# Patient Record
Sex: FEMALE | Race: BLACK OR AFRICAN AMERICAN | NOT HISPANIC OR LATINO | Employment: OTHER | URBAN - METROPOLITAN AREA
[De-identification: names, ages, dates, MRNs, and addresses within clinical notes are randomized per-mention and may not be internally consistent; named-entity substitution may affect disease eponyms.]

---

## 2017-04-12 ENCOUNTER — PATIENT OUTREACH (OUTPATIENT)
Dept: ADMINISTRATIVE | Facility: HOSPITAL | Age: 67
End: 2017-04-12

## 2017-04-12 NOTE — PROGRESS NOTES
Left message w/ the gentlemen who answered to have her call our office. Tried calling home number, I was unable to leave a message because the mailbox was full.    A letter was mailed to the patient on today in regards to the information below.    The patient is over due for a diabetes and hypertension follow up and fasting blood work w/ urine. The patient is also due for immunizations(pneumonia,tetanus,zoster, & flu), papsmear, mammogram, colonoscopy, and a diabetic eye and foot exam.

## 2017-09-25 DIAGNOSIS — E11.9 DIABETES MELLITUS WITHOUT COMPLICATION: Primary | ICD-10-CM

## 2021-08-24 ENCOUNTER — CLINICAL SUPPORT (OUTPATIENT)
Dept: URGENT CARE | Facility: CLINIC | Age: 71
End: 2021-08-24
Payer: MEDICARE

## 2021-08-24 DIAGNOSIS — Z78.9 NO KNOWN HEALTH PROBLEMS: Primary | ICD-10-CM

## 2021-08-24 DIAGNOSIS — U07.1 COVID-19 VIRUS DETECTED: ICD-10-CM

## 2021-08-24 LAB
CTP QC/QA: YES
SARS-COV-2 RDRP RESP QL NAA+PROBE: POSITIVE

## 2021-08-24 PROCEDURE — U0002 COVID-19 LAB TEST NON-CDC: HCPCS | Mod: QW,CR,S$GLB, | Performed by: FAMILY MEDICINE

## 2021-08-24 PROCEDURE — U0002: ICD-10-PCS | Mod: QW,CR,S$GLB, | Performed by: FAMILY MEDICINE

## 2021-08-24 PROCEDURE — 99211 OFF/OP EST MAY X REQ PHY/QHP: CPT | Mod: S$GLB,,, | Performed by: FAMILY MEDICINE

## 2021-08-24 PROCEDURE — 99211 PR OFFICE/OUTPT VISIT, EST, LEVL I: ICD-10-PCS | Mod: S$GLB,,, | Performed by: FAMILY MEDICINE

## 2022-01-31 ENCOUNTER — PATIENT OUTREACH (OUTPATIENT)
Dept: ADMINISTRATIVE | Facility: HOSPITAL | Age: 72
End: 2022-01-31
Payer: MEDICARE

## 2022-02-06 ENCOUNTER — HOSPITAL ENCOUNTER (INPATIENT)
Facility: HOSPITAL | Age: 72
LOS: 5 days | Discharge: HOME | DRG: 066 | End: 2022-02-11
Attending: EMERGENCY MEDICINE | Admitting: INTERNAL MEDICINE
Payer: MEDICARE

## 2022-02-06 DIAGNOSIS — E11.40 TYPE 2 DIABETES MELLITUS WITH DIABETIC NEUROPATHY: ICD-10-CM

## 2022-02-06 DIAGNOSIS — I10 HYPERTENSION, UNCONTROLLED: ICD-10-CM

## 2022-02-06 DIAGNOSIS — I65.01 VERTEBRAL ARTERY OCCLUSION, RIGHT: ICD-10-CM

## 2022-02-06 DIAGNOSIS — I63.9 STROKE: ICD-10-CM

## 2022-02-06 DIAGNOSIS — I63.9 CEREBROVASCULAR ACCIDENT (CVA), UNSPECIFIED MECHANISM: Primary | ICD-10-CM

## 2022-02-06 PROBLEM — D64.9 NORMOCYTIC ANEMIA: Status: ACTIVE | Noted: 2022-02-06

## 2022-02-06 PROBLEM — I63.211 CEREBROVASCULAR ACCIDENT (CVA) DUE TO OCCLUSION OF RIGHT VERTEBRAL ARTERY: Status: ACTIVE | Noted: 2022-02-06

## 2022-02-06 PROBLEM — F17.210 MODERATE CIGARETTE SMOKER (10-19 PER DAY): Status: ACTIVE | Noted: 2022-02-06

## 2022-02-06 PROBLEM — E66.3 OVERWEIGHT WITH BODY MASS INDEX (BMI) OF 25 TO 25.9 IN ADULT: Status: ACTIVE | Noted: 2022-02-06

## 2022-02-06 PROBLEM — E87.6 HYPOKALEMIA: Status: ACTIVE | Noted: 2022-02-06

## 2022-02-06 LAB
ALBUMIN SERPL BCP-MCNC: 3 G/DL (ref 3.5–5.2)
ALP SERPL-CCNC: 81 U/L (ref 55–135)
ALT SERPL W/O P-5'-P-CCNC: 6 U/L (ref 10–44)
ANION GAP SERPL CALC-SCNC: 14 MMOL/L (ref 8–16)
ANION GAP SERPL CALC-SCNC: 9 MMOL/L (ref 8–16)
APTT BLDCRRT: 26.3 SEC (ref 21–32)
AST SERPL-CCNC: 10 U/L (ref 10–40)
BASOPHILS # BLD AUTO: 0.02 K/UL (ref 0–0.2)
BASOPHILS NFR BLD: 0.3 % (ref 0–1.9)
BILIRUB SERPL-MCNC: 0.2 MG/DL (ref 0.1–1)
BUN SERPL-MCNC: 17 MG/DL (ref 8–23)
BUN SERPL-MCNC: 20 MG/DL (ref 6–30)
CALCIUM SERPL-MCNC: 8.9 MG/DL (ref 8.7–10.5)
CHLORIDE SERPL-SCNC: 104 MMOL/L (ref 95–110)
CHLORIDE SERPL-SCNC: 105 MMOL/L (ref 95–110)
CHOLEST SERPL-MCNC: 333 MG/DL (ref 120–199)
CHOLEST/HDLC SERPL: 6.7 {RATIO} (ref 2–5)
CO2 SERPL-SCNC: 24 MMOL/L (ref 23–29)
CREAT SERPL-MCNC: 1.1 MG/DL (ref 0.5–1.4)
CREAT SERPL-MCNC: 1.3 MG/DL (ref 0.5–1.4)
CTP QC/QA: YES
DIFFERENTIAL METHOD: ABNORMAL
EOSINOPHIL # BLD AUTO: 0.1 K/UL (ref 0–0.5)
EOSINOPHIL NFR BLD: 1.9 % (ref 0–8)
ERYTHROCYTE [DISTWIDTH] IN BLOOD BY AUTOMATED COUNT: 15.7 % (ref 11.5–14.5)
EST. GFR  (AFRICAN AMERICAN): 48 ML/MIN/1.73 M^2
EST. GFR  (NON AFRICAN AMERICAN): 42 ML/MIN/1.73 M^2
GLUCOSE SERPL-MCNC: 249 MG/DL (ref 70–110)
GLUCOSE SERPL-MCNC: 262 MG/DL (ref 70–110)
HCT VFR BLD AUTO: 34.2 % (ref 37–48.5)
HCT VFR BLD CALC: 37 %PCV (ref 36–54)
HDLC SERPL-MCNC: 50 MG/DL (ref 40–75)
HDLC SERPL: 15 % (ref 20–50)
HGB BLD-MCNC: 10.8 G/DL (ref 12–16)
IMM GRANULOCYTES # BLD AUTO: 0.02 K/UL (ref 0–0.04)
IMM GRANULOCYTES NFR BLD AUTO: 0.3 % (ref 0–0.5)
INR PPP: 0.9 (ref 0.8–1.2)
IRON SERPL-MCNC: 49 UG/DL (ref 30–160)
LDLC SERPL CALC-MCNC: 236 MG/DL (ref 63–159)
LYMPHOCYTES # BLD AUTO: 1.3 K/UL (ref 1–4.8)
LYMPHOCYTES NFR BLD: 19.9 % (ref 18–48)
MAGNESIUM SERPL-MCNC: 1.9 MG/DL (ref 1.6–2.6)
MCH RBC QN AUTO: 26.2 PG (ref 27–31)
MCHC RBC AUTO-ENTMCNC: 31.6 G/DL (ref 32–36)
MCV RBC AUTO: 83 FL (ref 82–98)
MONOCYTES # BLD AUTO: 0.4 K/UL (ref 0.3–1)
MONOCYTES NFR BLD: 6.6 % (ref 4–15)
NEUTROPHILS # BLD AUTO: 4.6 K/UL (ref 1.8–7.7)
NEUTROPHILS NFR BLD: 71 % (ref 38–73)
NONHDLC SERPL-MCNC: 283 MG/DL
NRBC BLD-RTO: 0 /100 WBC
PLATELET # BLD AUTO: 271 K/UL (ref 150–450)
PMV BLD AUTO: 10.4 FL (ref 9.2–12.9)
POC IONIZED CALCIUM: 1.11 MMOL/L (ref 1.06–1.42)
POC TCO2 (MEASURED): 28 MMOL/L (ref 23–29)
POCT GLUCOSE: 229 MG/DL (ref 70–110)
POCT GLUCOSE: 251 MG/DL (ref 70–110)
POTASSIUM BLD-SCNC: 3.6 MMOL/L (ref 3.5–5.1)
POTASSIUM SERPL-SCNC: 3.4 MMOL/L (ref 3.5–5.1)
PROT SERPL-MCNC: 7.1 G/DL (ref 6–8.4)
PROTHROMBIN TIME: 10 SEC (ref 9–12.5)
RBC # BLD AUTO: 4.12 M/UL (ref 4–5.4)
SAMPLE: ABNORMAL
SARS-COV-2 RDRP RESP QL NAA+PROBE: NEGATIVE
SATURATED IRON: 17 % (ref 20–50)
SODIUM BLD-SCNC: 141 MMOL/L (ref 136–145)
SODIUM SERPL-SCNC: 138 MMOL/L (ref 136–145)
TOTAL IRON BINDING CAPACITY: 293 UG/DL (ref 250–450)
TRANSFERRIN SERPL-MCNC: 198 MG/DL (ref 200–375)
TRIGL SERPL-MCNC: 235 MG/DL (ref 30–150)
TSH SERPL DL<=0.005 MIU/L-ACNC: 1.44 UIU/ML (ref 0.4–4)
WBC # BLD AUTO: 6.48 K/UL (ref 3.9–12.7)

## 2022-02-06 PROCEDURE — 20000000 HC ICU ROOM

## 2022-02-06 PROCEDURE — U0002 COVID-19 LAB TEST NON-CDC: HCPCS | Performed by: EMERGENCY MEDICINE

## 2022-02-06 PROCEDURE — 84443 ASSAY THYROID STIM HORMONE: CPT | Performed by: EMERGENCY MEDICINE

## 2022-02-06 PROCEDURE — 80061 LIPID PANEL: CPT | Performed by: EMERGENCY MEDICINE

## 2022-02-06 PROCEDURE — 93010 ELECTROCARDIOGRAM REPORT: CPT | Mod: ,,, | Performed by: INTERNAL MEDICINE

## 2022-02-06 PROCEDURE — 82330 ASSAY OF CALCIUM: CPT

## 2022-02-06 PROCEDURE — 84295 ASSAY OF SERUM SODIUM: CPT

## 2022-02-06 PROCEDURE — C9399 UNCLASSIFIED DRUGS OR BIOLOG: HCPCS | Performed by: INTERNAL MEDICINE

## 2022-02-06 PROCEDURE — 93005 ELECTROCARDIOGRAM TRACING: CPT

## 2022-02-06 PROCEDURE — 80053 COMPREHEN METABOLIC PANEL: CPT | Performed by: EMERGENCY MEDICINE

## 2022-02-06 PROCEDURE — 63600175 PHARM REV CODE 636 W HCPCS: Performed by: EMERGENCY MEDICINE

## 2022-02-06 PROCEDURE — 25000003 PHARM REV CODE 250: Performed by: INTERNAL MEDICINE

## 2022-02-06 PROCEDURE — 85610 PROTHROMBIN TIME: CPT | Performed by: EMERGENCY MEDICINE

## 2022-02-06 PROCEDURE — 85025 COMPLETE CBC W/AUTO DIFF WBC: CPT | Performed by: EMERGENCY MEDICINE

## 2022-02-06 PROCEDURE — 85730 THROMBOPLASTIN TIME PARTIAL: CPT | Performed by: EMERGENCY MEDICINE

## 2022-02-06 PROCEDURE — 82962 GLUCOSE BLOOD TEST: CPT

## 2022-02-06 PROCEDURE — 63600175 PHARM REV CODE 636 W HCPCS: Performed by: INTERNAL MEDICINE

## 2022-02-06 PROCEDURE — G0426 PR INPT TELEHEALTH CONSULT 50M: ICD-10-PCS | Mod: 95,,, | Performed by: PSYCHIATRY & NEUROLOGY

## 2022-02-06 PROCEDURE — 83735 ASSAY OF MAGNESIUM: CPT | Performed by: EMERGENCY MEDICINE

## 2022-02-06 PROCEDURE — 84466 ASSAY OF TRANSFERRIN: CPT | Performed by: INTERNAL MEDICINE

## 2022-02-06 PROCEDURE — 93010 EKG 12-LEAD: ICD-10-PCS | Mod: ,,, | Performed by: INTERNAL MEDICINE

## 2022-02-06 PROCEDURE — G0426 INPT/ED TELECONSULT50: HCPCS | Mod: 95,,, | Performed by: PSYCHIATRY & NEUROLOGY

## 2022-02-06 PROCEDURE — 82565 ASSAY OF CREATININE: CPT

## 2022-02-06 PROCEDURE — 99285 EMERGENCY DEPT VISIT HI MDM: CPT | Mod: 25

## 2022-02-06 PROCEDURE — 96374 THER/PROPH/DIAG INJ IV PUSH: CPT

## 2022-02-06 PROCEDURE — 25000003 PHARM REV CODE 250: Performed by: EMERGENCY MEDICINE

## 2022-02-06 PROCEDURE — 25500020 PHARM REV CODE 255: Performed by: EMERGENCY MEDICINE

## 2022-02-06 PROCEDURE — 36415 COLL VENOUS BLD VENIPUNCTURE: CPT | Performed by: INTERNAL MEDICINE

## 2022-02-06 PROCEDURE — 84132 ASSAY OF SERUM POTASSIUM: CPT

## 2022-02-06 PROCEDURE — 83036 HEMOGLOBIN GLYCOSYLATED A1C: CPT | Performed by: INTERNAL MEDICINE

## 2022-02-06 PROCEDURE — 99900035 HC TECH TIME PER 15 MIN (STAT)

## 2022-02-06 PROCEDURE — 85014 HEMATOCRIT: CPT

## 2022-02-06 RX ORDER — ATORVASTATIN CALCIUM 40 MG/1
80 TABLET, FILM COATED ORAL DAILY
Status: DISCONTINUED | OUTPATIENT
Start: 2022-02-07 | End: 2022-02-11 | Stop reason: HOSPADM

## 2022-02-06 RX ORDER — INSULIN ASPART 100 [IU]/ML
0-5 INJECTION, SOLUTION INTRAVENOUS; SUBCUTANEOUS EVERY 6 HOURS PRN
Status: DISCONTINUED | OUTPATIENT
Start: 2022-02-06 | End: 2022-02-10

## 2022-02-06 RX ORDER — LABETALOL HYDROCHLORIDE 5 MG/ML
10 INJECTION, SOLUTION INTRAVENOUS
Status: DISCONTINUED | OUTPATIENT
Start: 2022-02-06 | End: 2022-02-07

## 2022-02-06 RX ORDER — MULTIVITAMIN
1 TABLET ORAL DAILY
COMMUNITY

## 2022-02-06 RX ORDER — ONDANSETRON 2 MG/ML
4 INJECTION INTRAMUSCULAR; INTRAVENOUS EVERY 8 HOURS PRN
Status: DISCONTINUED | OUTPATIENT
Start: 2022-02-06 | End: 2022-02-11 | Stop reason: HOSPADM

## 2022-02-06 RX ORDER — GLUCAGON 1 MG
1 KIT INJECTION
Status: DISCONTINUED | OUTPATIENT
Start: 2022-02-06 | End: 2022-02-11 | Stop reason: HOSPADM

## 2022-02-06 RX ORDER — POLYETHYLENE GLYCOL 3350 17 G/17G
17 POWDER, FOR SOLUTION ORAL DAILY
Status: DISCONTINUED | OUTPATIENT
Start: 2022-02-07 | End: 2022-02-11 | Stop reason: HOSPADM

## 2022-02-06 RX ORDER — CLOPIDOGREL BISULFATE 75 MG/1
75 TABLET ORAL DAILY
Status: DISCONTINUED | OUTPATIENT
Start: 2022-02-07 | End: 2022-02-11 | Stop reason: HOSPADM

## 2022-02-06 RX ORDER — ENOXAPARIN SODIUM 100 MG/ML
40 INJECTION SUBCUTANEOUS EVERY 24 HOURS
Status: DISCONTINUED | OUTPATIENT
Start: 2022-02-06 | End: 2022-02-11 | Stop reason: HOSPADM

## 2022-02-06 RX ORDER — SODIUM CHLORIDE 9 MG/ML
INJECTION, SOLUTION INTRAVENOUS CONTINUOUS
Status: DISCONTINUED | OUTPATIENT
Start: 2022-02-06 | End: 2022-02-08

## 2022-02-06 RX ORDER — ASPIRIN 325 MG
325 TABLET ORAL DAILY
Status: ON HOLD | COMMUNITY
End: 2022-02-11 | Stop reason: HOSPADM

## 2022-02-06 RX ORDER — ASCORBIC ACID 500 MG
500 TABLET ORAL DAILY
COMMUNITY

## 2022-02-06 RX ORDER — LORAZEPAM 2 MG/ML
1 INJECTION INTRAMUSCULAR
Status: COMPLETED | OUTPATIENT
Start: 2022-02-06 | End: 2022-02-06

## 2022-02-06 RX ORDER — SODIUM CHLORIDE 0.9 % (FLUSH) 0.9 %
10 SYRINGE (ML) INJECTION
Status: DISCONTINUED | OUTPATIENT
Start: 2022-02-06 | End: 2022-02-11 | Stop reason: HOSPADM

## 2022-02-06 RX ORDER — IBUPROFEN 800 MG/1
800 TABLET ORAL DAILY
Status: ON HOLD | COMMUNITY
End: 2022-02-22 | Stop reason: HOSPADM

## 2022-02-06 RX ORDER — AMLODIPINE BESYLATE 10 MG/1
10 TABLET ORAL DAILY
Status: ON HOLD | COMMUNITY
End: 2022-02-22 | Stop reason: SDUPTHER

## 2022-02-06 RX ORDER — LABETALOL HYDROCHLORIDE 5 MG/ML
10 INJECTION, SOLUTION INTRAVENOUS
Status: COMPLETED | OUTPATIENT
Start: 2022-02-06 | End: 2022-02-06

## 2022-02-06 RX ORDER — ASPIRIN 81 MG/1
81 TABLET ORAL DAILY
Status: DISCONTINUED | OUTPATIENT
Start: 2022-02-07 | End: 2022-02-11 | Stop reason: HOSPADM

## 2022-02-06 RX ORDER — MUPIROCIN 20 MG/G
OINTMENT TOPICAL 2 TIMES DAILY
Status: DISCONTINUED | OUTPATIENT
Start: 2022-02-06 | End: 2022-02-11 | Stop reason: HOSPADM

## 2022-02-06 RX ORDER — CLOPIDOGREL 300 MG/1
600 TABLET, FILM COATED ORAL
Status: COMPLETED | OUTPATIENT
Start: 2022-02-06 | End: 2022-02-06

## 2022-02-06 RX ORDER — AMOXICILLIN 250 MG
1 CAPSULE ORAL 2 TIMES DAILY PRN
Status: DISCONTINUED | OUTPATIENT
Start: 2022-02-06 | End: 2022-02-11 | Stop reason: HOSPADM

## 2022-02-06 RX ORDER — ACETAMINOPHEN 325 MG/1
650 TABLET ORAL EVERY 6 HOURS PRN
Status: DISCONTINUED | OUTPATIENT
Start: 2022-02-06 | End: 2022-02-11 | Stop reason: HOSPADM

## 2022-02-06 RX ADMIN — ENOXAPARIN SODIUM 40 MG: 40 INJECTION SUBCUTANEOUS at 08:02

## 2022-02-06 RX ADMIN — POTASSIUM BICARBONATE 20 MEQ: 391 TABLET, EFFERVESCENT ORAL at 08:02

## 2022-02-06 RX ADMIN — INSULIN DETEMIR 5 UNITS: 100 INJECTION, SOLUTION SUBCUTANEOUS at 08:02

## 2022-02-06 RX ADMIN — CLOPIDOGREL BISULFATE 600 MG: 300 TABLET, FILM COATED ORAL at 05:02

## 2022-02-06 RX ADMIN — LORAZEPAM 1 MG: 2 INJECTION INTRAMUSCULAR; INTRAVENOUS at 08:02

## 2022-02-06 RX ADMIN — SODIUM CHLORIDE: 0.9 INJECTION, SOLUTION INTRAVENOUS at 11:02

## 2022-02-06 RX ADMIN — LABETALOL HYDROCHLORIDE 10 MG: 5 INJECTION INTRAVENOUS at 04:02

## 2022-02-06 RX ADMIN — IOHEXOL 75 ML: 350 INJECTION, SOLUTION INTRAVENOUS at 04:02

## 2022-02-06 NOTE — ED NOTES
Dr. Vivar in triage assessing patient. Stroke alert called. Glucose obtained 251. Pt wheeled to CT.

## 2022-02-06 NOTE — HPI
Pt is in the [facility] ED. Presenting with aphasia, dizziness and slurred speech. Symptoms started at 2:30 pm. Per ER nurse, patient symptoms are resolved.         Current Facility-Administered Medications:     labetaloL injection 10 mg, 10 mg, Intravenous, ED 1 Time, Mike Vivar MD    Current Outpatient Medications:     aspirin (ECOTRIN) 81 MG EC tablet, Take 1 tablet (81 mg total) by mouth once daily., Disp: 30 tablet, Rfl: 11    atorvastatin (LIPITOR) 80 MG tablet, Take 1 tablet (80 mg total) by mouth once daily., Disp: 90 tablet, Rfl: 3    cloNIDine (CATAPRES) 0.1 MG tablet, Take 0.1 mg by mouth 2 (two) times daily., Disp: , Rfl:     glimepiride (AMARYL) 2 MG tablet, Take 1 tablet (2 mg total) by mouth 2 (two) times daily., Disp: 60 tablet, Rfl: 11    lisinopril 10 MG tablet, Take 1 tablet (10 mg total) by mouth once daily., Disp: 30 tablet, Rfl: 2    meclizine (ANTIVERT) 25 mg tablet, Take 1 tablet (25 mg total) by mouth every 6 (six) hours as needed., Disp: 20 tablet, Rfl: 0    metformin (GLUCOPHAGE) 500 MG tablet, Take 1 tablet (500 mg total) by mouth daily with breakfast., Disp: 30 tablet, Rfl: 2    ondansetron (ZOFRAN) 4 MG tablet, Take 1 tablet (4 mg total) by mouth every 8 (eight) hours as needed (Nausea and vomiting)., Disp: 12 tablet, Rfl: 0    polyethylene glycol (COLYTE) 240-22.72-6.72 -5.84 gram SolR, Take 4,000 mLs (4 L total) by mouth as directed., Disp: 1 Bottle, Rfl: 0

## 2022-02-06 NOTE — ED PROVIDER NOTES
EM PHYSICIAN NOTE  SCRIBE #1 NOTE: I, Michael Romano, am scribing for, and in the presence of,  Mike Vivar MD. I have scribed the following portions of the note - Other sections scribed: HPI, ROS, PE.         HPI  This patient presents with a complaint of hypertension  Chief Complaint   Patient presents with    Hypertension     Pt reports /115 @ work 45 mins ago. Pt states she took norvasc 10, , and clondine 0.1 PTA.    Dizziness     Dizziness when she woke up.     Speech Problem     Slurred speech      Cerebrovascular Accident       HPI: 71 y.o. female, with a pertinent past medical history of hypertension presents to the ED with hypertension. Pt states that she was at work and around 1430 and she began to feel a variety of symptoms. Pt reports experiencing lightheadedness, blurry vision, slurred speech, dizziness, tingling in right arm and leg. Pt then measured her blood pressure and it was 215/115. Pt states that she then took a aspirin and clonidine 0.1 and all of her symptoms resolved. Pt denies being on blood thinners. No other exacerbating or alleviating factors. Patient denies other associated symptoms.       REVIEW of PMH, SOC History and Family History:  Past Medical History:   Diagnosis Date    Diabetes mellitus     Hyperlipidemia     Hypertension      Social history noncontributory  Family history noncontributory    Review of patient's allergies indicates:   Allergen Reactions    Ampicillin Rash    Darvocet a500 [propoxyphene n-acetaminophen] Other (See Comments)     shaky           REVIEW of SYSTEMS  Source: Patient  The nurse's notes and triage vital signs were reviewed.  GENERAL/CONSTITUTIONAL: SEE HPI  CARDIOVASCULAR: There is no report of chest pain   RESPIRATORY: There is no report of cough or SOB  GASTROINTESTINAL: There is no report of nausea, vomiting, diarrhea  MUSCULOSKELETAL: SEE HPI  SKIN AND BREASTS: There is no report of easy bruising, skin redness, skin  "rash.  HEMATOLOGIC/LYMPHATIC: There is no report of anemia, bleeding or clotting defects. There is no report of anticoagulant use.  The remainder of the ROS is negative.        PHYSICAL EXAMINATION    ED Triage Vitals [02/06/22 1513]   Enc Vitals Group      BP (!) 241/109      Pulse 94      Resp 18      Temp 97.9 °F (36.6 °C)      Temp src Oral      SpO2 100 %      Weight 160 lb      Height 5' 7"      Head Circumference       Peak Flow       Pain Score       Pain Loc       Pain Edu?       Excl. in GC?      Vital signs and Pulse Ox reviewed in clinical context. Abnormalities noted: hypertension  Pt's level of consciousness is alert, and the patient is in mild distress. No visual field defects. Extra ocular motion intact. No dysmetria. No pronator drift.  Skin: warm, pink and dry.  Capillary refill is less than 2 seconds.  Mucosa:moist  Head and Neck: WNL  Cardiac exam: RRR  Pulmonary exam: unlabored and clear  Abd Exam: soft nontender   Musculoskeletal: no joint tenderness, deformity or swelling. Normal strength and sensation of bilateral upper and lower extremities.  Neurologic: GCS: GCS 15; 5 over 5 strength, cranial nerves intact, neck supple.  The patient has no nystagmus.  There is normal finger to nose and rapid alternating hand movements.  There is no ataxia.  There are no visual field defects. EOMi         Initial Impression:  TIA, hypertensive urgency, stroke-like symptoms  Plan:  Stroke activation  Mike Vivar MD, 3:33 PM 2/6/2022      Medical decision making:   Nurses notes and Vital Signs reviewed.  Orders Placed This Encounter   Procedures    CT Head Without Contrast    CTA Head and Neck (xpd)    MRI Brain Ischemic Inter Pro Incl MRA W/O Con    X-Ray Chest AP Portable    CBC W/ AUTO DIFFERENTIAL    Comprehensive metabolic panel    TSH    LDL - Lipid Panel    Protime-INR    APTT    Vitamin B12    Folate    Iron and TIBC    Magnesium    Urinalysis, Reflex to Urine Culture Urine, Clean " Catch    Magnesium    Comprehensive metabolic panel    Magnesium    Phosphorus    Hemoglobin A1c    Troponin I    CK-MB    CBC auto differential    APTT    Protime-INR    Urinalysis, Reflex to Urine Culture Urine, Clean Catch    Sedimentation rate    Urinalysis Microscopic    Diet diabetic [facility]; 2000 Calorie    Vital signs    Nursing swallow assessment No food, oral fluids, or oral meds until after patient passes screen. Notify MD of Results    Vital signs    Check temperature    Cardiac Monitoring - Adult    Neuro checks:  LOC, Orientation, GCS, Speech, Gaze, Pupils, Visual Fields, Facial Symmetry, Motor, Sensation, Neglect, Ataxia    Intake and output    Measure height or length    Weigh patient    Skin assessment    Passive range of motion to affected extremity    Provide stroke education    Contact MD / APC if patient requires a face mask to maintain adequate oxygenation    Straight Cath    Tobacco cessation counseling    Nursing communication-     Nursing communication    Nursing communication    Toilet out of bed or at bedside commode    Notify Provider    Complete Renner Screening Assessment    Place sequential compression device    Notify Stroke Coordinator    Notify Provider    Notify Provider    Place MIRIAN hose    Neuro checks:  LOC, Orientation, GCS    If any glucose result is less than 50 or greater than 400:    If 2nd result is less than 50 or greater than 400:    Re-check Blood Glucose    Nursing swallow assessment Please notify MD if fails nursing bedside swallow study    Notify Provider    Full code    Consult to Telemedicine - Acute Stroke    IP consult to case management/social work    Inpatient consult to Neurology    Inpatient consult to Social Work/Case Management    OT evaluate and treat    PT evaluate and treat    Oxygen Continuous    Pulse Oximetry Q4H    CARDIAC MONITORING STRIPS    POCT glucose    POCT COVID-19 Rapid  Screening    ECG 12 lead    Echo Saline Bubble? Yes    Insert peripheral IV    Insert saline lock    Possible Hospitalization    Admit to Inpatient    Transfer patient    Aspiration precautions    Fall precautions       ED Course as of 02/07/22 1408   Sun Feb 06, 2022   1536 BP(!): 241/109 [MH]   1618 Per Dr. De Oliveira, if patient has evidence of large vessel occlusion please transfer to [facility] otherwise start Plavix and aspirin and admit to our campus [MH]   1656 CTA:     Absence of flow within the V3 segment right vertebral artery, suggestive of occlusion.  MRI of the brain obtained for further evaluation.     Otherwise, unremarkable CTA of the head and neck.     Case discussed with Dr. Vivar 02/06/2022 at 16:49. [MH]   1706 Patient received the dose of labetalol prior to the decision by the neurologist that the patient was not a candidate for tPA.  At that time the patient remains hypertensive but I will not repeat the dose of labetalol. [MH]   1727 CMP reveals potassium of 3.4.  Glucose is 262. Anion gap is normal. [MH]   1727 There is anemia on the CBC with an H&H of 10 and 34.  [MH]   1825 Pt had episode of slurred speech lasting 2-3 minutes associated with bilateral arm tremors.  BP elevated.  No focal motor deficits.  No facial droop.  No visual field deficits.  No aphasia. [MH]   1830 I am concerned about PRES vs stuttering stroke symptoms.  Neuro consult initiated [MH]   1833 Discussed with Dr. Albright: request MRI now.  No heparin or nicardipine yet, will base on results of MRI. [MH]   1908 Called [facility] to call in MRI []      ED Course User Index  [] Mike Vivar MD       Diagnoses that have been ruled out:   None   Diagnoses that are still under consideration:   None   Final diagnoses:   Stroke              This note was created using Dictation Software.  This program may occasionally misinterpret certain words and phrases.      SCRIBE ATTESTATION NOTE:  I attest that I personally  performed the services documented by the scribe and acknowledged and confirm the content of the note.   Nurses notes were reviewed.  Mike Vivar        Nurses notes were reviewed.      CRITICAL CARE TIME:  These services included the following: chart data review, reviewing nursing notes and researching old charts from internal and external sources, documentation time, consultant collaboration regarding findings and treatment options, medication orders and management, direct patient care, vital sign assessments, physical exam reassessments, and ordering, interpreting and reviewing diagnostic studies/lab tests.    Aggregate critical care time was approximately 35 minutes, which includes only time during which I was engaged in work directly related to the patient's care, as described above, whether at the bedside or elsewhere in the Emergency Department.  It did not include time spent performing other reported procedures or the services of residents, students, nurses or physician assistants.              Scribe Attestation:   Scribe #1: I performed the above scribed service and the documentation accurately describes the services I performed. I attest to the accuracy of the note.         ED Disposition Condition    Admit            Mike Vivar MD  02/07/22 1284

## 2022-02-06 NOTE — SUBJECTIVE & OBJECTIVE
"  Woke up with symptoms?: no    Recent bleeding noted: no  Does the patient take any Blood Thinners? yes  Medications: Antiplatelets:  aspirin      Past Medical History: hypertension    Past Surgical History: no major surgeries within the last 2 weeks    Family History: no relevant history    Social History: unable to obtain    Allergies: Ampicillin  Darvocet A500 [Propoxyphene N-Acetaminophen] No relevant allergies    Review of Systems   Constitutional: Positive for activity change.     Objective:   Vitals: Blood pressure (!) 241/109, pulse 94, temperature 97.9 °F (36.6 °C), temperature source Oral, resp. rate 18, height 5' 7" (1.702 m), weight 72.6 kg (160 lb), SpO2 100 %. BP: 240/109    CT READ: Yes  No hemmorhage. No mass effect. No early infarct signs.     Physical Exam  Neurological:      Mental Status: She is alert.           "

## 2022-02-06 NOTE — CONSULTS
[facility]  Vascular Neurology  Comprehensive Stroke Center  TeleVascular Neurology Acute Consultation Note      Consults    Consulting Provider: NASH SINGH  Current Providers  No providers found    Patient Location:  [facility] EMERGENCY DEPARTMENT Emergency Department  Spoke hospital nurse at bedside with patient assisting consultant.     Patient information was obtained from patient.         Assessment/Plan:     Pt is in the [facility] ED. Presenting with aphasia, dizziness and slurred speech. Symptoms started at 2:30 pm. Per ER nurse, patient symptoms are resolved. On my tele-evaluation, NIHSS=0.       CT head per ER physician is no acute intra-cranial process. I tried to retreive it and it does not show up in my PACS.She does not appear large vessel occlusion but I recommend obtaining CTA head and neck with and without contrast. It looks TIA. She is not TPA candidate as her NIHSS=0. No LVO.     Rectal aspirin 300 mg now.  Head of bed flat, IV Fluids, permissive hypertension  CTA head and neck with and without contrast.  Neuro consult if in house available or transfer for neuro consult  Stroke/TIA work-up with MRI brain, Echo, PT/OT/ Speech and swallow evaluation.  They will call me with CTA results if abnormal.  As CTA shows right V 3 occlusion, recommend loading Plavix load 600 mg today per POINT/CHANCE protocol today and from tomorrow 81 mg aspirin and plavix 75 mg for 21 days followed by mono antiplatelet.  Admit with Q 2 neuro checks.   Case discussed with ER physician.       Diagnoses:     Stroke like symptoms     STROKE DOCUMENTATION     Acute Stroke Times:   Acute Stroke Times   Last Known Normal Date: 02/06/22  Last Known Normal Time: 1430  Symptom Onset Date: 02/06/22  Symptom Onset Time: 1430  Stroke Team Called Date: 02/06/22  Stroke Team Called Time: 1533  Stroke Team Arrival Date: 02/06/22  Stroke Team Arrival Time: 1536    NIH Scale:  1a. Level of  "Consciousness: 0-->Alert, keenly responsive  1b. LOC Questions: 0-->Answers both questions correctly  1c. LOC Commands: 0-->Performs both tasks correctly  2. Best Gaze: 0-->Normal  3. Visual: 0-->No visual loss  4. Facial Palsy: 0-->Normal symmetrical movements  5a. Motor Arm, Left: 0-->No drift, limb holds 90 (or 45) degrees for full 10 secs  5b. Motor Arm, Right: 0-->No drift, limb holds 90 (or 45) degrees for full 10 secs  6a. Motor Leg, Left: 0-->No drift, leg holds 30 degree position for full 5 secs  6b. Motor Leg, Right: 0-->No drift, leg holds 30 degree position for full 5 secs  7. Limb Ataxia: 0-->Absent  8. Sensory: 0-->Normal, no sensory loss  9. Best Language: 0-->No aphasia, normal  10. Dysarthria: 0-->Normal  11. Extinction and Inattention (formerly Neglect): 0-->No abnormality  Total (NIH Stroke Scale): 0     Modified Stephens    Josh Coma Scale:    ABCD2 Score:    [id number] Score:   HAS -BLED Score:   ICH Score:   Hunt & Marques Classification:       Blood pressure (!) 241/109, pulse 94, temperature 97.9 °F (36.6 °C), temperature source Oral, resp. rate 18, height 5' 7" (1.702 m), weight 72.6 kg (160 lb), SpO2 100 %.  Alteplase Eligible?: Yes  Alteplase Recommendation: Alteplase not recommended due to Patient back to neurological baseline  Possible Interventional Revascularization Candidate? Awaiting CTA results from Spoke for determination     Disposition Recommendation: admit to inpatient    Subjective:     History of Present Illness:  Pt is in the [facility] ED. Presenting with aphasia, dizziness and slurred speech. Symptoms started at 2:30 pm. Per ER nurse, patient symptoms are resolved      Woke up with symptoms?: no    Recent bleeding noted: no  Does the patient take any Blood Thinners? yes  Medications: Antiplatelets:  aspirin      Past Medical History: hypertension    Past Surgical History: no major surgeries within the last 2 weeks    Family History: no relevant history    Social History: " "unable to obtain    Allergies: Ampicillin  Darvocet A500 [Propoxyphene N-Acetaminophen] No relevant allergies    Review of Systems   Constitutional: Positive for activity change.     Objective:   Vitals: Blood pressure (!) 241/109, pulse 94, temperature 97.9 °F (36.6 °C), temperature source Oral, resp. rate 18, height 5' 7" (1.702 m), weight 72.6 kg (160 lb), SpO2 100 %. BP: 240/109    CT READ: Yes  No hemmorhage. No mass effect. No early infarct signs.     Physical Exam  Neurological:      Mental Status: She is alert.               Recommended the emergency room physician to have a brief discussion with the patient and/or family if available regarding the risks and benefits of treatment, and to briefly document the occurrence of that discussion in his clinical encounter note.     The attending portion of this evaluation, treatment, and documentation was performed per Álvaro De Oliveira MD via audiovisual.    Billing code:  (non-intervention mild to moderate stroke, TIA, some mimics)    · This patient has a critical neurological condition/illness, with some potential for high morbidity and mortality.  · There is a moderate probability for acute neurological change leading to clinical and possibly life-threatening deterioration requiring highest level of physician preparedness for urgent intervention.  · Care was coordinated with other physicians involved in the patient's care.  · Radiologic studies and laboratory data were reviewed and interpreted, and plan of care was re-assessed based on the results.  · Diagnosis, treatment options and prognosis may have been discussed with the patient and/or family members or caregiver.      In your opinion, this was a: Tier 1 Van Negative    Consult End Time: 5:08 PM     Álvaro De Oliveira MD  Comprehensive Stroke Center  Vascular Neurology   [facility]  "

## 2022-02-07 PROBLEM — I63.9 CEREBRAL VASCULAR ACCIDENT: Status: ACTIVE | Noted: 2022-02-06

## 2022-02-07 LAB
ALBUMIN SERPL BCP-MCNC: 2.6 G/DL (ref 3.5–5.2)
ALP SERPL-CCNC: 66 U/L (ref 55–135)
ALT SERPL W/O P-5'-P-CCNC: 5 U/L (ref 10–44)
ANION GAP SERPL CALC-SCNC: 9 MMOL/L (ref 8–16)
AORTIC ROOT ANNULUS: 3.09 CM
AORTIC VALVE CUSP SEPERATION: 1.74 CM
APTT BLDCRRT: 28.1 SEC (ref 21–32)
ASCENDING AORTA: 3.06 CM
AST SERPL-CCNC: 8 U/L (ref 10–40)
AV INDEX (PROSTH): 0.58
AV MEAN GRADIENT: 11 MMHG
AV PEAK GRADIENT: 17 MMHG
AV VALVE AREA: 2.15 CM2
AV VELOCITY RATIO: 0.58
BACTERIA #/AREA URNS HPF: ABNORMAL /HPF
BASOPHILS # BLD AUTO: 0.02 K/UL (ref 0–0.2)
BASOPHILS NFR BLD: 0.4 % (ref 0–1.9)
BILIRUB SERPL-MCNC: 0.2 MG/DL (ref 0.1–1)
BILIRUB UR QL STRIP: NEGATIVE
BILIRUB UR QL STRIP: NEGATIVE
BSA FOR ECHO PROCEDURE: 1.94 M2
BUN SERPL-MCNC: 16 MG/DL (ref 8–23)
CALCIUM SERPL-MCNC: 8.4 MG/DL (ref 8.7–10.5)
CHLORIDE SERPL-SCNC: 108 MMOL/L (ref 95–110)
CK MB SERPL-MCNC: 1.4 NG/ML (ref 0.1–6.5)
CK MB SERPL-RTO: 2.5 % (ref 0–5)
CK SERPL-CCNC: 55 U/L (ref 20–180)
CLARITY UR: CLEAR
CLARITY UR: CLEAR
CO2 SERPL-SCNC: 24 MMOL/L (ref 23–29)
COLOR UR: YELLOW
COLOR UR: YELLOW
CREAT SERPL-MCNC: 0.9 MG/DL (ref 0.5–1.4)
CV ECHO LV RWT: 0.89 CM
DIFFERENTIAL METHOD: ABNORMAL
DOP CALC AO PEAK VEL: 2.05 M/S
DOP CALC AO VTI: 51.8 CM
DOP CALC LVOT AREA: 3.7 CM2
DOP CALC LVOT DIAMETER: 2.18 CM
DOP CALC LVOT PEAK VEL: 1.18 M/S
DOP CALC LVOT STROKE VOLUME: 111.25 CM3
DOP CALCLVOT PEAK VEL VTI: 29.82 CM
E WAVE DECELERATION TIME: 233.08 MSEC
E/A RATIO: 1.09
E/E' RATIO: 19.5 M/S
ECHO LV POSTERIOR WALL: 1.82 CM (ref 0.6–1.1)
EJECTION FRACTION: 65 %
EOSINOPHIL # BLD AUTO: 0.1 K/UL (ref 0–0.5)
EOSINOPHIL NFR BLD: 1.9 % (ref 0–8)
ERYTHROCYTE [DISTWIDTH] IN BLOOD BY AUTOMATED COUNT: 15.7 % (ref 11.5–14.5)
ERYTHROCYTE [SEDIMENTATION RATE] IN BLOOD BY WESTERGREN METHOD: 70 MM/HR (ref 0–20)
EST. GFR  (AFRICAN AMERICAN): >60 ML/MIN/1.73 M^2
EST. GFR  (NON AFRICAN AMERICAN): >60 ML/MIN/1.73 M^2
ESTIMATED AVG GLUCOSE: 171 MG/DL (ref 68–131)
FOLATE SERPL-MCNC: 5.9 NG/ML (ref 4–24)
FRACTIONAL SHORTENING: 31 % (ref 28–44)
GLUCOSE SERPL-MCNC: 133 MG/DL (ref 70–110)
GLUCOSE UR QL STRIP: ABNORMAL
GLUCOSE UR QL STRIP: ABNORMAL
HBA1C MFR BLD: 7.6 % (ref 4–5.6)
HCT VFR BLD AUTO: 31.4 % (ref 37–48.5)
HGB BLD-MCNC: 9.9 G/DL (ref 12–16)
HGB UR QL STRIP: ABNORMAL
HGB UR QL STRIP: ABNORMAL
HYALINE CASTS #/AREA URNS LPF: 0 /LPF
IMM GRANULOCYTES # BLD AUTO: 0.01 K/UL (ref 0–0.04)
IMM GRANULOCYTES NFR BLD AUTO: 0.2 % (ref 0–0.5)
INR PPP: 1 (ref 0.8–1.2)
INTERVENTRICULAR SEPTUM: 2.02 CM (ref 0.6–1.1)
IVRT: 149.94 MSEC
KETONES UR QL STRIP: NEGATIVE
KETONES UR QL STRIP: NEGATIVE
LA MAJOR: 6.61 CM
LA MINOR: 6.08 CM
LA WIDTH: 4.79 CM
LEFT ATRIUM SIZE: 4.07 CM
LEFT ATRIUM VOLUME INDEX: 55 ML/M2
LEFT ATRIUM VOLUME: 104.96 CM3
LEFT INTERNAL DIMENSION IN SYSTOLE: 2.81 CM (ref 2.1–4)
LEFT VENTRICLE DIASTOLIC VOLUME INDEX: 38.65 ML/M2
LEFT VENTRICLE DIASTOLIC VOLUME: 73.83 ML
LEFT VENTRICLE MASS INDEX: 188 G/M2
LEFT VENTRICLE SYSTOLIC VOLUME INDEX: 15.7 ML/M2
LEFT VENTRICLE SYSTOLIC VOLUME: 29.94 ML
LEFT VENTRICULAR INTERNAL DIMENSION IN DIASTOLE: 4.09 CM (ref 3.5–6)
LEFT VENTRICULAR MASS: 358.58 G
LEUKOCYTE ESTERASE UR QL STRIP: NEGATIVE
LEUKOCYTE ESTERASE UR QL STRIP: NEGATIVE
LV LATERAL E/E' RATIO: 16.71 M/S
LV SEPTAL E/E' RATIO: 23.4 M/S
LYMPHOCYTES # BLD AUTO: 1.7 K/UL (ref 1–4.8)
LYMPHOCYTES NFR BLD: 30.6 % (ref 18–48)
MAGNESIUM SERPL-MCNC: 1.9 MG/DL (ref 1.6–2.6)
MCH RBC QN AUTO: 26 PG (ref 27–31)
MCHC RBC AUTO-ENTMCNC: 31.5 G/DL (ref 32–36)
MCV RBC AUTO: 82 FL (ref 82–98)
MICROSCOPIC COMMENT: ABNORMAL
MONOCYTES # BLD AUTO: 0.5 K/UL (ref 0.3–1)
MONOCYTES NFR BLD: 9 % (ref 4–15)
MV PEAK A VEL: 1.07 M/S
MV PEAK E VEL: 1.17 M/S
NEUTROPHILS # BLD AUTO: 3.3 K/UL (ref 1.8–7.7)
NEUTROPHILS NFR BLD: 57.9 % (ref 38–73)
NITRITE UR QL STRIP: NEGATIVE
NITRITE UR QL STRIP: NEGATIVE
NRBC BLD-RTO: 0 /100 WBC
PH UR STRIP: 6 [PH] (ref 5–8)
PH UR STRIP: 6 [PH] (ref 5–8)
PHOSPHATE SERPL-MCNC: 2.9 MG/DL (ref 2.7–4.5)
PISA TR MAX VEL: 2.39 M/S
PLATELET # BLD AUTO: 267 K/UL (ref 150–450)
PMV BLD AUTO: 10.6 FL (ref 9.2–12.9)
POCT GLUCOSE: 140 MG/DL (ref 70–110)
POCT GLUCOSE: 148 MG/DL (ref 70–110)
POCT GLUCOSE: 161 MG/DL (ref 70–110)
POCT GLUCOSE: 176 MG/DL (ref 70–110)
POTASSIUM SERPL-SCNC: 3.2 MMOL/L (ref 3.5–5.1)
PROT SERPL-MCNC: 6.1 G/DL (ref 6–8.4)
PROT UR QL STRIP: ABNORMAL
PROT UR QL STRIP: ABNORMAL
PROTHROMBIN TIME: 10.3 SEC (ref 9–12.5)
PULM VEIN S/D RATIO: 1.59
PV PEAK D VEL: 0.49 M/S
PV PEAK S VEL: 0.78 M/S
PV PEAK VELOCITY: 0.83 CM/S
RA MAJOR: 6.15 CM
RA PRESSURE: 8 MMHG
RA WIDTH: 4.66 CM
RBC # BLD AUTO: 3.81 M/UL (ref 4–5.4)
RBC #/AREA URNS HPF: 6 /HPF (ref 0–4)
RIGHT VENTRICULAR END-DIASTOLIC DIMENSION: 3.96 CM
RV TISSUE DOPPLER FREE WALL SYSTOLIC VELOCITY 1 (APICAL 4 CHAMBER VIEW): 12.79 CM/S
SINUS: 3.23 CM
SODIUM SERPL-SCNC: 141 MMOL/L (ref 136–145)
SP GR UR STRIP: >1.03 (ref 1–1.03)
SP GR UR STRIP: >1.03 (ref 1–1.03)
SQUAMOUS #/AREA URNS HPF: 5 /HPF
STJ: 2.91 CM
TDI LATERAL: 0.07 M/S
TDI SEPTAL: 0.05 M/S
TDI: 0.06 M/S
TR MAX PG: 23 MMHG
TROPONIN I SERPL DL<=0.01 NG/ML-MCNC: 0.02 NG/ML (ref 0–0.03)
TV REST PULMONARY ARTERY PRESSURE: 31 MMHG
URN SPEC COLLECT METH UR: ABNORMAL
URN SPEC COLLECT METH UR: ABNORMAL
UROBILINOGEN UR STRIP-ACNC: NEGATIVE EU/DL
UROBILINOGEN UR STRIP-ACNC: NEGATIVE EU/DL
VIT B12 SERPL-MCNC: 302 PG/ML (ref 210–950)
WBC # BLD AUTO: 5.65 K/UL (ref 3.9–12.7)
WBC #/AREA URNS HPF: 2 /HPF (ref 0–5)

## 2022-02-07 PROCEDURE — 85025 COMPLETE CBC W/AUTO DIFF WBC: CPT | Performed by: INTERNAL MEDICINE

## 2022-02-07 PROCEDURE — 84484 ASSAY OF TROPONIN QUANT: CPT | Performed by: INTERNAL MEDICINE

## 2022-02-07 PROCEDURE — 25000003 PHARM REV CODE 250: Performed by: INTERNAL MEDICINE

## 2022-02-07 PROCEDURE — 82746 ASSAY OF FOLIC ACID SERUM: CPT | Performed by: INTERNAL MEDICINE

## 2022-02-07 PROCEDURE — 83735 ASSAY OF MAGNESIUM: CPT | Performed by: INTERNAL MEDICINE

## 2022-02-07 PROCEDURE — 82553 CREATINE MB FRACTION: CPT | Performed by: INTERNAL MEDICINE

## 2022-02-07 PROCEDURE — 99222 1ST HOSP IP/OBS MODERATE 55: CPT | Mod: ,,, | Performed by: PSYCHIATRY & NEUROLOGY

## 2022-02-07 PROCEDURE — 84100 ASSAY OF PHOSPHORUS: CPT | Performed by: INTERNAL MEDICINE

## 2022-02-07 PROCEDURE — 25000003 PHARM REV CODE 250: Performed by: STUDENT IN AN ORGANIZED HEALTH CARE EDUCATION/TRAINING PROGRAM

## 2022-02-07 PROCEDURE — 92610 EVALUATE SWALLOWING FUNCTION: CPT

## 2022-02-07 PROCEDURE — 85610 PROTHROMBIN TIME: CPT | Performed by: INTERNAL MEDICINE

## 2022-02-07 PROCEDURE — 85730 THROMBOPLASTIN TIME PARTIAL: CPT | Performed by: INTERNAL MEDICINE

## 2022-02-07 PROCEDURE — 82607 VITAMIN B-12: CPT | Performed by: INTERNAL MEDICINE

## 2022-02-07 PROCEDURE — 94761 N-INVAS EAR/PLS OXIMETRY MLT: CPT

## 2022-02-07 PROCEDURE — 63600175 PHARM REV CODE 636 W HCPCS: Performed by: INTERNAL MEDICINE

## 2022-02-07 PROCEDURE — 80053 COMPREHEN METABOLIC PANEL: CPT | Performed by: INTERNAL MEDICINE

## 2022-02-07 PROCEDURE — 81000 URINALYSIS NONAUTO W/SCOPE: CPT | Performed by: INTERNAL MEDICINE

## 2022-02-07 PROCEDURE — 85652 RBC SED RATE AUTOMATED: CPT | Performed by: INTERNAL MEDICINE

## 2022-02-07 PROCEDURE — 97161 PT EVAL LOW COMPLEX 20 MIN: CPT

## 2022-02-07 PROCEDURE — 99222 PR INITIAL HOSPITAL CARE,LEVL II: ICD-10-PCS | Mod: ,,, | Performed by: PSYCHIATRY & NEUROLOGY

## 2022-02-07 PROCEDURE — 97165 OT EVAL LOW COMPLEX 30 MIN: CPT

## 2022-02-07 PROCEDURE — 11000001 HC ACUTE MED/SURG PRIVATE ROOM

## 2022-02-07 PROCEDURE — 36415 COLL VENOUS BLD VENIPUNCTURE: CPT | Performed by: INTERNAL MEDICINE

## 2022-02-07 RX ORDER — LABETALOL HYDROCHLORIDE 5 MG/ML
10 INJECTION, SOLUTION INTRAVENOUS EVERY 4 HOURS PRN
Status: DISCONTINUED | OUTPATIENT
Start: 2022-02-07 | End: 2022-02-11 | Stop reason: HOSPADM

## 2022-02-07 RX ADMIN — ATORVASTATIN CALCIUM 80 MG: 40 TABLET, FILM COATED ORAL at 09:02

## 2022-02-07 RX ADMIN — ASPIRIN 81 MG: 81 TABLET, COATED ORAL at 09:02

## 2022-02-07 RX ADMIN — CLOPIDOGREL 75 MG: 75 TABLET, FILM COATED ORAL at 09:02

## 2022-02-07 RX ADMIN — LABETALOL HYDROCHLORIDE 10 MG: 5 INJECTION INTRAVENOUS at 09:02

## 2022-02-07 RX ADMIN — ENOXAPARIN SODIUM 40 MG: 40 INJECTION SUBCUTANEOUS at 05:02

## 2022-02-07 RX ADMIN — MUPIROCIN: 20 OINTMENT TOPICAL at 09:02

## 2022-02-07 RX ADMIN — LABETALOL HYDROCHLORIDE 10 MG: 5 INJECTION INTRAVENOUS at 08:02

## 2022-02-07 NOTE — CONSULTS
[facility] - Intensive Care  Neurology  Consult Note    Patient Name: Joanne Peña  MRN: [id number]  Admission Date: 2/6/2022  Hospital Length of Stay: 1 days  Code Status: Full Code   Attending Provider: Jaguar Mancia MD   Consulting Provider: Devonte Albright MD  Primary Care Physician: Marielle Gibson MD  Principal Problem:Cerebrovascular accident (CVA) due to occlusion of right vertebral artery    Inpatient consult to Neurology  Consult performed by: Devonte Albright MD  Consult ordered by: SHEA Suazo MD        Subjective:     Chief Complaint:  Dizziness     HPI: 70 y/o female with Medical Hx of HTN, DM, hyperlipidemia states that yesterday in the morning she began to experience dizziness and nausea. Also noted slurred speech. She was at work for which she delayed to seek medical care until around 2:30 pm when decided to come to ED. Pt arrived to this facility around 3:22 pm. Stroke CODE was made. No thrombolytics as NIHSS was 0. Another episode of dysarthria occurred at 6:29 pm. Pt tells me that this resolved. She is currently feeling better. No slurred speech, weakness or numbness of limbs but complains of lightheadedness.    Past Medical History:   Diagnosis Date    Diabetes mellitus     Hyperlipidemia     Hypertension        No past surgical history on file.    Review of patient's allergies indicates:   Allergen Reactions    Ampicillin Rash    Darvocet a500 [propoxyphene n-acetaminophen] Other (See Comments)     shaky       Current Neurological Medications:     No current facility-administered medications on file prior to encounter.     Current Outpatient Medications on File Prior to Encounter   Medication Sig    amLODIPine (NORVASC) 10 MG tablet Take 10 mg by mouth once daily.    ascorbic acid, vitamin C, (VITAMIN C) 500 MG tablet Take 500 mg by mouth once daily.    aspirin 325 MG tablet Take 325 mg by mouth once daily.    atorvastatin (LIPITOR) 80 MG tablet Take 1 tablet (80 mg total) by  mouth once daily. (Patient taking differently: Take 80 mg by mouth once daily. PRN)    cloNIDine (CATAPRES) 0.1 MG tablet Take 0.1 mg by mouth 2 (two) times daily. PRN    guanfacine HCl (GUANFACINE ORAL) Take by mouth.    ibuprofen (ADVIL,MOTRIN) 800 MG tablet Take 800 mg by mouth once daily.    metformin (GLUCOPHAGE) 500 MG tablet Take 1 tablet (500 mg total) by mouth daily with breakfast. (Patient taking differently: Take 500 mg by mouth daily with breakfast. prn)    multivitamin (ONE DAILY MULTIVITAMIN) per tablet Take 1 tablet by mouth once daily.    glimepiride (AMARYL) 2 MG tablet Take 1 tablet (2 mg total) by mouth 2 (two) times daily.    lisinopril 10 MG tablet Take 1 tablet (10 mg total) by mouth once daily.    meclizine (ANTIVERT) 25 mg tablet Take 1 tablet (25 mg total) by mouth every 6 (six) hours as needed.      Family History     Problem Relation (Age of Onset)    Cancer Father, Maternal Aunt    Diabetes Daughter    Heart disease Mother    Hypertension Brother, Daughter    Kidney disease Mother        Tobacco Use    Smoking status: Current Every Day Smoker     Packs/day: 0.50     Types: Cigarettes    Smokeless tobacco: Never Used   Substance and Sexual Activity    Alcohol use: Not on file    Drug use: No    Sexual activity: Not on file     Review of Systems   Constitutional: Negative for fever.   HENT: Negative for trouble swallowing.    Eyes: Negative for photophobia.   Respiratory: Negative for chest tightness.    Gastrointestinal: Negative for abdominal pain.   Genitourinary: Negative for dysuria.   Musculoskeletal: Negative for back pain.   Neurological: Negative for headaches.   Psychiatric/Behavioral: Negative for confusion.     Objective:     Vital Signs (Most Recent):  Temp: 98 °F (36.7 °C) (02/07/22 0430)  Pulse: 60 (02/07/22 0600)  Resp: 20 (02/07/22 0600)  BP: (!) 164/74 (02/07/22 0600)  SpO2: (!) 93 % (02/07/22 0600) Vital Signs (24h Range):  Temp:  [97.9 °F (36.6 °C)-98.1 °F  (36.7 °C)] 98 °F (36.7 °C)  Pulse:  [60-94] 60  Resp:  [10-39] 20  SpO2:  [91 %-100 %] 93 %  BP: (146-241)/() 164/74     Weight: 79.4 kg (175 lb)  Body mass index is 27.41 kg/m².    Physical Exam  Constitutional:       General: She is not in acute distress.  HENT:      Head: Normocephalic.      Right Ear: External ear normal.      Left Ear: External ear normal.   Eyes:      General:         Right eye: No discharge.         Left eye: No discharge.   Cardiovascular:      Rate and Rhythm: Normal rate.   Pulmonary:      Breath sounds: Normal breath sounds.   Abdominal:      Palpations: Abdomen is soft.   Musculoskeletal:         General: No tenderness.      Cervical back: Neck supple.   Skin:     General: Skin is warm.   Neurological:      Mental Status: She is oriented to person, place, and time.      Coordination: Finger-Nose-Finger Test and Heel to Shin Test normal.      Deep Tendon Reflexes: Strength normal.   Psychiatric:         Speech: Speech normal.         NEUROLOGICAL EXAMINATION:     MENTAL STATUS   Oriented to person, place, and time.   Speech: speech is normal   Level of consciousness: alert    CRANIAL NERVES     CN III, IV, VI   Right pupil: Size: 2 mm. Shape: regular.   Left pupil: Size: 2 mm. Shape: regular.   Ophthalmoparesis: none  Conjugate gaze: present    CN V   Right facial sensation deficit: none  Left facial sensation deficit: none    CN VII   Right facial weakness: none  Left facial weakness: none    CN XII   Tongue deviation: none    MOTOR EXAM     Strength   Strength 5/5 throughout.     GAIT AND COORDINATION      Coordination   Finger to nose coordination: normal  Heel to shin coordination: normal       Mild balance impairment         Significant Labs:   CBC:   Recent Labs   Lab 02/06/22  1543 02/06/22  1624 02/07/22  0237   WBC  --  6.48 5.65   HGB  --  10.8* 9.9*   HCT 37 34.2* 31.4*   PLT  --  271 267     CMP:   Recent Labs   Lab 02/06/22  1624 02/07/22  0237   * 133*     141   K 3.4* 3.2*    108   CO2 24 24   BUN 17 16   CREATININE 1.3 0.9   CALCIUM 8.9 8.4*   MG 1.9 1.9   PROT 7.1 6.1   ALBUMIN 3.0* 2.6*   BILITOT 0.2 0.2   ALKPHOS 81 66   AST 10 8*   ALT 6* 5*   ANIONGAP 9 9   EGFRNONAA 42* >60     Urine Studies:   Recent Labs   Lab 02/07/22  0106   COLORU Yellow  Yellow   APPEARANCEUA Clear  Clear   PHUR 6.0  6.0   SPECGRAV >1.030*  >1.030*   PROTEINUA 2+*  2+*   GLUCUA Trace*  Trace*   KETONESU Negative  Negative   BILIRUBINUA Negative  Negative   OCCULTUA Trace*  Trace*   NITRITE Negative  Negative   UROBILINOGEN Negative  Negative   LEUKOCYTESUR Negative  Negative   RBCUA 6*   WBCUA 2   BACTERIA None   SQUAMEPITHEL 5   HYALINECASTS 0       Significant Imaging: I have reviewed all pertinent imaging results/findings within the past 24 hours.     MRI     MRI brain:     Small acute infarction in the left s[place] genny and small subacute infarction in the left inferior cerebellum.  No evidence of intracranial hemorrhage.     MRA brain:     Significant diminished flow in the posterior circulation with nonvisualization of flow within the right vertebral artery and intermittent flow in the left vertebral artery.  Multiple luminal narrowing involving the basilar with suspected multiple high-grade stenosis versus occlusions.     Robust bilateral posterior communicating arteries supplying the PCA territories.  The findings in the basilar may be chronic in nature given the robust bilateral posterior communicating arteries.     Additional findings above.     Case discussed with Dr. Suazo on 02/06/2022 at 23:53.        Electronically signed by: Govind Simpson MD  Date:                                            02/06/2022  Time:                                           23:55    Assessment and Plan:     72 y/o female consulted for stroke    1. Acute stroke: small areas of infarction on left cerebellum and left genny. Posterior circulation strokes   CTA demonstrates occlusion of  left vertebral artery.   -ASA 81 mg daily. Clopidogrel 75 mg daily.   -High dose statin.   -Echo.   -OK for -200.   -PT/OT.    2. Left vertebral artery occlusion: pt now with posteriro circulation strokes.   No intervention as artery is already occluded.   -DAPT.   -Statin.    3. Tobacco abuse: pt reports smoking at least 1/2 pack daily.   -Tobacco cessation discussed with pt.     Pt and daughter informed or findings and plan. Questions answered.    Active Diagnoses:    Diagnosis Date Noted POA    PRINCIPAL PROBLEM:  Cerebrovascular accident (CVA) due to occlusion of right vertebral artery [I63.211] 02/06/2022 Yes    Normocytic anemia [D64.9] 02/06/2022 Yes    Moderate cigarette smoker (10-19 per day) [F17.210] 02/06/2022 Yes    Hypokalemia [E87.6] 02/06/2022 Yes    Overweight with body mass index (BMI) of 25 to 25.9 in adult [E66.3, Z68.25] 02/06/2022 Not Applicable    Mixed hyperlipidemia [E78.2] 02/04/2016 Yes    Type 2 diabetes mellitus with hyperglycemia, without long-term current use of insulin [E11.65] 01/29/2016 Yes    Malignant essential hypertension [I10] 01/29/2016 Yes      Problems Resolved During this Admission:       VTE Risk Mitigation (From admission, onward)         Ordered     enoxaparin injection 40 mg  Daily         02/06/22 1953     Place MIRIAN hose  Until discontinued         02/06/22 1953     IP VTE LOW RISK PATIENT  Once         02/06/22 1953     Place sequential compression device  Until discontinued         02/06/22 1953                Thank you for your consult. I will follow-up with patient. Please contact us if you have any additional questions.    Devonte Albright MD  Neurology  [facility] - [place] - Intensive Care

## 2022-02-07 NOTE — ASSESSMENT & PLAN NOTE
Given acute occlusion noted above, will allow for permissive HTN per CVA protocol  Holding home lisinopril, Norvasc and Clonidine  Monitor in ICU until PRES ruled out  MRI brain pending      labetaloL injection 10 mg, 10 mg, Intravenous, Q15 Min PRN SBP >220

## 2022-02-07 NOTE — PLAN OF CARE
Problem: Occupational Therapy Goal  Goal: Occupational Therapy Goal  Description: Goals to be met by: 2/21/22     Patient will increase functional independence with ADLs by performing:    LE Dressing with Modified [place].  Grooming while standing at sink with Modified [place].  Toileting from toilet with Modified [place] for hygiene and clothing management.   Step transfer with Modified [place]  Toilet transfer to toilet with Modified [place].    Outcome: Ongoing, Progressing     Pt very pleasant and motivated to participate in tx session this date. Pt denied symptoms related to CVA and was able to ambulate household/community distances w/ SBA and no AD. However, pt noted w/ mild balance deficits when simultaneously scanning environment while ambulating. Pt tolerated tx session well and will continue to benefit from skilled acute OT services 2-3x/wk to maximize functional capacity for safe performance w/ ADLs and functional mobility.

## 2022-02-07 NOTE — SUBJECTIVE & OBJECTIVE
Interval History: No acute events, she is currently not dizzy or having any abnormal symptoms this morning.     Review of Systems   Constitutional: Negative for chills, fatigue and fever.   HENT: Negative for congestion.    Respiratory: Negative for cough and shortness of breath.    Cardiovascular: Negative for chest pain.   Gastrointestinal: Negative for abdominal pain, constipation, diarrhea, nausea and vomiting.   Endocrine: Negative for heat intolerance.   Genitourinary: Negative for dysuria.   Musculoskeletal: Positive for gait problem.   Skin: Negative for rash.   Neurological: Negative for dizziness, speech difficulty and weakness.   Hematological: Does not bruise/bleed easily.   Psychiatric/Behavioral: Negative for confusion. The patient is not nervous/anxious.      Objective:     Vital Signs (Most Recent):  Temp: 98 °F (36.7 °C) (02/07/22 0430)  Pulse: 69 (02/07/22 1300)  Resp: 19 (02/07/22 1300)  BP: (!) 142/63 (02/07/22 1300)  SpO2: (!) 94 % (02/07/22 1300) Vital Signs (24h Range):  Temp:  [97.9 °F (36.6 °C)-98.1 °F (36.7 °C)] 98 °F (36.7 °C)  Pulse:  [60-94] 69  Resp:  [10-63] 19  SpO2:  [91 %-100 %] 94 %  BP: (142-241)/() 142/63     Weight: 79.4 kg (175 lb)  Body mass index is 27.41 kg/m².    Intake/Output Summary (Last 24 hours) at 2/7/2022 1458  Last data filed at 2/7/2022 1200  Gross per 24 hour   Intake 1210.78 ml   Output 250 ml   Net 960.78 ml      Physical Exam  Vitals and nursing note reviewed.   Constitutional:       General: She is awake. She is not in acute distress.     Appearance: She is well-developed. She is not diaphoretic.   HENT:      Head: Normocephalic and atraumatic.      Mouth/Throat:      Pharynx: No oropharyngeal exudate.   Eyes:      General: No scleral icterus.        Right eye: No discharge.         Left eye: No discharge.      Conjunctiva/sclera: Conjunctivae normal.      Pupils: Pupils are equal, round, and reactive to light.      Comments: PERRL, no ptosis   Neck:       Thyroid: No thyromegaly.      Vascular: No JVD.      Trachea: No tracheal deviation.   Cardiovascular:      Rate and Rhythm: Normal rate and regular rhythm.      Heart sounds: Normal heart sounds. No murmur heard.  No friction rub. No gallop.    Pulmonary:      Effort: Pulmonary effort is normal. No respiratory distress.      Breath sounds: Normal breath sounds. No stridor. No decreased breath sounds, wheezing, rhonchi or rales.   Chest:      Chest wall: No tenderness.   Abdominal:      General: Bowel sounds are normal. There is no distension.      Palpations: Abdomen is soft. There is no mass.      Tenderness: There is no abdominal tenderness. There is no guarding or rebound.   Genitourinary:     Comments: No agustin in place  Musculoskeletal:         General: No tenderness. Normal range of motion.      Cervical back: Normal range of motion and neck supple.   Lymphadenopathy:      Cervical: No cervical adenopathy.      Comments: No peripheral edema   Skin:     General: Skin is warm and dry.      Coloration: Skin is not pale.      Findings: No erythema or rash.   Neurological:      Mental Status: She is alert and easily aroused.      GCS: GCS eye subscore is 4. GCS verbal subscore is 5. GCS motor subscore is 6.      Cranial Nerves: No cranial nerve deficit.      Motor: No tremor, abnormal muscle tone or pronator drift.      Coordination: Coordination normal.      Comments: Symmetrical strength bilaterally this morning, no focal deficits. No dizziness/vertigo symptoms   Psychiatric:         Attention and Perception: Attention and perception normal.         Behavior: Behavior normal.         Thought Content: Thought content normal.         Cognition and Memory: Cognition and memory normal.         Judgment: Judgment normal.         Significant Labs: All pertinent labs within the past 24 hours have been reviewed.    Significant Imaging: I have reviewed all pertinent imaging results/findings within the past 24 hours.

## 2022-02-07 NOTE — HOSPITAL COURSE
Ms. Garza is a 72yo lady with a pas medical history of HTN, DM2 and tobacco abuse (smokes 1/2 PPD) admitted to ICU on 02/06/2022 with dizziness and found to have left santa genny and left cerebellum infarctions.     MRI brain with Small acute infarction in the left s[place] gen[place] and small subacute infarction in the left inferior cerebellum.  No evidence of intracranial hemorrhage. MRA brain with significant diminished flow in the posterior circulation with nonvisualization of flow within the right vertebral artery and intermittent flow in the left vertebral artery.  Multiple luminal narrowing involving the basilar with suspected multiple high-grade stenosis versus occlusions. Patient loaded with aspirin and plavix. 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 She was not a candidate for tpa. Neurology consulted. Patient transfer to the floor on 02/07/2022. Echo with EF of 65% and no evidence of intracardiac shunting. G2DD.  Home BP meds restarted and blood pressure still elevated. Added hydralazine and losartan with better blood pressure controlled. PT/OT recommended outpatient therapy on discharge.     Patient significant improvement.  States she noticed a difference with the addition of hydralazine losartan in the way she feels and in her blood pressure. Pt denies any fever, headaches, vision changes, chest pain, shortness of breath, palpitations, abdominal pain, nausea, vomiting, or any new weaknesses. Patient's exam on discharge was as follow: Patient is alert and oriented, appears in no acute distress, heart with regular rate and rhythm, lungs clear to asculation with non-labored breathing, abdomen soft, and no new weaknesses or focal deficits seen.  Bilateral lower extremities without any edema.  No calf tenderness.    Patient was counseled regarding any abnormal labs, differential diagnosis, treatment options, risk-benefit, lifestyle changes, prognosis, current  condition, and medications. Patient was interactive and attentive.  Patient's questions were answered in a respectful and timely manner. Patient was instructed to follow-up with PCP within 1 week and to continue taking medications as prescribed.  Also, extensively discussed the risks, benefits, and side effects of patient's medications. Discussed with patient about any medication changes. Patient verbalized understanding and agrees to treatment plan.  Patient is stable for discharge.  Patient has no other questions or concerns at this time.  ED precautions discussed with the patient.    Vital signs are stable. Ambulating without any difficulty. Tolerating p.o. intake without any nausea or vomiting. Afebrile for over 24 hours. Patient is in stable condition and has no questions or concerns. Patient will be discharge to home with referral for outpatient PT/OT. Medications sent to pharmacy.

## 2022-02-07 NOTE — ASSESSMENT & PLAN NOTE
Stat MRI brain pending  Appreciated Neurovascular stroke assessment and recommendations - thank you  Patient took ASA 325mg PTA  She was loaded with Plavix 600mg po x 1 at 17:20  Admitted to ICU due to stuttering symptoms and malignant HTN (although likely reactive)   -Allow for permissive HTN per stroke protocol interventions  CVA best practice pathway order sets have been initiated  TTE with bubble in am  Neurology consult, Dr. Albright, placed for am.  DVT prophylaxis with Lovenox 40mg daily  Swallow screen in ED now by nurse and formal in am  PT and OT consults, SLP consult    Current therapy:    0.9%  NaCl infusion, , Intravenous, Continuous, 75 cc/hr    [START ON 2/7/2022] aspirin EC tablet 81 mg, 81 mg, Oral, Daily    [START ON 2/7/2022] atorvastatin tablet 80 mg, 80 mg, Oral, Daily    [START ON 2/7/2022] clopidogreL tablet 75 mg, 75 mg, Oral, Daily    labetaloL injection 10 mg, 10 mg, Intravenous, Q15 Min PRN SBP >220    MRA brain showed: Absence of flow within the V3 segment right vertebral artery, suggestive of occlusion

## 2022-02-07 NOTE — PT/OT/SLP EVAL
Speech Language Pathology Evaluation  Bedside Swallow    Patient Name:  Joanne Peña   MRN:  [id number]  Admitting Diagnosis: Cerebrovascular accident (CVA) due to occlusion of right vertebral artery    Recommendations:                 General Recommendations:  Follow-up not indicated  Diet recommendations:  Regular, Thin   Aspiration Precautions: 1 bite/sip at a time   General Precautions: Standard,    Communication strategies:  none    History:     Past Medical History:   Diagnosis Date    Diabetes mellitus     Hyperlipidemia     Hypertension        No past surgical history on file.    Social History: Patient (I) for ADLs    Chest X-Rays: 2/6 Cardiomegaly.  No evidence of pulmonary edema.    Prior diet: unrestricted    Occupation/hobbies/homemaking: LPN at [employer]     Subjective   Pt and Pt's daughter reporting episode of slurred speech is resolved and that cognitive linguistic skills and swallow are at baseline. Nursing reporting good tolerance of meals and meds. Pt easily passed informal cognitive linguistic screen.   Patient goals: return to baseline    Pain/Comfort:  · Pain Rating 1: 0/10    Respiratory Status: Room air    Objective:     Oral Musculature Evaluation  · Oral Musculature: WFL  · Dentition: upper dentures,lower dentures  · Secretion Management: adequate  · Mucosal Quality: good  · Mandibular Strength and Mobility: WFL  · Oral Labial Strength and Mobility: WFL  · Lingual Strength and Mobility: WFL  · Velar Elevation: WFL  · Buccal Strength and Mobility: WFL  · Voice Prior to PO Intake: wfl  · Oral Musculature Comments: wfl    Bedside Swallow Eval:   Consistencies Assessed:  · Thin liquids behzad 3oz thin liquids via straw  · Puree X1  · Solids X2 crackers     Oral Phase:   · WFL    Pharyngeal Phase:   · no overt clinical signs/symptoms of aspiration    Compensatory Strategies  · None    Treatment: please note silent aspiration cannot be r/o at bedside.     Assessment:     Joanne  Nadya Peña is a 71 y.o. female with dx of cva she presents with functional swallow and language at reported baseline.     Goals:   Multidisciplinary Problems     SLP Goals     Not on file          Multidisciplinary Problems (Resolved)        Problem: SLP Goal    Goal Priority Disciplines Outcome   SLP Goal   (Resolved)    Low SLP Met   Description: Pt will participate in ST eval (goal met 2/7)                    Plan:       · Plan of Care expires:  02/07/22  · Plan of Care reviewed with:  patient,daughter   · SLP Follow-Up:  No       Discharge recommendations:    no further ST is warranted  Barriers to Discharge:  None    Time Tracking:     SLP Treatment Date:   02/07/22  Speech Start Time:  1226  Speech Stop Time:  1234     Speech Total Time (min):  8 min    Billable Minutes: Eval Swallow and Oral Function 8    02/07/2022

## 2022-02-07 NOTE — ED NOTES
Called to room by pts family member, pt had a starring look on her face, slight slurring to her speech.   Called MD to room.  Speech is slowing getting better.   Some weakness noted in her limbs.    BP and glucose rechecked.   2nd IV site started.  Will continue to monitor

## 2022-02-07 NOTE — EICU
72 yo female w/ PMHx HTN, active smoker here w/ multiple vague symptoms including slurred speech, tingling in her arms noted to be in hypertensive urgency at 220/120.     Detailed evaluation by ED physician and tele neuro noted, H&P reviewed.     Admitted to ICU for observation.     MRA brain showed: Absence of flow within the V3 segment right vertebral artery, suggestive of occlusion.     Plan as per bedside team.   Nicotine patch if needed.   BP control.   Wean off IVF.     Please call for further assistance.     Follow up note:  - BP goal set as   SBP between 150-180/ DBP < 110    Will use PRN labetalol if BP raises above these.

## 2022-02-07 NOTE — PLAN OF CARE
Problem: Adult Inpatient Plan of Care  Goal: Plan of Care Review  Outcome: Ongoing, Progressing  Goal: Patient-Specific Goal (Individualized)  Outcome: Ongoing, Progressing  Goal: Absence of Hospital-Acquired Illness or Injury  Outcome: Ongoing, Progressing  Goal: Optimal Comfort and Wellbeing  Outcome: Ongoing, Progressing  Goal: Readiness for Transition of Care  Outcome: Ongoing, Progressing     Problem: Infection  Goal: Absence of Infection Signs and Symptoms  Outcome: Ongoing, Progressing     Problem: Adjustment to Illness (Stroke, Ischemic/Transient Ischemic Attack)  Goal: Optimal Coping  Outcome: Ongoing, Progressing     Problem: Bowel Elimination Impaired (Stroke, Ischemic/Transient Ischemic Attack)  Goal: Effective Bowel Elimination  Outcome: Ongoing, Progressing     Problem: Cerebral Tissue Perfusion (Stroke, Ischemic/Transient Ischemic Attack)  Goal: Optimal Cerebral Tissue Perfusion  Outcome: Ongoing, Progressing     Problem: Cognitive Impairment (Stroke, Ischemic/Transient Ischemic Attack)  Goal: Optimal Cognitive Function  Outcome: Ongoing, Progressing     Problem: Communication Impairment (Stroke, Ischemic/Transient Ischemic Attack)  Goal: Improved Communication Skills  Outcome: Ongoing, Progressing     Problem: Functional Ability Impaired (Stroke, Ischemic/Transient Ischemic Attack)  Goal: Optimal Functional Ability  Outcome: Ongoing, Progressing     Problem: Respiratory Compromise (Stroke, Ischemic/Transient Ischemic Attack)  Goal: Effective Oxygenation and Ventilation  Outcome: Ongoing, Progressing     Problem: Sensorimotor Impairment (Stroke, Ischemic/Transient Ischemic Attack)  Goal: Improved Sensorimotor Function  Outcome: Ongoing, Progressing     Problem: Swallowing Impairment (Stroke, Ischemic/Transient Ischemic Attack)  Goal: Optimal Eating and Swallowing without Aspiration  Outcome: Ongoing, Progressing     Problem: Urinary Elimination Impaired (Stroke, Ischemic/Transient Ischemic  Attack)  Goal: Effective Urinary Elimination  Outcome: Ongoing, Progressing     Problem: Diabetes Comorbidity  Goal: Blood Glucose Level Within Targeted Range  Outcome: Ongoing, Progressing

## 2022-02-07 NOTE — PHARMACY MED REC
"Admission Medication History     The home medication history was taken by Mechelle Lao.    You may go to "Admission" then "Reconcile Home Medications" tabs to review and/or act upon these items.      The home medication list has been updated by the Pharmacy department.    Please read ALL comments highlighted in yellow.    Please address this information as you see fit.     Feel free to contact us if you have any questions or require assistance.      The medications listed below were removed from the home medication list.   Ondansetron   Colyte    Patient reports no longer taking the following medication(s):   Amaryl   Lisinopril    Patient reports not taking the following medications as prescribed:   Lipitor 80mg   Clonidine 0.1mg   Metformin 500mg    Medications listed below were obtained from: Patient/family    Amlodopine 10mg   Ibuprofen 800mg - daily   Vitamin C 500mg   Multivitamin   Guanfacine    Potential issues to be addressed PRIOR TO DISCHARGE  Patient requires education regarding drug therapies     Medication reconciliation was completed at patient's bedside.    Mechelle Lao  [phone]                    .          "

## 2022-02-07 NOTE — ASSESSMENT & PLAN NOTE
Holding NPO for now with acute CVA  Holding home Glucophage and Amaryl  Low dose SSI protocol for NPO patients  HgA1c  Glucose elevated to 262, so adding Levemir 5U HS, first now

## 2022-02-07 NOTE — PLAN OF CARE
Problem: Physical Therapy Goal  Goal: Physical Therapy Goal  Description: Goals to be met by:      Patient will increase functional independence with mobility by performin. Gait  x 300 feet with Modified [place] using no AD vs SPC.   2. Lower extremity exercise program x20 reps per handout, standing, with supervision    Outcome: Ongoing, Progressing

## 2022-02-07 NOTE — ASSESSMENT & PLAN NOTE
A1c:   Lab Results   Component Value Date    HGBA1C 7.6 (H) 02/06/2022     Meds: basal bolus insulin + SSI PRN to maintain goal 140-180  ADA diet, accuchecks ACHS, hypoglycemic protocol  - adjust as needed

## 2022-02-07 NOTE — PT/OT/SLP EVAL
Occupational Therapy   Evaluation    Name: Joanne Peña  MRN: [id number]  Admitting Diagnosis:  Cerebrovascular accident (CVA) due to occlusion of right vertebral artery  Recent Surgery: * No surgery found *      Recommendations:     Discharge Recommendations: outpatient PT (refer to PT eval)  Discharge Equipment Recommendations:  none  Barriers to discharge:  None    Assessment:     Joanne Peña is a 71 y.o. female with a medical diagnosis of Cerebrovascular accident (CVA) due to occlusion of right vertebral artery. Performance deficits affecting function: weakness,gait instability,impaired balance.      Pt very pleasant and motivated to participate in tx session this date. Pt denied symptoms related to CVA and was able to ambulate household/community distances w/ SBA and no AD. However, pt noted w/ mild balance deficits when simultaneously scanning environment while ambulating. Pt tolerated tx session well and will continue to benefit from skilled acute OT services 2-3x/wk to maximize functional capacity for safe performance w/ ADLs and functional mobility.     Rehab Prognosis: Good; patient would benefit from acute skilled OT services to address these deficits and reach maximum level of function.       Plan:     Patient to be seen 2 x/week,3 x/week to address the above listed problems via self-care/home management,therapeutic activities,therapeutic exercises  · Plan of Care Expires: 02/21/22  · Plan of Care Reviewed with: daughter,patient    Subjective     Chief Complaint: None stated; aware of mild balance deficits   Patient/Family Comments/goals: Pt ready to d/c home.     Occupational Profile:  Living Environment: Pt lives w/ son in [facility] w/ 0 [place].   Previous level of function: Pt was (I) w/ ADLs and functional mobility PTA; pt relies on son for transportation.   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   Equipment Used at Home:   none  Assistance upon Discharge: Pt will have assistance from son, dtr and spouse upon d/c.     Pain/Comfort:  · Pain Rating 1: 0/10  · Pain Rating Post-Intervention 1: 0/10    Patients cultural, spiritual, Nondenominational conflicts given the current situation: no    Objective:     Communicated with: nurse prior to session.  Patient found up in chair with blood pressure cuff,pulse ox (continuous),telemetry,peripheral IV upon OT entry to room.    General Precautions: Standard, fall   Orthopedic Precautions:N/A   Braces: N/A  Respiratory Status: Room air    Occupational Performance:    Bed Mobility:    · Pt found/left in chair    Functional Mobility/Transfers:  · Patient completed Sit <> Stand Transfer with supervision  with  no assistive device   · Functional Mobility: Pt ambulated household/community distances w/ SBA and no AD. Pt noted w/ mild swaying when turning head to scan environment upon PT's cues. Refer to PT eval for gait assessment.      Activities of Daily Living:  · Upper Body Dressing: minimum assistance for donning gown over back     Cognitive/Visual Perceptual:  Cognitive/Psychosocial Skills:     -       Oriented to: Person, Place, Time and Situation   -       Follows Commands/attention:Follows multistep  commands  -       Communication: clear/fluent  -       Memory: No Deficits noted  -       Safety awareness/insight to disability: intact     Physical Exam:  Balance:    -       good sitting balance; fair + standing balance  Postural examination/scapula alignment:    -       No postural abnormalities identified  Skin integrity: Visible skin intact  Edema:  None noted  Sensation:    -       Intact  Motor Planning:    -       WFL   Upper Extremity Range of Motion:     -       Right Upper Extremity: WFL  -       Left Upper Extremity: WFL  Upper Extremity Strength:    -       Right Upper Extremity: WFL  -       Left Upper Extremity: WFL   Strength:    -       Right Upper Extremity: WFL  -       Left Upper  Extremity: WFL  Fine Motor Coordination:    -       Intact  Left hand thumb/finger opposition skills and Right hand thumb/finger opposition skills  Gross motor coordination:   WFL    AMPAC 6 Click ADL:  AMPAC Total Score: 24    Treatment & Education:  -Initial eval complete.   -Pt educated on safe functional mobility and ADL performance.   -Pt educated on importance of scanning environment and maintaining awareness for preventing falls.   -Questions and concerns addressed within OT scope.   Education:    Patient left up in chair with all lines intact and call button in reach    GOALS:   Multidisciplinary Problems     Occupational Therapy Goals        Problem: Occupational Therapy Goal    Goal Priority Disciplines Outcome Interventions   Occupational Therapy Goal     OT, PT/OT Ongoing, Progressing    Description: Goals to be met by: 2/21/22     Patient will increase functional independence with ADLs by performing:    LE Dressing with Modified [place].  Grooming while standing at sink with Modified [place].  Toileting from toilet with Modified [place] for hygiene and clothing management.   Step transfer with Modified [place]  Toilet transfer to toilet with Modified [place].                     History:     Past Medical History:   Diagnosis Date    Diabetes mellitus     Hyperlipidemia     Hypertension        No past surgical history on file.    Time Tracking:     OT Date of Treatment: 02/07/22  OT Start Time: 1005  OT Stop Time: 1020  OT Total Time (min): 15 min    Billable Minutes:Evaluation 15  Total Time 15    2/7/2022

## 2022-02-07 NOTE — PROVIDER TRANSFER
72 yo admitted with CVA of left [place] and left cerebellar. Noted right vertebral artery occlusion but thought to be somewhat chronic and not acute. On ASA/plavix. Symptoms essentially resolved. PT/OT/SLP consulted. Stable for transfer to floor.    - restart home meds in AM as tolerated for blood pressure  - f/u Neurology recommendations and echo  - PT recommending outpatient therapy    Jaguar Mancia MD  Department of Hospital Medicine  [facility]

## 2022-02-07 NOTE — H&P
"[facility] Medicine  History & Physical    Patient Name: Joanne Peña  MRN: [id number]  Patient Class: IP- Inpatient  Admission Date: 2/6/2022  Attending Physician: Jaguar Mancia MD   Primary Care Provider: Marielle Gibson MD         Patient information was obtained from patient, relative(s), past medical records and ER records.     Subjective:     Principal Problem:Cerebrovascular accident (CVA) due to occlusion of right vertebral artery    Chief Complaint:   Chief Complaint   Patient presents with    Hypertension     Pt reports /115 @ work 45 mins ago. Pt states she took norvasc 10, , and clondine 0.1 PTA.    Dizziness     Dizziness when she woke up.     Speech Problem     Slurred speech      Cerebrovascular Accident        HPI: Ms. Peña is a 72yo lady with a pas medical history of HTN, DM2 and tobacco abuse (smokes 1/2 PPD).  Her COVID vaccination status is unknown, though she was positive on 8/24/21.     She now comes to the ED with vertigo when she woke up this morning.  She states things, "were just off when looking at them and kind of spinning."  She had her brother drive her to work (at the assisted) and had an episode of N/V en route.  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  Shortly after this she had abrupt onet of slurred speech and right sided weakness.  She had to go sit down and call the assisted nurse to evaluate her.  She denied double vision with the vertigo and denied any numbness or tingling (other than an odd burning in her nose before the symptoms).    When the nurse arrived she had dysarthria and a measured BP of 215/115, so she took a clonidine 0.1mg x 1 and ASA 325mg.  She called her daughter to come pick her up, who then drove her to the ED.  She states that her symptoms all resolved shortly after taking the ASA " (<1 hour).  She never had any neck pain or headache.  She feels all of her symptoms have now completely resolved, including the vertigo.  On arrival to the ED, she did have a brief recurrence of the above symptoms, but it resolved of its own accord after a few minutes.     In the ED she was found to be hypertensive with a CTA head that showed No CT evidence of large territorial infarction and absence of flow within the V3 segment right vertebral artery, suggestive of occlusion.  NV tele stroke consult was obtained and recommended Plavix 600mg and full dose ASA (already had PTA), and MRI brain stat.         Past Medical History:   Diagnosis Date    Hypertension        No past surgical history on file.    Review of patient's allergies indicates:   Allergen Reactions    Ampicillin     Darvocet a500 [propoxyphene n-acetaminophen]        No current facility-administered medications on file prior to encounter.     Current Outpatient Medications on File Prior to Encounter   Medication Sig    amLODIPine (NORVASC) 10 MG tablet Take 10 mg by mouth once daily.    ascorbic acid, vitamin C, (VITAMIN C) 500 MG tablet Take 500 mg by mouth once daily.    aspirin 325 MG tablet Take 325 mg by mouth once daily.    atorvastatin (LIPITOR) 80 MG tablet Take 1 tablet (80 mg total) by mouth once daily. (Patient taking differently: Take 80 mg by mouth once daily. PRN)    cloNIDine (CATAPRES) 0.1 MG tablet Take 0.1 mg by mouth 2 (two) times daily. PRN    guanfacine HCl (GUANFACINE ORAL) Take by mouth.    ibuprofen (ADVIL,MOTRIN) 800 MG tablet Take 800 mg by mouth once daily.    metformin (GLUCOPHAGE) 500 MG tablet Take 1 tablet (500 mg total) by mouth daily with breakfast. (Patient taking differently: Take 500 mg by mouth daily with breakfast. prn)    multivitamin (ONE DAILY MULTIVITAMIN) per tablet Take 1 tablet by mouth once daily.    glimepiride (AMARYL) 2 MG tablet Take 1 tablet (2 mg total) by mouth 2 (two) times daily.     lisinopril 10 MG tablet Take 1 tablet (10 mg total) by mouth once daily.    meclizine (ANTIVERT) 25 mg tablet Take 1 tablet (25 mg total) by mouth every 6 (six) hours as needed.    [DISCONTINUED] ondansetron (ZOFRAN) 4 MG tablet Take 1 tablet (4 mg total) by mouth every 8 (eight) hours as needed (Nausea and vomiting).    [DISCONTINUED] polyethylene glycol (COLYTE) 240-22.72-6.72 -5.84 gram SolR Take 4,000 mLs (4 L total) by mouth as directed.     Family History    None       Tobacco Use    Smoking status: Current Every Day Smoker     Packs/day: 0.50     Types: Cigarettes    Smokeless tobacco: Never Used   Substance and Sexual Activity    Alcohol use: Not on file    Drug use: No    Sexual activity: Not on file     Review of Systems   Constitutional: Negative for chills, fatigue and fever.   HENT: Negative for congestion.    Respiratory: Negative for cough and shortness of breath.    Cardiovascular: Negative for chest pain.   Gastrointestinal: Negative for abdominal pain, constipation, diarrhea, nausea and vomiting.   Endocrine: Negative for heat intolerance.   Genitourinary: Negative for dysuria.   Musculoskeletal: Positive for gait problem.   Skin: Negative for rash.   Neurological: Positive for dizziness and speech difficulty. Negative for weakness.        + Transient right hemiparesis  + Vertigo   Hematological: Does not bruise/bleed easily.   Psychiatric/Behavioral: Negative for confusion. The patient is not nervous/anxious.      Objective:     Vital Signs (Most Recent):  Temp: 97.9 °F (36.6 °C) (02/06/22 1513)  Pulse: 91 (02/06/22 1829)  Resp: 18 (02/06/22 1513)  BP: (!) 191/125 (02/06/22 1829)  SpO2: 97 % (02/06/22 1803) Vital Signs (24h Range):  Temp:  [97.9 °F (36.6 °C)] 97.9 °F (36.6 °C)  Pulse:  [69-94] 91  Resp:  [18] 18  SpO2:  [97 %-100 %] 97 %  BP: (183-241)/() 191/125     Weight: 72.6 kg (160 lb)  Body mass index is 25.06 kg/m².    Physical Exam  Vitals and nursing note reviewed.    Constitutional:       General: She is awake. She is not in acute distress.     Appearance: She is well-developed and well-nourished. She is not diaphoretic.   HENT:      Head: Normocephalic and atraumatic.      Mouth/Throat:      Pharynx: No oropharyngeal exudate.   Eyes:      General: No scleral icterus.        Right eye: No discharge.         Left eye: No discharge.      Extraocular Movements: EOM normal.      Conjunctiva/sclera: Conjunctivae normal.      Pupils: Pupils are equal, round, and reactive to light.      Comments: PERRL, no ptosis   Neck:      Thyroid: No thyromegaly.      Vascular: No JVD.      Trachea: No tracheal deviation.   Cardiovascular:      Rate and Rhythm: Normal rate and regular rhythm.      Pulses: Intact distal pulses.      Heart sounds: Normal heart sounds. No murmur heard.  No friction rub. No gallop.    Pulmonary:      Effort: Pulmonary effort is normal. No respiratory distress.      Breath sounds: Normal breath sounds. No stridor. No decreased breath sounds, wheezing, rhonchi or rales.   Chest:      Chest wall: No tenderness.   Abdominal:      General: Bowel sounds are normal. There is no distension.      Palpations: Abdomen is soft. There is no mass.      Tenderness: There is no abdominal tenderness. There is no guarding or rebound.   Genitourinary:     Comments: No agustin in place  Musculoskeletal:         General: No tenderness or edema. Normal range of motion.      Cervical back: Normal range of motion and neck supple.   Lymphadenopathy:      Cervical: No cervical adenopathy.      Comments: No peripheral edema   Skin:     General: Skin is warm and dry.      Coloration: Skin is not pale.      Findings: No erythema or rash.   Neurological:      Mental Status: She is alert and easily aroused. She is disoriented.      GCS: GCS eye subscore is 4. GCS verbal subscore is 5. GCS motor subscore is 6.      Cranial Nerves: No cranial nerve deficit.      Motor: No tremor, abnormal muscle tone  or pronator drift.      Coordination: Coordination normal.      Comments: 4+/5 right upper and lower strength  5/5 left sided strength  Finger to nose intact bilaterally  No dysarthria   Psychiatric:         Attention and Perception: Attention and perception normal.         Mood and Affect: Mood and affect normal.         Behavior: Behavior normal.         Thought Content: Thought content normal.         Cognition and Memory: Cognition and memory normal.         Judgment: Judgment normal.           CRANIAL NERVES     CN III, IV, VI   Pupils are equal, round, and reactive to light.  Extraocular motions are normal.        Significant Labs:   Recent Results (from the past 24 hour(s))   POCT glucose    Collection Time: 02/06/22  3:25 PM   Result Value Ref Range    POCT Glucose 251 (H) 70 - 110 mg/dL   ISTAT PROCEDURE    Collection Time: 02/06/22  3:43 PM   Result Value Ref Range    POC Glucose 249 (H) 70 - 110 mg/dL    POC BUN 20 6 - 30 mg/dL    POC Creatinine 1.1 0.5 - 1.4 mg/dL    POC Sodium 141 136 - 145 mmol/L    POC Potassium 3.6 3.5 - 5.1 mmol/L    POC Chloride 104 95 - 110 mmol/L    POC TCO2 (MEASURED) 28 23 - 29 mmol/L    POC Anion Gap 14 8 - 16 mmol/L    POC Ionized Calcium 1.11 1.06 - 1.42 mmol/L    POC Hematocrit 37 36 - 54 %PCV    Sample VENOUS    CBC W/ AUTO DIFFERENTIAL    Collection Time: 02/06/22  4:24 PM   Result Value Ref Range    WBC 6.48 3.90 - 12.70 K/uL    RBC 4.12 4.00 - 5.40 M/uL    Hemoglobin 10.8 (L) 12.0 - 16.0 g/dL    Hematocrit 34.2 (L) 37.0 - 48.5 %    MCV 83 82 - 98 fL    MCH 26.2 (L) 27.0 - 31.0 pg    MCHC 31.6 (L) 32.0 - 36.0 g/dL    RDW 15.7 (H) 11.5 - 14.5 %    Platelets 271 150 - 450 K/uL    MPV 10.4 9.2 - 12.9 fL    Immature Granulocytes 0.3 0.0 - 0.5 %    Gran # (ANC) 4.6 1.8 - 7.7 K/uL    Immature Grans (Abs) 0.02 0.00 - 0.04 K/uL    Lymph # 1.3 1.0 - 4.8 K/uL    Mono # 0.4 0.3 - 1.0 K/uL    Eos # 0.1 0.0 - 0.5 K/uL    Baso # 0.02 0.00 - 0.20 K/uL    nRBC 0 0 /100 WBC    Gran %  71.0 38.0 - 73.0 %    Lymph % 19.9 18.0 - 48.0 %    Mono % 6.6 4.0 - 15.0 %    Eosinophil % 1.9 0.0 - 8.0 %    Basophil % 0.3 0.0 - 1.9 %    Differential Method Automated    Comprehensive metabolic panel    Collection Time: 02/06/22  4:24 PM   Result Value Ref Range    Sodium 138 136 - 145 mmol/L    Potassium 3.4 (L) 3.5 - 5.1 mmol/L    Chloride 105 95 - 110 mmol/L    CO2 24 23 - 29 mmol/L    Glucose 262 (H) 70 - 110 mg/dL    BUN 17 8 - 23 mg/dL    Creatinine 1.3 0.5 - 1.4 mg/dL    Calcium 8.9 8.7 - 10.5 mg/dL    Total Protein 7.1 6.0 - 8.4 g/dL    Albumin 3.0 (L) 3.5 - 5.2 g/dL    Total Bilirubin 0.2 0.1 - 1.0 mg/dL    Alkaline Phosphatase 81 55 - 135 U/L    AST 10 10 - 40 U/L    ALT 6 (L) 10 - 44 U/L    Anion Gap 9 8 - 16 mmol/L    eGFR if African American 48 (A) >60 mL/min/1.73 m^2    eGFR if non African American 42 (A) >60 mL/min/1.73 m^2   TSH    Collection Time: 02/06/22  4:24 PM   Result Value Ref Range    TSH 1.440 0.400 - 4.000 uIU/mL   LDL - Lipid Panel    Collection Time: 02/06/22  4:24 PM   Result Value Ref Range    Cholesterol 333 (H) 120 - 199 mg/dL    Triglycerides 235 (H) 30 - 150 mg/dL    HDL 50 40 - 75 mg/dL    LDL Cholesterol 236.0 (H) 63.0 - 159.0 mg/dL    HDL/Cholesterol Ratio 15.0 (L) 20.0 - 50.0 %    Total Cholesterol/HDL Ratio 6.7 (H) 2.0 - 5.0    Non-HDL Cholesterol 283 mg/dL   Magnesium    Collection Time: 02/06/22  4:24 PM   Result Value Ref Range    Magnesium 1.9 1.6 - 2.6 mg/dL   POCT COVID-19 Rapid Screening    Collection Time: 02/06/22  6:06 PM   Result Value Ref Range    POC Rapid COVID Negative Negative     Acceptable Yes    POCT glucose    Collection Time: 02/06/22  6:25 PM   Result Value Ref Range    POCT Glucose 229 (H) 70 - 110 mg/dL   Protime-INR    Collection Time: 02/06/22  6:44 PM   Result Value Ref Range    Prothrombin Time 10.0 9.0 - 12.5 sec    INR 0.9 0.8 - 1.2   APTT    Collection Time: 02/06/22  6:44 PM   Result Value Ref Range    aPTT 26.3 21.0 - 32.0  sec     Significant Imaging:   CTA HEAD AND NECK (XPD)  FINDINGS:  Noncontrast CT head:   The subcutaneous tissues unremarkable bony calvarium is intact.  The paranasal sinuses unremarkable.  The mastoid air cells are clear.  The orbits and intraorbital contents are unremarkable.   The craniocervical junction is intact.  The midline structures are unremarkable.  The there are no extra-axial fluid collections.  There is no evidence of intracranial hemorrhage.  The ventricles and sulci are erosive changes in volume loss.  There are minimal hypodensities within periventricular and subcortical white matter.  The gray-white differentiation is maintained.  There is no dense vessel sign.  There is no evidence of mass effect.     CTA neck:   The great vessels arising for aortic arch are within normal limits.  The bilateral subclavian arteries are within normal limits.  The internal and external carotid arteries appear unremarkable.   The vertebral artery origins are within normal limits.  There is no identifiable flow within the V3 segment of the right vertebral artery.  The left vertebral artery is within normal limits.     CTA brain:   The intracranial segments of the ICAs are within normal limits.  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  There is probable azygous configuration of the A2.  The middle cerebral artery is unremarkable.   There is reconstitution of flow within the distal segment of the right V4.  The left vertebral is unremarkable.  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   The dural venous sinuses are within normal limits.   Noncontrast examination:   The soft tissue the neck are within normal limits.  The parotid and submandibular glands unremarkable.  The thyroid gland is within normal limits.  There is no evidence of lymphadenopathy in the neck.   The trachea is unremarkable.  There are emphysematous changes in the visualized lung apices.  There is  no evidence of a pneumothorax or pulmonary contusion.   There are degenerative changes in the osseous structures.  The region is physis in the lower cervical spine.  There is no evidence of acute fracture or listhesis of the cervical spine.     Impression:   Noncontrast CT scan head:   No CT evidence of large territorial infarction.  Additional evaluation, as clinically warranted.     CTA:   Absence of flow within the V3 segment right vertebral artery, suggestive of occlusion.    MRI of the brain obtained for further evaluation.   Otherwise, unremarkable CTA of the head and neck.      Electronically signed by: Govind Simpson MD  Date:                                            02/06/2022  Time:                                           16:52    06-FEB-2022 17:04:46 EKG   Normal sinus rhythm  Vent. rate 72 BPM  WA interval 138 ms  QRS duration 88 ms  QT/QTc 416/455 ms  P-R-T axes 58 25 122  T wave abnormality, consider lateral ischemia  Abnormal ECG  When compared with ECG of 25-JAN-2016 07:55,  Significant changes have occurred    Assessment/Plan:     * Cerebrovascular accident (CVA) due to occlusion of right vertebral artery  Stat MRI brain pending  Appreciated Neurovascular stroke assessment and recommendations - thank you  Patient took ASA 325mg PTA  She was loaded with Plavix 600mg po x 1 at 17:20  Admitted to ICU due to stuttering symptoms and malignant HTN (although likely reactive)   -Allow for permissive HTN per stroke protocol interventions  CVA best practice pathway order sets have been initiated  TTE with bubble in am  Neurology consult, Dr. Albright, placed for am.  DVT prophylaxis with Lovenox 40mg daily  Swallow screen in ED now by nurse and formal in am  PT and OT consults, SLP consult    Current therapy:    0.9%  NaCl infusion, , Intravenous, Continuous, 75 cc/hr    [START ON 2/7/2022] aspirin EC tablet 81 mg, 81 mg, Oral, Daily    [START ON 2/7/2022] atorvastatin tablet 80 mg, 80 mg, Oral, Daily     [START ON 2/7/2022] clopidogreL tablet 75 mg, 75 mg, Oral, Daily    labetaloL injection 10 mg, 10 mg, Intravenous, Q15 Min PRN SBP >220    MRA brain showed: Absence of flow within the V3 segment right vertebral artery, suggestive of occlusion      Malignant essential hypertension  Given acute occlusion noted above, will allow for permissive HTN per CVA protocol  Holding home lisinopril, Norvasc and Clonidine  Monitor in ICU until PRES ruled out  MRI brain pending      labetaloL injection 10 mg, 10 mg, Intravenous, Q15 Min PRN SBP >220        Type 2 diabetes mellitus with hyperglycemia, without long-term current use of insulin  Holding NPO for now with acute CVA  Holding home Glucophage and Amaryl  Low dose SSI protocol for NPO patients  HgA1c  Glucose elevated to 262, so adding Levemir 5U HS, first now    Mixed hyperlipidemia  Continue home Lipitor 80mg po qday  Consider adding Tricor    Normocytic anemia  Check B12, folate, Fe studies  Hg 10.8g, MCV 83    Moderate cigarette smoker (10-19 per day)  Tobacco cessation counseling provided x 3 minutes  Holding on nicotine patch in setting of acute cerebral thrombosis      Hypokalemia  K-lyte 20 meq po x 1  Follow daily  Check Mg now via add on      Overweight with body mass index (BMI) of 25 to 25.9 in adult  Encourage exercise and healthy diet  BMI 25.06 kg/m2        VTE Risk Mitigation (From admission, onward)         Ordered     enoxaparin injection 40 mg  Daily         02/06/22 1953     Place MIRIAN hose  Until discontinued         02/06/22 1953     IP VTE LOW RISK PATIENT  Once         02/06/22 1953     Place sequential compression device  Until discontinued         02/06/22 1953                   RAMON Suazo MD  Department of Hospital Medicine   [facility] - [place] - Emergency Dept

## 2022-02-07 NOTE — HPI
"Ms. Garza is a 72yo lady with a pas medical history of HTN, DM2 and tobacco abuse (smokes 1/2 PPD).  Her COVID vaccination status is unknown, though she was positive on 8/24/21.     She now comes to the ED with vertigo when she woke up this morning.  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  Shortly after this she had abrupt onet of slurred speech and right sided weakness.  She had to go sit down and call the residential nurse to evaluate her.  She denied double vision with the vertigo and denied any numbness or tingling (other than an odd burning in her nose before the symptoms).    When the nurse arrived she had dysarthria and a measured BP of 215/115, so she took a clonidine 0.1mg x 1 and ASA 325mg.  She called her daughter to come pick her up, who then drove her to the ED.  She states that her symptoms all resolved shortly after taking the ASA (<1 hour).  She never had any neck pain or headache.  She feels all of her symptoms have now completely resolved, including the vertigo.  On arrival to the ED, she did have a brief recurrence of the above symptoms, but it resolved of its own accord after a few minutes.     In the ED she was found to be hypertensive with a CTA head that showed No CT evidence of large territorial infarction and absence of flow within the V3 segment right vertebral artery, suggestive of occlusion.  NV tele stroke consult was obtained and recommended Plavix 600mg and full dose ASA (already had PTA), and MRI brain stat.     "

## 2022-02-07 NOTE — ASSESSMENT & PLAN NOTE
MRI/MRA - it does not appear that the right vertebral artery is acute as she looks like she has robust blood flow in other areas suggesting more chronic in nature  Appreciated Neurovascular stroke assessment and recommendations   Patient took ASA 325mg PTA and now on 81 mg daily  She was loaded with Plavix 600mg po x 1 and now on 75 mg daily for 21 days    Admitted to ICU due to stuttering symptoms and malignant HTN (although likely reactive)   -Allow for permissive HTN per stroke protocol interventions  CVA best practice pathway order sets have been initiated  TTE with bubble pending  Neurology consulted - appreciate recommendations  DVT prophylaxis with Lovenox 40mg daily  PT/OT/SLP consulted

## 2022-02-07 NOTE — CLINICAL REVIEW
Vertebral artery occlusion may be chronic  Appears culprit is likely small acute CVA in L santa-genny and L inferior cerebellum    MRI BRAIN ISCHEMIC INTERVENTIONAL PROTOCOL INCL MRA W/O CONTRAST  FINDINGS:  MRI brain:   There is diffusion restriction involving the lateral aspect of the left genny.  There is associated increased signal on the FLAIR sequences.     There is DWI hyperintensity in the left inferior cerebellum with no definitive decreased signal on the ADC maps corresponding to this region there is associated T2/FLAIR signal hyperintensity corresponding to this region of the diffusion weighted signal abnormality.     The ventricles and sulci are prominent.  There are scattered T2/FLAIR signal hyperintensities within the periventricular and subcortical white matter.  There are no extra-axial fluid collections there is no evidence of intracranial hemorrhage.     MRA:   The intracranial segments of the ICAs are within normal limits.  The A1 segment of the right anterior cerebral artery is hypoplastic.  There is also significant attenuated within the A2 segment of the right anterior cerebral artery.  The left anterior cerebral artery is within normal limits.     The middle cerebral arteries are within normal limits.     There is no identifiable flow within the right vertebral artery.  There is intermittent flow signal within the left vertebral artery.  The basilar is significant diminutive in appearance with suspected multiple areas of stenosis.  There are robust bilateral posterior communicating arteries the supply the PCAs.     There is no evidence of aneurysm of the intracranial circulation.     Impression:     MRI brain:   Small acute infarction in the left santa genyn and small subacute infarction in the left inferior cerebellum.  No evidence of intracranial hemorrhage.     MRA brain:   Significant diminished flow in the posterior circulation with nonvisualization of flow within the right vertebral artery  and intermittent flow in the left vertebral artery.  Multiple luminal narrowing involving the basilar with suspected multiple high-grade stenosis versus occlusions.     Robust bilateral posterior communicating arteries supplying the PCA territories.  The findings in the basilar may be chronic in nature given the robust bilateral posterior communicating arteries.          Electronically signed by: Govind Simpson MD  Date:                                            02/06/2022  Time:                                           23:55

## 2022-02-07 NOTE — ED NOTES
Chat  with MD I saw that we should notify him if systolic pressure drops below 180. I woke her up and repositioned the cuff and her systolic pressure is 177 currently. He is adjusting the patient fluids so I will pass the order to you and wait to start the fluids so the orders can be modified. I passed this message to Lorie in your department.

## 2022-02-07 NOTE — ASSESSMENT & PLAN NOTE
Tobacco cessation counseling provided x 3 minutes  Holding on nicotine patch in setting of acute cerebral thrombosis

## 2022-02-07 NOTE — PROGRESS NOTES
"[facility]  Progress Note    Patient Name: Jaonne Peña  MRN: [id number]  Patient Class: IP- Inpatient   Admission Date: 2/6/2022  Length of Stay: 1 days  Attending Physician: Jaguar Mancia MD  Primary Care Provider: Marielle Gibson MD        Subjective:     Principal Problem:Cerebral vascular accident        HPI:  Ms. Peña is a 70yo lady with a pas medical history of HTN, DM2 and tobacco abuse (smokes 1/2 PPD).  Her COVID vaccination status is unknown, though she was positive on 8/24/21.     She now comes to the ED with vertigo when she woke up this morning.  She states things, "were just off when looking at them and kind of spinning."  She had her brother drive her to work (at the half-way) and had an episode of N/V en route.  Shortly after arriving at the half-way, around 2:15pm, she developed a weird out of body sensation and vague confusion, like she could not recognize her inmates she's known forever.  Shortly after this she had abrupt onet of slurred speech and right sided weakness.  She had to go sit down and call the half-way nurse to evaluate her.  She denied double vision with the vertigo and denied any numbness or tingling (other than an odd burning in her nose before the symptoms).    When the nurse arrived she had dysarthria and a measured BP of 215/115, so she took a clonidine 0.1mg x 1 and ASA 325mg.  She called her daughter to come pick her up, who then drove her to the ED.  She states that her symptoms all resolved shortly after taking the ASA (<1 hour).  She never had any neck pain or headache.  She feels all of her symptoms have now completely resolved, including the vertigo.  On arrival to the ED, she did have a brief recurrence of the above symptoms, but it resolved of its own accord after a few minutes.     In the ED she was found to be hypertensive with a CTA head that showed No CT evidence of large territorial infarction and absence of flow within the V3 " segment right vertebral artery, suggestive of occlusion.  [place] tele stroke consult was obtained and recommended Plavix 600mg and full dose ASA (already had PTA), and MRI brain stat.         Overview/Hospital Course:  Admitted with dizziness and found to have left [place] and left cerebellum infarctions. Loaded with aspirin and plavix. 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 She was not a candidate for tpa. Neurology following. Stable for transfer to floor.      Interval History: No acute events, she is currently not dizzy or having any abnormal symptoms this morning.     Review of Systems   Constitutional: Negative for chills, fatigue and fever.   HENT: Negative for congestion.    Respiratory: Negative for cough and shortness of breath.    Cardiovascular: Negative for chest pain.   Gastrointestinal: Negative for abdominal pain, constipation, diarrhea, nausea and vomiting.   Endocrine: Negative for heat intolerance.   Genitourinary: Negative for dysuria.   Musculoskeletal: Positive for gait problem.   Skin: Negative for rash.   Neurological: Negative for dizziness, speech difficulty and weakness.   Hematological: Does not bruise/bleed easily.   Psychiatric/Behavioral: Negative for confusion. The patient is not nervous/anxious.      Objective:     Vital Signs (Most Recent):  Temp: 98 °F (36.7 °C) (02/07/22 0430)  Pulse: 69 (02/07/22 1300)  Resp: 19 (02/07/22 1300)  BP: (!) 142/63 (02/07/22 1300)  SpO2: (!) 94 % (02/07/22 1300) Vital Signs (24h Range):  Temp:  [97.9 °F (36.6 °C)-98.1 °F (36.7 °C)] 98 °F (36.7 °C)  Pulse:  [60-94] 69  Resp:  [10-63] 19  SpO2:  [91 %-100 %] 94 %  BP: (142-241)/() 142/63     Weight: 79.4 kg (175 lb)  Body mass index is 27.41 kg/m².    Intake/Output Summary (Last 24 hours) at 2/7/2022 3795  Last data filed at 2/7/2022 1200  Gross per 24 hour   Intake 1210.78 ml   Output 250 ml   Net 960.78 ml      Physical Exam  Vitals and  nursing note reviewed.   Constitutional:       General: She is awake. She is not in acute distress.     Appearance: She is well-developed. She is not diaphoretic.   HENT:      Head: Normocephalic and atraumatic.      Mouth/Throat:      Pharynx: No oropharyngeal exudate.   Eyes:      General: No scleral icterus.        Right eye: No discharge.         Left eye: No discharge.      Conjunctiva/sclera: Conjunctivae normal.      Pupils: Pupils are equal, round, and reactive to light.      Comments: PERRL, no ptosis   Neck:      Thyroid: No thyromegaly.      Vascular: No JVD.      Trachea: No tracheal deviation.   Cardiovascular:      Rate and Rhythm: Normal rate and regular rhythm.      Heart sounds: Normal heart sounds. No murmur heard.  No friction rub. No gallop.    Pulmonary:      Effort: Pulmonary effort is normal. No respiratory distress.      Breath sounds: Normal breath sounds. No stridor. No decreased breath sounds, wheezing, rhonchi or rales.   Chest:      Chest wall: No tenderness.   Abdominal:      General: Bowel sounds are normal. There is no distension.      Palpations: Abdomen is soft. There is no mass.      Tenderness: There is no abdominal tenderness. There is no guarding or rebound.   Genitourinary:     Comments: No agustin in place  Musculoskeletal:         General: No tenderness. Normal range of motion.      Cervical back: Normal range of motion and neck supple.   Lymphadenopathy:      Cervical: No cervical adenopathy.      Comments: No peripheral edema   Skin:     General: Skin is warm and dry.      Coloration: Skin is not pale.      Findings: No erythema or rash.   Neurological:      Mental Status: She is alert and easily aroused.      GCS: GCS eye subscore is 4. GCS verbal subscore is 5. GCS motor subscore is 6.      Cranial Nerves: No cranial nerve deficit.      Motor: No tremor, abnormal muscle tone or pronator drift.      Coordination: Coordination normal.      Comments: Symmetrical strength  bilaterally this morning, no focal deficits. No dizziness/vertigo symptoms   Psychiatric:         Attention and Perception: Attention and perception normal.         Behavior: Behavior normal.         Thought Content: Thought content normal.         Cognition and Memory: Cognition and memory normal.         Judgment: Judgment normal.         Significant Labs: All pertinent labs within the past 24 hours have been reviewed.    Significant Imaging: I have reviewed all pertinent imaging results/findings within the past 24 hours.      Assessment/Plan:      * Cerebral vascular accident  MRI/MRA - it does not appear that the right vertebral artery is acute as she looks like she has robust blood flow in other areas suggesting more chronic in nature  Appreciated Neurovascular stroke assessment and recommendations   Patient took ASA 325mg PTA and now on 81 mg daily  She was loaded with Plavix 600mg po x 1 and now on 75 mg daily for 21 days    Admitted to ICU due to stuttering symptoms and malignant HTN (although likely reactive)   -Allow for permissive HTN per stroke protocol interventions  CVA best practice pathway order sets have been initiated  TTE with bubble pending  Neurology consulted - appreciate recommendations  DVT prophylaxis with Lovenox 40mg daily  PT/OT/SLP consulted      Overweight with body mass index (BMI) of 25 to 25.9 in adult  Encourage exercise and healthy diet  BMI 25.06 kg/m2      Hypokalemia  K-lyte 20 meq po x 1  Follow daily  Check Mg now via add on      Moderate cigarette smoker (10-19 per day)  Tobacco cessation counseling provided x 3 minutes  Holding on nicotine patch in setting of acute cerebral thrombosis      Normocytic anemia  Check B12, folate, Fe studies  Hg 10.8g, MCV 83    Mixed hyperlipidemia  Continue home Lipitor 80mg po qday  Consider adding Tricor    Type 2 diabetes mellitus with hyperglycemia, without long-term current use of insulin  A1c:   Lab Results   Component Value Date     HGBA1C 7.6 (H) 02/06/2022     Meds: basal bolus insulin + SSI PRN to maintain goal 140-180  ADA diet, accuchecks ACHS, hypoglycemic protocol  - adjust as needed    Malignant essential hypertension  - Allowing for permissive hypertension up to   - can likely resume home meds below in AM  Holding home lisinopril, Norvasc and Clonidine  MRI brain does not show evidence of PRES            VTE Risk Mitigation (From admission, onward)         Ordered     enoxaparin injection 40 mg  Daily         02/06/22 1953     Place MIRIAN hose  Until discontinued         02/06/22 1953     IP VTE LOW RISK PATIENT  Once         02/06/22 1953     Place sequential compression device  Until discontinued         02/06/22 1953                Discharge Planning   ARIANA: 2/8/2022     Code Status: Full Code   Is the patient medically ready for discharge?:     Reason for patient still in hospital (select all that apply): Treatment               Critical care time spent on the evaluation and treatment of severe organ dysfunction, review of pertinent labs and imaging studies, discussions with consulting providers and discussions with patient/family: 32 minutes.      Jaguar Mancia MD  Department of Hospital Medicine   [facility] - [place] - Intensive Care

## 2022-02-07 NOTE — PT/OT/SLP EVAL
Physical Therapy Evaluation    Patient Name:  Joanne Peña   MRN:  [id number]    Recommendations:     Discharge Recommendations:  outpatient PT   Discharge Equipment Recommendations: none   Barriers to discharge: None    Assessment:     Joanne Peña is a 71 y.o. female admitted with a medical diagnosis of Cerebrovascular accident (CVA) due to occlusion of right vertebral artery.  She presents with the following impairments/functional limitations:  weakness,impaired balance,gait instability .    Mildly impaired balance/gait stability while ambulating without a device. D/w patient and dtr outpatient PT strongly recommended, as she might benefit from a cane for improved safety with gait but too soon to determine at this time.  after ambulating (d/w nurse) but down to 170s with seated rest.     Rehab Prognosis: Good; patient would benefit from acute skilled PT services to address these deficits and reach maximum level of function.        Plan:     During this hospitalization, patient to be seen 5 x/week to address the identified rehab impairments via gait training,therapeutic activities,therapeutic exercises and progress toward the following goals:    · Plan of Care Expires:  02/21/22    Subjective     Chief Complaint: none  Patient/Family Comments/goals: go home. Return to work  Pain/Comfort:  · Pain Rating 1: 0/10    Patients cultural, spiritual, Faith conflicts given the current situation: no    Living Environment:  [facility] with son. No TORREY. 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   Prior to admission, patients level of function was ind with all; no DME; no falls.  Equipment used at home: none.  DME owned (not currently used): none.  Upon discharge, patient will have assistance from family.    Objective:     Communicated with ICU nurse Nohemi prior to session.  Patient found up in chair with blood pressure cuff,pulse ox  (continuous),telemetry,peripheral IV  upon PT entry to room.    General Precautions: Standard, fall   Orthopedic Precautions:N/A   Braces: N/A  Respiratory Status: Room air    Exams:  · dtr present.  · Cognitive Exam:  Patient is oriented to Person, Place, Time, Situation and in good spirits  · Gross Motor Coordination:  WFL  · Postural Exam:  Patient presented with the following abnormalities:    · -       No postural abnormalities identified  · Sensation:    · -       Intact  · Skin Integrity/Edema:      · -       Skin integrity: Visible skin intact  · RLE ROM: WFL  · RLE Strength: WFL  · LLE ROM: WFL  · LLE Strength: WFL     + romberg (LOB with eyes closed)  Min assist needed to perform bilateral single-legged stance 5 seconds each    Functional Mobility:  · Transfers:     · Sit to Stand:  modified independence with no AD  · Gait: SBA ~ 300 ft  · Mild multi-directional sway with self recovery  · Cues to decrease gait speed   · Cues for arm swing instead of holding hospital gown  · Walks and talks on RA without SOB  · Glasses on  · Walks in straight path with cues to turn head left and right  · Balance: fair +/good - with gait    /82 pre gait  /86 post gait, seated  BP = 172/79 post gait, seated, after 5 minutes    *keep SBP <200 per Dr. Albright, verbal      AM-PAC 6 CLICK MOBILITY  Total Score:22     Patient left up in chair with call button in reach, nurse eze notified and dtr present.    GOALS:   Multidisciplinary Problems     Physical Therapy Goals        Problem: Physical Therapy Goal    Goal Priority Disciplines Outcome Goal Variances Interventions   Physical Therapy Goal     PT, PT/OT Ongoing, Progressing     Description: Goals to be met by:      Patient will increase functional independence with mobility by performin. Gait  x 300 feet with Modified Eddy using no AD vs SPC.   2. Lower extremity exercise program x20 reps per handout, standing, with supervision                      History:     Past Medical History:   Diagnosis Date    Diabetes mellitus     Hyperlipidemia     Hypertension        No past surgical history on file.    Time Tracking:     PT Received On: 02/07/22  PT Start Time: 0958     PT Stop Time: 1022  PT Total Time (min): 24 min     Billable Minutes: Evaluation 15 and Gait Training 8      02/07/2022

## 2022-02-07 NOTE — ASSESSMENT & PLAN NOTE
- Allowing for permissive hypertension up to   - can likely resume home meds below in AM  Holding home lisinopril, Norvasc and Clonidine  MRI brain does not show evidence of PRES

## 2022-02-07 NOTE — SUBJECTIVE & OBJECTIVE
Past Medical History:   Diagnosis Date    Hypertension        No past surgical history on file.    Review of patient's allergies indicates:   Allergen Reactions    Ampicillin     Darvocet a500 [propoxyphene n-acetaminophen]        No current facility-administered medications on file prior to encounter.     Current Outpatient Medications on File Prior to Encounter   Medication Sig    amLODIPine (NORVASC) 10 MG tablet Take 10 mg by mouth once daily.    ascorbic acid, vitamin C, (VITAMIN C) 500 MG tablet Take 500 mg by mouth once daily.    aspirin 325 MG tablet Take 325 mg by mouth once daily.    atorvastatin (LIPITOR) 80 MG tablet Take 1 tablet (80 mg total) by mouth once daily. (Patient taking differently: Take 80 mg by mouth once daily. PRN)    cloNIDine (CATAPRES) 0.1 MG tablet Take 0.1 mg by mouth 2 (two) times daily. PRN    guanfacine HCl (GUANFACINE ORAL) Take by mouth.    ibuprofen (ADVIL,MOTRIN) 800 MG tablet Take 800 mg by mouth once daily.    metformin (GLUCOPHAGE) 500 MG tablet Take 1 tablet (500 mg total) by mouth daily with breakfast. (Patient taking differently: Take 500 mg by mouth daily with breakfast. prn)    multivitamin (ONE DAILY MULTIVITAMIN) per tablet Take 1 tablet by mouth once daily.    glimepiride (AMARYL) 2 MG tablet Take 1 tablet (2 mg total) by mouth 2 (two) times daily.    lisinopril 10 MG tablet Take 1 tablet (10 mg total) by mouth once daily.    meclizine (ANTIVERT) 25 mg tablet Take 1 tablet (25 mg total) by mouth every 6 (six) hours as needed.    [DISCONTINUED] ondansetron (ZOFRAN) 4 MG tablet Take 1 tablet (4 mg total) by mouth every 8 (eight) hours as needed (Nausea and vomiting).    [DISCONTINUED] polyethylene glycol (COLYTE) 240-22.72-6.72 -5.84 gram SolR Take 4,000 mLs (4 L total) by mouth as directed.     Family History    None       Tobacco Use    Smoking status: Current Every Day Smoker     Packs/day: 0.50     Types: Cigarettes    Smokeless tobacco: Never  Used   Substance and Sexual Activity    Alcohol use: Not on file    Drug use: No    Sexual activity: Not on file     Review of Systems   Constitutional: Negative for chills, fatigue and fever.   HENT: Negative for congestion.    Respiratory: Negative for cough and shortness of breath.    Cardiovascular: Negative for chest pain.   Gastrointestinal: Negative for abdominal pain, constipation, diarrhea, nausea and vomiting.   Endocrine: Negative for heat intolerance.   Genitourinary: Negative for dysuria.   Musculoskeletal: Positive for gait problem.   Skin: Negative for rash.   Neurological: Positive for dizziness and speech difficulty. Negative for weakness.        + Transient right hemiparesis  + Vertigo   Hematological: Does not bruise/bleed easily.   Psychiatric/Behavioral: Negative for confusion. The patient is not nervous/anxious.      Objective:     Vital Signs (Most Recent):  Temp: 97.9 °F (36.6 °C) (02/06/22 1513)  Pulse: 91 (02/06/22 1829)  Resp: 18 (02/06/22 1513)  BP: (!) 191/125 (02/06/22 1829)  SpO2: 97 % (02/06/22 1803) Vital Signs (24h Range):  Temp:  [97.9 °F (36.6 °C)] 97.9 °F (36.6 °C)  Pulse:  [69-94] 91  Resp:  [18] 18  SpO2:  [97 %-100 %] 97 %  BP: (183-241)/() 191/125     Weight: 72.6 kg (160 lb)  Body mass index is 25.06 kg/m².    Physical Exam  Vitals and nursing note reviewed.   Constitutional:       General: She is awake. She is not in acute distress.     Appearance: She is well-developed and well-nourished. She is not diaphoretic.   HENT:      Head: Normocephalic and atraumatic.      Mouth/Throat:      Pharynx: No oropharyngeal exudate.   Eyes:      General: No scleral icterus.        Right eye: No discharge.         Left eye: No discharge.      Extraocular Movements: EOM normal.      Conjunctiva/sclera: Conjunctivae normal.      Pupils: Pupils are equal, round, and reactive to light.      Comments: PERRL, no ptosis   Neck:      Thyroid: No thyromegaly.      Vascular: No JVD.       Trachea: No tracheal deviation.   Cardiovascular:      Rate and Rhythm: Normal rate and regular rhythm.      Pulses: Intact distal pulses.      Heart sounds: Normal heart sounds. No murmur heard.  No friction rub. No gallop.    Pulmonary:      Effort: Pulmonary effort is normal. No respiratory distress.      Breath sounds: Normal breath sounds. No stridor. No decreased breath sounds, wheezing, rhonchi or rales.   Chest:      Chest wall: No tenderness.   Abdominal:      General: Bowel sounds are normal. There is no distension.      Palpations: Abdomen is soft. There is no mass.      Tenderness: There is no abdominal tenderness. There is no guarding or rebound.   Genitourinary:     Comments: No agustin in place  Musculoskeletal:         General: No tenderness or edema. Normal range of motion.      Cervical back: Normal range of motion and neck supple.   Lymphadenopathy:      Cervical: No cervical adenopathy.      Comments: No peripheral edema   Skin:     General: Skin is warm and dry.      Coloration: Skin is not pale.      Findings: No erythema or rash.   Neurological:      Mental Status: She is alert and easily aroused. She is disoriented.      GCS: GCS eye subscore is 4. GCS verbal subscore is 5. GCS motor subscore is 6.      Cranial Nerves: No cranial nerve deficit.      Motor: No tremor, abnormal muscle tone or pronator drift.      Coordination: Coordination normal.      Comments: 4+/5 right upper and lower strength  5/5 left sided strength  Finger to nose intact bilaterally  No dysarthria   Psychiatric:         Attention and Perception: Attention and perception normal.         Mood and Affect: Mood and affect normal.         Behavior: Behavior normal.         Thought Content: Thought content normal.         Cognition and Memory: Cognition and memory normal.         Judgment: Judgment normal.           CRANIAL NERVES     CN III, IV, VI   Pupils are equal, round, and reactive to light.  Extraocular motions are  normal.        Significant Labs:   Recent Results (from the past 24 hour(s))   POCT glucose    Collection Time: 02/06/22  3:25 PM   Result Value Ref Range    POCT Glucose 251 (H) 70 - 110 mg/dL   ISTAT PROCEDURE    Collection Time: 02/06/22  3:43 PM   Result Value Ref Range    POC Glucose 249 (H) 70 - 110 mg/dL    POC BUN 20 6 - 30 mg/dL    POC Creatinine 1.1 0.5 - 1.4 mg/dL    POC Sodium 141 136 - 145 mmol/L    POC Potassium 3.6 3.5 - 5.1 mmol/L    POC Chloride 104 95 - 110 mmol/L    POC TCO2 (MEASURED) 28 23 - 29 mmol/L    POC Anion Gap 14 8 - 16 mmol/L    POC Ionized Calcium 1.11 1.06 - 1.42 mmol/L    POC Hematocrit 37 36 - 54 %PCV    Sample VENOUS    CBC W/ AUTO DIFFERENTIAL    Collection Time: 02/06/22  4:24 PM   Result Value Ref Range    WBC 6.48 3.90 - 12.70 K/uL    RBC 4.12 4.00 - 5.40 M/uL    Hemoglobin 10.8 (L) 12.0 - 16.0 g/dL    Hematocrit 34.2 (L) 37.0 - 48.5 %    MCV 83 82 - 98 fL    MCH 26.2 (L) 27.0 - 31.0 pg    MCHC 31.6 (L) 32.0 - 36.0 g/dL    RDW 15.7 (H) 11.5 - 14.5 %    Platelets 271 150 - 450 K/uL    MPV 10.4 9.2 - 12.9 fL    Immature Granulocytes 0.3 0.0 - 0.5 %    Gran # (ANC) 4.6 1.8 - 7.7 K/uL    Immature Grans (Abs) 0.02 0.00 - 0.04 K/uL    Lymph # 1.3 1.0 - 4.8 K/uL    Mono # 0.4 0.3 - 1.0 K/uL    Eos # 0.1 0.0 - 0.5 K/uL    Baso # 0.02 0.00 - 0.20 K/uL    nRBC 0 0 /100 WBC    Gran % 71.0 38.0 - 73.0 %    Lymph % 19.9 18.0 - 48.0 %    Mono % 6.6 4.0 - 15.0 %    Eosinophil % 1.9 0.0 - 8.0 %    Basophil % 0.3 0.0 - 1.9 %    Differential Method Automated    Comprehensive metabolic panel    Collection Time: 02/06/22  4:24 PM   Result Value Ref Range    Sodium 138 136 - 145 mmol/L    Potassium 3.4 (L) 3.5 - 5.1 mmol/L    Chloride 105 95 - 110 mmol/L    CO2 24 23 - 29 mmol/L    Glucose 262 (H) 70 - 110 mg/dL    BUN 17 8 - 23 mg/dL    Creatinine 1.3 0.5 - 1.4 mg/dL    Calcium 8.9 8.7 - 10.5 mg/dL    Total Protein 7.1 6.0 - 8.4 g/dL    Albumin 3.0 (L) 3.5 - 5.2 g/dL    Total Bilirubin 0.2 0.1 -  1.0 mg/dL    Alkaline Phosphatase 81 55 - 135 U/L    AST 10 10 - 40 U/L    ALT 6 (L) 10 - 44 U/L    Anion Gap 9 8 - 16 mmol/L    eGFR if African American 48 (A) >60 mL/min/1.73 m^2    eGFR if non African American 42 (A) >60 mL/min/1.73 m^2   TSH    Collection Time: 02/06/22  4:24 PM   Result Value Ref Range    TSH 1.440 0.400 - 4.000 uIU/mL   LDL - Lipid Panel    Collection Time: 02/06/22  4:24 PM   Result Value Ref Range    Cholesterol 333 (H) 120 - 199 mg/dL    Triglycerides 235 (H) 30 - 150 mg/dL    HDL 50 40 - 75 mg/dL    LDL Cholesterol 236.0 (H) 63.0 - 159.0 mg/dL    HDL/Cholesterol Ratio 15.0 (L) 20.0 - 50.0 %    Total Cholesterol/HDL Ratio 6.7 (H) 2.0 - 5.0    Non-HDL Cholesterol 283 mg/dL   Magnesium    Collection Time: 02/06/22  4:24 PM   Result Value Ref Range    Magnesium 1.9 1.6 - 2.6 mg/dL   POCT COVID-19 Rapid Screening    Collection Time: 02/06/22  6:06 PM   Result Value Ref Range    POC Rapid COVID Negative Negative     Acceptable Yes    POCT glucose    Collection Time: 02/06/22  6:25 PM   Result Value Ref Range    POCT Glucose 229 (H) 70 - 110 mg/dL   Protime-INR    Collection Time: 02/06/22  6:44 PM   Result Value Ref Range    Prothrombin Time 10.0 9.0 - 12.5 sec    INR 0.9 0.8 - 1.2   APTT    Collection Time: 02/06/22  6:44 PM   Result Value Ref Range    aPTT 26.3 21.0 - 32.0 sec     Significant Imaging:   CTA HEAD AND NECK (XPD)  FINDINGS:  Noncontrast CT head:   The subcutaneous tissues unremarkable bony calvarium is intact.  The paranasal sinuses unremarkable.  The mastoid air cells are clear.  The orbits and intraorbital contents are unremarkable.   The craniocervical junction is intact.  The midline structures are unremarkable.  The there are no extra-axial fluid collections.  There is no evidence of intracranial hemorrhage.  The ventricles and sulci are erosive changes in volume loss.  There are minimal hypodensities within periventricular and subcortical white matter.  The  gray-white differentiation is maintained.  There is no dense vessel sign.  There is no evidence of mass effect.     CTA neck:   The great vessels arising for aortic arch are within normal limits.  The bilateral subclavian arteries are within normal limits.  The internal and external carotid arteries appear unremarkable.   The vertebral artery origins are within normal limits.  There is no identifiable flow within the V3 segment of the right vertebral artery.  The left vertebral artery is within normal limits.     CTA brain:   The intracranial segments of the ICAs are within normal limits.  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  There is probable azygous configuration of the A2.  The middle cerebral artery is unremarkable.   There is reconstitution of flow within the distal segment of the right V4.  The left vertebral is unremarkable.  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   The dural venous sinuses are within normal limits.   Noncontrast examination:   The soft tissue the neck are within normal limits.  The parotid and submandibular glands unremarkable.  The thyroid gland is within normal limits.  There is no evidence of lymphadenopathy in the neck.   The trachea is unremarkable.  There are emphysematous changes in the visualized lung apices.  There is no evidence of a pneumothorax or pulmonary contusion.   There are degenerative changes in the osseous structures.  The region is physis in the lower cervical spine.  There is no evidence of acute fracture or listhesis of the cervical spine.     Impression:   Noncontrast CT scan head:   No CT evidence of large territorial infarction.  Additional evaluation, as clinically warranted.     CTA:   Absence of flow within the V3 segment right vertebral artery, suggestive of occlusion.    MRI of the brain obtained for further evaluation.   Otherwise, unremarkable CTA of the head and neck.      Electronically signed by:  Govind Simpson MD  Date:                                            02/06/2022  Time:                                           16:52    06-FEB-2022 17:04:46 EKG   Normal sinus rhythm  Vent. rate 72 BPM  HI interval 138 ms  QRS duration 88 ms  QT/QTc 416/455 ms  P-R-T axes 58 25 122  T wave abnormality, consider lateral ischemia  Abnormal ECG  When compared with ECG of 25-JAN-2016 07:55,  Significant changes have occurred

## 2022-02-08 LAB
ALBUMIN SERPL BCP-MCNC: 2.8 G/DL (ref 3.5–5.2)
ALP SERPL-CCNC: 75 U/L (ref 55–135)
ALT SERPL W/O P-5'-P-CCNC: 6 U/L (ref 10–44)
ANION GAP SERPL CALC-SCNC: 11 MMOL/L (ref 8–16)
AST SERPL-CCNC: 9 U/L (ref 10–40)
BASOPHILS # BLD AUTO: 0.02 K/UL (ref 0–0.2)
BASOPHILS NFR BLD: 0.4 % (ref 0–1.9)
BILIRUB SERPL-MCNC: 0.3 MG/DL (ref 0.1–1)
BUN SERPL-MCNC: 16 MG/DL (ref 8–23)
CALCIUM SERPL-MCNC: 8.6 MG/DL (ref 8.7–10.5)
CHLORIDE SERPL-SCNC: 108 MMOL/L (ref 95–110)
CO2 SERPL-SCNC: 22 MMOL/L (ref 23–29)
CREAT SERPL-MCNC: 1 MG/DL (ref 0.5–1.4)
DIFFERENTIAL METHOD: ABNORMAL
EOSINOPHIL # BLD AUTO: 0.1 K/UL (ref 0–0.5)
EOSINOPHIL NFR BLD: 2 % (ref 0–8)
ERYTHROCYTE [DISTWIDTH] IN BLOOD BY AUTOMATED COUNT: 15.6 % (ref 11.5–14.5)
EST. GFR  (AFRICAN AMERICAN): >60 ML/MIN/1.73 M^2
EST. GFR  (NON AFRICAN AMERICAN): 58 ML/MIN/1.73 M^2
GLUCOSE SERPL-MCNC: 144 MG/DL (ref 70–110)
HCT VFR BLD AUTO: 31.7 % (ref 37–48.5)
HGB BLD-MCNC: 9.8 G/DL (ref 12–16)
IMM GRANULOCYTES # BLD AUTO: 0.01 K/UL (ref 0–0.04)
IMM GRANULOCYTES NFR BLD AUTO: 0.2 % (ref 0–0.5)
LYMPHOCYTES # BLD AUTO: 1.3 K/UL (ref 1–4.8)
LYMPHOCYTES NFR BLD: 25.6 % (ref 18–48)
MCH RBC QN AUTO: 25.9 PG (ref 27–31)
MCHC RBC AUTO-ENTMCNC: 30.9 G/DL (ref 32–36)
MCV RBC AUTO: 84 FL (ref 82–98)
MONOCYTES # BLD AUTO: 0.5 K/UL (ref 0.3–1)
MONOCYTES NFR BLD: 10.4 % (ref 4–15)
NEUTROPHILS # BLD AUTO: 3.1 K/UL (ref 1.8–7.7)
NEUTROPHILS NFR BLD: 61.4 % (ref 38–73)
NRBC BLD-RTO: 0 /100 WBC
PLATELET # BLD AUTO: 254 K/UL (ref 150–450)
PMV BLD AUTO: 10.5 FL (ref 9.2–12.9)
POCT GLUCOSE: 138 MG/DL (ref 70–110)
POCT GLUCOSE: 187 MG/DL (ref 70–110)
POTASSIUM SERPL-SCNC: 3.7 MMOL/L (ref 3.5–5.1)
PROT SERPL-MCNC: 6.5 G/DL (ref 6–8.4)
RBC # BLD AUTO: 3.79 M/UL (ref 4–5.4)
SODIUM SERPL-SCNC: 141 MMOL/L (ref 136–145)
WBC # BLD AUTO: 5.11 K/UL (ref 3.9–12.7)

## 2022-02-08 PROCEDURE — 63600175 PHARM REV CODE 636 W HCPCS: Performed by: INTERNAL MEDICINE

## 2022-02-08 PROCEDURE — 36415 COLL VENOUS BLD VENIPUNCTURE: CPT | Performed by: INTERNAL MEDICINE

## 2022-02-08 PROCEDURE — 80053 COMPREHEN METABOLIC PANEL: CPT | Performed by: INTERNAL MEDICINE

## 2022-02-08 PROCEDURE — 99232 PR SUBSEQUENT HOSPITAL CARE,LEVL II: ICD-10-PCS | Mod: ,,, | Performed by: PSYCHIATRY & NEUROLOGY

## 2022-02-08 PROCEDURE — 97110 THERAPEUTIC EXERCISES: CPT | Mod: CQ

## 2022-02-08 PROCEDURE — 99232 SBSQ HOSP IP/OBS MODERATE 35: CPT | Mod: ,,, | Performed by: PSYCHIATRY & NEUROLOGY

## 2022-02-08 PROCEDURE — 97535 SELF CARE MNGMENT TRAINING: CPT | Mod: CO

## 2022-02-08 PROCEDURE — 25000003 PHARM REV CODE 250: Performed by: STUDENT IN AN ORGANIZED HEALTH CARE EDUCATION/TRAINING PROGRAM

## 2022-02-08 PROCEDURE — 11000001 HC ACUTE MED/SURG PRIVATE ROOM

## 2022-02-08 PROCEDURE — 85025 COMPLETE CBC W/AUTO DIFF WBC: CPT | Performed by: INTERNAL MEDICINE

## 2022-02-08 PROCEDURE — 94761 N-INVAS EAR/PLS OXIMETRY MLT: CPT

## 2022-02-08 PROCEDURE — 25000003 PHARM REV CODE 250: Performed by: INTERNAL MEDICINE

## 2022-02-08 PROCEDURE — 97116 GAIT TRAINING THERAPY: CPT | Mod: CQ

## 2022-02-08 PROCEDURE — 97530 THERAPEUTIC ACTIVITIES: CPT | Mod: CO

## 2022-02-08 RX ORDER — AMLODIPINE BESYLATE 5 MG/1
10 TABLET ORAL DAILY
Status: DISCONTINUED | OUTPATIENT
Start: 2022-02-08 | End: 2022-02-11 | Stop reason: HOSPADM

## 2022-02-08 RX ORDER — LOSARTAN POTASSIUM 25 MG/1
25 TABLET ORAL DAILY
Status: DISCONTINUED | OUTPATIENT
Start: 2022-02-08 | End: 2022-02-08

## 2022-02-08 RX ORDER — LOSARTAN POTASSIUM 25 MG/1
50 TABLET ORAL DAILY
Status: DISCONTINUED | OUTPATIENT
Start: 2022-02-08 | End: 2022-02-09

## 2022-02-08 RX ADMIN — LABETALOL HYDROCHLORIDE 10 MG: 5 INJECTION INTRAVENOUS at 07:02

## 2022-02-08 RX ADMIN — POLYETHYLENE GLYCOL 3350 17 G: 17 POWDER, FOR SOLUTION ORAL at 08:02

## 2022-02-08 RX ADMIN — ATORVASTATIN CALCIUM 80 MG: 40 TABLET, FILM COATED ORAL at 08:02

## 2022-02-08 RX ADMIN — CLOPIDOGREL 75 MG: 75 TABLET, FILM COATED ORAL at 08:02

## 2022-02-08 RX ADMIN — MUPIROCIN: 20 OINTMENT TOPICAL at 08:02

## 2022-02-08 RX ADMIN — ENOXAPARIN SODIUM 40 MG: 40 INJECTION SUBCUTANEOUS at 04:02

## 2022-02-08 RX ADMIN — LOSARTAN POTASSIUM 50 MG: 25 TABLET, FILM COATED ORAL at 04:02

## 2022-02-08 RX ADMIN — AMLODIPINE BESYLATE 10 MG: 5 TABLET ORAL at 12:02

## 2022-02-08 RX ADMIN — ASPIRIN 81 MG: 81 TABLET, COATED ORAL at 08:02

## 2022-02-08 NOTE — SUBJECTIVE & OBJECTIVE
Interval History: No acute overnight events. she is currently not dizzy or having any abnormal symptoms this morning. States she feeling better overall. Still feel weak on her left side.     Review of Systems   Constitutional: Negative for chills, fatigue and fever.   HENT: Negative for congestion.    Respiratory: Negative for cough and shortness of breath.    Cardiovascular: Negative for chest pain.   Gastrointestinal: Negative for abdominal pain, constipation, diarrhea, nausea and vomiting.   Endocrine: Negative for heat intolerance.   Genitourinary: Negative for dysuria.   Musculoskeletal: Positive for gait problem.   Skin: Negative for rash.   Neurological: Positive for weakness. Negative for dizziness and speech difficulty.   Hematological: Does not bruise/bleed easily.   Psychiatric/Behavioral: Negative for confusion. The patient is not nervous/anxious.      Objective:     Vital Signs (Most Recent):  Temp: 98.2 °F (36.8 °C) (02/08/22 1300)  Pulse: 97 (02/08/22 1300)  Resp: 20 (02/08/22 1048)  BP: (!) 160/79 (02/08/22 1300)  SpO2: 98 % (02/08/22 1300) Vital Signs (24h Range):  Temp:  [98.2 °F (36.8 °C)-99 °F (37.2 °C)] 98.2 °F (36.8 °C)  Pulse:  [68-97] 97  Resp:  [18-33] 20  SpO2:  [93 %-98 %] 98 %  BP: (131-197)/(71-90) 160/79     Weight: 79.4 kg (175 lb)  Body mass index is 27.41 kg/m².    Intake/Output Summary (Last 24 hours) at 2/8/2022 1523  Last data filed at 2/8/2022 1300  Gross per 24 hour   Intake 480 ml   Output --   Net 480 ml      Physical Exam  Vitals and nursing note reviewed.   Constitutional:       General: She is awake. She is not in acute distress.     Appearance: She is well-developed. She is not diaphoretic.   HENT:      Head: Normocephalic and atraumatic.      Mouth/Throat:      Pharynx: No oropharyngeal exudate.   Eyes:      General: No scleral icterus.        Right eye: No discharge.         Left eye: No discharge.      Conjunctiva/sclera: Conjunctivae normal.      Pupils: Pupils are  equal, round, and reactive to light.      Comments: PERRL, no ptosis   Neck:      Thyroid: No thyromegaly.      Vascular: No JVD.      Trachea: No tracheal deviation.   Cardiovascular:      Rate and Rhythm: Normal rate and regular rhythm.      Heart sounds: Normal heart sounds. No murmur heard.  No friction rub. No gallop.    Pulmonary:      Effort: Pulmonary effort is normal. No respiratory distress.      Breath sounds: Normal breath sounds. No stridor. No decreased breath sounds, wheezing, rhonchi or rales.   Chest:      Chest wall: No tenderness.   Abdominal:      General: Bowel sounds are normal. There is no distension.      Palpations: Abdomen is soft. There is no mass.      Tenderness: There is no abdominal tenderness. There is no guarding or rebound.   Genitourinary:     Comments: No agustin in place  Musculoskeletal:         General: No tenderness. Normal range of motion.      Cervical back: Normal range of motion and neck supple.      Right lower leg: No edema.      Left lower leg: No edema.   Lymphadenopathy:      Comments: No peripheral edema   Skin:     General: Skin is warm and dry.      Coloration: Skin is not pale.      Findings: No erythema or rash.   Neurological:      Mental Status: She is alert and easily aroused.      GCS: GCS eye subscore is 4. GCS verbal subscore is 5. GCS motor subscore is 6.      Cranial Nerves: No cranial nerve deficit.      Motor: No tremor, abnormal muscle tone or pronator drift.      Coordination: Coordination normal.      Comments: Symmetrical strength bilaterally this morning, no focal deficits. No dizziness/vertigo symptoms   Psychiatric:         Attention and Perception: Attention and perception normal.         Behavior: Behavior normal.         Thought Content: Thought content normal.         Cognition and Memory: Cognition and memory normal.         Judgment: Judgment normal.         Significant Labs: All pertinent labs within the past 24 hours have been  reviewed.    Significant Imaging: I have reviewed all pertinent imaging results/findings within the past 24 hours.

## 2022-02-08 NOTE — NURSING
Patient arrived to floor via wheelchair via transporter from ICU.  Patient transferred to bed independently.  AAOX4.  Patient was oriented to room, information on whiteboard, and medication regimen.  Bed low, adequate lighting provided, side rails x2 up, call bell within reach.  Patient denied having pain/sob, none observed. No apparent distress noted. Will continue to monitor and follow treatment plan.  Family at bedside.

## 2022-02-08 NOTE — PROGRESS NOTES
[facility] Surg  Neurology  Progress Note    Patient Name: Joanne Peña  MRN: [id number]  Admission Date: 2/6/2022  Hospital Length of Stay: 2 days  Code Status: Full Code   Attending Provider: Madiha Kowalski DO  Primary Care Physician: Marielle Gibson MD   Principal Problem:Cerebral vascular accident    Subjective:     Interval History: 72 y/o female with Medical Hx of HTN, DM, hyperlipidemia states that yesterday in the morning she began to experience dizziness and nausea. Also noted slurred speech. She was at work for which she delayed to seek medical care until around 2:30 pm when decided to come to ED. Pt arrived to this facility around 3:22 pm. Stroke CODE was made. No thrombolytics as NIHSS was 0. Another episode of dysarthria occurred at 6:29 pm. Pt tells me that this resolved. She is currently feeling better. No slurred speech, weakness or numbness of limbs but complains of lightheadedness.    -2/8/22: No new symptoms. Denies headaches, visual or speech disturbances, vertigo, weakness or numbness.    Current Neurological Medications:     Current Facility-Administered Medications   Medication Dose Route Frequency Provider Last Rate Last Admin    acetaminophen tablet 650 mg  650 mg Oral Q6H PRN SHEA Suazo MD        amLODIPine tablet 10 mg  10 mg Oral Daily Madiha Kowalski DO   10 mg at 02/08/22 1200    aspirin EC tablet 81 mg  81 mg Oral Daily SHEA Suazo MD   81 mg at 02/08/22 0801    atorvastatin tablet 80 mg  80 mg Oral Daily SHEA Suazo MD   80 mg at 02/08/22 0801    clopidogreL tablet 75 mg  75 mg Oral Daily SHEA Suazo MD   75 mg at 02/08/22 0801    dextrose 50% injection 12.5 g  12.5 g Intravenous PRN SHEA Suazo MD        enoxaparin injection 40 mg  40 mg Subcutaneous Daily SHEA Suazo MD   40 mg at 02/07/22 1738    glucagon (human recombinant) injection 1 mg  1 mg Intramuscular PRN SHEA Suazo MD        influenza (QUADRIVALENT ADJUVANTED PF)  vaccine 0.5 mL  0.5 mL Intramuscular vaccine x 1 dose SHEA Suazo MD        insulin aspart U-100 pen 0-5 Units  0-5 Units Subcutaneous Q6H PRN SHEA Suazo MD        insulin detemir U-100 pen 5 Units  5 Units Subcutaneous QHS SHEA Suazo MD   5 Units at 02/06/22 2038    labetaloL injection 10 mg  10 mg Intravenous Q4H PRN Zach Fishman MD   10 mg at 02/08/22 0753    mupirocin 2 % ointment   Nasal BID Jaguar Mancia MD   Given at 02/08/22 0801    ondansetron injection 4 mg  4 mg Intravenous Q8H PRN SHEA Suazo MD        pneumoc 13-jose conj-dip cr(PF) (PREVNAR 13 (PF)) 0.5 mL  0.5 mL Intramuscular vaccine x 1 dose SHEA Suzao MD        polyethylene glycol packet 17 g  17 g Oral Daily SHEA Suazo MD   17 g at 02/08/22 0801    promethazine (PHENERGAN) 12.5 mg in dextrose 5 % 50 mL IVPB  12.5 mg Intravenous Q6H PRN SHEA Suazo MD        senna-docusate 8.6-50 mg per tablet 1 tablet  1 tablet Oral BID PRN SHEA Suazo MD        sodium chloride 0.9% flush 10 mL  10 mL Intravenous PRN SHEA Suazo MD           Review of Systems   Constitutional: Negative for fever.   HENT: Negative for trouble swallowing.    Eyes: Negative for photophobia.   Respiratory: Negative for chest tightness.    Gastrointestinal: Negative for abdominal pain.   Genitourinary: Negative for dysuria.   Musculoskeletal: Negative for back pain.   Neurological: Negative for headaches.   Psychiatric/Behavioral: Negative for confusion.     Objective:     Vital Signs (Most Recent):  Temp: 98.2 °F (36.8 °C) (02/08/22 1300)  Pulse: 97 (02/08/22 1300)  Resp: 20 (02/08/22 1048)  BP: (!) 160/79 (02/08/22 1300)  SpO2: 98 % (02/08/22 1300) Vital Signs (24h Range):  Temp:  [98.2 °F (36.8 °C)-99 °F (37.2 °C)] 98.2 °F (36.8 °C)  Pulse:  [68-97] 97  Resp:  [18-33] 20  SpO2:  [93 %-98 %] 98 %  BP: (131-197)/() 160/79     Weight: 79.4 kg (175 lb)  Body mass index is 27.41 kg/m².    Physical Exam  Constitutional:        General: She is not in acute distress.  HENT:      Head: Normocephalic.      Right Ear: External ear normal.      Left Ear: External ear normal.   Eyes:      General:         Right eye: No discharge.         Left eye: No discharge.   Cardiovascular:      Rate and Rhythm: Normal rate.   Pulmonary:      Breath sounds: Normal breath sounds.   Abdominal:      Palpations: Abdomen is soft.   Musculoskeletal:         General: No tenderness.      Cervical back: Neck supple.   Skin:     General: Skin is warm.   Neurological:      Mental Status: She is oriented to person, place, and time.      Coordination: Finger-Nose-Finger Test and Heel to Shin Test normal.      Deep Tendon Reflexes: Strength normal.   Psychiatric:         Speech: Speech normal.            NEUROLOGICAL EXAMINATION:      MENTAL STATUS   Oriented to person, place, and time.   Speech: speech is normal   Level of consciousness: alert     CRANIAL NERVES      CN III, IV, VI   Right pupil: Size: 2 mm. Shape: regular.   Left pupil: Size: 2 mm. Shape: regular.   Ophthalmoparesis: none  Conjugate gaze: present     CN V   Right facial sensation deficit: none  Left facial sensation deficit: none     CN VII   Right facial weakness: none  Left facial weakness: none     CN XII   Tongue deviation: none     MOTOR EXAM      Strength   Strength 5/5 throughout.      GAIT AND COORDINATION      Coordination   Finger to nose coordination: normal  Heel to shin coordination: normal       Mild balance impairment        Significant Labs:   CBC:   Recent Labs   Lab 02/08/22  0459   WBC 5.11   HGB 9.8*   HCT 31.7*        CMP:   Recent Labs   Lab 02/08/22  0459   *      K 3.7      CO2 22*   BUN 16   CREATININE 1.0   CALCIUM 8.6*   PROT 6.5   ALBUMIN 2.8*   BILITOT 0.3   ALKPHOS 75   AST 9*   ALT 6*   ANIONGAP 11   EGFRNONAA 58*       Significant Imaging: I have reviewed all pertinent imaging results/findings within the past 24 hours.    Assessment and  Plan:     70 y/o female consulted for stroke     1. Acute stroke: small areas of infarction on left cerebellum and left genny. Posterior circulation strokes           CTA demonstrates occlusion of left vertebral artery.           -ASA 81 mg daily. Clopidogrel 75 mg daily.           -High dose statin.           -Echo.           -OK for -160's.           -PT/OT.     2. 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           No intervention as artery is already occluded and seem to be a chronic occlusion. An acute occlusion would had caused a large area stroke.           -DAPT.           -Statin.     3. Tobacco abuse: pt reports smoking at least 1/2 pack daily.           -Tobacco cessation discussed with pt.             Pt and daughter informed or findings and plan. Questions answered.    Active Diagnoses:    Diagnosis Date Noted POA    PRINCIPAL PROBLEM:  Cerebral vascular accident [I63.9] 02/06/2022 Yes    Normocytic anemia [D64.9] 02/06/2022 Yes    Moderate cigarette smoker (10-19 per day) [F17.210] 02/06/2022 Yes    Hypokalemia [E87.6] 02/06/2022 Yes    Overweight with body mass index (BMI) of 25 to 25.9 in adult [E66.3, Z68.25] 02/06/2022 Not Applicable    Mixed hyperlipidemia [E78.2] 02/04/2016 Yes    Type 2 diabetes mellitus with hyperglycemia, without long-term current use of insulin [E11.65] 01/29/2016 Yes    Malignant essential hypertension [I10] 01/29/2016 Yes      Problems Resolved During this Admission:       VTE Risk Mitigation (From admission, onward)         Ordered     enoxaparin injection 40 mg  Daily         02/06/22 1953     Place MIRIAN hose  Until discontinued         02/06/22 1953     IP VTE LOW RISK PATIENT  Once         02/06/22 1953     Place sequential compression device  Until discontinued         02/06/22 1953                Devonte Albright MD  Neurology  [facility]

## 2022-02-08 NOTE — NURSING
Continuous NaCl  0.9% @100ml/hr last given 2/6/2022.  REKHA DALILA Gilliam was notified, stated to not start and notify am doctor.

## 2022-02-08 NOTE — PLAN OF CARE
[facility] [place] - Med Surg  Initial Discharge Assessment  Megha Car (Daughter) [phone] (Mobile) Lives with spouse Magdy Peña [phone], son and dtr in law.  No dme. Works at the  office.  Drives self. Family can drive her home.  PT recs outpt PT and sc.  Primary Care Provider: Marielle Gibson MD    Admission Diagnosis: Stroke [I63.9]  Vertebral artery occlusion, right [I65.01]    Admission Date: 2/6/2022  Expected Discharge Date: 2/8/2022    Discharge Barriers Identified: None    Payor: MEDICARE / Plan: MEDICARE PART A & B / Product Type: Government /     Extended Emergency Contact Information  Primary Emergency Contact: Megha Car  Address: [address] [place] [address]           [place] [address] [facility]  Home Phone: [phone]  Mobile Phone: [phone]  Relation: Daughter    Discharge Plan A: Home with family  Discharge Plan B: Home with family      [id number] [place] [facility] [address] AT [facility]  [address]  [place] [address]  Phone: [phone] Fax: [phone]      Initial Assessment (most recent)     Adult Discharge Assessment - 02/08/22 1709        Discharge Assessment    Assessment Type Discharge Planning Assessment     Source of Information patient;family     When was your last doctors appointment? --   2 year    Communicated ARIANA with patient/caregiver Date not available/Unable to determine     Reason For Admission stroke     Lives With spouse     Do you expect to return to your current living situation? Yes     Do you have help at home or someone to help you manage your care at home? Yes     Prior to hospitilization cognitive status: Alert/Oriented     Current cognitive status: Alert/Oriented     Walking or Climbing Stairs Difficulty none     Dressing/Bathing Difficulty none     Home Accessibility wheelchair accessible     Equipment Currently Used at Home other (see comments)     Readmission within 30 days? No     Patient  currently being followed by outpatient case management? Unable to determine (comments)     Do you currently have service(s) that help you manage your care at home? No     Do you take prescription medications? Yes     Do you have prescription coverage? Yes     Coverage medicare     Do you have any problems affording any of your prescribed medications? No     Is the patient taking medications as prescribed? yes     Who is going to help you get home at discharge? Megha Car (Daughter)   [phone] (Mobile)     How do you get to doctors appointments? family or friend will provide     Are you on dialysis? No     Discharge Plan A Home with family     Discharge Plan B Home with family     DME Needed Upon Discharge  shower chair     Discharge Plan discussed with: Patient;Adult children     Discharge Barriers Identified None        Relationship/Environment    Name(s) of Who Lives With Patient Megha Car (Daughter)   [phone] (Mobile)

## 2022-02-08 NOTE — PLAN OF CARE
Problem: Physical Therapy Goal  Goal: Physical Therapy Goal  Description: Goals to be met by:      Patient will increase functional independence with mobility by performin. Gait  x 300 feet with Modified [place] using no AD vs SPC.   2. Lower extremity exercise program x20 reps per handout, standing, with supervision    Outcome: Ongoing, Progressing   Sup to sit to Stand with mod I. Pt amb 500 ft with S with a little unsteadiness but no LOB. Pt performed BLE TE seated with demo/vcs for proper technique.  Pt demo'd understanding.

## 2022-02-08 NOTE — PLAN OF CARE
Problem: Occupational Therapy Goal  Goal: Occupational Therapy Goal  Description: Goals to be met by: 2/21/22     Patient will increase functional independence with ADLs by performing:    LE Dressing with Modified [place].  Grooming while standing at sink with Modified [place].  Toileting from toilet with Modified [place] for hygiene and clothing management.   Step transfer with Modified [place]  Toilet transfer to toilet with Modified [place].    Outcome: Ongoing, Progressing     Patient is very pleasant and actively participates in treatment. She is progressing toward her therapy goals and verbalized good understanding of home safety/fall prevention education provided. Patient performed functional mobility in room SBA with no use of AD with ambulation.    Double Island Pedicle Flap Text: The defect edges were debeveled with a #15 scalpel blade.  Given the location of the defect, shape of the defect and the proximity to free margins a double island pedicle advancement flap was deemed most appropriate.  Using a sterile surgical marker, an appropriate advancement flap was drawn incorporating the defect, outlining the appropriate donor tissue and placing the expected incisions within the relaxed skin tension lines where possible.    The area thus outlined was incised deep to adipose tissue with a #15 scalpel blade.  The skin margins were undermined to an appropriate distance in all directions around the primary defect and laterally outward around the island pedicle utilizing iris scissors.  There was minimal undermining beneath the pedicle flap.

## 2022-02-08 NOTE — ASSESSMENT & PLAN NOTE
Encourage exercise and healthy diet  Body mass index is 27.41 kg/m².  Weight loss as outpatient

## 2022-02-08 NOTE — PT/OT/SLP PROGRESS
"Physical Therapy Treatment    Patient Name:  Joanne Peña   MRN:  [id number]    Recommendations:     Discharge Recommendations:  outpatient PT   Discharge Equipment Recommendations: none   Barriers to discharge: None    Assessment:     Joanne Peña is a 71 y.o. female admitted with a medical diagnosis of Cerebral vascular accident.  She presents with the following impairments/functional limitations:  weakness,impaired functional mobilty,gait instability,impaired balance.    Rehab Prognosis: Good; patient would benefit from acute skilled PT services to address these deficits and reach maximum level of function.    Recent Surgery: * No surgery found *      Plan:     During this hospitalization, patient to be seen 5 x/week to address the identified rehab impairments via gait training,therapeutic activities,therapeutic exercises and progress toward the following goals:    · Plan of Care Expires:  02/21/22    Subjective     Chief Complaint: none  Patient/Family Comments/goals: "i'm ready to go home."  Pain/Comfort:  · Pain Rating 1: 0/10  · Pain Rating Post-Intervention 1: 0/10      Objective:     Communicated with nurse prior to session.  Patient found HOB elevated with telemetry,peripheral IV upon PT entry to room.     General Precautions: Standard, fall   Orthopedic Precautions:N/A   Braces:    Respiratory Status: Room air     Functional Mobility:  · Bed Mobility:     · Rolling Left:  modified independence  · Scooting: modified independence  · Supine to Sit: modified independence  · Transfers:     · Sit to Stand:  supervision with no AD  · Gait: 500 ft without AD with S  · Balance: F+ dynamic standing      AM-PAC 6 CLICK MOBILITY  Turning over in bed (including adjusting bedclothes, sheets and blankets)?: 4  Sitting down on and standing up from a chair with arms (e.g., wheelchair, bedside commode, etc.): 4  Moving from lying on back to sitting on the side of the bed?: 4  Moving to and from a bed to a chair " (including a wheelchair)?: 4  Need to walk in hospital room?: 4  Climbing 3-5 steps with a railing?: 3  Basic Mobility Total Score: 23       Therapeutic Activities and Exercises:   BLE TE seated 2x10; LAQ, jony, HS, HR, TT    Patient left sitting edge of bed with all lines intact, call button in reach and dtr present..    GOALS:   Multidisciplinary Problems     Physical Therapy Goals        Problem: Physical Therapy Goal    Goal Priority Disciplines Outcome Goal Variances Interventions   Physical Therapy Goal     PT, PT/OT Ongoing, Progressing     Description: Goals to be met by:      Patient will increase functional independence with mobility by performin. Gait  x 300 feet with Modified [place] using no AD vs SPC.   2. Lower extremity exercise program x20 reps per handout, standing, with supervision                     Time Tracking:     PT Received On: 22  PT Start Time: 1143     PT Stop Time: 1206  PT Total Time (min): 23 min     Billable Minutes: Gait Training 8 and Therapeutic Exercise 15    Treatment Type: Treatment  PT/PTA: PTA     PTA Visit Number: 1     2022

## 2022-02-08 NOTE — ASSESSMENT & PLAN NOTE
- Allowed for permissive hypertension for 24hrs  -Will resume home Norvasc 10mg. She was not on any other HTN meds per patient  -BP still elevated, will start Losartan 50mg with hold parameters  -Of note, patient stated she did not tolerate lisinopril in the past because it caused weakness  -Monitor BP

## 2022-02-08 NOTE — ASSESSMENT & PLAN NOTE
MRI/MRA - it does not appear that the right vertebral artery is acute as she looks like she has robust blood flow in other areas suggesting more chronic in nature  Appreciated Neurovascular stroke assessment and recommendations   Patient took ASA 325mg PTA and now on 81 mg daily  She was loaded with Plavix 600mg po x 1 and now on 75 mg daily for 21 days    Admitted to ICU due to stuttering symptoms and malignant HTN (although likely reactive)  Allow for permissive HTN per stroke protocol interventions  CVA best practice pathway order sets have been initiated  TTE with bubble study w/ no evidence of intracardiac shunting  Neurology consulted - appreciate recommendations  DVT prophylaxis with Lovenox 40mg daily  PT/OT/SLP consulted. Recommends outpatient PT/OT

## 2022-02-08 NOTE — PT/OT/SLP PROGRESS
"Occupational Therapy   Treatment    Name: Joanne Peña  MRN: [id number]  Admitting Diagnosis:  Cerebral vascular accident       Recommendations:     Discharge Recommendations: outpatient OT (Please refer to OT Eval)  Discharge Equipment Recommendations:  none  Barriers to discharge:  None    Assessment:     Joanne Peña is a 71 y.o. female with a medical diagnosis of Cerebral vascular accident. Performance deficits affecting function are weakness,impaired functional mobilty,impaired balance,gait instability.     Rehab Prognosis:  Good; patient would benefit from acute skilled OT services to address these deficits and reach maximum level of function.       Plan:     Patient to be seen 2 x/week,3 x/week to address the above listed problems via self-care/home management,therapeutic activities,therapeutic exercises  · Plan of Care Expires: 02/21/22  · Plan of Care Reviewed with: patient    Subjective     S: "I feel just fine, actually I just took a shower by myself."    Pain/Comfort:  · Pain Rating 1: 0/10    Objective:     Communicated with: Nurse Fair prior to session.  Patient found HOB elevated with telemetry,peripheral IV and daughter present upon OT entry to room.    General Precautions: Standard, fall   Orthopedic Precautions:N/A   Braces: N/A  Respiratory Status: Room air     Occupational Performance:     Bed Mobility:    · Patient completed Supine to Sit with independence  · Patient completed Sit to Supine with independence   · Patient completed rolling to L side with independence    Functional Mobility/Transfers:  · Patient completed Sit <> Stand Transfer with supervision using  no assistive device   · Patient completed a Toilet Transfer Stand-Pivot technique with supervision using no AD  · Functional Mobility: Patient performed functional mobility in room from the bed <-> sink with standby assistance and no assistive device. Patient ambulated household distances from bed>sink>toilet>bed SBA with " one VC to reduce her walking speed for increased safety.     Activities of Daily Living:  · Grooming: Patient washed her face standing at the sink with standby assistance and no assistive device.  · Upper Body Dressing: Pt donned gown with supervision sitting EOB.  · Lower Body Dressing: Patient doffed/donned her personal slip-on shoes with modified independence sitting EOB.      [facility] 6 Click ADL: 24    Treatment & Education:  Patient is very pleasant and actively participates in treatment. She is progressing toward her therapy goals and verbalized good understanding of home safety/fall prevention education provided. Patient performed functional mobility in room SBA with no use of AD with ambulation.     Patient left HOB elevated with all lines intact, call button in reach, bed alarm on with daughter and nurse presentEducation:      GOALS:   Multidisciplinary Problems     Occupational Therapy Goals        Problem: Occupational Therapy Goal    Goal Priority Disciplines Outcome Interventions   Occupational Therapy Goal     OT, PT/OT Ongoing, Progressing    Description: Goals to be met by: 2/21/22     Patient will increase functional independence with ADLs by performing:    LE Dressing with Modified [place].  Grooming while standing at sink with Modified [place].  Toileting from toilet with Modified [place] for hygiene and clothing management.   Step transfer with Modified [place]  Toilet transfer to toilet with Modified [place].                     Time Tracking:     OT Date of Treatment: 02/08/22  OT Start Time: 1513  OT Stop Time: 1537  OT Total Time (min): 24 min    Billable Minutes:Self Care/Home Management 13  Therapeutic Activity 11  Total Time 24    OT/JINA: JINA MURO Visit Number: 1    2/8/2022

## 2022-02-09 LAB
POCT GLUCOSE: 108 MG/DL (ref 70–110)
POCT GLUCOSE: 144 MG/DL (ref 70–110)
POCT GLUCOSE: 155 MG/DL (ref 70–110)
POCT GLUCOSE: 176 MG/DL (ref 70–110)
POCT GLUCOSE: 251 MG/DL (ref 70–110)

## 2022-02-09 PROCEDURE — 11000001 HC ACUTE MED/SURG PRIVATE ROOM

## 2022-02-09 PROCEDURE — 99232 SBSQ HOSP IP/OBS MODERATE 35: CPT | Mod: ,,, | Performed by: PSYCHIATRY & NEUROLOGY

## 2022-02-09 PROCEDURE — 25000003 PHARM REV CODE 250: Performed by: INTERNAL MEDICINE

## 2022-02-09 PROCEDURE — 97110 THERAPEUTIC EXERCISES: CPT | Mod: CQ

## 2022-02-09 PROCEDURE — 25000003 PHARM REV CODE 250: Performed by: STUDENT IN AN ORGANIZED HEALTH CARE EDUCATION/TRAINING PROGRAM

## 2022-02-09 PROCEDURE — 99232 PR SUBSEQUENT HOSPITAL CARE,LEVL II: ICD-10-PCS | Mod: ,,, | Performed by: PSYCHIATRY & NEUROLOGY

## 2022-02-09 PROCEDURE — 63600175 PHARM REV CODE 636 W HCPCS: Performed by: INTERNAL MEDICINE

## 2022-02-09 PROCEDURE — 25000003 PHARM REV CODE 250: Performed by: NURSE PRACTITIONER

## 2022-02-09 PROCEDURE — 97116 GAIT TRAINING THERAPY: CPT | Mod: CQ

## 2022-02-09 RX ORDER — HYDROXYZINE HYDROCHLORIDE 25 MG/1
25 TABLET, FILM COATED ORAL 3 TIMES DAILY PRN
Status: DISCONTINUED | OUTPATIENT
Start: 2022-02-09 | End: 2022-02-11 | Stop reason: HOSPADM

## 2022-02-09 RX ORDER — LOSARTAN POTASSIUM 25 MG/1
100 TABLET ORAL DAILY
Status: DISCONTINUED | OUTPATIENT
Start: 2022-02-09 | End: 2022-02-11 | Stop reason: HOSPADM

## 2022-02-09 RX ADMIN — AMLODIPINE BESYLATE 10 MG: 5 TABLET ORAL at 08:02

## 2022-02-09 RX ADMIN — ASPIRIN 81 MG: 81 TABLET, COATED ORAL at 08:02

## 2022-02-09 RX ADMIN — HYDROXYZINE HYDROCHLORIDE 25 MG: 25 TABLET ORAL at 09:02

## 2022-02-09 RX ADMIN — POLYETHYLENE GLYCOL 3350 17 G: 17 POWDER, FOR SOLUTION ORAL at 08:02

## 2022-02-09 RX ADMIN — ENOXAPARIN SODIUM 40 MG: 40 INJECTION SUBCUTANEOUS at 05:02

## 2022-02-09 RX ADMIN — CLOPIDOGREL 75 MG: 75 TABLET, FILM COATED ORAL at 08:02

## 2022-02-09 RX ADMIN — MUPIROCIN: 20 OINTMENT TOPICAL at 08:02

## 2022-02-09 RX ADMIN — LABETALOL HYDROCHLORIDE 10 MG: 5 INJECTION INTRAVENOUS at 11:02

## 2022-02-09 RX ADMIN — LOSARTAN POTASSIUM 100 MG: 25 TABLET, FILM COATED ORAL at 08:02

## 2022-02-09 RX ADMIN — ATORVASTATIN CALCIUM 80 MG: 40 TABLET, FILM COATED ORAL at 08:02

## 2022-02-09 RX ADMIN — LABETALOL HYDROCHLORIDE 10 MG: 5 INJECTION INTRAVENOUS at 07:02

## 2022-02-09 NOTE — PROGRESS NOTES
"[facility] Medicine  Progress Note    Patient Name: Joanne Peña  MRN: [id number]  Patient Class: IP- Inpatient   Admission Date: 2/6/2022  Length of Stay: 3 days  Attending Physician: Madiha Kowalski DO  Primary Care Provider: Marielle Gibson MD        Subjective:     Principal Problem:Cerebral vascular accident        HPI:  Ms. Peña is a 70yo lady with a pas medical history of HTN, DM2 and tobacco abuse (smokes 1/2 PPD).  Her COVID vaccination status is unknown, though she was positive on 8/24/21.     She now comes to the ED with vertigo when she woke up this morning.  She states things, "were just off when looking at them and kind of spinning."  She had her brother drive her to work (at the [employer]) and had an episode of N/V en route.  Shortly after arriving at the [employer], around 2:15pm, she developed a weird out of body sensation and vague confusion, like she could not recognize her inmates she's known forever.  Shortly after this she had abrupt onet of slurred speech and right sided weakness.  She had to go sit down and call the [employer] nurse to evaluate her.  She denied double vision with the vertigo and denied any numbness or tingling (other than an odd burning in her nose before the symptoms).    When the nurse arrived she had dysarthria and a measured BP of 215/115, so she took a clonidine 0.1mg x 1 and ASA 325mg.  She called her daughter to come pick her up, who then drove her to the ED.  She states that her symptoms all resolved shortly after taking the ASA (<1 hour).  She never had any neck pain or headache.  She feels all of her symptoms have now completely resolved, including the vertigo.  On arrival to the ED, she did have a brief recurrence of the above symptoms, but it resolved of its own accord after a few minutes.     In the ED she was found to be hypertensive with a CTA head that showed No CT evidence of large territorial infarction and absence of flow within the V3 segment " right vertebral artery, suggestive of occlusion.  [place] tele stroke consult was obtained and recommended Plavix 600mg and full dose ASA (already had PTA), and MRI brain stat.         Overview/Hospital Course:  Ms. Garza is a 70yo lady with a pas medical history of HTN, DM2 and tobacco abuse (smokes 1/2 PPD) admitted to ICU on 02/06/2022 with dizziness and found to have left [place] and left cerebellum infarctions.     MRI brain with Small acute infarction in the left [place] and small subacute infarction in the left inferior cerebellum.  No evidence of intracranial hemorrhage. MRA brain with significant diminished flow in the posterior circulation with nonvisualization of flow within the right vertebral artery and intermittent flow in the left vertebral artery.  Multiple luminal narrowing involving the basilar with suspected multiple high-grade stenosis versus occlusions. Patient loaded with aspirin and plavix. 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 She was not a candidate for tpa. Neurology consulted. Patient transfer to the floor on 02/07/2022. Echo with EF of 65% and no evidence of intracardiac shunting. G2DD.  Home BP meds restarted and blood pressure still elevated. Will continue to add and titrate anti-hypertensives meds as needed, and monitor blood pressure. PT/OT recommended outpatient therapy on discharge.       Interval History: No acute overnight events. she is currently not dizzy or having any abnormal symptoms this morning. States she feeling better overall. Still feel weak on her left side. Tolerating diet. Notice a difference in the way she felt when losartan was started and stated it made her feel better.     Review of Systems   Constitutional: Negative for chills, fatigue and fever.   HENT: Negative for congestion.    Respiratory: Negative for cough and shortness of breath.    Cardiovascular: Negative for chest pain.   Gastrointestinal:  Negative for abdominal pain, constipation, diarrhea, nausea and vomiting.   Endocrine: Negative for heat intolerance.   Genitourinary: Negative for dysuria.   Musculoskeletal: Positive for gait problem.   Skin: Negative for rash.   Neurological: Positive for weakness. Negative for dizziness and speech difficulty.   Hematological: Does not bruise/bleed easily.   Psychiatric/Behavioral: Negative for confusion. The patient is not nervous/anxious.      Objective:     Vital Signs (Most Recent):  Temp: 98 °F (36.7 °C) (02/09/22 1123)  Pulse: 67 (02/09/22 1215)  Resp: 20 (02/09/22 1123)  BP: (!) 164/72 (02/09/22 1215)  SpO2: 96 % (02/09/22 1123) Vital Signs (24h Range):  Temp:  [98 °F (36.7 °C)-98.7 °F (37.1 °C)] 98 °F (36.7 °C)  Pulse:  [67-76] 67  Resp:  [19-20] 20  SpO2:  [92 %-98 %] 96 %  BP: (164-194)/(70-90) 164/72     Weight: 79.4 kg (175 lb)  Body mass index is 27.41 kg/m².    Intake/Output Summary (Last 24 hours) at 2/9/2022 1407  Last data filed at 2/9/2022 0826  Gross per 24 hour   Intake 240 ml   Output --   Net 240 ml      Physical Exam  Vitals and nursing note reviewed.   Constitutional:       General: She is awake. She is not in acute distress.     Appearance: She is not diaphoretic.   HENT:      Head: Normocephalic and atraumatic.      Mouth/Throat:      Pharynx: No oropharyngeal exudate.   Eyes:      General: No scleral icterus.        Right eye: No discharge.         Left eye: No discharge.      Conjunctiva/sclera: Conjunctivae normal.      Pupils: Pupils are equal, round, and reactive to light.      Comments: PERRL, no ptosis   Neck:      Thyroid: No thyromegaly.      Vascular: No JVD.      Trachea: No tracheal deviation.   Cardiovascular:      Rate and Rhythm: Normal rate and regular rhythm.      Heart sounds: Normal heart sounds. No murmur heard.  No friction rub. No gallop.    Pulmonary:      Effort: Pulmonary effort is normal. No respiratory distress.      Breath sounds: Normal breath sounds. No  stridor. No decreased breath sounds, wheezing, rhonchi or rales.   Chest:      Chest wall: No tenderness.   Abdominal:      General: Bowel sounds are normal. There is no distension.      Palpations: Abdomen is soft. There is no mass.      Tenderness: There is no abdominal tenderness. There is no guarding or rebound.   Genitourinary:     Comments: No agustin in place  Musculoskeletal:         General: No tenderness. Normal range of motion.      Cervical back: Normal range of motion and neck supple.      Right lower leg: No edema.      Left lower leg: No edema.   Lymphadenopathy:      Comments: No peripheral edema   Skin:     General: Skin is warm and dry.      Coloration: Skin is not pale.      Findings: No erythema or rash.   Neurological:      Mental Status: She is alert and easily aroused.      GCS: GCS eye subscore is 4. GCS verbal subscore is 5. GCS motor subscore is 6.      Cranial Nerves: No cranial nerve deficit.      Motor: No tremor, abnormal muscle tone or pronator drift.      Coordination: Coordination normal.      Comments: Symmetrical strength bilaterally this morning, no focal deficits. No dizziness/vertigo symptoms   Psychiatric:         Attention and Perception: Attention and perception normal.         Behavior: Behavior normal.         Thought Content: Thought content normal.         Cognition and Memory: Cognition and memory normal.         Judgment: Judgment normal.         Significant Labs: All pertinent labs within the past 24 hours have been reviewed.    Significant Imaging: I have reviewed all pertinent imaging results/findings within the past 24 hours.      Assessment/Plan:      * Cerebral vascular accident  MRI/MRA - it does not appear that the right vertebral artery is acute as she looks like she has robust blood flow in other areas suggesting more chronic in nature  Appreciated Neurovascular stroke assessment and recommendations   Patient took ASA 325mg PTA and now on 81 mg daily  She was  loaded with Plavix 600mg po x 1 and now on 75 mg daily for 21 days    Admitted to ICU due to stuttering symptoms and malignant HTN (although likely reactive)  Allow for permissive HTN per stroke protocol interventions  CVA best practice pathway order sets have been initiated  TTE with bubble study w/ no evidence of intracardiac shunting  Neurology consulted - appreciate recommendations  DVT prophylaxis with Lovenox 40mg daily  PT/OT/SLP consulted. Recommends outpatient PT/OT  Continue to better control her blood pressure      Malignant essential hypertension  - Allowed for permissive hypertension for 24hrs  -Continue home Norvasc 10mg. She was not on any other HTN meds per patient  -BP still elevated, will increase Losartan to 100mg with hold parameters  -Of note, patient stated she did not tolerate lisinopril in the past because it caused weakness  -Monitor BP          Hypokalemia  Replace as needed  Resolved      Type 2 diabetes mellitus with hyperglycemia, without long-term current use of insulin  A1c:   Lab Results   Component Value Date    HGBA1C 7.6 (H) 02/06/2022     Meds: basal bolus insulin + SSI PRN to maintain goal 140-180  ADA diet, accuchecks ACHS, hypoglycemic protocol  - adjust as needed    Normocytic anemia  Stable  No signs of bleeding    Mixed hyperlipidemia  Continue home Lipitor 80mg po qday    Moderate cigarette smoker (10-19 per day)  Patient was counseled regarding smoking for 3-10 minutes.          Overweight with body mass index (BMI) of 25 to 25.9 in adult  Encourage exercise and healthy diet  Body mass index is 27.41 kg/m².  Weight loss as outpatient          VTE Risk Mitigation (From admission, onward)         Ordered     enoxaparin injection 40 mg  Daily         02/06/22 1953     Place MIRIAN hose  Until discontinued         02/06/22 1953     IP VTE LOW RISK PATIENT  Once         02/06/22 1953     Place sequential compression device  Until discontinued         02/06/22 1953                 Discharge Planning   ARIANA: 2/8/2022     Code Status: Full Code   Is the patient medically ready for discharge?:     Reason for patient still in hospital (select all that apply): Patient trending condition and Treatment  Discharge Plan A: Home with family                  Madiha Kowalski DO  Department of Hospital Medicine   [facility] [place] [facility]

## 2022-02-09 NOTE — PLAN OF CARE
02/09/22 1108   Medicare Message   Important Message from Medicare regarding Discharge Appeal Rights Given to patient/caregiver;Explained to patient/caregiver;Signed/date by patient/caregiver   Date IMM was signed 02/09/22   Time IMM was signed 1051   Patient provided with copy and original filed in chart.

## 2022-02-09 NOTE — SUBJECTIVE & OBJECTIVE
Interval History: No acute overnight events. she is currently not dizzy or having any abnormal symptoms this morning. States she feeling better overall. Still feel weak on her left side. Tolerating diet. Notice a difference in the way she felt when losartan was started and stated it made her feel better.     Review of Systems   Constitutional: Negative for chills, fatigue and fever.   HENT: Negative for congestion.    Respiratory: Negative for cough and shortness of breath.    Cardiovascular: Negative for chest pain.   Gastrointestinal: Negative for abdominal pain, constipation, diarrhea, nausea and vomiting.   Endocrine: Negative for heat intolerance.   Genitourinary: Negative for dysuria.   Musculoskeletal: Positive for gait problem.   Skin: Negative for rash.   Neurological: Positive for weakness. Negative for dizziness and speech difficulty.   Hematological: Does not bruise/bleed easily.   Psychiatric/Behavioral: Negative for confusion. The patient is not nervous/anxious.      Objective:     Vital Signs (Most Recent):  Temp: 98 °F (36.7 °C) (02/09/22 1123)  Pulse: 67 (02/09/22 1215)  Resp: 20 (02/09/22 1123)  BP: (!) 164/72 (02/09/22 1215)  SpO2: 96 % (02/09/22 1123) Vital Signs (24h Range):  Temp:  [98 °F (36.7 °C)-98.7 °F (37.1 °C)] 98 °F (36.7 °C)  Pulse:  [67-76] 67  Resp:  [19-20] 20  SpO2:  [92 %-98 %] 96 %  BP: (164-194)/(70-90) 164/72     Weight: 79.4 kg (175 lb)  Body mass index is 27.41 kg/m².    Intake/Output Summary (Last 24 hours) at 2/9/2022 1407  Last data filed at 2/9/2022 0826  Gross per 24 hour   Intake 240 ml   Output --   Net 240 ml      Physical Exam  Vitals and nursing note reviewed.   Constitutional:       General: She is awake. She is not in acute distress.     Appearance: She is not diaphoretic.   HENT:      Head: Normocephalic and atraumatic.      Mouth/Throat:      Pharynx: No oropharyngeal exudate.   Eyes:      General: No scleral icterus.        Right eye: No discharge.         Left  eye: No discharge.      Conjunctiva/sclera: Conjunctivae normal.      Pupils: Pupils are equal, round, and reactive to light.      Comments: PERRL, no ptosis   Neck:      Thyroid: No thyromegaly.      Vascular: No JVD.      Trachea: No tracheal deviation.   Cardiovascular:      Rate and Rhythm: Normal rate and regular rhythm.      Heart sounds: Normal heart sounds. No murmur heard.  No friction rub. No gallop.    Pulmonary:      Effort: Pulmonary effort is normal. No respiratory distress.      Breath sounds: Normal breath sounds. No stridor. No decreased breath sounds, wheezing, rhonchi or rales.   Chest:      Chest wall: No tenderness.   Abdominal:      General: Bowel sounds are normal. There is no distension.      Palpations: Abdomen is soft. There is no mass.      Tenderness: There is no abdominal tenderness. There is no guarding or rebound.   Genitourinary:     Comments: No agustin in place  Musculoskeletal:         General: No tenderness. Normal range of motion.      Cervical back: Normal range of motion and neck supple.      Right lower leg: No edema.      Left lower leg: No edema.   Lymphadenopathy:      Comments: No peripheral edema   Skin:     General: Skin is warm and dry.      Coloration: Skin is not pale.      Findings: No erythema or rash.   Neurological:      Mental Status: She is alert and easily aroused.      GCS: GCS eye subscore is 4. GCS verbal subscore is 5. GCS motor subscore is 6.      Cranial Nerves: No cranial nerve deficit.      Motor: No tremor, abnormal muscle tone or pronator drift.      Coordination: Coordination normal.      Comments: Symmetrical strength bilaterally this morning, no focal deficits. No dizziness/vertigo symptoms   Psychiatric:         Attention and Perception: Attention and perception normal.         Behavior: Behavior normal.         Thought Content: Thought content normal.         Cognition and Memory: Cognition and memory normal.         Judgment: Judgment normal.          Significant Labs: All pertinent labs within the past 24 hours have been reviewed.    Significant Imaging: I have reviewed all pertinent imaging results/findings within the past 24 hours.

## 2022-02-09 NOTE — ASSESSMENT & PLAN NOTE
- Allowed for permissive hypertension for 24hrs  -Continue home Norvasc 10mg. She was not on any other HTN meds per patient  -BP still elevated, will increase Losartan to 100mg with hold parameters  -Of note, patient stated she did not tolerate lisinopril in the past because it caused weakness  -Monitor BP

## 2022-02-09 NOTE — PT/OT/SLP PROGRESS
Physical Therapy Treatment    Patient Name:  Joanne Peña   MRN:  [id number]    Recommendations:     Discharge Recommendations:  outpatient PT   Discharge Equipment Recommendations: none   Barriers to discharge: None    Assessment:     Joanne Peña is a 71 y.o. female admitted with a medical diagnosis of Cerebral vascular accident.  She presents with the following impairments/functional limitations:  weakness,gait instability,impaired balance,decreased lower extremity function .    Rehab Prognosis: Good; patient would benefit from acute skilled PT services to address these deficits and reach maximum level of function.    Recent Surgery: * No surgery found *      Plan:     During this hospitalization, patient to be seen 5 x/week to address the identified rehab impairments via gait training,therapeutic activities,therapeutic exercises and progress toward the following goals:    · Plan of Care Expires:  02/21/22    Subjective     Chief Complaint: none  Patient/Family Comments/goals: pt agreed to therapy  Pain/Comfort:  · Pain Rating 1: 0/10  · Pain Rating Post-Intervention 1: 0/10      Objective:     Communicated with nurse prior to session.  Patient found HOB elevated with telemetry,peripheral IV upon PT entry to room.     General Precautions: Standard, fall   Orthopedic Precautions:N/A   Braces:    Respiratory Status: Room air     Functional Mobility:  · Bed Mobility:     · Rolling Left:  modified independence  · Scooting: modified independence  · Supine to Sit: modified independence  · Transfers:     · Sit to Stand:  modified independence with no AD  · Gait: 500 ft without AD with S with some unsteadiness with self-correction      AM-PAC 6 CLICK MOBILITY          Therapeutic Activities and Exercises:   BLE TE seated 2X10; LAQ, marches, HS, pillow squeezes, HR ,TT    Patient left sitting edge of bed with all lines intact, call button in reach and nurse notified..    GOALS:   Multidisciplinary Problems      Physical Therapy Goals        Problem: Physical Therapy Goal    Goal Priority Disciplines Outcome Goal Variances Interventions   Physical Therapy Goal     PT, PT/OT Ongoing, Progressing     Description: Goals to be met by:      Patient will increase functional independence with mobility by performin. Gait  x 300 feet with Modified [place] using no AD vs SPC.   2. Lower extremity exercise program x20 reps per handout, standing, with supervision                     Time Tracking:     PT Received On: 22  PT Start Time: 1208     PT Stop Time: 1231  PT Total Time (min): 23 min     Billable Minutes: Gait Training 10 and Therapeutic Exercise 13    Treatment Type: Treatment  PT/PTA: PTA     PTA Visit Number: 2     2022

## 2022-02-09 NOTE — PLAN OF CARE
Problem: Physical Therapy Goal  Goal: Physical Therapy Goal  Description: Goals to be met by:      Patient will increase functional independence with mobility by performin. Gait  x 300 feet with Modified [place] using no AD vs SPC.   2. Lower extremity exercise program x20 reps per handout, standing, with supervision    Outcome: Ongoing, Progressing   amb x 500 ft with S. Some unsteadiness.  Pt nilda BLE TE seated 2x10 with demo/vcs. Pt demo'd understanding.

## 2022-02-09 NOTE — ASSESSMENT & PLAN NOTE
MRI/MRA - it does not appear that the right vertebral artery is acute as she looks like she has robust blood flow in other areas suggesting more chronic in nature  Appreciated Neurovascular stroke assessment and recommendations   Patient took ASA 325mg PTA and now on 81 mg daily  She was loaded with Plavix 600mg po x 1 and now on 75 mg daily for 21 days    Admitted to ICU due to stuttering symptoms and malignant HTN (although likely reactive)  Allow for permissive HTN per stroke protocol interventions  CVA best practice pathway order sets have been initiated  TTE with bubble study w/ no evidence of intracardiac shunting  Neurology consulted - appreciate recommendations  DVT prophylaxis with Lovenox 40mg daily  PT/OT/SLP consulted. Recommends outpatient PT/OT  Continue to better control her blood pressure

## 2022-02-09 NOTE — PLAN OF CARE
Problem: Adult Inpatient Plan of Care  Goal: Plan of Care Review  Outcome: Ongoing, Progressing     Problem: Cerebral Tissue Perfusion (Stroke, Ischemic/Transient Ischemic Attack)  Goal: Optimal Cerebral Tissue Perfusion  Outcome: Ongoing, Progressing  Intervention: Protect and Optimize Cerebral Perfusion  Flowsheets (Taken 2/9/2022 0401)  Cerebral Perfusion Promotion:   blood pressure monitored   normothermia promoted

## 2022-02-10 LAB
POCT GLUCOSE: 157 MG/DL (ref 70–110)
POCT GLUCOSE: 172 MG/DL (ref 70–110)
POCT GLUCOSE: 173 MG/DL (ref 70–110)
POCT GLUCOSE: 179 MG/DL (ref 70–110)
POCT GLUCOSE: 204 MG/DL (ref 70–110)
POCT GLUCOSE: 216 MG/DL (ref 70–110)

## 2022-02-10 PROCEDURE — 25000003 PHARM REV CODE 250: Performed by: STUDENT IN AN ORGANIZED HEALTH CARE EDUCATION/TRAINING PROGRAM

## 2022-02-10 PROCEDURE — 99232 SBSQ HOSP IP/OBS MODERATE 35: CPT | Mod: ,,, | Performed by: PSYCHIATRY & NEUROLOGY

## 2022-02-10 PROCEDURE — 63600175 PHARM REV CODE 636 W HCPCS: Performed by: STUDENT IN AN ORGANIZED HEALTH CARE EDUCATION/TRAINING PROGRAM

## 2022-02-10 PROCEDURE — 11000001 HC ACUTE MED/SURG PRIVATE ROOM

## 2022-02-10 PROCEDURE — 25000003 PHARM REV CODE 250: Performed by: INTERNAL MEDICINE

## 2022-02-10 PROCEDURE — 99232 PR SUBSEQUENT HOSPITAL CARE,LEVL II: ICD-10-PCS | Mod: ,,, | Performed by: PSYCHIATRY & NEUROLOGY

## 2022-02-10 PROCEDURE — 25000003 PHARM REV CODE 250: Performed by: NURSE PRACTITIONER

## 2022-02-10 RX ORDER — INSULIN ASPART 100 [IU]/ML
0-5 INJECTION, SOLUTION INTRAVENOUS; SUBCUTANEOUS
Status: DISCONTINUED | OUTPATIENT
Start: 2022-02-10 | End: 2022-02-11 | Stop reason: HOSPADM

## 2022-02-10 RX ORDER — HYDRALAZINE HYDROCHLORIDE 25 MG/1
50 TABLET, FILM COATED ORAL EVERY 8 HOURS
Status: DISCONTINUED | OUTPATIENT
Start: 2022-02-10 | End: 2022-02-11 | Stop reason: HOSPADM

## 2022-02-10 RX ORDER — HYDRALAZINE HYDROCHLORIDE 25 MG/1
50 TABLET, FILM COATED ORAL EVERY 8 HOURS
Status: DISCONTINUED | OUTPATIENT
Start: 2022-02-10 | End: 2022-02-10

## 2022-02-10 RX ADMIN — HYDRALAZINE HYDROCHLORIDE 50 MG: 25 TABLET, FILM COATED ORAL at 09:02

## 2022-02-10 RX ADMIN — INSULIN ASPART 1 UNITS: 100 INJECTION, SOLUTION INTRAVENOUS; SUBCUTANEOUS at 09:02

## 2022-02-10 RX ADMIN — AMLODIPINE BESYLATE 10 MG: 5 TABLET ORAL at 08:02

## 2022-02-10 RX ADMIN — LOSARTAN POTASSIUM 100 MG: 25 TABLET, FILM COATED ORAL at 08:02

## 2022-02-10 RX ADMIN — ASPIRIN 81 MG: 81 TABLET, COATED ORAL at 08:02

## 2022-02-10 RX ADMIN — HYDRALAZINE HYDROCHLORIDE 50 MG: 25 TABLET, FILM COATED ORAL at 01:02

## 2022-02-10 RX ADMIN — MUPIROCIN: 20 OINTMENT TOPICAL at 08:02

## 2022-02-10 RX ADMIN — LABETALOL HYDROCHLORIDE 10 MG: 5 INJECTION INTRAVENOUS at 05:02

## 2022-02-10 RX ADMIN — CLOPIDOGREL 75 MG: 75 TABLET, FILM COATED ORAL at 08:02

## 2022-02-10 RX ADMIN — ENOXAPARIN SODIUM 40 MG: 40 INJECTION SUBCUTANEOUS at 05:02

## 2022-02-10 RX ADMIN — ATORVASTATIN CALCIUM 80 MG: 40 TABLET, FILM COATED ORAL at 09:02

## 2022-02-10 RX ADMIN — INSULIN DETEMIR 5 UNITS: 100 INJECTION, SOLUTION SUBCUTANEOUS at 09:02

## 2022-02-10 RX ADMIN — HYDROXYZINE HYDROCHLORIDE 25 MG: 25 TABLET ORAL at 09:02

## 2022-02-10 RX ADMIN — INSULIN ASPART 2 UNITS: 100 INJECTION, SOLUTION INTRAVENOUS; SUBCUTANEOUS at 12:02

## 2022-02-10 NOTE — SUBJECTIVE & OBJECTIVE
Interval History: No acute overnight events. she is currently not dizzy or having any abnormal symptoms this morning. States she feeling better overall. Still feel weak on her left side. Tolerating diet. Notice a difference in the way she felt when losartan was started and stated it made her feel better.     Review of Systems   Constitutional: Negative for chills, fatigue and fever.   HENT: Negative for congestion.    Respiratory: Negative for cough and shortness of breath.    Cardiovascular: Negative for chest pain.   Gastrointestinal: Negative for abdominal pain, constipation, diarrhea, nausea and vomiting.   Endocrine: Negative for heat intolerance.   Genitourinary: Negative for dysuria.   Musculoskeletal: Positive for gait problem.   Skin: Negative for rash.   Neurological: Positive for weakness. Negative for dizziness and speech difficulty.   Hematological: Does not bruise/bleed easily.   Psychiatric/Behavioral: Negative for confusion. The patient is not nervous/anxious.      Objective:     Vital Signs (Most Recent):  Temp: 98.1 °F (36.7 °C) (02/10/22 1143)  Pulse: 69 (02/10/22 1143)  Resp: 19 (02/10/22 1143)  BP: (!) 168/74 (02/10/22 1143)  SpO2: 96 % (02/10/22 1143) Vital Signs (24h Range):  Temp:  [98 °F (36.7 °C)-98.2 °F (36.8 °C)] 98.1 °F (36.7 °C)  Pulse:  [62-74] 69  Resp:  [18-20] 19  SpO2:  [94 %-97 %] 96 %  BP: (150-193)/(66-88) 168/74     Weight: 79.4 kg (175 lb)  Body mass index is 27.41 kg/m².    Intake/Output Summary (Last 24 hours) at 2/10/2022 1157  Last data filed at 2/9/2022 1800  Gross per 24 hour   Intake 360 ml   Output --   Net 360 ml      Physical Exam  Vitals and nursing note reviewed.   Constitutional:       General: She is awake. She is not in acute distress.     Appearance: She is not diaphoretic.   HENT:      Head: Normocephalic and atraumatic.      Right Ear: External ear normal.      Left Ear: External ear normal.      Mouth/Throat:      Pharynx: No oropharyngeal exudate.   Eyes:       General:         Right eye: No discharge.         Left eye: No discharge.      Extraocular Movements: Extraocular movements intact.      Conjunctiva/sclera: Conjunctivae normal.      Comments: PERRL, no ptosis   Neck:      Thyroid: No thyromegaly.      Vascular: No JVD.      Trachea: No tracheal deviation.   Cardiovascular:      Rate and Rhythm: Normal rate and regular rhythm.      Heart sounds: Normal heart sounds. No murmur heard.  No friction rub. No gallop.    Pulmonary:      Effort: Pulmonary effort is normal. No respiratory distress.      Breath sounds: Normal breath sounds. No stridor. No decreased breath sounds, wheezing, rhonchi or rales.   Chest:      Chest wall: No tenderness.   Abdominal:      General: Bowel sounds are normal. There is no distension.      Palpations: Abdomen is soft. There is no mass.      Tenderness: There is no abdominal tenderness. There is no guarding or rebound.   Genitourinary:     Comments: No agustin in place  Musculoskeletal:         General: No tenderness. Normal range of motion.      Cervical back: Normal range of motion and neck supple.      Right lower leg: No edema.      Left lower leg: No edema.   Lymphadenopathy:      Comments: No peripheral edema   Skin:     General: Skin is warm and dry.      Coloration: Skin is not pale.      Findings: No erythema or rash.   Neurological:      Mental Status: She is alert and easily aroused.      GCS: GCS eye subscore is 4. GCS verbal subscore is 5. GCS motor subscore is 6.      Cranial Nerves: No cranial nerve deficit.      Motor: No tremor, abnormal muscle tone or pronator drift.      Coordination: Coordination normal.      Comments: Symmetrical strength bilaterally, no focal deficits. No dizziness/vertigo symptoms   Psychiatric:         Attention and Perception: Attention and perception normal.         Behavior: Behavior normal.         Thought Content: Thought content normal.         Cognition and Memory: Cognition and memory  normal.         Judgment: Judgment normal.         Significant Labs: All pertinent labs within the past 24 hours have been reviewed.    Significant Imaging: I have reviewed all pertinent imaging results/findings within the past 24 hours.

## 2022-02-10 NOTE — PT/OT/SLP PROGRESS
"Physical Therapy      Patient Name:  Joanne Peña   MRN:  [id number]    Patient not seen today secondary to Patient unwilling to participate. "I've been walking all around this room. Will follow-up tomorrow.    Ban Mckeon PTA    "

## 2022-02-10 NOTE — ASSESSMENT & PLAN NOTE
MRI/MRA - it does not appear that the right vertebral artery is acute as she looks like she has robust blood flow in other areas suggesting more chronic in nature  Appreciated Neurovascular stroke assessment and recommendations   Patient took ASA 325mg PTA and now on 81 mg daily  She was loaded with Plavix 600mg po x 1 and now on 75 mg daily for 21 days    Admitted to ICU due to stuttering symptoms and malignant HTN (although likely reactive)  Allow for permissive HTN per stroke protocol interventions  CVA best practice pathway order sets have been initiated  TTE with bubble study w/ no evidence of intracardiac shunting  Neurology consulted - continue ASA,statin, and plavix  DVT prophylaxis with Lovenox 40mg daily  PT/OT/SLP consulted. Recommends outpatient PT/OT  Continue to better control her blood pressure

## 2022-02-10 NOTE — PLAN OF CARE
Problem: Adult Inpatient Plan of Care  Goal: Plan of Care Review  Outcome: Ongoing, Progressing  Flowsheets (Taken 2/10/2022 7634)  Plan of Care Reviewed With:   patient   daughter   Patient remained free from falls, trauma, and injuries throughout shift. No complaints of pain, nausea, or vomiting. Medications administered as prescribed. Patient ambulated to and from bathroom independently. Blood glucose controlled with prn sliding scale insulin. Patient's daughter visited throughout shift; very supportive in care. No acute distress noted.

## 2022-02-10 NOTE — PHYSICIAN QUERY
PT Name: Joanne Peña  MR #: [id number]  DOCUMENTATION CLARIFICATION     CDS/: Elena Álvarez RN, CDS               Contact information:  [email or web address]    This form is a permanent document in the medical record.    Query Date: February 10, 2022    By submitting this query, we are merely seeking further clarification of documentation. Please utilize your independent clinical judgment when addressing the question(s) below.    The medical record contains the following:   Indicators  Supporting Clinical Findings Location in Medical Record   x Occlusion or Stenosis documented --Vertebral artery occlusion may be chronic  --Appears culprit is likely small acute CVA in L santa-genny and L inferior cerebellum    --She does have an occluded left vertebral artery but this is thought to be chronic and not acute  --it does not appear that the right vertebral artery is acute     --Left vertebral artery occlusion: pt now with posteropr circulation strokes.          - No intervention as artery is already occluded and seem to be a chronic occlusion. An acute occlusion would had caused a large area stroke   [facility] Clinical review Note 2/7      Hosp med Note 2/7        Neuro Note 2/8   x TIA or Stroke documented --found to have left santa genny and left cerebellum infarctions    --Acute stroke: small areas of infarction on left cerebellum and left genny. Posterior circulation strokes Hosp med Note 2/7    Neuro Note 2/8        NIH Stroke Score (NIHSS)/neurological symptoms/ GCS     x Radiology findings --Small acute infarction in the left santa genny and small subacute infarction in the left inferior cerebellum.    --Significant diminished flow in the posterior circulation with nonvisualization of flow within the right vertebral artery and intermittent flow in the left vertebral artery.  Multiple luminal narrowing involving the basilar with suspected multiple high-grade stenosis versus occlusions.   Brain MRI  2/6      Brain MRA 2/6   x Medication --continue ASA,statin, and plavix Hosp med Note 2/10      Treatment      Other       Provider, please clarify the CVA:    [   ] Stroke/Cerebrovascular Accident (CVA) (please specify type, site and cause)  Site: (specify from each column) Cause:   [   ] Right  [ x ] Left  [   ] Bilateral  [   ] Unspecified   [ x ] Posterior  [   ] Other (please specify): ______   [   ] Unspecified   [ x ] Vertebral artery  [   ] Basilar artery  [   ] Other (please specify): ___________  [   ] Unspecified [   ] Ischemic            [   ] Occlusion/Stenosis      [   ] Other (please specify): ___  [   ] Unspecified         [   ] Other Neurological Diagnosis (please specify): _________________   [  ] Clinically Undetermined       Please document in your progress notes daily for the duration of treatment, until resolved, and include in your discharge summary.

## 2022-02-10 NOTE — PLAN OF CARE
Problem: Adult Inpatient Plan of Care  Goal: Plan of Care Review  Outcome: Ongoing, Progressing  Flowsheets (Taken 2/10/2022 0764)  Plan of Care Reviewed With:   patient   daughter     Problem: Infection  Goal: Absence of Infection Signs and Symptoms  Outcome: Ongoing, Progressing     Problem: Adjustment to Illness (Stroke, Ischemic/Transient Ischemic Attack)  Goal: Optimal Coping  Outcome: Ongoing, Progressing     Problem: Bowel Elimination Impaired (Stroke, Ischemic/Transient Ischemic Attack)  Goal: Effective Bowel Elimination  Outcome: Ongoing, Progressing     Problem: Cerebral Tissue Perfusion (Stroke, Ischemic/Transient Ischemic Attack)  Goal: Optimal Cerebral Tissue Perfusion  Outcome: Ongoing, Progressing     Problem: Cognitive Impairment (Stroke, Ischemic/Transient Ischemic Attack)  Goal: Optimal Cognitive Function  Outcome: Ongoing, Progressing     Problem: Communication Impairment (Stroke, Ischemic/Transient Ischemic Attack)  Goal: Improved Communication Skills  Outcome: Ongoing, Progressing

## 2022-02-10 NOTE — PLAN OF CARE
Problem: Adult Inpatient Plan of Care  Goal: Plan of Care Review  Outcome: Ongoing, Progressing     Problem: Diabetes Comorbidity  Goal: Blood Glucose Level Within Targeted Range  Outcome: Ongoing, Progressing  Intervention: Monitor and Manage Glycemia  Flowsheets (Taken 2/10/2022 0344)  Glycemic Management: blood glucose monitored     Problem: Hypertension Acute  Goal: Blood Pressure Within Desired Range  Outcome: Ongoing, Progressing  Intervention: Normalize Blood Pressure  Flowsheets (Taken 2/10/2022 0345)  Sensory Stimulation Regulation: quiet environment promoted  Medication Review/Management: medications reviewed

## 2022-02-10 NOTE — PROGRESS NOTES
"[facility]  Progress Note    Patient Name: Joanne Peña  MRN: [id number]  Patient Class: IP- Inpatient   Admission Date: 2/6/2022  Length of Stay: 4 days  Attending Physician: Madiha Kowalski DO  Primary Care Provider: Marielle Gibson MD        Subjective:     Principal Problem:Cerebral vascular accident        HPI:  Ms. Peña is a 72yo lady with a pas medical history of HTN, DM2 and tobacco abuse (smokes 1/2 PPD).  Her COVID vaccination status is unknown, though she was positive on 8/24/21.     She now comes to the ED with vertigo when she woke up this morning.  She states things, "were just off when looking at them and kind of spinning."  She had her brother drive her to work (at the [employer]) and had an episode of N/V en route.  Shortly after arriving at the correction, around 2:15pm, she developed a weird out of body sensation and vague confusion, like she could not recognize her inmates she's known forever.  Shortly after this she had abrupt onet of slurred speech and right sided weakness.  She had to go sit down and call the correction nurse to evaluate her.  She denied double vision with the vertigo and denied any numbness or tingling (other than an odd burning in her nose before the symptoms).    When the nurse arrived she had dysarthria and a measured BP of 215/115, so she took a clonidine 0.1mg x 1 and ASA 325mg.  She called her daughter to come pick her up, who then drove her to the ED.  She states that her symptoms all resolved shortly after taking the ASA (<1 hour).  She never had any neck pain or headache.  She feels all of her symptoms have now completely resolved, including the vertigo.  On arrival to the ED, she did have a brief recurrence of the above symptoms, but it resolved of its own accord after a few minutes.     In the ED she was found to be hypertensive with a CTA head that showed No CT evidence of large territorial infarction and absence of flow within the V3 segment " right vertebral artery, suggestive of occlusion.  [place] tele stroke consult was obtained and recommended Plavix 600mg and full dose ASA (already had PTA), and MRI brain stat.         Overview/Hospital Course:  Ms. Garza is a 72yo lady with a pas medical history of HTN, DM2 and tobacco abuse (smokes 1/2 PPD) admitted to ICU on 02/06/2022 with dizziness and found to have left santa genny and left cerebellum infarctions.     MRI brain with Small acute infarction in the left santa genny and small subacute infarction in the left inferior cerebellum.  No evidence of intracranial hemorrhage. MRA brain with significant diminished flow in the posterior circulation with nonvisualization of flow within the right vertebral artery and intermittent flow in the left vertebral artery.  Multiple luminal narrowing involving the basilar with suspected multiple high-grade stenosis versus occlusions. Patient loaded with aspirin and plavix. 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 She was not a candidate for tpa. Neurology consulted. Patient transfer to the floor on 02/07/2022. Echo with EF of 65% and no evidence of intracardiac shunting. G2DD.  Home BP meds restarted and blood pressure still elevated. Will continue to add and titrate anti-hypertensives meds as needed, and monitor blood pressure. PT/OT recommended outpatient therapy on discharge.       Interval History: No acute overnight events. she is currently not dizzy or having any abnormal symptoms this morning. States she feeling better overall. Still feel weak on her left side. Tolerating diet. Notice a difference in the way she felt when losartan was started and stated it made her feel better.     Review of Systems   Constitutional: Negative for chills, fatigue and fever.   HENT: Negative for congestion.    Respiratory: Negative for cough and shortness of breath.    Cardiovascular: Negative for chest pain.   Gastrointestinal:  Negative for abdominal pain, constipation, diarrhea, nausea and vomiting.   Endocrine: Negative for heat intolerance.   Genitourinary: Negative for dysuria.   Musculoskeletal: Positive for gait problem.   Skin: Negative for rash.   Neurological: Positive for weakness. Negative for dizziness and speech difficulty.   Hematological: Does not bruise/bleed easily.   Psychiatric/Behavioral: Negative for confusion. The patient is not nervous/anxious.      Objective:     Vital Signs (Most Recent):  Temp: 98.1 °F (36.7 °C) (02/10/22 1143)  Pulse: 69 (02/10/22 1143)  Resp: 19 (02/10/22 1143)  BP: (!) 168/74 (02/10/22 1143)  SpO2: 96 % (02/10/22 1143) Vital Signs (24h Range):  Temp:  [98 °F (36.7 °C)-98.2 °F (36.8 °C)] 98.1 °F (36.7 °C)  Pulse:  [62-74] 69  Resp:  [18-20] 19  SpO2:  [94 %-97 %] 96 %  BP: (150-193)/(66-88) 168/74     Weight: 79.4 kg (175 lb)  Body mass index is 27.41 kg/m².    Intake/Output Summary (Last 24 hours) at 2/10/2022 1157  Last data filed at 2/9/2022 1800  Gross per 24 hour   Intake 360 ml   Output --   Net 360 ml      Physical Exam  Vitals and nursing note reviewed.   Constitutional:       General: She is awake. She is not in acute distress.     Appearance: She is not diaphoretic.   HENT:      Head: Normocephalic and atraumatic.      Right Ear: External ear normal.      Left Ear: External ear normal.      Mouth/Throat:      Pharynx: No oropharyngeal exudate.   Eyes:      General:         Right eye: No discharge.         Left eye: No discharge.      Extraocular Movements: Extraocular movements intact.      Conjunctiva/sclera: Conjunctivae normal.      Comments: PERRL, no ptosis   Neck:      Thyroid: No thyromegaly.      Vascular: No JVD.      Trachea: No tracheal deviation.   Cardiovascular:      Rate and Rhythm: Normal rate and regular rhythm.      Heart sounds: Normal heart sounds. No murmur heard.  No friction rub. No gallop.    Pulmonary:      Effort: Pulmonary effort is normal. No respiratory  distress.      Breath sounds: Normal breath sounds. No stridor. No decreased breath sounds, wheezing, rhonchi or rales.   Chest:      Chest wall: No tenderness.   Abdominal:      General: Bowel sounds are normal. There is no distension.      Palpations: Abdomen is soft. There is no mass.      Tenderness: There is no abdominal tenderness. There is no guarding or rebound.   Genitourinary:     Comments: No agustin in place  Musculoskeletal:         General: No tenderness. Normal range of motion.      Cervical back: Normal range of motion and neck supple.      Right lower leg: No edema.      Left lower leg: No edema.   Lymphadenopathy:      Comments: No peripheral edema   Skin:     General: Skin is warm and dry.      Coloration: Skin is not pale.      Findings: No erythema or rash.   Neurological:      Mental Status: She is alert and easily aroused.      GCS: GCS eye subscore is 4. GCS verbal subscore is 5. GCS motor subscore is 6.      Cranial Nerves: No cranial nerve deficit.      Motor: No tremor, abnormal muscle tone or pronator drift.      Coordination: Coordination normal.      Comments: Symmetrical strength bilaterally, no focal deficits. No dizziness/vertigo symptoms   Psychiatric:         Attention and Perception: Attention and perception normal.         Behavior: Behavior normal.         Thought Content: Thought content normal.         Cognition and Memory: Cognition and memory normal.         Judgment: Judgment normal.         Significant Labs: All pertinent labs within the past 24 hours have been reviewed.    Significant Imaging: I have reviewed all pertinent imaging results/findings within the past 24 hours.      Assessment/Plan:      * Cerebral vascular accident  MRI/MRA - it does not appear that the right vertebral artery is acute as she looks like she has robust blood flow in other areas suggesting more chronic in nature  Appreciated Neurovascular stroke assessment and recommendations   Patient took ASA  325mg PTA and now on 81 mg daily  She was loaded with Plavix 600mg po x 1 and now on 75 mg daily for 21 days    Admitted to ICU due to stuttering symptoms and malignant HTN (although likely reactive)  Allow for permissive HTN per stroke protocol interventions  CVA best practice pathway order sets have been initiated  TTE with bubble study w/ no evidence of intracardiac shunting  Neurology consulted - continue ASA,statin, and plavix  DVT prophylaxis with Lovenox 40mg daily  PT/OT/SLP consulted. Recommends outpatient PT/OT  Continue to better control her blood pressure      Malignant essential hypertension  - Allowed for permissive hypertension for 24hrs  -Continue home Norvasc 10mg. She was not on any other HTN meds per patient  -BP still elevated with SBP in the 170-190s  -Continue Losartan to 100mg and will add hydralazine with hold parameters  -Of note, patient stated she did not tolerate lisinopril in the past because it caused weakness  -Monitor BP          Hypokalemia  Replace as needed  Resolved      Type 2 diabetes mellitus with hyperglycemia, without long-term current use of insulin  A1c:   Lab Results   Component Value Date    HGBA1C 7.6 (H) 02/06/2022     Meds: basal bolus insulin + SSI PRN to maintain goal 140-180  On review, patient has been refusing basal 5U at bedtime  ADA diet, accuchecks ACHS, hypoglycemic protocol  - adjust as needed    Normocytic anemia  Stable  No signs of bleeding    Mixed hyperlipidemia  Continue home Lipitor 80mg po qday    Moderate cigarette smoker (10-19 per day)  Patient was counseled regarding smoking for 3-10 minutes.          Overweight with body mass index (BMI) of 25 to 25.9 in adult  Encourage exercise and healthy diet  Body mass index is 27.41 kg/m².  Weight loss as outpatient          VTE Risk Mitigation (From admission, onward)         Ordered     enoxaparin injection 40 mg  Daily         02/06/22 1953     Place MIRIAN vasqueze  Until discontinued         02/06/22 1953      IP VTE LOW RISK PATIENT  Once         02/06/22 1953     Place sequential compression device  Until discontinued         02/06/22 1953                Discharge Planning   ARIANA: 2/8/2022     Code Status: Full Code   Is the patient medically ready for discharge?:     Reason for patient still in hospital (select all that apply): Patient trending condition and Treatment  Discharge Plan A: Home with family                  Madiha Kowalski DO  Department of Hospital Medicine   [facility]

## 2022-02-10 NOTE — ASSESSMENT & PLAN NOTE
A1c:   Lab Results   Component Value Date    HGBA1C 7.6 (H) 02/06/2022     Meds: basal bolus insulin + SSI PRN to maintain goal 140-180  On review, patient has been refusing basal 5U at bedtime  ADA diet, accuchecks ACHS, hypoglycemic protocol  - adjust as needed

## 2022-02-10 NOTE — ASSESSMENT & PLAN NOTE
- Allowed for permissive hypertension for 24hrs  -Continue home Norvasc 10mg. She was not on any other HTN meds per patient  -BP still elevated with SBP in the 170-190s  -Continue Losartan to 100mg and will add hydralazine with hold parameters  -Of note, patient stated she did not tolerate lisinopril in the past because it caused weakness  -Monitor BP

## 2022-02-11 VITALS
HEART RATE: 73 BPM | HEIGHT: 67 IN | OXYGEN SATURATION: 97 % | DIASTOLIC BLOOD PRESSURE: 74 MMHG | WEIGHT: 175 LBS | SYSTOLIC BLOOD PRESSURE: 156 MMHG | TEMPERATURE: 98 F | BODY MASS INDEX: 27.47 KG/M2 | RESPIRATION RATE: 19 BRPM

## 2022-02-11 PROBLEM — E87.6 HYPOKALEMIA: Status: RESOLVED | Noted: 2022-02-06 | Resolved: 2022-02-11

## 2022-02-11 LAB
POCT GLUCOSE: 139 MG/DL (ref 70–110)
POCT GLUCOSE: 159 MG/DL (ref 70–110)

## 2022-02-11 PROCEDURE — 25000003 PHARM REV CODE 250: Performed by: STUDENT IN AN ORGANIZED HEALTH CARE EDUCATION/TRAINING PROGRAM

## 2022-02-11 PROCEDURE — 97530 THERAPEUTIC ACTIVITIES: CPT | Mod: CO

## 2022-02-11 PROCEDURE — 97535 SELF CARE MNGMENT TRAINING: CPT | Mod: CO

## 2022-02-11 RX ORDER — LOSARTAN POTASSIUM 100 MG/1
100 TABLET ORAL DAILY
Qty: 30 TABLET | Refills: 1 | Status: ON HOLD | OUTPATIENT
Start: 2022-02-12 | End: 2022-02-22 | Stop reason: SDUPTHER

## 2022-02-11 RX ORDER — ASPIRIN 81 MG/1
81 TABLET ORAL DAILY
Qty: 30 TABLET | Refills: 3 | Status: SHIPPED | OUTPATIENT
Start: 2022-02-11 | End: 2024-03-15

## 2022-02-11 RX ORDER — HYDRALAZINE HYDROCHLORIDE 50 MG/1
50 TABLET, FILM COATED ORAL EVERY 8 HOURS
Qty: 90 TABLET | Refills: 1 | Status: ON HOLD | OUTPATIENT
Start: 2022-02-11 | End: 2022-02-22 | Stop reason: SDUPTHER

## 2022-02-11 RX ORDER — CLOPIDOGREL BISULFATE 75 MG/1
75 TABLET ORAL DAILY
Qty: 16 TABLET | Refills: 0 | Status: SHIPPED | OUTPATIENT
Start: 2022-02-12 | End: 2022-06-24

## 2022-02-11 RX ADMIN — AMLODIPINE BESYLATE 10 MG: 5 TABLET ORAL at 08:02

## 2022-02-11 RX ADMIN — ASPIRIN 81 MG: 81 TABLET, COATED ORAL at 08:02

## 2022-02-11 RX ADMIN — CLOPIDOGREL 75 MG: 75 TABLET, FILM COATED ORAL at 08:02

## 2022-02-11 RX ADMIN — MUPIROCIN: 20 OINTMENT TOPICAL at 08:02

## 2022-02-11 RX ADMIN — HYDRALAZINE HYDROCHLORIDE 50 MG: 25 TABLET, FILM COATED ORAL at 05:02

## 2022-02-11 RX ADMIN — ATORVASTATIN CALCIUM 80 MG: 40 TABLET, FILM COATED ORAL at 08:02

## 2022-02-11 RX ADMIN — LOSARTAN POTASSIUM 100 MG: 25 TABLET, FILM COATED ORAL at 08:02

## 2022-02-11 NOTE — PROGRESS NOTES
[facility] [place] [facility] S[facility]  Neurology  Progress Note    Patient Name: Joanne Peña  MRN: [id number]  Admission Date: 2/6/2022  Hospital Length of Stay: 5 days  Code Status: Full Code   Attending Provider: Madiha Kowalski DO  Primary Care Physician: Marielle Gibson MD   Principal Problem:Cerebral vascular accident    Subjective:     Interval History: 72 y/o female with Medical Hx of HTN, DM, hyperlipidemia states that yesterday in the morning she began to experience dizziness and nausea. Also noted slurred speech. She was at work for which she delayed to seek medical care until around 2:30 pm when decided to come to ED. Pt arrived to this facility around 3:22 pm. Stroke CODE was made. No thrombolytics as NIHSS was 0. Another episode of dysarthria occurred at 6:29 pm. Pt tells me that this resolved. She is currently feeling better. No slurred speech, weakness or numbness of limbs but complains of lightheadedness.     -2/8/22: No new symptoms. Denies headaches, visual or speech disturbances, vertigo, weakness or numbness.    -2/9/22: Pt with no motor or sensory symptoms.    Current Neurological Medications:     Current Facility-Administered Medications   Medication Dose Route Frequency Provider Last Rate Last Admin    acetaminophen tablet 650 mg  650 mg Oral Q6H PRN Jaguar Mancia MD        amLODIPine tablet 10 mg  10 mg Oral Daily Madiha Kowalski DO   10 mg at 02/10/22 0851    aspirin EC tablet 81 mg  81 mg Oral Daily Jaguar Mancia MD   81 mg at 02/10/22 0854    atorvastatin tablet 80 mg  80 mg Oral Daily Jaguar Mancia MD   80 mg at 02/10/22 0900    clopidogreL tablet 75 mg  75 mg Oral Daily Jaguar Mancia MD   75 mg at 02/10/22 0854    dextrose 50% injection 12.5 g  12.5 g Intravenous PRN Jaguar Mancia MD        enoxaparin injection 40 mg  40 mg Subcutaneous Daily Jaguar Mancia MD   40 mg at 02/10/22 1758    glucagon (human recombinant) injection 1 mg  1 mg Intramuscular PRN Jaguar Mancia MD         hydrALAZINE tablet 50 mg  50 mg Oral Q8H JohannaRaphael SNIDER. Kowalski, DO   50 mg at 02/11/22 0526    hydrOXYzine HCL tablet 25 mg  25 mg Oral TID PRN MARCOS Gomez   25 mg at 02/10/22 2108    influenza (QUADRIVALENT ADJUVANTED PF) vaccine 0.5 mL  0.5 mL Intramuscular vaccine x 1 dose Jaguar Mancia MD        insulin aspart U-100 pen 0-5 Units  0-5 Units Subcutaneous QID (AC + HS) PRN JohannaRaphael T. Kowalski, DO   1 Units at 02/10/22 2109    insulin detemir U-100 pen 5 Units  5 Units Subcutaneous QHS Jaguar Mancia MD   5 Units at 02/10/22 2118    labetaloL injection 10 mg  10 mg Intravenous Q4H PRN Jaguar Mancia MD   10 mg at 02/10/22 0517    losartan tablet 100 mg  100 mg Oral Daily Johanna-Mary Ann SNIDER. Kowalski, DO   100 mg at 02/10/22 0851    mupirocin 2 % ointment   Nasal BID Jaguar Mancia MD   Given at 02/10/22 0854    ondansetron injection 4 mg  4 mg Intravenous Q8H PRN Jaguar Mancia MD        pneumoc 13-jose conj-dip cr(PF) (PREVNAR 13 (PF)) 0.5 mL  0.5 mL Intramuscular vaccine x 1 dose Jaguar Mancia MD        polyethylene glycol packet 17 g  17 g Oral Daily Jaguar Mancia MD   17 g at 02/09/22 0854    promethazine (PHENERGAN) 12.5 mg in dextrose 5 % 50 mL IVPB  12.5 mg Intravenous Q6H PRN Jaguar Mancia MD        senna-docusate 8.6-50 mg per tablet 1 tablet  1 tablet Oral BID PRN Jaguar Mancia MD        sodium chloride 0.9% flush 10 mL  10 mL Intravenous PRN Jaguar Mancia MD           Review of Systems   Constitutional: Negative for fever.   HENT: Negative for trouble swallowing.    Eyes: Negative for photophobia.   Respiratory: Negative for chest tightness.    Gastrointestinal: Negative for abdominal pain.   Genitourinary: Negative for dysuria.   Musculoskeletal: Negative for back pain.   Neurological: Negative for headaches.   Psychiatric/Behavioral: Negative for confusion.     Objective:     Vital Signs (Most Recent):  Temp: 98 °F (36.7 °C) (02/11/22 0317)  Pulse: 72 (02/11/22 0317)  Resp: 19  (02/11/22 0317)  BP: (!) 161/69 (02/11/22 0317)  SpO2: (!) 93 % (02/11/22 0317) Vital Signs (24h Range):  Temp:  [97.9 °F (36.6 °C)-98.1 °F (36.7 °C)] 98 °F (36.7 °C)  Pulse:  [64-77] 72  Resp:  [18-20] 19  SpO2:  [92 %-97 %] 93 %  BP: (153-168)/(65-81) 161/69     Weight: 79.4 kg (175 lb)  Body mass index is 27.41 kg/m².    Physical Exam  Constitutional:       General: She is not in acute distress.  HENT:      Head: Normocephalic.      Right Ear: External ear normal.      Left Ear: External ear normal.   Eyes:      General:         Right eye: No discharge.         Left eye: No discharge.   Cardiovascular:      Rate and Rhythm: Normal rate.   Pulmonary:      Breath sounds: Normal breath sounds.   Abdominal:      Palpations: Abdomen is soft.   Musculoskeletal:         General: No tenderness.      Cervical back: Neck supple.   Skin:     General: Skin is warm.   Neurological:      Mental Status: She is oriented to person, place, and time.      Coordination: Finger-Nose-Finger Test and Heel to Shin Test normal.      Deep Tendon Reflexes: Strength normal.   Psychiatric:         Speech: Speech normal.            NEUROLOGICAL EXAMINATION:      MENTAL STATUS   Oriented to person, place, and time.   Speech: speech is normal   Level of consciousness: alert     CRANIAL NERVES      CN III, IV, VI   Right pupil: Size: 2 mm. Shape: regular.   Left pupil: Size: 2 mm. Shape: regular.   Ophthalmoparesis: none  Conjugate gaze: present     CN V   Right facial sensation deficit: none  Left facial sensation deficit: none     CN VII   Right facial weakness: none  Left facial weakness: none     CN XII   Tongue deviation: none     MOTOR EXAM      Strength   Strength 5/5 throughout.      GAIT AND COORDINATION      Coordination   Finger to nose coordination: normal  Heel to shin coordination: normal       Mild balance impairment             Significant Labs: CBC: No results for input(s): WBC, HGB, HCT, PLT in the last 48 hours.  CMP: No  results for input(s): GLU, NA, K, CL, CO2, BUN, CREATININE, CALCIUM, MG, PROT, ALBUMIN, BILITOT, ALKPHOS, AST, ALT, ANIONGAP, EGFRNONAA in the last 48 hours.    Significant Imaging: I have reviewed all pertinent imaging results/findings within the past 24 hours.    Assessment and Plan:     72 y/o female consulted for stroke     1. Acute stroke: small areas of infarction on left cerebellum and left genny. Posterior circulation strokes           CTA demonstrates occlusion of left vertebral artery.           -ASA 81 mg daily. Clopidogrel 75 mg daily x21 days           -High dose statin.           -OK for BP control.     2. 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           No intervention as artery is already occluded and seem to be a chronic occlusion. An acute occlusion would had caused a large area stroke.           -DAPT.           -Statin.     3. Tobacco abuse: pt reports smoking at least 1/2 pack daily.           -Tobacco cessation discussed with pt.             Pt and daughter informed or findings and plan. Questions answered.    Active Diagnoses:    Diagnosis Date Noted POA    PRINCIPAL PROBLEM:  Cerebral vascular accident [I63.9] 02/06/2022 Yes    Normocytic anemia [D64.9] 02/06/2022 Yes    Moderate cigarette smoker (10-19 per day) [F17.210] 02/06/2022 Yes    Hypokalemia [E87.6] 02/06/2022 Yes    Overweight with body mass index (BMI) of 25 to 25.9 in adult [E66.3, Z68.25] 02/06/2022 Not Applicable    Mixed hyperlipidemia [E78.2] 02/04/2016 Yes    Type 2 diabetes mellitus with hyperglycemia, without long-term current use of insulin [E11.65] 01/29/2016 Yes    Malignant essential hypertension [I10] 01/29/2016 Yes      Problems Resolved During this Admission:       VTE Risk Mitigation (From admission, onward)         Ordered     enoxaparin injection 40 mg  Daily         02/06/22 1953     Place MIRIAN hose  Until discontinued         02/06/22 1953     IP VTE LOW RISK PATIENT   Once         02/06/22 1953     Place sequential compression device  Until discontinued         02/06/22 1953                Devonte Albright MD  Neurology  [facility] Surg

## 2022-02-11 NOTE — PROGRESS NOTES
[facility] Med Surg  Neurology  Progress Note    Patient Name: Joanne Peña  MRN: [id number]  Admission Date: 2/6/2022  Hospital Length of Stay: 4 days  Code Status: Full Code   Attending Provider: Madiha Kowalski DO  Primary Care Physician: Marielle Gibson MD   Principal Problem:Cerebral vascular accident    Subjective:     Interval History: 72 y/o female with Medical Hx of HTN, DM, hyperlipidemia states that yesterday in the morning she began to experience dizziness and nausea. Also noted slurred speech. She was at work for which she delayed to seek medical care until around 2:30 pm when decided to come to ED. Pt arrived to this facility around 3:22 pm. Stroke CODE was made. No thrombolytics as NIHSS was 0. Another episode of dysarthria occurred at 6:29 pm. Pt tells me that this resolved. She is currently feeling better. No slurred speech, weakness or numbness of limbs but complains of lightheadedness.     -2/8/22: No new symptoms. Denies headaches, visual or speech disturbances, vertigo, weakness or numbness.    -2/10/22: Ms. Peña reports no symptoms.    Current Neurological Medications:     Current Facility-Administered Medications   Medication Dose Route Frequency Provider Last Rate Last Admin    acetaminophen tablet 650 mg  650 mg Oral Q6H PRN Jaguar Mancia MD        amLODIPine tablet 10 mg  10 mg Oral Daily Madiha Kowalski DO   10 mg at 02/10/22 0851    aspirin EC tablet 81 mg  81 mg Oral Daily Jaguar Mancia MD   81 mg at 02/10/22 0854    atorvastatin tablet 80 mg  80 mg Oral Daily Jaguar Mancia MD   80 mg at 02/10/22 0900    clopidogreL tablet 75 mg  75 mg Oral Daily Jaguar Mancia MD   75 mg at 02/10/22 0854    dextrose 50% injection 12.5 g  12.5 g Intravenous PRN Jaguar Mancia MD        enoxaparin injection 40 mg  40 mg Subcutaneous Daily Jaguar Mancia MD   40 mg at 02/10/22 1758    glucagon (human recombinant) injection 1 mg  1 mg Intramuscular PRN Jaguar Mancia MD         hydrALAZINE tablet 50 mg  50 mg Oral Q8H JohannaRaphael SNIDER. Kowalski, DO   50 mg at 02/10/22 1305    hydrOXYzine HCL tablet 25 mg  25 mg Oral TID PRN MARCOS Gomez   25 mg at 02/09/22 2100    influenza (QUADRIVALENT ADJUVANTED PF) vaccine 0.5 mL  0.5 mL Intramuscular vaccine x 1 dose Jaguar Mancia MD        insulin aspart U-100 pen 0-5 Units  0-5 Units Subcutaneous QID (AC + HS) PRN JohannaRaphael SNIDER. Kowalski, DO   2 Units at 02/10/22 1221    insulin detemir U-100 pen 5 Units  5 Units Subcutaneous QHS Jaguar Mancia MD   5 Units at 02/06/22 2038    labetaloL injection 10 mg  10 mg Intravenous Q4H PRN Jaguar Mancia MD   10 mg at 02/10/22 0517    losartan tablet 100 mg  100 mg Oral Daily JohannaRaphael SNIDER. Kowalski, DO   100 mg at 02/10/22 0851    mupirocin 2 % ointment   Nasal BID Jaguar Mancia MD   Given at 02/10/22 0854    ondansetron injection 4 mg  4 mg Intravenous Q8H PRN Jaguar Mancia MD        pneumoc 13-jose conj-dip cr(PF) (PREVNAR 13 (PF)) 0.5 mL  0.5 mL Intramuscular vaccine x 1 dose Jaguar Mancia MD        polyethylene glycol packet 17 g  17 g Oral Daily Jaguar aMncia MD   17 g at 02/09/22 0854    promethazine (PHENERGAN) 12.5 mg in dextrose 5 % 50 mL IVPB  12.5 mg Intravenous Q6H PRN Jaguar Mancia MD        senna-docusate 8.6-50 mg per tablet 1 tablet  1 tablet Oral BID PRN Jaguar Mancia MD        sodium chloride 0.9% flush 10 mL  10 mL Intravenous PRN Jaguar Mancia MD           Review of Systems   Constitutional: Negative for fever.   HENT: Negative for trouble swallowing.    Eyes: Negative for photophobia.   Respiratory: Negative for chest tightness.    Gastrointestinal: Negative for abdominal pain.   Genitourinary: Negative for dysuria.   Musculoskeletal: Negative for back pain.   Neurological: Negative for headaches.   Psychiatric/Behavioral: Negative for confusion.     Objective:     Vital Signs (Most Recent):  Temp: 98.1 °F (36.7 °C) (02/10/22 1626)  Pulse: 74 (02/10/22 1626)  Resp: 18  (02/10/22 1626)  BP: (!) 162/75 (02/10/22 1626)  SpO2: 96 % (02/10/22 1626) Vital Signs (24h Range):  Temp:  [98 °F (36.7 °C)-98.1 °F (36.7 °C)] 98.1 °F (36.7 °C)  Pulse:  [62-74] 74  Resp:  [18-20] 18  SpO2:  [94 %-97 %] 96 %  BP: (150-193)/(66-88) 162/75     Weight: 79.4 kg (175 lb)  Body mass index is 27.41 kg/m².    Physical Exam  Constitutional:       General: She is not in acute distress.  HENT:      Head: Normocephalic.      Right Ear: External ear normal.      Left Ear: External ear normal.   Eyes:      General:         Right eye: No discharge.         Left eye: No discharge.   Cardiovascular:      Rate and Rhythm: Normal rate.   Pulmonary:      Breath sounds: Normal breath sounds.   Abdominal:      Palpations: Abdomen is soft.   Musculoskeletal:         General: No tenderness.      Cervical back: Neck supple.   Skin:     General: Skin is warm.   Neurological:      Mental Status: She is oriented to person, place, and time.      Coordination: Finger-Nose-Finger Test and Heel to Shin Test normal.      Deep Tendon Reflexes: Strength normal.   Psychiatric:         Speech: Speech normal.            NEUROLOGICAL EXAMINATION:      MENTAL STATUS   Oriented to person, place, and time.   Speech: speech is normal   Level of consciousness: alert     CRANIAL NERVES      CN III, IV, VI   Right pupil: Size: 2 mm. Shape: regular.   Left pupil: Size: 2 mm. Shape: regular.   Ophthalmoparesis: none  Conjugate gaze: present     CN V   Right facial sensation deficit: none  Left facial sensation deficit: none     CN VII   Right facial weakness: none  Left facial weakness: none     CN XII   Tongue deviation: none     MOTOR EXAM      Strength   Strength 5/5 throughout.      GAIT AND COORDINATION      Coordination   Finger to nose coordination: normal  Heel to shin coordination: normal       Mild balance impairment             Significant Labs: CBC: No results for input(s): WBC, HGB, HCT, PLT in the last 48 hours.  CMP: No results  for input(s): GLU, NA, K, CL, CO2, BUN, CREATININE, CALCIUM, MG, PROT, ALBUMIN, BILITOT, ALKPHOS, AST, ALT, ANIONGAP, EGFRNONAA in the last 48 hours.    Significant Imaging: I have reviewed all pertinent imaging results/findings within the past 24 hours.    Assessment and Plan:     70 y/o female consulted for stroke     1. Acute stroke: small areas of infarction on left cerebellum and left genny. Posterior circulation strokes           CTA demonstrates occlusion of left vertebral artery.           -ASA 81 mg daily. Clopidogrel 75 mg daily x 21 days.           -High dose statin.           -Echo show goof EF; no AS           -OK for BP control. Adding hydralazine today     2. 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           No intervention as artery is already occluded and seem to be a chronic occlusion. An acute occlusion would had caused a large area stroke.           -DAPT.           -Statin.     3. Tobacco abuse: pt reports smoking at least 1/2 pack daily.           -Tobacco cessation discussed with pt.             Pt and daughter informed or findings and plan. Questions answered.       Active Diagnoses:    Diagnosis Date Noted POA    PRINCIPAL PROBLEM:  Cerebral vascular accident [I63.9] 02/06/2022 Yes    Normocytic anemia [D64.9] 02/06/2022 Yes    Moderate cigarette smoker (10-19 per day) [F17.210] 02/06/2022 Yes    Hypokalemia [E87.6] 02/06/2022 Yes    Overweight with body mass index (BMI) of 25 to 25.9 in adult [E66.3, Z68.25] 02/06/2022 Not Applicable    Mixed hyperlipidemia [E78.2] 02/04/2016 Yes    Type 2 diabetes mellitus with hyperglycemia, without long-term current use of insulin [E11.65] 01/29/2016 Yes    Malignant essential hypertension [I10] 01/29/2016 Yes      Problems Resolved During this Admission:       VTE Risk Mitigation (From admission, onward)         Ordered     enoxaparin injection 40 mg  Daily         02/06/22 1953     Place MIRIAN hose  Until  discontinued         02/06/22 1953     IP VTE LOW RISK PATIENT  Once         02/06/22 1953     Place sequential compression device  Until discontinued         02/06/22 1953                Devonte Albright MD  Neurology  [facility] Surg

## 2022-02-11 NOTE — NURSING
Patient in bed, free of falls, discharge paperwork with education and instructions printed and reviewed with patient. Safely remove IV, catheter intact. Prescription information given. Daughter at bedside. All questions answered. Awaiting transport to .

## 2022-02-11 NOTE — PLAN OF CARE
[facility] - [place] - Med Surg  Final Note:  TN sent a secure message to med surg nurse Branden, that patient is cleared for discharge from case management's standpoint.  Primary Care Provider: Marielle Gibson MD    Expected Discharge Date: 2/11/2022    Final Discharge Note (most recent)     Final Note - 02/11/22 1030        Final Note    Assessment Type Final Discharge Note     Anticipated Discharge Disposition Home or Self Care     What phone number can be called within the next 1-3 days to see how you are doing after discharge? --   see chart    Hospital Resources/Appts/Education Provided Appointments scheduled by Navigator/Coordinator;Provided patient/caregiver with written discharge plan information        Post-Acute Status    Coverage Medicare     Discharge Delays None known at this time                 Important Message from Medicare  Important Message from Medicare regarding Discharge Appeal Rights: Given to patient/caregiver,Explained to patient/caregiver,Signed/date by patient/caregiver     Date IMM was signed: 02/09/22  Time IMM was signed: 1051    Contact Info     OUT PATIENT PHYSICIAL THERAPY    PHYSICAL THERAPY WILL CALL PATIENT       Next Steps: Follow up    Instructions: out patient services

## 2022-02-11 NOTE — PT/OT/SLP PROGRESS
Occupational Therapy   Treatment    Name: Joanne Peña  MRN: [id number]  Admitting Diagnosis:  Cerebral vascular accident       Recommendations:     Discharge Recommendations: outpatient OT  Discharge Equipment Recommendations:  none  Barriers to discharge:  None    Assessment:     Joanne Peña is a 71 y.o. female with a medical diagnosis of Cerebral vascular accident.  Performance deficits affecting function are weakness,gait instability,impaired balance,decreased lower extremity function.     Rehab Prognosis:  Good; patient would benefit from acute skilled OT services to address these deficits and reach maximum level of function.       Plan:     Patient to be seen 2 x/week,3 x/week to address the above listed problems via self-care/home management,therapeutic activities,therapeutic exercises  · Plan of Care Expires: 02/21/22  · Plan of Care Reviewed with: patient,daughter    Subjective     Pain/Comfort:  · Pain Rating 1: 0/10    Objective:     Communicated with: Nurse Otero prior to session.  Patient found supine with telemetry,peripheral IV and daughter present upon OT entry to room.    General Precautions: Standard, fall   Orthopedic Precautions:N/A   Braces: N/A  Respiratory Status: Room air     Occupational Performance:     Bed Mobility:    · Patient completed Rolling/Turning to Left with  independence  · Patient completed Supine to Sit with independence     Functional Mobility/Transfers:  · Patient completed Sit <> Stand Transfer with independence  with  no assistive device   · Patient completed Bed <> Chair Transfer using Stand Pivot technique with independence with no assistive device  · Functional Mobility: Patient performed functional mobility with independence in room from bed<->sink to complete ADLs with no assistive device. Patient with good dynamic standing balance and safety awareness with functional mobility.     Activities of Daily Living:  · Grooming: Patient performed hand washing  with independence standing at the sink with no assistive device.  · Upper Body Dressing:Patient donned a sweater without fasteners and a hat with independence.  · Lower Body Dressing: Patient doffed/donned her personal anti-slip slip-on shoes with modified independence sitting EOB.      [facility] 6 Click ADL: 24    Treatment & Education:  Patient educated in home safety upon discharge, patient and daughter verbalized understanding. Patient completed 3 ADLs with independence/modified independence during this treatment session.     Patient left with all lines intact, call button in reach and daughter present awaiting transport to be discharged.    GOALS:   Multidisciplinary Problems     Occupational Therapy Goals        Problem: Occupational Therapy Goal    Goal Priority Disciplines Outcome Interventions   Occupational Therapy Goal     OT, PT/OT Ongoing, Progressing    Description: Goals to be met by: 2/21/22     Patient will increase functional independence with ADLs by performing:    LE Dressing with Modified [place].  Grooming while standing at sink with Modified [place].  Toileting from toilet with Modified [place] for hygiene and clothing management.   Step transfer with Modified [place]  Toilet transfer to toilet with Modified [place].                     Time Tracking:     OT Date of Treatment: 02/11/22  OT Start Time: 1140  OT Stop Time: 1207  OT Total Time (min): 27 min    Billable Minutes:Self Care/Home Management 16  Therapeutic Activity 11  Total Time 27    OT/JINA: JINA     JINA Visit Number: 2    2/11/2022

## 2022-02-11 NOTE — NURSING
Report received from CARINA Swan LPN. Patient lying in bed resting comfortably.  No acute cardiac or respiratory distress noted. Safety measures maintained; wheels locked, bed in lowest position, bed alarm active and engaged, and call light in reach. Will continue to monitor.

## 2022-02-11 NOTE — PLAN OF CARE
Problem: Occupational Therapy Goal  Goal: Occupational Therapy Goal  Description: Goals to be met by: 2/21/22     Patient will increase functional independence with ADLs by performing:    LE Dressing with Modified [place].  Grooming while standing at sink with Modified [place].  Toileting from toilet with Modified [place] for hygiene and clothing management.   Step transfer with Modified [place]  Toilet transfer to toilet with Modified [place].    Outcome: Ongoing, Progressing

## 2022-02-11 NOTE — DISCHARGE SUMMARY
"[facility]  Discharge Summary      Patient Name: Joanne Peña  MRN: [id number]  Patient Class: IP- Inpatient  Admission Date: 2/6/2022  Hospital Length of Stay: 5 days  Discharge Date and Time: 2/11/2022 12:25 PM  Attending Physician: No att. providers found   Discharging Provider: Madiha Kowalski, DO  Primary Care Provider: Marielle Gibson MD      HPI:   Ms. Peña is a 72yo lady with a pas medical history of HTN, DM2 and tobacco abuse (smokes 1/2 PPD).  Her COVID vaccination status is unknown, though she was positive on 8/24/21.     She now comes to the ED with vertigo when she woke up this morning.  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  Shortly after arriving at the long-term, around 2:15pm, she developed a weird out of body sensation and vague confusion, like she could not recognize her inmates she's known forever.  Shortly after this she had abrupt onet of slurred speech and right sided weakness.  She had to go sit down and call the long-term nurse to evaluate her.  She denied double vision with the vertigo and denied any numbness or tingling (other than an odd burning in her nose before the symptoms).    When the nurse arrived she had dysarthria and a measured BP of 215/115, so she took a clonidine 0.1mg x 1 and ASA 325mg.  She called her daughter to come pick her up, who then drove her to the ED.  She states that her symptoms all resolved shortly after taking the ASA (<1 hour).  She never had any neck pain or headache.  She feels all of her symptoms have now completely resolved, including the vertigo.  On arrival to the ED, she did have a brief recurrence of the above symptoms, but it resolved of its own accord after a few minutes.     In the ED she was found to be hypertensive with a CTA head that showed No CT evidence of large territorial infarction and " absence of flow within the V3 segment right vertebral artery, suggestive of occlusion.  [place] tele stroke consult was obtained and recommended Plavix 600mg and full dose ASA (already had PTA), and MRI brain stat.         * No surgery found *      Hospital Course:   Ms. Garza is a 72yo lady with a pas medical history of HTN, DM2 and tobacco abuse (smokes 1/2 PPD) admitted to ICU on 02/06/2022 with dizziness and found to have left s[place] and left cerebellum infarctions.     MRI brain with Small acute infarction in the left [place] and small subacute infarction in the left inferior cerebellum.  No evidence of intracranial hemorrhage. MRA brain with significant diminished flow in the posterior circulation with nonvisualization of flow within the right vertebral artery and intermittent flow in the left vertebral artery.  Multiple luminal narrowing involving the basilar with suspected multiple high-grade stenosis versus occlusions. Patient loaded with aspirin and plavix. 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 She was not a candidate for tpa. Neurology consulted. Patient transfer to the floor on 02/07/2022. Echo with EF of 65% and no evidence of intracardiac shunting. G2DD.  Home BP meds restarted and blood pressure still elevated. Added hydralazine and losartan with better blood pressure controlled. PT/OT recommended outpatient therapy on discharge.     Patient significant improvement.  States she noticed a difference with the addition of hydralazine losartan in the way she feels and in her blood pressure. Pt denies any fever, headaches, vision changes, chest pain, shortness of breath, palpitations, abdominal pain, nausea, vomiting, or any new weaknesses. Patient's exam on discharge was as follow: Patient is alert and oriented, appears in no acute distress, heart with regular rate and rhythm, lungs clear to asculation with non-labored breathing, abdomen soft,  and no new weaknesses or focal deficits seen.  Bilateral lower extremities without any edema.  No calf tenderness.    Patient was counseled regarding any abnormal labs, differential diagnosis, treatment options, risk-benefit, lifestyle changes, prognosis, current condition, and medications. Patient was interactive and attentive.  Patient's questions were answered in a respectful and timely manner. Patient was instructed to follow-up with PCP within 1 week and to continue taking medications as prescribed.  Also, extensively discussed the risks, benefits, and side effects of patient's medications. Discussed with patient about any medication changes. Patient verbalized understanding and agrees to treatment plan.  Patient is stable for discharge.  Patient has no other questions or concerns at this time.  ED precautions discussed with the patient.    Vital signs are stable. Ambulating without any difficulty. Tolerating p.o. intake without any nausea or vomiting. Afebrile for over 24 hours. Patient is in stable condition and has no questions or concerns. Patient will be discharge to home with referral for outpatient PT/OT. Medications sent to pharmacy.          Goals of Care Treatment Preferences:  Code Status: Full Code      Consults:   Consults (From admission, onward)        Status Ordering Provider     Inpatient consult to Neurology  Once        Provider:  Devonte Albright MD    Completed SHEA SWEET          No new Assessment & Plan notes have been filed under this hospital service since the last note was generated.  Service: Hospital Medicine    Final Active Diagnoses:    Diagnosis Date Noted POA    PRINCIPAL PROBLEM:  Cerebral vascular accident [I63.9] 02/06/2022 Yes    Malignant essential hypertension [I10] 01/29/2016 Yes    Type 2 diabetes mellitus with hyperglycemia, without long-term current use of insulin [E11.65] 01/29/2016 Yes    Normocytic anemia [D64.9] 02/06/2022 Yes    Mixed hyperlipidemia  [E78.2] 02/04/2016 Yes    Moderate cigarette smoker (10-19 per day) [F17.210] 02/06/2022 Yes    Overweight with body mass index (BMI) of 25 to 25.9 in adult [E66.3, Z68.25] 02/06/2022 Not Applicable      Problems Resolved During this Admission:    Diagnosis Date Noted Date Resolved POA    Hypokalemia [E87.6] 02/06/2022 02/11/2022 Yes       Discharged Condition: stable    Disposition: Home or Self Care    Follow Up:   Follow-up Information     OUT PATIENT PHYSICIAL THERAPY.    Why: out patient services  Contact information:  PHYSICAL THERAPY WILL CALL PATIENT                     Patient Instructions:      Ambulatory referral/consult to Physical/Occupational Therapy   Standing Status: Future   Referral Priority: Routine Referral Type: Physical Medicine   Referral Reason: Specialty Services Required   Number of Visits Requested: 1     Diet Cardiac     Diet diabetic     Notify your health care provider if you experience any of the following:  persistent nausea and vomiting or diarrhea     Notify your health care provider if you experience any of the following:  difficulty breathing or increased cough     Notify your health care provider if you experience any of the following:  severe persistent headache     Notify your health care provider if you experience any of the following:  persistent dizziness, light-headedness, or visual disturbances     Notify your health care provider if you experience any of the following:  increased confusion or weakness     Activity as tolerated       Pending Diagnostic Studies:     None         Medications:  Reconciled Home Medications:      Medication List      START taking these medications    clopidogreL 75 mg tablet  Commonly known as: PLAVIX  Take 1 tablet (75 mg total) by mouth once daily. for 16 days  Start taking on: February 12, 2022     hydrALAZINE 50 MG tablet  Commonly known as: APRESOLINE  Take 1 tablet (50 mg total) by mouth every 8 (eight) hours.     losartan 100 MG  tablet  Commonly known as: COZAAR  Take 1 tablet (100 mg total) by mouth once daily.  Start taking on: February 12, 2022        CHANGE how you take these medications    aspirin 81 MG EC tablet  Commonly known as: ECOTRIN  Take 1 tablet (81 mg total) by mouth once daily.  What changed: Another medication with the same name was removed. Continue taking this medication, and follow the directions you see here.     atorvastatin 80 MG tablet  Commonly known as: LIPITOR  Take 1 tablet (80 mg total) by mouth once daily.  What changed: additional instructions     metFORMIN 500 MG tablet  Commonly known as: GLUCOPHAGE  Take 1 tablet (500 mg total) by mouth daily with breakfast.  What changed: additional instructions        CONTINUE taking these medications    amLODIPine 10 MG tablet  Commonly known as: NORVASC  Take 10 mg by mouth once daily.     ascorbic acid (vitamin C) 500 MG tablet  Commonly known as: VITAMIN C  Take 500 mg by mouth once daily.     glimepiride 2 MG tablet  Commonly known as: AMARYL  Take 1 tablet (2 mg total) by mouth 2 (two) times daily.     GUANFACINE ORAL  Take by mouth.     ibuprofen 800 MG tablet  Commonly known as: ADVIL,MOTRIN  Take 800 mg by mouth once daily.     meclizine 25 mg tablet  Commonly known as: ANTIVERT  Take 1 tablet (25 mg total) by mouth every 6 (six) hours as needed.     ONE DAILY MULTIVITAMIN per tablet  Generic drug: multivitamin  Take 1 tablet by mouth once daily.        STOP taking these medications    cloNIDine 0.1 MG tablet  Commonly known as: CATAPRES     lisinopriL 10 MG tablet            Indwelling Lines/Drains at time of discharge:   Lines/Drains/Airways     None                 Time spent on the discharge of patient: Greater than 35 minutes         Madiha Kowalski DO  Department of Hospital Medicine  [facility]

## 2022-02-16 ENCOUNTER — TELEPHONE (OUTPATIENT)
Dept: FAMILY MEDICINE | Facility: CLINIC | Age: 72
End: 2022-02-16
Payer: MEDICARE

## 2022-02-17 ENCOUNTER — OFFICE VISIT (OUTPATIENT)
Dept: FAMILY MEDICINE | Facility: CLINIC | Age: 72
End: 2022-02-17
Payer: MEDICARE

## 2022-02-17 VITALS
HEART RATE: 78 BPM | TEMPERATURE: 98 F | BODY MASS INDEX: 27.55 KG/M2 | HEIGHT: 67 IN | WEIGHT: 175.5 LBS | SYSTOLIC BLOOD PRESSURE: 136 MMHG | DIASTOLIC BLOOD PRESSURE: 84 MMHG | OXYGEN SATURATION: 98 %

## 2022-02-17 DIAGNOSIS — E11.40 TYPE 2 DIABETES MELLITUS WITH DIABETIC NEUROPATHY, WITHOUT LONG-TERM CURRENT USE OF INSULIN: ICD-10-CM

## 2022-02-17 DIAGNOSIS — Z11.59 NEED FOR HEPATITIS C SCREENING TEST: ICD-10-CM

## 2022-02-17 DIAGNOSIS — E11.65 TYPE 2 DIABETES MELLITUS WITH HYPERGLYCEMIA, WITHOUT LONG-TERM CURRENT USE OF INSULIN: ICD-10-CM

## 2022-02-17 DIAGNOSIS — Z12.31 BREAST CANCER SCREENING BY MAMMOGRAM: ICD-10-CM

## 2022-02-17 DIAGNOSIS — E78.2 MIXED HYPERLIPIDEMIA: ICD-10-CM

## 2022-02-17 DIAGNOSIS — E66.3 OVERWEIGHT WITH BODY MASS INDEX (BMI) OF 27 TO 27.9 IN ADULT: ICD-10-CM

## 2022-02-17 DIAGNOSIS — R42 DIZZINESS: ICD-10-CM

## 2022-02-17 DIAGNOSIS — Z12.11 COLON CANCER SCREENING: ICD-10-CM

## 2022-02-17 DIAGNOSIS — I63.89 CEREBROVASCULAR ACCIDENT (CVA) DUE TO OTHER MECHANISM: ICD-10-CM

## 2022-02-17 DIAGNOSIS — Z78.0 POST-MENOPAUSAL: ICD-10-CM

## 2022-02-17 DIAGNOSIS — Z09 HOSPITAL DISCHARGE FOLLOW-UP: Primary | ICD-10-CM

## 2022-02-17 DIAGNOSIS — E78.5 HYPERLIPIDEMIA, UNSPECIFIED HYPERLIPIDEMIA TYPE: ICD-10-CM

## 2022-02-17 PROCEDURE — 99204 OFFICE O/P NEW MOD 45 MIN: CPT | Mod: S$PBB,,, | Performed by: NURSE PRACTITIONER

## 2022-02-17 PROCEDURE — 99215 OFFICE O/P EST HI 40 MIN: CPT | Mod: PBBFAC,PO | Performed by: NURSE PRACTITIONER

## 2022-02-17 PROCEDURE — 99999 PR PBB SHADOW E&M-EST. PATIENT-LVL V: CPT | Mod: PBBFAC,,, | Performed by: NURSE PRACTITIONER

## 2022-02-17 PROCEDURE — 99999 PR PBB SHADOW E&M-EST. PATIENT-LVL V: ICD-10-PCS | Mod: PBBFAC,,, | Performed by: NURSE PRACTITIONER

## 2022-02-17 PROCEDURE — 99204 PR OFFICE/OUTPT VISIT, NEW, LEVL IV, 45-59 MIN: ICD-10-PCS | Mod: S$PBB,,, | Performed by: NURSE PRACTITIONER

## 2022-02-17 RX ORDER — ATORVASTATIN CALCIUM 80 MG/1
80 TABLET, FILM COATED ORAL DAILY
Qty: 90 TABLET | Refills: 3 | Status: SHIPPED | OUTPATIENT
Start: 2022-02-17 | End: 2023-03-13

## 2022-02-17 RX ORDER — GLIMEPIRIDE 2 MG/1
2 TABLET ORAL 2 TIMES DAILY
Qty: 180 TABLET | Refills: 3 | Status: SHIPPED | OUTPATIENT
Start: 2022-02-17 | End: 2023-03-13

## 2022-02-17 RX ORDER — MECLIZINE HYDROCHLORIDE 25 MG/1
25 TABLET ORAL 2 TIMES DAILY PRN
Qty: 60 TABLET | Refills: 0 | Status: SHIPPED | OUTPATIENT
Start: 2022-02-17 | End: 2022-06-03

## 2022-02-17 RX ORDER — MECLIZINE HYDROCHLORIDE 25 MG/1
25 TABLET ORAL 2 TIMES DAILY PRN
Qty: 60 TABLET | Refills: 0 | Status: SHIPPED | OUTPATIENT
Start: 2022-02-17 | End: 2022-02-17

## 2022-02-17 NOTE — PROGRESS NOTES
"Chief Complaint  Chief Complaint   Patient presents with    Hospital Follow Up     Patient had a stroke       HPI  Ms. Kaela Peña is a 71 y.o. female with medical problems as listed below. The patient presents to clinic for hospital f/u. The patient was admitted to the hospital on 2/6/2022 and was discharged on 2/11/2022. The patient was admitted for CVA and hypertension.     HPI   Patient Name: Joanne Peña  MRN: [id number]  Patient Class: IP- Inpatient  Admission Date: 2/6/2022  Hospital Length of Stay: 5 days  Discharge Date and Time: 2/11/2022 12:25 PM  Attending Physician: Taylor att. providers found   Discharging Provider: Madiha Kowalski DO  Primary Care Provider: Marielle Gibson MD        HPI:   Ms. Peña is a 70yo lady with a pas medical history of HTN, DM2 and tobacco abuse (smokes 1/2 PPD).  Her COVID vaccination status is unknown, though she was positive on 8/24/21.      She now comes to the ED with vertigo when she woke up this morning.  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  Shortly after this she had abrupt onet of slurred speech and right sided weakness.  She had to go sit down and call the residential nurse to evaluate her.  She denied double vision with the vertigo and denied any numbness or tingling (other than an odd burning in her nose before the symptoms).     When the nurse arrived she had dysarthria and a measured BP of 215/115, so she took a clonidine 0.1mg x 1 and ASA 325mg.  She called her daughter to come pick her up, who then drove her to the ED.  She states that her symptoms all resolved shortly after taking the ASA (<1 hour).  She never had any neck pain or headache.  She feels " all of her symptoms have now completely resolved, including the vertigo.  On arrival to the ED, she did have a brief recurrence of the above symptoms, but it resolved of its own accord after a few minutes.      In the ED she was found to be hypertensive with a CTA head that showed No CT evidence of large territorial infarction and absence of flow within the V3 segment right vertebral artery, suggestive of occlusion.  [place] tele stroke consult was obtained and recommended Plavix 600mg and full dose ASA (already had PTA), and MRI brain stat.           * No surgery found *       Hospital Course:   Ms. Garza is a 70yo lady with a pas medical history of HTN, DM2 and tobacco abuse (smokes 1/2 PPD) admitted to ICU on 02/06/2022 with dizziness and found to have left [place] and left cerebellum infarctions.      MRI brain with Small acute infarction in the left [place] and small subacute infarction in the left inferior cerebellum.  No evidence of intracranial hemorrhage. MRA brain with significant diminished flow in the posterior circulation with nonvisualization of flow within the right vertebral artery and intermittent flow in the left vertebral artery.  Multiple luminal narrowing involving the basilar with suspected multiple high-grade stenosis versus occlusions. Patient loaded with aspirin and plavix. 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 She was not a candidate for tpa. Neurology consulted. Patient transfer to the floor on 02/07/2022. Echo with EF of 65% and no evidence of intracardiac shunting. G2DD.  Home BP meds restarted and blood pressure still elevated. Added hydralazine and losartan with better blood pressure controlled. PT/OT recommended outpatient therapy on discharge.      Patient significant improvement.  States she noticed a difference with the addition of hydralazine losartan in the way she feels and in her blood pressure. Pt denies any fever,  headaches, vision changes, chest pain, shortness of breath, palpitations, abdominal pain, nausea, vomiting, or any new weaknesses. Patient's exam on discharge was as follow: Patient is alert and oriented, appears in no acute distress, heart with regular rate and rhythm, lungs clear to asculation with non-labored breathing, abdomen soft, and no new weaknesses or focal deficits seen.  Bilateral lower extremities without any edema.  No calf tenderness.     Patient was counseled regarding any abnormal labs, differential diagnosis, treatment options, risk-benefit, lifestyle changes, prognosis, current condition, and medications. Patient was interactive and attentive.  Patient's questions were answered in a respectful and timely manner. Patient was instructed to follow-up with PCP within 1 week and to continue taking medications as prescribed.  Also, extensively discussed the risks, benefits, and side effects of patient's medications. Discussed with patient about any medication changes. Patient verbalized understanding and agrees to treatment plan.  Patient is stable for discharge.  Patient has no other questions or concerns at this time.  ED precautions discussed with the patient.     Vital signs are stable. Ambulating without any difficulty. Tolerating p.o. intake without any nausea or vomiting. Afebrile for over 24 hours. Patient is in stable condition and has no questions or concerns. Patient will be discharge to home with referral for outpatient PT/OT. Medications sent to pharmacy.            Goals of Care Treatment Preferences:  Code Status: Full Code        Consults:           Consults (From admission, onward)         Status Ordering Provider       Inpatient consult to Neurology  Once        Provider:  Devonte Albright MD    Completed SHEA SWEET             No new Assessment & Plan notes have been filed under this hospital service since the last note was generated.  Service: Hospital Medicine            Final  Active Diagnoses:     Diagnosis Date Noted POA    PRINCIPAL PROBLEM:  Cerebral vascular accident [I63.9] 02/06/2022 Yes    Malignant essential hypertension [I10] 01/29/2016 Yes    Type 2 diabetes mellitus with hyperglycemia, without long-term current use of insulin [E11.65] 01/29/2016 Yes    Normocytic anemia [D64.9] 02/06/2022 Yes    Mixed hyperlipidemia [E78.2] 02/04/2016 Yes    Moderate cigarette smoker (10-19 per day) [F17.210] 02/06/2022 Yes    Overweight with body mass index (BMI) of 25 to 25.9 in adult [E66.3, Z68.25] 02/06/2022 Not Applicable       Problems Resolved During this Admission:     Diagnosis Date Noted Date Resolved POA    Hypokalemia [E87.6] 02/06/2022 02/11/2022 Yes         Discharged Condition: stable     Disposition: Home or Self Care     Follow Up:         Follow-up Information           OUT PATIENT PHYSICIAL THERAPY.    Why: out patient services  Contact information:  PHYSICAL THERAPY WILL CALL PATIENT                           Patient Instructions:             Ambulatory referral/consult to Physical/Occupational Therapy    Standing Status: Future   Referral Priority: Routine Referral Type: Physical Medicine    Referral Reason: Specialty Services Required    Number of Visits Requested: 1       Diet Cardiac      Diet diabetic      Notify your health care provider if you experience any of the following:  persistent nausea and vomiting or diarrhea      Notify your health care provider if you experience any of the following:  difficulty breathing or increased cough      Notify your health care provider if you experience any of the following:  severe persistent headache      Notify your health care provider if you experience any of the following:  persistent dizziness, light-headedness, or visual disturbances      Notify your health care provider if you experience any of the following:  increased confusion or weakness      Activity as tolerated             Pending Diagnostic Studies:       None          Medications:  Reconciled Home Medications:       Medication List       START taking these medications    clopidogreL 75 mg tablet  Commonly known as: PLAVIX  Take 1 tablet (75 mg total) by mouth once daily. for 16 days  Start taking on: February 12, 2022      hydrALAZINE 50 MG tablet  Commonly known as: APRESOLINE  Take 1 tablet (50 mg total) by mouth every 8 (eight) hours.      losartan 100 MG tablet  Commonly known as: COZAAR  Take 1 tablet (100 mg total) by mouth once daily.  Start taking on: February 12, 2022          CHANGE how you take these medications    aspirin 81 MG EC tablet  Commonly known as: ECOTRIN  Take 1 tablet (81 mg total) by mouth once daily.  What changed: Another medication with the same name was removed. Continue taking this medication, and follow the directions you see here.      atorvastatin 80 MG tablet  Commonly known as: LIPITOR  Take 1 tablet (80 mg total) by mouth once daily.  What changed: additional instructions      metFORMIN 500 MG tablet  Commonly known as: GLUCOPHAGE  Take 1 tablet (500 mg total) by mouth daily with breakfast.  What changed: additional instructions          CONTINUE taking these medications    amLODIPine 10 MG tablet  Commonly known as: NORVASC  Take 10 mg by mouth once daily.      ascorbic acid (vitamin C) 500 MG tablet  Commonly known as: VITAMIN C  Take 500 mg by mouth once daily.      glimepiride 2 MG tablet  Commonly known as: AMARYL  Take 1 tablet (2 mg total) by mouth 2 (two) times daily.      GUANFACINE ORAL  Take by mouth.      ibuprofen 800 MG tablet  Commonly known as: ADVIL,MOTRIN  Take 800 mg by mouth once daily.      meclizine 25 mg tablet  Commonly known as: ANTIVERT  Take 1 tablet (25 mg total) by mouth every 6 (six) hours as needed.      ONE DAILY MULTIVITAMIN per tablet  Generic drug: multivitamin  Take 1 tablet by mouth once daily.          STOP taking these medications    cloNIDine 0.1 MG tablet  Commonly known as: CATAPRES       lisinopriL 10 MG tablet                Indwelling Lines/Drains at time of discharge:       Lines/Drains/Airways      None                      Time spent on the discharge of patient: Greater than 35 minutes           Madiha Kowalski DO  Department of Hospital Medicine  [facility] [place] [facility]        The patient since discharge:  The patient has been doing well since discharge. She states that she is in no pain but that she does have  Some lightheadedness. She also has bilateral leg weakness. The patient was started on losartan 100 mg and  Hydralazine 50 mg. Initially the patient was only on Norvasc 10 mg. She has not been taking the medication as prescribed. She she has been taking the norvasc daily but has not been taking the hydralazine at all and has only been taking the losartan PRN. The patient has been taking her BP twice a day and it has been running in the 130s.     She has physical therapy that was set up for 2/28/2022.     She did not have neurology set up so referral will be placed at this time     The patient is otherwise healthy and has no other complaints at this time.       PAST MEDICAL HISTORY:  Past Medical History:   Diagnosis Date    Diabetes mellitus     Hyperlipidemia     Hypertension     Stroke        PAST SURGICAL HISTORY:  History reviewed. No pertinent surgical history.    SOCIAL HISTORY:  Social History     Socioeconomic History    Marital status:    Tobacco Use    Smoking status: Former Smoker     Packs/day: 0.50     Types: Cigarettes    Smokeless tobacco: Never Used   Substance and Sexual Activity    Drug use: No       FAMILY HISTORY:  Family History   Problem Relation Age of Onset    Kidney disease Mother     Heart disease Mother     Cancer Father     Hypertension Brother     Diabetes Daughter     Hypertension Daughter     Cancer Maternal Aunt        ALLERGIES AND MEDICATIONS: updated and reviewed.  Review of patient's allergies indicates:   Allergen Reactions     Ampicillin Rash    Darvocet a500 [propoxyphene n-acetaminophen] Other (See Comments)     kierstenky     Current Outpatient Medications   Medication Sig Dispense Refill    amLODIPine (NORVASC) 10 MG tablet Take 10 mg by mouth once daily.      aspirin (ECOTRIN) 81 MG EC tablet Take 1 tablet (81 mg total) by mouth once daily. 30 tablet 3    clopidogreL (PLAVIX) 75 mg tablet Take 1 tablet (75 mg total) by mouth once daily. for 16 days 16 tablet 0    losartan (COZAAR) 100 MG tablet Take 1 tablet (100 mg total) by mouth once daily. 30 tablet 1    multivitamin (THERAGRAN) per tablet Take 1 tablet by mouth once daily.      ascorbic acid, vitamin C, (VITAMIN C) 500 MG tablet Take 500 mg by mouth once daily.      atorvastatin (LIPITOR) 80 MG tablet Take 1 tablet (80 mg total) by mouth once daily. 90 tablet 3    glimepiride (AMARYL) 2 MG tablet Take 1 tablet (2 mg total) by mouth 2 (two) times daily. 180 tablet 3    guanfacine HCl (GUANFACINE ORAL) Take by mouth.      hydrALAZINE (APRESOLINE) 50 MG tablet Take 1 tablet (50 mg total) by mouth every 8 (eight) hours. (Patient not taking: Reported on 2/17/2022) 90 tablet 1    ibuprofen (ADVIL,MOTRIN) 800 MG tablet Take 800 mg by mouth once daily.      meclizine (ANTIVERT) 25 mg tablet Take 1 tablet (25 mg total) by mouth 2 (two) times daily as needed for Dizziness. 60 tablet 0    metformin (GLUCOPHAGE) 500 MG tablet Take 1 tablet (500 mg total) by mouth daily with breakfast. (Patient taking differently: Take 500 mg by mouth daily with breakfast. prn) 30 tablet 2     No current facility-administered medications for this visit.       Patient Care Team:  Marielle Gibson MD as PCP - General (Internal Medicine)    ROS  Review of Systems   Constitutional: Negative for chills, fatigue, fever and unexpected weight change.   HENT: Negative for congestion, ear discharge, ear pain and postnasal drip.    Eyes: Negative for photophobia, pain and visual disturbance.   Respiratory:  "Negative for cough, shortness of breath and wheezing.    Cardiovascular: Negative for chest pain, palpitations and leg swelling.   Gastrointestinal: Negative for abdominal pain, constipation, diarrhea, nausea and vomiting.   Genitourinary: Negative for dysuria, frequency, urgency and vaginal discharge.   Musculoskeletal: Negative for back pain, joint swelling and neck stiffness.   Skin: Negative for rash.   Neurological: Positive for weakness and light-headedness. Negative for headaches.   Psychiatric/Behavioral: Negative for dysphoric mood and sleep disturbance. The patient is not nervous/anxious.            Physical Exam  Vitals:    02/17/22 0926   BP: 136/84   Pulse: 78   Temp: 98.4 °F (36.9 °C)   TempSrc: Oral   SpO2: 98%   Weight: 79.6 kg (175 lb 7.8 oz)   Height: 5' 7" (1.702 m)    Body mass index is 27.49 kg/m².  Weight: 79.6 kg (175 lb 7.8 oz)   Height: 5' 7" (170.2 cm)     Physical Exam  Constitutional:       General: Vital signs are normal.      Appearance: She is well-developed and well-nourished.   HENT:      Head: Normocephalic and atraumatic.      Right Ear: External ear normal.      Left Ear: External ear normal.      Nose: Nose normal.      Mouth/Throat:      Mouth: Oropharynx is clear and moist.   Eyes:      Extraocular Movements: EOM normal.      Conjunctiva/sclera: Conjunctivae normal.      Pupils: Pupils are equal, round, and reactive to light.   Neck:      Thyroid: No thyromegaly.   Cardiovascular:      Rate and Rhythm: Normal rate and regular rhythm.      Pulses: Intact distal pulses.   Pulmonary:      Effort: Pulmonary effort is normal.      Breath sounds: Normal breath sounds. No wheezing.   Abdominal:      General: Bowel sounds are normal.      Palpations: Abdomen is soft. There is no hepatomegaly or splenomegaly.      Tenderness: There is no abdominal tenderness.   Genitourinary:     Vagina: Normal. No vaginal discharge.      Uterus: Normal.    Musculoskeletal:         General: Normal range " of motion.      Cervical back: Normal range of motion and neck supple.   Lymphadenopathy:      Cervical: No cervical adenopathy.   Skin:     General: Skin is warm and dry.      Findings: No rash.   Neurological:      Mental Status: She is alert and oriented to person, place, and time.      Cranial Nerves: No cranial nerve deficit.      Motor: Weakness present.   Psychiatric:         Mood and Affect: Mood and affect normal.         Behavior: Behavior normal.         Thought Content: Thought content normal.         Cognition and Memory: Cognition and memory normal.         Judgment: Judgment normal.         Health Maintenance       Date Due Completion Date    Hepatitis C Screening Never done ---    COVID-19 Vaccine (1) Never done ---    Pneumococcal Vaccines (Age 65+) (1 of 2 - PPSV23) Never done ---    Eye Exam Never done ---    TETANUS VACCINE Never done ---    High Dose Statin Never done ---    DEXA SCAN Never done ---    Colorectal Cancer Screening Never done ---    Shingles Vaccine (1 of 2) Never done ---    Diabetes Urine Screening 01/29/2017 1/29/2016    Foot Exam 02/04/2017 2/4/2016    Mammogram 02/12/2017 2/12/2016    Influenza Vaccine (1) Never done ---    Hemoglobin A1c 08/06/2022 2/6/2022    Lipid Panel 02/06/2023 2/6/2022      Health maintenance reviewed at this time.     Assessment & Plan  Hospital discharge follow-up'Cerebrovascular accident (CVA) due to other mechanism  -     Ambulatory referral/consult to Neurology; Future; Expected date: 02/24/2022    Patient has been doing well. Will monitor BP at home. Patient encouraged to take medication as prescribed. As of now she will not take the hydralazine.    PT on 2/28/2022.    Neurology appointment to be scheduled after visit.     Type 2 diabetes mellitus with diabetic neuropathy, without long-term current use of insulin  -     glimepiride (AMARYL) 2 MG tablet; Take 1 tablet (2 mg total) by mouth 2 (two) times daily.  Dispense: 180 tablet; Refill: 3  -      Comprehensive Metabolic Panel; Future; Expected date: 02/17/2022  -     Hemoglobin A1C; Future; Expected date: 02/17/2022  -     DXA Bone Density Spine And Hip; Future; Expected date: 02/17/2022  -     MICROALBUMIN / CREATININE RATIO URINE; Future; Expected date: 02/17/2022    Hyperlipidemia, unspecified hyperlipidemia type  -     atorvastatin (LIPITOR) 80 MG tablet; Take 1 tablet (80 mg total) by mouth once daily.  Dispense: 90 tablet; Refill: 3    Mixed hyperlipidemia  -     CBC Auto Differential; Future; Expected date: 02/17/2022    Type 2 diabetes mellitus with hyperglycemia, without long-term current use of insulin  -     CBC Auto Differential; Future; Expected date: 02/17/2022  -     Diabetic Eye Screening Photo; Future    Need for hepatitis C screening test  -     Hepatitis C Antibody; Future; Expected date: 02/17/2022    Breast cancer screening by mammogram  -     Mammo Digital Screening Bilat w/ Dany; Future; Expected date: 02/17/2022    Post-menopausal  -     DXA Bone Density Spine And Hip; Future; Expected date: 02/17/2022    Colon cancer screening  -     Case Request Endoscopy: COLONOSCOPY    Overweight with body mass index (BMI) of 27 to 27.9 in adult  The patient is asked to make an attempt to improve diet and exercise patterns to aid in medical management of this problem.    Dizziness  -     meclizine (ANTIVERT) 25 mg tablet; Take 1 tablet (25 mg total) by mouth 2 (two) times daily as needed for Dizziness.  Dispense: 60 tablet; Refill: 0    The current medical regimen is effective;  continue present plan and medications.               Follow-up: Follow up in about 11 days (around 2/28/2022) for establish care. labs ordered prior to visit..

## 2022-02-19 ENCOUNTER — LAB VISIT (OUTPATIENT)
Dept: LAB | Facility: HOSPITAL | Age: 72
End: 2022-02-19
Attending: NURSE PRACTITIONER
Payer: MEDICARE

## 2022-02-19 DIAGNOSIS — Z11.59 NEED FOR HEPATITIS C SCREENING TEST: ICD-10-CM

## 2022-02-19 DIAGNOSIS — E11.65 TYPE 2 DIABETES MELLITUS WITH HYPERGLYCEMIA, WITHOUT LONG-TERM CURRENT USE OF INSULIN: ICD-10-CM

## 2022-02-19 DIAGNOSIS — E11.40 TYPE 2 DIABETES MELLITUS WITH DIABETIC NEUROPATHY, WITHOUT LONG-TERM CURRENT USE OF INSULIN: ICD-10-CM

## 2022-02-19 DIAGNOSIS — E78.2 MIXED HYPERLIPIDEMIA: ICD-10-CM

## 2022-02-19 LAB
ALBUMIN SERPL BCP-MCNC: 2.9 G/DL (ref 3.5–5.2)
ALP SERPL-CCNC: 82 U/L (ref 55–135)
ALT SERPL W/O P-5'-P-CCNC: 19 U/L (ref 10–44)
ANION GAP SERPL CALC-SCNC: 11 MMOL/L (ref 8–16)
AST SERPL-CCNC: 15 U/L (ref 10–40)
BASOPHILS # BLD AUTO: 0.02 K/UL (ref 0–0.2)
BASOPHILS NFR BLD: 0.3 % (ref 0–1.9)
BILIRUB SERPL-MCNC: 0.2 MG/DL (ref 0.1–1)
BUN SERPL-MCNC: 18 MG/DL (ref 8–23)
CALCIUM SERPL-MCNC: 9.1 MG/DL (ref 8.7–10.5)
CHLORIDE SERPL-SCNC: 109 MMOL/L (ref 95–110)
CO2 SERPL-SCNC: 21 MMOL/L (ref 23–29)
CREAT SERPL-MCNC: 0.9 MG/DL (ref 0.5–1.4)
DIFFERENTIAL METHOD: ABNORMAL
EOSINOPHIL # BLD AUTO: 0.1 K/UL (ref 0–0.5)
EOSINOPHIL NFR BLD: 1.7 % (ref 0–8)
ERYTHROCYTE [DISTWIDTH] IN BLOOD BY AUTOMATED COUNT: 15.4 % (ref 11.5–14.5)
EST. GFR  (AFRICAN AMERICAN): >60 ML/MIN/1.73 M^2
EST. GFR  (NON AFRICAN AMERICAN): >60 ML/MIN/1.73 M^2
ESTIMATED AVG GLUCOSE: 157 MG/DL (ref 68–131)
GLUCOSE SERPL-MCNC: 174 MG/DL (ref 70–110)
HBA1C MFR BLD: 7.1 % (ref 4–5.6)
HCT VFR BLD AUTO: 25.9 % (ref 37–48.5)
HGB BLD-MCNC: 7.5 G/DL (ref 12–16)
IMM GRANULOCYTES # BLD AUTO: 0.03 K/UL (ref 0–0.04)
IMM GRANULOCYTES NFR BLD AUTO: 0.4 % (ref 0–0.5)
LYMPHOCYTES # BLD AUTO: 1.2 K/UL (ref 1–4.8)
LYMPHOCYTES NFR BLD: 16.8 % (ref 18–48)
MCH RBC QN AUTO: 25.6 PG (ref 27–31)
MCHC RBC AUTO-ENTMCNC: 29 G/DL (ref 32–36)
MCV RBC AUTO: 88 FL (ref 82–98)
MONOCYTES # BLD AUTO: 0.5 K/UL (ref 0.3–1)
MONOCYTES NFR BLD: 6.6 % (ref 4–15)
NEUTROPHILS # BLD AUTO: 5.2 K/UL (ref 1.8–7.7)
NEUTROPHILS NFR BLD: 74.2 % (ref 38–73)
NRBC BLD-RTO: 0 /100 WBC
PLATELET # BLD AUTO: 387 K/UL (ref 150–450)
PMV BLD AUTO: 10.6 FL (ref 9.2–12.9)
POTASSIUM SERPL-SCNC: 4 MMOL/L (ref 3.5–5.1)
PROT SERPL-MCNC: 7.2 G/DL (ref 6–8.4)
RBC # BLD AUTO: 2.93 M/UL (ref 4–5.4)
SODIUM SERPL-SCNC: 141 MMOL/L (ref 136–145)
WBC # BLD AUTO: 7.01 K/UL (ref 3.9–12.7)

## 2022-02-19 PROCEDURE — 80053 COMPREHEN METABOLIC PANEL: CPT | Performed by: NURSE PRACTITIONER

## 2022-02-19 PROCEDURE — 36415 COLL VENOUS BLD VENIPUNCTURE: CPT | Mod: PO | Performed by: NURSE PRACTITIONER

## 2022-02-19 PROCEDURE — 85025 COMPLETE CBC W/AUTO DIFF WBC: CPT | Performed by: NURSE PRACTITIONER

## 2022-02-19 PROCEDURE — 83036 HEMOGLOBIN GLYCOSYLATED A1C: CPT | Performed by: NURSE PRACTITIONER

## 2022-02-19 PROCEDURE — 86803 HEPATITIS C AB TEST: CPT | Performed by: NURSE PRACTITIONER

## 2022-02-21 ENCOUNTER — HOSPITAL ENCOUNTER (OUTPATIENT)
Facility: HOSPITAL | Age: 72
Discharge: HOME | End: 2022-02-22
Attending: EMERGENCY MEDICINE | Admitting: EMERGENCY MEDICINE
Payer: MEDICARE

## 2022-02-21 DIAGNOSIS — R42 VERTIGO: Primary | ICD-10-CM

## 2022-02-21 DIAGNOSIS — R42 DIZZINESS: ICD-10-CM

## 2022-02-21 DIAGNOSIS — Z86.73 HISTORY OF CEREBELLAR STROKE: ICD-10-CM

## 2022-02-21 DIAGNOSIS — I63.9 CEREBROVASCULAR ACCIDENT (CVA), UNSPECIFIED MECHANISM: ICD-10-CM

## 2022-02-21 DIAGNOSIS — H55.00 NYSTAGMUS: ICD-10-CM

## 2022-02-21 LAB
ALBUMIN SERPL BCP-MCNC: 2.9 G/DL (ref 3.5–5.2)
ALP SERPL-CCNC: 82 U/L (ref 55–135)
ALT SERPL W/O P-5'-P-CCNC: 13 U/L (ref 10–44)
ANION GAP SERPL CALC-SCNC: 10 MMOL/L (ref 8–16)
AST SERPL-CCNC: 9 U/L (ref 10–40)
BASOPHILS # BLD AUTO: 0.03 K/UL (ref 0–0.2)
BASOPHILS NFR BLD: 0.4 % (ref 0–1.9)
BILIRUB SERPL-MCNC: 0.3 MG/DL (ref 0.1–1)
BUN SERPL-MCNC: 17 MG/DL (ref 8–23)
CALCIUM SERPL-MCNC: 9.4 MG/DL (ref 8.7–10.5)
CHLORIDE SERPL-SCNC: 109 MMOL/L (ref 95–110)
CHOLEST SERPL-MCNC: 221 MG/DL (ref 120–199)
CHOLEST/HDLC SERPL: 4.9 {RATIO} (ref 2–5)
CO2 SERPL-SCNC: 21 MMOL/L (ref 23–29)
CREAT SERPL-MCNC: 0.9 MG/DL (ref 0.5–1.4)
CTP QC/QA: YES
DIFFERENTIAL METHOD: ABNORMAL
EOSINOPHIL # BLD AUTO: 0.1 K/UL (ref 0–0.5)
EOSINOPHIL NFR BLD: 0.6 % (ref 0–8)
ERYTHROCYTE [DISTWIDTH] IN BLOOD BY AUTOMATED COUNT: 15.6 % (ref 11.5–14.5)
EST. GFR  (AFRICAN AMERICAN): >60 ML/MIN/1.73 M^2
EST. GFR  (NON AFRICAN AMERICAN): >60 ML/MIN/1.73 M^2
GLUCOSE SERPL-MCNC: 128 MG/DL (ref 70–110)
HCT VFR BLD AUTO: 24.9 % (ref 37–48.5)
HDLC SERPL-MCNC: 45 MG/DL (ref 40–75)
HDLC SERPL: 20.4 % (ref 20–50)
HGB BLD-MCNC: 7.6 G/DL (ref 12–16)
IMM GRANULOCYTES # BLD AUTO: 0.06 K/UL (ref 0–0.04)
IMM GRANULOCYTES NFR BLD AUTO: 0.7 % (ref 0–0.5)
INR PPP: 1 (ref 0.8–1.2)
LDLC SERPL CALC-MCNC: 154.4 MG/DL (ref 63–159)
LYMPHOCYTES # BLD AUTO: 0.9 K/UL (ref 1–4.8)
LYMPHOCYTES NFR BLD: 10 % (ref 18–48)
MCH RBC QN AUTO: 25.4 PG (ref 27–31)
MCHC RBC AUTO-ENTMCNC: 30.5 G/DL (ref 32–36)
MCV RBC AUTO: 83 FL (ref 82–98)
MONOCYTES # BLD AUTO: 0.5 K/UL (ref 0.3–1)
MONOCYTES NFR BLD: 5.8 % (ref 4–15)
NEUTROPHILS # BLD AUTO: 7 K/UL (ref 1.8–7.7)
NEUTROPHILS NFR BLD: 82.5 % (ref 38–73)
NONHDLC SERPL-MCNC: 176 MG/DL
NRBC BLD-RTO: 1 /100 WBC
PLATELET # BLD AUTO: 395 K/UL (ref 150–450)
PMV BLD AUTO: 9.9 FL (ref 9.2–12.9)
POCT GLUCOSE: 127 MG/DL (ref 70–110)
POCT GLUCOSE: 137 MG/DL (ref 70–110)
POTASSIUM SERPL-SCNC: 3.8 MMOL/L (ref 3.5–5.1)
PROT SERPL-MCNC: 7.1 G/DL (ref 6–8.4)
PROTHROMBIN TIME: 10.7 SEC (ref 9–12.5)
RBC # BLD AUTO: 2.99 M/UL (ref 4–5.4)
SARS-COV-2 RDRP RESP QL NAA+PROBE: NEGATIVE
SODIUM SERPL-SCNC: 140 MMOL/L (ref 136–145)
TRIGL SERPL-MCNC: 108 MG/DL (ref 30–150)
TSH SERPL DL<=0.005 MIU/L-ACNC: 1.92 UIU/ML (ref 0.4–4)
WBC # BLD AUTO: 8.48 K/UL (ref 3.9–12.7)

## 2022-02-21 PROCEDURE — G0427 PR INPT TELEHEALTH CON 70/>M: ICD-10-PCS | Mod: 95,G0,, | Performed by: PSYCHIATRY & NEUROLOGY

## 2022-02-21 PROCEDURE — 25000003 PHARM REV CODE 250: Performed by: EMERGENCY MEDICINE

## 2022-02-21 PROCEDURE — 93005 ELECTROCARDIOGRAM TRACING: CPT

## 2022-02-21 PROCEDURE — 63600175 PHARM REV CODE 636 W HCPCS: Performed by: EMERGENCY MEDICINE

## 2022-02-21 PROCEDURE — 99285 EMERGENCY DEPT VISIT HI MDM: CPT | Mod: 25

## 2022-02-21 PROCEDURE — 82962 GLUCOSE BLOOD TEST: CPT

## 2022-02-21 PROCEDURE — U0002 COVID-19 LAB TEST NON-CDC: HCPCS | Performed by: EMERGENCY MEDICINE

## 2022-02-21 PROCEDURE — 85610 PROTHROMBIN TIME: CPT | Performed by: EMERGENCY MEDICINE

## 2022-02-21 PROCEDURE — G0427 INPT/ED TELECONSULT70: HCPCS | Mod: 95,G0,, | Performed by: PSYCHIATRY & NEUROLOGY

## 2022-02-21 PROCEDURE — G0378 HOSPITAL OBSERVATION PER HR: HCPCS

## 2022-02-21 PROCEDURE — 80053 COMPREHEN METABOLIC PANEL: CPT | Performed by: EMERGENCY MEDICINE

## 2022-02-21 PROCEDURE — 85025 COMPLETE CBC W/AUTO DIFF WBC: CPT | Performed by: EMERGENCY MEDICINE

## 2022-02-21 PROCEDURE — 93010 EKG 12-LEAD: ICD-10-PCS | Mod: ,,, | Performed by: INTERNAL MEDICINE

## 2022-02-21 PROCEDURE — 96374 THER/PROPH/DIAG INJ IV PUSH: CPT

## 2022-02-21 PROCEDURE — 84443 ASSAY THYROID STIM HORMONE: CPT | Performed by: HOSPITALIST

## 2022-02-21 PROCEDURE — 80061 LIPID PANEL: CPT | Performed by: HOSPITALIST

## 2022-02-21 PROCEDURE — 96376 TX/PRO/DX INJ SAME DRUG ADON: CPT

## 2022-02-21 PROCEDURE — 96375 TX/PRO/DX INJ NEW DRUG ADDON: CPT

## 2022-02-21 PROCEDURE — 93010 ELECTROCARDIOGRAM REPORT: CPT | Mod: ,,, | Performed by: INTERNAL MEDICINE

## 2022-02-21 RX ORDER — ATORVASTATIN CALCIUM 40 MG/1
80 TABLET, FILM COATED ORAL DAILY
Status: DISCONTINUED | OUTPATIENT
Start: 2022-02-22 | End: 2022-02-22 | Stop reason: HOSPADM

## 2022-02-21 RX ORDER — LABETALOL HYDROCHLORIDE 5 MG/ML
10 INJECTION, SOLUTION INTRAVENOUS
Status: DISCONTINUED | OUTPATIENT
Start: 2022-02-21 | End: 2022-02-22 | Stop reason: HOSPADM

## 2022-02-21 RX ORDER — LORAZEPAM 2 MG/ML
1 INJECTION INTRAMUSCULAR
Status: COMPLETED | OUTPATIENT
Start: 2022-02-21 | End: 2022-02-21

## 2022-02-21 RX ORDER — ONDANSETRON 2 MG/ML
8 INJECTION INTRAMUSCULAR; INTRAVENOUS
Status: COMPLETED | OUTPATIENT
Start: 2022-02-21 | End: 2022-02-21

## 2022-02-21 RX ORDER — MECLIZINE HYDROCHLORIDE 25 MG/1
25 TABLET ORAL
Status: COMPLETED | OUTPATIENT
Start: 2022-02-21 | End: 2022-02-21

## 2022-02-21 RX ORDER — ASPIRIN 81 MG/1
81 TABLET ORAL DAILY
Status: DISCONTINUED | OUTPATIENT
Start: 2022-02-22 | End: 2022-02-22 | Stop reason: HOSPADM

## 2022-02-21 RX ORDER — CLOPIDOGREL BISULFATE 75 MG/1
75 TABLET ORAL DAILY
Status: DISCONTINUED | OUTPATIENT
Start: 2022-02-22 | End: 2022-02-22 | Stop reason: HOSPADM

## 2022-02-21 RX ORDER — SODIUM CHLORIDE 0.9 % (FLUSH) 0.9 %
10 SYRINGE (ML) INJECTION
Status: DISCONTINUED | OUTPATIENT
Start: 2022-02-21 | End: 2022-02-22 | Stop reason: HOSPADM

## 2022-02-21 RX ADMIN — ONDANSETRON 8 MG: 2 INJECTION INTRAMUSCULAR; INTRAVENOUS at 08:02

## 2022-02-21 RX ADMIN — LORAZEPAM 1 MG: 2 INJECTION INTRAMUSCULAR; INTRAVENOUS at 08:02

## 2022-02-21 RX ADMIN — ONDANSETRON 8 MG: 2 INJECTION INTRAMUSCULAR; INTRAVENOUS at 02:02

## 2022-02-21 RX ADMIN — MECLIZINE HYDROCHLORIDE 25 MG: 25 TABLET ORAL at 05:02

## 2022-02-21 NOTE — ASSESSMENT & PLAN NOTE
70 yo female with dizziness.  Poor posterior circulation which may be contributing.  No other focal neurologic deficits.  No acute interventions recommended.  Recent stroke, no signs of LVO.

## 2022-02-21 NOTE — CONSULTS
[facility]  Vascular Neurology  Comprehensive Stroke Center  TeleVascular Neurology Acute Consultation Note      Consults    Consulting Provider: JANETT ARZATE  Current Providers  No providers found    Patient Location:  [facility] EMERGENCY DEPARTMENT Emergency Department  Spoke hospital nurse at bedside with patient assisting consultant.     Patient information was obtained from patient and relative(s).         Assessment/Plan:       Diagnoses:   * Dizziness  70 yo female with dizziness.  Poor posterior circulation which may be contributing.  No other focal neurologic deficits.  No acute interventions recommended.  Recent stroke, no signs of LVO.        STROKE DOCUMENTATION     Acute Stroke Times:   Acute Stroke Times   Stroke Team Called Time: 1421  Stroke Team Arrival Time: 1421  CT Interpretation Time: 1434    NIH Scale:  Interval: baseline  1a. Level of Consciousness: 0-->Alert, keenly responsive  1b. LOC Questions: 0-->Answers both questions correctly  1c. LOC Commands: 0-->Performs both tasks correctly  2. Best Gaze: 0-->Normal  3. Visual: 0-->No visual loss  4. Facial Palsy: 0-->Normal symmetrical movements  5a. Motor Arm, Left: 0-->No drift, limb holds 90 (or 45) degrees for full 10 secs  5b. Motor Arm, Right: 0-->No drift, limb holds 90 (or 45) degrees for full 10 secs  6a. Motor Leg, Left: 0-->No drift, leg holds 30 degree position for full 5 secs  6b. Motor Leg, Right: 0-->No drift, leg holds 30 degree position for full 5 secs  7. Limb Ataxia: 0-->Absent  8. Sensory: 0-->Normal, no sensory loss  9. Best Language: 0-->No aphasia, normal  10. Dysarthria: 0-->Normal  11. Extinction and Inattention (formerly Neglect): 0-->No abnormality  Total (NIH Stroke Scale): 0     Modified [place]    Josh Coma Scale:    ABCD2 Score:    [id number] Score:   HAS -BLED Score:   ICH Score:   Hunt & Marques Classification:       Blood pressure 134/63, pulse 86, temperature 98.5 °F  "(36.9 °C), temperature source Oral, resp. rate 18, height 5' 7" (1.702 m), weight 72.6 kg (160 lb), SpO2 98 %.  Alteplase Eligible?: No  Alteplase Recommendation: Alteplase not recommended due to Suspected stroke mimic   Possible Interventional Revascularization Candidate? No; at this time symptoms not suggestive of large vessel occlusion    Disposition Recommendation: admit to inpatient    Subjective:     History of Present Illness:  72 yo female with dizziness and generalized weakness.  Today she feels worse than other days but symptoms have been ongoing for a few days.  No clear triggers, had stroke earlier this month.  Has not improved.        Woke up with symptoms?: no    Recent bleeding noted: no  Does the patient take any Blood Thinners? no  Medications: No relevant medications      Past Medical History: hypertension and stroke    Past Surgical History: none    Family History: no relevant history    Social History: no smoking, no drinking, no drugs    Allergies: Ampicillin  Darvocet A500 [Propoxyphene N-Acetaminophen]     Review of Systems   Constitutional: Negative for chills and fever.   HENT: Negative for nosebleeds and sore throat.    Eyes: Negative for photophobia, pain and redness.   Respiratory: Negative for cough and shortness of breath.    Cardiovascular: Negative for chest pain and palpitations.   Gastrointestinal: Positive for nausea and vomiting. Negative for abdominal pain, blood in stool and diarrhea.   Endocrine: Negative for cold intolerance and heat intolerance.   Genitourinary: Negative for difficulty urinating and hematuria.   Musculoskeletal: Negative for arthralgias, back pain and neck pain.   Skin: Negative for color change and rash.   Neurological: Positive for dizziness and light-headedness. Negative for headaches.   Hematological: Does not bruise/bleed easily.   Psychiatric/Behavioral: Negative for confusion and hallucinations.     Objective:   Vitals: Blood pressure 134/63, pulse 86, " "temperature 98.5 °F (36.9 °C), temperature source Oral, resp. rate 18, height 5' 7" (1.702 m), weight 72.6 kg (160 lb), SpO2 98 %.     CT READ: Yes  No hemmorhage. No mass effect. No early infarct signs.     Physical Exam  Vitals reviewed.   Constitutional:       Appearance: She is well-developed.   HENT:      Head: Normocephalic and atraumatic.      Right Ear: External ear normal.      Left Ear: External ear normal.   Eyes:      Conjunctiva/sclera: Conjunctivae normal.   Neck:      Trachea: No tracheal deviation.   Pulmonary:      Effort: Pulmonary effort is normal. No respiratory distress.   Abdominal:      General: There is no distension.   Musculoskeletal:         General: Normal range of motion.      Cervical back: Normal range of motion.   Skin:     General: Skin is dry.   Neurological:      Mental Status: She is alert.   Psychiatric:         Behavior: Behavior normal.         Thought Content: Thought content normal.         Judgment: Judgment normal.               Recommended the emergency room physician to have a brief discussion with the patient and/or family if available regarding the  risks and benefits of treatment, and to briefly document the occurrence of that discussion in his clinical encounter note.     The attending portion of this evaluation, treatment, and documentation was performed per Yohannes Deleon MD via audiovisual.    Billing code:  (moderate to severe stroke, large areas of edema, some mimics)    · This patient has a critical neurological condition/illness, with high morbidity and mortality.  · There is a high probability for acute neurological change leading to clinical and possibly life-threatening deterioration requiring highest level of physician preparedness for urgent intervention.  · Care was coordinated with other physicians involved in the patient's care.  · Radiologic studies and laboratory data were reviewed and interpreted, and plan of care was re-assessed based on the " results.  · Diagnosis, treatment options and prognosis may have been discussed with the patient and/or family members or caregiver.  · Further advanced medical management and further evaluation is warranted for his care.      In your opinion, this was a: Tier 2 N/A    Consult End Time: 2:37 PM     Yohannes Deleon MD  [facility]  Vascular Neurology   [facility]

## 2022-02-21 NOTE — HPI
72 yo female with dizziness and generalized weakness.  Today she feels worse than other days but symptoms have been ongoing for a few days.  No clear triggers, had stroke earlier this month.  Has not improved.

## 2022-02-21 NOTE — ED PROVIDER NOTES
Encounter Date: 2/21/2022       History     Chief Complaint   Patient presents with    Dizziness     Pt brought in by  EMS [facility]. Pt c/c dizziness. Pt states she was admitted here for CVA and discharded on Feb.11th. Pt states she started feeling dizzy then and its progressively gotten worse. Pt stated she believes her new Rx are causing her dizziness.       71-year-old female with a history of stroke, diabetes, hyperlipidemia presenting with sudden onset severe worsening vertigo.  Patient was recently admitted for stroke 2 weeks ago.  She notes she had mild vertigo upon discharge but today became significantly worse at approximately 11:00 a.m..  Notes associated nausea.  Denies severe headache, numbness, weakness, changes in vision.  Notes intermittent ringing in her right ear.  Denies fevers, chills, cough, shortness of breath.  Denies double vision.  Denies focal numbness or weakness.  Previously in usual state of health.  Reports she has had difficulty walking due to significant dizziness.  Worse with sitting up, better with lying flat.        Review of patient's allergies indicates:   Allergen Reactions    Ampicillin Rash    Darvocet a500 [propoxyphene n-acetaminophen] Other (See Comments)     karl     Past Medical History:   Diagnosis Date    Diabetes mellitus     Hyperlipidemia     Hypertension     Stroke      No past surgical history on file.  Family History   Problem Relation Age of Onset    Kidney disease Mother     Heart disease Mother     Cancer Father     Hypertension Brother     Diabetes Daughter     Hypertension Daughter     Cancer Maternal Aunt      Social History     Tobacco Use    Smoking status: Former Smoker     Packs/day: 0.50     Types: Cigarettes    Smokeless tobacco: Never Used   Substance Use Topics    Drug use: No     Review of Systems   Constitutional: Negative for chills and fever.   HENT: Negative for sore throat.    Respiratory: Negative for shortness of breath.     Cardiovascular: Negative for chest pain.   Gastrointestinal: Negative for nausea and vomiting.   Genitourinary: Negative for dysuria.   Musculoskeletal: Negative for back pain.   Skin: Negative for rash.   Neurological: Positive for dizziness and light-headedness. Negative for tremors, syncope, facial asymmetry, speech difficulty, weakness and numbness.       Physical Exam     Initial Vitals [02/21/22 1322]   BP Pulse Resp Temp SpO2   134/63 86 18 98.5 °F (36.9 °C) 98 %      MAP       --         Physical Exam    Constitutional: She appears well-developed and well-nourished. She is not diaphoretic. No distress.   HENT:   Head: Normocephalic and atraumatic.   Eyes: Conjunctivae and EOM are normal. Pupils are equal, round, and reactive to light.   Neck: Neck supple.   Normal range of motion.  Cardiovascular: Normal rate, regular rhythm, normal heart sounds and intact distal pulses. Exam reveals no gallop and no friction rub.    No murmur heard.  Pulmonary/Chest: Breath sounds normal. No stridor. No respiratory distress. She has no wheezes. She has no rhonchi. She has no rales. She exhibits no tenderness.   Abdominal: Abdomen is soft. Bowel sounds are normal. She exhibits no distension. There is no abdominal tenderness. There is no rebound and no guarding.   Musculoskeletal:         General: No tenderness or edema. Normal range of motion.      Cervical back: Normal range of motion and neck supple.     Neurological: She is alert and oriented to person, place, and time. She has normal strength. No cranial nerve deficit. GCS score is 15. GCS eye subscore is 4. GCS verbal subscore is 5. GCS motor subscore is 6.   Positive sustained nytagmus to the right. No ataxia. Normal finger to nose, heel to shin, normal speech and phonation, no pronator drift.     Skin: Skin is warm and dry. No rash noted. No pallor.   Psychiatric: She has a normal mood and affect. Her behavior is normal.         ED Course   Procedures  Labs Reviewed    CBC W/ AUTO DIFFERENTIAL - Abnormal; Notable for the following components:       Result Value    RBC 2.99 (*)     Hemoglobin 7.6 (*)     Hematocrit 24.9 (*)     MCH 25.4 (*)     MCHC 30.5 (*)     RDW 15.6 (*)     Immature Granulocytes 0.7 (*)     Immature Grans (Abs) 0.06 (*)     Lymph # 0.9 (*)     nRBC 1 (*)     Gran % 82.5 (*)     Lymph % 10.0 (*)     All other components within normal limits   COMPREHENSIVE METABOLIC PANEL - Abnormal; Notable for the following components:    CO2 21 (*)     Glucose 128 (*)     Albumin 2.9 (*)     AST 9 (*)     All other components within normal limits   LIPID PANEL - Abnormal; Notable for the following components:    Cholesterol 221 (*)     All other components within normal limits    Narrative:     Fasting   POCT GLUCOSE - Abnormal; Notable for the following components:    POCT Glucose 127 (*)     All other components within normal limits   PROTIME-INR   TSH    Narrative:     Fasting   SARS-COV-2 RDRP GENE    Narrative:     This test utilizes isothermal nucleic acid amplification   technology to detect the SARS-CoV-2 RdRp nucleic acid segment.   The analytical sensitivity (limit of detection) is 125 genome   equivalents/mL.   A POSITIVE result implies infection with the SARS-CoV-2 virus;   the patient is presumed to be contagious.     A NEGATIVE result means that SARS-CoV-2 nucleic acids are not   present above the limit of detection. A NEGATIVE result should be   treated as presumptive. It does not rule out the possibility of   COVID-19 and should not be the sole basis for treatment decisions.   If COVID-19 is strongly suspected based on clinical and exposure   history, re-testing using an alternate molecular assay should be   considered.   This test is only for use under the Food and Drug   Administration s Emergency Use Authorization (EUA).   Commercial kits are provided by [employer].   Performance characteristics of the EUA have been independently   verified by  [facility] Department of   Pathology and Laboratory Medicine.   _________________________________________________________________   The authorized Fact Sheet for Healthcare Providers and the authorized Fact   Sheet for Patients of the ID NOW COVID-19 are available on the FDA   website:     https://www.fda.gov/media/212862/download  https://www.fda.gov/media/097595/download          POCT GLUCOSE, HAND-HELD DEVICE        ECG Results          ECG 12 lead (Final result)  Result time 02/21/22 19:17:30    Final result by Interface, Lab In [facility] (02/21/22 19:17:30)                 Narrative:    Test Reason : I63.9,    Vent. Rate : 084 BPM     Atrial Rate : 084 BPM     P-R Int : 126 ms          QRS Dur : 088 ms      QT Int : 390 ms       P-R-T Axes : 047 011 102 degrees     QTc Int : 460 ms    Normal sinus rhythm  Minimal voltage criteria for LVH, may be normal variant  T wave abnormality, consider lateral ischemia  Abnormal ECG  When compared with ECG of 06-FEB-2022 17:04,  No significant change was found  Confirmed by Jackson Kebede MD ([id number]) on 2/21/2022 7:17:24 PM    Referred By: [email or web address]   SELF           Confirmed By:Jackson Kebede MD                            Imaging Results          MRI BRAIN WITHOUT CONTRAST (In process)                CT Head Without Contrast (Final result)  Result time 02/21/22 14:59:45    Final result by James Pack MD (02/21/22 14:59:45)                 Impression:      No CT evidence of acute intracranial abnormality. If the patient has an acute, focal neurological deficit, MRI of the brain may be indicated.    Chronic microvascular ischemic changes and evolving small infarcts in the left genny and left cerebellum as seen on previous MRI.      Electronically signed by: James Pack MD  Date:    02/21/2022  Time:    14:59             Narrative:    EXAMINATION:  CT HEAD WITHOUT CONTRAST    CLINICAL HISTORY:  Neuro deficit, acute, stroke suspected;    TECHNIQUE:  Low dose  axial images were obtained through the head.  Coronal and sagittal reformations were also performed. Contrast was not administered.    COMPARISON:  02/06/2022.    FINDINGS:  No evidence of acute territorial infarct, hemorrhage, mass effect, or midline shift.  Evolving small infarcts in the left genny and left cerebellum are better evaluated on prior MRI.    Patchy periventricular white matter hypoattenuation suggestive of chronic microvascular ischemic change.  Suspect remote lacunar infarct in the right basal ganglia.  No evidence of hydrocephalus.    No displaced calvarial fracture.    Mucosal thickening in the ethmoid and sphenoid sinuses.  No air-fluid levels.                                 Medications   aspirin EC tablet 81 mg (has no administration in time range)   atorvastatin tablet 80 mg (has no administration in time range)   clopidogreL tablet 75 mg (has no administration in time range)   sodium chloride 0.9% flush 10 mL (has no administration in time range)   sodium chloride 0.9% bolus 500 mL (has no administration in time range)   labetaloL injection 10 mg (has no administration in time range)   ondansetron injection 8 mg (8 mg Intravenous Given 2/21/22 1451)   meclizine tablet 25 mg (25 mg Oral Given 2/21/22 1702)   lorazepam injection 1 mg (1 mg Intravenous Given 2/21/22 2019)   ondansetron injection 8 mg (8 mg Intravenous Given 2/21/22 2019)     Medical Decision Making:   Initial Assessment:   70 yo with sudden onset vertigo and positive nystagmus.  Patient well-appearing and in no acute distress.  Reports symptoms had been somewhat persistent after recent stroke but today became acutely worse 11:00 a.m..  Reports she has been unable to stand.  Reports significant dizziness and symptoms are debilitating.  History of posterior stroke in the cerebellum.  Symptoms could be consistent.  TMs normal.  Will get labs, head CT, consult tele stroke, reassess.  ED Management:  CT scan without new findings.   Tele stroke evaluated patient.  Does not recommend acute intervention but does recommend admission to the hospital for further evaluation workup given acute onset, heart neurologic finding.  Patient be placed in observation for further evaluation of possible worsening stroke symptoms.                      Clinical Impression:   Final diagnoses:  [R42] Vertigo (Primary)  [H55.00] Nystagmus  [Z86.73] History of cerebellar stroke          ED Disposition Condition    Observation               Andrew Bell MD  02/21/22 0783

## 2022-02-21 NOTE — ED TRIAGE NOTES
Pt to the ED complaining of dizziness and generalized weakness since this morning. Pt states she wasn't able to get up to make it to the restroom. Pt also c/o nausea/vomiting, tinnitus and blurry vision this morning that has now resolved. Pt has hx of CVA on 02/06/22 and was discharged from the hospital on 02/11/2022. Pt's speech is clear, no obvious facial drooping, denies numbness of arms or legs. Pt AAOx4.

## 2022-02-21 NOTE — SUBJECTIVE & OBJECTIVE
"  Woke up with symptoms?: no    Recent bleeding noted: no  Does the patient take any Blood Thinners? no  Medications: No relevant medications      Past Medical History: hypertension and stroke    Past Surgical History: none    Family History: no relevant history    Social History: no smoking, no drinking, no drugs    Allergies: Ampicillin  Darvocet A500 [Propoxyphene N-Acetaminophen]     Review of Systems   Constitutional: Negative for chills and fever.   HENT: Negative for nosebleeds and sore throat.    Eyes: Negative for photophobia, pain and redness.   Respiratory: Negative for cough and shortness of breath.    Cardiovascular: Negative for chest pain and palpitations.   Gastrointestinal: Positive for nausea and vomiting. Negative for abdominal pain, blood in stool and diarrhea.   Endocrine: Negative for cold intolerance and heat intolerance.   Genitourinary: Negative for difficulty urinating and hematuria.   Musculoskeletal: Negative for arthralgias, back pain and neck pain.   Skin: Negative for color change and rash.   Neurological: Positive for dizziness and light-headedness. Negative for headaches.   Hematological: Does not bruise/bleed easily.   Psychiatric/Behavioral: Negative for confusion and hallucinations.     Objective:   Vitals: Blood pressure 134/63, pulse 86, temperature 98.5 °F (36.9 °C), temperature source Oral, resp. rate 18, height 5' 7" (1.702 m), weight 72.6 kg (160 lb), SpO2 98 %.     CT READ: {TELESTROKE CT READ:50486}    Physical Exam  Vitals reviewed.   Constitutional:       Appearance: She is well-developed.   HENT:      Head: Normocephalic and atraumatic.      Right Ear: External ear normal.      Left Ear: External ear normal.   Eyes:      Conjunctiva/sclera: Conjunctivae normal.   Neck:      Trachea: No tracheal deviation.   Pulmonary:      Effort: Pulmonary effort is normal. No respiratory distress.   Abdominal:      General: There is no distension.   Musculoskeletal:         General: " Normal range of motion.      Cervical back: Normal range of motion.   Skin:     General: Skin is dry.   Neurological:      Mental Status: She is alert.   Psychiatric:         Behavior: Behavior normal.         Thought Content: Thought content normal.         Judgment: Judgment normal.

## 2022-02-22 VITALS
RESPIRATION RATE: 19 BRPM | TEMPERATURE: 98 F | DIASTOLIC BLOOD PRESSURE: 66 MMHG | BODY MASS INDEX: 26.47 KG/M2 | HEIGHT: 67 IN | SYSTOLIC BLOOD PRESSURE: 145 MMHG | HEART RATE: 76 BPM | WEIGHT: 168.63 LBS | OXYGEN SATURATION: 95 %

## 2022-02-22 LAB
ALBUMIN SERPL BCP-MCNC: 2.9 G/DL (ref 3.5–5.2)
ALP SERPL-CCNC: 78 U/L (ref 55–135)
ALT SERPL W/O P-5'-P-CCNC: 13 U/L (ref 10–44)
ANION GAP SERPL CALC-SCNC: 9 MMOL/L (ref 8–16)
APTT BLDCRRT: 25.6 SEC (ref 21–32)
AST SERPL-CCNC: 7 U/L (ref 10–40)
BASOPHILS # BLD AUTO: 0.03 K/UL (ref 0–0.2)
BASOPHILS NFR BLD: 0.4 % (ref 0–1.9)
BILIRUB SERPL-MCNC: 0.2 MG/DL (ref 0.1–1)
BUN SERPL-MCNC: 19 MG/DL (ref 8–23)
CALCIUM SERPL-MCNC: 9.2 MG/DL (ref 8.7–10.5)
CHLORIDE SERPL-SCNC: 108 MMOL/L (ref 95–110)
CK MB SERPL-MCNC: 1.2 NG/ML (ref 0.1–6.5)
CK MB SERPL-RTO: 2.8 % (ref 0–5)
CK SERPL-CCNC: 43 U/L (ref 20–180)
CO2 SERPL-SCNC: 25 MMOL/L (ref 23–29)
CREAT SERPL-MCNC: 1.2 MG/DL (ref 0.5–1.4)
DIFFERENTIAL METHOD: ABNORMAL
EOSINOPHIL # BLD AUTO: 0.1 K/UL (ref 0–0.5)
EOSINOPHIL NFR BLD: 1.4 % (ref 0–8)
ERYTHROCYTE [DISTWIDTH] IN BLOOD BY AUTOMATED COUNT: 15.6 % (ref 11.5–14.5)
EST. GFR  (AFRICAN AMERICAN): 53 ML/MIN/1.73 M^2
EST. GFR  (NON AFRICAN AMERICAN): 46 ML/MIN/1.73 M^2
GLUCOSE SERPL-MCNC: 83 MG/DL (ref 70–110)
HCT VFR BLD AUTO: 25.2 % (ref 37–48.5)
HCV AB SERPL QL IA: NEGATIVE
HGB BLD-MCNC: 7.6 G/DL (ref 12–16)
IMM GRANULOCYTES # BLD AUTO: 0.04 K/UL (ref 0–0.04)
IMM GRANULOCYTES NFR BLD AUTO: 0.6 % (ref 0–0.5)
INR PPP: 1 (ref 0.8–1.2)
LYMPHOCYTES # BLD AUTO: 1.6 K/UL (ref 1–4.8)
LYMPHOCYTES NFR BLD: 22.8 % (ref 18–48)
MAGNESIUM SERPL-MCNC: 2.1 MG/DL (ref 1.6–2.6)
MCH RBC QN AUTO: 25.5 PG (ref 27–31)
MCHC RBC AUTO-ENTMCNC: 30.2 G/DL (ref 32–36)
MCV RBC AUTO: 85 FL (ref 82–98)
MONOCYTES # BLD AUTO: 0.7 K/UL (ref 0.3–1)
MONOCYTES NFR BLD: 9.3 % (ref 4–15)
NEUTROPHILS # BLD AUTO: 4.6 K/UL (ref 1.8–7.7)
NEUTROPHILS NFR BLD: 65.5 % (ref 38–73)
NRBC BLD-RTO: 0 /100 WBC
PHOSPHATE SERPL-MCNC: 4.3 MG/DL (ref 2.7–4.5)
PLATELET # BLD AUTO: 480 K/UL (ref 150–450)
PMV BLD AUTO: 9.8 FL (ref 9.2–12.9)
POTASSIUM SERPL-SCNC: 3.8 MMOL/L (ref 3.5–5.1)
PROT SERPL-MCNC: 7.2 G/DL (ref 6–8.4)
PROTHROMBIN TIME: 10.5 SEC (ref 9–12.5)
RBC # BLD AUTO: 2.98 M/UL (ref 4–5.4)
SODIUM SERPL-SCNC: 142 MMOL/L (ref 136–145)
TROPONIN I SERPL DL<=0.01 NG/ML-MCNC: 0.02 NG/ML (ref 0–0.03)
WBC # BLD AUTO: 6.96 K/UL (ref 3.9–12.7)

## 2022-02-22 PROCEDURE — 84484 ASSAY OF TROPONIN QUANT: CPT | Performed by: HOSPITALIST

## 2022-02-22 PROCEDURE — 99213 OFFICE O/P EST LOW 20 MIN: CPT | Mod: ,,, | Performed by: PSYCHIATRY & NEUROLOGY

## 2022-02-22 PROCEDURE — 97161 PT EVAL LOW COMPLEX 20 MIN: CPT

## 2022-02-22 PROCEDURE — 84100 ASSAY OF PHOSPHORUS: CPT | Performed by: HOSPITALIST

## 2022-02-22 PROCEDURE — 83735 ASSAY OF MAGNESIUM: CPT | Performed by: HOSPITALIST

## 2022-02-22 PROCEDURE — 85730 THROMBOPLASTIN TIME PARTIAL: CPT | Performed by: HOSPITALIST

## 2022-02-22 PROCEDURE — 97165 OT EVAL LOW COMPLEX 30 MIN: CPT

## 2022-02-22 PROCEDURE — 82553 CREATINE MB FRACTION: CPT | Performed by: HOSPITALIST

## 2022-02-22 PROCEDURE — 99213 PR OFFICE/OUTPT VISIT, EST, LEVL III, 20-29 MIN: ICD-10-PCS | Mod: ,,, | Performed by: PSYCHIATRY & NEUROLOGY

## 2022-02-22 PROCEDURE — G0378 HOSPITAL OBSERVATION PER HR: HCPCS

## 2022-02-22 PROCEDURE — 85025 COMPLETE CBC W/AUTO DIFF WBC: CPT | Performed by: HOSPITALIST

## 2022-02-22 PROCEDURE — 85610 PROTHROMBIN TIME: CPT | Performed by: HOSPITALIST

## 2022-02-22 PROCEDURE — 25000003 PHARM REV CODE 250: Performed by: HOSPITALIST

## 2022-02-22 PROCEDURE — 80053 COMPREHEN METABOLIC PANEL: CPT | Performed by: HOSPITALIST

## 2022-02-22 PROCEDURE — 36415 COLL VENOUS BLD VENIPUNCTURE: CPT | Performed by: HOSPITALIST

## 2022-02-22 RX ORDER — HYDRALAZINE HYDROCHLORIDE 50 MG/1
50 TABLET, FILM COATED ORAL EVERY 8 HOURS
Qty: 90 TABLET | Refills: 1 | Status: SHIPPED | OUTPATIENT
Start: 2022-02-22 | End: 2024-03-15

## 2022-02-22 RX ORDER — ONDANSETRON 4 MG/1
4 TABLET, ORALLY DISINTEGRATING ORAL ONCE
Qty: 20 TABLET | Refills: 0 | Status: SHIPPED | OUTPATIENT
Start: 2022-02-22 | End: 2022-02-22 | Stop reason: SDUPTHER

## 2022-02-22 RX ORDER — AMLODIPINE BESYLATE 10 MG/1
10 TABLET ORAL DAILY
Start: 2022-02-22 | End: 2022-03-07 | Stop reason: SDUPTHER

## 2022-02-22 RX ORDER — ONDANSETRON 4 MG/1
4 TABLET, ORALLY DISINTEGRATING ORAL EVERY 8 HOURS PRN
Qty: 20 TABLET | Refills: 0 | Status: SHIPPED | OUTPATIENT
Start: 2022-02-22

## 2022-02-22 RX ORDER — LOSARTAN POTASSIUM 100 MG/1
100 TABLET ORAL DAILY
Qty: 30 TABLET | Refills: 1 | Status: SHIPPED | OUTPATIENT
Start: 2022-02-22 | End: 2022-07-28 | Stop reason: SDUPTHER

## 2022-02-22 RX ADMIN — CLOPIDOGREL 75 MG: 75 TABLET, FILM COATED ORAL at 09:02

## 2022-02-22 RX ADMIN — ASPIRIN 81 MG: 81 TABLET, DELAYED RELEASE ORAL at 09:02

## 2022-02-22 RX ADMIN — ATORVASTATIN CALCIUM 80 MG: 40 TABLET, FILM COATED ORAL at 09:02

## 2022-02-22 NOTE — DISCHARGE SUMMARY
[facility] Medicine  Discharge Summary      Patient Name: Joanne Peña  MRN: [id number]  Patient Class: OP- Observation  Admission Date: 2/21/2022  Hospital Length of Stay: 0 days  Discharge Date and Time:  02/22/2022 11:23 AM  Attending Physician: Cj Batista MD   Discharging Provider: Luda Francis NP  Primary Care Provider: Marielle Gibson MD      HPI:   71 y.o. female with HTN, HLD, DM2, tobacco use, and recent posterior CVA presents with a complaint of dizziness.  Has had dizziness since stroke, but reports a little worse this morning.  Has now improved after zofran and meclizine in ED.  Has not yet started outpatient therapy.  Denies fever, chills, cough, SOB, palpitations, headaches, vision changes, syncope, n/v/d, or dysuria.  In the ED, routine labs, TSH, covid test, CT head all unremarkable for any acute abnormality.      * No surgery found *      Hospital Course:   Placed is observation for dizziness felt possible symptoms from recent small infarcts in the left genny and left cerebellum last week. Dizziness resolved last night after ativan. Repeat MRI no acute intracranial abnormality. PT recommended RW and outpatient PT. Neurology recommended continue ASA/plavix/statin. Patient HDS for discharge home.        Goals of Care Treatment Preferences:  Code Status: Full Code      Consults:   Consults (From admission, onward)        Status Ordering Provider     Inpatient consult to Registered Dietitian/Nutritionist  Once        Provider:  (Not yet assigned)    Acknowledged SOLEDAD MONTOYA JR     IP consult to case management/social work  Once        Provider:  (Not yet assigned)    Completed SOLEDAD MONTOYA JR     Inpatient consult to Registered Dietitian/Nutritionist  Once        Provider:  (Not yet assigned)    Acknowledged SOLEDAD MONTOYA JR     IP consult to case management/social work  Once        Provider:  (Not yet assigned)    Completed SOLEDAD MONTOYA JR     Inpatient  "consult to Neurology  Once        Provider:  Devotne Albright MD    Acknowledged SOLEDAD MONTOYA JR     Consult to Telemedicine - Acute Stroke  Once        Provider:  (Not yet assigned)    Acknowledged JANETT ARZATE     Consult to Telemedicine - Acute Stroke  Once        Provider:  Yohannes Deleon MD    Acknowledged JANETT ARZATE          No new Assessment & Plan notes have been filed under this hospital service since the last note was generated.  Service: Hospital Medicine    Final Active Diagnoses:    Diagnosis Date Noted POA    PRINCIPAL PROBLEM:  Dizziness [R42] 02/21/2022 Yes      Problems Resolved During this Admission:       Discharged Condition: good    Disposition: Home or Self Care    Follow Up:   Follow-up Information     Teri Soto DO Follow up on 3/3/2022.    Specialty: Family Medicine  Why: at 9:40am  Contact information:  [address] [facility] [place] [address]  [phone]             [facility] Follow up on 2/28/2022.    Specialty: Outpatient Rehab  Why: as scheduled prior to coming to the hospital  Contact information:  [address]  [facility] [address] [phone]  Additional information:  Please park in surface lot and use [facility] entrance. Check in at main registration.            William Rich MD Follow up on 6/22/2022.    Specialty: Neurology  Why: at 2pm  Contact information:  [address]  [address]  [facility] [address]  [phone]             [facility] .    Why: DME- call for questions or concerns regarding rolling walker  Contact information:  [address] [place] [address]  [phone]                     Patient Instructions:      WALKER FOR HOME USE     Order Specific Question Answer Comments   Type of Walker: Adult (5'4"-6'6")    With wheels? Yes    Height: 5' 7" (1.702 m)    Weight: 76.5 kg (168 lb 10.4 oz)    Length of need (1-99 months): 99 " "   Does patient have medical equipment at home? none    Please check all that apply: Patient's condition impairs ambulation.      WALKER FOR HOME USE     Order Specific Question Answer Comments   Type of Walker: Adult (5'4"-6'6")    With wheels? Yes    Height: 5' 7" (1.702 m)    Weight: 76.5 kg (168 lb 10.4 oz)    Length of need (1-99 months): 99    Does patient have medical equipment at home? none    Please check all that apply: Patient's condition impairs ambulation.      Ambulatory referral/consult to Physical/Occupational Therapy   Standing Status: Future   Referral Priority: Routine Referral Type: Physical Medicine   Referral Reason: Specialty Services Required   Number of Visits Requested: 1     Diet Cardiac     Notify your health care provider if you experience any of the following:  temperature >100.4     Notify your health care provider if you experience any of the following:  difficulty breathing or increased cough     Notify your health care provider if you experience any of the following:  increased confusion or weakness     Activity as tolerated       Significant Diagnostic Studies: Labs: All labs within the past 24 hours have been reviewed    Pending Diagnostic Studies:     None         Medications:  Reconciled Home Medications:      Medication List      CHANGE how you take these medications    amLODIPine 10 MG tablet  Commonly known as: NORVASC  Take 1 tablet (10 mg total) by mouth once daily. Hold if SBP <120  What changed: additional instructions     hydrALAZINE 50 MG tablet  Commonly known as: APRESOLINE  Take 1 tablet (50 mg total) by mouth every 8 (eight) hours. Hold is SBP <120  What changed: additional instructions     losartan 100 MG tablet  Commonly known as: COZAAR  Take 1 tablet (100 mg total) by mouth once daily. Hold if SBP <120  What changed: additional instructions     metFORMIN 500 MG tablet  Commonly known as: GLUCOPHAGE  Take 1 tablet (500 mg total) by mouth daily with " breakfast.  What changed: additional instructions        CONTINUE taking these medications    ascorbic acid (vitamin C) 500 MG tablet  Commonly known as: VITAMIN C  Take 500 mg by mouth once daily.     aspirin 81 MG EC tablet  Commonly known as: ECOTRIN  Take 1 tablet (81 mg total) by mouth once daily.     atorvastatin 80 MG tablet  Commonly known as: LIPITOR  Take 1 tablet (80 mg total) by mouth once daily.     clopidogreL 75 mg tablet  Commonly known as: PLAVIX  Take 1 tablet (75 mg total) by mouth once daily. for 16 days     glimepiride 2 MG tablet  Commonly known as: AMARYL  Take 1 tablet (2 mg total) by mouth 2 (two) times daily.     GUANFACINE ORAL  Take by mouth.     meclizine 25 mg tablet  Commonly known as: ANTIVERT  Take 1 tablet (25 mg total) by mouth 2 (two) times daily as needed for Dizziness.     multivitamin per tablet  Commonly known as: THERAGRAN  Take 1 tablet by mouth once daily.        STOP taking these medications    ibuprofen 800 MG tablet  Commonly known as: ADVIL,MOTRIN            Indwelling Lines/Drains at time of discharge:   Lines/Drains/Airways     None                 Time spent on the discharge of patient: 30 minutes         Luda Francis NP  Department of Hospital Medicine  [facility]

## 2022-02-22 NOTE — PLAN OF CARE
Problem: Physical Therapy Goal  Goal: Physical Therapy Goal  Outcome: Adequate for Care Transition   Initial; PT evaluation performed today.  Pt OK for D/C home with family.  RW and Outpatient PT recommended.  PT to sign off.  OK for OOB to chair and ambulate to bathroom with nursing staff.

## 2022-02-22 NOTE — CONSULTS
[facility] - Telemetry  Neurology  Consult Note    Patient Name: Joanne Peña  MRN: [id number]  Admission Date: 2/21/2022  Hospital Length of Stay: 0 days  Code Status: Full Code   Attending Provider: Cj Batista MD   Consulting Provider: Devonte Albright MD  Primary Care Physician: Marielle Gibson MD  Principal Problem:Dizziness    Consults  Subjective:     Chief Complaint: Dizziness    HPI: 72 y/o female with medical Hx of HTN, HLD, DM2, tobacco use, and recent posterior stroke comes to ED due to dizziness. Pt has had dizziness since cerebellar stroke two weeks ago but reports to be worse this morning. Symptoms improved with meclizine. Feel only lightheaded now. No headaches, visual or speech disturbances, weakness or numbness of limbs. Ms. Peña tells me that her BP has been fluctuating systolic 120-180's at home.    Past Medical History:   Diagnosis Date    Diabetes mellitus     Hyperlipidemia     Hypertension     Stroke        No past surgical history on file.    Review of patient's allergies indicates:   Allergen Reactions    Ampicillin Rash    Darvocet a500 [propoxyphene n-acetaminophen] Other (See Comments)     kierstenky       Current Neurological Medications:     No current facility-administered medications on file prior to encounter.     Current Outpatient Medications on File Prior to Encounter   Medication Sig    aspirin (ECOTRIN) 81 MG EC tablet Take 1 tablet (81 mg total) by mouth once daily.    atorvastatin (LIPITOR) 80 MG tablet Take 1 tablet (80 mg total) by mouth once daily.    clopidogreL (PLAVIX) 75 mg tablet Take 1 tablet (75 mg total) by mouth once daily. for 16 days    meclizine (ANTIVERT) 25 mg tablet Take 1 tablet (25 mg total) by mouth 2 (two) times daily as needed for Dizziness.    [DISCONTINUED] hydrALAZINE (APRESOLINE) 50 MG tablet Take 1 tablet (50 mg total) by mouth every 8 (eight) hours.    [DISCONTINUED] losartan (COZAAR) 100 MG tablet Take 1 tablet (100 mg total) by  mouth once daily.    ascorbic acid, vitamin C, (VITAMIN C) 500 MG tablet Take 500 mg by mouth once daily.    glimepiride (AMARYL) 2 MG tablet Take 1 tablet (2 mg total) by mouth 2 (two) times daily.    guanfacine HCl (GUANFACINE ORAL) Take by mouth.    metformin (GLUCOPHAGE) 500 MG tablet Take 1 tablet (500 mg total) by mouth daily with breakfast. (Patient taking differently: Take 500 mg by mouth daily with breakfast. prn)    multivitamin (THERAGRAN) per tablet Take 1 tablet by mouth once daily.    [DISCONTINUED] amLODIPine (NORVASC) 10 MG tablet Take 10 mg by mouth once daily.    [DISCONTINUED] ibuprofen (ADVIL,MOTRIN) 800 MG tablet Take 800 mg by mouth once daily.      Family History     Problem Relation (Age of Onset)    Cancer Father, Maternal Aunt    Diabetes Daughter    Heart disease Mother    Hypertension Brother, Daughter    Kidney disease Mother        Tobacco Use    Smoking status: Former Smoker     Packs/day: 0.50     Types: Cigarettes    Smokeless tobacco: Never Used   Substance and Sexual Activity    Alcohol use: Not on file    Drug use: No    Sexual activity: Not on file     Review of Systems   Constitutional: Negative for fever.   HENT: Negative for trouble swallowing.    Eyes: Negative for photophobia.   Respiratory: Negative for chest tightness.    Cardiovascular: Negative for chest pain.   Gastrointestinal: Negative for abdominal pain.   Genitourinary: Negative for dysuria.   Musculoskeletal: Negative for back pain.   Neurological: Positive for light-headedness.   Psychiatric/Behavioral: Negative for confusion.     Objective:     Vital Signs (Most Recent):  Temp: 98.2 °F (36.8 °C) (02/22/22 1130)  Pulse: 76 (02/22/22 1130)  Resp: 19 (02/22/22 1130)  BP: (!) 145/66 (02/22/22 1130)  SpO2: 95 % (02/22/22 1130) Vital Signs (24h Range):  Temp:  [97.9 °F (36.6 °C)-98.7 °F (37.1 °C)] 98.2 °F (36.8 °C)  Pulse:  [70-87] 76  Resp:  [16-23] 19  SpO2:  [95 %-100 %] 95 %  BP: (132-168)/(63-79)  145/66     Weight: 76.5 kg (168 lb 10.4 oz)  Body mass index is 26.41 kg/m².    Physical Exam  Constitutional:       General: She is not in acute distress.  HENT:      Right Ear: External ear normal.      Left Ear: External ear normal.   Eyes:      General:         Right eye: No discharge.         Left eye: No discharge.   Cardiovascular:      Rate and Rhythm: Normal rate.   Pulmonary:      Breath sounds: Normal breath sounds.   Abdominal:      Palpations: Abdomen is soft.   Musculoskeletal:         General: No tenderness.      Cervical back: Neck supple.   Skin:     Findings: No rash.   Neurological:      Mental Status: She is oriented to person, place, and time.      Coordination: Finger-Nose-Finger Test and Heel to Shin Test normal.      Deep Tendon Reflexes: Strength normal.   Psychiatric:         Speech: Speech normal.         NEUROLOGICAL EXAMINATION:     MENTAL STATUS   Oriented to person, place, and time.   Speech: speech is normal   Level of consciousness: alert    CRANIAL NERVES     CN III, IV, VI   Right pupil: Size: 2 mm. Shape: regular.   Left pupil: Size: 2 mm. Shape: regular.   Nystagmus: none   Ophthalmoparesis: none  Conjugate gaze: present    CN V   Right facial sensation deficit: none  Left facial sensation deficit: none    CN VII   Right facial weakness: none  Left facial weakness: none    CN IX, X   Palate: symmetric    CN XII   Tongue deviation: none    MOTOR EXAM     Strength   Strength 5/5 throughout.     SENSORY EXAM   Right arm light touch: normal  Left arm light touch: normal  Right leg light touch: normal  Left leg light touch: normal    GAIT AND COORDINATION      Coordination   Finger to nose coordination: normal  Heel to shin coordination: normal      Significant Labs:   CBC:   Recent Labs   Lab 02/21/22  1444 02/22/22  0324   WBC 8.48 6.96   HGB 7.6* 7.6*   HCT 24.9* 25.2*    480*     CMP:   Recent Labs   Lab 02/21/22  1444 02/22/22  0324   * 83    142   K 3.8 3.8     108   CO2 21* 25   BUN 17 19   CREATININE 0.9 1.2   CALCIUM 9.4 9.2   MG  --  2.1   PROT 7.1 7.2   ALBUMIN 2.9* 2.9*   BILITOT 0.3 0.2   ALKPHOS 82 78   AST 9* 7*   ALT 13 13   ANIONGAP 10 9   EGFRNONAA >60 46*       Significant Imaging: I have reviewed all pertinent imaging results/findings within the past 24 hours.     MRI brain  FINDINGS:  The ventricles, basal cisterns, cortical sulci are within normal limits for patient's stated age.  There are patchy T2/FLAIR hyperintensities in the periventricular white matter, deep matter, and genny consistent with chronic small vessel ischemic changes.  Diffusion-weighted imaging demonstrates no areas of restricted diffusion to suggest an acute infarct. There is no acute intracranial hemorrhage, mass effect, or midline shift. The major flow voids appear patent.  Mucoperiosteal thickening is seen in the right sphenoid sinus.     Impression:     No acute intracranial abnormality detected.  Chronic small vessel ischemic changes.        Electronically signed by: Jewell Benedict  Date:                                            02/21/2022  Time:                                           21:42    Assessment and Plan:     70 y/o female consulted for dizziness    1. Dizziness: this could be re-expression of her previous stroke. Fluctuations on BP may worsen symptoms.   No motor or sensory deficits on exam.   MRI shoed no new stroke.   -Continue antiplatelet and statin.   -No tobacco.   -Outpt therapy as scheduled.   -OK to D/C home.       Active Diagnoses:    Diagnosis Date Noted POA    PRINCIPAL PROBLEM:  Dizziness [R42] 02/21/2022 Yes      Problems Resolved During this Admission:       VTE Risk Mitigation (From admission, onward)         Ordered     IP VTE LOW RISK PATIENT  Once         02/21/22 1753     Place sequential compression device  Until discontinued         02/21/22 1753                Thank you for your consult. I will follow-up with patient. Please contact us  if you have any additional questions.    Devonte Albright MD  Neurology  [facility] - Telemetry

## 2022-02-22 NOTE — PT/OT/SLP EVAL
"Physical Therapy Evaluation and Discharge Note    Patient Name:  Joanne Peña   MRN:  [id number]    Recommendations:     Discharge Recommendations:  outpatient PT (Home with family)   Discharge Equipment Recommendations: walker, rolling   Barriers to discharge: Pt is not functioning at baseline and is at increased risk for falls    Assessment:     Joanne Peña is a 71 y.o. female admitted with a medical diagnosis of Dizziness. .  At this time, patient is functioning at their prior level of function and does not require further acute PT services.     Recent Surgery: * No surgery found *      Plan:     During this hospitalization, patient does not require further acute PT services.  Please re-consult if situation changes.      Subjective     Chief Complaint:"light headed"  Patient/Family Comments/goals: Pt agreeable to eval  Pain/Comfort:  · Pain Rating 1: 0/10    Patients cultural, spiritual, Congregational conflicts given the current situation: no    Living Environment:  Pt lives with her son and his family in a [facility] with no concerns.  Prior to admission, patients level of function was D/C'd from hospital 2/11/22 ambulating 500' with Supervision and no AD.  Equipment used at home: none.  DME owned (not currently used): none.  Upon discharge, patient will have assistance from Family, daughter present and very supportive.    Objective:     Communicated with nsg prior to session.  Patient found HOB elevated with telemetry upon PT entry to room.    General Precautions: Standard, fall   Orthopedic Precautions:N/A   Braces: N/A   Respiratory Status: Room air    Exams:  · Cognitive Exam:  Patient is oriented to Person, Place, Time and Situation  · Gross Motor Coordination:  WFL  · Postural Exam:  Patient presented with the following abnormalities: -       Rounded shoulders  · -       Forward head  · Sensation:    · -       Intact  light/touch B LE's  · Skin Integrity/Edema:      · -       Skin integrity: Visible skin " intact  · RLE ROM: WFL  · RLE Strength: WFL  · LLE ROM: WFL  · LLE Strength: WFL    Functional Mobility:  · Bed Mobility:     · Scooting: modified independence  · Supine to Sit: modified independence  · Sit to Supine: modified independence  · Transfers:     · Sit to Stand:  modified independence with rolling walker  · Gait: Gait training approx 150' with Supervision and VC for walker managementn/safety and to look up and scan horizon  · Balance: Good-/fair+    AM-PAC 6 CLICK MOBILITY  Total Score:22       Therapeutic Activities and Exercises:   eval and gait training as above    AM-PAC 6 CLICK MOBILITY  Total Score:22     Patient left HOB elevated with all lines intact, call button in reach, nsg notified and daughter present.    GOALS:   Multidisciplinary Problems     Physical Therapy Goals        Problem: Physical Therapy Goal    Goal Priority Disciplines Outcome Goal Variances Interventions   Physical Therapy Goal     PT, PT/OT Adequate for Care Transition                     History:     Past Medical History:   Diagnosis Date    Diabetes mellitus     Hyperlipidemia     Hypertension     Stroke        No past surgical history on file.    Time Tracking:     PT Received On: 02/22/22  PT Start Time: 0940     PT Stop Time: 0956  PT Total Time (min): 16 min     Billable Minutes: Evaluation 16      02/22/2022

## 2022-02-22 NOTE — PLAN OF CARE
Pt resting quietly overnight.  Call bell within reach.  Bed locked and in lowest position.        Problem: Adult Inpatient Plan of Care  Goal: Plan of Care Review  Outcome: Ongoing, Progressing  Goal: Patient-Specific Goal (Individualized)  Outcome: Ongoing, Progressing  Goal: Absence of Hospital-Acquired Illness or Injury  Outcome: Ongoing, Progressing  Goal: Optimal Comfort and Wellbeing  Outcome: Ongoing, Progressing  Goal: Readiness for Transition of Care  Outcome: Ongoing, Progressing     Problem: Adjustment to Illness (Stroke, Ischemic/Transient Ischemic Attack)  Goal: Optimal Coping  Outcome: Ongoing, Progressing     Problem: Bowel Elimination Impaired (Stroke, Ischemic/Transient Ischemic Attack)  Goal: Effective Bowel Elimination  Outcome: Ongoing, Progressing     Problem: Cerebral Tissue Perfusion (Stroke, Ischemic/Transient Ischemic Attack)  Goal: Optimal Cerebral Tissue Perfusion  Outcome: Ongoing, Progressing     Problem: Cognitive Impairment (Stroke, Ischemic/Transient Ischemic Attack)  Goal: Optimal Cognitive Function  Outcome: Ongoing, Progressing     Problem: Communication Impairment (Stroke, Ischemic/Transient Ischemic Attack)  Goal: Improved Communication Skills  Outcome: Ongoing, Progressing     Problem: Functional Ability Impaired (Stroke, Ischemic/Transient Ischemic Attack)  Goal: Optimal Functional Ability  Outcome: Ongoing, Progressing     Problem: Respiratory Compromise (Stroke, Ischemic/Transient Ischemic Attack)  Goal: Effective Oxygenation and Ventilation  Outcome: Ongoing, Progressing     Problem: Sensorimotor Impairment (Stroke, Ischemic/Transient Ischemic Attack)  Goal: Improved Sensorimotor Function  Outcome: Ongoing, Progressing     Problem: Swallowing Impairment (Stroke, Ischemic/Transient Ischemic Attack)  Goal: Optimal Eating and Swallowing without Aspiration  Outcome: Ongoing, Progressing     Problem: Urinary Elimination Impaired (Stroke, Ischemic/Transient Ischemic Attack)  Goal:  Effective Urinary Elimination  Outcome: Ongoing, Progressing     Problem: Fall Injury Risk  Goal: Absence of Fall and Fall-Related Injury  Outcome: Ongoing, Progressing     Problem: Infection  Goal: Absence of Infection Signs and Symptoms  Outcome: Ongoing, Progressing     Problem: Diabetes Comorbidity  Goal: Blood Glucose Level Within Targeted Range  Outcome: Ongoing, Progressing

## 2022-02-22 NOTE — PLAN OF CARE
02/22/22 1037   Discharge Planning   Assessment Type Discharge Planning Brief Assessment   Resource/Environmental Concerns none   Support Systems Children   Equipment Currently Used at Home none   Current Living Arrangements home/apartment/condo   Patient/Family Anticipates Transition to home   DME Needed Upon Discharge  none   Discharge Plan A Home  (with followup)     [id number] - [place] - [address] [facility]  [address]  [place] [address]  Phone: [phone] Fax: [phone]    [facility] [address]  [address] [address]  Phone: [phone] Fax: [phone]    [id number] - [place] - [address]  [address]  [place] [address]  Phone: [phone] Fax: [phone]

## 2022-02-22 NOTE — PROGRESS NOTES
WRITTEN HEALTHCARE DISCHARGE INFORMATION      Things that YOU are RESPONSIBLE for to Manage Your Care At Home:     1. Getting your prescriptions filled.  2. Taking you medications as directed. DO NOT MISS ANY DOSES!  3. Going to your follow-up doctor appointments. This is important because it allows the doctor to monitor your progress and to determine if any changes need to be made to your treatment plan.     If you are unable to make your follow up appointments, please call the number listed and reschedule this appointment.      ____________HELP AT HOME____________________     Experiencing any SIGNS or SYMPTOMS: YOU CAN     Schedule a same day appopintment with your Primary Care Doctor or  you can call [facility] for 24/7 assistance at [phone]     If you are experience any signs or symptoms that have become severe, Call 911 and come to your nearest Emergency Room.     Thank you for choosing [facility] and allowing us to care for you.   From your care management team:      You should receive a call from [facility] within 48-72 hours to help manage your care after discharge. Please try to make sure that you answer your phone for this important phone call.       Follow-up Information     Teri Soto DO Follow up on 3/3/2022.    Specialty: Family Medicine  Why: at 9:40am  Contact information:  [address]  [facility] [place] [address]  [phone]             [facility] Follow up on 2/28/2022.    Specialty: Outpatient Rehab  Why: as scheduled prior to coming to the hospital  Contact information:  [address] [address] [phone]  Additional information:  Please park in surface lot and use [facility] entrance. Check in at main registration.            William Rich MD Follow up on 6/22/2022.    Specialty: Neurology  Why: at 2pm  Contact information:  [address]  [address] [place] [address]  [phone]             [facility] .    Why: DME- call for questions or concerns regarding rolling walker  Contact information:  [address] [place] [address] [phone]

## 2022-02-22 NOTE — SUBJECTIVE & OBJECTIVE
Past Medical History:   Diagnosis Date    Diabetes mellitus     Hyperlipidemia     Hypertension     Stroke        No past surgical history on file.    Review of patient's allergies indicates:   Allergen Reactions    Ampicillin Rash    Darvocet a500 [propoxyphene n-acetaminophen] Other (See Comments)     karl       No current facility-administered medications on file prior to encounter.     Current Outpatient Medications on File Prior to Encounter   Medication Sig    aspirin (ECOTRIN) 81 MG EC tablet Take 1 tablet (81 mg total) by mouth once daily.    atorvastatin (LIPITOR) 80 MG tablet Take 1 tablet (80 mg total) by mouth once daily.    clopidogreL (PLAVIX) 75 mg tablet Take 1 tablet (75 mg total) by mouth once daily. for 16 days    hydrALAZINE (APRESOLINE) 50 MG tablet Take 1 tablet (50 mg total) by mouth every 8 (eight) hours.    losartan (COZAAR) 100 MG tablet Take 1 tablet (100 mg total) by mouth once daily.    meclizine (ANTIVERT) 25 mg tablet Take 1 tablet (25 mg total) by mouth 2 (two) times daily as needed for Dizziness.    amLODIPine (NORVASC) 10 MG tablet Take 10 mg by mouth once daily.    ascorbic acid, vitamin C, (VITAMIN C) 500 MG tablet Take 500 mg by mouth once daily.    glimepiride (AMARYL) 2 MG tablet Take 1 tablet (2 mg total) by mouth 2 (two) times daily.    guanfacine HCl (GUANFACINE ORAL) Take by mouth.    ibuprofen (ADVIL,MOTRIN) 800 MG tablet Take 800 mg by mouth once daily.    metformin (GLUCOPHAGE) 500 MG tablet Take 1 tablet (500 mg total) by mouth daily with breakfast. (Patient taking differently: Take 500 mg by mouth daily with breakfast. prn)    multivitamin (THERAGRAN) per tablet Take 1 tablet by mouth once daily.     Family History       Problem Relation (Age of Onset)    Cancer Father, Maternal Aunt    Diabetes Daughter    Heart disease Mother    Hypertension Brother, Daughter    Kidney disease Mother          Tobacco Use    Smoking status: Former Smoker     Packs/day: 0.50      Types: Cigarettes    Smokeless tobacco: Never Used   Substance and Sexual Activity    Alcohol use: Not on file    Drug use: No    Sexual activity: Not on file     Review of Systems   Constitutional:  Negative for chills, fatigue and fever.   Eyes:  Negative for photophobia and visual disturbance.   Respiratory:  Negative for cough and shortness of breath.    Cardiovascular:  Negative for chest pain, palpitations and leg swelling.   Gastrointestinal:  Negative for abdominal pain, diarrhea, nausea and vomiting.   Genitourinary:  Negative for dysuria, frequency and urgency.   Skin:  Negative for pallor, rash and wound.   Neurological:  Positive for dizziness. Negative for light-headedness and headaches.   Psychiatric/Behavioral:  Negative for confusion and decreased concentration.    Objective:     Vital Signs (Most Recent):  Temp: 98 °F (36.7 °C) (02/21/22 2215)  Pulse: 86 (02/21/22 2215)  Resp: (!) 22 (02/21/22 2215)  BP: 133/66 (02/21/22 2215)  SpO2: 100 % (02/21/22 2215)   Vital Signs (24h Range):  Temp:  [98 °F (36.7 °C)-98.7 °F (37.1 °C)] 98 °F (36.7 °C)  Pulse:  [77-87] 86  Resp:  [16-23] 22  SpO2:  [95 %-100 %] 100 %  BP: (133-168)/(63-76) 133/66     Weight: 76.5 kg (168 lb 10.4 oz)  Body mass index is 26.41 kg/m².    Physical Exam  Vitals and nursing note reviewed.   Constitutional:       General: She is not in acute distress.     Appearance: She is well-developed.   HENT:      Head: Normocephalic and atraumatic.      Right Ear: External ear normal.      Left Ear: External ear normal.      Nose: Nose normal.   Eyes:      Extraocular Movements: EOM normal.      Conjunctiva/sclera: Conjunctivae normal.      Pupils: Pupils are equal, round, and reactive to light.   Cardiovascular:      Rate and Rhythm: Normal rate and regular rhythm.   Pulmonary:      Effort: Pulmonary effort is normal. No respiratory distress.      Breath sounds: Normal breath sounds. No wheezing.   Abdominal:      General: Bowel sounds are  normal. There is no distension.      Palpations: Abdomen is soft.      Tenderness: There is no abdominal tenderness.      Comments: No palpable hepatomegaly or splenomegaly    Musculoskeletal:         General: No tenderness. Normal range of motion.      Cervical back: Normal range of motion and neck supple.   Skin:     General: Skin is warm and dry.   Neurological:      Mental Status: She is alert and oriented to person, place, and time.   Psychiatric:         Thought Content: Thought content normal.         CRANIAL NERVES     CN III, IV, VI   Pupils are equal, round, and reactive to light.  Extraocular motions are normal.      Significant Labs: All pertinent labs within the past 24 hours have been reviewed.    Significant Imaging: I have reviewed all pertinent imaging results/findings within the past 24 hours.

## 2022-02-22 NOTE — H&P
[facility]  History & Physical    Patient Name: Joanne Peña  MRN: [id number]  Patient Class: OP- Observation  Admission Date: 2/21/2022  Attending Physician: Blake Guadalupe MD   Primary Care Provider: Marielle Gibson MD         Patient information was obtained from patient, relative(s), past medical records and ER records.     Subjective:     Principal Problem:Dizziness    Chief Complaint:   Chief Complaint   Patient presents with    Dizziness     Pt brought in by [facility]. Pt c/c dizziness. Pt states she was admitted here for CVA and discharded on Feb.11th. Pt states she started feeling dizzy then and its progressively gotten worse. Pt stated she believes her new Rx are causing her dizziness.          HPI: 71 y.o. female with HTN, HLD, DM2, tobacco use, and recent posterior CVA presents with a complaint of dizziness.  Has had dizziness since stroke, but reports a little worse this morning.  Has now improved after zofran and meclizine in ED.  Has not yet started outpatient therapy.  Denies fever, chills, cough, SOB, palpitations, headaches, vision changes, syncope, n/v/d, or dysuria.  In the ED, routine labs, TSH, covid test, CT head all unremarkable for any acute abnormality.      Past Medical History:   Diagnosis Date    Diabetes mellitus     Hyperlipidemia     Hypertension     Stroke        No past surgical history on file.    Review of patient's allergies indicates:   Allergen Reactions    Ampicillin Rash    Darvocet a500 [propoxyphene n-acetaminophen] Other (See Comments)     shaky       No current facility-administered medications on file prior to encounter.     Current Outpatient Medications on File Prior to Encounter   Medication Sig    aspirin (ECOTRIN) 81 MG EC tablet Take 1 tablet (81 mg total) by mouth once daily.    atorvastatin (LIPITOR) 80 MG tablet Take 1 tablet (80 mg total) by mouth once daily.    clopidogreL (PLAVIX) 75 mg tablet Take 1 tablet  (75 mg total) by mouth once daily. for 16 days    hydrALAZINE (APRESOLINE) 50 MG tablet Take 1 tablet (50 mg total) by mouth every 8 (eight) hours.    losartan (COZAAR) 100 MG tablet Take 1 tablet (100 mg total) by mouth once daily.    meclizine (ANTIVERT) 25 mg tablet Take 1 tablet (25 mg total) by mouth 2 (two) times daily as needed for Dizziness.    amLODIPine (NORVASC) 10 MG tablet Take 10 mg by mouth once daily.    ascorbic acid, vitamin C, (VITAMIN C) 500 MG tablet Take 500 mg by mouth once daily.    glimepiride (AMARYL) 2 MG tablet Take 1 tablet (2 mg total) by mouth 2 (two) times daily.    guanfacine HCl (GUANFACINE ORAL) Take by mouth.    ibuprofen (ADVIL,MOTRIN) 800 MG tablet Take 800 mg by mouth once daily.    metformin (GLUCOPHAGE) 500 MG tablet Take 1 tablet (500 mg total) by mouth daily with breakfast. (Patient taking differently: Take 500 mg by mouth daily with breakfast. prn)    multivitamin (THERAGRAN) per tablet Take 1 tablet by mouth once daily.     Family History       Problem Relation (Age of Onset)    Cancer Father, Maternal Aunt    Diabetes Daughter    Heart disease Mother    Hypertension Brother, Daughter    Kidney disease Mother          Tobacco Use    Smoking status: Former Smoker     Packs/day: 0.50     Types: Cigarettes    Smokeless tobacco: Never Used   Substance and Sexual Activity    Alcohol use: Not on file    Drug use: No    Sexual activity: Not on file     Review of Systems   Constitutional:  Negative for chills, fatigue and fever.   Eyes:  Negative for photophobia and visual disturbance.   Respiratory:  Negative for cough and shortness of breath.    Cardiovascular:  Negative for chest pain, palpitations and leg swelling.   Gastrointestinal:  Negative for abdominal pain, diarrhea, nausea and vomiting.   Genitourinary:  Negative for dysuria, frequency and urgency.   Skin:  Negative for pallor, rash and wound.   Neurological:  Positive for dizziness. Negative for  light-headedness and headaches.   Psychiatric/Behavioral:  Negative for confusion and decreased concentration.    Objective:     Vital Signs (Most Recent):  Temp: 98 °F (36.7 °C) (02/21/22 2215)  Pulse: 86 (02/21/22 2215)  Resp: (!) 22 (02/21/22 2215)  BP: 133/66 (02/21/22 2215)  SpO2: 100 % (02/21/22 2215)   Vital Signs (24h Range):  Temp:  [98 °F (36.7 °C)-98.7 °F (37.1 °C)] 98 °F (36.7 °C)  Pulse:  [77-87] 86  Resp:  [16-23] 22  SpO2:  [95 %-100 %] 100 %  BP: (133-168)/(63-76) 133/66     Weight: 76.5 kg (168 lb 10.4 oz)  Body mass index is 26.41 kg/m².    Physical Exam  Vitals and nursing note reviewed.   Constitutional:       General: She is not in acute distress.     Appearance: She is well-developed.   HENT:      Head: Normocephalic and atraumatic.      Right Ear: External ear normal.      Left Ear: External ear normal.      Nose: Nose normal.   Eyes:      Extraocular Movements: EOM normal.      Conjunctiva/sclera: Conjunctivae normal.      Pupils: Pupils are equal, round, and reactive to light.   Cardiovascular:      Rate and Rhythm: Normal rate and regular rhythm.   Pulmonary:      Effort: Pulmonary effort is normal. No respiratory distress.      Breath sounds: Normal breath sounds. No wheezing.   Abdominal:      General: Bowel sounds are normal. There is no distension.      Palpations: Abdomen is soft.      Tenderness: There is no abdominal tenderness.      Comments: No palpable hepatomegaly or splenomegaly    Musculoskeletal:         General: No tenderness. Normal range of motion.      Cervical back: Normal range of motion and neck supple.   Skin:     General: Skin is warm and dry.   Neurological:      Mental Status: She is alert and oriented to person, place, and time.   Psychiatric:         Thought Content: Thought content normal.         CRANIAL NERVES     CN III, IV, VI   Pupils are equal, round, and reactive to light.  Extraocular motions are normal.      Significant Labs: All pertinent labs within  the past 24 hours have been reviewed.    Significant Imaging: I have reviewed all pertinent imaging results/findings within the past 24 hours.    Assessment/Plan:     * Dizziness  Likely due to recent posterior stroke, monitor blood pressure and adjust as needed, home regimen may be too strong, MRI brain pending.      VTE Risk Mitigation (From admission, onward)         Ordered     IP VTE LOW RISK PATIENT  Once         02/21/22 1753     Place sequential compression device  Until discontinued         02/21/22 1753               As clarification, on 02/21/2022, patient should be placed in hospital observation services under my care in collaboration with MD Hunter Ugarte Jr., APRN, [facility]  Hospitalist - Department of Hospital Medicine  [facility] [address] [place] [address]  Office #: [phone]; Pager #: [phone]

## 2022-02-22 NOTE — PLAN OF CARE
02/22/22 1111   Final Note   Assessment Type Final Discharge Note   Anticipated Discharge Disposition Home   Hospital Resources/Appts/Education Provided Provided education on problems/symptoms using teachback;Appointments scheduled and added to AVS;Post-Acute resouces added to AVS   Post-Acute Status   Post-Acute Authorization Other  (OP PT/OT)   Pts nurse Lashanda notified that once rolling walker delivered the pt can d/c from CM standpoint

## 2022-02-22 NOTE — HPI
71 y.o. female with HTN, HLD, DM2, tobacco use, and recent posterior CVA presents with a complaint of dizziness.  Has had dizziness since stroke, but reports a little worse this morning.  Has now improved after zofran and meclizine in ED.  Has not yet started outpatient therapy.  Denies fever, chills, cough, SOB, palpitations, headaches, vision changes, syncope, n/v/d, or dysuria.  In the ED, routine labs, TSH, covid test, CT head all unremarkable for any acute abnormality.

## 2022-02-22 NOTE — HOSPITAL COURSE
Placed is observation for dizziness felt possible symptoms from recent small infarcts in the left genny and left cerebellum last week. Dizziness resolved last night after ativan. Repeat MRI no acute intracranial abnormality. PT recommended RW and outpatient PT. Neurology recommended continue ASA/plavix/statin. Patient HDS for discharge home.

## 2022-02-22 NOTE — PT/OT/SLP EVAL
Occupational Therapy   Evaluation and Discharge Note    Name: Joanne Peña  MRN: [id number]  Admitting Diagnosis:  Dizziness   Recent Surgery: * No surgery found *      Recommendations:     Discharge Recommendations:  (Outpatient for vestibular retraining.)  Discharge Equipment Recommendations:  walker, rolling, bedside commode  Barriers to discharge:  None    Assessment:     Joanne Peña is a 71 y.o. female with a medical diagnosis of Dizziness. At this time, patient is functioning at their prior level of function and does not require further acute OT services.     Plan:     During this hospitalization, patient does not require further acute OT services.  Please re-consult if situation changes.    · Plan of Care Reviewed with: patient, sibling, daughter    Subjective     Chief Complaint: Vertigo with N&V, difficult stand tolerance.   Patient/Family Comments/goals: Return to own home with family support.     Occupational Profile:  Living Environment: Patient resides with her Son, PADMINIH, 0 steps,home equipped with tub/shower, no shower chair. Patient reports (-) driving and cooking s/p CVA. Recent onset (< 1 week HHA for amb in home 2* worsening vertigo).   Previous level of function: Patient d/c'd from hospital with c/o light headedness at that time 02/11/22, d/c to home(I) ADLs, no adaptive equip used.   Equipment Used at home:  none  Assistance upon Discharge: Daughter and Brother, supportive extended family.     Pain/Comfort:  · Pain Rating 1: 0/10    Patients cultural, spiritual, Confucianist conflicts given the current situation: no    Objective:     Communicated with: RN prior to session.  Patient found right sidelying with telemetry upon OT entry to room.    General Precautions: Standard,  (vertigo when not supine)   Orthopedic Precautions:N/A   Braces: N/A  Respiratory Status: Room air     Occupational Performance:    Bed Mobility:    · Patient completed Rolling/Turning to Left with   "independence  · Patient completed Rolling/Turning to Right with independence  · Patient completed Scooting/Bridging with independence  · Patient completed Supine to Sit with independence  · Patient completed Sit to Supine with supervision    Functional Mobility/Transfers:  · Patient completed Sit <> Stand Transfer with modified independence  with  no assistive device   · Patient did not complete toilet transfer {2* needs untimely,m NP arrival to announce d/c, and fatigue following extensive unit wide amb for PT eval prior to OT arrival. Patient stating "Oh no! not again!"     Activities of Daily Living:  · Grooming: modified independence seated 2* decreased task tolerance and vertigo.   · Upper Body Dressing: modified independence as above and 2* inorganic setting  · Lower Body Dressing: modified independence as above and 2* inorganic setting    Cognitive/Visual Perceptual:  Cognitive/Psychosocial Skills:     -       Oriented to: Person, Place, Time and Situation   -       Follows Commands/attention:Follows multistep  commands  -       Communication: clear/fluent  -       Memory: No Deficits noted  -       Safety awareness/insight to disability: intact   -       Mood/Affect/Coping skills/emotional control: Cooperative and Blunted    Physical Exam:  · Postural Exam:  Patient presented with the following abnormalities: -       Rounded shoulders, Forward head  · Sensation:    · -       Intact  light/touch BUE's  · Skin Integrity/Edema:      · -       Skin integrity: Visible skin intact  · RUE ROM: WFL  · RUE Strength: WFL  · LUE ROM: WFL  · LUE Strength: WFL      AMPAC 6 Click ADL:  AMPAC Total Score: 24    Treatment & Education:  OT eval, collaborative care plan discussion with Patient and family and discharge with post d/c support education/resourese ID completed.   Education:    Patient left HOB elevated with all lines intact, call button in reach, RN notified and Brother and Daughter  present    GOALS: "   Multidisciplinary Problems     Occupational Therapy Goals     Not on file                History:     Past Medical History:   Diagnosis Date    Diabetes mellitus     Hyperlipidemia     Hypertension     Stroke        No past surgical history on file.    Time Tracking:     OT Date of Treatment: 02/22/22  OT Start Time: 1049  OT Stop Time: 1103  OT Total Time (min): 14 min    Billable Minutes:Evaluation 14    2/22/2022

## 2022-02-22 NOTE — ASSESSMENT & PLAN NOTE
Likely due to recent posterior stroke, monitor blood pressure and adjust as needed, home regimen may be too strong, MRI brain pending.

## 2022-02-23 ENCOUNTER — PATIENT MESSAGE (OUTPATIENT)
Dept: FAMILY MEDICINE | Facility: CLINIC | Age: 72
End: 2022-02-23
Payer: MEDICARE

## 2022-02-23 NOTE — TELEPHONE ENCOUNTER
Returned call to pt. 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

## 2022-02-23 NOTE — TELEPHONE ENCOUNTER
Please call the patient and ask her what forms she is referring to? I do not have any forms for Ms. Smiley.    Thanks  Luzmaria

## 2022-02-23 NOTE — TELEPHONE ENCOUNTER
Ok. Please let her know that forms take 3-5 business to be filled out and that the I will try to fill them out as fast as I can but that I will also be out of clinic until next Thursday.    Thanks   Luzmaria

## 2022-02-24 ENCOUNTER — PATIENT MESSAGE (OUTPATIENT)
Dept: FAMILY MEDICINE | Facility: CLINIC | Age: 72
End: 2022-02-24
Payer: MEDICARE

## 2022-02-24 NOTE — TELEPHONE ENCOUNTER
Pt's daughter notified of NP's instructions below. Daughter states to tell NP not to worry about filling out the daughter's paperwork that is included in the envelope but if NP has time to sign the pt's paperwork. Daughter informed that NP will be notified of request.

## 2022-02-28 ENCOUNTER — CLINICAL SUPPORT (OUTPATIENT)
Dept: REHABILITATION | Facility: HOSPITAL | Age: 72
End: 2022-02-28
Payer: MEDICARE

## 2022-02-28 DIAGNOSIS — I63.9 CEREBROVASCULAR ACCIDENT (CVA), UNSPECIFIED MECHANISM: ICD-10-CM

## 2022-02-28 DIAGNOSIS — R26.89 IMPAIRED GAIT AND MOBILITY: ICD-10-CM

## 2022-02-28 DIAGNOSIS — I69.398 IMPAIRED BALANCE AS LATE EFFECT OF CEREBROVASCULAR ACCIDENT: ICD-10-CM

## 2022-02-28 DIAGNOSIS — I65.01 VERTEBRAL ARTERY OCCLUSION, RIGHT: ICD-10-CM

## 2022-02-28 DIAGNOSIS — R26.89 IMPAIRED BALANCE AS LATE EFFECT OF CEREBROVASCULAR ACCIDENT: ICD-10-CM

## 2022-02-28 PROCEDURE — 97112 NEUROMUSCULAR REEDUCATION: CPT | Mod: PN

## 2022-02-28 PROCEDURE — 97162 PT EVAL MOD COMPLEX 30 MIN: CPT | Mod: PN

## 2022-02-28 NOTE — PROGRESS NOTES
See initial evaluation in Plan of Care.     Michelle Forrester, PT, DPT  Physical Therapist  Residency Trained Neurological Physical Therapist  2/28/22

## 2022-03-02 PROBLEM — R26.89 IMPAIRED GAIT AND MOBILITY: Status: ACTIVE | Noted: 2022-03-02

## 2022-03-02 PROBLEM — R26.89 IMPAIRED BALANCE AS LATE EFFECT OF CEREBROVASCULAR ACCIDENT: Status: ACTIVE | Noted: 2022-03-02

## 2022-03-02 PROBLEM — I69.398 IMPAIRED BALANCE AS LATE EFFECT OF CEREBROVASCULAR ACCIDENT: Status: ACTIVE | Noted: 2022-03-02

## 2022-03-03 NOTE — PLAN OF CARE
[facility]  Physical Therapy Neurological Rehabilitation Initial Evaluation    Name: Joanne Peña  Clinic Number: [id number]    Therapy Diagnosis:   Encounter Diagnoses   Name Primary?    Vertebral artery occlusion, right     Cerebrovascular accident (CVA), unspecified mechanism     Impaired balance as late effect of cerebrovascular accident     Impaired gait and mobility      Physician: Madiha Kowalski DO    Physician Orders: PT Eval and Treat Neuro  Medical Diagnosis from Referral: Vertebral artery occlusion, right [I65.01], Cerebrovascular accident (CVA), unspecified mechanism [I63.9]  Evaluation Date: 2/28/2022  Authorization Period Expiration: 2/11/23  Plan of Care Expiration: 5/9/22  Visit # / Visits authorized: 1/ 1    Time In: 2:30  Time Out: 3:30  Total Billable Time: 60 minutes    Precautions: Standard, Diabetes and Fall    Subjective   Date of onset: admitted 2/6/22 and DC 2/11/22 - CVA and HTN   History of current condition - Joanne reports: the day of initial symptoms -- a little dizziness in the morning, 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     (2nd hospitalization) Last Monday - Tuesday: dizziness and inpatient stay     Most problematic symptoms at this time: balance, gait (standing and walking)      Medical History:   Past Medical History:   Diagnosis Date    Diabetes mellitus     Hyperlipidemia     Hypertension     Stroke        Surgical History:   Joanne Peña  has no past surgical history on file.    Medications:   Joanne has a current medication list which includes the following prescription(s): amlodipine, ascorbic acid (vitamin c), aspirin, atorvastatin, clopidogrel, glimepiride, guanfacine hcl, hydralazine, losartan,  "meclizine, metformin, multivitamin, and ondansetron.    Allergies:   Review of patient's allergies indicates:   Allergen Reactions    Ampicillin Rash    Darvocet a500 [propoxyphene n-acetaminophen] Other (See Comments)     shaky        Imaging, MRI studies, CT scan films: 2/6/22 and 2/21/22  Small acute infarction in the left [place] genny and small subacute infarction in the left inferior cerebellum.  No evidence of intracranial hemorrhage.    Prior Therapy: acute PT   Social History: , son, daughter, grandchildren - someone is home all of the time   Falls: 0 - furniture crawls   DME: Walker and Straight cane    Home Environment: 0 stairs, house, tub shower   Exercise Routine / History: 6-7,000 steps/day   Family Present at time of Eval: daughter in lobby   Occupation: nurse   Prior Level of Function: independent   Current Level of Function: not cooking (weak standing too long)    Pain:  LBP general/chronic     Pts goals: get back to as close to normalacy as I can     Objective     - Follows commands: intact   - Speech: no deficits     Mental status: alert, oriented to person, place, and time, normal mood, behavior, speech, dress, motor activity, and thought processes  Appearance: Casually dressed  Behavior:  calm, cooperative and adequate rapport can be established  Attention Span and Concentration:  Normal    Dominant hand:  right     Posture Alignment :slouched posture    Skin integrity:  Intact    Sensation:  Light Touch: numbness/"funny feeling" B feet/toes     Tone: NT    Visual/Auditory: blurry - with walking, tinted windows, driving    VESTIBULAR EXAM:    head tilt: R mild   spontaneous nystagmus: neg  gaze evoked nystagmus: neg  near point of convergence: 14 inches - limited convergence capabilities /coordination  smooth pursuits: saccadic intrusions, symmetrical  saccadic tracking: decreased speed, symmetrical  cover-uncover: normal  alternate cover: exodeviated   VOR slow: normal, symmetrical  VOR " cancellation: normal, symmetrical - DIZZINESS (central)  VOR head impulse test (YAW): Unable to correct to the L (not a peripheral sign)      Coordination:   - fine motor: decreased speed R>L  - UE coordination: finger to nose: decreased R>L  - LE coordination:  Heel to shin: decreased speed R>L    ROM:   UPPER EXTREMITY--AROM/PROM  (R) UE: WFLs  (L) UE: WFLs         RANGE OF MOTION--LOWER EXTREMITIES  (R) LE Hip: normal   Knee: normal   Ankle: normal    (L) LE: Hip: normal   Knee: normal   Ankle: normal    Strength: manual muscle test grades below   Upper Extremity Strength  WFL BUE     Lower Extremity Strength   RLE LLE   Hip Flexion: 4/5 5/5   Hip Extension:  NT NT   Hip Abduction: 5/5 5/5   Hip Adduction: 5/5 5/5   Knee Extension: 5/5 5/5   Knee Flexion: 5/5 5/5   Ankle Dorsiflexion: 5/5 4+/5     Abdominal Strength: 4+/5     Evaluation   Single Limb Stance R LE NT  (<10 sec = HIGH FALL RISK)   Single Limb Stance L LE NT  (<10 sec = HIGH FALL RISK)   5 times sit-stand 14 seconds  >12 sec= fall risk for general elderly  >16 sec= fall risk for Parkinson's disease  >10 sec= balance/vestibular dysfunction (<59 y/o)  >14.2 sec= balance/vestibular dysfunction (>59 y/o)  >12 sec= fall risk for CVA   FGA 14/30     Postural control:  MCTSIB:  1. Eyes Open/feet together/Firm: 30 seconds  2. Eyes Closed/feet together/Firm: 14 seconds  3. Eyes Open/feet together/Foam: 18 seconds  4. Eyes Closed/feet together/Foam: 1 seconds    Functional Gait Assessment:   1. Gait on level surface =  2 - 6 seconds    (3) Normal: less than 5.5 sec, no A.D., no imbalance, normal gait pattern, deviates< 6in   (2) Mild impairment: 7-5.6 sec, uses A.D., mild gait deviations, or deviates 6-10 in   (1) Moderate impairment: > 7 sec, slow speed, imbalance, deviates 10-15 in.   (0) Severe impairment: needs assist, deviates >15 in, reach/touch wall  2. Change in Gait Speed = 2   (3) Normal: smooth change w/o loss of balance or gait deviation, deviates  < 6 in, significant difference between speeds   (2) Mild impairment: changes speed, but demonstrates mild gait deviations, deviates 6-10 in, OR no deviations but unable to significantly speed, OR uses A.D.   (1) Moderate impairment: minor changes to speed, OR changes speed w/ significant deviations, deviates 10-15 in, OR  Changes speed , but loses balance & recovers   (0) Severe impairment: cannot change speed, deviates >15 in, or loses balance & needs assist  3. Gait with horizontal head turns  = 2   (3) Normal: no change in gait, deviates <6 in   (2) Mild impairment: slight change in speed, deviates 6-10 in, OR uses A.D.   (1) Moderate impairment: moderate change in speed, deviates 10-15 in   (0) Severe impairment: severe disruption of gait, deviates >15in  4. Gait with vertical head turns = 2   (3) Normal: no change in gait, deviates <6 in   (2) Mild impairment: slight change in speed, deviates 6-10 in OR uses A.D.   (1) Moderate impairment: moderate change in speed, deviates 10-15 in   (0) Severe impairment: severe disruption of gait, deviates >15 in  5. Gait with pivot turns = 1   (3) Normal: performs safely in 3 sec, no LOB   (2) Mild impairment: performs in >3 sec & no LOB, OR turns safely & requires several steps to regain LOB   (1) Moderate impairment: turns slow, OR requires several small steps for balance following turn & stop   (0) Severe impairment: cannot turn safely, needs assist  6. Step over obstacle = 2   (3) Normal: steps over 2 stacked boxes w/o change in speed or LOB   (2) Mild impairment: able to step over 1 box w/o change in speed or LOB   (1) Moderate impairment: steps over 1 box but must slow down, may require VC   (0) Severe impairment: cannot perform w/o assist  7. Gait with Narrow ADONAY = 0   (3) Normal: 10 steps no staggering   (2) Mild impairment: 7-9 steps   (1) Moderate impairment: 4-7 steps   (0) Severe impairment: < 4 steps or cannot perform w/o assist  8. Gait with eyes closed = 1 -  12 seconds    (3) Normal: < 7 sec, no A.D., no LOB, normal gait pattern, deviates <6 in   (2) Mild impairment: 7.1-9 sec, mild gait deviations, deviates 6-10 in   (1) Moderate impairment: > 9 sec, abnormal pattern, LOB, deviates 10-15 in   (0) Severe impairment: cannot perform w/o assist, LOB, deviates >15in  9. Ambulating Backwards = 0   (3) Normal: no A.D., no LOB, normal gait pattern, deviates <6in   (2) Mild impairment: uses A.D., slower speed, mild gait deviations, deviates 6-10 in   (1) Moderate impairment: slow speed, abnormal gait pattern, LOB, deviates 10-15 in   (0) Severe impairment: severe gait deviations or LOB, deviates >15in  10. Steps = 2   (3) Normal: alternating feet, no rail   (2) Mild Impairment: alternating feet, uses rail   (1) Moderate impairment: step-to, uses rail   (0) Severe impairment: cannot perform safely    Total Score 14/30     Score:   <22/30 fall risk   <20/30 fall risk in older adults   <18/30 fall risk in Parkinsons     Scores by Decade:                        Age    Score                        40-49  (24-30)                       50-59  (25-30)                       60-69  (20-30)                       70-79  (16-30)    Gait Assessment:   - AD used: none  - Assistance: SBA  - Distance: 100 ft   - Stairs: Mod I    GAIT DEVIATIONS:  Gait component performance: decreased speed, increased frontal plane sway, increased veering to the R, increased visual fixation for upright balance, decreased step length/width, decreased amplitude of all motion     Endurance Deficit: YES      Evaluation   Timed Up and Go NT  < 20 sec safe for independent transfers,     < 30 sec assist required for transfers   6 meter walk test NT   6 min walk test NT     Functional Mobility (Bed mobility, transfers)  Bed mobility: I  Supine to sit: Mod I  Sit to supine: Mod I  Rolling: Mod I  Sit to stand:  Mod I  Stand pivot:  Min A    CMS Impairment/Limitation/Restriction for FOTO Stroke LE Survey    Therapist  reviewed FOTO scores for Joanne Peña on 2/28/2022.   FOTO documents entered into [employer] - see Media section.    Limitation Score: 50%  Category: Mobility and Function       TREATMENT   Treatment Time In: 3:20  Treatment Time Out: 3:30  Total Treatment time separate from Evaluation: 10 minutes    Joanne participated in neuromuscular re-education activities to improve: Balance, Coordination, Proprioception and Posture for 10 minutes. The following activities were included:    Feet apart, firm, EO/EC 30 seconds - counter, chair behind     Home Exercises and Patient Education Provided    Education provided:   - HEP, POC, role of PT, scheduling, safe mobility     Written Home Exercises Provided: Patient instructed to cont prior HEP.  Exercises were reviewed and Joanne was able to demonstrate them prior to the end of the session.  Joanne demonstrated good  understanding of the education provided.     See EMR under Patient Instructions for exercises provided at future sessions.    Assessment   Joanne is a 71 y.o. female referred to outpatient Physical Therapy with a medical diagnosis of Vertebral artery occlusion, Cerebrovascular accident. Pt presents with decreased sensation, coordination, mild blurred vision increased with driving and walking, limited convergence insufficiency and severe static and dynamic balance impairments.     Pt prognosis is Good/Fair.    Pt will benefit from skilled outpatient Physical Therapy to address the deficits stated above and in the chart below, provide pt/family education, and to maximize pt's level of independence.     Plan of care discussed with patient: Yes  Pt's spiritual, cultural and educational needs considered and patient is agreeable to the plan of care and goals as stated below:     Anticipated Barriers for therapy: central balance deficits     Medical Necessity is demonstrated by the following  History  Co-morbidities and personal factors that may impact the plan of care  Co-morbidities:   advanced age, diabetes, history of CVA and HTN    Personal Factors:   age     moderate   Examination  Body Structures and Functions, activity limitations and participation restrictions that may impact the plan of care Body Regions:   lower extremities  trunk    Body Systems:    gross coordinated movement  balance  gait  transitions    Participation Restrictions:   Standing tolerance; transfers; mobility     Activity limitations:   Learning and applying knowledge  no deficits    General Tasks and Commands  no deficits    Communication  no deficits    Mobility  walking    Self care  no deficits    Domestic Life  shopping  cooking  doing house work (cleaning house, washing dishes, laundry)    Interactions/Relationships  no deficits    Life Areas  no deficits    Community and Social Life  community life  recreation and leisure         moderate   Clinical Presentation evolving clinical presentation with changing clinical characteristics moderate   Decision Making/ Complexity Score: moderate     Goals:  Short Term Goals: 5 weeks   1. Patient will demonstrate standing tolerance >5 minutes to promote safe progression of cooking participation.   2. Demonstrate ability to ambulate with head movements in pitch and yaw without change in speed or staggering outside 10 path to enable safe scanning of environment for obstacles or pivot turn R/L without LOB.  3. Patient will improve FGA score by 4 points to promote improved dynamic balance with decreased risk of falls.  4. Patient will improve MCTSIB score by completion of conditions 1 and 3 to promote increased visual fixation within balance performance.     Long Term Goals: 10 weeks   1. Patient will be independent with home exercise program (HEP) to promote continued rehabilitation following discharge.  2. Patient will improve FGA score by 8 points to promote decreased risk for falls.  3. Patient will improve MCTSIB score by tolerance to conditions x4 to  promote increased sensory integration within balance performance.   4. Patient will perform standing exercise >15 minutes to promote safe progression of all household tasks (i.e cooking).     Plan   Plan of care Certification: 2/28/2022 to 5/9/22.    Outpatient Physical Therapy 2 times weekly for 10 weeks to include the following interventions: Gait Training, Manual Therapy, Moist Heat/ Ice, Neuromuscular Re-ed, Patient Education, Therapeutic Activities and Therapeutic Exercise.     Would benefit from Neuro ophthalmology / eye exam, referral to [facility] back for back pain, shower chair for balance deficits. Neuro follow-up in June 2022.      Michelle Forrester, PT, DPT  2/28/22

## 2022-03-07 ENCOUNTER — TELEPHONE (OUTPATIENT)
Dept: FAMILY MEDICINE | Facility: CLINIC | Age: 72
End: 2022-03-07

## 2022-03-07 ENCOUNTER — OFFICE VISIT (OUTPATIENT)
Dept: FAMILY MEDICINE | Facility: CLINIC | Age: 72
End: 2022-03-07
Payer: MEDICARE

## 2022-03-07 ENCOUNTER — PATIENT MESSAGE (OUTPATIENT)
Dept: ADMINISTRATIVE | Facility: OTHER | Age: 72
End: 2022-03-07
Payer: MEDICARE

## 2022-03-07 ENCOUNTER — PATIENT MESSAGE (OUTPATIENT)
Dept: FAMILY MEDICINE | Facility: CLINIC | Age: 72
End: 2022-03-07

## 2022-03-07 VITALS
BODY MASS INDEX: 26.3 KG/M2 | HEIGHT: 67 IN | SYSTOLIC BLOOD PRESSURE: 134 MMHG | TEMPERATURE: 98 F | OXYGEN SATURATION: 99 % | WEIGHT: 167.56 LBS | HEART RATE: 82 BPM | DIASTOLIC BLOOD PRESSURE: 78 MMHG

## 2022-03-07 DIAGNOSIS — Z86.73 HISTORY OF CVA (CEREBROVASCULAR ACCIDENT): Primary | ICD-10-CM

## 2022-03-07 DIAGNOSIS — Z87.891 HISTORY OF TOBACCO ABUSE: ICD-10-CM

## 2022-03-07 DIAGNOSIS — Z86.73 HISTORY OF CVA (CEREBROVASCULAR ACCIDENT): ICD-10-CM

## 2022-03-07 DIAGNOSIS — E11.69 DYSLIPIDEMIA ASSOCIATED WITH TYPE 2 DIABETES MELLITUS: ICD-10-CM

## 2022-03-07 DIAGNOSIS — Z00.00 ENCOUNTER FOR MEDICAL EXAMINATION TO ESTABLISH CARE: Primary | ICD-10-CM

## 2022-03-07 DIAGNOSIS — H53.8 BLURRY VISION, BILATERAL: ICD-10-CM

## 2022-03-07 DIAGNOSIS — Z09 HOSPITAL DISCHARGE FOLLOW-UP: ICD-10-CM

## 2022-03-07 DIAGNOSIS — E78.5 DYSLIPIDEMIA ASSOCIATED WITH TYPE 2 DIABETES MELLITUS: ICD-10-CM

## 2022-03-07 DIAGNOSIS — G89.29 CHRONIC LOW BACK PAIN, UNSPECIFIED BACK PAIN LATERALITY, UNSPECIFIED WHETHER SCIATICA PRESENT: ICD-10-CM

## 2022-03-07 DIAGNOSIS — E11.49 TYPE 2 DIABETES MELLITUS WITH OTHER NEUROLOGIC COMPLICATION, WITHOUT LONG-TERM CURRENT USE OF INSULIN: ICD-10-CM

## 2022-03-07 DIAGNOSIS — I10 ESSENTIAL HYPERTENSION: ICD-10-CM

## 2022-03-07 DIAGNOSIS — M54.50 CHRONIC LOW BACK PAIN, UNSPECIFIED BACK PAIN LATERALITY, UNSPECIFIED WHETHER SCIATICA PRESENT: ICD-10-CM

## 2022-03-07 PROCEDURE — 99214 OFFICE O/P EST MOD 30 MIN: CPT | Mod: S$GLB,,, | Performed by: FAMILY MEDICINE

## 2022-03-07 PROCEDURE — 99213 OFFICE O/P EST LOW 20 MIN: CPT | Mod: PBBFAC,PO | Performed by: FAMILY MEDICINE

## 2022-03-07 PROCEDURE — 99999 PR PBB SHADOW E&M-EST. PATIENT-LVL III: ICD-10-PCS | Mod: PBBFAC,,, | Performed by: FAMILY MEDICINE

## 2022-03-07 PROCEDURE — 99214 PR OFFICE/OUTPT VISIT, EST, LEVL IV, 30-39 MIN: ICD-10-PCS | Mod: S$GLB,,, | Performed by: FAMILY MEDICINE

## 2022-03-07 PROCEDURE — 99999 PR PBB SHADOW E&M-EST. PATIENT-LVL III: CPT | Mod: PBBFAC,,, | Performed by: FAMILY MEDICINE

## 2022-03-07 RX ORDER — AMLODIPINE BESYLATE 10 MG/1
10 TABLET ORAL DAILY
Qty: 90 TABLET | Refills: 3 | Status: SHIPPED | OUTPATIENT
Start: 2022-03-07 | End: 2023-03-13

## 2022-03-07 NOTE — TELEPHONE ENCOUNTER
----- Message from Michelle Forrester PT sent at 3/2/2022  8:03 PM CST -----  Regarding: FW: New PT Eval Information  So sorry to send a second message - however I did forget to note that neuro ophthalmology / eye exam may be appropriate for this patient - noting blurry vision following hospitalizations - noted convergence deficits and increased visual sensitivity with walking/driving.     Thank you again!  Michelle Forrester PT, DPT     ----- Message -----  From: Michelle Forrester PT  Sent: 3/2/2022  10:53 AM CST  To: Teri Soto, DO  Subject: New PT Eval Information                          Good morning,     I saw this patient in clinic on Monday 2/28/22 - recently stroke of L genny/lower cerebellum. It looks as if she is a new patient for you on 3/3 at 9:30am.     She would benefit from a referral to our healthy back program as she notes chronic LBP that is more noted.   She would also benefit from a shower chair if possible due to impaired balance at this time.     I was unable to determine if she would benefit from Speech Therapy - but seems intact cognitively/memory/speech.     She also has a follow up for neuro in June and I didn't know if this was standard practice or if could be moved up? She seems to note not much was told to her about the stroke/incident.     Thank you so much for your time!   Michelle Forrester PT, DPT

## 2022-03-07 NOTE — TELEPHONE ENCOUNTER
Called patient's daughter to clarify details on FMLA paperwork. Left vm to return call and sent [employer] message for clarification.

## 2022-03-07 NOTE — PROGRESS NOTES
Assessment & Plan  Encounter for medical examination to establish care    Hospital discharge follow-up    History of CVA (cerebrovascular accident)  -     amLODIPine (NORVASC) 10 MG tablet; Take 1 tablet (10 mg total) by mouth once daily. Hold if SBP <120  Dispense: 90 tablet; Refill: 3  -     CBC Auto Differential; Future; Expected date: 06/07/2022  -     Comprehensive Metabolic Panel; Future; Expected date: 06/07/2022  -     Lipid Panel; Future; Expected date: 06/07/2022    Essential hypertension  -     amLODIPine (NORVASC) 10 MG tablet; Take 1 tablet (10 mg total) by mouth once daily. Hold if SBP <120  Dispense: 90 tablet; Refill: 3  -     Hypertension Digital Medicine (HDMP) Enrollment Order  -     Hypertension Digital Medicine (HDMP): Assign Onboarding Questionnaires  -     CBC Auto Differential; Future; Expected date: 06/07/2022  -     Comprehensive Metabolic Panel; Future; Expected date: 06/07/2022  -     Lipid Panel; Future; Expected date: 06/07/2022    Type 2 diabetes mellitus with other neurologic complication, without long-term current use of insulin  -     CBC Auto Differential; Future; Expected date: 06/07/2022  -     Comprehensive Metabolic Panel; Future; Expected date: 06/07/2022  -     Hemoglobin A1C; Future; Expected date: 06/07/2022  -     Lipid Panel; Future; Expected date: 06/07/2022  -     Microalbumin/Creatinine Ratio, Urine; Future; Expected date: 06/07/2022    Dyslipidemia associated with type 2 diabetes mellitus  -     Lipid Panel; Future; Expected date: 06/07/2022    History of tobacco abuse      Patient is recovering well after hospitalization. Continue PT and keep f/u w/Neurology.   Patient to enroll in digital HTN monitoring program. Continue antihypertensives as prescribed. Norvasc refilled.  Commended patient on quitting smoking.  Repeat labs and follow up in 3 months.       Follow-up: Follow up in about 3 months (around  "6/7/2022).  ______________________________________________________________________    Chief Complaint  Chief Complaint   Patient presents with    [facility]    Hospital Follow Up    Cerebrovascular Accident     Joanne Peña is a 71 y.o. female with medical diagnoses as listed in the medical history and problem list that presents to the office with her daughter to establish care and to follow up on recent hospitalization under observation for dizziness on 2/21-22 shortly after being discharged for a left santa-pontine and cerebellar infarcts on 2/11. The HPI and hospital course were summarized as below:    "HPI:   71 y.o. female with HTN, HLD, DM2, tobacco use, and recent posterior CVA presents with a complaint of dizziness. Has had dizziness since stroke, but reports a little worse this morning.  Has now improved after zofran and meclizine in ED.  Has not yet started outpatient therapy.  Denies fever, chills, cough, SOB, palpitations, headaches, vision changes, syncope, n/v/d, or dysuria.  In the ED, routine labs, TSH, covid test, CT head all unremarkable for any acute abnormality.     Hospital Course:   Placed is observation for dizziness felt possible symptoms from recent small infarcts in the left genny and left cerebellum last week. Dizziness resolved last night after ativan. Repeat MRI no acute intracranial abnormality. PT recommended RW and outpatient PT. Neurology recommended continue ASA/plavix/statin. Patient HDS for discharge home."     Today, patient is recovering well after hospital discharge. She reports some difficulties in maintaining her balance but denies any further episodes of dizziness. Denies syncope or falls. Ambulates with a walker and cane at home. She lives with her daughter-in-law. She has undergone one session of outpatient PT. Next PT session is 3/29. She has f/u w/Neurology on 6/22 and has been added to the waitlist for a sooner appointment. She is taking amlodipine 10 mg and " states that she was instructed to only take losartan 100 mg and hydralazine if SBP >120. Today, her BP is 134/78. She is interested in the digital HTN monitoring program. Taking ASA and Plavix as instructed. She has not smoked since hospital discharge. Her daughter is requesting completion of [place] paperwork for the time that she has missed for taking care of her mother during and between hospitalizations.       PAST MEDICAL HISTORY:  Past Medical History:   Diagnosis Date    Diabetes mellitus     Hyperlipidemia     Hypertension     Stroke        PAST SURGICAL HISTORY:  History reviewed. No pertinent surgical history.    SOCIAL HISTORY:  Social History     Socioeconomic History    Marital status:    Tobacco Use    Smoking status: Former Smoker     Packs/day: 0.50     Types: Cigarettes    Smokeless tobacco: Never Used   Substance and Sexual Activity    Drug use: No       FAMILY HISTORY:  Family History   Problem Relation Age of Onset    Kidney disease Mother     Heart disease Mother     Cancer Father     Hypertension Brother     Diabetes Daughter     Hypertension Daughter     Cancer Maternal Aunt        ALLERGIES AND MEDICATIONS: updated and reviewed.  Review of patient's allergies indicates:   Allergen Reactions    Ampicillin Rash    Darvocet a500 [propoxyphene n-acetaminophen] Other (See Comments)     karl     Current Outpatient Medications   Medication Sig Dispense Refill    ascorbic acid, vitamin C, (VITAMIN C) 500 MG tablet Take 500 mg by mouth once daily.      aspirin (ECOTRIN) 81 MG EC tablet Take 1 tablet (81 mg total) by mouth once daily. 30 tablet 3    atorvastatin (LIPITOR) 80 MG tablet Take 1 tablet (80 mg total) by mouth once daily. 90 tablet 3    clopidogreL (PLAVIX) 75 mg tablet Take 1 tablet (75 mg total) by mouth once daily. for 16 days 16 tablet 0    glimepiride (AMARYL) 2 MG tablet Take 1 tablet (2 mg total) by mouth 2 (two) times daily. 180 tablet 3    guanfacine HCl  "(GUANFACINE ORAL) Take by mouth.      hydrALAZINE (APRESOLINE) 50 MG tablet Take 1 tablet (50 mg total) by mouth every 8 (eight) hours. Hold is SBP <120 90 tablet 1    losartan (COZAAR) 100 MG tablet Take 1 tablet (100 mg total) by mouth once daily. Hold if SBP <120 30 tablet 1    meclizine (ANTIVERT) 25 mg tablet Take 1 tablet (25 mg total) by mouth 2 (two) times daily as needed for Dizziness. 60 tablet 0    metformin (GLUCOPHAGE) 500 MG tablet Take 1 tablet (500 mg total) by mouth daily with breakfast. (Patient taking differently: Take 500 mg by mouth daily with breakfast. prn) 30 tablet 2    multivitamin (THERAGRAN) per tablet Take 1 tablet by mouth once daily.      ondansetron (ZOFRAN-ODT) 4 MG TbDL Dissolve 1 tablet (4 mg total) by mouth every 8 (eight) hours as needed. 20 tablet 0    amLODIPine (NORVASC) 10 MG tablet Take 1 tablet (10 mg total) by mouth once daily. Hold if SBP <120 90 tablet 3     No current facility-administered medications for this visit.         ROS  Review of Systems   Constitutional: Positive for activity change. Negative for unexpected weight change.   Eyes: Negative for photophobia and visual disturbance.   Respiratory: Negative for cough and shortness of breath.    Cardiovascular: Negative for chest pain and leg swelling.   Endocrine: Negative for polyuria.   Musculoskeletal: Positive for back pain (chronic, unchanged) and gait problem.   Neurological: Negative for dizziness, syncope, speech difficulty, weakness and headaches.   Psychiatric/Behavioral: Negative for confusion and dysphoric mood. The patient is not nervous/anxious.            Physical Exam  Vitals:    03/07/22 0942 03/07/22 1021   BP: (!) 148/78 134/78   BP Location: Right arm    Patient Position: Sitting    BP Method: Medium (Manual)    Pulse: 82    Temp: 98.2 °F (36.8 °C)    TempSrc: Oral    SpO2: 99%    Weight: 76 kg (167 lb 8.8 oz)    Height: 5' 7" (1.702 m)     Body mass index is 26.24 kg/m².  Weight: 76 kg " "(167 lb 8.8 oz)   Height: 5' 7" (170.2 cm)   Physical Exam  Constitutional:       General: She is not in acute distress.     Appearance: Normal appearance.   HENT:      Head: Normocephalic and atraumatic.   Cardiovascular:      Rate and Rhythm: Normal rate and regular rhythm.      Pulses: Normal pulses.      Heart sounds: Normal heart sounds.   Pulmonary:      Effort: Pulmonary effort is normal. No respiratory distress.      Breath sounds: Normal breath sounds.   Musculoskeletal:      Right lower leg: No edema.      Left lower leg: No edema.   Skin:     General: Skin is warm and dry.      Findings: No rash.   Neurological:      Mental Status: She is alert and oriented to person, place, and time.   Psychiatric:         Mood and Affect: Mood normal.         Behavior: Behavior normal.         Thought Content: Thought content normal.             "

## 2022-03-07 NOTE — TELEPHONE ENCOUNTER
"Please inform patient that her physical therapist messaged me recommending an order for a shower chair. Order placed - please fax to her [employer] company of her choice. They also recommended a referral to an eye doctor for an eye exam as well as a referral to the "healthy back" program for chronic back pain - both referrals were placed.   "

## 2022-03-14 ENCOUNTER — CLINICAL SUPPORT (OUTPATIENT)
Dept: REHABILITATION | Facility: HOSPITAL | Age: 72
End: 2022-03-14
Payer: MEDICARE

## 2022-03-14 DIAGNOSIS — R26.89 IMPAIRED BALANCE AS LATE EFFECT OF CEREBROVASCULAR ACCIDENT: Primary | ICD-10-CM

## 2022-03-14 DIAGNOSIS — I69.398 IMPAIRED BALANCE AS LATE EFFECT OF CEREBROVASCULAR ACCIDENT: Primary | ICD-10-CM

## 2022-03-14 DIAGNOSIS — R26.89 IMPAIRED GAIT AND MOBILITY: ICD-10-CM

## 2022-03-14 PROCEDURE — 97112 NEUROMUSCULAR REEDUCATION: CPT | Mod: PN

## 2022-03-14 NOTE — PROGRESS NOTES
[facility]   Physical Therapy Treatment Note     Name: Joanne Peña  Clinic Number: [id number]    Therapy Diagnosis:   Encounter Diagnoses   Name Primary?   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  Response to previous treatment: fine   Functional change: ongoing     Pain: 0/10  Location: LBP nagging     OBJECTIVE     Objective Measures updated at progress report unless specified.     Treatment     Joanne received the treatments listed below:      neuromuscular re-education activities to improve: Balance, Coordination, Proprioception and Posture for 45 minutes. The following activities were included:    HEP:   Feet apart, firm, EO/EC 30 seconds - counter, chair behind   Stay active and walk     //bars:    AP heel/toe sway 2x10    WBOS - EO with target    WBOS  - pitch/yaw with target    NBOS - EO with target    NBOS - pitch/yaw with target     //bars:    WBOS - ball toss x10 - multiple directions    NBOS - ball toss x10 - multiple directions     4 sided turns - head and then body    x2 full rotations B directions     Stop and Go with Turns    - stop, go, turn and  go directions    - quick reactionary transitions    - ~200 ft     50 ft ambulation with eyes on ball target in hand     50 ft ambulation with VORx1 pitch with eyes on ball target in hand     50 ft ambulation with VORx1 yaw with eyes on ball target in hand     50 ft ambulation with slalom and cones x10 x2 sets     Patient Education and Home Exercises     Home Exercises Provided and Patient Education Provided     Education provided:   - HEP, POC, role of PT, scheduling, safe mobility     Written Home Exercises Provided: Patient instructed to cont prior HEP. Exercises were reviewed and Joanne was able to demonstrate them prior to the end of the session.  Joanne demonstrated good  understanding of the education provided. See EMR under Patient Instructions for exercises provided during therapy sessions    ASSESSMENT   Joanne demonstrating significant balance progress since evaluation with subjective indicating patient has stayed active with  on a daily basis going out of the house and doing daily activities with mild caution. Pt with appropriate balance statically, mild challenge with dynamic motion such as quick motion, turning, AP sway and slalom ambulation. Pt limited by nagging back pain and is waiting for healthy back referral to go through to set up appointment - pt given number to call and schedule as referral is placed. Pt continues to be appropriate for skilled PT services.     Joanne Is progressing well towards her goals.   Pt prognosis is Good.     Pt will continue to benefit from skilled outpatient physical therapy to address the deficits listed in the problem list box on initial evaluation, provide pt/family education and to maximize pt's level of independence in the home and community environment.     Pt's spiritual, cultural and educational needs considered and pt agreeable to plan of care and goals.     Anticipated barriers to physical therapy: central balance deficits     Goals:   Short Term  Goals: 5 weeks   1. Patient will demonstrate standing tolerance >5 minutes to promote safe progression of cooking participation.   2. Demonstrate ability to ambulate with head movements in pitch and yaw without change in speed or staggering outside 10 path to enable safe scanning of environment for obstacles or pivot turn R/L without LOB.  3. Patient will improve FGA score by 4 points to promote improved dynamic balance with decreased risk of falls.  4. Patient will improve MCTSIB score by completion of conditions 1 and 3 to promote increased visual fixation within balance performance.      Long Term Goals: 10 weeks   1. Patient will be independent with home exercise program (HEP) to promote continued rehabilitation following discharge.  2. Patient will improve FGA score by 8 points to promote decreased risk for falls.  3. Patient will improve MCTSIB score by tolerance to conditions x4 to promote increased sensory integration within balance performance.   4. Patient will perform standing exercise >15 minutes to promote safe progression of all household tasks (i.e cooking).     PLAN   Plan of care Certification: 2/28/2022 to 5/9/22.     Outpatient Physical Therapy 2 times weekly for 10 weeks to include the following interventions: Gait Training, Manual Therapy, Moist Heat/ Ice, Neuromuscular Re-ed, Patient Education, Therapeutic Activities and Therapeutic Exercise.      Would benefit from Neuro ophthalmology / eye exam, referral to healthy back for back pain, shower chair for balance deficits. Neuro follow-up in June 2022.      Michelle Forrester, PT, DPT  3/14/22

## 2022-03-16 ENCOUNTER — HOME CARE VISIT (OUTPATIENT)
Dept: NEUROLOGY | Facility: HOSPITAL | Age: 72
End: 2022-03-16
Payer: MEDICARE

## 2022-03-16 ENCOUNTER — TELEPHONE (OUTPATIENT)
Dept: NEUROLOGY | Facility: CLINIC | Age: 72
End: 2022-03-16
Payer: MEDICARE

## 2022-03-16 NOTE — TELEPHONE ENCOUNTER
Received teams message from Latesha Perrin Rn pt needing vascular neurology appt. Appt scheduled and she will give to pt.

## 2022-03-17 ENCOUNTER — OFFICE VISIT (OUTPATIENT)
Dept: URGENT CARE | Facility: CLINIC | Age: 72
End: 2022-03-17
Payer: MEDICARE

## 2022-03-17 VITALS
HEART RATE: 79 BPM | SYSTOLIC BLOOD PRESSURE: 142 MMHG | HEIGHT: 67 IN | TEMPERATURE: 97 F | OXYGEN SATURATION: 98 % | RESPIRATION RATE: 18 BRPM | WEIGHT: 167 LBS | DIASTOLIC BLOOD PRESSURE: 68 MMHG | BODY MASS INDEX: 26.21 KG/M2

## 2022-03-17 DIAGNOSIS — G89.29 CHRONIC RIGHT-SIDED LOW BACK PAIN WITH RIGHT-SIDED SCIATICA: Primary | ICD-10-CM

## 2022-03-17 DIAGNOSIS — M54.41 CHRONIC RIGHT-SIDED LOW BACK PAIN WITH RIGHT-SIDED SCIATICA: Primary | ICD-10-CM

## 2022-03-17 PROCEDURE — 72100 XR LUMBAR SPINE 2 OR 3 VIEWS: ICD-10-PCS | Mod: S$GLB,,, | Performed by: RADIOLOGY

## 2022-03-17 PROCEDURE — 3077F SYST BP >= 140 MM HG: CPT | Mod: CPTII,S$GLB,,

## 2022-03-17 PROCEDURE — 3051F HG A1C>EQUAL 7.0%<8.0%: CPT | Mod: CPTII,S$GLB,,

## 2022-03-17 PROCEDURE — 3008F PR BODY MASS INDEX (BMI) DOCUMENTED: ICD-10-PCS | Mod: CPTII,S$GLB,,

## 2022-03-17 PROCEDURE — 1125F PR PAIN SEVERITY QUANTIFIED, PAIN PRESENT: ICD-10-PCS | Mod: CPTII,S$GLB,,

## 2022-03-17 PROCEDURE — 3078F PR MOST RECENT DIASTOLIC BLOOD PRESSURE < 80 MM HG: ICD-10-PCS | Mod: CPTII,S$GLB,,

## 2022-03-17 PROCEDURE — 3008F BODY MASS INDEX DOCD: CPT | Mod: CPTII,S$GLB,,

## 2022-03-17 PROCEDURE — 1125F AMNT PAIN NOTED PAIN PRSNT: CPT | Mod: CPTII,S$GLB,,

## 2022-03-17 PROCEDURE — 1160F RVW MEDS BY RX/DR IN RCRD: CPT | Mod: CPTII,S$GLB,,

## 2022-03-17 PROCEDURE — 3077F PR MOST RECENT SYSTOLIC BLOOD PRESSURE >= 140 MM HG: ICD-10-PCS | Mod: CPTII,S$GLB,,

## 2022-03-17 PROCEDURE — 4010F PR ACE/ARB THEARPY RXD/TAKEN: ICD-10-PCS | Mod: CPTII,S$GLB,,

## 2022-03-17 PROCEDURE — 3062F POS MACROALBUMINURIA REV: CPT | Mod: CPTII,S$GLB,,

## 2022-03-17 PROCEDURE — 3078F DIAST BP <80 MM HG: CPT | Mod: CPTII,S$GLB,,

## 2022-03-17 PROCEDURE — 3066F PR DOCUMENTATION OF TREATMENT FOR NEPHROPATHY: ICD-10-PCS | Mod: CPTII,S$GLB,,

## 2022-03-17 PROCEDURE — 4010F ACE/ARB THERAPY RXD/TAKEN: CPT | Mod: CPTII,S$GLB,,

## 2022-03-17 PROCEDURE — 3051F PR MOST RECENT HEMOGLOBIN A1C LEVEL 7.0 - < 8.0%: ICD-10-PCS | Mod: CPTII,S$GLB,,

## 2022-03-17 PROCEDURE — 3066F NEPHROPATHY DOC TX: CPT | Mod: CPTII,S$GLB,,

## 2022-03-17 PROCEDURE — 99213 OFFICE O/P EST LOW 20 MIN: CPT | Mod: S$GLB,,,

## 2022-03-17 PROCEDURE — 1160F PR REVIEW ALL MEDS BY PRESCRIBER/CLIN PHARMACIST DOCUMENTED: ICD-10-PCS | Mod: CPTII,S$GLB,,

## 2022-03-17 PROCEDURE — 99213 PR OFFICE/OUTPT VISIT, EST, LEVL III, 20-29 MIN: ICD-10-PCS | Mod: S$GLB,,,

## 2022-03-17 PROCEDURE — 72100 X-RAY EXAM L-S SPINE 2/3 VWS: CPT | Mod: S$GLB,,, | Performed by: RADIOLOGY

## 2022-03-17 PROCEDURE — 3062F PR POS MACROALBUMINURIA RESULT DOCUMENTED/REVIEW: ICD-10-PCS | Mod: CPTII,S$GLB,,

## 2022-03-17 PROCEDURE — 1159F MED LIST DOCD IN RCRD: CPT | Mod: CPTII,S$GLB,,

## 2022-03-17 PROCEDURE — 1159F PR MEDICATION LIST DOCUMENTED IN MEDICAL RECORD: ICD-10-PCS | Mod: CPTII,S$GLB,,

## 2022-03-17 RX ORDER — DICLOFENAC SODIUM 10 MG/G
2 GEL TOPICAL 4 TIMES DAILY
Qty: 100 G | Refills: 1 | Status: SHIPPED | OUTPATIENT
Start: 2022-03-17 | End: 2022-11-15

## 2022-03-17 NOTE — PROGRESS NOTES
"Subjective:       Patient ID: Joanne Peña is a 71 y.o. female.    Vitals:  height is 5' 7" (1.702 m) and weight is 75.8 kg (167 lb). Her tympanic temperature is 96.8 °F (36 °C). Her blood pressure is 142/68 (abnormal) and her pulse is 79. Her respiration is 18 and oxygen saturation is 98%.     Chief Complaint: Hip Pain    Patient is a 72 y/o female with a hx HTN, T2DM, recent posterior CVA who presents with chronic R lower back pain for the past year. Pt stated that the back pain has been more noticeable her stroke in February 2022. Worsening pain today. Pain is dull and aching. Low back pain comes and goes but there is always some level of dull pain. Denies any radiation of pain. Reports that her R leg felt a little numb today but this has resolved. Pain worse with back movements and certain positions. No alleviating factors. Has tried tylenol without relief. Has been instructed not to take NSAIDs. Has been attending physical therapy for her stroke. Denies any fever, chills, SOB, CP, N/V/D, dizziness, vertigo, LOC, focal weakness or sensation loss, leg pain or swelling, dysuria, urgency, frequency, B/B incontience, saddle anesthesia.    Hip Pain   The incident occurred more than 1 week ago. There was no injury mechanism. The quality of the pain is described as aching. The pain is at a severity of 8/10. The pain is severe. The pain has been worsening since onset. Associated symptoms include a loss of motion, muscle weakness and numbness (resolved). Pertinent negatives include no inability to bear weight, loss of sensation or tingling. She reports no foreign bodies present. The symptoms are aggravated by movement. She has tried acetaminophen for the symptoms. The treatment provided no relief.       Constitution: Negative for chills and fever.   HENT: Negative for ear pain, congestion and sore throat.    Neck: Negative for neck pain and neck stiffness.   Cardiovascular: Negative for chest pain.   Eyes: Negative " for eye pain.   Respiratory: Negative for cough and shortness of breath.    Gastrointestinal: Negative for abdominal pain, nausea, vomiting, constipation, diarrhea and bowel incontinence.   Genitourinary: Negative for dysuria, frequency, urgency, flank pain and bladder incontinence.   Musculoskeletal: Positive for back pain and muscle ache.   Skin: Negative for rash.   Neurological: Positive for numbness (resolved). Negative for dizziness, light-headedness, headaches and tingling.       Objective:      Physical Exam   Constitutional: She is oriented to person, place, and time. She appears well-developed. She is cooperative. No distress.   HENT:   Head: Normocephalic and atraumatic.   Nose: Nose normal.   Mouth/Throat: Oropharynx is clear and moist and mucous membranes are normal.   Eyes: Conjunctivae and lids are normal.   Neck: Trachea normal and phonation normal. Neck supple.   Cardiovascular: Normal rate, regular rhythm, normal heart sounds and normal pulses.   Pulmonary/Chest: Effort normal and breath sounds normal.   Abdominal: Normal appearance and bowel sounds are normal. She exhibits no abdominal bruit, no pulsatile midline mass and no mass. Soft.   Musculoskeletal:         General: No deformity.      Thoracic back: Normal.      Lumbar back: She exhibits decreased range of motion and tenderness.        Back:       Comments: Pain and tenderness to R lumbar paraspinal musculature. Limited ROM of lumbar back 2/2 pain. No ecchymosis, rash, or other skin lesions. No midline spinal tenderness.   Neurological: She is alert and oriented to person, place, and time. She has normal strength and normal reflexes. No sensory deficit.   Skin: Skin is warm, dry, intact and not diaphoretic.   Psychiatric: Her speech is normal and behavior is normal. Judgment and thought content normal.   Nursing note and vitals reviewed.    XR LUMBAR SPINE 2 OR 3 VIEWS    Result Date: 3/17/2022  EXAMINATION: XR LUMBAR SPINE 2 OR 3 VIEWS  CLINICAL HISTORY: Low back pain, unspecified TECHNIQUE: Two or three views of the lumbar spine COMPARISON: None FINDINGS: Mild scoliosis with convexity to the left can be seen.  Vertebral bodies are intact.  Disc spaces are maintained.  No significant bony abnormalities are noted     See above Electronically signed by: Michael Morales MD Date:    03/17/2022 Time:    13:14        Assessment:       1. Chronic right-sided low back pain with right-sided sciatica          Plan:         Chronic right-sided low back pain with right-sided sciatica  -     XR LUMBAR SPINE 2 OR 3 VIEWS; Future; Expected date: 03/17/2022  -     diclofenac sodium (VOLTAREN) 1 % Gel; Apply 2 g topically 4 (four) times daily. Use gloves to apply  Dispense: 100 g; Refill: 1           Medical Decision Making:   Initial Assessment:   Patient is a 70 y/o female with a hx HTN, T2DM, recent posterior CVA who presents with chronic R lower back pain for the past year. Has been worse since her stroke in February 2022. Has been attending physical therapy. VSS. On exam, there is tenderness to R lumbar paraspinal musculature. Limited ROM of lower back 2/2 pain. No ecchymosis, rash, or other skin lesions. Negative SLR BL. No midline spinal tenderness.  Differential Diagnosis:   DDx includes but not limited to muscle strain, disc herniation, spinal stenosis, DJD, spondylolisthesis, spinal fracture. No findings concerning for spinal compression syndromes.  Clinical Tests:   Radiological Study: Ordered and Reviewed  Urgent Care Management:  Reviewed XR with pt. Mild scoliosis with no other acute abnormalities. Voltaren gel for pain. Discussed supportive care, such as warm compresses. Discussed clinic return vs strict ED precautions for worsening symptoms, such as bladder or bowel incontinence, or loss of sensation in buttocks, groin, thighs. Appt with neurology in April. Appt with PCP in 3 months. Pt verbalizes understanding and agrees with plan.          Patient Instructions   Back pain  Please return here or go to the Emergency Department for any concerns or worsening of condition.  If you were prescribed a narcotic medication, do not drive or operate heavy equipment or machinery while taking these medications.  Tylenol for pain. Avoid NSAIDs  Voltaren gel as directed. Can also try lidocaine patches OTC.  Warm compresses to affected area three times a day.  Mild back stretching as discussed.  Avoid any heavy lifting with current symptoms.  Please follow up with your primary care doctor or specialist in the next 48-72hrs if no improvement.     If you  smoke, please stop smoking.

## 2022-03-17 NOTE — PATIENT INSTRUCTIONS
Back pain  Please return here or go to the Emergency Department for any concerns or worsening of condition.  If you were prescribed a narcotic medication, do not drive or operate heavy equipment or machinery while taking these medications.  Tylenol for pain. Avoid NSAIDs  Voltaren gel as directed. Can also try lidocaine patches OTC.  Warm compresses to affected area three times a day.  Mild back stretching as discussed.  Avoid any heavy lifting with current symptoms.  Please follow up with your primary care doctor or specialist in the next 48-72hrs if no improvement.     If you  smoke, please stop smoking.

## 2022-03-21 ENCOUNTER — PATIENT MESSAGE (OUTPATIENT)
Dept: FAMILY MEDICINE | Facility: CLINIC | Age: 72
End: 2022-03-21
Payer: MEDICARE

## 2022-03-21 VITALS — OXYGEN SATURATION: 100 % | SYSTOLIC BLOOD PRESSURE: 130 MMHG | HEART RATE: 77 BPM | DIASTOLIC BLOOD PRESSURE: 86 MMHG

## 2022-03-22 NOTE — PROGRESS NOTES
Patients VS are WNL on today's visits. She denies any personal or familial history of stroke. PMH of HTN, HLP, tobacco abuse. Educated patient on stroke S/S, Stroke RF, and purpose of stroke mobile program.

## 2022-03-29 ENCOUNTER — CLINICAL SUPPORT (OUTPATIENT)
Dept: REHABILITATION | Facility: HOSPITAL | Age: 72
End: 2022-03-29
Payer: MEDICARE

## 2022-03-29 DIAGNOSIS — R26.89 IMPAIRED BALANCE AS LATE EFFECT OF CEREBROVASCULAR ACCIDENT: Primary | ICD-10-CM

## 2022-03-29 DIAGNOSIS — R26.89 IMPAIRED GAIT AND MOBILITY: ICD-10-CM

## 2022-03-29 DIAGNOSIS — I69.398 IMPAIRED BALANCE AS LATE EFFECT OF CEREBROVASCULAR ACCIDENT: Primary | ICD-10-CM

## 2022-03-29 PROCEDURE — 97110 THERAPEUTIC EXERCISES: CPT | Mod: PN

## 2022-03-29 NOTE — PROGRESS NOTES
[facility]   Physical Therapy Treatment Note     Name: Joanne Peña  Clinic Number: [id number]    Therapy Diagnosis:   Encounter Diagnoses   Name Primary?    Impaired balance as late effect of cerebrovascular accident Yes    Impaired gait and mobility      Physician: Madiha Kowalski DO    Visit Date: 3/29/2022    Physician Orders: PT Eval and Treat Neuro  Medical Diagnosis from Referral: Vertebral artery occlusion, right [I65.01], Cerebrovascular accident (CVA), unspecified mechanism [I63.9]  Evaluation Date: 2/28/2022  Authorization Period Expiration: 12/31/22  Plan of Care Expiration: 5/9/22  Visit # / Visits authorized: 1/ 1, 2/20    PTA Visit #: 0/5     Time In: 3:30  Time Out: 4:15   Total Billable Time: 45 minutes    Precautions: Standard, Diabetes and Fall    SUBJECTIVE     Pt reports: balance is much better; 1 LOB with back pain; no dizziness, no nausea; has been staying active   Pt feels back pain is most limiting at this time and was able to get a healthy back appt in MAY 2020.     She was compliant with home exercise program.  Response to previous treatment: fine   Functional change: ongoing     Pain: 0/10  Location: LBP nagging     OBJECTIVE     Objective Measures updated at progress report unless specified.     Palpation - R PSIS region painful 8/10 pain    - posterior scour (+) RLE   - KELSEY (-)   - FADIR (+) RLE   - long axis distraction (+) RLE   - provoking upright mobility (walking, standing) - extension > flexion    Treatment   BOLDED performed today:   Italics not performed today:     Joanne received the treatments listed below:      therapeutic exercises to develop ROM, flexibility, posture and core stabilization for 45 minutes including:    Objective as above     Nustep L1 - BUE/LE 8 minutes (no pain)     STRETCHES FOR BACK: all x2    KTC - 30s B (no pain)   Piriformis - 30s B (no pain)   Figure 4 piriformis - 30s B (no pain)   Long axis distraction with QL  activation - 30s B (painful R)    LTR - unable to the L due to pain in the R low back   Bridge - unable due to pain in the R low pack   Pelvic Tilts - no pain    - 2x10   BOSU ball - no pain    - 2x10 AP   - 2x10 rotational    - 2x10 lateral   BOSU ball - no pain   - 2x10 seated marches with core activation   Figure 4 seated piriformis - 30s B (no pain) x2     neuromuscular re-education activities to improve: Balance, Coordination, Proprioception and Posture for 00 minutes. The following activities were included:    POSTPONED balance due to severe back pain   HEP:   Feet apart, firm, EO/EC 30 seconds - counter, chair behind   Stay active and walk     //bars:    AP heel/toe sway 2x10    WBOS - EO with target    WBOS  - pitch/yaw with target    NBOS - EO with target    NBOS - pitch/yaw with target     //bars:    WBOS - ball toss x10 - multiple directions    NBOS - ball toss x10 - multiple directions     4 sided turns - head and then body    x2 full rotations B directions     Stop and Go with Turns    - stop, go, turn and go directions    - quick reactionary transitions    - ~200 ft     50 ft ambulation with eyes on ball target in hand     50 ft ambulation with VORx1 pitch with eyes on ball target in hand     50 ft ambulation with VORx1 yaw with eyes on ball target in hand     50 ft ambulation with slalom and cones x10 x2 sets     Patient Education and Home Exercises     Home Exercises Provided and Patient Education Provided     Education provided:   - HEP, POC, role of PT, scheduling, safe mobility     Written Home Exercises Provided: Patient instructed to cont prior HEP. Exercises were reviewed and Joanne was able to demonstrate them prior to the end of the session.  Joanne demonstrated good  understanding of the education provided. See EMR under Patient Instructions for exercises provided during therapy sessions    ASSESSMENT   Joanne demonstrating continued balance progress in clinic and within daily life and notes  that most balance issues are stemming from back pain and bouts of sharp back pain. Pt states back pain began after leaving hospital post stroke ([place], cerebellum). Pt is taken through screening, stretches and exercise of the hips and core to determine beneficial exercises and stretches to utilize at home as well as assistance in mobility at this time with increased length of time until healthy back evaluation. Pain is localized at the R PSIS region and is elicited with long axis distraction, FADIR, scour testing in posterior direction, standing/walking, low back extension and is limited in exercises but finds most relief with pelvic tilts with BOSU and seated figure 4 piriformis stretch. Pt is issued HEP to assist in pain management and will address at next session. Pt continues to be appropriate for skilled PT services.     Joanne Is progressing well towards her goals.   Pt prognosis is Good.     Pt will continue to benefit from skilled outpatient physical therapy to address the deficits listed in the problem list box on initial evaluation, provide pt/family education and to maximize pt's level of independence in the home and community environment.     Pt's spiritual, cultural and educational needs considered and pt agreeable to plan of care and goals.     Anticipated barriers to physical therapy: central balance deficits     Goals:   Short Term Goals: 5 weeks   1. Patient will demonstrate standing tolerance >5 minutes to promote safe progression of cooking participation.   2. Demonstrate ability to ambulate with head movements in pitch and yaw without change in speed or staggering outside 10 path to enable safe scanning of environment for obstacles or pivot turn R/L without LOB.  3. Patient will improve FGA score by 4 points to promote improved dynamic balance with decreased risk of falls.  4. Patient will improve MCTSIB score by completion of conditions 1 and 3 to promote increased visual fixation within  balance performance.      Long Term Goals: 10 weeks   1. Patient will be independent with home exercise program (HEP) to promote continued rehabilitation following discharge.  2. Patient will improve FGA score by 8 points to promote decreased risk for falls.  3. Patient will improve MCTSIB score by tolerance to conditions x4 to promote increased sensory integration within balance performance.   4. Patient will perform standing exercise >15 minutes to promote safe progression of all household tasks (i.e cooking).     PLAN   Plan of care Certification: 2/28/2022 to 5/9/22.     Outpatient Physical Therapy 2 times weekly for 10 weeks to include the following interventions: Gait Training, Manual Therapy, Moist Heat/ Ice, Neuromuscular Re-ed, Patient Education, Therapeutic Activities and Therapeutic Exercise.     Michelle Forrester, PT, DPT  3/29/22

## 2022-03-31 ENCOUNTER — CLINICAL SUPPORT (OUTPATIENT)
Dept: REHABILITATION | Facility: HOSPITAL | Age: 72
End: 2022-03-31
Payer: MEDICARE

## 2022-03-31 DIAGNOSIS — R26.89 IMPAIRED BALANCE AS LATE EFFECT OF CEREBROVASCULAR ACCIDENT: Primary | ICD-10-CM

## 2022-03-31 DIAGNOSIS — R26.89 IMPAIRED GAIT AND MOBILITY: ICD-10-CM

## 2022-03-31 DIAGNOSIS — I69.398 IMPAIRED BALANCE AS LATE EFFECT OF CEREBROVASCULAR ACCIDENT: Primary | ICD-10-CM

## 2022-03-31 PROCEDURE — 97110 THERAPEUTIC EXERCISES: CPT | Mod: PN

## 2022-03-31 NOTE — PROGRESS NOTES
"[facility]   Physical Therapy Treatment Note     Name: Joanne Peña  Clinic Number: [id number]    Therapy Diagnosis:   Encounter Diagnoses   Name Primary?    Impaired balance as late effect of cerebrovascular accident Yes    Impaired gait and mobility      Physician: Madiha Kowalski DO    Visit Date: 3/31/2022    Physician Orders: PT Eval and Treat Neuro  Medical Diagnosis from Referral: Vertebral artery occlusion, right [I65.01], Cerebrovascular accident (CVA), unspecified mechanism [I63.9]  Evaluation Date: 2/28/2022  Authorization Period Expiration: 12/31/22  Plan of Care Expiration: 5/9/22  Visit # / Visits authorized: 1/ 1, 3/20    PTA Visit #: 0/5     Time In: 3:30  Time Out: 4:25  Total Billable Time: 55 minutes    Precautions: Standard, Diabetes and Fall    SUBJECTIVE     Pt reports: balance continues to be better however back pain illicits need to hold furniture as she is fearful legs will give out from pain - no dizziness, no nausea, "just the back pain"    She was compliant with home exercise program.  Response to previous treatment: was sore that day  Functional change: upright mobility pain improved to points to 6/10     Pain: 6/10  Location: LBP nagging     OBJECTIVE     Objective Measures updated at progress report unless specified.     Treatment   BOLDED performed today:   Italics not performed today:     Joanne received the treatments listed below:      therapeutic exercises to develop ROM, flexibility, posture and core stabilization for 55 minutes including:    Nustep L5 - BUE/LE 8 minutes (no pain)     Blue large swiss ball - FWD/BWD rollout (no pain) 2x10                 - diagonal rollout (no pain) 2x10       - seated   Pelvic Tilt - x20 A/P      - with core (no pain)       - seated   Bridge - 2x10       - with core (no pain)      - 2/10 pain initially   SLR - 2x10 B       - with core (no pain)   Clamshell (supine) - 2x10       - with core (no pain)     BOSU " ball - no pain    - 2x10 AP   - 2x10 rotational      Figure 4 seated piriformis - 30s B (no pain) x2     LTR - no pain x10     Long axis distraction - no pain (with and without QL activation)   Scour - no pain   KELSEY - no pain  FADIR - hip discomfort, no pain     Testing for Sacroiliac dysfunction    - muscle energy techniques performed in hook-lying with hip ABD/ADD with resistance at B knees     STRETCHES FOR BACK: all x2    KTC - 30s B (no pain)   Piriformis - 30s B (no pain)   Figure 4 piriformis - 30s B (no pain)   Long axis distraction with QL activation - 30s B (painful R)    neuromuscular re-education activities to improve: Balance, Coordination, Proprioception and Posture for 00 minutes. The following activities were included:    POSTPONED balance due to severe back pain   HEP:   Feet apart, firm, EO/EC 30 seconds - counter, chair behind   Stay active and walk     //bars:    AP heel/toe sway 2x10    WBOS - EO with target    WBOS  - pitch/yaw with target    NBOS - EO with target    NBOS - pitch/yaw with target     //bars:    WBOS - ball toss x10 - multiple directions    NBOS - ball toss x10 - multiple directions     4 sided turns - head and then body    x2 full rotations B directions     Stop and Go with Turns    - stop, go, turn and go directions    - quick reactionary transitions    - ~200 ft     50 ft ambulation with eyes on ball target in hand     50 ft ambulation with VORx1 pitch with eyes on ball target in hand     50 ft ambulation with VORx1 yaw with eyes on ball target in hand     50 ft ambulation with slalom and cones x10 x2 sets     Patient Education and Home Exercises     Home Exercises Provided and Patient Education Provided     Education provided:   - HEP, POC, role of PT, scheduling, safe mobility     Written Home Exercises Provided: Patient instructed to cont prior HEP. Exercises were reviewed and Joanne was able to demonstrate them prior to the end of the session.  Joanne demonstrated good   understanding of the education provided. See EMR under Patient Instructions for exercises provided during therapy sessions    ASSESSMENT   Joanne demonstrating continued back pain with mild decrease from last session with ability to participate in multiple exercises patient was unable to perform last session with additions addressing core strength, LE and stabilization. Pt is taken through a series of sacroiliac screening tests and muscle energy techniques to address back pain. Pt is educated on anatomy and educated on continuance of HEP as able, all mobility as able and utilization of heat as needed. Balance is on hold at this time for pain management to improve function. Pt continues to be appropriate for skilled PT services.     Pt is seen with PT Usama and PT Ricardo.     Joanne Is progressing well towards her goals.   Pt prognosis is Good.     Pt will continue to benefit from skilled outpatient physical therapy to address the deficits listed in the problem list box on initial evaluation, provide pt/family education and to maximize pt's level of independence in the home and community environment.     Pt's spiritual, cultural and educational needs considered and pt agreeable to plan of care and goals.     Anticipated barriers to physical therapy: central balance deficits     Goals:   Short Term Goals: 5 weeks   1. Patient will demonstrate standing tolerance >5 minutes to promote safe progression of cooking participation.   2. Demonstrate ability to ambulate with head movements in pitch and yaw without change in speed or staggering outside 10 path to enable safe scanning of environment for obstacles or pivot turn R/L without LOB.  3. Patient will improve FGA score by 4 points to promote improved dynamic balance with decreased risk of falls.  4. Patient will improve MCTSIB score by completion of conditions 1 and 3 to promote increased visual fixation within balance performance.      Long Term Goals: 10 weeks   1.  Patient will be independent with home exercise program (HEP) to promote continued rehabilitation following discharge.  2. Patient will improve FGA score by 8 points to promote decreased risk for falls.  3. Patient will improve MCTSIB score by tolerance to conditions x4 to promote increased sensory integration within balance performance.   4. Patient will perform standing exercise >15 minutes to promote safe progression of all household tasks (i.e cooking).     PLAN   Plan of care Certification: 2/28/2022 to 5/9/22.     Outpatient Physical Therapy 2 times weekly for 10 weeks to include the following interventions: Gait Training, Manual Therapy, Moist Heat/ Ice, Neuromuscular Re-ed, Patient Education, Therapeutic Activities and Therapeutic Exercise.     Michelle Forrester, PT, DPT  3/31/22

## 2022-04-05 ENCOUNTER — CLINICAL SUPPORT (OUTPATIENT)
Dept: REHABILITATION | Facility: HOSPITAL | Age: 72
End: 2022-04-05
Payer: MEDICARE

## 2022-04-05 DIAGNOSIS — R26.89 IMPAIRED GAIT AND MOBILITY: ICD-10-CM

## 2022-04-05 DIAGNOSIS — R26.89 IMPAIRED BALANCE AS LATE EFFECT OF CEREBROVASCULAR ACCIDENT: Primary | ICD-10-CM

## 2022-04-05 DIAGNOSIS — I69.398 IMPAIRED BALANCE AS LATE EFFECT OF CEREBROVASCULAR ACCIDENT: Primary | ICD-10-CM

## 2022-04-05 PROCEDURE — 97112 NEUROMUSCULAR REEDUCATION: CPT | Mod: PN

## 2022-04-05 PROCEDURE — 97110 THERAPEUTIC EXERCISES: CPT | Mod: PN

## 2022-04-05 NOTE — PROGRESS NOTES
[facility]   Physical Therapy Treatment Note     Name: Joanne Peña  Clinic Number: [id number]    Therapy Diagnosis:   Encounter Diagnoses   Name Primary?    Impaired balance as late effect of cerebrovascular accident Yes    Impaired gait and mobility      Physician: Madiha Kowalski DO    Visit Date: 4/5/2022    Physician Orders: PT Eval and Treat Neuro  Medical Diagnosis from Referral: Vertebral artery occlusion, right [I65.01], Cerebrovascular accident (CVA), unspecified mechanism [I63.9]  Evaluation Date: 2/28/2022  Authorization Period Expiration: 12/31/22  Plan of Care Expiration: 5/9/22  Visit # / Visits authorized: 1/ 1, 4/20    PTA Visit #: 0/5     Time In: 3:32  Time Out: 4:15  Total Billable Time: 43 minutes    Precautions: Standard, Diabetes and Fall    SUBJECTIVE     Pt reports: balance is fine - holds on to things because of the hip back pain  - no dizziness, no nausea, no falls, LOB     She was compliant with home exercise program.  Response to previous treatment: was sore that day  Functional change: upright mobility pain improved to points to 6/10     Pain: 6/10  Location: LBP nagging     OBJECTIVE     Objective Measures updated at progress report unless specified.     Treatment   BOLDED performed today:   Italics not performed today:     Joanne received the treatments listed below:      therapeutic exercises to develop ROM, flexibility, posture and core stabilization for 10 minutes including:    Nustep L5 - BUE/LE 8 minutes (no pain)     Isometric TA activation in sitting     Blue large swiss ball - FWD/BWD rollout (no pain) 2x10                 - diagonal rollout (no pain) 2x10       - seated   Pelvic Tilt - x20 A/P      - with core (no pain)       - seated   Bridge - 2x10       - with core (no pain)      - 2/10 pain initially   SLR - 2x10 B       - with core (no pain)   Clamshell (supine) - 2x10       - with core (no pain)     BOSU ball - no pain    - 2x10  AP   - 2x10 rotational      Figure 4 seated piriformis - 30s B (no pain) x2     LTR - no pain x10     Long axis distraction - no pain (with and without QL activation)   Scour - no pain   KELSEY - no pain  FADIR - hip discomfort, no pain     Testing for Sacroiliac dysfunction    - muscle energy techniques performed in hook-lying with hip ABD/ADD with resistance at B knees     STRETCHES FOR BACK: all x2    KTC - 30s B (no pain)   Piriformis - 30s B (no pain)   Figure 4 piriformis - 30s B (no pain)   Long axis distraction with QL activation - 30s B (painful R)    neuromuscular re-education activities to improve: Balance, Coordination, Proprioception and Posture for 33 minutes. The following activities were included:    HEP:   Feet apart, firm, EO/EC 30 seconds - counter, chair behind   Stay active and walk     //bars:    AP heel/toe sway 2x10    WBOS - EO with target    WBOS  - pitch/yaw with target    NBOS - EO with target    NBOS - pitch/yaw with target     //bars:    WBOS - ball toss x10 - multiple directions    NBOS - ball toss x10 - multiple directions     4 sided turns - head and then body    x2 full rotations B directions     Stop and Go with Turns    - stop, go, turn and go directions    - quick reactionary transitions    - ~200 ft     50 ft ambulation with eyes on ball target in hand     50 ft ambulation with VORx1 pitch with eyes on ball target in hand     50 ft ambulation with VORx1 yaw with eyes on ball target in hand     50 ft ambulation with slalom and cones x10 x2 sets     Patient Education and Home Exercises     Home Exercises Provided and Patient Education Provided     Education provided:   - HEP, POC, role of PT, scheduling, safe mobility     Written Home Exercises Provided: Patient instructed to cont prior HEP. Exercises were reviewed and Joanne was able to demonstrate them prior to the end of the session.  Joanne demonstrated good  understanding of the education provided. See EMR under Patient  Instructions for exercises provided during therapy sessions    ASSESSMENT   Joanne demonstrating mild improvements in back pain generally throughout daily function. Balance continues to improve as pt is very active and mobile at home and pt is diligent with HEP. Pt continues to be appropriate for skilled PT services at this time.     Joanne Is progressing well towards her goals.   Pt prognosis is Good.     Pt will continue to benefit from skilled outpatient physical therapy to address the deficits listed in the problem list box on initial evaluation, provide pt/family education and to maximize pt's level of independence in the home and community environment.     Pt's spiritual, cultural and educational needs considered and pt agreeable to plan of care and goals.     Anticipated barriers to physical therapy: central balance deficits     Goals:   Short Term Goals: 5 weeks   1. Patient will demonstrate standing tolerance >5 minutes to promote safe progression of cooking participation.   2. Demonstrate ability to ambulate with head movements in pitch and yaw without change in speed or staggering outside 10 path to enable safe scanning of environment for obstacles or pivot turn R/L without LOB.  3. Patient will improve FGA score by 4 points to promote improved dynamic balance with decreased risk of falls.  4. Patient will improve MCTSIB score by completion of conditions 1 and 3 to promote increased visual fixation within balance performance.      Long Term Goals: 10 weeks   1. Patient will be independent with home exercise program (HEP) to promote continued rehabilitation following discharge.  2. Patient will improve FGA score by 8 points to promote decreased risk for falls.  3. Patient will improve MCTSIB score by tolerance to conditions x4 to promote increased sensory integration within balance performance.   4. Patient will perform standing exercise >15 minutes to promote safe progression of all household tasks (i.e  cooking).     PLAN   Plan of care Certification: 2/28/2022 to 5/9/22.     Outpatient Physical Therapy 2 times weekly for 10 weeks to include the following interventions: Gait Training, Manual Therapy, Moist Heat/ Ice, Neuromuscular Re-ed, Patient Education, Therapeutic Activities and Therapeutic Exercise.     Michelle Forrester, PT, DPT  4/5/22

## 2022-04-07 ENCOUNTER — OFFICE VISIT (OUTPATIENT)
Dept: NEUROLOGY | Facility: CLINIC | Age: 72
End: 2022-04-07
Payer: MEDICARE

## 2022-04-07 VITALS
SYSTOLIC BLOOD PRESSURE: 150 MMHG | BODY MASS INDEX: 26.51 KG/M2 | DIASTOLIC BLOOD PRESSURE: 80 MMHG | HEART RATE: 69 BPM | WEIGHT: 168.88 LBS | HEIGHT: 67 IN

## 2022-04-07 DIAGNOSIS — E78.2 MIXED HYPERLIPIDEMIA: ICD-10-CM

## 2022-04-07 DIAGNOSIS — I51.7 LEFT ATRIAL ENLARGEMENT: ICD-10-CM

## 2022-04-07 DIAGNOSIS — I69.398 IMPAIRED BALANCE AS LATE EFFECT OF CEREBROVASCULAR ACCIDENT: ICD-10-CM

## 2022-04-07 DIAGNOSIS — R42 DIZZINESS: ICD-10-CM

## 2022-04-07 DIAGNOSIS — E11.65 TYPE 2 DIABETES MELLITUS WITH HYPERGLYCEMIA, WITHOUT LONG-TERM CURRENT USE OF INSULIN: ICD-10-CM

## 2022-04-07 DIAGNOSIS — Z87.891 QUIT SMOKING WITHIN PAST YEAR: ICD-10-CM

## 2022-04-07 DIAGNOSIS — I10 MALIGNANT ESSENTIAL HYPERTENSION: ICD-10-CM

## 2022-04-07 DIAGNOSIS — R26.89 IMPAIRED GAIT AND MOBILITY: ICD-10-CM

## 2022-04-07 DIAGNOSIS — I63.89 CEREBROVASCULAR ACCIDENT (CVA) DUE TO OTHER MECHANISM: ICD-10-CM

## 2022-04-07 DIAGNOSIS — I63.9 LEFT PONTINE STROKE: Primary | ICD-10-CM

## 2022-04-07 DIAGNOSIS — R26.89 IMPAIRED BALANCE AS LATE EFFECT OF CEREBROVASCULAR ACCIDENT: ICD-10-CM

## 2022-04-07 PROCEDURE — 99499 UNLISTED E&M SERVICE: CPT | Mod: S$GLB,,, | Performed by: STUDENT IN AN ORGANIZED HEALTH CARE EDUCATION/TRAINING PROGRAM

## 2022-04-07 PROCEDURE — 3051F HG A1C>EQUAL 7.0%<8.0%: CPT | Mod: CPTII,GC,S$GLB, | Performed by: PSYCHIATRY & NEUROLOGY

## 2022-04-07 PROCEDURE — 99204 OFFICE O/P NEW MOD 45 MIN: CPT | Mod: GC,S$GLB,, | Performed by: PSYCHIATRY & NEUROLOGY

## 2022-04-07 PROCEDURE — 3051F PR MOST RECENT HEMOGLOBIN A1C LEVEL 7.0 - < 8.0%: ICD-10-PCS | Mod: CPTII,GC,S$GLB, | Performed by: PSYCHIATRY & NEUROLOGY

## 2022-04-07 PROCEDURE — 99999 PR PBB SHADOW E&M-EST. PATIENT-LVL IV: ICD-10-PCS | Mod: PBBFAC,GC,, | Performed by: STUDENT IN AN ORGANIZED HEALTH CARE EDUCATION/TRAINING PROGRAM

## 2022-04-07 PROCEDURE — 99999 PR PBB SHADOW E&M-EST. PATIENT-LVL IV: CPT | Mod: PBBFAC,GC,, | Performed by: STUDENT IN AN ORGANIZED HEALTH CARE EDUCATION/TRAINING PROGRAM

## 2022-04-07 PROCEDURE — 99204 PR OFFICE/OUTPT VISIT, NEW, LEVL IV, 45-59 MIN: ICD-10-PCS | Mod: GC,S$GLB,, | Performed by: PSYCHIATRY & NEUROLOGY

## 2022-04-07 PROCEDURE — 99499 RISK ADDL DX/OHS AUDIT: ICD-10-PCS | Mod: S$GLB,,, | Performed by: STUDENT IN AN ORGANIZED HEALTH CARE EDUCATION/TRAINING PROGRAM

## 2022-04-07 NOTE — PROGRESS NOTES
[facility] - NEUROLOGY [address]TH [address] [facility], [place]    Date: 4/7/22  Patient Name: Joanne Peña   MRN: [id number]   PCP: Teri Soto  Referring Provider: Luzmaria John, MORENA    Assessment:     Joanne Peña is a 71 y.o. female presenting for stroke follow up. Left hemipons/inferior cerebellum secondary to thrombotic small artery occlusion (LENA) in the setting of hypertension. EKG without arrhythmia. ECHO with severe atrial enlargement; ordered ambulatory referral for evaluation of long term cardiac monitoring as posterior circulation strokes can be cardio-embolic. Patient in agreement. Patient back to baseline functional status. No residual deficits on physical exam. Continues with secondary stroke prevention and has quit smoking. Blood pressure/diabetes/hyperlipidemia managed by PCP. Follow up to Vascular Neurology clinic as needed. Counseled on returning to ED if symptoms re-present for evaluation.     Plan:     Problem List Items Addressed This Visit        Neuro    Left pontine stroke - Primary    Current Assessment & Plan     - stable  - continue secondary stroke prevention           Impaired balance as late effect of cerebrovascular accident    Current Assessment & Plan     - resolved              Cardiac/Vascular    Malignant essential hypertension    Current Assessment & Plan     - continue secondary stroke prevention           Mixed hyperlipidemia    Current Assessment & Plan     - continue secondary stroke prevention           Left atrial enlargement    Current Assessment & Plan     - follow up with Cardiology EP outpatient           Relevant Orders    Ambulatory referral/consult to Electrophysiology       Endocrine    Type 2 diabetes mellitus with hyperglycemia, without long-term current use of insulin    Current Assessment & Plan     - continue secondary stroke prevention              Other    Quit smoking within past year    Current Assessment & Plan      - continue smoking cessation           Dizziness    Current Assessment & Plan     - resolved           Impaired gait and mobility    Current Assessment & Plan     - resolved                 Nathaniel Regalado MD    Patient note was created using MModal Dictation.  Any errors in syntax or even information may not have been identified and edited on initial review prior to signing this note.  Subjective:   Patient seen in consultation at the request of Luzmaria John NP for stroke follow up. A copy of this note will be sent to the referring physician.        HPI:   Ms. Joanne Peña is a previously smoking 71 y.o. right handed female with past medical history of hypertension, hyperlipidemia, and type 2 diabetes presenting for stroke follow up after presenting to [facility] ED 02/06/2022 and 02/21/2022 for stroke like symptoms.     In brief, [place] [facility] with dizziness, nausea, vomiting, dysarthria, and right sided weakness secondary to posterior circulation strokes in the setting of hypertension (215/115). Workup notable for labs consistent with mixed hyperlipidemia and diabetes, TTE with severe atrial enlargement, and small acute infarctions of the left santa genny/left inferior cerebellum on MRI brain as well as significant diminished flow in the posterior circulation with nonvisualization of flow within the right vertebral artery and intermittent flow in the left vertebral artery on MRA. Patient was not a candidate for tPA and managed with aspirin/plavix loads as well as blood pressure control. She was discharged 02/11/2022 with DAPT x 30 days and addition of losartan/hydralazine with outpatient amlodipine for blood pressure control. Patient re-presented 02/21/2022 for dizziness with repeat MRI showing no new acute intracranial abnormality; patient was managed with anti nausea medications and discharged shortly after dizziness resolved.     Since then patient has  progressed with outpatient PT and has not had any further episodes of dizziness, dysarthria, or weakness. She has finished dual antiplatelet therapy and has continued aspirin, atorvastatin, and blood pressure/diabetic medications without adverse effects. She states she has quit smoking. She does not need refills of her medications and states she feels back to baseline.    PAST MEDICAL HISTORY:  Past Medical History:   Diagnosis Date    Diabetes mellitus     Hyperlipidemia     Hypertension     Stroke        PAST SURGICAL HISTORY:  History reviewed. No pertinent surgical history.    CURRENT MEDS:  Current Outpatient Medications   Medication Sig Dispense Refill    amLODIPine (NORVASC) 10 MG tablet Take 1 tablet (10 mg total) by mouth once daily. Hold if SBP <120 90 tablet 3    ascorbic acid, vitamin C, (VITAMIN C) 500 MG tablet Take 500 mg by mouth once daily.      aspirin (ECOTRIN) 81 MG EC tablet Take 1 tablet (81 mg total) by mouth once daily. 30 tablet 3    atorvastatin (LIPITOR) 80 MG tablet Take 1 tablet (80 mg total) by mouth once daily. 90 tablet 3    diclofenac sodium (VOLTAREN) 1 % Gel Apply 2 g topically 4 (four) times daily. Use gloves to apply 100 g 1    glimepiride (AMARYL) 2 MG tablet Take 1 tablet (2 mg total) by mouth 2 (two) times daily. 180 tablet 3    guanfacine HCl (GUANFACINE ORAL) Take by mouth.      hydrALAZINE (APRESOLINE) 50 MG tablet Take 1 tablet (50 mg total) by mouth every 8 (eight) hours. Hold is SBP <120 90 tablet 1    losartan (COZAAR) 100 MG tablet Take 1 tablet (100 mg total) by mouth once daily. Hold if SBP <120 30 tablet 1    meclizine (ANTIVERT) 25 mg tablet Take 1 tablet (25 mg total) by mouth 2 (two) times daily as needed for Dizziness. 60 tablet 0    metformin (GLUCOPHAGE) 500 MG tablet Take 1 tablet (500 mg total) by mouth daily with breakfast. (Patient taking differently: Take 500 mg by mouth daily with breakfast. prn) 30 tablet 2    ondansetron (ZOFRAN-ODT) 4  "MG TbDL Dissolve 1 tablet (4 mg total) by mouth every 8 (eight) hours as needed. 20 tablet 0    clopidogreL (PLAVIX) 75 mg tablet Take 1 tablet (75 mg total) by mouth once daily. for 16 days 16 tablet 0    multivitamin (THERAGRAN) per tablet Take 1 tablet by mouth once daily.       No current facility-administered medications for this visit.       ALLERGIES:  Review of patient's allergies indicates:   Allergen Reactions    Lisinopril-hydrochlorothiazide Other (See Comments)     Pt stated it makes her feel drained. She couldn't raise arms over head.    Ampicillin Rash    Darvocet a500 [propoxyphene n-acetaminophen] Other (See Comments)     shaky       FAMILY HISTORY:  Family History   Problem Relation Age of Onset    Kidney disease Mother     Heart disease Mother     Cancer Father     Hypertension Brother     Diabetes Daughter     Hypertension Daughter     Cancer Maternal Aunt        SOCIAL HISTORY:  Social History     Tobacco Use    Smoking status: Former Smoker     Packs/day: 0.50     Types: Cigarettes    Smokeless tobacco: Never Used   Substance Use Topics    Drug use: No       Review of Systems:  12 system review of systems is negative except for the symptoms mentioned in HPI.      Objective:     Vitals:    04/07/22 0917   BP: (!) 150/80   BP Location: Left arm   Patient Position: Sitting   BP Method: Large (Automatic)   Pulse: 69   Weight: 76.6 kg (168 lb 14 oz)   Height: 5' 7" (1.702 m)     General: NAD, well nourished   Eyes: no tearing, discharge, no erythema   ENT: moist mucous membranes of the oral cavity, nares patent    Neck: Supple, full range of motion  Cardiovascular: Warm and well perfused, pulses equal and symmetrical  Lungs: Normal work of breathing, normal chest wall excursions  Skin: No rash, lesions, or breakdown on exposed skin  Psychiatry: Mood and affect are appropriate   Abdomen: soft, non tender, non distended  Extremeties: No cyanosis, clubbing or edema.    Neurological "   MENTAL STATUS: Alert and oriented to person, place, and time. Attention and concentration within normal limits. Speech without dysarthria, able to name and repeat without difficulty. Recent and remote memory within normal limits   CRANIAL NERVES: Visual fields intact. PERRL. EOMI. Facial sensation intact. Face symmetrical. Hearing grossly intact. Full shoulder shrug bilaterally. Tongue protrudes midline   SENSORY: Sensation is intact to light touch throughout.  Joint position perception intact. Negative Romberg.   MOTOR: Normal bulk and tone. No pronator drift.  5/5 deltoid, biceps, triceps, interosseous, hand  bilaterally. 5/5 iliopsoas, knee extension/flexion, foot dorsi/plantarflexion bilaterally.    REFLEXES: Symmetric and 2+ throughout. Toes down going bilaterally.   CEREBELLAR/COORDINATION/GAIT: Gait steady with normal arm swing and stride length.  Heel to shin intact. Finger to nose intact. Normal rapid alternating movements.     Diagnostics  Labs   Latest Reference Range & Units 02/06/22 16:24 02/19/22 10:58   Hemoglobin A1C External 4.0 - 5.6 % 7.6 (H) [1] 7.1 (H) [2]   (H): Data is abnormally high    Latest Reference Range & Units 02/06/22 16:24 02/21/22 17:51   Cholesterol 120 - 199 mg/dL 333 (H) [1] 221 (H) [2]   HDL 40 - 75 mg/dL 50 [3] 45 [4]   HDL/Cholesterol Ratio 20.0 - 50.0 % 15.0 (L) 20.4   LDL Cholesterol External 63.0 - 159.0 mg/dL 236.0 (H) [5] 154.4 [6]   Non-HDL Cholesterol mg/dL 283 [7] 176 [8]   Total Cholesterol/HDL Ratio 2.0 - 5.0  6.7 (H) 4.9   Triglycerides 30 - 150 mg/dL 235 (H) [9] 108 [10]   (H): Data is abnormally high  (L): Data is abnormally low    Brain imaging:     MRI BRAIN WITHOUT CONTRAST 02/21/2022    Narrative  EXAMINATION:  MRI OF THE BRAIN WITHOUT    CLINICAL HISTORY:  Dizziness.    TECHNIQUE:  Multiplanar multidetector MRI imaging of the brain without gadolinium was performed.    COMPARISON:  CT head 02/21/2022    FINDINGS:  The ventricles, basal cisterns, cortical  sulci are within normal limits for patient's stated age.  There are patchy T2/FLAIR hyperintensities in the periventricular white matter, deep matter, and genny consistent with chronic small vessel ischemic changes.  Diffusion-weighted imaging demonstrates no areas of restricted diffusion to suggest an acute infarct. There is no acute intracranial hemorrhage, mass effect, or midline shift. The major flow voids appear patent.  Mucoperiosteal thickening is seen in the right sphenoid sinus.    Impression  No acute intracranial abnormality detected.  Chronic small vessel ischemic changes.      Electronically signed by: Jewell Benedict  Date:    02/21/2022  Time:    21:42      CT Head Without Contrast 02/21/2022    Narrative  EXAMINATION:  CT HEAD WITHOUT CONTRAST    CLINICAL HISTORY:  Neuro deficit, acute, stroke suspected;    TECHNIQUE:  Low dose axial images were obtained through the head.  Coronal and sagittal reformations were also performed. Contrast was not administered.    COMPARISON:  02/06/2022.    FINDINGS:  No evidence of acute territorial infarct, hemorrhage, mass effect, or midline shift.  Evolving small infarcts in the left genny and left cerebellum are better evaluated on prior MRI.    Patchy periventricular white matter hypoattenuation suggestive of chronic microvascular ischemic change.  Suspect remote lacunar infarct in the right basal ganglia.  No evidence of hydrocephalus.    No displaced calvarial fracture.    Mucosal thickening in the ethmoid and sphenoid sinuses.  No air-fluid levels.    Impression  No CT evidence of acute intracranial abnormality. If the patient has an acute, focal neurological deficit, MRI of the brain may be indicated.    Chronic microvascular ischemic changes and evolving small infarcts in the left genny and left cerebellum as seen on previous MRI.      Electronically signed by: James Pack MD  Date:    02/21/2022  Time:    14:59      MRI Brain Ischemic Inter Pro Incl MRA W/O  Con 02/06/2022    Narrative  EXAMINATION:  MRI BRAIN ISCHEMIC INTERVENTIONAL PROTOCOL INCL MRA W/O CONTRAST    CLINICAL HISTORY:  stroke symptoms recurring;    TECHNIQUE:  Limited multisequence and multiplanar MRI the brain was performed using limited ischemic intervention protocol.    Via the same acquisition, 3D time-of-flight MRA of the intracranial circulation was performed.    COMPARISON:  CTA dated 02/06/2022.    FINDINGS:  MRI brain:    There is diffusion restriction involving the lateral aspect of the left genny.  There is associated increased signal on the FLAIR sequences.    There is DWI hyperintensity in the left inferior cerebellum with no definitive decreased signal on the ADC maps corresponding to this region there is associated T2/FLAIR signal hyperintensity corresponding to this region of the diffusion weighted signal abnormality.    The ventricles and sulci are prominent.  There are scattered T2/FLAIR signal hyperintensities within the periventricular and subcortical white matter.  There are no extra-axial fluid collections there is no evidence of intracranial hemorrhage.    MRA:    The intracranial segments of the ICAs are within normal limits.  The A1 segment of the right anterior cerebral artery is hypoplastic.  There is also significant attenuated within the A2 segment of the right anterior cerebral artery.  The left anterior cerebral artery is within normal limits.    The middle cerebral arteries are within normal limits.    There is no identifiable flow within the right vertebral artery.  There is intermittent flow signal within the left vertebral artery.  The basilar is significant diminutive in appearance with suspected multiple areas of stenosis.  There are robust bilateral posterior communicating arteries the supply the PCAs.    There is no evidence of aneurysm of the intracranial circulation.    Impression  MRI brain:    Small acute infarction in the left santa genny and small subacute  infarction in the left inferior cerebellum.  No evidence of intracranial hemorrhage.    MRA brain:    Significant diminished flow in the posterior circulation with nonvisualization of flow within the right vertebral artery and intermittent flow in the left vertebral artery.  Multiple luminal narrowing involving the basilar with suspected multiple high-grade stenosis versus occlusions.    Robust bilateral posterior communicating arteries supplying the PCA territories.  The findings in the basilar may be chronic in nature given the robust bilateral posterior communicating arteries.    Additional findings above.    Case discussed with Dr. Suazo on 02/06/2022 at 23:53.      Electronically signed by: Govind Simpson MD  Date:    02/06/2022  Time:    23:55      Vessel Imaging:     CTA Head and Neck (xpd) 02/06/2022    Narrative  EXAMINATION:  CTA HEAD AND NECK (XPD)    CLINICAL HISTORY:  Neuro deficit, acute, stroke suspected;    TECHNIQUE:  Non contrast low dose axial images were obtained through the head.  CT angiogram was performed from the level of the erin to the top of the head following the IV administration of 75mL of Omnipaque 350.   Sagittal and coronal reconstructions and maximum intensity projection reconstructions were performed. Arterial stenosis percentages are based on NASCET measurement criteria.    CT source data was analyzed using artificial intelligence software for detection of a large vessel occlusions (LVO) in order to enable computer assisted triage notification and aid clinical stroke decision making.    COMPARISON:  CT head dated 01/25/2016.    FINDINGS:  Noncontrast CT head:    The subcutaneous tissues unremarkable bony calvarium is intact.  The paranasal sinuses unremarkable.  The mastoid air cells are clear.  The orbits and intraorbital contents are unremarkable.    The craniocervical junction is intact.  The midline structures are unremarkable.  The there are no extra-axial fluid  collections.  There is no evidence of intracranial hemorrhage.  The ventricles and sulci are erosive changes in volume loss.  There are minimal hypodensities within periventricular and subcortical white matter.  The gray-white differentiation is maintained.  There is no dense vessel sign.  There is no evidence of mass effect.    CTA neck:    The great vessels arising for aortic arch are within normal limits.  The bilateral subclavian arteries are within normal limits.  The internal and external carotid arteries appear unremarkable.    The vertebral artery origins are within normal limits.  There is no identifiable flow within the V3 segment of the right vertebral artery.  The left vertebral artery is within normal limits.    CTA brain:    The intracranial segments of the ICAs are within normal limits.  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  There is probable azygous configuration of the A2.  The middle cerebral artery is unremarkable.    There is reconstitution of flow within the distal segment of the right V4.  The left vertebral is unremarkable.  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    The dural venous sinuses are within normal limits.    Noncontrast examination:    The soft tissue the neck are within normal limits.  The parotid and submandibular glands unremarkable.  The thyroid gland is within normal limits.  There is no evidence of lymphadenopathy in the neck.    The trachea is unremarkable.  There are emphysematous changes in the visualized lung apices.  There is no evidence of a pneumothorax or pulmonary contusion.    There are degenerative changes in the osseous structures.  The region is physis in the lower cervical spine.  There is no evidence of acute fracture or listhesis of the cervical spine.    Impression  Noncontrast CT scan head:    No CT evidence of large territorial infarction.  Additional evaluation, as clinically  warranted.    CTA:    Absence of flow within the V3 segment right vertebral artery, suggestive of occlusion.  MRI of the brain obtained for further evaluation.    Otherwise, unremarkable CTA of the head and neck.    Case discussed with Dr. Vivar 02/06/2022 at 16:49.      Electronically signed by: Govind Simpson MD  Date:    02/06/2022  Time:    16:52      Cardiac Evaluation:     ECG 12 Lead 02/21/2022  Vent. Rate : 084 BPM     Atrial Rate : 084 BPM      P-R Int : 126 ms          QRS Dur : 088 ms       QT Int : 390 ms       P-R-T Axes : 047 011 102 degrees      QTc Int : 460 ms     Normal sinus rhythm   Minimal voltage criteria for LVH, may be normal variant   T wave abnormality, consider lateral ischemia   Abnormal ECG   When compared with ECG of 06-FEB-2022 17:04,   No significant change was found     Transthoracic echo (TTE) complete 02/07/2022    Interpretation Summary  · The estimated ejection fraction is 65%.  · There is no evidence of intracardiac shunting.  · The left ventricle is normal in size with concentric hypertrophy and normal systolic function.  · Severe left atrial enlargement.  · Grade II left ventricular diastolic dysfunction.  · Normal right ventricular size with normal right ventricular systolic function.  · Moderate right atrial enlargement.  · Intermediate central venous pressure (8 mmHg).  · The estimated PA systolic pressure is 31 mmHg.

## 2022-04-12 ENCOUNTER — CLINICAL SUPPORT (OUTPATIENT)
Dept: REHABILITATION | Facility: HOSPITAL | Age: 72
End: 2022-04-12
Payer: MEDICARE

## 2022-04-12 DIAGNOSIS — R26.89 IMPAIRED BALANCE AS LATE EFFECT OF CEREBROVASCULAR ACCIDENT: Primary | ICD-10-CM

## 2022-04-12 DIAGNOSIS — R26.89 IMPAIRED GAIT AND MOBILITY: ICD-10-CM

## 2022-04-12 DIAGNOSIS — I69.398 IMPAIRED BALANCE AS LATE EFFECT OF CEREBROVASCULAR ACCIDENT: Primary | ICD-10-CM

## 2022-04-12 PROCEDURE — 97110 THERAPEUTIC EXERCISES: CPT | Mod: PN

## 2022-04-12 PROCEDURE — 97112 NEUROMUSCULAR REEDUCATION: CPT | Mod: PN

## 2022-04-12 NOTE — PROGRESS NOTES
[facility]   Physical Therapy Treatment Note - Progress Note    Name: Joanne Peña  Clinic Number: [id number]    Therapy Diagnosis:   Encounter Diagnoses   Name Primary?    Impaired balance as late effect of cerebrovascular accident Yes    Impaired gait and mobility      Physician: Madiha Kowalski DO    Visit Date: 4/12/2022    Physician Orders: PT Eval and Treat Neuro  Medical Diagnosis from Referral: Vertebral artery occlusion, right [I65.01], Cerebrovascular accident (CVA), unspecified mechanism [I63.9]  Evaluation Date: 2/28/2022  Authorization Period Expiration: 12/31/22  Plan of Care Expiration: 5/9/22  Visit # / Visits authorized: 1/ 1, 5/20    PTA Visit #: 0/5     Time In: 3:30  Time Out: 4:15  Total Billable Time: 45 minutes    Precautions: Standard, Diabetes and Fall    SUBJECTIVE     Pt reports: same as before - has healthy back and ortho appts first week in may     She was compliant with home exercise program.  Response to previous treatment: was sore that day  Functional change: upright mobility pain improved to points to 6/10     Pain: 8/10 --> 2/10   Location: LBP nagging     OBJECTIVE     Objective Measures updated at progress report unless specified.       Evaluation 4/12/22   Single Limb Stance R LE NT  (<10 sec = HIGH FALL RISK) NT   Single Limb Stance L LE NT  (<10 sec = HIGH FALL RISK) NT   5 times sit-stand 14 seconds  >12 sec= fall risk for general elderly  >16 sec= fall risk for Parkinson's disease  >10 sec= balance/vestibular dysfunction (<61 y/o)  >14.2 sec= balance/vestibular dysfunction (>61 y/o)  >12 sec= fall risk for CVA NT   FGA 14/30 22/30      Postural control:  MCTSIB:  1. Eyes Open/feet together/Firm: 30 seconds (initial 30)  2. Eyes Closed/feet together/Firm: 30 seconds (initial 14)   3. Eyes Open/feet together/Foam: 30 seconds (initial 18)   4. Eyes Closed/feet together/Foam: 30 seconds (initial 1)      Functional Gait Assessment:   1. Gait  on level surface =  1 - 8 seconds               (3) Normal: less than 5.5 sec, no A.D., no imbalance, normal gait pattern, deviates< 6in              (2) Mild impairment: 7-5.6 sec, uses A.D., mild gait deviations, or deviates 6-10 in              (1) Moderate impairment: > 7 sec, slow speed, imbalance, deviates 10-15 in.              (0) Severe impairment: needs assist, deviates >15 in, reach/touch wall  2. Change in Gait Speed = 3              (3) Normal: smooth change w/o loss of balance or gait deviation, deviates < 6 in, significant difference between speeds              (2) Mild impairment: changes speed, but demonstrates mild gait deviations, deviates 6-10 in, OR no deviations but unable to significantly speed, OR uses A.D.              (1) Moderate impairment: minor changes to speed, OR changes speed w/ significant deviations, deviates 10-15 in, OR  Changes speed , but loses balance & recovers              (0) Severe impairment: cannot change speed, deviates >15 in, or loses balance & needs assist  3. Gait with horizontal head turns  = 3              (3) Normal: no change in gait, deviates <6 in              (2) Mild impairment: slight change in speed, deviates 6-10 in, OR uses A.D.              (1) Moderate impairment: moderate change in speed, deviates 10-15 in              (0) Severe impairment: severe disruption of gait, deviates >15in  4. Gait with vertical head turns = 3              (3) Normal: no change in gait, deviates <6 in              (2) Mild impairment: slight change in speed, deviates 6-10 in OR uses A.D.              (1) Moderate impairment: moderate change in speed, deviates 10-15 in              (0) Severe impairment: severe disruption of gait, deviates >15 in  5. Gait with pivot turns = 2              (3) Normal: performs safely in 3 sec, no LOB              (2) Mild impairment: performs in >3 sec & no LOB, OR turns safely & requires several steps to regain LOB              (1) Moderate  impairment: turns slow, OR requires several small steps for balance following turn & stop              (0) Severe impairment: cannot turn safely, needs assist  6. Step over obstacle = 2              (3) Normal: steps over 2 stacked boxes w/o change in speed or LOB              (2) Mild impairment: able to step over 1 box w/o change in speed or LOB              (1) Moderate impairment: steps over 1 box but must slow down, may require VC              (0) Severe impairment: cannot perform w/o assist  7. Gait with Narrow ADONAY = 1              (3) Normal: 10 steps no staggering              (2) Mild impairment: 7-9 steps              (1) Moderate impairment: 4-7 steps              (0) Severe impairment: < 4 steps or cannot perform w/o assist  8. Gait with eyes closed = 2 - 9 seconds               (3) Normal: < 7 sec, no A.D., no LOB, normal gait pattern, deviates <6 in              (2) Mild impairment: 7.1-9 sec, mild gait deviations, deviates 6-10 in              (1) Moderate impairment: > 9 sec, abnormal pattern, LOB, deviates 10-15 in              (0) Severe impairment: cannot perform w/o assist, LOB, deviates >15in  9. Ambulating Backwards = 2              (3) Normal: no A.D., no LOB, normal gait pattern, deviates <6in              (2) Mild impairment: uses A.D., slower speed, mild gait deviations, deviates 6-10 in              (1) Moderate impairment: slow speed, abnormal gait pattern, LOB, deviates 10-15 in              (0) Severe impairment: severe gait deviations or LOB, deviates >15in  10. Steps = 3              (3) Normal: alternating feet, no rail              (2) Mild Impairment: alternating feet, uses rail              (1) Moderate impairment: step-to, uses rail              (0) Severe impairment: cannot perform safely     Total Score 22/30 (14/30)      Score:   <22/30 fall risk   <20/30 fall risk in older adults   <18/30 fall risk in Parkinsons      Scores by Decade:                        Age    Score                         40-49  (24-30)                       50-59  (25-30)                       60-69  (20-30)                       70-79  (16-30)     Gait Assessment:   - AD used: none  - Assistance: SBA  - Distance: 100 ft   - Stairs: Mod I     GAIT DEVIATIONS:  Gait component performance: decreased speed, increased frontal plane sway, increased step length/width, decreased amplitude of all motion      Endurance Deficit: YES        Evaluation   Timed Up and Go NT  < 20 sec safe for independent transfers,     < 30 sec assist required for transfers   6 meter walk test NT   6 min walk test NT      Functional Mobility (Bed mobility, transfers)  Bed mobility: I  Supine to sit: Mod I  Sit to supine: Mod I  Rolling: Mod I  Sit to stand:  Mod I  Stand pivot:  Min A     CMS Impairment/Limitation/Restriction for FOTO Stroke LE Survey     Therapist reviewed FOTO scores for Joanne Peña on 2/28/2022.   FOTO documents entered into [employer] - see Media section.     Limitation Score: 73% (50%)  Category: Mobility and Function         Treatment   BOLDED performed today:   Italics not performed today:     Joanne received the treatments listed below:      therapeutic exercises to develop ROM, flexibility, posture and core stabilization for 30 minutes including:    Nustep L5 - BUE/LE 8 minutes (no pain)     Objective as above     Isometric TA activation in sitting     Blue large swiss ball - FWD/BWD rollout (no pain) 2x10                 - diagonal rollout (no pain) 2x10       - seated     Pelvic Tilt - x20 A/P      - with core (no pain)       - supine   Bridge - 2x10       - with core (no pain)      - 2/10 pain initially   SLR - 2x10 B       - with core (no pain)   Clamshell (supine) - 2x10       - with core (no pain)     BOSU ball - no pain    - 2x10 AP   - 2x10 rotational      Figure 4 seated piriformis - 30s B (no pain) x2     LTR - no pain x10 (with core)     Long axis distraction - no pain (with and without QL activation)    Scour - no pain   KELSEY - no pain  FADIR - hip discomfort, no pain     Testing for Sacroiliac dysfunction    - muscle energy techniques performed in hook-lying with hip ABD/ADD with resistance at B knees     STRETCHES FOR BACK: all x2    KTC - 30s B (no pain)   Piriformis - 30s B (no pain)   Figure 4 piriformis - 30s B (no pain)   Long axis distraction with QL activation - 30s B (painful R)    neuromuscular re-education activities to improve: Balance, Coordination, Proprioception and Posture for 15 minutes. The following activities were included:    Objective as above     HEP:   Feet apart, firm, EO/EC 30 seconds - counter, chair behind   Stay active and walk     //bars:    AP heel/toe sway 2x10    WBOS - EO with target    WBOS  - pitch/yaw with target    NBOS - EO with target    NBOS - pitch/yaw with target     //bars:    WBOS - ball toss x10 - multiple directions    NBOS - ball toss x10 - multiple directions     4 sided turns - head and then body    x2 full rotations B directions     Stop and Go with Turns    - stop, go, turn and go directions    - quick reactionary transitions    - ~200 ft     50 ft ambulation with eyes on ball target in hand     50 ft ambulation with VORx1 pitch with eyes on ball target in hand     50 ft ambulation with VORx1 yaw with eyes on ball target in hand     50 ft ambulation with slalom and cones x10 x2 sets     Patient Education and Home Exercises     Home Exercises Provided and Patient Education Provided     Education provided:   - HEP, POC, role of PT, scheduling, safe mobility     Written Home Exercises Provided: Patient instructed to cont prior HEP. Exercises were reviewed and Joanne was able to demonstrate them prior to the end of the session.  Joanne demonstrated good  understanding of the education provided. See EMR under Patient Instructions for exercises provided during therapy sessions    ASSESSMENT   Joanne demonstrating statistically significant improvement in both static  and dynamic balance within outcomes performed and documented as above. Pt notes improvement within subjective intake and within FOTO score. Pt continues to be limited by dynamic mobility due to significant back pain that which with TA activation, stretching and core strengthening improves to 2/10 this session however is not able to maintain. Of note with TA activation throughout mobility pain reduced. Will assist in continuance of dynamic stability and balance as well as tolerance to upright mobility. Will decrease frequency of visits at this time. Pt continues to be appropriate for skilled PT services at this time.     Joanne Is progressing well towards her goals.   Pt prognosis is Good.     Pt will continue to benefit from skilled outpatient physical therapy to address the deficits listed in the problem list box on initial evaluation, provide pt/family education and to maximize pt's level of independence in the home and community environment.     Pt's spiritual, cultural and educational needs considered and pt agreeable to plan of care and goals.     Anticipated barriers to physical therapy: central balance deficits     Goals: as of 4/12/22  Short Term Goals: 5 weeks   1. Patient will demonstrate standing tolerance >5 minutes to promote safe progression of cooking participation. - MET 4/12/22  2. Demonstrate ability to ambulate with head movements in pitch and yaw without change in speed or staggering outside 10 path to enable safe scanning of environment for obstacles or pivot turn R/L without LOB. - MET 4/12/22  3. Patient will improve FGA score by 4 points to promote improved dynamic balance with decreased risk of falls. - MET 4/12/22  4. Patient will improve MCTSIB score by completion of conditions 1 and 3 to promote increased visual fixation within balance performance. - MET 4/12/22     Long Term Goals: 10 weeks   1. Patient will be independent with home exercise program (HEP) to promote continued  rehabilitation following discharge. - MET 4/12/22  2. Patient will improve FGA score by 8 points to promote decreased risk for falls. - MET 4/12/22  3. Patient will improve MCTSIB score by tolerance to conditions x4 to promote increased sensory integration within balance performance. - MET 4/12/22  4. Patient will perform standing exercise >15 minutes to promote safe progression of all household tasks (i.e cooking). - ongoing   NEW GOAL: Patient will verbalize LBP of 6/10 rating with standing in home routine to improve tolerance to daily tasks and mobility.      PLAN   Plan of care Certification: 2/28/2022 to 5/9/22.     Outpatient Physical Therapy 1 times weekly for 4 weeks to include the following interventions: Gait Training, Manual Therapy, Moist Heat/ Ice, Neuromuscular Re-ed, Patient Education, Therapeutic Activities and Therapeutic Exercise.     Michelle Forrester, PT, DPT  4/12/22

## 2022-04-18 ENCOUNTER — PATIENT MESSAGE (OUTPATIENT)
Dept: FAMILY MEDICINE | Facility: CLINIC | Age: 72
End: 2022-04-18
Payer: MEDICARE

## 2022-04-18 ENCOUNTER — PATIENT OUTREACH (OUTPATIENT)
Dept: ADMINISTRATIVE | Facility: OTHER | Age: 72
End: 2022-04-18
Payer: MEDICARE

## 2022-04-18 DIAGNOSIS — M25.559 HIP PAIN: Primary | ICD-10-CM

## 2022-04-18 NOTE — PROGRESS NOTES
Health Maintenance Due   Topic Date Due    COVID-19 Vaccine (1) Never done    Eye Exam  Never done    TETANUS VACCINE  Never done    DEXA Scan  Never done    Colorectal Cancer Screening  Never done    Shingles Vaccine (1 of 2) Never done    Foot Exam  02/04/2017    Mammogram  02/12/2017    Pneumococcal Vaccines (Age 65+) (1 - PCV) Never done    Influenza Vaccine (1) Never done     Updates were requested from care everywhere.  Chart was reviewed for overdue Proactive [facility] Encounters (COURTNEY) topics (CRS, Breast Cancer Screening, Eye exam)  Health Maintenance has been updated.  LINKS immunization registry triggered.  Immunizations were reconciled.

## 2022-04-19 ENCOUNTER — OFFICE VISIT (OUTPATIENT)
Dept: ORTHOPEDICS | Facility: CLINIC | Age: 72
End: 2022-04-19
Attending: ORTHOPAEDIC SURGERY
Payer: MEDICARE

## 2022-04-19 ENCOUNTER — HOME CARE VISIT (OUTPATIENT)
Dept: NEUROLOGY | Facility: HOSPITAL | Age: 72
End: 2022-04-19
Payer: MEDICARE

## 2022-04-19 VITALS
RESPIRATION RATE: 18 BRPM | BODY MASS INDEX: 26.78 KG/M2 | HEART RATE: 99 BPM | DIASTOLIC BLOOD PRESSURE: 72 MMHG | OXYGEN SATURATION: 99 % | WEIGHT: 171 LBS | SYSTOLIC BLOOD PRESSURE: 142 MMHG

## 2022-04-19 DIAGNOSIS — M16.11 PRIMARY OSTEOARTHRITIS OF RIGHT HIP: Primary | ICD-10-CM

## 2022-04-19 DIAGNOSIS — M54.16 LUMBAR RADICULOPATHY: ICD-10-CM

## 2022-04-19 DIAGNOSIS — M46.1 SACROILIITIS: ICD-10-CM

## 2022-04-19 PROCEDURE — 3078F PR MOST RECENT DIASTOLIC BLOOD PRESSURE < 80 MM HG: ICD-10-PCS | Mod: CPTII,S$GLB,, | Performed by: ORTHOPAEDIC SURGERY

## 2022-04-19 PROCEDURE — 3077F SYST BP >= 140 MM HG: CPT | Mod: CPTII,S$GLB,, | Performed by: ORTHOPAEDIC SURGERY

## 2022-04-19 PROCEDURE — 99999 PR PBB SHADOW E&M-EST. PATIENT-LVL III: ICD-10-PCS | Mod: PBBFAC,,, | Performed by: ORTHOPAEDIC SURGERY

## 2022-04-19 PROCEDURE — 99499 UNLISTED E&M SERVICE: CPT | Mod: S$GLB,,, | Performed by: ORTHOPAEDIC SURGERY

## 2022-04-19 PROCEDURE — 3051F HG A1C>EQUAL 7.0%<8.0%: CPT | Mod: CPTII,S$GLB,, | Performed by: ORTHOPAEDIC SURGERY

## 2022-04-19 PROCEDURE — 1159F MED LIST DOCD IN RCRD: CPT | Mod: CPTII,S$GLB,, | Performed by: ORTHOPAEDIC SURGERY

## 2022-04-19 PROCEDURE — 3066F NEPHROPATHY DOC TX: CPT | Mod: CPTII,S$GLB,, | Performed by: ORTHOPAEDIC SURGERY

## 2022-04-19 PROCEDURE — 99203 PR OFFICE/OUTPT VISIT, NEW, LEVL III, 30-44 MIN: ICD-10-PCS | Mod: S$GLB,,, | Performed by: ORTHOPAEDIC SURGERY

## 2022-04-19 PROCEDURE — 4010F PR ACE/ARB THEARPY RXD/TAKEN: ICD-10-PCS | Mod: CPTII,S$GLB,, | Performed by: ORTHOPAEDIC SURGERY

## 2022-04-19 PROCEDURE — 3051F PR MOST RECENT HEMOGLOBIN A1C LEVEL 7.0 - < 8.0%: ICD-10-PCS | Mod: CPTII,S$GLB,, | Performed by: ORTHOPAEDIC SURGERY

## 2022-04-19 PROCEDURE — 3062F POS MACROALBUMINURIA REV: CPT | Mod: CPTII,S$GLB,, | Performed by: ORTHOPAEDIC SURGERY

## 2022-04-19 PROCEDURE — 1159F PR MEDICATION LIST DOCUMENTED IN MEDICAL RECORD: ICD-10-PCS | Mod: CPTII,S$GLB,, | Performed by: ORTHOPAEDIC SURGERY

## 2022-04-19 PROCEDURE — 99999 PR PBB SHADOW E&M-EST. PATIENT-LVL III: CPT | Mod: PBBFAC,,, | Performed by: ORTHOPAEDIC SURGERY

## 2022-04-19 PROCEDURE — 3062F PR POS MACROALBUMINURIA RESULT DOCUMENTED/REVIEW: ICD-10-PCS | Mod: CPTII,S$GLB,, | Performed by: ORTHOPAEDIC SURGERY

## 2022-04-19 PROCEDURE — 3288F FALL RISK ASSESSMENT DOCD: CPT | Mod: CPTII,S$GLB,, | Performed by: ORTHOPAEDIC SURGERY

## 2022-04-19 PROCEDURE — 99203 OFFICE O/P NEW LOW 30 MIN: CPT | Mod: S$GLB,,, | Performed by: ORTHOPAEDIC SURGERY

## 2022-04-19 PROCEDURE — 3066F PR DOCUMENTATION OF TREATMENT FOR NEPHROPATHY: ICD-10-PCS | Mod: CPTII,S$GLB,, | Performed by: ORTHOPAEDIC SURGERY

## 2022-04-19 PROCEDURE — 3288F PR FALLS RISK ASSESSMENT DOCUMENTED: ICD-10-PCS | Mod: CPTII,S$GLB,, | Performed by: ORTHOPAEDIC SURGERY

## 2022-04-19 PROCEDURE — 1125F PR PAIN SEVERITY QUANTIFIED, PAIN PRESENT: ICD-10-PCS | Mod: CPTII,S$GLB,, | Performed by: ORTHOPAEDIC SURGERY

## 2022-04-19 PROCEDURE — 3077F PR MOST RECENT SYSTOLIC BLOOD PRESSURE >= 140 MM HG: ICD-10-PCS | Mod: CPTII,S$GLB,, | Performed by: ORTHOPAEDIC SURGERY

## 2022-04-19 PROCEDURE — 99499 RISK ADDL DX/OHS AUDIT: ICD-10-PCS | Mod: S$GLB,,, | Performed by: ORTHOPAEDIC SURGERY

## 2022-04-19 PROCEDURE — 4010F ACE/ARB THERAPY RXD/TAKEN: CPT | Mod: CPTII,S$GLB,, | Performed by: ORTHOPAEDIC SURGERY

## 2022-04-19 PROCEDURE — 1125F AMNT PAIN NOTED PAIN PRSNT: CPT | Mod: CPTII,S$GLB,, | Performed by: ORTHOPAEDIC SURGERY

## 2022-04-19 PROCEDURE — 1101F PT FALLS ASSESS-DOCD LE1/YR: CPT | Mod: CPTII,S$GLB,, | Performed by: ORTHOPAEDIC SURGERY

## 2022-04-19 PROCEDURE — 3078F DIAST BP <80 MM HG: CPT | Mod: CPTII,S$GLB,, | Performed by: ORTHOPAEDIC SURGERY

## 2022-04-19 PROCEDURE — 3008F BODY MASS INDEX DOCD: CPT | Mod: CPTII,S$GLB,, | Performed by: ORTHOPAEDIC SURGERY

## 2022-04-19 PROCEDURE — 1101F PR PT FALLS ASSESS DOC 0-1 FALLS W/OUT INJ PAST YR: ICD-10-PCS | Mod: CPTII,S$GLB,, | Performed by: ORTHOPAEDIC SURGERY

## 2022-04-19 PROCEDURE — 3008F PR BODY MASS INDEX (BMI) DOCUMENTED: ICD-10-PCS | Mod: CPTII,S$GLB,, | Performed by: ORTHOPAEDIC SURGERY

## 2022-04-19 RX ORDER — METHYLPREDNISOLONE 4 MG/1
TABLET ORAL
Qty: 1 EACH | Refills: 0 | Status: SHIPPED | OUTPATIENT
Start: 2022-04-19 | End: 2023-06-21

## 2022-04-19 RX ORDER — GABAPENTIN 100 MG/1
100 CAPSULE ORAL 3 TIMES DAILY
Qty: 90 CAPSULE | Refills: 0 | Status: SHIPPED | OUTPATIENT
Start: 2022-04-19 | End: 2023-06-21

## 2022-04-19 RX ORDER — METHOCARBAMOL 500 MG/1
500 TABLET, FILM COATED ORAL 3 TIMES DAILY
Qty: 30 TABLET | Refills: 0 | Status: SHIPPED | OUTPATIENT
Start: 2022-04-19 | End: 2022-04-29

## 2022-04-19 NOTE — PROGRESS NOTES
NEW PATIENT ORTHOPAEDIC: HIP    PRIMARY CARE PHYSICIAN: Teri Soto, DO   REFERRING PROVIDER: Emil Gardner MD  [address] [facility]  [place]  [place] [id number]     ASSESSMENT & PLAN:    Impression:  Right Hip Mild Osteoarthritis, primary  Lumbar Radiculopathy  Sacroilitis    Follow Up Plan: PRN    Non operative care:    Joanne Peña has physical exam evidence of above and wishes to pursue an non-operative care. I am recommending the following: Medrol dosepack, gabapentin, robaxin.  Patient works as a nurse.  She has a recent history of give was placed on Plavix for 21 days, now off.  She has been undergoing physical therapy and has been doing well.  However she has residual pains primarily located in her right back and buttocks extending down in to the posterior aspect of her leg and into her foot at times.  She was referred to the Healthy Back from but with confounding hip pains and problems she is following up with me today.  Obtain radiographs of the right hip which show some mild arthritic changes of that hip compared to the other side.  She her hip is not irritable on exam and no pain with internal or external rotation of her hip.  Ultimately I think her primary issue is her lumbar spine.  She does have some scoliosis of her spine and I reviewed a previous lumbar radiograph with some mild facet disease.  She also has clinical exam findings consistent with sacroilitis.  For all of these pains I think he would be best treated with the Medrol Dosepak and for the radicular pains gabapentin and paraspinal tenderness Robaxin.  Advised that she should continue with the Healthy Back program.  Should this fail to alleviate her pains I advised she call we would place a report to get     The patient has been ordered:  Pain Prescription    CONSULTS:   None    ACTIVE PROBLEM LIST  Patient Active Problem List   Diagnosis    Malignant essential hypertension    Type 2 diabetes mellitus with hyperglycemia, without long-term  current use of insulin    Mixed hyperlipidemia    Left pontine stroke    Normocytic anemia    Quit smoking within past year    Overweight with body mass index (BMI) of 25 to 25.9 in adult    Dizziness    Impaired balance as late effect of cerebrovascular accident    Impaired gait and mobility    Left atrial enlargement           SUBJECTIVE    CHIEF COMPLAINT: Hip Pain    HPI:   Joanne Peña is a 71 y.o. female here for evaluation and management of right hip pain. There is not a specific incident that brought about this pain. she has had progressive problems with the hip(s) starting 1 months ago and is progressing which is now interfering with activities which include: walking, functional household ADL's, participating in family activities, enjoying hobbies, exercise and rising from a sitting position    Currently the pain in the joint is moderate with activity.  The pain is constant and is located in buttocks, lateral hip and thigh.  The pain is described as aching, radiating, severe, sharp and shooting . Relieving factors include ice or heat and over the counter medication  and patches    Joanne Peña has no additional complaints.     PROGRESSIVE SYMPTOMS:  Pain worsened by weight bearing  Pain effecting living situation  Pain limiting ability to stay fit and healthy    FUNCTIONAL STATUS:   Climb a flight of stairs or walk up a hill     PREVIOUS TREATMENTS:  Medical: Tylenol  Physical Therapy: Activities Modified  and Formal Physical Therapy for 6 weeks  Previous Orthopaedic Surgery: None    REVIEW OF SYSTEMS:  PAIN ASSESSMENT:  See HPI.  MUSCULOSKELETAL: See HPI.  OTHER 10 point review of systems is negative except as stated in HPI above    PAST MEDICAL HISTORY   has a past medical history of Diabetes mellitus, Hyperlipidemia, Hypertension, and Stroke.     PAST SURGICAL HISTORY   has no past surgical history on file.     FAMILY HISTORY  family history includes Cancer in her father and maternal  aunt; Diabetes in her daughter; Heart disease in her mother; Hypertension in her brother and daughter; Kidney disease in her mother.     SOCIAL HISTORY   reports that she has quit smoking. Her smoking use included cigarettes. She smoked 0.50 packs per day. She has never used smokeless tobacco. She reports that she does not use drugs.     ALLERGIES   Review of patient's allergies indicates:   Allergen Reactions    Lisinopril-hydrochlorothiazide Other (See Comments)     Pt stated it makes her feel drained. She couldn't raise arms over head.    Ampicillin Rash    Darvocet a500 [propoxyphene n-acetaminophen] Other (See Comments)     shaky        MEDICATIONS   Current Outpatient Medications on File Prior to Visit   Medication Sig Dispense Refill    amLODIPine (NORVASC) 10 MG tablet Take 1 tablet (10 mg total) by mouth once daily. Hold if SBP <120 90 tablet 3    ascorbic acid, vitamin C, (VITAMIN C) 500 MG tablet Take 500 mg by mouth once daily.      aspirin (ECOTRIN) 81 MG EC tablet Take 1 tablet (81 mg total) by mouth once daily. 30 tablet 3    atorvastatin (LIPITOR) 80 MG tablet Take 1 tablet (80 mg total) by mouth once daily. 90 tablet 3    diclofenac sodium (VOLTAREN) 1 % Gel Apply 2 g topically 4 (four) times daily. Use gloves to apply 100 g 1    glimepiride (AMARYL) 2 MG tablet Take 1 tablet (2 mg total) by mouth 2 (two) times daily. 180 tablet 3    guanfacine HCl (GUANFACINE ORAL) Take by mouth.      hydrALAZINE (APRESOLINE) 50 MG tablet Take 1 tablet (50 mg total) by mouth every 8 (eight) hours. Hold is SBP <120 90 tablet 1    losartan (COZAAR) 100 MG tablet Take 1 tablet (100 mg total) by mouth once daily. Hold if SBP <120 30 tablet 1    multivitamin (THERAGRAN) per tablet Take 1 tablet by mouth once daily.      ondansetron (ZOFRAN-ODT) 4 MG TbDL Dissolve 1 tablet (4 mg total) by mouth every 8 (eight) hours as needed. 20 tablet 0    clopidogreL (PLAVIX) 75 mg tablet Take 1 tablet (75 mg total) by  mouth once daily. for 16 days 16 tablet 0    meclizine (ANTIVERT) 25 mg tablet Take 1 tablet (25 mg total) by mouth 2 (two) times daily as needed for Dizziness. 60 tablet 0    metformin (GLUCOPHAGE) 500 MG tablet Take 1 tablet (500 mg total) by mouth daily with breakfast. (Patient taking differently: Take 500 mg by mouth daily with breakfast. prn) 30 tablet 2     No current facility-administered medications on file prior to visit.          PHYSICAL EXAM   weight is 77.6 kg (171 lb). Her blood pressure is 142/72 (abnormal) and her pulse is 99. Her respiration is 18 and oxygen saturation is 99%.   Body mass index is 26.78 kg/m².      All other systems deferred.  GENERAL:  No acute distress  HABITUS: Normal  GAIT: Antalgic  SKIN: Normal     HIP EXAM:    right:   ROM:   Flexion: 120 degrees    Internal Rotation: 30 degrees    External Rotation: 30 degrees    Abduction: 45 degrees    Adduction: 20 degrees  No apparent leg length discrepancy    Palpation: yes tenderness over SI joint   Pain is not reproduced with IR or ER of the hip  Strength: 5/5 hip flexion, abduction, knee flexion and extension   Straight leg raise: Negative   Neurovascular Status: Sensation intact to light touch in Sural, saphenous, SPN, DPN, Tibial nerve distribution  2+ pulse DP/PT, normal capillary refill, foot has normal warmth    DATA:  Diagnostic tests reviewed for today's visit:     AP pelvis, hip, and lateral radiographs shows mild narrowing of the superior joint space with sclerosis. The femoral neck is in netural position. The femoral head is spherical at superior margin. There is no signficant evidence of acetabular dysplasia and the femoral head remains covered.

## 2022-04-19 NOTE — PROGRESS NOTES
Contacted patient to schedule monthly SM visit no answer at numbers provided VM message box is full.

## 2022-04-26 ENCOUNTER — TELEPHONE (OUTPATIENT)
Dept: ELECTROPHYSIOLOGY | Facility: CLINIC | Age: 72
End: 2022-04-26
Payer: MEDICARE

## 2022-04-26 DIAGNOSIS — I49.8 OTHER CARDIAC ARRHYTHMIA: Primary | ICD-10-CM

## 2022-04-26 NOTE — TELEPHONE ENCOUNTER
Spoke to pt to set up an appointment to see dr parham as a NP. Pt stated the selected time/date works for her.

## 2022-05-02 ENCOUNTER — PATIENT MESSAGE (OUTPATIENT)
Dept: ORTHOPEDICS | Facility: CLINIC | Age: 72
End: 2022-05-02
Payer: MEDICARE

## 2022-05-03 ENCOUNTER — PATIENT MESSAGE (OUTPATIENT)
Dept: ORTHOPEDICS | Facility: CLINIC | Age: 72
End: 2022-05-03
Payer: MEDICARE

## 2022-05-03 DIAGNOSIS — M54.16 LUMBAR RADICULOPATHY: Primary | ICD-10-CM

## 2022-05-04 ENCOUNTER — TELEPHONE (OUTPATIENT)
Dept: PAIN MEDICINE | Facility: CLINIC | Age: 72
End: 2022-05-04
Payer: MEDICARE

## 2022-05-04 ENCOUNTER — PATIENT MESSAGE (OUTPATIENT)
Dept: ADMINISTRATIVE | Facility: OTHER | Age: 72
End: 2022-05-04
Payer: MEDICARE

## 2022-05-04 ENCOUNTER — PATIENT MESSAGE (OUTPATIENT)
Dept: ORTHOPEDICS | Facility: CLINIC | Age: 72
End: 2022-05-04
Payer: MEDICARE

## 2022-05-04 ENCOUNTER — PATIENT OUTREACH (OUTPATIENT)
Dept: ADMINISTRATIVE | Facility: OTHER | Age: 72
End: 2022-05-04
Payer: MEDICARE

## 2022-05-04 ENCOUNTER — OFFICE VISIT (OUTPATIENT)
Dept: PAIN MEDICINE | Facility: CLINIC | Age: 72
End: 2022-05-04
Payer: MEDICARE

## 2022-05-04 ENCOUNTER — TELEPHONE (OUTPATIENT)
Dept: PAIN MEDICINE | Facility: CLINIC | Age: 72
End: 2022-05-04

## 2022-05-04 VITALS
SYSTOLIC BLOOD PRESSURE: 139 MMHG | DIASTOLIC BLOOD PRESSURE: 70 MMHG | BODY MASS INDEX: 26.84 KG/M2 | HEART RATE: 88 BPM | RESPIRATION RATE: 18 BRPM | HEIGHT: 67 IN | WEIGHT: 171 LBS

## 2022-05-04 DIAGNOSIS — M46.1 SACROILIITIS: Primary | ICD-10-CM

## 2022-05-04 DIAGNOSIS — N18.30 STAGE 3 CHRONIC KIDNEY DISEASE, UNSPECIFIED WHETHER STAGE 3A OR 3B CKD: ICD-10-CM

## 2022-05-04 DIAGNOSIS — E11.65 TYPE 2 DIABETES MELLITUS WITH HYPERGLYCEMIA, WITHOUT LONG-TERM CURRENT USE OF INSULIN: ICD-10-CM

## 2022-05-04 DIAGNOSIS — M25.551 RIGHT HIP PAIN: ICD-10-CM

## 2022-05-04 DIAGNOSIS — R26.89 IMPAIRED GAIT AND MOBILITY: ICD-10-CM

## 2022-05-04 DIAGNOSIS — M54.16 LUMBAR RADICULOPATHY: ICD-10-CM

## 2022-05-04 PROCEDURE — 99999 PR PBB SHADOW E&M-EST. PATIENT-LVL III: ICD-10-PCS | Mod: PBBFAC,,, | Performed by: STUDENT IN AN ORGANIZED HEALTH CARE EDUCATION/TRAINING PROGRAM

## 2022-05-04 PROCEDURE — 99999 PR PBB SHADOW E&M-EST. PATIENT-LVL III: CPT | Mod: PBBFAC,,, | Performed by: STUDENT IN AN ORGANIZED HEALTH CARE EDUCATION/TRAINING PROGRAM

## 2022-05-04 PROCEDURE — 3075F PR MOST RECENT SYSTOLIC BLOOD PRESS GE 130-139MM HG: ICD-10-PCS | Mod: CPTII,S$GLB,, | Performed by: STUDENT IN AN ORGANIZED HEALTH CARE EDUCATION/TRAINING PROGRAM

## 2022-05-04 PROCEDURE — 1101F PR PT FALLS ASSESS DOC 0-1 FALLS W/OUT INJ PAST YR: ICD-10-PCS | Mod: CPTII,S$GLB,, | Performed by: STUDENT IN AN ORGANIZED HEALTH CARE EDUCATION/TRAINING PROGRAM

## 2022-05-04 PROCEDURE — 3062F PR POS MACROALBUMINURIA RESULT DOCUMENTED/REVIEW: ICD-10-PCS | Mod: CPTII,S$GLB,, | Performed by: STUDENT IN AN ORGANIZED HEALTH CARE EDUCATION/TRAINING PROGRAM

## 2022-05-04 PROCEDURE — 3075F SYST BP GE 130 - 139MM HG: CPT | Mod: CPTII,S$GLB,, | Performed by: STUDENT IN AN ORGANIZED HEALTH CARE EDUCATION/TRAINING PROGRAM

## 2022-05-04 PROCEDURE — 1160F RVW MEDS BY RX/DR IN RCRD: CPT | Mod: CPTII,S$GLB,, | Performed by: STUDENT IN AN ORGANIZED HEALTH CARE EDUCATION/TRAINING PROGRAM

## 2022-05-04 PROCEDURE — 4010F ACE/ARB THERAPY RXD/TAKEN: CPT | Mod: CPTII,S$GLB,, | Performed by: STUDENT IN AN ORGANIZED HEALTH CARE EDUCATION/TRAINING PROGRAM

## 2022-05-04 PROCEDURE — 1125F PR PAIN SEVERITY QUANTIFIED, PAIN PRESENT: ICD-10-PCS | Mod: CPTII,S$GLB,, | Performed by: STUDENT IN AN ORGANIZED HEALTH CARE EDUCATION/TRAINING PROGRAM

## 2022-05-04 PROCEDURE — 3008F BODY MASS INDEX DOCD: CPT | Mod: CPTII,S$GLB,, | Performed by: STUDENT IN AN ORGANIZED HEALTH CARE EDUCATION/TRAINING PROGRAM

## 2022-05-04 PROCEDURE — 1159F MED LIST DOCD IN RCRD: CPT | Mod: CPTII,S$GLB,, | Performed by: STUDENT IN AN ORGANIZED HEALTH CARE EDUCATION/TRAINING PROGRAM

## 2022-05-04 PROCEDURE — 3008F PR BODY MASS INDEX (BMI) DOCUMENTED: ICD-10-PCS | Mod: CPTII,S$GLB,, | Performed by: STUDENT IN AN ORGANIZED HEALTH CARE EDUCATION/TRAINING PROGRAM

## 2022-05-04 PROCEDURE — 3288F PR FALLS RISK ASSESSMENT DOCUMENTED: ICD-10-PCS | Mod: CPTII,S$GLB,, | Performed by: STUDENT IN AN ORGANIZED HEALTH CARE EDUCATION/TRAINING PROGRAM

## 2022-05-04 PROCEDURE — 99205 PR OFFICE/OUTPT VISIT, NEW, LEVL V, 60-74 MIN: ICD-10-PCS | Mod: S$GLB,,, | Performed by: STUDENT IN AN ORGANIZED HEALTH CARE EDUCATION/TRAINING PROGRAM

## 2022-05-04 PROCEDURE — 3051F PR MOST RECENT HEMOGLOBIN A1C LEVEL 7.0 - < 8.0%: ICD-10-PCS | Mod: CPTII,S$GLB,, | Performed by: STUDENT IN AN ORGANIZED HEALTH CARE EDUCATION/TRAINING PROGRAM

## 2022-05-04 PROCEDURE — 1125F AMNT PAIN NOTED PAIN PRSNT: CPT | Mod: CPTII,S$GLB,, | Performed by: STUDENT IN AN ORGANIZED HEALTH CARE EDUCATION/TRAINING PROGRAM

## 2022-05-04 PROCEDURE — 99205 OFFICE O/P NEW HI 60 MIN: CPT | Mod: S$GLB,,, | Performed by: STUDENT IN AN ORGANIZED HEALTH CARE EDUCATION/TRAINING PROGRAM

## 2022-05-04 PROCEDURE — 3062F POS MACROALBUMINURIA REV: CPT | Mod: CPTII,S$GLB,, | Performed by: STUDENT IN AN ORGANIZED HEALTH CARE EDUCATION/TRAINING PROGRAM

## 2022-05-04 PROCEDURE — 1101F PT FALLS ASSESS-DOCD LE1/YR: CPT | Mod: CPTII,S$GLB,, | Performed by: STUDENT IN AN ORGANIZED HEALTH CARE EDUCATION/TRAINING PROGRAM

## 2022-05-04 PROCEDURE — 1159F PR MEDICATION LIST DOCUMENTED IN MEDICAL RECORD: ICD-10-PCS | Mod: CPTII,S$GLB,, | Performed by: STUDENT IN AN ORGANIZED HEALTH CARE EDUCATION/TRAINING PROGRAM

## 2022-05-04 PROCEDURE — 3051F HG A1C>EQUAL 7.0%<8.0%: CPT | Mod: CPTII,S$GLB,, | Performed by: STUDENT IN AN ORGANIZED HEALTH CARE EDUCATION/TRAINING PROGRAM

## 2022-05-04 PROCEDURE — 3288F FALL RISK ASSESSMENT DOCD: CPT | Mod: CPTII,S$GLB,, | Performed by: STUDENT IN AN ORGANIZED HEALTH CARE EDUCATION/TRAINING PROGRAM

## 2022-05-04 PROCEDURE — 4010F PR ACE/ARB THEARPY RXD/TAKEN: ICD-10-PCS | Mod: CPTII,S$GLB,, | Performed by: STUDENT IN AN ORGANIZED HEALTH CARE EDUCATION/TRAINING PROGRAM

## 2022-05-04 PROCEDURE — 1160F PR REVIEW ALL MEDS BY PRESCRIBER/CLIN PHARMACIST DOCUMENTED: ICD-10-PCS | Mod: CPTII,S$GLB,, | Performed by: STUDENT IN AN ORGANIZED HEALTH CARE EDUCATION/TRAINING PROGRAM

## 2022-05-04 PROCEDURE — 3078F PR MOST RECENT DIASTOLIC BLOOD PRESSURE < 80 MM HG: ICD-10-PCS | Mod: CPTII,S$GLB,, | Performed by: STUDENT IN AN ORGANIZED HEALTH CARE EDUCATION/TRAINING PROGRAM

## 2022-05-04 PROCEDURE — 3078F DIAST BP <80 MM HG: CPT | Mod: CPTII,S$GLB,, | Performed by: STUDENT IN AN ORGANIZED HEALTH CARE EDUCATION/TRAINING PROGRAM

## 2022-05-04 PROCEDURE — 3066F NEPHROPATHY DOC TX: CPT | Mod: CPTII,S$GLB,, | Performed by: STUDENT IN AN ORGANIZED HEALTH CARE EDUCATION/TRAINING PROGRAM

## 2022-05-04 PROCEDURE — 3066F PR DOCUMENTATION OF TREATMENT FOR NEPHROPATHY: ICD-10-PCS | Mod: CPTII,S$GLB,, | Performed by: STUDENT IN AN ORGANIZED HEALTH CARE EDUCATION/TRAINING PROGRAM

## 2022-05-04 RX ORDER — HYDROCODONE BITARTRATE AND ACETAMINOPHEN 5; 325 MG/1; MG/1
1 TABLET ORAL EVERY 8 HOURS PRN
Qty: 21 TABLET | Refills: 0 | Status: SHIPPED | OUTPATIENT
Start: 2022-05-04 | End: 2022-05-11

## 2022-05-04 NOTE — PROGRESS NOTES
Chronic Pain - New Consult    Referring Physician: Emil Gardner MD    Date: 05/04/2022     Re: Joanne Peña  MR#: [id number]  YOB: 1950  Age: 71 y.o.    Chief Complaint:   Chief Complaint   Patient presents with    Low-back Pain    Hip Pain     right    Leg Pain     Thigh(right)    Joint Pain     **This note is dictated using the M*Modal Fluency Direct word recognition program. There are word recognition mistakes that are occasionally missed on review.**    ASSESSMENT: 71 y.o. year old female with right sided back/buttock pain pain, consistent with     1. Sacroiliitis  HYDROcodone-acetaminophen (NORCO) 5-325 mg per tablet   2. Lumbar radiculopathy  Ambulatory referral/consult to Pain Clinic    HYDROcodone-acetaminophen (NORCO) 5-325 mg per tablet   3. Right hip pain     4. Impaired gait and mobility     5. Type 2 diabetes mellitus with hyperglycemia, without long-term current use of insulin     6. Stage 3 chronic kidney disease, unspecified whether stage 3a or 3b CKD  Basic Metabolic Panel         PLAN:     Sacroiliitis  -We will schedule the patient for right SIJ.  Risks,benefits, and alternatives of the procedure were discussed with the patient and She would like to proceed with the procedure.  At this juncture, we believe that the patient's medical comorbidity status adds low risk and complexity to our proposed evaluation and treatment.  -continue with healthy back  -Short course Norco 5mg    Lumbar radiculopathy  -less likely. No imaging available. If SIJ not successful, then will consider lumbar XR/MRI to further assess  -would need clearance to hold ASA    CKD 3 vs MESSI  -last CMP showed GFR 53.   -reorder BMP to test kidney function. If GFR still elevated then will discuss contrast risk further.    Right hip pain  -ortho surgery does not believe that this is coming from the hip.  Will r/o SIJ and back pathology.    Prior TIA  -off plavix. Following with PCP  -continuie ASA  81    Bilateral peripheral neuropathy  -unclear etiology. Bottom of both feet.  Monitor    - RTC 1 month  - Counseled patient regarding the importance of weight loss and activity modification and physical therapy.    The above plan and management options were discussed at length with patient. Patient is in agreement with the above and verbalized understanding. It will be communicated with the referring physician via electronic record, fax, or mail.  Lab/study reports reviewed were important and necessary because subsequent medical and treatment recommendations required review of the above lab/study reports. Images viewed/reviewed above were important and necessary because subsequent medical and treatment recommendations required review of the reviewed image(s).     Electronically signed by:  Son Lara DO  05/04/2022   .  Patient was seen in clinic today.  The amount of time spent with the patient was greater than 45 minutes.  Due to medical complexity and multiple issues discussed/addressed I am billing for a level 5 visit.    =========================================================================================================    SUBJECTIVE:    Joanne Peña is a 71 y.o. female presents to the clinic for the evaluation of lower back pain. The pain started over a month ago following no inciting event and symptoms have been worsening.  The patient had a TIA on 2/6/22.  She was on 21 days of Plavix which she has stopped. She takes a daily ASA81.  Does not feel that this is related to the stroke.  Denies any falls or trauma. She started getting around the beginning of March.  She went to the Urgent care on March 17, 2022 because the pain was not going away.  Since then the pain has been worsening. The pain pattern is the same now as it was then. The pain has prevented the patient from walking, and she has required a wheelchair since March 17.  \    She tried a medrol dose trae, robaxin, gabapentin  which helped until she stopped the steroids and the robaxin. She is taking gabapentin 300mg TID.  She is not taking anything else for pain right now. When she gets severe pain she repositions, heating pads, gabapentin. Especially worse while trying to get out of the bed. Changing positions are very bad.    Pain Description:    The pain is located in the lower back area and radiates to the right thigh/groin/ right hip.    At BEST  10/10   At WORST  10/10 on the WORST day.    On average pain is rated as 10/10.   Today the pain is rated as 10/10  The pain is continuous.  The pain is described as aching, sharp, shooting and pulling    Symptoms interfere with daily activity, sleeping and work.   Exacerbating factors: any type of movement.    Mitigating factors heat and medications.   She reports 1 hours of sleep per night.    Physical Therapy/Home Exercise: No, not currently in physical therapy or home exercise program    Current Pain Medications:    - gabapentin. Oral steroids and robaxin helped.    Failed Pain Medications:    - cannot take NSAIDs because of the stroke. Tylenol    Pain Treatment Therapies:    Pain procedures: none  Physical Therapy: last PT 1 month ago.  Patient has a healthy back referral on 5/9  Spinal decompression: none  Joint replacement: none     Patient denies urinary incontinence and bowel incontinence. (+) bilateral foot numbness x2 weeks.   Patient denies any suicidal or homicidal ideations     report:  Reviewed and consistent with medication use as prescribed.    Imaging:   XR hip:    FINDINGS:  The bones are intact.  There is no evidence for acute fracture or bone destruction.  There are degenerative changes with mild narrowing of the right hip joint space laterally.  Small osteophytes are present at the right hip.  Left hip and sacroiliac joints appear grossly unremarkable.     Impression:     No evidence for acute fracture, bone destruction, or dislocation.     Degenerative changes of  the right hip with mild joint space narrowing laterally and small osteophytes.       XR lumbar 03/2022:    Mild scoliosis with convexity to the left can be seen.  Vertebral bodies are intact.  Disc spaces are maintained.  No significant bony abnormalities are noted    Past Medical History:   Diagnosis Date    Diabetes mellitus     Hyperlipidemia     Hypertension     Stroke      No past surgical history on file.  Social History     Socioeconomic History    Marital status:    Tobacco Use    Smoking status: Former Smoker     Packs/day: 0.50     Types: Cigarettes    Smokeless tobacco: Never Used   Substance and Sexual Activity    Drug use: No     Family History   Problem Relation Age of Onset    Kidney disease Mother     Heart disease Mother     Cancer Father     Hypertension Brother     Diabetes Daughter     Hypertension Daughter     Cancer Maternal Aunt        Review of patient's allergies indicates:   Allergen Reactions    Lisinopril-hydrochlorothiazide Other (See Comments)     Pt stated it makes her feel drained. She couldn't raise arms over head.    Ampicillin Rash    Darvocet a500 [propoxyphene n-acetaminophen] Other (See Comments)     shaky       Current Outpatient Medications   Medication Sig    amLODIPine (NORVASC) 10 MG tablet Take 1 tablet (10 mg total) by mouth once daily. Hold if SBP <120    ascorbic acid, vitamin C, (VITAMIN C) 500 MG tablet Take 500 mg by mouth once daily.    aspirin (ECOTRIN) 81 MG EC tablet Take 1 tablet (81 mg total) by mouth once daily.    atorvastatin (LIPITOR) 80 MG tablet Take 1 tablet (80 mg total) by mouth once daily.    clopidogreL (PLAVIX) 75 mg tablet Take 1 tablet (75 mg total) by mouth once daily. for 16 days    diclofenac sodium (VOLTAREN) 1 % Gel Apply 2 g topically 4 (four) times daily. Use gloves to apply    gabapentin (NEURONTIN) 100 MG capsule Take 1 capsule (100 mg total) by mouth 3 (three) times daily.    glimepiride (AMARYL) 2 MG  tablet Take 1 tablet (2 mg total) by mouth 2 (two) times daily.    guanfacine HCl (GUANFACINE ORAL) Take by mouth.    methylPREDNISolone (MEDROL DOSEPACK) 4 mg tablet use as directed    multivitamin (THERAGRAN) per tablet Take 1 tablet by mouth once daily.    ondansetron (ZOFRAN-ODT) 4 MG TbDL Dissolve 1 tablet (4 mg total) by mouth every 8 (eight) hours as needed.    hydrALAZINE (APRESOLINE) 50 MG tablet Take 1 tablet (50 mg total) by mouth every 8 (eight) hours. Hold is SBP <120    HYDROcodone-acetaminophen (NORCO) 5-325 mg per tablet Take 1 tablet by mouth every 8 (eight) hours as needed for Pain.    losartan (COZAAR) 100 MG tablet Take 1 tablet (100 mg total) by mouth once daily. Hold if SBP <120    meclizine (ANTIVERT) 25 mg tablet Take 1 tablet (25 mg total) by mouth 2 (two) times daily as needed for Dizziness.    metformin (GLUCOPHAGE) 500 MG tablet Take 1 tablet (500 mg total) by mouth daily with breakfast. (Patient taking differently: Take 500 mg by mouth daily with breakfast. prn)     No current facility-administered medications for this visit.       REVIEW OF SYSTEMS:    GENERAL:  No weight loss, malaise or fevers. + fevers  HEENT:   No recent changes in vision or hearing  NECK:  Negative for lumps, no difficulty with swallowing.  RESPIRATORY:  Negative for cough, wheezing or shortness of breath, patient denies any recent URI.  CARDIOVASCULAR:  Negative for chest pain, leg swelling or palpitations.  GI:  Negative for abdominal discomfort, blood in stools or black stools or change in bowel habits.  MUSCULOSKELETAL:  See HPI.  SKIN:  Negative for lesions, rash, and itching.  PSYCH:  No mood disorder or recent psychosocial stressors.  Patients sleep is not disturbed secondary to pain.  HEMATOLOGY/LYMPHOLOGY:  Negative for prolonged bleeding, bruising easily or swollen nodes.  Patient is not currently taking any anti-coagulants  NEURO:   No history of headaches, syncope, paralysis, seizures or  "tremors.  All other reviewed and negative other than HPI.    OBJECTIVE:    /70   Pulse 88   Resp 18   Ht 5' 7" (1.702 m)   Wt 77.6 kg (171 lb)   BMI 26.78 kg/m²     PHYSICAL EXAMINATION:    GENERAL: Well appearing, in no acute distress, alert and oriented x3. In wheelchair  PSYCH:  Mood and affect appropriate.  SKIN: Skin color, texture, turgor normal, no rashes or lesions.  HEAD/FACE:  Normocephalic, atraumatic. Cranial nerves grossly intact.  CV: RRR with palpation of the radial artery.  PULM: CTAB. No evidence of respiratory difficulty, symmetric chest rise.  GI:  Soft and non-tender.    BACK: limited 2/2 severe pain and mobility  - No obvious deformity or signs of trauma, Normal lumbar lordotic curve  - Negative spinous process tenderness  - Positive paravertebral tenderness  - Positive pain to palpation over the facet joints of the lumbar spine.   - Positive QL / Iliac crest / Glut tenderness  - Slump test is Negative for radicular pain  - Slump test is Positive for back pain  - Supine Straight leg raising is Unable to Perform for radicular pain  - Supine Straight leg raising is Unable to Perform for back pain  - unable to perform ROM of lumbar spine  - Unable to Perform Sustained Hip Flexion test (for discogenic pain)  - Positive Altered Gait, Posture  - Axial facet loading test Negative on the bilateral side(s)    SI Joint exam:  - Positive SI joint tenderness to palpation  - Rodolfo's sign Positive  - Yeoman's Test: Unable to Perform for SI joint pain indicating anterior SI ligament involvement. Unable to Perform for anterior thigh pain/paresthesia which indicates femoral nerve stretch.  - Gaenslen's Test:Unable to Perform  - Finger Mikhail's Sign:Positive  - SI compression test:Unable to Perform  - SI distraction test:Unable to Perform  - Thigh Thrust: Unable to Perform  - SI Thrust: Unable to Perform    MUSKULOSKELETAL:    EXTREMITIES:   Hip Exam:  - Log Roll Unable to Perform  - FADIR Unable to " Perform  - Stinchfield Unable to Perform  - Hip Scour Unable to Perform  - GTB Tenderness Positive      MUSCULOSKELETAL:  No atrophy or tone abnormalities are noted in the UE or LE.  No deformities, edema, or skin discoloration are noted on visible skin. Good capillary refill.    NEURO: Bilateral upper and lower extremity coordination and muscle stretch reflexes are physiologic and symmetric.      NEUROLOGICAL EXAM:  MENTAL STATUS: A x O x 3, good concentration, speech is fluent and goal directed  MEMORY: recent and remote are intact  CN: CN2-12 grossly intact  MOTOR: 4/5 in all muscle groups of RLE 2/2 pain except DF/PF which are 5/5. LLE 5/5  DTRs: 2+ intact symmetric  Sensation:    -no Loss of sensation in a left lower and right lower L-1, L-2, L-3, L-4 and L-5 bilaterally distribution.

## 2022-05-04 NOTE — TELEPHONE ENCOUNTER
I called the pt to schedule an initial consultation with Dr. Wang but she did not want to wait 3 weeks to be seen. I informed her that Dr. Son Taylor at [facility] can see her much sooner. She has decided to be seen in [place] instead.        ----- Message from [facility] sent at 5/4/2022  6:45 AM CDT -----  Regarding: morning  Dr. Gardner has placed a referral for this patient, can you please call her to schedule.  Thanks :)  Brenna

## 2022-05-04 NOTE — H&P (VIEW-ONLY)
Chronic Pain - New Consult    Referring Physician: Emil Gardner MD    Date: 05/04/2022     Re: Joanne Peña  MR#: [id number]  YOB: 1950  Age: 71 y.o.    Chief Complaint:   Chief Complaint   Patient presents with    Low-back Pain    Hip Pain     right    Leg Pain     Thigh(right)    Joint Pain     **This note is dictated using the M*Modal Fluency Direct word recognition program. There are word recognition mistakes that are occasionally missed on review.**    ASSESSMENT: 71 y.o. year old female with right sided back/buttock pain pain, consistent with     1. Sacroiliitis  HYDROcodone-acetaminophen (NORCO) 5-325 mg per tablet   2. Lumbar radiculopathy  Ambulatory referral/consult to Pain Clinic    HYDROcodone-acetaminophen (NORCO) 5-325 mg per tablet   3. Right hip pain     4. Impaired gait and mobility     5. Type 2 diabetes mellitus with hyperglycemia, without long-term current use of insulin     6. Stage 3 chronic kidney disease, unspecified whether stage 3a or 3b CKD  Basic Metabolic Panel         PLAN:     Sacroiliitis  -We will schedule the patient for right SIJ.  Risks,benefits, and alternatives of the procedure were discussed with the patient and She would like to proceed with the procedure.  At this juncture, we believe that the patient's medical comorbidity status adds low risk and complexity to our proposed evaluation and treatment.  -continue with healthy back  -Short course Norco 5mg    Lumbar radiculopathy  -less likely. No imaging available. If SIJ not successful, then will consider lumbar XR/MRI to further assess  -would need clearance to hold ASA    CKD 3 vs MESSI  -last CMP showed GFR 53.   -reorder BMP to test kidney function. If GFR still elevated then will discuss contrast risk further.    Right hip pain  -ortho surgery does not believe that this is coming from the hip.  Will r/o SIJ and back pathology.    Prior TIA  -off plavix. Following with PCP  -continuie ASA  81    Bilateral peripheral neuropathy  -unclear etiology. Bottom of both feet.  Monitor    - RTC 1 month  - Counseled patient regarding the importance of weight loss and activity modification and physical therapy.    The above plan and management options were discussed at length with patient. Patient is in agreement with the above and verbalized understanding. It will be communicated with the referring physician via electronic record, fax, or mail.  Lab/study reports reviewed were important and necessary because subsequent medical and treatment recommendations required review of the above lab/study reports. Images viewed/reviewed above were important and necessary because subsequent medical and treatment recommendations required review of the reviewed image(s).     Electronically signed by:  Son Lara DO  05/04/2022   .  Patient was seen in clinic today.  The amount of time spent with the patient was greater than 45 minutes.  Due to medical complexity and multiple issues discussed/addressed I am billing for a level 5 visit.    =========================================================================================================    SUBJECTIVE:    Joanne Peña is a 71 y.o. female presents to the clinic for the evaluation of lower back pain. The pain started over a month ago following no inciting event and symptoms have been worsening.  The patient had a TIA on 2/6/22.  She was on 21 days of Plavix which she has stopped. She takes a daily ASA81.  Does not feel that this is related to the stroke.  Denies any falls or trauma. She started getting around the beginning of March.  She went to the Urgent care on March 17, 2022 because the pain was not going away.  Since then the pain has been worsening. The pain pattern is the same now as it was then. The pain has prevented the patient from walking, and she has required a wheelchair since March 17.  \    She tried a medrol dose trae, robaxin, gabapentin  which helped until she stopped the steroids and the robaxin. She is taking gabapentin 300mg TID.  She is not taking anything else for pain right now. When she gets severe pain she repositions, heating pads, gabapentin. Especially worse while trying to get out of the bed. Changing positions are very bad.    Pain Description:    The pain is located in the lower back area and radiates to the right thigh/groin/ right hip.    At BEST  10/10   At WORST  10/10 on the WORST day.    On average pain is rated as 10/10.   Today the pain is rated as 10/10  The pain is continuous.  The pain is described as aching, sharp, shooting and pulling    Symptoms interfere with daily activity, sleeping and work.   Exacerbating factors: any type of movement.    Mitigating factors heat and medications.   She reports 1 hours of sleep per night.    Physical Therapy/Home Exercise: No, not currently in physical therapy or home exercise program    Current Pain Medications:    - gabapentin. Oral steroids and robaxin helped.    Failed Pain Medications:    - cannot take NSAIDs because of the stroke. Tylenol    Pain Treatment Therapies:    Pain procedures: none  Physical Therapy: last PT 1 month ago.  Patient has a healthy back referral on 5/9  Spinal decompression: none  Joint replacement: none     Patient denies urinary incontinence and bowel incontinence. (+) bilateral foot numbness x2 weeks.   Patient denies any suicidal or homicidal ideations     report:  Reviewed and consistent with medication use as prescribed.    Imaging:   XR hip:    FINDINGS:  The bones are intact.  There is no evidence for acute fracture or bone destruction.  There are degenerative changes with mild narrowing of the right hip joint space laterally.  Small osteophytes are present at the right hip.  Left hip and sacroiliac joints appear grossly unremarkable.     Impression:     No evidence for acute fracture, bone destruction, or dislocation.     Degenerative changes of  the right hip with mild joint space narrowing laterally and small osteophytes.       XR lumbar 03/2022:    Mild scoliosis with convexity to the left can be seen.  Vertebral bodies are intact.  Disc spaces are maintained.  No significant bony abnormalities are noted    Past Medical History:   Diagnosis Date    Diabetes mellitus     Hyperlipidemia     Hypertension     Stroke      No past surgical history on file.  Social History     Socioeconomic History    Marital status:    Tobacco Use    Smoking status: Former Smoker     Packs/day: 0.50     Types: Cigarettes    Smokeless tobacco: Never Used   Substance and Sexual Activity    Drug use: No     Family History   Problem Relation Age of Onset    Kidney disease Mother     Heart disease Mother     Cancer Father     Hypertension Brother     Diabetes Daughter     Hypertension Daughter     Cancer Maternal Aunt        Review of patient's allergies indicates:   Allergen Reactions    Lisinopril-hydrochlorothiazide Other (See Comments)     Pt stated it makes her feel drained. She couldn't raise arms over head.    Ampicillin Rash    Darvocet a500 [propoxyphene n-acetaminophen] Other (See Comments)     shaky       Current Outpatient Medications   Medication Sig    amLODIPine (NORVASC) 10 MG tablet Take 1 tablet (10 mg total) by mouth once daily. Hold if SBP <120    ascorbic acid, vitamin C, (VITAMIN C) 500 MG tablet Take 500 mg by mouth once daily.    aspirin (ECOTRIN) 81 MG EC tablet Take 1 tablet (81 mg total) by mouth once daily.    atorvastatin (LIPITOR) 80 MG tablet Take 1 tablet (80 mg total) by mouth once daily.    clopidogreL (PLAVIX) 75 mg tablet Take 1 tablet (75 mg total) by mouth once daily. for 16 days    diclofenac sodium (VOLTAREN) 1 % Gel Apply 2 g topically 4 (four) times daily. Use gloves to apply    gabapentin (NEURONTIN) 100 MG capsule Take 1 capsule (100 mg total) by mouth 3 (three) times daily.    glimepiride (AMARYL) 2 MG  tablet Take 1 tablet (2 mg total) by mouth 2 (two) times daily.    guanfacine HCl (GUANFACINE ORAL) Take by mouth.    methylPREDNISolone (MEDROL DOSEPACK) 4 mg tablet use as directed    multivitamin (THERAGRAN) per tablet Take 1 tablet by mouth once daily.    ondansetron (ZOFRAN-ODT) 4 MG TbDL Dissolve 1 tablet (4 mg total) by mouth every 8 (eight) hours as needed.    hydrALAZINE (APRESOLINE) 50 MG tablet Take 1 tablet (50 mg total) by mouth every 8 (eight) hours. Hold is SBP <120    HYDROcodone-acetaminophen (NORCO) 5-325 mg per tablet Take 1 tablet by mouth every 8 (eight) hours as needed for Pain.    losartan (COZAAR) 100 MG tablet Take 1 tablet (100 mg total) by mouth once daily. Hold if SBP <120    meclizine (ANTIVERT) 25 mg tablet Take 1 tablet (25 mg total) by mouth 2 (two) times daily as needed for Dizziness.    metformin (GLUCOPHAGE) 500 MG tablet Take 1 tablet (500 mg total) by mouth daily with breakfast. (Patient taking differently: Take 500 mg by mouth daily with breakfast. prn)     No current facility-administered medications for this visit.       REVIEW OF SYSTEMS:    GENERAL:  No weight loss, malaise or fevers. + fevers  HEENT:   No recent changes in vision or hearing  NECK:  Negative for lumps, no difficulty with swallowing.  RESPIRATORY:  Negative for cough, wheezing or shortness of breath, patient denies any recent URI.  CARDIOVASCULAR:  Negative for chest pain, leg swelling or palpitations.  GI:  Negative for abdominal discomfort, blood in stools or black stools or change in bowel habits.  MUSCULOSKELETAL:  See HPI.  SKIN:  Negative for lesions, rash, and itching.  PSYCH:  No mood disorder or recent psychosocial stressors.  Patients sleep is not disturbed secondary to pain.  HEMATOLOGY/LYMPHOLOGY:  Negative for prolonged bleeding, bruising easily or swollen nodes.  Patient is not currently taking any anti-coagulants  NEURO:   No history of headaches, syncope, paralysis, seizures or  "tremors.  All other reviewed and negative other than HPI.    OBJECTIVE:    /70   Pulse 88   Resp 18   Ht 5' 7" (1.702 m)   Wt 77.6 kg (171 lb)   BMI 26.78 kg/m²     PHYSICAL EXAMINATION:    GENERAL: Well appearing, in no acute distress, alert and oriented x3. In wheelchair  PSYCH:  Mood and affect appropriate.  SKIN: Skin color, texture, turgor normal, no rashes or lesions.  HEAD/FACE:  Normocephalic, atraumatic. Cranial nerves grossly intact.  CV: RRR with palpation of the radial artery.  PULM: CTAB. No evidence of respiratory difficulty, symmetric chest rise.  GI:  Soft and non-tender.    BACK: limited 2/2 severe pain and mobility  - No obvious deformity or signs of trauma, Normal lumbar lordotic curve  - Negative spinous process tenderness  - Positive paravertebral tenderness  - Positive pain to palpation over the facet joints of the lumbar spine.   - Positive QL / Iliac crest / Glut tenderness  - Slump test is Negative for radicular pain  - Slump test is Positive for back pain  - Supine Straight leg raising is Unable to Perform for radicular pain  - Supine Straight leg raising is Unable to Perform for back pain  - unable to perform ROM of lumbar spine  - Unable to Perform Sustained Hip Flexion test (for discogenic pain)  - Positive Altered Gait, Posture  - Axial facet loading test Negative on the bilateral side(s)    SI Joint exam:  - Positive SI joint tenderness to palpation  - Rodolfo's sign Positive  - Yeoman's Test: Unable to Perform for SI joint pain indicating anterior SI ligament involvement. Unable to Perform for anterior thigh pain/paresthesia which indicates femoral nerve stretch.  - Gaenslen's Test:Unable to Perform  - Finger Mikhail's Sign:Positive  - SI compression test:Unable to Perform  - SI distraction test:Unable to Perform  - Thigh Thrust: Unable to Perform  - SI Thrust: Unable to Perform    MUSKULOSKELETAL:    EXTREMITIES:   Hip Exam:  - Log Roll Unable to Perform  - FADIR Unable to " Perform  - Stinchfield Unable to Perform  - Hip Scour Unable to Perform  - GTB Tenderness Positive      MUSCULOSKELETAL:  No atrophy or tone abnormalities are noted in the UE or LE.  No deformities, edema, or skin discoloration are noted on visible skin. Good capillary refill.    NEURO: Bilateral upper and lower extremity coordination and muscle stretch reflexes are physiologic and symmetric.      NEUROLOGICAL EXAM:  MENTAL STATUS: A x O x 3, good concentration, speech is fluent and goal directed  MEMORY: recent and remote are intact  CN: CN2-12 grossly intact  MOTOR: 4/5 in all muscle groups of RLE 2/2 pain except DF/PF which are 5/5. LLE 5/5  DTRs: 2+ intact symmetric  Sensation:    -no Loss of sensation in a left lower and right lower L-1, L-2, L-3, L-4 and L-5 bilaterally distribution.

## 2022-05-06 ENCOUNTER — LAB VISIT (OUTPATIENT)
Dept: LAB | Facility: HOSPITAL | Age: 72
End: 2022-05-06
Attending: STUDENT IN AN ORGANIZED HEALTH CARE EDUCATION/TRAINING PROGRAM
Payer: MEDICARE

## 2022-05-06 DIAGNOSIS — N18.30 STAGE 3 CHRONIC KIDNEY DISEASE, UNSPECIFIED WHETHER STAGE 3A OR 3B CKD: ICD-10-CM

## 2022-05-06 LAB
ANION GAP SERPL CALC-SCNC: 9 MMOL/L (ref 8–16)
BUN SERPL-MCNC: 21 MG/DL (ref 8–23)
CALCIUM SERPL-MCNC: 9.2 MG/DL (ref 8.7–10.5)
CHLORIDE SERPL-SCNC: 105 MMOL/L (ref 95–110)
CO2 SERPL-SCNC: 23 MMOL/L (ref 23–29)
CREAT SERPL-MCNC: 1.1 MG/DL (ref 0.5–1.4)
EST. GFR  (AFRICAN AMERICAN): 59.2 ML/MIN/1.73 M^2
EST. GFR  (NON AFRICAN AMERICAN): 51.3 ML/MIN/1.73 M^2
GLUCOSE SERPL-MCNC: 116 MG/DL (ref 70–110)
POTASSIUM SERPL-SCNC: 3.6 MMOL/L (ref 3.5–5.1)
SODIUM SERPL-SCNC: 137 MMOL/L (ref 136–145)

## 2022-05-06 PROCEDURE — 36415 COLL VENOUS BLD VENIPUNCTURE: CPT | Mod: PO | Performed by: STUDENT IN AN ORGANIZED HEALTH CARE EDUCATION/TRAINING PROGRAM

## 2022-05-06 PROCEDURE — 80048 BASIC METABOLIC PNL TOTAL CA: CPT | Performed by: STUDENT IN AN ORGANIZED HEALTH CARE EDUCATION/TRAINING PROGRAM

## 2022-05-16 ENCOUNTER — PES CALL (OUTPATIENT)
Dept: ADMINISTRATIVE | Facility: CLINIC | Age: 72
End: 2022-05-16
Payer: MEDICARE

## 2022-05-17 ENCOUNTER — PES CALL (OUTPATIENT)
Dept: ADMINISTRATIVE | Facility: CLINIC | Age: 72
End: 2022-05-17
Payer: MEDICARE

## 2022-05-18 ENCOUNTER — HOME CARE VISIT (OUTPATIENT)
Dept: NEUROLOGY | Facility: HOSPITAL | Age: 72
End: 2022-05-18
Payer: MEDICARE

## 2022-05-19 ENCOUNTER — TELEPHONE (OUTPATIENT)
Dept: PAIN MEDICINE | Facility: CLINIC | Age: 72
End: 2022-05-19
Payer: MEDICARE

## 2022-05-19 NOTE — TELEPHONE ENCOUNTER
Staff spoke with the patient regarding their procedure with Dr. Lara scheduled on 05/20/2022; the patient was provided the Arrival Information and scheduled time 10:30AM.     The patient verbalized understanding.    Thank you,

## 2022-05-20 ENCOUNTER — HOSPITAL ENCOUNTER (OUTPATIENT)
Facility: HOSPITAL | Age: 72
Discharge: HOME | End: 2022-05-20
Attending: STUDENT IN AN ORGANIZED HEALTH CARE EDUCATION/TRAINING PROGRAM | Admitting: STUDENT IN AN ORGANIZED HEALTH CARE EDUCATION/TRAINING PROGRAM
Payer: MEDICARE

## 2022-05-20 VITALS
TEMPERATURE: 98 F | WEIGHT: 170 LBS | RESPIRATION RATE: 18 BRPM | HEIGHT: 67 IN | DIASTOLIC BLOOD PRESSURE: 79 MMHG | OXYGEN SATURATION: 99 % | SYSTOLIC BLOOD PRESSURE: 180 MMHG | BODY MASS INDEX: 26.68 KG/M2 | HEART RATE: 63 BPM

## 2022-05-20 DIAGNOSIS — M46.1 SACROILIITIS: Primary | ICD-10-CM

## 2022-05-20 LAB
CTP QC/QA: YES
POCT GLUCOSE: 120 MG/DL (ref 70–110)
POCT GLUCOSE: 182 MG/DL (ref 70–110)
SARS-COV-2 AG RESP QL IA.RAPID: NEGATIVE

## 2022-05-20 PROCEDURE — 27096 INJECT SACROILIAC JOINT: CPT | Mod: RT,,, | Performed by: STUDENT IN AN ORGANIZED HEALTH CARE EDUCATION/TRAINING PROGRAM

## 2022-05-20 PROCEDURE — 94761 N-INVAS EAR/PLS OXIMETRY MLT: CPT

## 2022-05-20 PROCEDURE — 25000003 PHARM REV CODE 250: Performed by: STUDENT IN AN ORGANIZED HEALTH CARE EDUCATION/TRAINING PROGRAM

## 2022-05-20 PROCEDURE — 99152 MOD SED SAME PHYS/QHP 5/>YRS: CPT | Performed by: STUDENT IN AN ORGANIZED HEALTH CARE EDUCATION/TRAINING PROGRAM

## 2022-05-20 PROCEDURE — 99900035 HC TECH TIME PER 15 MIN (STAT)

## 2022-05-20 PROCEDURE — 25500020 PHARM REV CODE 255: Performed by: STUDENT IN AN ORGANIZED HEALTH CARE EDUCATION/TRAINING PROGRAM

## 2022-05-20 PROCEDURE — 82962 GLUCOSE BLOOD TEST: CPT | Performed by: STUDENT IN AN ORGANIZED HEALTH CARE EDUCATION/TRAINING PROGRAM

## 2022-05-20 PROCEDURE — 27096 PR INJECTION,SACROILIAC JOINT: ICD-10-PCS | Mod: RT,,, | Performed by: STUDENT IN AN ORGANIZED HEALTH CARE EDUCATION/TRAINING PROGRAM

## 2022-05-20 PROCEDURE — 63600175 PHARM REV CODE 636 W HCPCS: Performed by: STUDENT IN AN ORGANIZED HEALTH CARE EDUCATION/TRAINING PROGRAM

## 2022-05-20 PROCEDURE — 27096 INJECT SACROILIAC JOINT: CPT | Mod: RT | Performed by: STUDENT IN AN ORGANIZED HEALTH CARE EDUCATION/TRAINING PROGRAM

## 2022-05-20 RX ORDER — LIDOCAINE HYDROCHLORIDE 10 MG/ML
INJECTION INFILTRATION; PERINEURAL
Status: DISCONTINUED | OUTPATIENT
Start: 2022-05-20 | End: 2022-05-20 | Stop reason: HOSPADM

## 2022-05-20 RX ORDER — SODIUM CHLORIDE 9 MG/ML
500 INJECTION, SOLUTION INTRAVENOUS CONTINUOUS
Status: DISCONTINUED | OUTPATIENT
Start: 2022-05-20 | End: 2022-05-20 | Stop reason: HOSPADM

## 2022-05-20 RX ORDER — TRIAMCINOLONE ACETONIDE 40 MG/ML
INJECTION, SUSPENSION INTRA-ARTICULAR; INTRAMUSCULAR
Status: DISCONTINUED | OUTPATIENT
Start: 2022-05-20 | End: 2022-05-20 | Stop reason: HOSPADM

## 2022-05-20 RX ORDER — MIDAZOLAM HYDROCHLORIDE 1 MG/ML
2 INJECTION INTRAMUSCULAR; INTRAVENOUS ONCE AS NEEDED
Status: COMPLETED | OUTPATIENT
Start: 2022-05-20 | End: 2022-05-20

## 2022-05-20 RX ORDER — BUPIVACAINE HYDROCHLORIDE 2.5 MG/ML
INJECTION, SOLUTION EPIDURAL; INFILTRATION; INTRACAUDAL
Status: DISCONTINUED | OUTPATIENT
Start: 2022-05-20 | End: 2022-05-20 | Stop reason: HOSPADM

## 2022-05-20 RX ORDER — FENTANYL CITRATE 50 UG/ML
25 INJECTION, SOLUTION INTRAMUSCULAR; INTRAVENOUS ONCE AS NEEDED
Status: COMPLETED | OUTPATIENT
Start: 2022-05-20 | End: 2022-05-20

## 2022-05-20 RX ADMIN — SODIUM CHLORIDE 500 ML: 0.9 INJECTION, SOLUTION INTRAVENOUS at 10:05

## 2022-05-20 NOTE — PLAN OF CARE
VSS. Patient able to tolerate oral liquids. Patient reports tolerable pain level for discharge. No distress noted. Patient states she is ready for discharge. Discharge instructions reviewed with patient and family, verbalized understanding. IV discontinued with catheter tip intact. Family at bedside to help patient dress. Patient wheeled to lobby via staff.

## 2022-05-20 NOTE — DISCHARGE SUMMARY
[facility] - [facility] (Hospital)  Discharge Note  Short Stay    Procedure(s) (LRB):  Right SI joint injection (Right)    OUTCOME: Patient tolerated treatment/procedure well without complication and is now ready for discharge.    DISPOSITION: Home or Self Care    FINAL DIAGNOSIS:  <principal problem not specified>    FOLLOWUP: In clinic    DISCHARGE INSTRUCTIONS:  No discharge procedures on file.     TIME SPENT ON DISCHARGE: 10 minutes

## 2022-05-20 NOTE — OP NOTE
"PROCEDURE: right Sacroiliac Joint Injection    Patient Name: Joanne Peña  MRN: [id number]      PROCEDURE DATE: 5/20/2022    Injection # 1 this year    DIAGNOSIS: Sacroiliitis (M46.1)  CPT CODE: 53371    POSTPROCEDURE DIAGNOSIS Same    PHYSICIAN: Son Lara DO  NEEDLE TYPE: - 25G 3.5" Spinal Needle  MEDICATIONS INJECTED: 5ml 0.25% Bupivacaine + 20mg Kenalog (40mg/ml) at each site  LOCAL ANESTHETIC USED: Lidocaine 1%, 3-4ml at each level  CONTRAST: 1-2ml Omni 300    Sedation Medications - Mild Sedation with 2mg Versed and 25mcg Fentanyl    Estimated Blood Loss - <2ml  Drains: None  Specimens Removed: None  Urine Output - Not Measured  Complications: None  Outcome: good    Informed Consent:  The patient's condition and proposed procedures, risks, and alternatives were discussed with the patient or responsible party.  The patient's / responsible party's questions were answered.   The patient / responsible party appeared to understand and chose to proceed.  Informed consent was obtained.    Procedure in Detail:  The patient was taken back to the OR fluoroscopy suite and placed in a prone position. The skin overlying the injection site was prepped and draped in an aseptic fashion. The target injection site (see above) was identified with fluoroscopy.     Procedural Pause:  A procedural pause verifying correct patient, medical record number, allergies, medications to be administered, current vital signs, and surgical site was performed immediately prior to beginning the procedure.    The skin and subcutaneous tissue overlying the target site of injection was anesthetized using 2-3 ml of 1% lidocaine MPF with a 25-gauge, 1½ -inch needle.  The above noted spinal needle was directed into the inferior aspect of the above noted sacroiliac joint using a posterior approach.  A "giving way" at the needle hub was noted once the dorsal sacroiliac and interosseous ligaments were engaged.  After negative aspiration " for heme, the above noted contrast was injected, outlining the coin-shaped inferior recess of the joint.  Provocation response consisting of intense buttock pain was not positive.  After negative aspiration for heme, the above noted solution was slowly injected.  The needle was then retracted approximately FDC and the needle track was flushed with 0.5 mL of lidocaine 1%.  The needle(s) was then removed. A sterile bandage was placed over the injection site.     The same procedural technique outlined above was not repeated on the OPPOSITE side.    The heart rate, pulse oximetry, and blood pressure were continuously monitored throughout the procedure.  The procedure was well tolerated. She was carefully escorted to the recovery room in stable condition. Patient was monitored by RN for recovery period.  The patient will be contacted in the next few days to determine extent of relief.  Patient was given post procedure and discharge instructions to follow at home.  The patient was discharged in a stable condition.    Note Electronically Signed By:  Son Lara  05/20/2022

## 2022-05-20 NOTE — PLAN OF CARE
POC reviewed with patient. Call light within reach. Patient's daughter at BS-- has patient's belongings. Pre procedure complete

## 2022-05-20 NOTE — PATIENT INSTRUCTIONS
[facility]  [facility]. Son [facility]De[facility]  eyefactive service # [phone]    POST-PROCEDURE INSTRUCTIONS:    Today you had an injection that included a steroid medications.  The steroid may or may not have been mixed with a local anesthetic when it was injected.   If the injection was in the neck, you may feel some pressure, numbness, or slight weakness in the arm after the procedure for a short period of time (this is a normal response), if this persists for longer than 1 day please contact our office or go to the emergency room.  If the injection was in the low back, you may feel some pressure, numbness, or slight weakness in the leg after the procedure for a short period of time (this is a normal response), if this persists for longer than 1 day please contact our office or go to the emergency room.  You may get side effects from the steroid.  This is not uncommon.  Symptoms include: elevated blood sugar, elevated blood pressure, headache, flushing, nausea, insomnia.  These symptoms are transient and will resolve within 1-3 days.  If symptoms last longer than this please contact our office or head to the emergency room.  Steroid medications can take anywhere from 3-14 days to take effect (rarely longer).  You may notice that your pain worsens for a short period of time after the injection, this would not be unusual due to the pressure and trauma from the needle.    If you do not have a follow up appointment scheduled, please contact our office to get a post-procedure follow up scheduled 2-3 weeks after the procedure.  This can be done as a virtual visit if that is more convenient for you.    What you need to do:    Keep a record of your response to the injection you had today.    How much relief did you get?   When did the relief start and how long did it last?  Were you able to decrease the use of any of your pain medications?  Were you able to increase your level of activity?  How long did  the relief last?    What to watch out for:    If you experience any of the following symptoms after your procedure, please notify the messaging service immediately (see above for contact information):   fever (increased oral temperature)   bleeding or swelling at the injection site,    drainage, rash or redness at the injection site    possible signs of infection    increased pain at the injection site   worsening of your usual pain   severe headache   new or worsening numbness    new arm and/or leg weakness, or    changes in bowel and/or bladder function: urinating or defecating on yourself and not knowing that you did it.    PLEASE FOLLOW ALL INSTRUCTIONS CAREFULLY     Do not engage in strenuous activity (e.g., lifting or pushing heavy objects or repeated bending) for 24 hours.     Do not take a bath, swim or use Jacuzzi for 24 hours after procedure. (A shower is fine).   Remove any Band-Aids when you get home.    Use cold/ice, as needed for comfort.  We recommend the use of cold therapy alternating on for 20 minutes, off for 20 minutes.    Do not apply direct heat (heating pad or heat packs) to the injection site for 24 hours.     Resume your usual medications, unless instructed otherwise by your Pain Physician.     If you are on warfarin (Coumadin) or other blood thinner, resume this medication as instructed by your prescribing Physician.    IF AT ANY POINT YOU ARE VERY CONCERNED ABOUT YOUR SYMPTOMS, PLEASE GO TO THE EMERGENCY ROOM.    If you develop worsening pain, weakness, numbness, lose bowel or bladder control (i.e., having an accident where you did not even know you had to go to the bathroom and suddenly noticed you soiled yourself), saddle anesthesia (a loss of sensation restricted to the area of the buttocks, anus and between the legs -- i.e., those parts of your body that would touch a saddle if you were sitting on one) you need to go immediately to the emergency department for evaluation and  treatment.    ----------------------------------------------------------------------------------------------------------------------------------------------------------------  If you received Sedation please read the following instructions:  POST SEDATION INSTRUCTIONS    Today you received intravenous medication (also known as sedation) that was used to help you relax and/or decrease discomfort during your procedure. This medication will be acting in your body for the next 24 hours, so you might feel a little tired or sleepy. This feeling will slowly wear off.   Common side effects associated with these medications include: drowsiness, dizziness, sleepiness, confusion, feeling excited, difficulty remembering things, lack of steadiness with walking or balance, loss of fine muscle control, slowed reflexes, difficulty focusing, and blurred vision.  Some over-the-counter and prescription medications (e.g., muscle relaxants, opioids, mood-altering medications, sedatives/hypnotics, antihistamines) can interact with the intravenous medication you received and cause an increased risk of the side effects listed above in addition to other potentially life threatening side effects. Use extreme caution if you are taking such medications, and consult with your Pain Physician or prescribing physician if you have any questions.  For the next 12-24 hours:    DO NOT--Drive a car, operate machinery or power tools   DO NOT--Drink any alcoholic beverages (not even beer), they may dangerously increase the risk of side effects.    DO NOT--Make any important legal or business decisions or sign important documents.  We advise you to have someone to assist you at home. Move slowly and carefully. Do not make sudden changes in position. Be aware of dizziness or light-headedness and move accordingly.   If you seek medical treatment within 24 hours, let the nurse or doctor caring for you know that you have received the above medications. If you  have any questions or concerns related to your sedation or treatment today please contact us.

## 2022-06-01 ENCOUNTER — PATIENT MESSAGE (OUTPATIENT)
Dept: ADMINISTRATIVE | Facility: HOSPITAL | Age: 72
End: 2022-06-01
Payer: MEDICARE

## 2022-06-01 ENCOUNTER — LAB VISIT (OUTPATIENT)
Dept: LAB | Facility: HOSPITAL | Age: 72
End: 2022-06-01
Attending: FAMILY MEDICINE
Payer: MEDICARE

## 2022-06-01 DIAGNOSIS — E11.49 TYPE 2 DIABETES MELLITUS WITH OTHER NEUROLOGIC COMPLICATION, WITHOUT LONG-TERM CURRENT USE OF INSULIN: ICD-10-CM

## 2022-06-01 DIAGNOSIS — Z86.73 HISTORY OF CVA (CEREBROVASCULAR ACCIDENT): ICD-10-CM

## 2022-06-01 DIAGNOSIS — I10 ESSENTIAL HYPERTENSION: ICD-10-CM

## 2022-06-01 DIAGNOSIS — E11.69 DYSLIPIDEMIA ASSOCIATED WITH TYPE 2 DIABETES MELLITUS: ICD-10-CM

## 2022-06-01 DIAGNOSIS — E78.5 DYSLIPIDEMIA ASSOCIATED WITH TYPE 2 DIABETES MELLITUS: ICD-10-CM

## 2022-06-01 LAB
ALBUMIN SERPL BCP-MCNC: 3.2 G/DL (ref 3.5–5.2)
ALP SERPL-CCNC: 84 U/L (ref 55–135)
ALT SERPL W/O P-5'-P-CCNC: 9 U/L (ref 10–44)
ANION GAP SERPL CALC-SCNC: 6 MMOL/L (ref 8–16)
AST SERPL-CCNC: 9 U/L (ref 10–40)
BASOPHILS # BLD AUTO: 0.02 K/UL (ref 0–0.2)
BASOPHILS NFR BLD: 0.3 % (ref 0–1.9)
BILIRUB SERPL-MCNC: 0.3 MG/DL (ref 0.1–1)
BUN SERPL-MCNC: 20 MG/DL (ref 8–23)
CALCIUM SERPL-MCNC: 9.4 MG/DL (ref 8.7–10.5)
CHLORIDE SERPL-SCNC: 106 MMOL/L (ref 95–110)
CHOLEST SERPL-MCNC: 209 MG/DL (ref 120–199)
CHOLEST/HDLC SERPL: 3.5 {RATIO} (ref 2–5)
CO2 SERPL-SCNC: 25 MMOL/L (ref 23–29)
CREAT SERPL-MCNC: 1.1 MG/DL (ref 0.5–1.4)
DIFFERENTIAL METHOD: ABNORMAL
EOSINOPHIL # BLD AUTO: 0.2 K/UL (ref 0–0.5)
EOSINOPHIL NFR BLD: 2.6 % (ref 0–8)
ERYTHROCYTE [DISTWIDTH] IN BLOOD BY AUTOMATED COUNT: 19.2 % (ref 11.5–14.5)
EST. GFR  (AFRICAN AMERICAN): 59.2 ML/MIN/1.73 M^2
EST. GFR  (NON AFRICAN AMERICAN): 51.3 ML/MIN/1.73 M^2
ESTIMATED AVG GLUCOSE: 163 MG/DL (ref 68–131)
GLUCOSE SERPL-MCNC: 168 MG/DL (ref 70–110)
HBA1C MFR BLD: 7.3 % (ref 4–5.6)
HCT VFR BLD AUTO: 28.6 % (ref 37–48.5)
HDLC SERPL-MCNC: 59 MG/DL (ref 40–75)
HDLC SERPL: 28.2 % (ref 20–50)
HGB BLD-MCNC: 7.8 G/DL (ref 12–16)
IMM GRANULOCYTES # BLD AUTO: 0.02 K/UL (ref 0–0.04)
IMM GRANULOCYTES NFR BLD AUTO: 0.3 % (ref 0–0.5)
LDLC SERPL CALC-MCNC: 128.4 MG/DL (ref 63–159)
LYMPHOCYTES # BLD AUTO: 1.6 K/UL (ref 1–4.8)
LYMPHOCYTES NFR BLD: 27 % (ref 18–48)
MCH RBC QN AUTO: 20.1 PG (ref 27–31)
MCHC RBC AUTO-ENTMCNC: 27.3 G/DL (ref 32–36)
MCV RBC AUTO: 74 FL (ref 82–98)
MONOCYTES # BLD AUTO: 0.5 K/UL (ref 0.3–1)
MONOCYTES NFR BLD: 8 % (ref 4–15)
NEUTROPHILS # BLD AUTO: 3.6 K/UL (ref 1.8–7.7)
NEUTROPHILS NFR BLD: 61.8 % (ref 38–73)
NONHDLC SERPL-MCNC: 150 MG/DL
NRBC BLD-RTO: 0 /100 WBC
PLATELET # BLD AUTO: 460 K/UL (ref 150–450)
PMV BLD AUTO: 9.7 FL (ref 9.2–12.9)
POTASSIUM SERPL-SCNC: 4.2 MMOL/L (ref 3.5–5.1)
PROT SERPL-MCNC: 7.2 G/DL (ref 6–8.4)
RBC # BLD AUTO: 3.88 M/UL (ref 4–5.4)
SODIUM SERPL-SCNC: 137 MMOL/L (ref 136–145)
TRIGL SERPL-MCNC: 108 MG/DL (ref 30–150)
WBC # BLD AUTO: 5.85 K/UL (ref 3.9–12.7)

## 2022-06-01 PROCEDURE — 83036 HEMOGLOBIN GLYCOSYLATED A1C: CPT | Performed by: FAMILY MEDICINE

## 2022-06-01 PROCEDURE — 85025 COMPLETE CBC W/AUTO DIFF WBC: CPT | Performed by: FAMILY MEDICINE

## 2022-06-01 PROCEDURE — 80061 LIPID PANEL: CPT | Performed by: FAMILY MEDICINE

## 2022-06-01 PROCEDURE — 36415 COLL VENOUS BLD VENIPUNCTURE: CPT | Mod: PO | Performed by: FAMILY MEDICINE

## 2022-06-01 PROCEDURE — 80053 COMPREHEN METABOLIC PANEL: CPT | Performed by: FAMILY MEDICINE

## 2022-06-03 ENCOUNTER — TELEPHONE (OUTPATIENT)
Dept: PAIN MEDICINE | Facility: CLINIC | Age: 72
End: 2022-06-03
Payer: MEDICARE

## 2022-06-06 ENCOUNTER — OFFICE VISIT (OUTPATIENT)
Dept: PAIN MEDICINE | Facility: CLINIC | Age: 72
End: 2022-06-06
Payer: MEDICARE

## 2022-06-06 VITALS
BODY MASS INDEX: 26.68 KG/M2 | DIASTOLIC BLOOD PRESSURE: 75 MMHG | RESPIRATION RATE: 18 BRPM | SYSTOLIC BLOOD PRESSURE: 145 MMHG | HEART RATE: 84 BPM | HEIGHT: 67 IN | WEIGHT: 170 LBS

## 2022-06-06 DIAGNOSIS — F41.9 ANXIETY: ICD-10-CM

## 2022-06-06 DIAGNOSIS — M54.9 DORSALGIA, UNSPECIFIED: ICD-10-CM

## 2022-06-06 DIAGNOSIS — M51.36 DDD (DEGENERATIVE DISC DISEASE), LUMBAR: Primary | ICD-10-CM

## 2022-06-06 PROCEDURE — 1160F RVW MEDS BY RX/DR IN RCRD: CPT | Mod: CPTII,S$GLB,, | Performed by: STUDENT IN AN ORGANIZED HEALTH CARE EDUCATION/TRAINING PROGRAM

## 2022-06-06 PROCEDURE — 3078F DIAST BP <80 MM HG: CPT | Mod: CPTII,S$GLB,, | Performed by: STUDENT IN AN ORGANIZED HEALTH CARE EDUCATION/TRAINING PROGRAM

## 2022-06-06 PROCEDURE — 3288F FALL RISK ASSESSMENT DOCD: CPT | Mod: CPTII,S$GLB,, | Performed by: STUDENT IN AN ORGANIZED HEALTH CARE EDUCATION/TRAINING PROGRAM

## 2022-06-06 PROCEDURE — 1159F PR MEDICATION LIST DOCUMENTED IN MEDICAL RECORD: ICD-10-PCS | Mod: CPTII,S$GLB,, | Performed by: STUDENT IN AN ORGANIZED HEALTH CARE EDUCATION/TRAINING PROGRAM

## 2022-06-06 PROCEDURE — 1160F PR REVIEW ALL MEDS BY PRESCRIBER/CLIN PHARMACIST DOCUMENTED: ICD-10-PCS | Mod: CPTII,S$GLB,, | Performed by: STUDENT IN AN ORGANIZED HEALTH CARE EDUCATION/TRAINING PROGRAM

## 2022-06-06 PROCEDURE — 99999 PR PBB SHADOW E&M-EST. PATIENT-LVL III: ICD-10-PCS | Mod: PBBFAC,,, | Performed by: STUDENT IN AN ORGANIZED HEALTH CARE EDUCATION/TRAINING PROGRAM

## 2022-06-06 PROCEDURE — 1101F PT FALLS ASSESS-DOCD LE1/YR: CPT | Mod: CPTII,S$GLB,, | Performed by: STUDENT IN AN ORGANIZED HEALTH CARE EDUCATION/TRAINING PROGRAM

## 2022-06-06 PROCEDURE — 3062F POS MACROALBUMINURIA REV: CPT | Mod: CPTII,S$GLB,, | Performed by: STUDENT IN AN ORGANIZED HEALTH CARE EDUCATION/TRAINING PROGRAM

## 2022-06-06 PROCEDURE — 3077F PR MOST RECENT SYSTOLIC BLOOD PRESSURE >= 140 MM HG: ICD-10-PCS | Mod: CPTII,S$GLB,, | Performed by: STUDENT IN AN ORGANIZED HEALTH CARE EDUCATION/TRAINING PROGRAM

## 2022-06-06 PROCEDURE — 4010F PR ACE/ARB THEARPY RXD/TAKEN: ICD-10-PCS | Mod: CPTII,S$GLB,, | Performed by: STUDENT IN AN ORGANIZED HEALTH CARE EDUCATION/TRAINING PROGRAM

## 2022-06-06 PROCEDURE — 3078F PR MOST RECENT DIASTOLIC BLOOD PRESSURE < 80 MM HG: ICD-10-PCS | Mod: CPTII,S$GLB,, | Performed by: STUDENT IN AN ORGANIZED HEALTH CARE EDUCATION/TRAINING PROGRAM

## 2022-06-06 PROCEDURE — 3066F NEPHROPATHY DOC TX: CPT | Mod: CPTII,S$GLB,, | Performed by: STUDENT IN AN ORGANIZED HEALTH CARE EDUCATION/TRAINING PROGRAM

## 2022-06-06 PROCEDURE — 99214 OFFICE O/P EST MOD 30 MIN: CPT | Mod: S$GLB,,, | Performed by: STUDENT IN AN ORGANIZED HEALTH CARE EDUCATION/TRAINING PROGRAM

## 2022-06-06 PROCEDURE — 1159F MED LIST DOCD IN RCRD: CPT | Mod: CPTII,S$GLB,, | Performed by: STUDENT IN AN ORGANIZED HEALTH CARE EDUCATION/TRAINING PROGRAM

## 2022-06-06 PROCEDURE — 3062F PR POS MACROALBUMINURIA RESULT DOCUMENTED/REVIEW: ICD-10-PCS | Mod: CPTII,S$GLB,, | Performed by: STUDENT IN AN ORGANIZED HEALTH CARE EDUCATION/TRAINING PROGRAM

## 2022-06-06 PROCEDURE — 3288F PR FALLS RISK ASSESSMENT DOCUMENTED: ICD-10-PCS | Mod: CPTII,S$GLB,, | Performed by: STUDENT IN AN ORGANIZED HEALTH CARE EDUCATION/TRAINING PROGRAM

## 2022-06-06 PROCEDURE — 3077F SYST BP >= 140 MM HG: CPT | Mod: CPTII,S$GLB,, | Performed by: STUDENT IN AN ORGANIZED HEALTH CARE EDUCATION/TRAINING PROGRAM

## 2022-06-06 PROCEDURE — 99214 PR OFFICE/OUTPT VISIT, EST, LEVL IV, 30-39 MIN: ICD-10-PCS | Mod: S$GLB,,, | Performed by: STUDENT IN AN ORGANIZED HEALTH CARE EDUCATION/TRAINING PROGRAM

## 2022-06-06 PROCEDURE — 1125F PR PAIN SEVERITY QUANTIFIED, PAIN PRESENT: ICD-10-PCS | Mod: CPTII,S$GLB,, | Performed by: STUDENT IN AN ORGANIZED HEALTH CARE EDUCATION/TRAINING PROGRAM

## 2022-06-06 PROCEDURE — 3008F BODY MASS INDEX DOCD: CPT | Mod: CPTII,S$GLB,, | Performed by: STUDENT IN AN ORGANIZED HEALTH CARE EDUCATION/TRAINING PROGRAM

## 2022-06-06 PROCEDURE — 1125F AMNT PAIN NOTED PAIN PRSNT: CPT | Mod: CPTII,S$GLB,, | Performed by: STUDENT IN AN ORGANIZED HEALTH CARE EDUCATION/TRAINING PROGRAM

## 2022-06-06 PROCEDURE — 3051F PR MOST RECENT HEMOGLOBIN A1C LEVEL 7.0 - < 8.0%: ICD-10-PCS | Mod: CPTII,S$GLB,, | Performed by: STUDENT IN AN ORGANIZED HEALTH CARE EDUCATION/TRAINING PROGRAM

## 2022-06-06 PROCEDURE — 1101F PR PT FALLS ASSESS DOC 0-1 FALLS W/OUT INJ PAST YR: ICD-10-PCS | Mod: CPTII,S$GLB,, | Performed by: STUDENT IN AN ORGANIZED HEALTH CARE EDUCATION/TRAINING PROGRAM

## 2022-06-06 PROCEDURE — 4010F ACE/ARB THERAPY RXD/TAKEN: CPT | Mod: CPTII,S$GLB,, | Performed by: STUDENT IN AN ORGANIZED HEALTH CARE EDUCATION/TRAINING PROGRAM

## 2022-06-06 PROCEDURE — 3051F HG A1C>EQUAL 7.0%<8.0%: CPT | Mod: CPTII,S$GLB,, | Performed by: STUDENT IN AN ORGANIZED HEALTH CARE EDUCATION/TRAINING PROGRAM

## 2022-06-06 PROCEDURE — 3008F PR BODY MASS INDEX (BMI) DOCUMENTED: ICD-10-PCS | Mod: CPTII,S$GLB,, | Performed by: STUDENT IN AN ORGANIZED HEALTH CARE EDUCATION/TRAINING PROGRAM

## 2022-06-06 PROCEDURE — 99999 PR PBB SHADOW E&M-EST. PATIENT-LVL III: CPT | Mod: PBBFAC,,, | Performed by: STUDENT IN AN ORGANIZED HEALTH CARE EDUCATION/TRAINING PROGRAM

## 2022-06-06 PROCEDURE — 3066F PR DOCUMENTATION OF TREATMENT FOR NEPHROPATHY: ICD-10-PCS | Mod: CPTII,S$GLB,, | Performed by: STUDENT IN AN ORGANIZED HEALTH CARE EDUCATION/TRAINING PROGRAM

## 2022-06-06 RX ORDER — LORAZEPAM 1 MG/1
1 TABLET ORAL ONCE AS NEEDED
Qty: 1 TABLET | Refills: 0 | Status: SHIPPED | OUTPATIENT
Start: 2022-06-06 | End: 2022-06-06

## 2022-06-06 RX ORDER — LORAZEPAM 1 MG/1
1 TABLET ORAL ONCE AS NEEDED
Qty: 1 TABLET | Refills: 0 | Status: SHIPPED | OUTPATIENT
Start: 2022-06-06 | End: 2023-08-07

## 2022-06-06 NOTE — PROGRESS NOTES
Chronic Pain - New Consult    Referring Physician: No ref. provider found    Date: 06/06/2022     Re: Joanne Peña  MR#: [id number]  YOB: 1950  Age: 71 y.o.    Chief Complaint:   Chief Complaint   Patient presents with    Low-back Pain     **This note is dictated using the M*Modal Fluency Direct word recognition program. There are word recognition mistakes that are occasionally missed on review.**    ASSESSMENT: 71 y.o. year old female with right sided back/buttock pain pain, consistent with     1. DDD (degenerative disc disease), lumbar     2. Dorsalgia, unspecified  MRI Lumbar Spine Without Contrast   3. Anxiety  LORazepam (ATIVAN) 1 MG tablet    DISCONTINUED: LORazepam (ATIVAN) 1 MG tablet       PLAN:     DDD of lumbar spine  - MRI lumbar to evaluate  - discussed possible KRISTA vs TFESI as treatment options depending on what MRI looks like.   -discussed importance of core strengthening, back exercises, losing weight  -okay to take tylenol    Sacroiliitis  -s/p R SIJ on 5/20/22.  Her right sided buttock pain has resolved 100% at 2 weeks.  -continue with healthy back.  She needs to call to reschedule her next appointment.  -s/p short course Norco 5mg    Lumbar radiculopathy  -less likely. No imaging available. If SIJ not successful, then will consider lumbar XR/MRI to further assess  -would need clearance to hold ASA    CKD 3   -last CMP showed GFR 53.   -GFR stable  -avoid nephrotoxic medications    Right hip pain  -ortho surgery does not believe that this is coming from the hip.  Will r/o SIJ and back pathology.    Prior TIA  -off plavix. Following with PCP  -continuie ASA 81    Bilateral peripheral neuropathy  -unclear etiology. Bottom of both feet.  Monitor    DM2  -new A1c = 7.3 on 6/1/22    Anemia of chronic disease / HLD   -discused her blood test results    - RTC 1-2 weeks after MRI  - Counseled patient regarding the importance of weight loss and activity modification and physical  therapy.    The above plan and management options were discussed at length with patient. Patient is in agreement with the above and verbalized understanding. It will be communicated with the referring physician via electronic record, fax, or mail.  Lab/study reports reviewed were important and necessary because subsequent medical and treatment recommendations required review of the above lab/study reports. Images viewed/reviewed above were important and necessary because subsequent medical and treatment recommendations required review of the reviewed image(s).     Electronically signed by:  Son Lara DO  06/06/2022   .  Patient was seen in clinic today.  The amount of time spent with the patient was greater than 45 minutes.  Due to medical complexity and multiple issues discussed/addressed I am billing for a level 5 visit.    =========================================================================================================    SUBJECTIVE:    Interval History 6/6/2022:     Joanne Peña is a 71 y.o. female presents to the clinic for follow up.  Since last visit the pain has has significantly improved.  She is s/p Right SIJ on 5/20/22 with almost 100% improvement of the low back/buttock pain.  She no longer requires a cane or walker to ambulate.  She feels signfiicantly better.  Currently her pain is located in the midline back without radiation. Worse with flexion, standing, walking    The pain is located in the lower back area and does not radiate.  The pain is described as aching    At BEST  4/10   At WORST  8/10 on the WORST day.    On average pain is rated as 4/10.   Today the pain is rated as 6/10  Symptoms interfere with nothing .   Exacerbating factors: Walking.    Mitigating factors laying down, medications and sitting.     Current pain medications: gabapentin. Oral steroids and robaxin helped. Short course Norco  Failed Pain Medications: cannot take NSAIDs because of the stroke.  Tylenol     Initial Hx:  Joanne Peña is a 71 y.o. female presents to the clinic for the evaluation of lower back pain. The pain started over a month ago following no inciting event and symptoms have been worsening.  The patient had a TIA on 2/6/22.  She was on 21 days of Plavix which she has stopped. She takes a daily ASA81.  Does not feel that this is related to the stroke.  Denies any falls or trauma. She started getting around the beginning of March.  She went to the Urgent care on March 17, 2022 because the pain was not going away.  Since then the pain has been worsening. The pain pattern is the same now as it was then. The pain has prevented the patient from walking, and she has required a wheelchair since March 17.     She tried a medrol dose trae, robaxin, gabapentin which helped until she stopped the steroids and the robaxin. She is taking gabapentin 300mg TID.  She is not taking anything else for pain right now. When she gets severe pain she repositions, heating pads, gabapentin. Especially worse while trying to get out of the bed. Changing positions are very bad.    Pain Description:    The pain is located in the lower back area and radiates to the right thigh/groin/ right hip.    At BEST  10/10   At WORST  10/10 on the WORST day.    On average pain is rated as 10/10.   Today the pain is rated as 10/10  The pain is continuous.  The pain is described as aching, sharp, shooting and pulling    Symptoms interfere with daily activity, sleeping and work.   Exacerbating factors: any type of movement.    Mitigating factors heat and medications.   She reports 1 hours of sleep per night.    Physical Therapy/Home Exercise: No, not currently in physical therapy or home exercise program    Current Pain Medications:    - gabapentin. Oral steroids and robaxin helped.    Failed Pain Medications:    - cannot take NSAIDs because of the stroke. Tylenol     Pain Treatment Therapies:    Pain procedures: none  Physical  Therapy: last PT 1 month ago.  Patient has a healthy back referral on 5/9  Spinal decompression: none  Joint replacement: none     Patient denies urinary incontinence and bowel incontinence. (+) bilateral foot numbness x2 weeks.   Patient denies any suicidal or homicidal ideations     report:  Reviewed and consistent with medication use as prescribed.    Imaging:   XR hip:    FINDINGS:  The bones are intact.  There is no evidence for acute fracture or bone destruction.  There are degenerative changes with mild narrowing of the right hip joint space laterally.  Small osteophytes are present at the right hip.  Left hip and sacroiliac joints appear grossly unremarkable.     Impression:     No evidence for acute fracture, bone destruction, or dislocation.     Degenerative changes of the right hip with mild joint space narrowing laterally and small osteophytes.       XR lumbar 03/2022:    Mild scoliosis with convexity to the left can be seen.  Vertebral bodies are intact.  Disc spaces are maintained.  No significant bony abnormalities are noted    Past Medical History:   Diagnosis Date    Diabetes mellitus     Hyperlipidemia     Hypertension     Stroke      Past Surgical History:   Procedure Laterality Date    INJECTION OF JOINT Right 5/20/2022    Procedure: Right SI joint injection;  Surgeon: Son Lara DO;  Location: [facility];  Service: Pain Management;  Laterality: Right;     Social History     Socioeconomic History    Marital status:    Tobacco Use    Smoking status: Former Smoker     Packs/day: 0.50     Types: Cigarettes    Smokeless tobacco: Never Used   Substance and Sexual Activity    Alcohol use: Never    Drug use: No     Family History   Problem Relation Age of Onset    Kidney disease Mother     Heart disease Mother     Cancer Father     Hypertension Brother     Diabetes Daughter     Hypertension Daughter     Cancer Maternal Aunt        Review of patient's allergies  indicates:   Allergen Reactions    Lisinopril-hydrochlorothiazide Other (See Comments)     Pt stated it makes her feel drained. She couldn't raise arms over head.    Ampicillin Rash    Darvocet a500 [propoxyphene n-acetaminophen] Other (See Comments)     karl       Current Outpatient Medications   Medication Sig    amLODIPine (NORVASC) 10 MG tablet Take 1 tablet (10 mg total) by mouth once daily. Hold if SBP <120    ascorbic acid, vitamin C, (VITAMIN C) 500 MG tablet Take 500 mg by mouth once daily.    aspirin (ECOTRIN) 81 MG EC tablet Take 1 tablet (81 mg total) by mouth once daily.    atorvastatin (LIPITOR) 80 MG tablet Take 1 tablet (80 mg total) by mouth once daily.    diclofenac sodium (VOLTAREN) 1 % Gel Apply 2 g topically 4 (four) times daily. Use gloves to apply    glimepiride (AMARYL) 2 MG tablet Take 1 tablet (2 mg total) by mouth 2 (two) times daily.    guanfacine HCl (GUANFACINE ORAL) Take by mouth.    meclizine (ANTIVERT) 25 mg tablet TAKE 1 TABLET (25 MG TOTAL) BY MOUTH 2 (TWO) TIMES DAILY AS NEEDED FOR DIZZINESS.    methylPREDNISolone (MEDROL DOSEPACK) 4 mg tablet use as directed    multivitamin (THERAGRAN) per tablet Take 1 tablet by mouth once daily.    ondansetron (ZOFRAN-ODT) 4 MG TbDL Dissolve 1 tablet (4 mg total) by mouth every 8 (eight) hours as needed.    clopidogreL (PLAVIX) 75 mg tablet Take 1 tablet (75 mg total) by mouth once daily. for 16 days    gabapentin (NEURONTIN) 100 MG capsule Take 1 capsule (100 mg total) by mouth 3 (three) times daily.    hydrALAZINE (APRESOLINE) 50 MG tablet Take 1 tablet (50 mg total) by mouth every 8 (eight) hours. Hold is SBP <120    LORazepam (ATIVAN) 1 MG tablet Take 1 tablet (1 mg total) by mouth once as needed for Anxiety (before procedure).    losartan (COZAAR) 100 MG tablet Take 1 tablet (100 mg total) by mouth once daily. Hold if SBP <120    metformin (GLUCOPHAGE) 500 MG tablet Take 1 tablet (500 mg total) by mouth daily with  "breakfast. (Patient taking differently: Take 500 mg by mouth daily with breakfast. prn)     No current facility-administered medications for this visit.       REVIEW OF SYSTEMS:    GENERAL:  No weight loss, malaise or fevers. + fevers  HEENT:   No recent changes in vision or hearing  NECK:  Negative for lumps, no difficulty with swallowing.  RESPIRATORY:  Negative for cough, wheezing or shortness of breath, patient denies any recent URI.  CARDIOVASCULAR:  Negative for chest pain, leg swelling or palpitations.  GI:  Negative for abdominal discomfort, blood in stools or black stools or change in bowel habits.  MUSCULOSKELETAL:  See HPI.  SKIN:  Negative for lesions, rash, and itching.  PSYCH:  No mood disorder or recent psychosocial stressors.  Patients sleep is not disturbed secondary to pain.  HEMATOLOGY/LYMPHOLOGY:  Negative for prolonged bleeding, bruising easily or swollen nodes.  Patient is not currently taking any anti-coagulants  NEURO:   No history of headaches, syncope, paralysis, seizures or tremors.  All other reviewed and negative other than HPI.    OBJECTIVE:    BP (!) 145/75   Pulse 84   Resp 18   Ht 5' 7" (1.702 m)   Wt 77.1 kg (170 lb)   BMI 26.63 kg/m²     PHYSICAL EXAMINATION:    GENERAL: Well appearing, in no acute distress, alert and oriented x3. NOT In wheelchair anymore  PSYCH:  Mood and affect appropriate.  SKIN: Skin color, texture, turgor normal, no rashes or lesions.  HEAD/FACE:  Normocephalic, atraumatic. Cranial nerves grossly intact.  CV: RRR with palpation of the radial artery.  PULM: CTAB. No evidence of respiratory difficulty, symmetric chest rise.  GI:  Soft and non-tender.    BACK:  - No obvious deformity or signs of trauma, Normal lumbar lordotic curve  - Negative spinous process tenderness  - Negative paravertebral tenderness  - Negative pain to palpation over the facet joints of the lumbar spine.   - Negative QL / Iliac crest / Glut tenderness  - Slump test is Negative for " radicular pain  - Slump test is Negative for back pain  - Supine Straight leg raising is Negative for radicular pain  - Supine Straight leg raising is Negative for back pain  - unable to perform ROM of lumbar spine  - Negative Sustained Hip Flexion test (for discogenic pain)  - Negative Altered Gait, Posture  - Axial facet loading test Negative on the bilateral side(s)    SI Joint exam:  - Negative SI joint tenderness to palpation  - Rodolfo's sign Negative  - Yeoman's Test: Unable to Perform for SI joint pain indicating anterior SI ligament involvement. Unable to Perform for anterior thigh pain/paresthesia which indicates femoral nerve stretch.  - Gaenslen's Test:Unable to Perform  - Finger Mikhail's Sign:Negative  - SI compression test:Unable to Perform  - SI distraction test:Unable to Perform  - Thigh Thrust: Unable to Perform  - SI Thrust: Unable to Perform    MUSKULOSKELETAL:    EXTREMITIES:   Hip Exam:  - Log Roll Unable to Perform  - FADIR Unable to Perform  - Stinchfield Unable to Perform  - Hip Scour Unable to Perform  - GTB Tenderness Negative      MUSCULOSKELETAL:  No atrophy or tone abnormalities are noted in the UE or LE.  No deformities, edema, or skin discoloration are noted on visible skin. Good capillary refill.    NEURO: Bilateral upper and lower extremity coordination and muscle stretch reflexes are physiologic and symmetric.      NEUROLOGICAL EXAM:  MENTAL STATUS: A x O x 3, good concentration, speech is fluent and goal directed  MEMORY: recent and remote are intact  CN: CN2-12 grossly intact  MOTOR: 5/5 in all muscle groups  DTRs: 2+ intact symmetric  Sensation:    -no Loss of sensation in a left lower and right lower L-1, L-2, L-3, L-4 and L-5 bilaterally distribution.

## 2022-06-08 ENCOUNTER — TELEPHONE (OUTPATIENT)
Dept: ADMINISTRATIVE | Facility: CLINIC | Age: 72
End: 2022-06-08
Payer: MEDICARE

## 2022-06-10 ENCOUNTER — PATIENT MESSAGE (OUTPATIENT)
Dept: FAMILY MEDICINE | Facility: CLINIC | Age: 72
End: 2022-06-10
Payer: MEDICARE

## 2022-06-10 DIAGNOSIS — D50.9 IRON DEFICIENCY ANEMIA, UNSPECIFIED IRON DEFICIENCY ANEMIA TYPE: Primary | ICD-10-CM

## 2022-06-10 DIAGNOSIS — N18.31 STAGE 3A CHRONIC KIDNEY DISEASE: ICD-10-CM

## 2022-06-10 DIAGNOSIS — E11.29 MICROALBUMINURIA DUE TO TYPE 2 DIABETES MELLITUS: ICD-10-CM

## 2022-06-10 DIAGNOSIS — R80.9 MICROALBUMINURIA DUE TO TYPE 2 DIABETES MELLITUS: ICD-10-CM

## 2022-06-10 NOTE — TELEPHONE ENCOUNTER
Please inform patient that her labs show chronic kidney disease, most likely related to her diabetes, as well as severe anemia. She needs prompt follow up with a kidney specialist and a blood specialist. I have put in referrals to both specialists. I recommend taking 2 tablets of iron with vitamin C every day to help her blood counts. Please keep her appointment with me to further discuss her labs.

## 2022-06-13 ENCOUNTER — PES CALL (OUTPATIENT)
Dept: ADMINISTRATIVE | Facility: CLINIC | Age: 72
End: 2022-06-13
Payer: MEDICARE

## 2022-06-13 ENCOUNTER — TELEPHONE (OUTPATIENT)
Dept: FAMILY MEDICINE | Facility: CLINIC | Age: 72
End: 2022-06-13
Payer: MEDICARE

## 2022-06-13 ENCOUNTER — OFFICE VISIT (OUTPATIENT)
Dept: HEMATOLOGY/ONCOLOGY | Facility: CLINIC | Age: 72
End: 2022-06-13
Payer: MEDICARE

## 2022-06-13 VITALS
DIASTOLIC BLOOD PRESSURE: 77 MMHG | TEMPERATURE: 98 F | OXYGEN SATURATION: 99 % | HEART RATE: 86 BPM | WEIGHT: 170.19 LBS | HEIGHT: 67 IN | BODY MASS INDEX: 26.71 KG/M2 | SYSTOLIC BLOOD PRESSURE: 166 MMHG

## 2022-06-13 DIAGNOSIS — D75.839 THROMBOCYTOSIS: ICD-10-CM

## 2022-06-13 DIAGNOSIS — D50.9 IRON DEFICIENCY ANEMIA, UNSPECIFIED IRON DEFICIENCY ANEMIA TYPE: Primary | ICD-10-CM

## 2022-06-13 PROCEDURE — 3008F PR BODY MASS INDEX (BMI) DOCUMENTED: ICD-10-PCS | Mod: CPTII,S$GLB,, | Performed by: INTERNAL MEDICINE

## 2022-06-13 PROCEDURE — 3288F PR FALLS RISK ASSESSMENT DOCUMENTED: ICD-10-PCS | Mod: CPTII,S$GLB,, | Performed by: INTERNAL MEDICINE

## 2022-06-13 PROCEDURE — 3288F FALL RISK ASSESSMENT DOCD: CPT | Mod: CPTII,S$GLB,, | Performed by: INTERNAL MEDICINE

## 2022-06-13 PROCEDURE — 99204 OFFICE O/P NEW MOD 45 MIN: CPT | Mod: S$GLB,,, | Performed by: INTERNAL MEDICINE

## 2022-06-13 PROCEDURE — 99204 PR OFFICE/OUTPT VISIT, NEW, LEVL IV, 45-59 MIN: ICD-10-PCS | Mod: S$GLB,,, | Performed by: INTERNAL MEDICINE

## 2022-06-13 PROCEDURE — 3066F NEPHROPATHY DOC TX: CPT | Mod: CPTII,S$GLB,, | Performed by: INTERNAL MEDICINE

## 2022-06-13 PROCEDURE — 1125F AMNT PAIN NOTED PAIN PRSNT: CPT | Mod: CPTII,S$GLB,, | Performed by: INTERNAL MEDICINE

## 2022-06-13 PROCEDURE — 4010F PR ACE/ARB THEARPY RXD/TAKEN: ICD-10-PCS | Mod: CPTII,S$GLB,, | Performed by: INTERNAL MEDICINE

## 2022-06-13 PROCEDURE — 3078F PR MOST RECENT DIASTOLIC BLOOD PRESSURE < 80 MM HG: ICD-10-PCS | Mod: CPTII,S$GLB,, | Performed by: INTERNAL MEDICINE

## 2022-06-13 PROCEDURE — 99999 PR PBB SHADOW E&M-EST. PATIENT-LVL V: CPT | Mod: PBBFAC,,, | Performed by: INTERNAL MEDICINE

## 2022-06-13 PROCEDURE — 1101F PT FALLS ASSESS-DOCD LE1/YR: CPT | Mod: CPTII,S$GLB,, | Performed by: INTERNAL MEDICINE

## 2022-06-13 PROCEDURE — 3051F HG A1C>EQUAL 7.0%<8.0%: CPT | Mod: CPTII,S$GLB,, | Performed by: INTERNAL MEDICINE

## 2022-06-13 PROCEDURE — 99999 PR PBB SHADOW E&M-EST. PATIENT-LVL V: ICD-10-PCS | Mod: PBBFAC,,, | Performed by: INTERNAL MEDICINE

## 2022-06-13 PROCEDURE — 3077F PR MOST RECENT SYSTOLIC BLOOD PRESSURE >= 140 MM HG: ICD-10-PCS | Mod: CPTII,S$GLB,, | Performed by: INTERNAL MEDICINE

## 2022-06-13 PROCEDURE — 3062F POS MACROALBUMINURIA REV: CPT | Mod: CPTII,S$GLB,, | Performed by: INTERNAL MEDICINE

## 2022-06-13 PROCEDURE — 3008F BODY MASS INDEX DOCD: CPT | Mod: CPTII,S$GLB,, | Performed by: INTERNAL MEDICINE

## 2022-06-13 PROCEDURE — 3078F DIAST BP <80 MM HG: CPT | Mod: CPTII,S$GLB,, | Performed by: INTERNAL MEDICINE

## 2022-06-13 PROCEDURE — 1101F PR PT FALLS ASSESS DOC 0-1 FALLS W/OUT INJ PAST YR: ICD-10-PCS | Mod: CPTII,S$GLB,, | Performed by: INTERNAL MEDICINE

## 2022-06-13 PROCEDURE — 1125F PR PAIN SEVERITY QUANTIFIED, PAIN PRESENT: ICD-10-PCS | Mod: CPTII,S$GLB,, | Performed by: INTERNAL MEDICINE

## 2022-06-13 PROCEDURE — 3066F PR DOCUMENTATION OF TREATMENT FOR NEPHROPATHY: ICD-10-PCS | Mod: CPTII,S$GLB,, | Performed by: INTERNAL MEDICINE

## 2022-06-13 PROCEDURE — 3077F SYST BP >= 140 MM HG: CPT | Mod: CPTII,S$GLB,, | Performed by: INTERNAL MEDICINE

## 2022-06-13 PROCEDURE — 3051F PR MOST RECENT HEMOGLOBIN A1C LEVEL 7.0 - < 8.0%: ICD-10-PCS | Mod: CPTII,S$GLB,, | Performed by: INTERNAL MEDICINE

## 2022-06-13 PROCEDURE — 3062F PR POS MACROALBUMINURIA RESULT DOCUMENTED/REVIEW: ICD-10-PCS | Mod: CPTII,S$GLB,, | Performed by: INTERNAL MEDICINE

## 2022-06-13 PROCEDURE — 4010F ACE/ARB THERAPY RXD/TAKEN: CPT | Mod: CPTII,S$GLB,, | Performed by: INTERNAL MEDICINE

## 2022-06-13 NOTE — PROGRESS NOTES
"  Subjective:       Patient ID: Joanne Peña is a 71 y.o. female.    Chief Complaint: Anemia  Reason For Consultation: Anemia  HPI     She has been taking Fe supp intermittently . She received IV Fe during pregnancy. She recently restarted Fe supplementation. No prior history of prbc transfusions.  No screening colonscopy. No fam hx of colon cancer. No melena, hematochezia or change in bowel habits No fatigue. She craves ice. Patient reports her mom had anemia.      Past Medical History:   Diagnosis Date    Diabetes mellitus     Hyperlipidemia     Hypertension     Stroke        Past Surgical History:   Procedure Laterality Date    INJECTION OF JOINT Right 5/20/2022    Procedure: Right SI joint injection;  Surgeon: Son Lara DO;  Location: [facility] OR;  Service: Pain Management;  Laterality: Right;       Review of Systems   Constitutional: Negative for appetite change, fatigue, fever and unexpected weight change.   HENT: Negative for mouth sores.    Eyes: Negative for visual disturbance.   Respiratory: Negative for cough and shortness of breath.    Cardiovascular: Negative for chest pain.   Gastrointestinal: Negative for abdominal pain and diarrhea.   Genitourinary: Negative for frequency.   Musculoskeletal: Negative for back pain.   Integumentary:  Negative for rash.   Neurological: Negative for headaches.   Hematological: Negative for adenopathy.   Psychiatric/Behavioral: The patient is not nervous/anxious.          Objective:       Vitals:    06/13/22 1320   BP: (!) 166/77   BP Location: Left arm   Patient Position: Sitting   Pulse: 86   Temp: 98 °F (36.7 °C)   TempSrc: Oral   SpO2: 99%   Weight: 77.2 kg (170 lb 3.1 oz)   Height: 5' 7" (1.702 m)       Physical Exam  Constitutional:       Appearance: She is well-developed.   HENT:      Head: Normocephalic.      Right Ear: External ear normal.      Left Ear: External ear normal.      Mouth/Throat:      Pharynx: No oropharyngeal exudate.   Eyes: "      General: No scleral icterus.        Right eye: No discharge.         Left eye: No discharge.      Conjunctiva/sclera: Conjunctivae normal.   Cardiovascular:      Rate and Rhythm: Normal rate and regular rhythm.      Heart sounds: Normal heart sounds. No murmur heard.  Pulmonary:      Effort: Pulmonary effort is normal.      Breath sounds: Normal breath sounds. No wheezing or rales.   Abdominal:      General: Bowel sounds are normal.      Palpations: Abdomen is soft.      Tenderness: There is no abdominal tenderness. There is no guarding or rebound.   Musculoskeletal:         General: Normal range of motion.      Cervical back: Normal range of motion and neck supple.      Right lower leg: No edema.      Left lower leg: No edema.   Skin:     Coloration: Skin is not jaundiced.      Findings: No rash.   Neurological:      General: No focal deficit present.      Mental Status: She is alert and oriented to person, place, and time.   Psychiatric:         Mood and Affect: Mood normal.         Behavior: Behavior normal.         Lab Results   Component Value Date    WBC 5.85 06/01/2022    HGB 7.8 (L) 06/01/2022    HCT 28.6 (L) 06/01/2022    MCV 74 (L) 06/01/2022     (H) 06/01/2022         Assessment:       Problem List Items Addressed This Visit    None     Visit Diagnoses     Iron deficiency anemia, unspecified iron deficiency anemia type    -  Primary    Relevant Orders    Ambulatory referral/consult to Gastroenterology    CBC Auto Differential    Ferritin    Iron and TIBC    Thrombocytosis              Plan:       Problem List Items Addressed This Visit    None     Visit Diagnoses     Iron deficiency anemia, unspecified iron deficiency anemia type    -  Primary    Relevant Orders    Ambulatory referral/consult to Gastroenterology    CBC Auto Differential    Ferritin    Iron and TIBC    Thrombocytosis , likely reactive

## 2022-06-14 ENCOUNTER — HOSPITAL ENCOUNTER (OUTPATIENT)
Dept: CARDIOLOGY | Facility: CLINIC | Age: 72
Discharge: HOME | End: 2022-06-14
Payer: MEDICARE

## 2022-06-14 ENCOUNTER — OFFICE VISIT (OUTPATIENT)
Dept: ELECTROPHYSIOLOGY | Facility: CLINIC | Age: 72
End: 2022-06-14
Payer: MEDICARE

## 2022-06-14 VITALS
HEART RATE: 80 BPM | DIASTOLIC BLOOD PRESSURE: 64 MMHG | WEIGHT: 172.63 LBS | HEIGHT: 67 IN | BODY MASS INDEX: 27.09 KG/M2 | SYSTOLIC BLOOD PRESSURE: 163 MMHG

## 2022-06-14 DIAGNOSIS — I10 MALIGNANT ESSENTIAL HYPERTENSION: ICD-10-CM

## 2022-06-14 DIAGNOSIS — I51.7 LEFT ATRIAL ENLARGEMENT: Primary | ICD-10-CM

## 2022-06-14 DIAGNOSIS — I49.8 OTHER CARDIAC ARRHYTHMIA: ICD-10-CM

## 2022-06-14 DIAGNOSIS — E11.65 TYPE 2 DIABETES MELLITUS WITH HYPERGLYCEMIA, WITHOUT LONG-TERM CURRENT USE OF INSULIN: ICD-10-CM

## 2022-06-14 DIAGNOSIS — I63.89 CEREBROVASCULAR ACCIDENT (CVA) DUE TO OTHER MECHANISM: ICD-10-CM

## 2022-06-14 DIAGNOSIS — Z86.73 HISTORY OF STROKE: ICD-10-CM

## 2022-06-14 PROCEDURE — 93005 ELECTROCARDIOGRAM TRACING: CPT | Mod: S$GLB,,, | Performed by: INTERNAL MEDICINE

## 2022-06-14 PROCEDURE — 3008F BODY MASS INDEX DOCD: CPT | Mod: CPTII,S$GLB,, | Performed by: INTERNAL MEDICINE

## 2022-06-14 PROCEDURE — 1126F AMNT PAIN NOTED NONE PRSNT: CPT | Mod: CPTII,S$GLB,, | Performed by: INTERNAL MEDICINE

## 2022-06-14 PROCEDURE — 99999 PR PBB SHADOW E&M-EST. PATIENT-LVL IV: ICD-10-PCS | Mod: PBBFAC,,, | Performed by: INTERNAL MEDICINE

## 2022-06-14 PROCEDURE — 3051F HG A1C>EQUAL 7.0%<8.0%: CPT | Mod: CPTII,S$GLB,, | Performed by: INTERNAL MEDICINE

## 2022-06-14 PROCEDURE — 3078F PR MOST RECENT DIASTOLIC BLOOD PRESSURE < 80 MM HG: ICD-10-PCS | Mod: CPTII,S$GLB,, | Performed by: INTERNAL MEDICINE

## 2022-06-14 PROCEDURE — 99205 PR OFFICE/OUTPT VISIT, NEW, LEVL V, 60-74 MIN: ICD-10-PCS | Mod: S$GLB,,, | Performed by: INTERNAL MEDICINE

## 2022-06-14 PROCEDURE — 93005 RHYTHM STRIP: ICD-10-PCS | Mod: S$GLB,,, | Performed by: INTERNAL MEDICINE

## 2022-06-14 PROCEDURE — 1160F RVW MEDS BY RX/DR IN RCRD: CPT | Mod: CPTII,S$GLB,, | Performed by: INTERNAL MEDICINE

## 2022-06-14 PROCEDURE — 1160F PR REVIEW ALL MEDS BY PRESCRIBER/CLIN PHARMACIST DOCUMENTED: ICD-10-PCS | Mod: CPTII,S$GLB,, | Performed by: INTERNAL MEDICINE

## 2022-06-14 PROCEDURE — 3062F PR POS MACROALBUMINURIA RESULT DOCUMENTED/REVIEW: ICD-10-PCS | Mod: CPTII,S$GLB,, | Performed by: INTERNAL MEDICINE

## 2022-06-14 PROCEDURE — 4010F PR ACE/ARB THEARPY RXD/TAKEN: ICD-10-PCS | Mod: CPTII,S$GLB,, | Performed by: INTERNAL MEDICINE

## 2022-06-14 PROCEDURE — 1159F PR MEDICATION LIST DOCUMENTED IN MEDICAL RECORD: ICD-10-PCS | Mod: CPTII,S$GLB,, | Performed by: INTERNAL MEDICINE

## 2022-06-14 PROCEDURE — 93010 RHYTHM STRIP: ICD-10-PCS | Mod: S$GLB,,, | Performed by: INTERNAL MEDICINE

## 2022-06-14 PROCEDURE — 3066F NEPHROPATHY DOC TX: CPT | Mod: CPTII,S$GLB,, | Performed by: INTERNAL MEDICINE

## 2022-06-14 PROCEDURE — 3078F DIAST BP <80 MM HG: CPT | Mod: CPTII,S$GLB,, | Performed by: INTERNAL MEDICINE

## 2022-06-14 PROCEDURE — 3062F POS MACROALBUMINURIA REV: CPT | Mod: CPTII,S$GLB,, | Performed by: INTERNAL MEDICINE

## 2022-06-14 PROCEDURE — 3077F PR MOST RECENT SYSTOLIC BLOOD PRESSURE >= 140 MM HG: ICD-10-PCS | Mod: CPTII,S$GLB,, | Performed by: INTERNAL MEDICINE

## 2022-06-14 PROCEDURE — 4010F ACE/ARB THERAPY RXD/TAKEN: CPT | Mod: CPTII,S$GLB,, | Performed by: INTERNAL MEDICINE

## 2022-06-14 PROCEDURE — 1159F MED LIST DOCD IN RCRD: CPT | Mod: CPTII,S$GLB,, | Performed by: INTERNAL MEDICINE

## 2022-06-14 PROCEDURE — 1126F PR PAIN SEVERITY QUANTIFIED, NO PAIN PRESENT: ICD-10-PCS | Mod: CPTII,S$GLB,, | Performed by: INTERNAL MEDICINE

## 2022-06-14 PROCEDURE — 99999 PR PBB SHADOW E&M-EST. PATIENT-LVL IV: CPT | Mod: PBBFAC,,, | Performed by: INTERNAL MEDICINE

## 2022-06-14 PROCEDURE — 3008F PR BODY MASS INDEX (BMI) DOCUMENTED: ICD-10-PCS | Mod: CPTII,S$GLB,, | Performed by: INTERNAL MEDICINE

## 2022-06-14 PROCEDURE — 3051F PR MOST RECENT HEMOGLOBIN A1C LEVEL 7.0 - < 8.0%: ICD-10-PCS | Mod: CPTII,S$GLB,, | Performed by: INTERNAL MEDICINE

## 2022-06-14 PROCEDURE — 3077F SYST BP >= 140 MM HG: CPT | Mod: CPTII,S$GLB,, | Performed by: INTERNAL MEDICINE

## 2022-06-14 PROCEDURE — 3066F PR DOCUMENTATION OF TREATMENT FOR NEPHROPATHY: ICD-10-PCS | Mod: CPTII,S$GLB,, | Performed by: INTERNAL MEDICINE

## 2022-06-14 PROCEDURE — 99205 OFFICE O/P NEW HI 60 MIN: CPT | Mod: S$GLB,,, | Performed by: INTERNAL MEDICINE

## 2022-06-14 PROCEDURE — 93010 ELECTROCARDIOGRAM REPORT: CPT | Mod: S$GLB,,, | Performed by: INTERNAL MEDICINE

## 2022-06-14 NOTE — PROGRESS NOTES
Subjective:    Patient ID:  Joanne Peña is a 71 y.o. female who presents for evaluation of Cerebrovascular Accident    Referring Physician: Nathaniel Regalado MD  Primary Care Physician: DO MADHAV Fernandez  I had the pleasure of seeing Mrs. Peña today in our electrophysiology clinic in consultation for her cryptogenic stroke. 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 She was in her usual state of health until 2/21/2022 when she developed sudden onset of dizziness, blurry vision, trouble speaking, and nausea/vomiting which subsequently resolved. She was evaluated in the ER. Scans noted evolving left cerebellar and genny infarct. She was discharged on plavix x 3 weeks then switched to aspirin. ECHO noted preserved LV function and a negative bubble study but with severe left atrial enlargement. She was in sinus rhythm.    I reviewed available electrocardiograms including today's in clinic ECG which note sinus rhythm.    Review of Systems   Constitutional: Negative for fever and malaise/fatigue.   HENT: Negative for congestion and sore throat.    Eyes: Negative for blurred vision and visual disturbance.   Cardiovascular: Negative for chest pain, dyspnea on exertion, irregular heartbeat, near-syncope, palpitations and syncope.   Respiratory: Negative for cough and shortness of breath.    Hematologic/Lymphatic: Negative for bleeding problem. Does not bruise/bleed easily.   Skin: Negative.    Musculoskeletal: Positive for arthritis and joint pain.   Gastrointestinal: Negative for bloating, abdominal pain, hematochezia and melena.   Neurological: Negative for focal weakness and weakness.   Psychiatric/Behavioral: Negative.         Objective:    Physical Exam  Vitals reviewed.   Constitutional:       General: She is not in acute distress.     Appearance: She is well-developed. She is not diaphoretic.   HENT:       Head: Normocephalic and atraumatic.   Eyes:      General:         Right eye: No discharge.         Left eye: No discharge.      Conjunctiva/sclera: Conjunctivae normal.   Cardiovascular:      Rate and Rhythm: Normal rate and regular rhythm.      Heart sounds: No murmur heard.    No friction rub. No gallop.   Pulmonary:      Effort: Pulmonary effort is normal. No respiratory distress.      Breath sounds: Normal breath sounds. No wheezing or rales.   Abdominal:      General: Bowel sounds are normal. There is no distension.      Palpations: Abdomen is soft.      Tenderness: There is no abdominal tenderness.   Musculoskeletal:      Cervical back: Neck supple.   Skin:     General: Skin is warm and dry.   Neurological:      Mental Status: She is alert and oriented to person, place, and time.   Psychiatric:         Behavior: Behavior normal.         Thought Content: Thought content normal.         Judgment: Judgment normal.           Assessment:       1. Left atrial enlargement    2. History of stroke    3. Malignant essential hypertension    4. Type 2 diabetes mellitus with hyperglycemia, without long-term current use of insulin    5. 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 We discussed the potential etiology of undiagnosed atrial arrhythmias (atrial fibrillation, atrial flutter) as possible culprits for recurrent strokes. We discussed how stroke prevention strategies are different in the setting of atrial fibrillation and flutter with use of anticoagulation. To date no atrial arrhythmias have been observed however she has risk factors for atrial fibrillation. Discussed the benefit of long-term heart rhythm monitoring with an implantable loop recorder. Discussed the risks of implantation including pain, infection, bleeding and rare risk of device  erosion. She desires to proceed.    Plan  14 day holter  ILR implant if no AF observed    Thank you for allowing me to participate in the care of this patient. Please do not hesitate to call me with any questions or concerns.    Hunter Rich MD, PhD  Cardiac Electrophysiology

## 2022-06-23 ENCOUNTER — HOSPITAL ENCOUNTER (OUTPATIENT)
Dept: RADIOLOGY | Facility: HOSPITAL | Age: 72
Discharge: HOME | End: 2022-06-23
Attending: STUDENT IN AN ORGANIZED HEALTH CARE EDUCATION/TRAINING PROGRAM
Payer: MEDICARE

## 2022-06-23 DIAGNOSIS — M54.9 DORSALGIA, UNSPECIFIED: ICD-10-CM

## 2022-06-23 PROCEDURE — 72148 MRI LUMBAR SPINE W/O DYE: CPT | Mod: TC

## 2022-06-23 PROCEDURE — 72148 MRI LUMBAR SPINE W/O DYE: CPT | Mod: 26,,, | Performed by: RADIOLOGY

## 2022-06-23 PROCEDURE — 72148 MRI LUMBAR SPINE WITHOUT CONTRAST: ICD-10-PCS | Mod: 26,,, | Performed by: RADIOLOGY

## 2022-06-24 ENCOUNTER — CLINICAL SUPPORT (OUTPATIENT)
Dept: CARDIOLOGY | Facility: HOSPITAL | Age: 72
End: 2022-06-24
Attending: INTERNAL MEDICINE
Payer: MEDICARE

## 2022-06-24 ENCOUNTER — OFFICE VISIT (OUTPATIENT)
Dept: FAMILY MEDICINE | Facility: CLINIC | Age: 72
End: 2022-06-24
Payer: MEDICARE

## 2022-06-24 VITALS
TEMPERATURE: 99 F | OXYGEN SATURATION: 99 % | DIASTOLIC BLOOD PRESSURE: 70 MMHG | SYSTOLIC BLOOD PRESSURE: 156 MMHG | BODY MASS INDEX: 27.25 KG/M2 | HEART RATE: 96 BPM | WEIGHT: 173.63 LBS | HEIGHT: 67 IN

## 2022-06-24 DIAGNOSIS — E11.29 MICROALBUMINURIA DUE TO TYPE 2 DIABETES MELLITUS: ICD-10-CM

## 2022-06-24 DIAGNOSIS — E11.69 DYSLIPIDEMIA ASSOCIATED WITH TYPE 2 DIABETES MELLITUS: ICD-10-CM

## 2022-06-24 DIAGNOSIS — R80.9 MICROALBUMINURIA DUE TO TYPE 2 DIABETES MELLITUS: ICD-10-CM

## 2022-06-24 DIAGNOSIS — E78.5 DYSLIPIDEMIA ASSOCIATED WITH TYPE 2 DIABETES MELLITUS: ICD-10-CM

## 2022-06-24 DIAGNOSIS — I10 ESSENTIAL HYPERTENSION: Primary | ICD-10-CM

## 2022-06-24 DIAGNOSIS — N18.31 STAGE 3A CHRONIC KIDNEY DISEASE: ICD-10-CM

## 2022-06-24 DIAGNOSIS — I63.89 CEREBROVASCULAR ACCIDENT (CVA) DUE TO OTHER MECHANISM: ICD-10-CM

## 2022-06-24 DIAGNOSIS — Z86.73 HISTORY OF CVA (CEREBROVASCULAR ACCIDENT): ICD-10-CM

## 2022-06-24 DIAGNOSIS — E11.40 TYPE 2 DIABETES MELLITUS WITH DIABETIC NEUROPATHY, WITHOUT LONG-TERM CURRENT USE OF INSULIN: ICD-10-CM

## 2022-06-24 DIAGNOSIS — D50.9 IRON DEFICIENCY ANEMIA, UNSPECIFIED IRON DEFICIENCY ANEMIA TYPE: ICD-10-CM

## 2022-06-24 PROCEDURE — 93246 EXT ECG>7D<15D RECORDING: CPT

## 2022-06-24 PROCEDURE — 99214 PR OFFICE/OUTPT VISIT, EST, LEVL IV, 30-39 MIN: ICD-10-PCS | Mod: S$GLB,,, | Performed by: FAMILY MEDICINE

## 2022-06-24 PROCEDURE — 3062F POS MACROALBUMINURIA REV: CPT | Mod: CPTII,S$GLB,, | Performed by: FAMILY MEDICINE

## 2022-06-24 PROCEDURE — 3066F PR DOCUMENTATION OF TREATMENT FOR NEPHROPATHY: ICD-10-PCS | Mod: CPTII,S$GLB,, | Performed by: FAMILY MEDICINE

## 2022-06-24 PROCEDURE — 1101F PR PT FALLS ASSESS DOC 0-1 FALLS W/OUT INJ PAST YR: ICD-10-PCS | Mod: CPTII,S$GLB,, | Performed by: FAMILY MEDICINE

## 2022-06-24 PROCEDURE — 99999 PR PBB SHADOW E&M-EST. PATIENT-LVL IV: CPT | Mod: PBBFAC,,, | Performed by: FAMILY MEDICINE

## 2022-06-24 PROCEDURE — 1159F PR MEDICATION LIST DOCUMENTED IN MEDICAL RECORD: ICD-10-PCS | Mod: CPTII,S$GLB,, | Performed by: FAMILY MEDICINE

## 2022-06-24 PROCEDURE — 3066F NEPHROPATHY DOC TX: CPT | Mod: CPTII,S$GLB,, | Performed by: FAMILY MEDICINE

## 2022-06-24 PROCEDURE — 1160F PR REVIEW ALL MEDS BY PRESCRIBER/CLIN PHARMACIST DOCUMENTED: ICD-10-PCS | Mod: CPTII,S$GLB,, | Performed by: FAMILY MEDICINE

## 2022-06-24 PROCEDURE — 93248 EXT ECG>7D<15D REV&INTERPJ: CPT | Mod: ,,, | Performed by: INTERNAL MEDICINE

## 2022-06-24 PROCEDURE — 93248 CV CARDIAC MONITOR - 3-15 DAY ADULT (CUPID ONLY): ICD-10-PCS | Mod: ,,, | Performed by: INTERNAL MEDICINE

## 2022-06-24 PROCEDURE — 3008F PR BODY MASS INDEX (BMI) DOCUMENTED: ICD-10-PCS | Mod: CPTII,S$GLB,, | Performed by: FAMILY MEDICINE

## 2022-06-24 PROCEDURE — 3288F FALL RISK ASSESSMENT DOCD: CPT | Mod: CPTII,S$GLB,, | Performed by: FAMILY MEDICINE

## 2022-06-24 PROCEDURE — 3077F SYST BP >= 140 MM HG: CPT | Mod: CPTII,S$GLB,, | Performed by: FAMILY MEDICINE

## 2022-06-24 PROCEDURE — 3008F BODY MASS INDEX DOCD: CPT | Mod: CPTII,S$GLB,, | Performed by: FAMILY MEDICINE

## 2022-06-24 PROCEDURE — 99999 PR PBB SHADOW E&M-EST. PATIENT-LVL IV: ICD-10-PCS | Mod: PBBFAC,,, | Performed by: FAMILY MEDICINE

## 2022-06-24 PROCEDURE — 4010F ACE/ARB THERAPY RXD/TAKEN: CPT | Mod: CPTII,S$GLB,, | Performed by: FAMILY MEDICINE

## 2022-06-24 PROCEDURE — 1125F PR PAIN SEVERITY QUANTIFIED, PAIN PRESENT: ICD-10-PCS | Mod: CPTII,S$GLB,, | Performed by: FAMILY MEDICINE

## 2022-06-24 PROCEDURE — 3078F DIAST BP <80 MM HG: CPT | Mod: CPTII,S$GLB,, | Performed by: FAMILY MEDICINE

## 2022-06-24 PROCEDURE — 3062F PR POS MACROALBUMINURIA RESULT DOCUMENTED/REVIEW: ICD-10-PCS | Mod: CPTII,S$GLB,, | Performed by: FAMILY MEDICINE

## 2022-06-24 PROCEDURE — 1101F PT FALLS ASSESS-DOCD LE1/YR: CPT | Mod: CPTII,S$GLB,, | Performed by: FAMILY MEDICINE

## 2022-06-24 PROCEDURE — 99214 OFFICE O/P EST MOD 30 MIN: CPT | Mod: S$GLB,,, | Performed by: FAMILY MEDICINE

## 2022-06-24 PROCEDURE — 1159F MED LIST DOCD IN RCRD: CPT | Mod: CPTII,S$GLB,, | Performed by: FAMILY MEDICINE

## 2022-06-24 PROCEDURE — 3077F PR MOST RECENT SYSTOLIC BLOOD PRESSURE >= 140 MM HG: ICD-10-PCS | Mod: CPTII,S$GLB,, | Performed by: FAMILY MEDICINE

## 2022-06-24 PROCEDURE — 1125F AMNT PAIN NOTED PAIN PRSNT: CPT | Mod: CPTII,S$GLB,, | Performed by: FAMILY MEDICINE

## 2022-06-24 PROCEDURE — 3078F PR MOST RECENT DIASTOLIC BLOOD PRESSURE < 80 MM HG: ICD-10-PCS | Mod: CPTII,S$GLB,, | Performed by: FAMILY MEDICINE

## 2022-06-24 PROCEDURE — 3051F PR MOST RECENT HEMOGLOBIN A1C LEVEL 7.0 - < 8.0%: ICD-10-PCS | Mod: CPTII,S$GLB,, | Performed by: FAMILY MEDICINE

## 2022-06-24 PROCEDURE — 3288F PR FALLS RISK ASSESSMENT DOCUMENTED: ICD-10-PCS | Mod: CPTII,S$GLB,, | Performed by: FAMILY MEDICINE

## 2022-06-24 PROCEDURE — 4010F PR ACE/ARB THEARPY RXD/TAKEN: ICD-10-PCS | Mod: CPTII,S$GLB,, | Performed by: FAMILY MEDICINE

## 2022-06-24 PROCEDURE — 3051F HG A1C>EQUAL 7.0%<8.0%: CPT | Mod: CPTII,S$GLB,, | Performed by: FAMILY MEDICINE

## 2022-06-24 PROCEDURE — 1160F RVW MEDS BY RX/DR IN RCRD: CPT | Mod: CPTII,S$GLB,, | Performed by: FAMILY MEDICINE

## 2022-06-24 RX ORDER — CHLORTHALIDONE 25 MG/1
25 TABLET ORAL DAILY
Qty: 30 TABLET | Refills: 11 | Status: SHIPPED | OUTPATIENT
Start: 2022-06-24 | End: 2023-09-14

## 2022-06-24 NOTE — PATIENT INSTRUCTIONS
Take amlodipine at night.  Take chlorthalidone and losartan in the morning.  Take glimepiride twice a day.   Get your tetanus vaccine at your pharmacy

## 2022-06-24 NOTE — PROGRESS NOTES
Assessment & Plan  Essential hypertension  -     chlorthalidone (HYGROTEN) 25 MG Tab; Take 1 tablet (25 mg total) by mouth once daily.  Dispense: 30 tablet; Refill: 11    Uncontrolled. DASH diet. Start chlorthalidone 25 mg.     History of CVA (cerebrovascular accident)    Type 2 diabetes mellitus with diabetic neuropathy, without long-term current use of insulin  -     Hemoglobin A1C; Future; Expected date: 06/24/2022  -     Microalbumin/Creatinine Ratio, Urine; Future; Expected date: 06/24/2022  -     Comprehensive Metabolic Panel; Future; Expected date: 06/24/2022    Labs reviewed - A1c 7.3. Patient admits to eating an unhealthy diet - reinforced importance of limitation of unhealthy foods and smaller meal portions. Offered referral to diabetic educator, which she declined. Intolerant to metformin and only taking glimepiride once a day - increase to twice a day. Refuses increasing dose. Foot exam performed. Repeat labs in 3 months.    Dyslipidemia associated with type 2 diabetes mellitus  -     Lipid Panel; Future; Expected date: 06/24/2022    TC elevated. See above. Compliant in taking statin. May consider Vascepa if TC remains elevated at next lab check.    Microalbuminuria due to type 2 diabetes mellitus  Stage 3a chronic kidney disease  Patient aware of needing Nephrology follow up.    Iron deficiency anemia, unspecified iron deficiency anemia type  Management per Hematology. Reinforced importance of colon CA screening through colonoscopy with GI to which she was referred by Hematology.       Health maintenance reviewed & addressed above.    Follow-up: Follow up in about 3 months (around 9/24/2022). Labs discussed in detail today, as her chronic conditions are not well controlled due to non-compliance with her diet. Will review the rest of her HM at next f/u visit if time allows.   ______________________________________________________________________    Chief Complaint  Chief Complaint   Patient presents with     Follow-up       HPI  Joanne Peña is a 71 y.o. female with medical diagnoses as listed in the medical history and problem list that presents to the office to follow up on their chronic conditions. She is in her usual state of health today other than chronic LBP being managed by Pain Management.     Most recent pertinent workup:     Last CBC Results:   Lab Results   Component Value Date    WBC 5.85 06/01/2022    HGB 7.8 (L) 06/01/2022    HCT 28.6 (L) 06/01/2022     (H) 06/01/2022       Last CMP Results  Lab Results   Component Value Date     06/01/2022    K 4.2 06/01/2022     06/01/2022    CO2 25 06/01/2022    BUN 20 06/01/2022    CREATININE 1.1 06/01/2022    CALCIUM 9.4 06/01/2022    ALBUMIN 3.2 (L) 06/01/2022    AST 9 (L) 06/01/2022    ALT 9 (L) 06/01/2022       Last Lipids  Lab Results   Component Value Date    CHOL 209 (H) 06/01/2022    TRIG 108 06/01/2022    HDL 59 06/01/2022    LDLCALC 128.4 06/01/2022       Last A1C  Lab Results   Component Value Date    HGBA1C 7.3 (H) 06/01/2022         Health Maintenance       Date Due Completion Date    COVID-19 Vaccine (1) Never done ---    Eye Exam Never done ---    TETANUS VACCINE Never done ---    DEXA Scan Never done ---    Colorectal Cancer Screening Never done ---    Shingles Vaccine (1 of 2) Never done ---    Foot Exam 02/04/2017 2/4/2016    Mammogram 02/12/2017 2/12/2016    Pneumococcal Vaccines (Age 65+) (1 - PCV) Never done ---    Influenza Vaccine (Season Ended) 09/01/2022 ---    Hemoglobin A1c 12/01/2022 6/1/2022    Diabetes Urine Screening 06/01/2023 6/1/2022    Lipid Panel 06/01/2023 6/1/2022    High Dose Statin 06/14/2023 6/14/2022            PAST MEDICAL HISTORY:  Past Medical History:   Diagnosis Date    Diabetes mellitus     Hyperlipidemia     Hypertension     Stroke        PAST SURGICAL HISTORY:  Past Surgical History:   Procedure Laterality Date    INJECTION OF JOINT Right 5/20/2022    Procedure: Right SI joint  injection;  Surgeon: Son Lara DO;  Location: [facility] OR;  Service: Pain Management;  Laterality: Right;       SOCIAL HISTORY:  Social History     Socioeconomic History    Marital status:    Tobacco Use    Smoking status: Former Smoker     Packs/day: 0.50     Types: Cigarettes    Smokeless tobacco: Never Used   Substance and Sexual Activity    Alcohol use: Never    Drug use: No       FAMILY HISTORY:  Family History   Problem Relation Age of Onset    Kidney disease Mother     Heart disease Mother     Cancer Father     Hypertension Brother     Diabetes Daughter     Hypertension Daughter     Cancer Maternal Aunt        ALLERGIES AND MEDICATIONS: updated and reviewed.  Review of patient's allergies indicates:   Allergen Reactions    Lisinopril-hydrochlorothiazide Other (See Comments)     Pt stated it makes her feel drained. She couldn't raise arms over head.    Ampicillin Rash    Darvocet a500 [propoxyphene n-acetaminophen] Other (See Comments)     shaky     Current Outpatient Medications   Medication Sig Dispense Refill    amLODIPine (NORVASC) 10 MG tablet Take 1 tablet (10 mg total) by mouth once daily. Hold if SBP <120 90 tablet 3    ascorbic acid, vitamin C, (VITAMIN C) 500 MG tablet Take 500 mg by mouth once daily.      aspirin (ECOTRIN) 81 MG EC tablet Take 1 tablet (81 mg total) by mouth once daily. 30 tablet 3    atorvastatin (LIPITOR) 80 MG tablet Take 1 tablet (80 mg total) by mouth once daily. 90 tablet 3    glimepiride (AMARYL) 2 MG tablet Take 1 tablet (2 mg total) by mouth 2 (two) times daily. 180 tablet 3    hydrALAZINE (APRESOLINE) 50 MG tablet Take 1 tablet (50 mg total) by mouth every 8 (eight) hours. Hold is SBP <120 90 tablet 1    LORazepam (ATIVAN) 1 MG tablet Take 1 tablet (1 mg total) by mouth once as needed for Anxiety (before procedure). 1 tablet 0    losartan (COZAAR) 100 MG tablet Take 1 tablet (100 mg total) by mouth once daily. Hold if SBP <120 30  tablet 1    meclizine (ANTIVERT) 25 mg tablet TAKE 1 TABLET (25 MG TOTAL) BY MOUTH 2 (TWO) TIMES DAILY AS NEEDED FOR DIZZINESS. 60 tablet 0    multivitamin (THERAGRAN) per tablet Take 1 tablet by mouth once daily.      chlorthalidone (HYGROTEN) 25 MG Tab Take 1 tablet (25 mg total) by mouth once daily. 30 tablet 11    diclofenac sodium (VOLTAREN) 1 % Gel Apply 2 g topically 4 (four) times daily. Use gloves to apply (Patient not taking: No sig reported) 100 g 1    gabapentin (NEURONTIN) 100 MG capsule Take 1 capsule (100 mg total) by mouth 3 (three) times daily. (Patient not taking: Reported on 6/24/2022) 90 capsule 0    guanfacine HCl (GUANFACINE ORAL) Take by mouth.      methylPREDNISolone (MEDROL DOSEPACK) 4 mg tablet use as directed (Patient not taking: Reported on 6/24/2022) 1 each 0    ondansetron (ZOFRAN-ODT) 4 MG TbDL Dissolve 1 tablet (4 mg total) by mouth every 8 (eight) hours as needed. (Patient not taking: No sig reported) 20 tablet 0     No current facility-administered medications for this visit.         ROS  Review of Systems   Constitutional: Negative for activity change, fever and unexpected weight change.   HENT: Negative for congestion and sore throat.    Eyes: Negative for photophobia and visual disturbance.   Respiratory: Negative for cough and shortness of breath.    Cardiovascular: Negative for chest pain and leg swelling.   Gastrointestinal: Negative for abdominal pain, constipation, diarrhea, nausea and vomiting.   Endocrine: Negative for polydipsia, polyphagia and polyuria.   Genitourinary: Negative for dysuria and urgency.   Musculoskeletal: Positive for arthralgias and back pain.   Neurological: Negative for dizziness, weakness and headaches.   Psychiatric/Behavioral: Negative for dysphoric mood and sleep disturbance. The patient is not nervous/anxious.            Physical Exam  Vitals:    06/24/22 1024   BP: (!) 156/70   BP Location: Right arm   Patient Position: Sitting   BP  "Method: Large (Manual)   Pulse: 96   Temp: 98.5 °F (36.9 °C)   TempSrc: Oral   SpO2: 99%   Weight: 78.8 kg (173 lb 9.8 oz)   Height: 5' 7" (1.702 m)    Body mass index is 27.19 kg/m².  Weight: 78.8 kg (173 lb 9.8 oz)   Height: 5' 7" (170.2 cm)   Physical Exam  Constitutional:       General: She is not in acute distress.     Appearance: Normal appearance.   HENT:      Head: Normocephalic and atraumatic.   Neck:      Thyroid: No thyromegaly.   Cardiovascular:      Rate and Rhythm: Normal rate and regular rhythm.      Pulses: Normal pulses.      Heart sounds: Normal heart sounds.   Pulmonary:      Effort: Pulmonary effort is normal. No respiratory distress.      Breath sounds: Normal breath sounds.   Musculoskeletal:      Cervical back: Neck supple.      Right lower leg: No edema.      Left lower leg: No edema.   Feet:      Comments: Protective Sensation (w/ 10 gram monofilament):  Right: Intact  Left: Intact    Visual Inspection:  Normal -  Bilateral    Pedal Pulses:   Right: Present  Left: Present    Posterior tibialis:   Right:Present  Left: Present    Lymphadenopathy:      Cervical: No cervical adenopathy.   Skin:     General: Skin is warm and dry.      Findings: No rash.   Neurological:      General: No focal deficit present.      Mental Status: She is alert and oriented to person, place, and time.   Psychiatric:         Mood and Affect: Mood normal.         Behavior: Behavior normal.         Thought Content: Thought content normal.             "

## 2022-06-27 ENCOUNTER — TELEPHONE (OUTPATIENT)
Dept: FAMILY MEDICINE | Facility: CLINIC | Age: 72
End: 2022-06-27
Payer: MEDICARE

## 2022-06-27 DIAGNOSIS — N94.9 ADNEXAL CYST: Primary | ICD-10-CM

## 2022-06-27 DIAGNOSIS — N85.2 ENLARGED UTERUS: ICD-10-CM

## 2022-06-27 NOTE — TELEPHONE ENCOUNTER
----- Message from Emmy Khoury MA sent at 6/27/2022  2:54 PM CDT -----  Type:  Patient Returning Call    Who Called:  Self    Who Left Message for Patient: EMELYN LEIJA    Does the patient know what this is regarding?:no    Would the patient rather a call back or a response via [facility]? yes    Best Call Back Number:[phone]

## 2022-06-27 NOTE — TELEPHONE ENCOUNTER
----- Message from Son Lara DO sent at 6/27/2022 11:52 AM CDT -----  Regarding: enlarged uterus on MRI  Hi Dr. Soto,    Just an FYI. I have been seeing Mrs Peña for her back pain, and got a MRI of the lumbar spine.  Radiology noticed an enlarged uterus and recommended Pelvic US.  I will defer to you whether or not you feel it is necessary. Just wanted to let you know.  Thank you!    Best Regards,  Son    
Left message to return call to clinic   
Thank you for letting me know. I will follow up with her and order the pelvic US.     To my staff: please inform patient that her MRI low back showed an enlarged uterus and a cyst of the left ovary. We need further eval w/pelvic ultrasound. Hospital will call her to schedule this.   
WDL

## 2022-07-07 ENCOUNTER — OFFICE VISIT (OUTPATIENT)
Dept: PAIN MEDICINE | Facility: CLINIC | Age: 72
End: 2022-07-07
Payer: MEDICARE

## 2022-07-07 VITALS
HEIGHT: 67 IN | HEART RATE: 82 BPM | DIASTOLIC BLOOD PRESSURE: 83 MMHG | BODY MASS INDEX: 27.15 KG/M2 | WEIGHT: 173 LBS | RESPIRATION RATE: 18 BRPM | SYSTOLIC BLOOD PRESSURE: 158 MMHG

## 2022-07-07 DIAGNOSIS — M46.1 SACROILIITIS: ICD-10-CM

## 2022-07-07 DIAGNOSIS — M51.36 DDD (DEGENERATIVE DISC DISEASE), LUMBAR: ICD-10-CM

## 2022-07-07 DIAGNOSIS — M47.816 LUMBAR SPONDYLOSIS: Primary | ICD-10-CM

## 2022-07-07 PROCEDURE — 1101F PT FALLS ASSESS-DOCD LE1/YR: CPT | Mod: CPTII,S$GLB,, | Performed by: STUDENT IN AN ORGANIZED HEALTH CARE EDUCATION/TRAINING PROGRAM

## 2022-07-07 PROCEDURE — 1101F PR PT FALLS ASSESS DOC 0-1 FALLS W/OUT INJ PAST YR: ICD-10-PCS | Mod: CPTII,S$GLB,, | Performed by: STUDENT IN AN ORGANIZED HEALTH CARE EDUCATION/TRAINING PROGRAM

## 2022-07-07 PROCEDURE — 3051F PR MOST RECENT HEMOGLOBIN A1C LEVEL 7.0 - < 8.0%: ICD-10-PCS | Mod: CPTII,S$GLB,, | Performed by: STUDENT IN AN ORGANIZED HEALTH CARE EDUCATION/TRAINING PROGRAM

## 2022-07-07 PROCEDURE — 1159F PR MEDICATION LIST DOCUMENTED IN MEDICAL RECORD: ICD-10-PCS | Mod: CPTII,S$GLB,, | Performed by: STUDENT IN AN ORGANIZED HEALTH CARE EDUCATION/TRAINING PROGRAM

## 2022-07-07 PROCEDURE — 3077F SYST BP >= 140 MM HG: CPT | Mod: CPTII,S$GLB,, | Performed by: STUDENT IN AN ORGANIZED HEALTH CARE EDUCATION/TRAINING PROGRAM

## 2022-07-07 PROCEDURE — 4010F ACE/ARB THERAPY RXD/TAKEN: CPT | Mod: CPTII,S$GLB,, | Performed by: STUDENT IN AN ORGANIZED HEALTH CARE EDUCATION/TRAINING PROGRAM

## 2022-07-07 PROCEDURE — 1125F PR PAIN SEVERITY QUANTIFIED, PAIN PRESENT: ICD-10-PCS | Mod: CPTII,S$GLB,, | Performed by: STUDENT IN AN ORGANIZED HEALTH CARE EDUCATION/TRAINING PROGRAM

## 2022-07-07 PROCEDURE — 99999 PR PBB SHADOW E&M-EST. PATIENT-LVL III: ICD-10-PCS | Mod: PBBFAC,,, | Performed by: STUDENT IN AN ORGANIZED HEALTH CARE EDUCATION/TRAINING PROGRAM

## 2022-07-07 PROCEDURE — 3066F NEPHROPATHY DOC TX: CPT | Mod: CPTII,S$GLB,, | Performed by: STUDENT IN AN ORGANIZED HEALTH CARE EDUCATION/TRAINING PROGRAM

## 2022-07-07 PROCEDURE — 1160F RVW MEDS BY RX/DR IN RCRD: CPT | Mod: CPTII,S$GLB,, | Performed by: STUDENT IN AN ORGANIZED HEALTH CARE EDUCATION/TRAINING PROGRAM

## 2022-07-07 PROCEDURE — 3062F PR POS MACROALBUMINURIA RESULT DOCUMENTED/REVIEW: ICD-10-PCS | Mod: CPTII,S$GLB,, | Performed by: STUDENT IN AN ORGANIZED HEALTH CARE EDUCATION/TRAINING PROGRAM

## 2022-07-07 PROCEDURE — 3066F PR DOCUMENTATION OF TREATMENT FOR NEPHROPATHY: ICD-10-PCS | Mod: CPTII,S$GLB,, | Performed by: STUDENT IN AN ORGANIZED HEALTH CARE EDUCATION/TRAINING PROGRAM

## 2022-07-07 PROCEDURE — 3288F PR FALLS RISK ASSESSMENT DOCUMENTED: ICD-10-PCS | Mod: CPTII,S$GLB,, | Performed by: STUDENT IN AN ORGANIZED HEALTH CARE EDUCATION/TRAINING PROGRAM

## 2022-07-07 PROCEDURE — 3051F HG A1C>EQUAL 7.0%<8.0%: CPT | Mod: CPTII,S$GLB,, | Performed by: STUDENT IN AN ORGANIZED HEALTH CARE EDUCATION/TRAINING PROGRAM

## 2022-07-07 PROCEDURE — 4010F PR ACE/ARB THEARPY RXD/TAKEN: ICD-10-PCS | Mod: CPTII,S$GLB,, | Performed by: STUDENT IN AN ORGANIZED HEALTH CARE EDUCATION/TRAINING PROGRAM

## 2022-07-07 PROCEDURE — 3288F FALL RISK ASSESSMENT DOCD: CPT | Mod: CPTII,S$GLB,, | Performed by: STUDENT IN AN ORGANIZED HEALTH CARE EDUCATION/TRAINING PROGRAM

## 2022-07-07 PROCEDURE — 99214 OFFICE O/P EST MOD 30 MIN: CPT | Mod: S$GLB,,, | Performed by: STUDENT IN AN ORGANIZED HEALTH CARE EDUCATION/TRAINING PROGRAM

## 2022-07-07 PROCEDURE — 3008F PR BODY MASS INDEX (BMI) DOCUMENTED: ICD-10-PCS | Mod: CPTII,S$GLB,, | Performed by: STUDENT IN AN ORGANIZED HEALTH CARE EDUCATION/TRAINING PROGRAM

## 2022-07-07 PROCEDURE — 99214 PR OFFICE/OUTPT VISIT, EST, LEVL IV, 30-39 MIN: ICD-10-PCS | Mod: S$GLB,,, | Performed by: STUDENT IN AN ORGANIZED HEALTH CARE EDUCATION/TRAINING PROGRAM

## 2022-07-07 PROCEDURE — 1159F MED LIST DOCD IN RCRD: CPT | Mod: CPTII,S$GLB,, | Performed by: STUDENT IN AN ORGANIZED HEALTH CARE EDUCATION/TRAINING PROGRAM

## 2022-07-07 PROCEDURE — 99999 PR PBB SHADOW E&M-EST. PATIENT-LVL III: CPT | Mod: PBBFAC,,, | Performed by: STUDENT IN AN ORGANIZED HEALTH CARE EDUCATION/TRAINING PROGRAM

## 2022-07-07 PROCEDURE — 1160F PR REVIEW ALL MEDS BY PRESCRIBER/CLIN PHARMACIST DOCUMENTED: ICD-10-PCS | Mod: CPTII,S$GLB,, | Performed by: STUDENT IN AN ORGANIZED HEALTH CARE EDUCATION/TRAINING PROGRAM

## 2022-07-07 PROCEDURE — 3062F POS MACROALBUMINURIA REV: CPT | Mod: CPTII,S$GLB,, | Performed by: STUDENT IN AN ORGANIZED HEALTH CARE EDUCATION/TRAINING PROGRAM

## 2022-07-07 PROCEDURE — 3079F PR MOST RECENT DIASTOLIC BLOOD PRESSURE 80-89 MM HG: ICD-10-PCS | Mod: CPTII,S$GLB,, | Performed by: STUDENT IN AN ORGANIZED HEALTH CARE EDUCATION/TRAINING PROGRAM

## 2022-07-07 PROCEDURE — 3079F DIAST BP 80-89 MM HG: CPT | Mod: CPTII,S$GLB,, | Performed by: STUDENT IN AN ORGANIZED HEALTH CARE EDUCATION/TRAINING PROGRAM

## 2022-07-07 PROCEDURE — 1125F AMNT PAIN NOTED PAIN PRSNT: CPT | Mod: CPTII,S$GLB,, | Performed by: STUDENT IN AN ORGANIZED HEALTH CARE EDUCATION/TRAINING PROGRAM

## 2022-07-07 PROCEDURE — 3077F PR MOST RECENT SYSTOLIC BLOOD PRESSURE >= 140 MM HG: ICD-10-PCS | Mod: CPTII,S$GLB,, | Performed by: STUDENT IN AN ORGANIZED HEALTH CARE EDUCATION/TRAINING PROGRAM

## 2022-07-07 PROCEDURE — 3008F BODY MASS INDEX DOCD: CPT | Mod: CPTII,S$GLB,, | Performed by: STUDENT IN AN ORGANIZED HEALTH CARE EDUCATION/TRAINING PROGRAM

## 2022-07-07 RX ORDER — CYCLOBENZAPRINE HCL 10 MG
10 TABLET ORAL NIGHTLY PRN
Qty: 90 TABLET | Refills: 1 | Status: SHIPPED | OUTPATIENT
Start: 2022-07-07 | End: 2022-12-14

## 2022-07-07 NOTE — PROGRESS NOTES
Chronic Pain - New Consult    Referring Physician: No ref. provider found    Date: 07/07/2022     Re: Joanne Peña  MR#: [id number]  YOB: 1950  Age: 71 y.o.    Chief Complaint:   Chief Complaint   Patient presents with    Low-back Pain     **This note is dictated using the M*Modal Fluency Direct word recognition program. There are word recognition mistakes that are occasionally missed on review.**    ASSESSMENT: 71 y.o. year old female with right sided back/buttock pain pain, consistent with     1. Lumbar spondylosis  cyclobenzaprine (FLEXERIL) 10 MG tablet    Ambulatory referral/consult to [facility]   2. DDD (degenerative disc disease), lumbar  Ambulatory referral/consult to [facility]   3. Sacroiliitis  Ambulatory referral/consult to [facility]       PLAN:       Lumbar spondylosis  -more likely facet arthropathy than DDD causing her axial back pain.  -100% improvement in buttock pain from the SIJ, but now with just axial back pain.  -discussed MBB/RFA.  Patient will think about it  -PT referral  -Flexeril at night since patient states it helps.    DDD of lumbar spine  - MRI lumbar to evaluate  - discussed possible KRISTA vs TFESI as treatment options depending on what MRI looks like.    -discussed importance of core strengthening, back exercises, losing weight  -okay to take tylenol    Sacroiliitis  -s/p R SIJ on 5/20/22.  Her right sided buttock pain has resolved 100% at 2 months  -FAILED Norco 5mg (cognitive side effects)    Lumbar radiculopathy  -less likely. No imaging available. If SIJ not successful, then will consider lumbar XR/MRI to further assess  -would need clearance to hold ASA    CKD 3   -last CMP showed GFR 53.   -GFR stable  -avoid nephrotoxic medications    Right hip pain  -ortho surgery does not believe that this is coming from the hip.  Will r/o SIJ and back pathology.    Prior TIA  -off plavix. Following with PCP  -continuie ASA 81    Bilateral  peripheral neuropathy  -unclear etiology. Bottom of both feet.  Monitor    DM2  -new A1c = 7.3 on 6/1/22    Anemia of chronic disease / HLD   -discused her blood test results    - RTC 2 months  - Counseled patient regarding the importance of weight loss and activity modification and physical therapy.    The above plan and management options were discussed at length with patient. Patient is in agreement with the above and verbalized understanding. It will be communicated with the referring physician via electronic record, fax, or mail.  Lab/study reports reviewed were important and necessary because subsequent medical and treatment recommendations required review of the above lab/study reports. Images viewed/reviewed above were important and necessary because subsequent medical and treatment recommendations required review of the reviewed image(s).     Electronically signed by:  Son Lara DO  07/07/2022     =========================================================================================================    SUBJECTIVE:    Interval History 7/7/2022:     Joanne Peña is a 71 y.o. female presents to the clinic for follow up.  Since last visit the pain has has moderately improved.  Buttocks pain has resolved. Now with axial back pain without radiation.    The pain is located in the lower back area and does not radiate.  The pain is described as aching    At BEST  4/10   At WORST  8/10 on the WORST day.    On average pain is rated as 4/10.   Today the pain is rated as 3/10  Symptoms interfere with daily activity and sleeping.   Exacerbating factors: Standing and Walking.    Mitigating factors heat and massage.     Current pain medications: none  Failed Pain Medications: cannot take NSAIDs because of the stroke. Tylenol. Short course Norco (side effects cognition), gabapentin, robaxin, oral steroids    Interval History 6/6/2022:     Joanne Peña is a 71 y.o. female presents to the clinic for  follow up.  Since last visit the pain has has significantly improved.  She is s/p Right SIJ on 5/20/22 with almost 100% improvement of the low back/buttock pain.  She no longer requires a cane or walker to ambulate.  She feels signfiicantly better.  Currently her pain is located in the midline back without radiation. Worse with flexion, standing, walking    The pain is located in the lower back area and does not radiate.  The pain is described as aching    At BEST  4/10   At WORST  8/10 on the WORST day.    On average pain is rated as 4/10.   Today the pain is rated as 6/10  Symptoms interfere with nothing .   Exacerbating factors: Walking.    Mitigating factors laying down, medications and sitting.     Current pain medications: gabapentin. Oral steroids and robaxin helped. Short course Norco  Failed Pain Medications: cannot take NSAIDs because of the stroke. Tylenol. Robaxin    Initial Hx:  Joanne Peña is a 71 y.o. female presents to the clinic for the evaluation of lower back pain. The pain started over a month ago following no inciting event and symptoms have been worsening.  The patient had a TIA on 2/6/22.  She was on 21 days of Plavix which she has stopped. She takes a daily ASA81.  Does not feel that this is related to the stroke.  Denies any falls or trauma. She started getting around the beginning of March.  She went to the Urgent care on March 17, 2022 because the pain was not going away.  Since then the pain has been worsening. The pain pattern is the same now as it was then. The pain has prevented the patient from walking, and she has required a wheelchair since March 17.     She tried a medrol dose trae, robaxin, gabapentin which helped until she stopped the steroids and the robaxin. She is taking gabapentin 300mg TID.  She is not taking anything else for pain right now. When she gets severe pain she repositions, heating pads, gabapentin. Especially worse while trying to get out of the bed.  Changing positions are very bad.    Pain Description:    The pain is located in the lower back area and radiates to the right thigh/groin/ right hip.    At BEST  10/10   At WORST  10/10 on the WORST day.    On average pain is rated as 10/10.   Today the pain is rated as 10/10  The pain is continuous.  The pain is described as aching, sharp, shooting and pulling    Symptoms interfere with daily activity, sleeping and work.   Exacerbating factors: any type of movement.    Mitigating factors heat and medications.   She reports 1 hours of sleep per night.    Physical Therapy/Home Exercise: No, not currently in physical therapy or home exercise program    Current Pain Medications:    - gabapentin. Oral steroids and robaxin helped.    Failed Pain Medications:    - cannot take NSAIDs because of the stroke. Tylenol     Pain Treatment Therapies:    Pain procedures: none  Physical Therapy: last PT 1 month ago.  Patient has a healthy back referral on 5/9  Spinal decompression: none  Joint replacement: none     Patient denies urinary incontinence and bowel incontinence. (+) bilateral foot numbness x2 weeks.   Patient denies any suicidal or homicidal ideations     report:  Reviewed and consistent with medication use as prescribed.    Imaging:   XR hip:    FINDINGS:  The bones are intact.  There is no evidence for acute fracture or bone destruction.  There are degenerative changes with mild narrowing of the right hip joint space laterally.  Small osteophytes are present at the right hip.  Left hip and sacroiliac joints appear grossly unremarkable.     Impression:     No evidence for acute fracture, bone destruction, or dislocation.     Degenerative changes of the right hip with mild joint space narrowing laterally and small osteophytes.       XR lumbar 03/2022:    Mild scoliosis with convexity to the left can be seen.  Vertebral bodies are intact.  Disc spaces are maintained.  No significant bony abnormalities are  noted    Past Medical History:   Diagnosis Date    Diabetes mellitus     Hyperlipidemia     Hypertension     Stroke      Past Surgical History:   Procedure Laterality Date    INJECTION OF JOINT Right 5/20/2022    Procedure: Right SI joint injection;  Surgeon: Son Lara DO;  Location: [facility];  Service: Pain Management;  Laterality: Right;     Social History     Socioeconomic History    Marital status:    Tobacco Use    Smoking status: Former Smoker     Packs/day: 0.50     Types: Cigarettes    Smokeless tobacco: Never Used   Substance and Sexual Activity    Alcohol use: Never    Drug use: No     Family History   Problem Relation Age of Onset    Kidney disease Mother     Heart disease Mother     Cancer Father     Hypertension Brother     Diabetes Daughter     Hypertension Daughter     Cancer Maternal Aunt        Review of patient's allergies indicates:   Allergen Reactions    Lisinopril-hydrochlorothiazide Other (See Comments)     Pt stated it makes her feel drained. She couldn't raise arms over head.    Ampicillin Rash    Darvocet a500 [propoxyphene n-acetaminophen] Other (See Comments)     shaky       Current Outpatient Medications   Medication Sig    amLODIPine (NORVASC) 10 MG tablet Take 1 tablet (10 mg total) by mouth once daily. Hold if SBP <120    ascorbic acid, vitamin C, (VITAMIN C) 500 MG tablet Take 500 mg by mouth once daily.    aspirin (ECOTRIN) 81 MG EC tablet Take 1 tablet (81 mg total) by mouth once daily.    atorvastatin (LIPITOR) 80 MG tablet Take 1 tablet (80 mg total) by mouth once daily.    chlorthalidone (HYGROTEN) 25 MG Tab Take 1 tablet (25 mg total) by mouth once daily.    diclofenac sodium (VOLTAREN) 1 % Gel Apply 2 g topically 4 (four) times daily. Use gloves to apply    gabapentin (NEURONTIN) 100 MG capsule Take 1 capsule (100 mg total) by mouth 3 (three) times daily.    glimepiride (AMARYL) 2 MG tablet Take 1 tablet (2 mg total) by mouth 2  (two) times daily.    guanfacine HCl (GUANFACINE ORAL) Take by mouth.    hydrALAZINE (APRESOLINE) 50 MG tablet Take 1 tablet (50 mg total) by mouth every 8 (eight) hours. Hold is SBP <120    LORazepam (ATIVAN) 1 MG tablet Take 1 tablet (1 mg total) by mouth once as needed for Anxiety (before procedure).    losartan (COZAAR) 100 MG tablet Take 1 tablet (100 mg total) by mouth once daily. Hold if SBP <120    meclizine (ANTIVERT) 25 mg tablet TAKE 1 TABLET (25 MG TOTAL) BY MOUTH 2 (TWO) TIMES DAILY AS NEEDED FOR DIZZINESS.    methylPREDNISolone (MEDROL DOSEPACK) 4 mg tablet use as directed    multivitamin (THERAGRAN) per tablet Take 1 tablet by mouth once daily.    ondansetron (ZOFRAN-ODT) 4 MG TbDL Dissolve 1 tablet (4 mg total) by mouth every 8 (eight) hours as needed.    cyclobenzaprine (FLEXERIL) 10 MG tablet Take 1 tablet (10 mg total) by mouth nightly as needed for Muscle spasms.     No current facility-administered medications for this visit.       REVIEW OF SYSTEMS:    GENERAL:  No weight loss, malaise or fevers. + fevers  HEENT:   No recent changes in vision or hearing  NECK:  Negative for lumps, no difficulty with swallowing.  RESPIRATORY:  Negative for cough, wheezing or shortness of breath, patient denies any recent URI.  CARDIOVASCULAR:  Negative for chest pain, leg swelling or palpitations.  GI:  Negative for abdominal discomfort, blood in stools or black stools or change in bowel habits.  MUSCULOSKELETAL:  See HPI.  SKIN:  Negative for lesions, rash, and itching.  PSYCH:  No mood disorder or recent psychosocial stressors.  Patients sleep is not disturbed secondary to pain.  HEMATOLOGY/LYMPHOLOGY:  Negative for prolonged bleeding, bruising easily or swollen nodes.  Patient is not currently taking any anti-coagulants  NEURO:   No history of headaches, syncope, paralysis, seizures or tremors.  All other reviewed and negative other than HPI.    OBJECTIVE:    BP (!) 158/83   Pulse 82   Resp 18    "Ht 5' 7" (1.702 m)   Wt 78.5 kg (173 lb)   BMI 27.10 kg/m²     PHYSICAL EXAMINATION:    GENERAL: Well appearing, in no acute distress, alert and oriented x3. NOT In wheelchair anymore  PSYCH:  Mood and affect appropriate.  SKIN: Skin color, texture, turgor normal, no rashes or lesions.  HEAD/FACE:  Normocephalic, atraumatic. Cranial nerves grossly intact.  CV: RRR with palpation of the radial artery.  PULM: CTAB. No evidence of respiratory difficulty, symmetric chest rise.  GI:  Soft and non-tender.    BACK:  - No obvious deformity or signs of trauma, Normal lumbar lordotic curve  - Positive spinous process tenderness  - Positive paravertebral tenderness  - Positive pain to palpation over the facet joints of the lumbar spine.   - Negative QL / Iliac crest / Glut tenderness  - Slump test is Negative for radicular pain  - Slump test is Negative for back pain  - Supine Straight leg raising is Negative for radicular pain  - Supine Straight leg raising is Negative for back pain  - unable to perform ROM of lumbar spine  - Negative Sustained Hip Flexion test (for discogenic pain)  - Negative Altered Gait, Posture  - Axial facet loading test Positive on the bilateral side(s)    SI Joint exam:  - Negative SI joint tenderness to palpation  - Rodolfo's sign Negative  - Yeoman's Test: Unable to Perform for SI joint pain indicating anterior SI ligament involvement. Unable to Perform for anterior thigh pain/paresthesia which indicates femoral nerve stretch.  - Gaenslen's Test:Unable to Perform  - Finger Mikhail's Sign:Negative  - SI compression test:Unable to Perform  - SI distraction test:Unable to Perform  - Thigh Thrust: Unable to Perform  - SI Thrust: Unable to Perform    MUSKULOSKELETAL:    EXTREMITIES:   Hip Exam:  - Log Roll Unable to Perform  - FADIR Unable to Perform  - Stinchfield Unable to Perform  - Hip Scour Unable to Perform  - GTB Tenderness Negative      MUSCULOSKELETAL:  No atrophy or tone abnormalities are " noted in the UE or LE.  No deformities, edema, or skin discoloration are noted on visible skin. Good capillary refill.    NEURO: Bilateral upper and lower extremity coordination and muscle stretch reflexes are physiologic and symmetric.      NEUROLOGICAL EXAM:  MENTAL STATUS: A x O x 3, good concentration, speech is fluent and goal directed  MEMORY: recent and remote are intact  CN: CN2-12 grossly intact  MOTOR: 5/5 in all muscle groups  DTRs: 2+ intact symmetric  Sensation:    -no Loss of sensation in a left lower and right lower L-1, L-2, L-3, L-4 and L-5 bilaterally distribution.

## 2022-07-08 ENCOUNTER — LAB VISIT (OUTPATIENT)
Dept: LAB | Facility: HOSPITAL | Age: 72
End: 2022-07-08
Attending: INTERNAL MEDICINE
Payer: MEDICARE

## 2022-07-08 DIAGNOSIS — D50.9 IRON DEFICIENCY ANEMIA, UNSPECIFIED IRON DEFICIENCY ANEMIA TYPE: ICD-10-CM

## 2022-07-08 LAB
BASOPHILS # BLD AUTO: 0.03 K/UL (ref 0–0.2)
BASOPHILS NFR BLD: 0.5 % (ref 0–1.9)
DIFFERENTIAL METHOD: ABNORMAL
EOSINOPHIL # BLD AUTO: 0.2 K/UL (ref 0–0.5)
EOSINOPHIL NFR BLD: 2.9 % (ref 0–8)
ERYTHROCYTE [DISTWIDTH] IN BLOOD BY AUTOMATED COUNT: 22.3 % (ref 11.5–14.5)
FERRITIN SERPL-MCNC: 25 NG/ML (ref 20–300)
HCT VFR BLD AUTO: 33.6 % (ref 37–48.5)
HGB BLD-MCNC: 9.2 G/DL (ref 12–16)
IMM GRANULOCYTES # BLD AUTO: 0.05 K/UL (ref 0–0.04)
IMM GRANULOCYTES NFR BLD AUTO: 0.9 % (ref 0–0.5)
IRON SERPL-MCNC: 42 UG/DL (ref 30–160)
LYMPHOCYTES # BLD AUTO: 1.6 K/UL (ref 1–4.8)
LYMPHOCYTES NFR BLD: 26.8 % (ref 18–48)
MCH RBC QN AUTO: 22.8 PG (ref 27–31)
MCHC RBC AUTO-ENTMCNC: 27.4 G/DL (ref 32–36)
MCV RBC AUTO: 83 FL (ref 82–98)
MONOCYTES # BLD AUTO: 0.6 K/UL (ref 0.3–1)
MONOCYTES NFR BLD: 10 % (ref 4–15)
NEUTROPHILS # BLD AUTO: 3.4 K/UL (ref 1.8–7.7)
NEUTROPHILS NFR BLD: 58.9 % (ref 38–73)
NRBC BLD-RTO: 0 /100 WBC
PLATELET # BLD AUTO: 451 K/UL (ref 150–450)
PMV BLD AUTO: 11.1 FL (ref 9.2–12.9)
RBC # BLD AUTO: 4.04 M/UL (ref 4–5.4)
SATURATED IRON: 12 % (ref 20–50)
TOTAL IRON BINDING CAPACITY: 354 UG/DL (ref 250–450)
TRANSFERRIN SERPL-MCNC: 239 MG/DL (ref 200–375)
WBC # BLD AUTO: 5.79 K/UL (ref 3.9–12.7)

## 2022-07-08 PROCEDURE — 82728 ASSAY OF FERRITIN: CPT | Performed by: INTERNAL MEDICINE

## 2022-07-08 PROCEDURE — 85025 COMPLETE CBC W/AUTO DIFF WBC: CPT | Performed by: INTERNAL MEDICINE

## 2022-07-08 PROCEDURE — 84466 ASSAY OF TRANSFERRIN: CPT | Performed by: INTERNAL MEDICINE

## 2022-07-08 PROCEDURE — 36415 COLL VENOUS BLD VENIPUNCTURE: CPT | Mod: PO | Performed by: INTERNAL MEDICINE

## 2022-07-13 LAB
OHS CV EVENT MONITOR DAY: 13
OHS CV HOLTER HOOKUP DATE: NORMAL
OHS CV HOLTER HOOKUP TIME: NORMAL
OHS CV HOLTER LENGTH DECIMAL HOURS: 323.27
OHS CV HOLTER LENGTH HOURS: 11
OHS CV HOLTER LENGTH MINUTES: 16
OHS CV HOLTER SCAN DATE: NORMAL
OHS CV HOLTER SINUS AVERAGE HR: 79 BPM
OHS CV HOLTER SINUS MAX HR: 200 BPM
OHS CV HOLTER SINUS MIN HR: 57 BPM
OHS CV HOLTER STUDY END DATE: NORMAL
OHS CV HOLTER STUDY END TIME: NORMAL

## 2022-07-14 NOTE — PROGRESS NOTES
Please notify the patient that no AF was observed. We should proceed with ILR implant as discussed in clinic.

## 2022-07-15 ENCOUNTER — PATIENT MESSAGE (OUTPATIENT)
Dept: ELECTROPHYSIOLOGY | Facility: CLINIC | Age: 72
End: 2022-07-15
Payer: MEDICARE

## 2022-07-20 ENCOUNTER — OFFICE VISIT (OUTPATIENT)
Dept: HEMATOLOGY/ONCOLOGY | Facility: CLINIC | Age: 72
End: 2022-07-20
Payer: MEDICARE

## 2022-07-20 VITALS
BODY MASS INDEX: 28.09 KG/M2 | SYSTOLIC BLOOD PRESSURE: 155 MMHG | WEIGHT: 179 LBS | HEART RATE: 72 BPM | DIASTOLIC BLOOD PRESSURE: 76 MMHG | OXYGEN SATURATION: 100 % | HEIGHT: 67 IN

## 2022-07-20 DIAGNOSIS — D50.9 IRON DEFICIENCY ANEMIA, UNSPECIFIED IRON DEFICIENCY ANEMIA TYPE: Primary | ICD-10-CM

## 2022-07-20 DIAGNOSIS — D64.9 ANEMIA, UNSPECIFIED TYPE: ICD-10-CM

## 2022-07-20 DIAGNOSIS — D75.839 THROMBOCYTOSIS: ICD-10-CM

## 2022-07-20 PROCEDURE — 3288F FALL RISK ASSESSMENT DOCD: CPT | Mod: CPTII,S$GLB,, | Performed by: INTERNAL MEDICINE

## 2022-07-20 PROCEDURE — 99214 PR OFFICE/OUTPT VISIT, EST, LEVL IV, 30-39 MIN: ICD-10-PCS | Mod: S$GLB,,, | Performed by: INTERNAL MEDICINE

## 2022-07-20 PROCEDURE — 3066F PR DOCUMENTATION OF TREATMENT FOR NEPHROPATHY: ICD-10-PCS | Mod: CPTII,S$GLB,, | Performed by: INTERNAL MEDICINE

## 2022-07-20 PROCEDURE — 4010F PR ACE/ARB THEARPY RXD/TAKEN: ICD-10-PCS | Mod: CPTII,S$GLB,, | Performed by: INTERNAL MEDICINE

## 2022-07-20 PROCEDURE — 3078F PR MOST RECENT DIASTOLIC BLOOD PRESSURE < 80 MM HG: ICD-10-PCS | Mod: CPTII,S$GLB,, | Performed by: INTERNAL MEDICINE

## 2022-07-20 PROCEDURE — 1101F PR PT FALLS ASSESS DOC 0-1 FALLS W/OUT INJ PAST YR: ICD-10-PCS | Mod: CPTII,S$GLB,, | Performed by: INTERNAL MEDICINE

## 2022-07-20 PROCEDURE — 99999 PR PBB SHADOW E&M-EST. PATIENT-LVL IV: CPT | Mod: PBBFAC,,, | Performed by: INTERNAL MEDICINE

## 2022-07-20 PROCEDURE — 1126F AMNT PAIN NOTED NONE PRSNT: CPT | Mod: CPTII,S$GLB,, | Performed by: INTERNAL MEDICINE

## 2022-07-20 PROCEDURE — 99999 PR PBB SHADOW E&M-EST. PATIENT-LVL IV: ICD-10-PCS | Mod: PBBFAC,,, | Performed by: INTERNAL MEDICINE

## 2022-07-20 PROCEDURE — 3078F DIAST BP <80 MM HG: CPT | Mod: CPTII,S$GLB,, | Performed by: INTERNAL MEDICINE

## 2022-07-20 PROCEDURE — 99214 OFFICE O/P EST MOD 30 MIN: CPT | Mod: S$GLB,,, | Performed by: INTERNAL MEDICINE

## 2022-07-20 PROCEDURE — 3008F BODY MASS INDEX DOCD: CPT | Mod: CPTII,S$GLB,, | Performed by: INTERNAL MEDICINE

## 2022-07-20 PROCEDURE — 3051F HG A1C>EQUAL 7.0%<8.0%: CPT | Mod: CPTII,S$GLB,, | Performed by: INTERNAL MEDICINE

## 2022-07-20 PROCEDURE — 3066F NEPHROPATHY DOC TX: CPT | Mod: CPTII,S$GLB,, | Performed by: INTERNAL MEDICINE

## 2022-07-20 PROCEDURE — 4010F ACE/ARB THERAPY RXD/TAKEN: CPT | Mod: CPTII,S$GLB,, | Performed by: INTERNAL MEDICINE

## 2022-07-20 PROCEDURE — 1159F PR MEDICATION LIST DOCUMENTED IN MEDICAL RECORD: ICD-10-PCS | Mod: CPTII,S$GLB,, | Performed by: INTERNAL MEDICINE

## 2022-07-20 PROCEDURE — 3062F POS MACROALBUMINURIA REV: CPT | Mod: CPTII,S$GLB,, | Performed by: INTERNAL MEDICINE

## 2022-07-20 PROCEDURE — 1159F MED LIST DOCD IN RCRD: CPT | Mod: CPTII,S$GLB,, | Performed by: INTERNAL MEDICINE

## 2022-07-20 PROCEDURE — 3077F PR MOST RECENT SYSTOLIC BLOOD PRESSURE >= 140 MM HG: ICD-10-PCS | Mod: CPTII,S$GLB,, | Performed by: INTERNAL MEDICINE

## 2022-07-20 PROCEDURE — 3288F PR FALLS RISK ASSESSMENT DOCUMENTED: ICD-10-PCS | Mod: CPTII,S$GLB,, | Performed by: INTERNAL MEDICINE

## 2022-07-20 PROCEDURE — 3062F PR POS MACROALBUMINURIA RESULT DOCUMENTED/REVIEW: ICD-10-PCS | Mod: CPTII,S$GLB,, | Performed by: INTERNAL MEDICINE

## 2022-07-20 PROCEDURE — 1126F PR PAIN SEVERITY QUANTIFIED, NO PAIN PRESENT: ICD-10-PCS | Mod: CPTII,S$GLB,, | Performed by: INTERNAL MEDICINE

## 2022-07-20 PROCEDURE — 3051F PR MOST RECENT HEMOGLOBIN A1C LEVEL 7.0 - < 8.0%: ICD-10-PCS | Mod: CPTII,S$GLB,, | Performed by: INTERNAL MEDICINE

## 2022-07-20 PROCEDURE — 3008F PR BODY MASS INDEX (BMI) DOCUMENTED: ICD-10-PCS | Mod: CPTII,S$GLB,, | Performed by: INTERNAL MEDICINE

## 2022-07-20 PROCEDURE — 1101F PT FALLS ASSESS-DOCD LE1/YR: CPT | Mod: CPTII,S$GLB,, | Performed by: INTERNAL MEDICINE

## 2022-07-20 PROCEDURE — 3077F SYST BP >= 140 MM HG: CPT | Mod: CPTII,S$GLB,, | Performed by: INTERNAL MEDICINE

## 2022-07-20 NOTE — PROGRESS NOTES
"  Subjective:       Patient ID: Joanne Peña is a 72 y.o. female.    Chief Complaint: Anemia  Diagnosis: Anemia       She has been taking Fe supp intermittently . She received IV Fe during pregnancy. She recently restarted Fe supplementation. No prior history of prbc transfusions.  No screening colonscopy. No fam hx of colon cancer. No melena, hematochezia or change in bowel habits No fatigue. She craves ice. Patient reports her mom had anemia    Interval Hx; She takes Vitron C OTC x 1mon  Doing well  No melena, hematochezia or change in bowel habits  No SOB/lightheadedness    Past Medical History:   Diagnosis Date    Diabetes mellitus     Hyperlipidemia     Hypertension     Stroke        Past Surgical History:   Procedure Laterality Date    INJECTION OF JOINT Right 5/20/2022    Procedure: Right SI joint injection;  Surgeon: Son Lara DO;  [facility]: [facility];  Service: Pain Management;  Laterality: Right;       Review of Systems   Constitutional: Negative for appetite change, fatigue, fever and unexpected weight change.   HENT: Negative for mouth sores.    Eyes: Negative for visual disturbance.   Respiratory: Negative for cough and shortness of breath.    Cardiovascular: Negative for chest pain.   Gastrointestinal: Negative for abdominal pain and diarrhea.   Genitourinary: Negative for frequency.   Musculoskeletal: Negative for back pain.   Integumentary:  Negative for rash.   Neurological: Negative for headaches.   Hematological: Negative for adenopathy.   Psychiatric/Behavioral: The patient is not nervous/anxious.          Objective:       Vitals:    07/20/22 1100   BP: (!) 155/76   BP Location: Left arm   Patient Position: Sitting   BP Method: Large (Automatic)   Pulse: 72   SpO2: 100%   Weight: 81.2 kg (179 lb 0.2 oz)   Height: 5' 7" (1.702 m)       Physical Exam  Constitutional:       Appearance: She is well-developed.   HENT:      Head: Normocephalic.      Right Ear: External ear normal. "      Left Ear: External ear normal.      Mouth/Throat:      Pharynx: No oropharyngeal exudate.   Eyes:      General: No scleral icterus.        Right eye: No discharge.         Left eye: No discharge.      Conjunctiva/sclera: Conjunctivae normal.   Cardiovascular:      Rate and Rhythm: Normal rate and regular rhythm.      Heart sounds: Normal heart sounds. No murmur heard.  Pulmonary:      Effort: Pulmonary effort is normal.      Breath sounds: Normal breath sounds. No wheezing or rales.   Abdominal:      General: Bowel sounds are normal.      Palpations: Abdomen is soft.      Tenderness: There is no abdominal tenderness. There is no guarding or rebound.   Musculoskeletal:         General: Normal range of motion.      Cervical back: Normal range of motion and neck supple.      Right lower leg: No edema.      Left lower leg: No edema.   Skin:     Coloration: Skin is not jaundiced.      Findings: No rash.   Neurological:      General: No focal deficit present.      Mental Status: She is alert and oriented to person, place, and time.   Psychiatric:         Mood and Affect: Mood normal.         Behavior: Behavior normal.         Lab Results   Component Value Date    WBC 5.79 07/08/2022    HGB 9.2 (L) 07/08/2022    HCT 33.6 (L) 07/08/2022    MCV 83 07/08/2022     (H) 07/08/2022         Assessment/plan       Problem List Items Addressed This Visit    None     Visit Diagnoses     Iron deficiency anemia, unspecified iron deficiency anemia type    -  Primary  Cont Fe supp  Hb improved -9.2g/dL    Relevant Orders    Iron and TIBC    Ferritin    Anemia, unspecified type        Relevant Orders    CBC Auto Differential    Comprehensive Metabolic Panel    Immunofixation Electrophoresis    Protein Electrophoresis, Serum    Thrombocytosis      Reactive  monitor          Follow-up      F/u in 3mos with labs 1 week prior to f/u

## 2022-07-28 ENCOUNTER — OFFICE VISIT (OUTPATIENT)
Dept: FAMILY MEDICINE | Facility: CLINIC | Age: 72
End: 2022-07-28
Payer: MEDICARE

## 2022-07-28 ENCOUNTER — PES CALL (OUTPATIENT)
Dept: ADMINISTRATIVE | Facility: CLINIC | Age: 72
End: 2022-07-28
Payer: MEDICARE

## 2022-07-28 VITALS
SYSTOLIC BLOOD PRESSURE: 134 MMHG | DIASTOLIC BLOOD PRESSURE: 66 MMHG | WEIGHT: 179.69 LBS | OXYGEN SATURATION: 99 % | HEIGHT: 67 IN | BODY MASS INDEX: 28.2 KG/M2 | HEART RATE: 110 BPM | TEMPERATURE: 98 F

## 2022-07-28 DIAGNOSIS — N64.52 BREAST DISCHARGE: Primary | ICD-10-CM

## 2022-07-28 DIAGNOSIS — I10 ESSENTIAL HYPERTENSION: ICD-10-CM

## 2022-07-28 DIAGNOSIS — Z12.31 ENCOUNTER FOR SCREENING MAMMOGRAM FOR MALIGNANT NEOPLASM OF BREAST: ICD-10-CM

## 2022-07-28 PROCEDURE — 99999 PR PBB SHADOW E&M-EST. PATIENT-LVL V: ICD-10-PCS | Mod: PBBFAC,,, | Performed by: FAMILY MEDICINE

## 2022-07-28 PROCEDURE — 3078F DIAST BP <80 MM HG: CPT | Mod: CPTII,S$GLB,, | Performed by: FAMILY MEDICINE

## 2022-07-28 PROCEDURE — 1101F PR PT FALLS ASSESS DOC 0-1 FALLS W/OUT INJ PAST YR: ICD-10-PCS | Mod: CPTII,S$GLB,, | Performed by: FAMILY MEDICINE

## 2022-07-28 PROCEDURE — 1126F PR PAIN SEVERITY QUANTIFIED, NO PAIN PRESENT: ICD-10-PCS | Mod: CPTII,S$GLB,, | Performed by: FAMILY MEDICINE

## 2022-07-28 PROCEDURE — 99214 PR OFFICE/OUTPT VISIT, EST, LEVL IV, 30-39 MIN: ICD-10-PCS | Mod: S$GLB,,, | Performed by: FAMILY MEDICINE

## 2022-07-28 PROCEDURE — 3066F NEPHROPATHY DOC TX: CPT | Mod: CPTII,S$GLB,, | Performed by: FAMILY MEDICINE

## 2022-07-28 PROCEDURE — 1159F PR MEDICATION LIST DOCUMENTED IN MEDICAL RECORD: ICD-10-PCS | Mod: CPTII,S$GLB,, | Performed by: FAMILY MEDICINE

## 2022-07-28 PROCEDURE — 3008F PR BODY MASS INDEX (BMI) DOCUMENTED: ICD-10-PCS | Mod: CPTII,S$GLB,, | Performed by: FAMILY MEDICINE

## 2022-07-28 PROCEDURE — 3075F PR MOST RECENT SYSTOLIC BLOOD PRESS GE 130-139MM HG: ICD-10-PCS | Mod: CPTII,S$GLB,, | Performed by: FAMILY MEDICINE

## 2022-07-28 PROCEDURE — 4010F PR ACE/ARB THEARPY RXD/TAKEN: ICD-10-PCS | Mod: CPTII,S$GLB,, | Performed by: FAMILY MEDICINE

## 2022-07-28 PROCEDURE — 3288F PR FALLS RISK ASSESSMENT DOCUMENTED: ICD-10-PCS | Mod: CPTII,S$GLB,, | Performed by: FAMILY MEDICINE

## 2022-07-28 PROCEDURE — 99999 PR PBB SHADOW E&M-EST. PATIENT-LVL V: CPT | Mod: PBBFAC,,, | Performed by: FAMILY MEDICINE

## 2022-07-28 PROCEDURE — 99214 OFFICE O/P EST MOD 30 MIN: CPT | Mod: S$GLB,,, | Performed by: FAMILY MEDICINE

## 2022-07-28 PROCEDURE — 3062F POS MACROALBUMINURIA REV: CPT | Mod: CPTII,S$GLB,, | Performed by: FAMILY MEDICINE

## 2022-07-28 PROCEDURE — 1159F MED LIST DOCD IN RCRD: CPT | Mod: CPTII,S$GLB,, | Performed by: FAMILY MEDICINE

## 2022-07-28 PROCEDURE — 4010F ACE/ARB THERAPY RXD/TAKEN: CPT | Mod: CPTII,S$GLB,, | Performed by: FAMILY MEDICINE

## 2022-07-28 PROCEDURE — 3062F PR POS MACROALBUMINURIA RESULT DOCUMENTED/REVIEW: ICD-10-PCS | Mod: CPTII,S$GLB,, | Performed by: FAMILY MEDICINE

## 2022-07-28 PROCEDURE — 1160F RVW MEDS BY RX/DR IN RCRD: CPT | Mod: CPTII,S$GLB,, | Performed by: FAMILY MEDICINE

## 2022-07-28 PROCEDURE — 3051F HG A1C>EQUAL 7.0%<8.0%: CPT | Mod: CPTII,S$GLB,, | Performed by: FAMILY MEDICINE

## 2022-07-28 PROCEDURE — 1101F PT FALLS ASSESS-DOCD LE1/YR: CPT | Mod: CPTII,S$GLB,, | Performed by: FAMILY MEDICINE

## 2022-07-28 PROCEDURE — 3288F FALL RISK ASSESSMENT DOCD: CPT | Mod: CPTII,S$GLB,, | Performed by: FAMILY MEDICINE

## 2022-07-28 PROCEDURE — 3075F SYST BP GE 130 - 139MM HG: CPT | Mod: CPTII,S$GLB,, | Performed by: FAMILY MEDICINE

## 2022-07-28 PROCEDURE — 1160F PR REVIEW ALL MEDS BY PRESCRIBER/CLIN PHARMACIST DOCUMENTED: ICD-10-PCS | Mod: CPTII,S$GLB,, | Performed by: FAMILY MEDICINE

## 2022-07-28 PROCEDURE — 3078F PR MOST RECENT DIASTOLIC BLOOD PRESSURE < 80 MM HG: ICD-10-PCS | Mod: CPTII,S$GLB,, | Performed by: FAMILY MEDICINE

## 2022-07-28 PROCEDURE — 3066F PR DOCUMENTATION OF TREATMENT FOR NEPHROPATHY: ICD-10-PCS | Mod: CPTII,S$GLB,, | Performed by: FAMILY MEDICINE

## 2022-07-28 PROCEDURE — 3051F PR MOST RECENT HEMOGLOBIN A1C LEVEL 7.0 - < 8.0%: ICD-10-PCS | Mod: CPTII,S$GLB,, | Performed by: FAMILY MEDICINE

## 2022-07-28 PROCEDURE — 1126F AMNT PAIN NOTED NONE PRSNT: CPT | Mod: CPTII,S$GLB,, | Performed by: FAMILY MEDICINE

## 2022-07-28 PROCEDURE — 3008F BODY MASS INDEX DOCD: CPT | Mod: CPTII,S$GLB,, | Performed by: FAMILY MEDICINE

## 2022-07-28 RX ORDER — LOSARTAN POTASSIUM 100 MG/1
100 TABLET ORAL DAILY
Qty: 90 TABLET | Refills: 3 | Status: SHIPPED | OUTPATIENT
Start: 2022-07-28 | End: 2023-06-20

## 2022-07-28 RX ORDER — CEPHALEXIN 500 MG/1
1000 TABLET ORAL 2 TIMES DAILY
Qty: 40 TABLET | Refills: 0 | Status: SHIPPED | OUTPATIENT
Start: 2022-07-28 | End: 2022-08-07

## 2022-07-28 NOTE — PROGRESS NOTES
Assessment & Plan  Breast discharge   -     Mammo Digital Diagnostic Left with Dany; Future; Expected date: 07/28/2022  -     cephalexin (KEFTAB) 500 mg tablet; Take 2 tablets (1,000 mg total) by mouth 2 (two) times daily. for 10 days  Dispense: 40 tablet; Refill: 0  -     US Breast Left Complete; Future; Expected date: 07/28/2022    Will treat empirically for mastitis. Diagnostic mammo/US to be scheduled.    Encounter for screening mammogram for malignant neoplasm of breast  -     Mammo Digital Screening Right with Dany; Future; Expected date: 07/28/2022    Essential hypertension  -     losartan (COZAAR) 100 MG tablet; Take 1 tablet (100 mg total) by mouth once daily. Hold if SBP <120  Dispense: 90 tablet; Refill: 3    Controlled. Medication(s) refilled.       Follow-up: Follow up if symptoms worsen or fail to improve.  ______________________________________________________________________    Chief Complaint  Chief Complaint   Patient presents with    Breast Discharge       HPI  Joanne Peña is a 72 y.o. female with medical diagnoses as listed in the medical history and problem list that presents to the office c/o greenish discharge of the left breast which she first noticed 2-3 days ago. Patients that she has had this problem before. Denies fever, chills, rashes. Overdue for mammogram.     Health Maintenance       Date Due Completion Date    COVID-19 Vaccine (1) Never done ---    Pneumococcal Vaccines (Age 65+) (1 - PCV) Never done ---    Eye Exam Never done ---    TETANUS VACCINE Never done ---    DEXA Scan Never done ---    Colorectal Cancer Screening Never done ---    Shingles Vaccine (1 of 2) Never done ---    Mammogram 02/12/2017 2/12/2016    Influenza Vaccine (1) 09/01/2022 ---    Hemoglobin A1c 12/01/2022 6/1/2022    Diabetes Urine Screening 06/01/2023 6/1/2022    Lipid Panel 06/01/2023 6/1/2022    Foot Exam 06/24/2023 6/24/2022    Override on 6/24/2022: Done    High Dose Statin 07/20/2023 7/20/2022             PAST MEDICAL HISTORY:  Past Medical History:   Diagnosis Date    Diabetes mellitus     Hyperlipidemia     Hypertension     Stroke        PAST SURGICAL HISTORY:  Past Surgical History:   Procedure Laterality Date    INJECTION OF JOINT Right 5/20/2022    Procedure: Right SI joint injection;  Surgeon: Son Lara DO;  [facility]: [facility];  Service: Pain Management;  Laterality: Right;       SOCIAL HISTORY:  Social History     Socioeconomic History    Marital status:    Tobacco Use    Smoking status: Former Smoker     Packs/day: 0.50     Types: Cigarettes    Smokeless tobacco: Never Used   Substance and Sexual Activity    Alcohol use: Never    Drug use: No       FAMILY HISTORY:  Family History   Problem Relation Age of Onset    Kidney disease Mother     Heart disease Mother     Cancer Father     Hypertension Brother     Diabetes Daughter     Hypertension Daughter     Cancer Maternal Aunt        ALLERGIES AND MEDICATIONS: updated and reviewed.  Review of patient's allergies indicates:   Allergen Reactions    Lisinopril-hydrochlorothiazide Other (See Comments)     Pt stated it makes her feel drained. She couldn't raise arms over head.    Ampicillin Rash    Darvocet a500 [propoxyphene n-acetaminophen] Other (See Comments)     shaky     Current Outpatient Medications   Medication Sig Dispense Refill    amLODIPine (NORVASC) 10 MG tablet Take 1 tablet (10 mg total) by mouth once daily. Hold if SBP <120 90 tablet 3    ascorbic acid, vitamin C, (VITAMIN C) 500 MG tablet Take 500 mg by mouth once daily.      aspirin (ECOTRIN) 81 MG EC tablet Take 1 tablet (81 mg total) by mouth once daily. 30 tablet 3    atorvastatin (LIPITOR) 80 MG tablet Take 1 tablet (80 mg total) by mouth once daily. 90 tablet 3    chlorthalidone (HYGROTEN) 25 MG Tab Take 1 tablet (25 mg total) by mouth once daily. 30 tablet 11    cyclobenzaprine (FLEXERIL) 10 MG tablet Take 1 tablet (10 mg total) by mouth  "nightly as needed for Muscle spasms. 90 tablet 1    glimepiride (AMARYL) 2 MG tablet Take 1 tablet (2 mg total) by mouth 2 (two) times daily. 180 tablet 3    guanfacine HCl (GUANFACINE ORAL) Take by mouth.      hydrALAZINE (APRESOLINE) 50 MG tablet Take 1 tablet (50 mg total) by mouth every 8 (eight) hours. Hold is SBP <120 90 tablet 1    LORazepam (ATIVAN) 1 MG tablet Take 1 tablet (1 mg total) by mouth once as needed for Anxiety (before procedure). 1 tablet 0    meclizine (ANTIVERT) 25 mg tablet TAKE 1 TABLET (25 MG TOTAL) BY MOUTH 2 (TWO) TIMES DAILY AS NEEDED FOR DIZZINESS. 60 tablet 0    multivitamin (THERAGRAN) per tablet Take 1 tablet by mouth once daily.      ondansetron (ZOFRAN-ODT) 4 MG TbDL Dissolve 1 tablet (4 mg total) by mouth every 8 (eight) hours as needed. 20 tablet 0    cephalexin (KEFTAB) 500 mg tablet Take 2 tablets (1,000 mg total) by mouth 2 (two) times daily. for 10 days 40 tablet 0    diclofenac sodium (VOLTAREN) 1 % Gel Apply 2 g topically 4 (four) times daily. Use gloves to apply (Patient not taking: Reported on 7/28/2022) 100 g 1    gabapentin (NEURONTIN) 100 MG capsule Take 1 capsule (100 mg total) by mouth 3 (three) times daily. (Patient not taking: Reported on 7/28/2022) 90 capsule 0    losartan (COZAAR) 100 MG tablet Take 1 tablet (100 mg total) by mouth once daily. Hold if SBP <120 90 tablet 3    methylPREDNISolone (MEDROL DOSEPACK) 4 mg tablet use as directed (Patient not taking: Reported on 7/28/2022) 1 each 0     No current facility-administered medications for this visit.         ROS  Review of Systems   Constitutional: Negative for chills and fever.   Skin: Negative for color change and rash.           Physical Exam  Vitals:    07/28/22 1455   BP: 134/66   BP Location: Right arm   Patient Position: Sitting   BP Method: Large (Manual)   Pulse: 110   Temp: 98.2 °F (36.8 °C)   TempSrc: Oral   SpO2: 99%   Weight: 81.5 kg (179 lb 10.8 oz)   Height: 5' 7" (1.702 m)    Body " "mass index is 28.14 kg/m².  Weight: 81.5 kg (179 lb 10.8 oz)   Height: 5' 7" (170.2 cm)   Physical Exam  Constitutional:       General: She is not in acute distress.     Appearance: Normal appearance.   HENT:      Head: Normocephalic and atraumatic.   Chest:   Breasts:      Right: Mass (induration around the areola) and nipple discharge (pus) present. No skin change or tenderness.      Left: Normal.       Neurological:      Mental Status: She is alert. Mental status is at baseline.   Psychiatric:         Mood and Affect: Mood normal.         Behavior: Behavior normal.             "

## 2022-07-29 ENCOUNTER — TELEPHONE (OUTPATIENT)
Dept: ELECTROPHYSIOLOGY | Facility: CLINIC | Age: 72
End: 2022-07-29
Payer: MEDICARE

## 2022-07-29 NOTE — TELEPHONE ENCOUNTER
Spoke with patient regarding monitor results and Dr Rich recommendation about proceeding with the loop recorder implant for long term monitoring. Patient states that she would like to discuss it with her daughter first, and she will call the office back to let me know if she wants to proceed.

## 2022-08-01 ENCOUNTER — TELEPHONE (OUTPATIENT)
Dept: ADMINISTRATIVE | Facility: CLINIC | Age: 72
End: 2022-08-01
Payer: MEDICARE

## 2022-08-17 ENCOUNTER — HOSPITAL ENCOUNTER (OUTPATIENT)
Dept: RADIOLOGY | Facility: HOSPITAL | Age: 72
Discharge: HOME | End: 2022-08-17
Attending: FAMILY MEDICINE
Payer: MEDICARE

## 2022-08-17 ENCOUNTER — PATIENT MESSAGE (OUTPATIENT)
Dept: FAMILY MEDICINE | Facility: CLINIC | Age: 72
End: 2022-08-17
Payer: MEDICARE

## 2022-08-17 DIAGNOSIS — N64.52 BREAST DISCHARGE: ICD-10-CM

## 2022-08-17 DIAGNOSIS — N94.9 ADNEXAL CYST: ICD-10-CM

## 2022-08-17 DIAGNOSIS — N63.20 BREAST MASS, LEFT: Primary | ICD-10-CM

## 2022-08-17 DIAGNOSIS — N85.2 ENLARGED UTERUS: ICD-10-CM

## 2022-08-17 DIAGNOSIS — N63.42 SUBAREOLAR MASS OF LEFT BREAST: ICD-10-CM

## 2022-08-17 PROCEDURE — 77062 MAMMO DIGITAL DIAGNOSTIC BILAT WITH TOMO: ICD-10-PCS | Mod: 26,,, | Performed by: RADIOLOGY

## 2022-08-17 PROCEDURE — 76642 ULTRASOUND BREAST LIMITED: CPT | Mod: 26,LT,, | Performed by: RADIOLOGY

## 2022-08-17 PROCEDURE — 77066 MAMMO DIGITAL DIAGNOSTIC BILAT WITH TOMO: ICD-10-PCS | Mod: 26,,, | Performed by: RADIOLOGY

## 2022-08-17 PROCEDURE — 77062 BREAST TOMOSYNTHESIS BI: CPT | Mod: 26,,, | Performed by: RADIOLOGY

## 2022-08-17 PROCEDURE — 77066 DX MAMMO INCL CAD BI: CPT | Mod: 26,,, | Performed by: RADIOLOGY

## 2022-08-17 PROCEDURE — 76642 ULTRASOUND BREAST LIMITED: CPT | Mod: TC,LT

## 2022-08-17 PROCEDURE — 77066 DX MAMMO INCL CAD BI: CPT | Mod: TC

## 2022-08-17 PROCEDURE — 76642 US BREAST LEFT LIMITED: ICD-10-PCS | Mod: 26,LT,, | Performed by: RADIOLOGY

## 2022-08-17 PROCEDURE — 76830 TRANSVAGINAL US NON-OB: CPT | Mod: TC

## 2022-08-17 NOTE — TELEPHONE ENCOUNTER
Please inform patient that her mammogram and ultrasound of the left breast shows a small mass that is probably benign. Uncertain if this is related to the breast discharge she had at her last visit. Recommend close follow up imaging in 6 months. If she still has the discharge, I recommend following up with a Breast specialist.

## 2022-08-18 ENCOUNTER — CLINICAL SUPPORT (OUTPATIENT)
Dept: REHABILITATION | Facility: HOSPITAL | Age: 72
End: 2022-08-18
Attending: STUDENT IN AN ORGANIZED HEALTH CARE EDUCATION/TRAINING PROGRAM
Payer: MEDICARE

## 2022-08-18 ENCOUNTER — PATIENT MESSAGE (OUTPATIENT)
Dept: FAMILY MEDICINE | Facility: CLINIC | Age: 72
End: 2022-08-18
Payer: MEDICARE

## 2022-08-18 DIAGNOSIS — D25.9 UTERINE LEIOMYOMA, UNSPECIFIED LOCATION: ICD-10-CM

## 2022-08-18 DIAGNOSIS — M46.1 SACROILIITIS: ICD-10-CM

## 2022-08-18 DIAGNOSIS — R29.898 DECREASED STRENGTH OF TRUNK AND BACK: ICD-10-CM

## 2022-08-18 DIAGNOSIS — N83.8 OVARIAN MASS, LEFT: Primary | ICD-10-CM

## 2022-08-18 DIAGNOSIS — N85.8 OTHER SPECIFIED NONINFLAMMATORY DISORDERS OF UTERUS: ICD-10-CM

## 2022-08-18 DIAGNOSIS — M47.816 LUMBAR SPONDYLOSIS: ICD-10-CM

## 2022-08-18 DIAGNOSIS — R29.3 BAD POSTURE: ICD-10-CM

## 2022-08-18 DIAGNOSIS — M51.36 DDD (DEGENERATIVE DISC DISEASE), LUMBAR: ICD-10-CM

## 2022-08-18 DIAGNOSIS — M25.69 DECREASED ROM OF TRUNK AND BACK: ICD-10-CM

## 2022-08-18 PROCEDURE — 97162 PT EVAL MOD COMPLEX 30 MIN: CPT | Mod: PN | Performed by: PHYSICAL MEDICINE & REHABILITATION

## 2022-08-18 PROCEDURE — 97110 THERAPEUTIC EXERCISES: CPT | Mod: PN | Performed by: PHYSICAL MEDICINE & REHABILITATION

## 2022-08-18 NOTE — TELEPHONE ENCOUNTER
Patient's pelvic ultrasound showed fibroids in her uterus and a nodule on the left ovary. We need to further evaluate with a pelvic MRI and get her to follow up with a gynecologist. I have put in referrals. Please also relay the message sent about her mammogram/ultrasound sent out yesterday.

## 2022-08-18 NOTE — PLAN OF CARE
[facility] - PHYSICAL THERAPY LUMBAR EVALUATION     Name: Joanne Pñea  Clinic Number: [id number]    Therapy Diagnosis:   Encounter Diagnoses   Name Primary?    Lumbar spondylosis     DDD (degenerative disc disease), lumbar     Sacroiliitis     Bad posture     Decreased ROM of trunk and back     Decreased strength of trunk and back      Physician: Son Lara,*    Physician Orders: PT Eval and Treat    Medical Diagnosis from Referral:   M47.816 (ICD-10-CM) - Lumbar spondylosis   M51.36 (ICD-10-CM) - DDD (degenerative disc disease), lumbar   M46.1 (ICD-10-CM) - Sacroiliitis       Evaluation Date: 8/18/2022  Authorization Period Expiration: 7/23/23  Plan of Care Expiration: 11/18/22  Reassessment Due: 9/18/22  Visit # / Visits authorized: 1/ 1    Time In: 9:00 am  Time Out: 10:30 am  Total Billable Time: 90 minutes    Precautions:  Stroke Feb 2022 effecting balance  Nodule left ovary per patient-waiting for [facility] Physician to call    Pattern of pain determined: 2      Subjective   Date of onset: long term  History of current condition - Joanne reports: She has had back pain for years but managed.  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  She was working up until a stroke in  Feb 2022 which has effected her balance a little.  She did therapy.  She isn't working now, because after the stroke, she developed right hip pain and back pain.  She had an injection in her back which helped her right hip.  Her back remains a problem.  Her back pain goes from mid back into both upper pelvic area, and it is intermittent.  It is better sitting, lying down.  She is a little worse with bending but actually identifies it is just a pull, not the pain, but the big problems are standing or walking any length of time or distance.  She has trouble shopping and she can't do it without cart to lean on.  She has to sit to cook. She has to sit to do ADL.  She can't  the shower.  Her  helps at home.   She doesn't drive.  She feels she is always looking for a chair.  She had trouble walking from waiting room to eval room here, she had trouble standing for mammogram.     Symptoms intermittent in back.  No legs symptoms  Pain 10/10 at worst (walking/standing)   0/10 at best  8/10 walking from waiting room in     Prior Therapy: for stroke   Prior Treatment: [facility]  Social History: lives with  who is healthy, no stairs  Occupation: LPN, working up to stroke Feb 2022, [employer], she wants to go back to work  Leisure: play bingo      Prior Level of Function: full  Current Level of Function: limited with showering, ADL, cooking  DME owned/used: cane, walker, doesn't use these, no other equipment  Disturbed Sleep: no        Pattern of pain questions:  1.  Where is your pain the worst? back  2.  Is your pain constant or intermittent? intermittent  3.  Does bending forward make your typical pain worse? No, pulling in back but no pain,  standing and walking much much worse, relieved sitting  4.  Since the start of your back pain, has there been a change in your bowel or bladder? no  5.  What can't you do now that you use to be able to do? Walk more than 2 min, standing to cook a meal      Pts goals:  Cook a meal without sitting, walk in the mall  Medical History:   Past Medical History:   Diagnosis Date    Diabetes mellitus     Hyperlipidemia     Hypertension     Stroke        Surgical History:   Joanne Peña  has a past surgical history that includes Injection of joint (Right, 5/20/2022).    Medications:   Joanne has a current medication list which includes the following prescription(s): amlodipine, ascorbic acid (vitamin c), aspirin, atorvastatin, chlorthalidone, cyclobenzaprine, diclofenac sodium, gabapentin, glimepiride, guanfacine hcl, hydralazine, lorazepam, losartan, meclizine, methylprednisolone, multivitamin, and ondansetron.    Allergies:   Review of patient's allergies indicates:   Allergen  Reactions    Lisinopril-hydrochlorothiazide Other (See Comments)     Pt stated it makes her feel drained. She couldn't raise arms over head.    Ampicillin Rash    Darvocet a500 [propoxyphene n-acetaminophen] Other (See Comments)     kierstenky        Imaging, 22Impression:   1. Multilevel degenerative change of the lumbar spine as detailed above.  Moderate spinal canal stenosis and neural foraminal narrowing noted at L4-L5.  Narrowing of right lateral recess at L5-S1 with disc material contacting the descending right S1 nerve root.  2. Incompletely characterized enlarged, heterogeneous uterus and partially visualized left adnexal cyst.  Recommend pelvic ultrasound.               Red Flag Screening:   Cough  Sneeze  Strain: (--)  Bladder/ bowel: (--)  Falls: (--)  Night pain: (--)  Unexplained weight loss: (--)  General health: good    OBJECTIVE     Postural examination/scapula alignment: Rounded shoulder and Head forward  Sitting: fair posture  Standing: stands slightly flexed  Correction of posture:  No pain at rest, but lumbar roll felt good    Function:  Sit to stand 30 sec, no use of hands 9 reps, legs fatigued, no pain  Standing test: back pain at 30 seconds standing  TU sec without hands and no walking aid  Stands feet together and eyes closed 10 sec.    Single leg stance 3 sec bilat    MOVEMENT LOSS    Norms ROM Loss Initial   Flexion Fingers touch toes, sacral angle >/= 70 deg, uniform spinal curvature, posterior weight shift  moderate loss   Extension ASIS surpasses toes, spine of scapulae surpasses heels, uniform spinal curve major loss   Side glide Right  major loss   Side glide Left  major loss   Rotation Right PT observes contralateral shoulder major loss   Rotation Left PT observes contralateral shoulder major loss     Lower Extremity Strength  Right LE  Left LE    Hip flexion: 5/5 Hip flexion: 5/5   Hip extension:  4+/5 Hip extension: 4+/5   Hip abduction: 4+/5 Hip abduction: 4+/5   Hip  adduction:  4+/5 Hip adduction:  4+/5   Knee Flexion 5/5 Knee Flexion 5/5   Knee Extension 5/5 Knee Extension 5/5   Ankle dorsiflexion: 5/5 Ankle dorsiflexion: 5/5   Ankle plantarflexion: 5/5 Ankle plantarflexion: 5/5       GAIT:  Assistive Device used: none  Level of Assistance: independent  Patient displays the following gait deviations:  increased base of support.     Special Tests:   Test Name  Test Result   Prone Instability Test (--)   SI Joint Provocation Test (--)   Straight Leg Raise (--)   Neural Tension Test (+) for pulling with slump   Crossed Straight Leg Raise (--)   Walking on toes (--) able   Walking on heels  (--) able       NEUROLOGICAL SCREENING     Sensory deficit: intact    Reflexes:    Left Right   Patella Tendon 2+ 2+   Achilles Tendon 2+ 2+   Babinski  (--) (--)   Clonus (--) (--)     REPEATED TEST MOVEMENTS:    Baseline symptoms: no pain at rest  Repeated Flexion in Standing no effect   Repeated Extension in Standing end range pain  pain during motion  no worse   Repeated Flexion in lying no effect due to no pain at rest.   Sitting and flexion reduce symptoms if present, ie standing pain reduced with sitting     Repeated Extension in lying  not tested     [facility] 2022   Visit Number 1   VAS Pain Rating 6   Flexion in Lying 10   Flexion in Sitting 10   Lumbar Extension Seat Pad 0   Femur Restraint 4   Top Dead Center 24   Counterweight 150   Lumbar Flexion 36   Lumbar Extension 0   Lumbar Peak Torque 102   Min Torque 22   Test Percent Below Normative Data 38   Ice - Z Lie (in min.) 10         Baseline Isometric Testing on Med X equipment: Testing administered by PT  Date of testin22  ROM 0-36 deg   Max Peak Torque 102    Min Peak Torque 22    Flex/Ext Ratio 5/1   % below normative data -35% average below  but 68 % below at 0 degrees         Limitation/Restriction for FOTO 22 Survey    Therapist reviewed FOTO scores for Joanne Peña on 2022.   FOTO  documents entered into EPIC - see Media section.    Limitation Score: 56% limited  Goal < 40 % limited             Treatment   Treatment Time In: 10:00  Treatment Time Out: 10:30   Total Treatment time separate from Evaluation: 30 minutes      Joanne received therapeutic exercises to develop/improve posture, lumbar/cervical ROM, strength and muscular endurance for 30 minutes including the following exercises:         Written Home Exercises Provided: yes.  Exercises were reviewed and Joanne was able to demonstrate them prior to the end of the session.  Joanne demonstrated good  understanding of the education provided.         Home Exercises Provided and Patient Education Provided   Home exercises include: flexion in lie, single knee to chest, pelvic tilts, slouch correct  Cardio program: encouraged short walks at home, resting in chair, at eval 8/18/22  Lifting education date:   Posture/Lumbar roll:  Encouraged roll  Other Equipment: she may buy ball        See EMR under Patient Instructions for exercises provided 8/18/2022.      Education provided:   - Patient received education regarding proper posture and body mechanics.  Patient was given top [facility] Visit 1 handouts which discuss what to expect in therapy, the purpose and opportunity for health coaching, the program,  wellness when discharged from therapy, back education and care specifically for posture seated, standing, lifting correctly, components of exercise, importance of nutrition and hydration, and importance of sleep.   Information on lumbar rolls provided.  - Bro roll tried, recommended, and purchase information was provided.    - Patient received a handout regarding anticipated muscular soreness following the isometric test and strategies for management were reviewed with patient including stretching, using ice and scheduled rest.   - Patient received education on the [facility] program, purpose of the isometric test, progression  of back strengthening as well as wellness approach and systemic strengthening.  Details of the program were discussed.  Reviewed that patient should feel support/pressure from med ex restraints but no pain or discomfort and patient expressed understanding.  Med x dynamic exercise and baseline IM test performed with instructions to guide the patient safely through the isometric testing.  Patient informed to perform isometric test correctly, and safely, building best force they safely can and not pushing through pain.  Patient demonstrated understanding of information      Joanne received cold pack for 10 minutes to low back.    Assessment   Joanne is a 72 y.o. female referred to [facility] with a medical diagnosis of   M47.816 (ICD-10-CM) - Lumbar spondylosis   M51.36 (ICD-10-CM) - DDD (degenerative disc disease), lumbar   M46.1 (ICD-10-CM) - Sacroiliitis     .   Pt presents with reported low back pain   that is reproduced with standing/walking for very short times and is significantly restricting activity at home  and reduced with sitting/bending, leaning  indicating directional preference of flexion and pattern 2.   Upon physical assessment, pt demonstrates slouched posturing in sitting, mild hip weakness bilaterally, and trunk and hip mobility deficits. Upon further local examination, hip, lumbar, and thoracic rotation were all limited significantly. Per lumbar MedX testing, pt was 38% on average below in strength but 68% below in standing/walking positions 12-0 degrees in  strength when compared to those of  same age/height/weight/gender. All of the above noted supports potential lumbar classification as a pattern 2 with recurrent/ or consistent symptoms, thus pt is a good candidate for the [facility] Program. Pt would benefit from LE and trunk mobility training, stability training,  improved cardiovascular and muscular endurance, neuromuscular re-education for posture, coordination, and muscular  recruitment and education on positional offloading techniques to decrease the intensity and frequency of flare-ups.     Pain Pattern: 2       Pt prognosis is Good.   Pt will benefit from skilled outpatient Physical Therapy to address the deficits stated above and in the chart below, provide pt/family education, and to maximize pt's level of independence. Based on the above history and physical examination an active physical therapy program is recommended.  Pt will continue to benefit from skilled outpatient physical therapy to address the deficits listed below in the chart, provide pt/family education and to maximize pt's level of independence in the home and community environment. .       Plan of care discussed with patient: Yes  Pt's spiritual, cultural and educational needs considered and patient is agreeable to the plan of care and goals as stated below:     Anticipated Barriers for therapy: attending with scheduling    PT Evaluation Completed? Yes    Medical necessity is demonstrated by the following problem list.    Pt presents with the following impairments:     History  Co-morbidities and personal factors that may impact the plan of care Co-morbidities:   stroke  Personal Factors:   Attending, relies on rides   moderate   Examination  Body Structures and Functions, activity limitations and participation restrictions that may impact the plan of care Body Regions:   back  lower extremities    Body Systems:    gross symmetry  ROM  strength  transfers  transitions  motor control    Participation Restrictions:   attending    Activity limitations:   Learning and applying knowledge  no deficits    General Tasks and Commands  no deficits    Communication  no deficits    Mobility  lifting and carrying objects  walking  driving (bike, car, motorcycle)    Self care  looking after one's health    Domestic Life  shopping  cooking  doing house work (cleaning house, washing dishes,  laundry)    Interactions/Relationships  no deficits    Life Areas  no deficits    Community and Social Life  no deficits         moderate   Clinical Presentation evolving clinical presentation with changing clinical characteristics moderate   Decision Making/ Complexity Score: moderate       GOALS: Pt is in agreement with the following goals.    Short term goals:  6 weeks or 10 visits   1.  Pt will demonstrate increased lumbar ROM by at least 6 degrees from the initial ROM value with improvements noted in functional ROM and ability to perform ADLs.  (approp and ongoing)  2.  Pt will demonstrate increased MedX average isometric strength value  by 10% from initial test resulting in improved ability to perform bending, lifting, and carrying activities safely, confidently.  (approp and ongoing)  3.  Patient report a reduction in worst pain score by 1-2 points for improved tolerance for 4/10 at worst.  (approp and ongoing)  4.  Pt able to perform HEP correctly with minimal cueing or supervision from therapist to encourage independent management of symptoms. (approp and ongoing)      Long term goals: 10 weeks or 20 visits   1. Pt will demonstrate increased lumbar ROM by at least 12 degrees from initial ROM value, resulting in improved ability to perform functional fwd bending while standing and sitting. (approp and ongoing)  2. Pt will demonstrate increased MedX average isometric strength value  by 40% from initial test resulting in improved ability to perform bending, lifting, and carrying activities safely, confidently at 0-12 degrees for improved ability to walk/ stand.  (approp and ongoing)  3. Pt to demonstrate ability to independently control and reduce their pain through posture positioning and mechanical movements throughout a typical day.  (approp and ongoing)  4.  Pt will demonstrate reduced pain and improved functional outcomes as reported on the FOTO by reaching a limitation score of < or = 40% or less in  "order to demonstrate subjective improvement in pt's condition.    (approp and ongoing)  5. Pt will demonstrate independence with the HEP at discharge  (approp and ongoing)  6. Sit to stand 30 sec, no use of hands 11 reps,  Standing test: back pain not until 2 -5 min standing, TUG: < 11 sec without hands and no walking aid  (approp and ongoing)  7. Stand to cook meal without sitting, shop in mall with rest breaks  (approp and ongoing)      Plan   Outpatient physical therapy 2x week for 10 weeks or 20 visits to include the following:   - Patient education  - Therapeutic exercise  - Manual therapy  - Performance testing   - Neuromuscular Re-education  - Therapeutic activity   - Modalities    Pt may be seen by PTA as part of the rehabilitation team.     Therapist: Kassidy Ge, PT  8/18/2022    "I certify the need for these services furnished under this plan of treatment and while under my care."    ____________________________________  Physician/Referring Practitioner    _______________  Date of Signature          "

## 2022-08-18 NOTE — PATIENT INSTRUCTIONS
Slouch Correct          Practice using the postural muscles by slouching and then correcting.  Correct and hold 3 sec.  Slouch again and correct, hold 3 sec.  Do 10 times.  On the last one, over correct into very erect posture and then back off 10 % and maintain this.   Use a lumbar roll when done the exercise to make sure you are sitting tall.   Do this exercise until it is natural for you to sit tall.

## 2022-08-24 ENCOUNTER — PATIENT MESSAGE (OUTPATIENT)
Dept: ADMINISTRATIVE | Facility: HOSPITAL | Age: 72
End: 2022-08-24
Payer: MEDICARE

## 2022-08-30 ENCOUNTER — PES CALL (OUTPATIENT)
Dept: ADMINISTRATIVE | Facility: CLINIC | Age: 72
End: 2022-08-30
Payer: MEDICARE

## 2022-09-13 ENCOUNTER — LAB VISIT (OUTPATIENT)
Dept: LAB | Facility: HOSPITAL | Age: 72
End: 2022-09-13
Attending: INTERNAL MEDICINE
Payer: MEDICARE

## 2022-09-13 DIAGNOSIS — D50.9 IRON DEFICIENCY ANEMIA, UNSPECIFIED IRON DEFICIENCY ANEMIA TYPE: ICD-10-CM

## 2022-09-13 DIAGNOSIS — D64.9 ANEMIA, UNSPECIFIED TYPE: ICD-10-CM

## 2022-09-13 LAB
ALBUMIN SERPL BCP-MCNC: 3.9 G/DL (ref 3.5–5.2)
ALP SERPL-CCNC: 98 U/L (ref 55–135)
ALT SERPL W/O P-5'-P-CCNC: 13 U/L (ref 10–44)
ANION GAP SERPL CALC-SCNC: 8 MMOL/L (ref 8–16)
AST SERPL-CCNC: 11 U/L (ref 10–40)
BASOPHILS # BLD AUTO: 0.02 K/UL (ref 0–0.2)
BASOPHILS NFR BLD: 0.3 % (ref 0–1.9)
BILIRUB SERPL-MCNC: 0.2 MG/DL (ref 0.1–1)
BUN SERPL-MCNC: 34 MG/DL (ref 8–23)
CALCIUM SERPL-MCNC: 9.2 MG/DL (ref 8.7–10.5)
CHLORIDE SERPL-SCNC: 111 MMOL/L (ref 95–110)
CO2 SERPL-SCNC: 20 MMOL/L (ref 23–29)
CREAT SERPL-MCNC: 1.2 MG/DL (ref 0.5–1.4)
DIFFERENTIAL METHOD: ABNORMAL
EOSINOPHIL # BLD AUTO: 0.2 K/UL (ref 0–0.5)
EOSINOPHIL NFR BLD: 3 % (ref 0–8)
ERYTHROCYTE [DISTWIDTH] IN BLOOD BY AUTOMATED COUNT: 19.6 % (ref 11.5–14.5)
EST. GFR  (NO RACE VARIABLE): 48.7 ML/MIN/1.73 M^2
FERRITIN SERPL-MCNC: 21 NG/ML (ref 20–300)
GLUCOSE SERPL-MCNC: 157 MG/DL (ref 70–110)
HCT VFR BLD AUTO: 34.1 % (ref 37–48.5)
HGB BLD-MCNC: 10.5 G/DL (ref 12–16)
IMM GRANULOCYTES # BLD AUTO: 0.03 K/UL (ref 0–0.04)
IMM GRANULOCYTES NFR BLD AUTO: 0.5 % (ref 0–0.5)
IRON SERPL-MCNC: 57 UG/DL (ref 30–160)
LYMPHOCYTES # BLD AUTO: 1.6 K/UL (ref 1–4.8)
LYMPHOCYTES NFR BLD: 28.2 % (ref 18–48)
MCH RBC QN AUTO: 24.9 PG (ref 27–31)
MCHC RBC AUTO-ENTMCNC: 30.8 G/DL (ref 32–36)
MCV RBC AUTO: 81 FL (ref 82–98)
MONOCYTES # BLD AUTO: 0.6 K/UL (ref 0.3–1)
MONOCYTES NFR BLD: 9.6 % (ref 4–15)
NEUTROPHILS # BLD AUTO: 3.4 K/UL (ref 1.8–7.7)
NEUTROPHILS NFR BLD: 58.4 % (ref 38–73)
NRBC BLD-RTO: 0 /100 WBC
PLATELET # BLD AUTO: 373 K/UL (ref 150–450)
PMV BLD AUTO: 11.2 FL (ref 9.2–12.9)
POTASSIUM SERPL-SCNC: 4.6 MMOL/L (ref 3.5–5.1)
PROT SERPL-MCNC: 8.1 G/DL (ref 6–8.4)
RBC # BLD AUTO: 4.22 M/UL (ref 4–5.4)
SATURATED IRON: 16 % (ref 20–50)
SODIUM SERPL-SCNC: 139 MMOL/L (ref 136–145)
TOTAL IRON BINDING CAPACITY: 363 UG/DL (ref 250–450)
TRANSFERRIN SERPL-MCNC: 245 MG/DL (ref 200–375)
WBC # BLD AUTO: 5.75 K/UL (ref 3.9–12.7)

## 2022-09-13 PROCEDURE — 80053 COMPREHEN METABOLIC PANEL: CPT | Performed by: INTERNAL MEDICINE

## 2022-09-13 PROCEDURE — 86334 IMMUNOFIX E-PHORESIS SERUM: CPT | Mod: 26,,, | Performed by: PATHOLOGY

## 2022-09-13 PROCEDURE — 36415 COLL VENOUS BLD VENIPUNCTURE: CPT | Mod: PO | Performed by: INTERNAL MEDICINE

## 2022-09-13 PROCEDURE — 82728 ASSAY OF FERRITIN: CPT | Performed by: INTERNAL MEDICINE

## 2022-09-13 PROCEDURE — 86334 IMMUNOFIX E-PHORESIS SERUM: CPT | Performed by: INTERNAL MEDICINE

## 2022-09-13 PROCEDURE — 85025 COMPLETE CBC W/AUTO DIFF WBC: CPT | Performed by: INTERNAL MEDICINE

## 2022-09-13 PROCEDURE — 86334 PATHOLOGIST INTERPRETATION IFE: ICD-10-PCS | Mod: 26,,, | Performed by: PATHOLOGY

## 2022-09-13 PROCEDURE — 84165 PROTEIN E-PHORESIS SERUM: CPT | Mod: 26,,, | Performed by: PATHOLOGY

## 2022-09-13 PROCEDURE — 84466 ASSAY OF TRANSFERRIN: CPT | Performed by: INTERNAL MEDICINE

## 2022-09-13 PROCEDURE — 84165 PROTEIN E-PHORESIS SERUM: CPT | Performed by: INTERNAL MEDICINE

## 2022-09-13 PROCEDURE — 84165 PATHOLOGIST INTERPRETATION SPE: ICD-10-PCS | Mod: 26,,, | Performed by: PATHOLOGY

## 2022-09-14 LAB
ALBUMIN SERPL ELPH-MCNC: 4.08 G/DL (ref 3.35–5.55)
ALPHA1 GLOB SERPL ELPH-MCNC: 0.35 G/DL (ref 0.17–0.41)
ALPHA2 GLOB SERPL ELPH-MCNC: 0.98 G/DL (ref 0.43–0.99)
B-GLOBULIN SERPL ELPH-MCNC: 0.92 G/DL (ref 0.5–1.1)
GAMMA GLOB SERPL ELPH-MCNC: 1.16 G/DL (ref 0.67–1.58)
INTERPRETATION SERPL IFE-IMP: NORMAL
PATHOLOGIST INTERPRETATION IFE: NORMAL
PATHOLOGIST INTERPRETATION SPE: NORMAL
PROT SERPL-MCNC: 7.5 G/DL (ref 6–8.4)

## 2022-09-16 ENCOUNTER — TELEPHONE (OUTPATIENT)
Dept: HEMATOLOGY/ONCOLOGY | Facility: CLINIC | Age: 72
End: 2022-09-16
Payer: MEDICARE

## 2022-09-21 ENCOUNTER — TELEPHONE (OUTPATIENT)
Dept: GASTROENTEROLOGY | Facility: CLINIC | Age: 72
End: 2022-09-21
Payer: MEDICARE

## 2022-09-21 NOTE — TELEPHONE ENCOUNTER
----- Message from Mansi Street sent at 9/21/2022 12:10 PM CDT -----  .Type:  Patient Returning Call    Who Called:  self     Who Left Message for Patient:  Rachael     Does the patient know what this is regarding?: behzad change     Would the patient rather a call back or a response via [facility]? Call     Best Call Back Number: .[phone]

## 2022-09-22 ENCOUNTER — CLINICAL SUPPORT (OUTPATIENT)
Dept: REHABILITATION | Facility: HOSPITAL | Age: 72
End: 2022-09-22
Payer: MEDICARE

## 2022-09-22 DIAGNOSIS — R29.898 DECREASED STRENGTH OF TRUNK AND BACK: ICD-10-CM

## 2022-09-22 DIAGNOSIS — R29.3 BAD POSTURE: Primary | ICD-10-CM

## 2022-09-22 PROCEDURE — 97110 THERAPEUTIC EXERCISES: CPT | Mod: PN,CQ

## 2022-09-22 NOTE — PROGRESS NOTES
[facility]      Name: Joanne Peña  Clinic Number: [id number]    Therapy Diagnosis:   Encounter Diagnoses   Name Primary?    Bad posture Yes    Decreased strength of trunk and back      Physician: Son Lara,*    Visit Date: 2022    Physician Orders: PT Eval and Treat    Medical Diagnosis from Referral:   M47.816 (ICD-10-CM) - Lumbar spondylosis   M51.36 (ICD-10-CM) - DDD (degenerative disc disease), lumbar   M46.1 (ICD-10-CM) - Sacroiliitis         Evaluation Date: 2022  Authorization Period Expiration: 23  Plan of Care Expiration: 22  Reassessment Due: 10/22/22  Visit # / Visits authorized:      Time In: 2:30 pm  Time Out: 3:15 pm  Total Billable Time: 45 minutes     Precautions:  Stroke 2022 effecting balance  Nodule left ovary per patient-waiting for [facility] Physician to call     Pattern of pain determined: 2      Subjective   Joanne reports after the evaluation she was feeling much better, but since then her pain is back to where it was before. Today after walking in her pain was an 8/10. At worse, her pain is 10/10.  She has not been doing her exercises at all.    Patient reports tolerating previous visit good.  Patient reports their pain to be 7/10 on a 0-10 scale with 0 being no pain and 10 being the worst pain imaginable.  Pain Location: low back     Occupation: LPN, working up to stroke 2022, [employer], she wants to go back to work    Leisure: play bingo      Pt goals: Pts goals:  Cook a meal without sitting, walk in the mall    Objective        Baseline Isometric Testing on Med X equipment: Testing administered by PT  Date of testin22  ROM 0-36 deg   Max Peak Torque 102    Min Peak Torque 22    Flex/Ext Ratio 5/1   % below normative data -35% average below  but 68 % below at 0 degrees      MOVEMENT LOSS     Norms ROM Loss Initial 22 reassess T Fryer   Flexion Fingers touch toes, sacral angle >/= 70 deg,  "uniform spinal curvature, posterior weight shift  moderate loss Min loss    Extension ASIS surpasses toes, spine of scapulae surpasses heels, uniform spinal curve major loss Major loss   Side glide Right   major loss Major loss   Side glide Left   major loss Major loss   Rotation Right PT observes contralateral shoulder major loss Major loss   Rotation Left PT observes contralateral shoulder major loss Major loss      Limitation/Restriction for FOTO 8/18/22 Survey     Therapist reviewed FOTO scores for Joanne Peña on 8/18/2022.   FOTO documents entered into [employer] - see Media section.     Limitation Score: 56% limited  Goal < 40 % limited            Treatment    Pt was instructed in and performed the following:     Joanne received therapeutic exercises to develop/improved posture, cardiovascular endurance, muscular endurance, lumbar/cervical ROM, strength and muscular endurance for 45 minutes including the following exercises:     NuStep 8 min  EIS 10x3"  SOC 10x  PPT 10x3"  SKTC 10x/ea  LTR 10x/ea    [facility] - Short 9/22/2022   Visit Number 2   VAS Pain Rating 7   Time 8   Lumbar Stretches - Slouch 10   Extension in Standing 10   Flexion in Lying 10   Flexion in Sitting 10   Lumbar Extension - Seat Pad -   Femur Restraint -   Top Dead Center -   Counterweight -   Lumbar Flexion -   Lumbar Extension -   Lumbar Peak Torque -   Lumbar Weight 40   Repetitions 15   Rating of Perceived Exertion 3       Peripheral muscle strengthening which included 1 set of 15-20 repetitions at a slow, controlled 10-13 second per rep pace focused on strengthening supporting musculature for improved body mechanics and functional mobility.  Pt and therapist focused on proper form during treatment to ensure optimal strengthening of each targeted muscle group.  Machines were utilized including torso rotation, chest press, rowing, biceps, and triceps. Leg extension, leg curl, hip abd, hip add, and leg press added visit 3   "     Home Exercises Provided and Patient Education Provided   Stretching: SKTC, Seated lumbar flexion  Strengthening: PPT, SOC  Cardio program (V5): not yet  Lifting education (V11):not yet  Posture/ Using Lumbar Roll:no using    Education provided:   - Compliance of HEP    Written Home Exercises Provided: Patient instructed to cont prior HEP.  Exercises were reviewed and Joanne was able to demonstrate them prior to the end of the session.  Joanne demonstrated good  understanding of the education provided.     See EMR under Patient Instructions for exercises provided prior visit.    Assessment   Joanne tolerated her initial follow up visit well without exacerbation of symptoms. Pt reassessed by PT with no notable change in range of motion or symptoms, and patient acknowledges not doing exercises.  Reviewed exercises. Initiated MedX lumbar ext, and pt was able to perform 15 reps at an RPE of 3/10. No issue with UE peripheral machines.     Patient is making good progress towards established goals.  Pt will continue to benefit from skilled outpatient physical therapy to address the deficits stated in the impairment chart, provide pt/family education and to maximize pt's level of independence in the home and community environment.     Anticipated Barriers for therapy: attending with scheduling  Pt's spiritual, cultural and educational needs considered and pt agreeable to plan of care and goals as stated below:       Goals: Short term goals:  6 weeks or 10 visits   1.  Pt will demonstrate increased lumbar ROM by at least 6 degrees from the initial ROM value with improvements noted in functional ROM and ability to perform ADLs.  (approp and ongoing)  2.  Pt will demonstrate increased MedX average isometric strength value  by 10% from initial test resulting in improved ability to perform bending, lifting, and carrying activities safely, confidently.  (approp and ongoing)  3.  Patient report a reduction in worst pain score  by 1-2 points for improved tolerance for 4/10 at worst.  (approp and ongoing)  4.  Pt able to perform HEP correctly with minimal cueing or supervision from therapist to encourage independent management of symptoms. (approp and ongoing)        Long term goals: 10 weeks or 20 visits   1. Pt will demonstrate increased lumbar ROM by at least 12 degrees from initial ROM value, resulting in improved ability to perform functional fwd bending while standing and sitting. (approp and ongoing)  2. Pt will demonstrate increased MedX average isometric strength value  by 40% from initial test resulting in improved ability to perform bending, lifting, and carrying activities safely, confidently at 0-12 degrees for improved ability to walk/ stand.  (approp and ongoing)  3. Pt to demonstrate ability to independently control and reduce their pain through posture positioning and mechanical movements throughout a typical day.  (approp and ongoing)  4.  Pt will demonstrate reduced pain and improved functional outcomes as reported on the FOTO by reaching a limitation score of < or = 40% or less in order to demonstrate subjective improvement in pt's condition.    (approp and ongoing)  5. Pt will demonstrate independence with the HEP at discharge  (approp and ongoing)  6. Sit to stand 30 sec, no use of hands 11 reps,  Standing test: back pain not until 2 -5 min standing, TUG: < 11 sec without hands and no walking aid  (approp and ongoing)  7. Stand to cook meal without sitting, shop in mall with rest breaks  (approp and ongoing)        Plan   Add lumbar ext isometrics NV.      Therapist: Reyna Bean, PTA  9/22/2022

## 2022-09-22 NOTE — PROGRESS NOTES
Reassessment:  MOVEMENT LOSS     Norms ROM Loss Initial 9/22/22 reassess T Joo   Flexion Fingers touch toes, sacral angle >/= 70 deg, uniform spinal curvature, posterior weight shift  moderate loss Min loss    Extension ASIS surpasses toes, spine of scapulae surpasses heels, uniform spinal curve major loss Major loss   Side glide Right   major loss Major loss   Side glide Left   major loss Major loss   Rotation Right PT observes contralateral shoulder major loss Major loss   Rotation Left PT observes contralateral shoulder major loss Major loss   Reviewed HEP which patient reports not doing.    She continues to present  with reported low back pain   that is reproduced with standing/walking for very short times and is significantly restricting activity at home  and reduced with sitting/bending, leaning  indicating directional preference of flexion and pattern 2.  She will continue to benefit from the [facility]. Pt would benefit from LE and trunk mobility training, stability training

## 2022-09-29 ENCOUNTER — CLINICAL SUPPORT (OUTPATIENT)
Dept: REHABILITATION | Facility: HOSPITAL | Age: 72
End: 2022-09-29
Payer: MEDICARE

## 2022-09-29 DIAGNOSIS — R29.898 DECREASED STRENGTH OF TRUNK AND BACK: ICD-10-CM

## 2022-09-29 DIAGNOSIS — R29.3 BAD POSTURE: Primary | ICD-10-CM

## 2022-09-29 PROCEDURE — 97110 THERAPEUTIC EXERCISES: CPT | Mod: PN

## 2022-09-29 NOTE — PROGRESS NOTES
[facility]      Name: Joanne Peña  Clinic Number: [id number]    Therapy Diagnosis:   No diagnosis found.    Physician: Son Lara,*    Visit Date: 2022    Physician Orders: PT Eval and Treat    Medical Diagnosis from Referral:   M47.816 (ICD-10-CM) - Lumbar spondylosis   M51.36 (ICD-10-CM) - DDD (degenerative disc disease), lumbar   M46.1 (ICD-10-CM) - Sacroiliitis         Evaluation Date: 2022  Authorization Period Expiration: 23  Plan of Care Expiration: 22  Reassessment Due: 10/22/22  Visit # / Visits authorized: 3/12     Time In: 1610  Time Out: 1700  Total Billable Time: 25 minutes     Precautions:  Stroke 2022 effecting balance  Nodule left ovary per patient-waiting for [facility] Physician to call     Pattern of pain determined: 2      Subjective   Joanne reports that she feels better following last treatment session. She has been trying to do some stretches at home.     Patient reports tolerating previous visit good.  Patient reports their pain to be 7/10 on a 0-10 scale with 0 being no pain and 10 being the worst pain imaginable.  Pain Location: low back     Occupation: LPN, working up to stroke 2022, [employer], she wants to go back to work    Leisure: play bingo      Pt goals: Pts goals:  Cook a meal without sitting, walk in the mall    Objective        Baseline Isometric Testing on Med X equipment: Testing administered by PT  Date of testin22  ROM 0-36 deg   Max Peak Torque 102    Min Peak Torque 22    Flex/Ext Ratio 5/1   % below normative data -35% average below  but 68 % below at 0 degrees      MOVEMENT LOSS     Norms ROM Loss Initial 22 reassess T Fryer   Flexion Fingers touch toes, sacral angle >/= 70 deg, uniform spinal curvature, posterior weight shift  moderate loss Min loss    Extension ASIS surpasses toes, spine of scapulae surpasses heels, uniform spinal curve major loss Major loss   Side glide Right   " major loss Major loss   Side glide Left   major loss Major loss   Rotation Right PT observes contralateral shoulder major loss Major loss   Rotation Left PT observes contralateral shoulder major loss Major loss      Limitation/Restriction for FOTO 8/18/22 Survey     Therapist reviewed FOTO scores for Joanne Peña on 8/18/2022.   FOTO documents entered into [facility] - see Media section.     Limitation Score: 56% limited  Goal < 40 % limited            Treatment    Pt was instructed in and performed the following:     Joanne received therapeutic exercises to develop/improved posture, cardiovascular endurance, muscular endurance, lumbar/cervical ROM, strength and muscular endurance for 25 minutes including the following exercises:     NuStep 8 min  EIS 10x3"  SOC 10x  PPT 10x3"  SKTC 10x/ea  LTR 10x/ea    [facility] 9/29/2022   Visit Number 3   VAS Pain Rating 6   Time 5   Lumbar Stretches - Slouch Overcorrection 10   Extension in Standing 10   Flexion in Lying 10   Flexion in Sitting -   Lumbar Extension Seat Pad -   Femur Restraint -   Top Dead Center -   Counterweight -   Lumbar Flexion -   Lumbar Extension -   Lumbar Peak Torque -   Min Torque -   Test Percent Below Normative Data -   Lumbar Weight 40   Repetitions 18   Rating of Perceived Exertion 3   Ice - Z Lie (in min.) -        Peripheral muscle strengthening which included 1 set of 15-20 repetitions at a slow, controlled 10-13 second per rep pace focused on strengthening supporting musculature for improved body mechanics and functional mobility.  Pt and therapist focused on proper form during treatment to ensure optimal strengthening of each targeted muscle group.  Machines were utilized including torso rotation, chest press, rowing, biceps, and triceps. Leg extension, leg curl, hip abd, hip add, and leg press added visit 3       Home Exercises Provided and Patient Education Provided   Stretching: SKTC, Seated lumbar flexion  Strengthening: PPT, " SOC  Cardio program (V5): not yet  Lifting education (V11):not yet  Posture/ Using Lumbar Roll:no using    Education provided:   - Compliance of HEP    Written Home Exercises Provided: Patient instructed to cont prior HEP.  Exercises were reviewed and Joanne was able to demonstrate them prior to the end of the session.  Joanne demonstrated good  understanding of the education provided.     See EMR under Patient Instructions for exercises provided prior visit.    Assessment     Joanne tolerated treatment well today. Continued reviewing exercises for her to perform at home. Fair recall noted. No exacerbation of symptoms during exercises. Continued MedX machine with good tolerance. Able to increase reps today with same resistance and no increase in exertion. Introduced lower extremity peripherals with no issues. Will continue to monitor and challenge.     Patient is making good progress towards established goals.  Pt will continue to benefit from skilled outpatient physical therapy to address the deficits stated in the impairment chart, provide pt/family education and to maximize pt's level of independence in the home and community environment.     Anticipated Barriers for therapy: attending with scheduling  Pt's spiritual, cultural and educational needs considered and pt agreeable to plan of care and goals as stated below:       Goals: Short term goals:  6 weeks or 10 visits   1.  Pt will demonstrate increased lumbar ROM by at least 6 degrees from the initial ROM value with improvements noted in functional ROM and ability to perform ADLs.  (approp and ongoing)  2.  Pt will demonstrate increased MedX average isometric strength value  by 10% from initial test resulting in improved ability to perform bending, lifting, and carrying activities safely, confidently.  (approp and ongoing)  3.  Patient report a reduction in worst pain score by 1-2 points for improved tolerance for 4/10 at worst.  (approp and ongoing)  4.  Pt  able to perform HEP correctly with minimal cueing or supervision from therapist to encourage independent management of symptoms. (approp and ongoing)        Long term goals: 10 weeks or 20 visits   1. Pt will demonstrate increased lumbar ROM by at least 12 degrees from initial ROM value, resulting in improved ability to perform functional fwd bending while standing and sitting. (approp and ongoing)  2. Pt will demonstrate increased MedX average isometric strength value  by 40% from initial test resulting in improved ability to perform bending, lifting, and carrying activities safely, confidently at 0-12 degrees for improved ability to walk/ stand.  (approp and ongoing)  3. Pt to demonstrate ability to independently control and reduce their pain through posture positioning and mechanical movements throughout a typical day.  (approp and ongoing)  4.  Pt will demonstrate reduced pain and improved functional outcomes as reported on the FOTO by reaching a limitation score of < or = 40% or less in order to demonstrate subjective improvement in pt's condition.    (approp and ongoing)  5. Pt will demonstrate independence with the HEP at discharge  (approp and ongoing)  6. Sit to stand 30 sec, no use of hands 11 reps,  Standing test: back pain not until 2 -5 min standing, TUG: < 11 sec without hands and no walking aid  (approp and ongoing)  7. Stand to cook meal without sitting, shop in mall with rest breaks  (approp and ongoing)        Plan   Add lumbar ext isometrics NV.      Therapist: Flo Ortega, PT  9/29/2022

## 2022-10-05 DIAGNOSIS — D50.9 IRON DEFICIENCY ANEMIA, UNSPECIFIED IRON DEFICIENCY ANEMIA TYPE: Primary | ICD-10-CM

## 2022-10-05 DIAGNOSIS — D75.839 THROMBOCYTOSIS: ICD-10-CM

## 2022-10-06 ENCOUNTER — TELEPHONE (OUTPATIENT)
Dept: HEMATOLOGY/ONCOLOGY | Facility: CLINIC | Age: 72
End: 2022-10-06
Payer: MEDICARE

## 2022-10-06 ENCOUNTER — DOCUMENTATION ONLY (OUTPATIENT)
Dept: REHABILITATION | Facility: HOSPITAL | Age: 72
End: 2022-10-06
Payer: MEDICARE
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
## 2022-10-10 ENCOUNTER — PATIENT MESSAGE (OUTPATIENT)
Dept: ADMINISTRATIVE | Facility: HOSPITAL | Age: 72
End: 2022-10-10
Payer: MEDICARE

## 2022-10-10 ENCOUNTER — LAB VISIT (OUTPATIENT)
Dept: LAB | Facility: HOSPITAL | Age: 72
End: 2022-10-10
Attending: INTERNAL MEDICINE
Payer: MEDICARE

## 2022-10-10 DIAGNOSIS — D75.839 THROMBOCYTOSIS: ICD-10-CM

## 2022-10-10 DIAGNOSIS — D50.9 IRON DEFICIENCY ANEMIA, UNSPECIFIED IRON DEFICIENCY ANEMIA TYPE: ICD-10-CM

## 2022-10-10 LAB
ALBUMIN SERPL BCP-MCNC: 3.6 G/DL (ref 3.5–5.2)
ALP SERPL-CCNC: 95 U/L (ref 55–135)
ALT SERPL W/O P-5'-P-CCNC: 15 U/L (ref 10–44)
ANION GAP SERPL CALC-SCNC: 8 MMOL/L (ref 8–16)
AST SERPL-CCNC: 12 U/L (ref 10–40)
BASOPHILS # BLD AUTO: 0.03 K/UL (ref 0–0.2)
BASOPHILS NFR BLD: 0.4 % (ref 0–1.9)
BILIRUB SERPL-MCNC: 0.3 MG/DL (ref 0.1–1)
BUN SERPL-MCNC: 30 MG/DL (ref 8–23)
CALCIUM SERPL-MCNC: 9.2 MG/DL (ref 8.7–10.5)
CHLORIDE SERPL-SCNC: 112 MMOL/L (ref 95–110)
CO2 SERPL-SCNC: 23 MMOL/L (ref 23–29)
CREAT SERPL-MCNC: 1.5 MG/DL (ref 0.5–1.4)
DIFFERENTIAL METHOD: ABNORMAL
EOSINOPHIL # BLD AUTO: 0.2 K/UL (ref 0–0.5)
EOSINOPHIL NFR BLD: 2.4 % (ref 0–8)
ERYTHROCYTE [DISTWIDTH] IN BLOOD BY AUTOMATED COUNT: 17.9 % (ref 11.5–14.5)
EST. GFR  (NO RACE VARIABLE): 37 ML/MIN/1.73 M^2
FERRITIN SERPL-MCNC: 18 NG/ML (ref 20–300)
GLUCOSE SERPL-MCNC: 197 MG/DL (ref 70–110)
HCT VFR BLD AUTO: 32.4 % (ref 37–48.5)
HGB BLD-MCNC: 9.6 G/DL (ref 12–16)
IMM GRANULOCYTES # BLD AUTO: 0.04 K/UL (ref 0–0.04)
IMM GRANULOCYTES NFR BLD AUTO: 0.6 % (ref 0–0.5)
IRON SERPL-MCNC: 57 UG/DL (ref 30–160)
IRON SERPL-MCNC: 57 UG/DL (ref 30–160)
LYMPHOCYTES # BLD AUTO: 1.7 K/UL (ref 1–4.8)
LYMPHOCYTES NFR BLD: 23.7 % (ref 18–48)
MCH RBC QN AUTO: 24.1 PG (ref 27–31)
MCHC RBC AUTO-ENTMCNC: 29.6 G/DL (ref 32–36)
MCV RBC AUTO: 81 FL (ref 82–98)
MONOCYTES # BLD AUTO: 0.6 K/UL (ref 0.3–1)
MONOCYTES NFR BLD: 8.8 % (ref 4–15)
NEUTROPHILS # BLD AUTO: 4.5 K/UL (ref 1.8–7.7)
NEUTROPHILS NFR BLD: 64.1 % (ref 38–73)
NRBC BLD-RTO: 0 /100 WBC
PLATELET # BLD AUTO: 345 K/UL (ref 150–450)
PMV BLD AUTO: 10.4 FL (ref 9.2–12.9)
POTASSIUM SERPL-SCNC: 4.4 MMOL/L (ref 3.5–5.1)
PROT SERPL-MCNC: 7.6 G/DL (ref 6–8.4)
RBC # BLD AUTO: 3.99 M/UL (ref 4–5.4)
SATURATED IRON: 17 % (ref 20–50)
SODIUM SERPL-SCNC: 143 MMOL/L (ref 136–145)
TOTAL IRON BINDING CAPACITY: 343 UG/DL (ref 250–450)
TRANSFERRIN SERPL-MCNC: 232 MG/DL (ref 200–375)
WBC # BLD AUTO: 7.01 K/UL (ref 3.9–12.7)

## 2022-10-10 PROCEDURE — 84165 PATHOLOGIST INTERPRETATION SPE: ICD-10-PCS | Mod: 26,,, | Performed by: PATHOLOGY

## 2022-10-10 PROCEDURE — 82728 ASSAY OF FERRITIN: CPT | Performed by: INTERNAL MEDICINE

## 2022-10-10 PROCEDURE — 86334 IMMUNOFIX E-PHORESIS SERUM: CPT | Mod: 26,,, | Performed by: PATHOLOGY

## 2022-10-10 PROCEDURE — 84165 PROTEIN E-PHORESIS SERUM: CPT | Mod: 26,,, | Performed by: PATHOLOGY

## 2022-10-10 PROCEDURE — 80053 COMPREHEN METABOLIC PANEL: CPT | Performed by: INTERNAL MEDICINE

## 2022-10-10 PROCEDURE — 36415 COLL VENOUS BLD VENIPUNCTURE: CPT | Mod: PO | Performed by: INTERNAL MEDICINE

## 2022-10-10 PROCEDURE — 86334 PATHOLOGIST INTERPRETATION IFE: ICD-10-PCS | Mod: 26,,, | Performed by: PATHOLOGY

## 2022-10-10 PROCEDURE — 84466 ASSAY OF TRANSFERRIN: CPT | Performed by: INTERNAL MEDICINE

## 2022-10-10 PROCEDURE — 84165 PROTEIN E-PHORESIS SERUM: CPT | Performed by: INTERNAL MEDICINE

## 2022-10-10 PROCEDURE — 85025 COMPLETE CBC W/AUTO DIFF WBC: CPT | Performed by: INTERNAL MEDICINE

## 2022-10-10 PROCEDURE — 86334 IMMUNOFIX E-PHORESIS SERUM: CPT | Performed by: INTERNAL MEDICINE

## 2022-10-11 ENCOUNTER — PATIENT MESSAGE (OUTPATIENT)
Dept: GASTROENTEROLOGY | Facility: CLINIC | Age: 72
End: 2022-10-11

## 2022-10-11 ENCOUNTER — CLINICAL SUPPORT (OUTPATIENT)
Dept: REHABILITATION | Facility: HOSPITAL | Age: 72
End: 2022-10-11
Payer: MEDICARE

## 2022-10-11 ENCOUNTER — TELEPHONE (OUTPATIENT)
Dept: GASTROENTEROLOGY | Facility: CLINIC | Age: 72
End: 2022-10-11
Payer: MEDICARE

## 2022-10-11 DIAGNOSIS — R29.898 DECREASED STRENGTH OF TRUNK AND BACK: ICD-10-CM

## 2022-10-11 DIAGNOSIS — R29.3 BAD POSTURE: Primary | ICD-10-CM

## 2022-10-11 LAB
ALBUMIN SERPL ELPH-MCNC: 3.78 G/DL (ref 3.35–5.55)
ALPHA1 GLOB SERPL ELPH-MCNC: 0.34 G/DL (ref 0.17–0.41)
ALPHA2 GLOB SERPL ELPH-MCNC: 0.97 G/DL (ref 0.43–0.99)
B-GLOBULIN SERPL ELPH-MCNC: 0.88 G/DL (ref 0.5–1.1)
GAMMA GLOB SERPL ELPH-MCNC: 1.03 G/DL (ref 0.67–1.58)
INTERPRETATION SERPL IFE-IMP: NORMAL
PROT SERPL-MCNC: 7 G/DL (ref 6–8.4)

## 2022-10-11 PROCEDURE — 97110 THERAPEUTIC EXERCISES: CPT | Mod: PN

## 2022-10-11 NOTE — TELEPHONE ENCOUNTER
"----- Message from Keagan Huffman sent at 10/11/2022 12:55 PM CDT -----  Reschedule Existing Appointment        Appt Date: 10/11    Type of appt: NP - GI bleed    Physician: Iman     Reason for rescheduling? Pt stating her therapy appt ran later than expected; Requesting to r/s for soonest available    Caller: Self    Contact Preference: [phone]             Additional Information:  "Thank you for all that you do for our patients"     "

## 2022-10-11 NOTE — PROGRESS NOTES
[facility]      Name: Joanne Peña  Clinic Number: [id number]    Therapy Diagnosis:   Encounter Diagnoses   Name Primary?    Bad posture Yes    Decreased strength of trunk and back        Physician: Son Lara,*    Visit Date: 10/11/2022    Physician Orders: PT Eval and Treat    Medical Diagnosis from Referral:   M47.816 (ICD-10-CM) - Lumbar spondylosis   M51.36 (ICD-10-CM) - DDD (degenerative disc disease), lumbar   M46.1 (ICD-10-CM) - Sacroiliitis   Evaluation Date: 2022  Authorization Period Expiration: 23  Plan of Care Expiration: 22  Reassessment Due: 10/22/22  Visit # / Visits authorized:      Time In: 1125  Time Out: 1225  Total Billable Time: 55 minutes     Precautions:  Stroke 2022 effecting balance  Nodule left ovary per patient-waiting for [facility] Physician to call     Pattern of pain determined: 2      Subjective   Joanne reports no new issues today. Pain continues to decrease and she is doing well with her exercises.     Patient reports tolerating previous visit good.  Patient reports their pain to be 4/10 on a 0-10 scale with 0 being no pain and 10 being the worst pain imaginable.  Pain Location: low back     Occupation: LPN, working up to stroke 2022, [employer], she wants to go back to work    Leisure: play bingo      Pt goals: Pts goals:  Cook a meal without sitting, walk in the mall    Objective        Baseline Isometric Testing on Med X equipment: Testing administered by PT  Date of testin22  ROM 0-36 deg   Max Peak Torque 102    Min Peak Torque 22    Flex/Ext Ratio 5/1   % below normative data -35% average below  but 68 % below at 0 degrees      MOVEMENT LOSS     Norms ROM Loss Initial 22 reassess T Fryer   Flexion Fingers touch toes, sacral angle >/= 70 deg, uniform spinal curvature, posterior weight shift  moderate loss Min loss    Extension ASIS surpasses toes, spine of scapulae surpasses heels,  "uniform spinal curve major loss Major loss   Side glide Right   major loss Major loss   Side glide Left   major loss Major loss   Rotation Right PT observes contralateral shoulder major loss Major loss   Rotation Left PT observes contralateral shoulder major loss Major loss      Limitation/Restriction for FOTO 8/18/22 Survey     Therapist reviewed FOTO scores for Joanne Peña on 8/18/2022.   FOTO documents entered into [facility] - see Media section.     Limitation Score: 56% limited  Goal < 40 % limited            Treatment    Pt was instructed in and performed the following:     Joanne received therapeutic exercises to develop/improved posture, cardiovascular endurance, muscular endurance, lumbar/cervical ROM, strength and muscular endurance for 55 minutes including the following exercises:     NuStep 8 min  SOC 10x  PPT 10x3"  SKTC 10x/ea  LTR 10x/ea  +Seated lumbar flexion 10x5"  +Seated ball rollouts 3 ways x10 ea    [facility] 10/11/2022   Visit Number 4   VAS Pain Rating 4   Time -   Lumbar Stretches - Slouch Overcorrection 10   Extension in Standing 10   Flexion in Lying 10   Flexion in Sitting -   Lumbar Extension Seat Pad -   Femur Restraint -   Top Dead Center -   Counterweight -   Lumbar Flexion -   Lumbar Extension -   Lumbar Peak Torque -   Min Torque -   Test Percent Below Normative Data -   Lumbar Weight 40   Repetitions 20   Rating of Perceived Exertion 3   Ice - Z Lie (in min.) -        Peripheral muscle strengthening which included 1 set of 15-20 repetitions at a slow, controlled 10-13 second per rep pace focused on strengthening supporting musculature for improved body mechanics and functional mobility.  Pt and therapist focused on proper form during treatment to ensure optimal strengthening of each targeted muscle group.  Machines were utilized including torso rotation, chest press, rowing, biceps, and triceps. Leg extension, leg curl, hip abd, hip add, and leg press added visit 3   "     Home Exercises Provided and Patient Education Provided   Stretching: SKTC, Seated lumbar flexion  Strengthening: PPT, SOC  Cardio program (V5): not yet  Lifting education (V11):not yet  Posture/ Using Lumbar Roll:no using    Education provided:   - Compliance of HEP    Written Home Exercises Provided: Patient instructed to cont prior HEP.  Exercises were reviewed and Joanne was able to demonstrate them prior to the end of the session.  Joanne demonstrated good  understanding of the education provided.     See EMR under Patient Instructions for exercises provided prior visit.    Assessment     Joanne tolerated treatment well. She reports that her pain is decreasing and she able to move more easily at home. Demonstrates good familiarity with exercises and tolerated addition of new movements well today. Able to complete max reps on MedX machine without significant exertion. Will increase resistance next visit. No issue with peripherals. Will continue to progress as able.     Patient is making good progress towards established goals.  Pt will continue to benefit from skilled outpatient physical therapy to address the deficits stated in the impairment chart, provide pt/family education and to maximize pt's level of independence in the home and community environment.     Anticipated Barriers for therapy: attending with scheduling  Pt's spiritual, cultural and educational needs considered and pt agreeable to plan of care and goals as stated below:       Goals: Short term goals:  6 weeks or 10 visits   1.  Pt will demonstrate increased lumbar ROM by at least 6 degrees from the initial ROM value with improvements noted in functional ROM and ability to perform ADLs.  (approp and ongoing)  2.  Pt will demonstrate increased MedX average isometric strength value  by 10% from initial test resulting in improved ability to perform bending, lifting, and carrying activities safely, confidently.  (approp and ongoing)  3.  Patient  report a reduction in worst pain score by 1-2 points for improved tolerance for 4/10 at worst.  (approp and ongoing)  4.  Pt able to perform HEP correctly with minimal cueing or supervision from therapist to encourage independent management of symptoms. (approp and ongoing)        Long term goals: 10 weeks or 20 visits   1. Pt will demonstrate increased lumbar ROM by at least 12 degrees from initial ROM value, resulting in improved ability to perform functional fwd bending while standing and sitting. (approp and ongoing)  2. Pt will demonstrate increased MedX average isometric strength value  by 40% from initial test resulting in improved ability to perform bending, lifting, and carrying activities safely, confidently at 0-12 degrees for improved ability to walk/ stand.  (approp and ongoing)  3. Pt to demonstrate ability to independently control and reduce their pain through posture positioning and mechanical movements throughout a typical day.  (approp and ongoing)  4.  Pt will demonstrate reduced pain and improved functional outcomes as reported on the FOTO by reaching a limitation score of < or = 40% or less in order to demonstrate subjective improvement in pt's condition.    (approp and ongoing)  5. Pt will demonstrate independence with the HEP at discharge  (approp and ongoing)  6. Sit to stand 30 sec, no use of hands 11 reps,  Standing test: back pain not until 2 -5 min standing, TUG: < 11 sec without hands and no walking aid  (approp and ongoing)  7. Stand to cook meal without sitting, shop in mall with rest breaks  (approp and ongoing)        Plan   Add lumbar ext isometrics NV.      Therapist: Flo Ortega, PT  10/11/2022

## 2022-10-12 ENCOUNTER — TELEPHONE (OUTPATIENT)
Dept: HEMATOLOGY/ONCOLOGY | Facility: CLINIC | Age: 72
End: 2022-10-12
Payer: MEDICARE

## 2022-10-13 ENCOUNTER — TELEPHONE (OUTPATIENT)
Dept: HEMATOLOGY/ONCOLOGY | Facility: CLINIC | Age: 72
End: 2022-10-13
Payer: MEDICARE

## 2022-10-13 LAB
PATHOLOGIST INTERPRETATION IFE: NORMAL
PATHOLOGIST INTERPRETATION SPE: NORMAL

## 2022-10-13 NOTE — TELEPHONE ENCOUNTER
Left message for patient to call back and book an appointment. Patient missed appointment on 10/13

## 2022-10-17 ENCOUNTER — DOCUMENTATION ONLY (OUTPATIENT)
Dept: REHABILITATION | Facility: HOSPITAL | Age: 72
End: 2022-10-17
Payer: MEDICARE

## 2022-10-17 DIAGNOSIS — R29.898 DECREASED STRENGTH OF TRUNK AND BACK: ICD-10-CM

## 2022-10-17 DIAGNOSIS — R29.3 BAD POSTURE: Primary | ICD-10-CM

## 2022-10-17 NOTE — PROGRESS NOTES
Spoke with pt about missed visit 10/17, she states she is still having some cold and congestion symptoms but if she is feeling better plans on coming to visit 10/20 at 10:30 a with Reyna.       Liliya Shaw, PT  10/17/2022

## 2022-10-20 ENCOUNTER — CLINICAL SUPPORT (OUTPATIENT)
Dept: REHABILITATION | Facility: HOSPITAL | Age: 72
End: 2022-10-20
Payer: MEDICARE

## 2022-10-20 DIAGNOSIS — R29.898 DECREASED STRENGTH OF TRUNK AND BACK: ICD-10-CM

## 2022-10-20 DIAGNOSIS — R29.3 BAD POSTURE: Primary | ICD-10-CM

## 2022-10-20 PROCEDURE — 97110 THERAPEUTIC EXERCISES: CPT | Mod: PN,CQ

## 2022-10-20 NOTE — PROGRESS NOTES
[facility]      Name: Joanne Peña  Clinic Number: [id number]    Therapy Diagnosis:   Encounter Diagnoses   Name Primary?    Bad posture Yes    Decreased strength of trunk and back        Physician: Son Lara,*    Visit Date: 10/20/2022    Physician Orders: PT Eval and Treat    Medical Diagnosis from Referral:   M47.816 (ICD-10-CM) - Lumbar spondylosis   M51.36 (ICD-10-CM) - DDD (degenerative disc disease), lumbar   M46.1 (ICD-10-CM) - Sacroiliitis   Evaluation Date: 2022  Authorization Period Expiration: 23  Plan of Care Expiration: 22  Reassessment Due: 10/22/22  Visit # / Visits authorized:      Time In: 1030AM  Time Out: 1115AM  Total Billable Time: 45 minutes     Precautions:  Stroke 2022 effecting balance  Nodule left ovary per patient-waiting for [facility] Physician to call     Pattern of pain determined: 2      Subjective   Joanne reports no new issues today.     Patient reports tolerating previous visit good.  Patient reports their pain to be 0/10 on a 0-10 scale with 0 being no pain and 10 being the worst pain imaginable.  Pain Location: low back     Occupation: LPN, working up to stroke 2022, [employer], she wants to go back to work    Leisure: play bingo      Pt goals: Pts goals:  Cook a meal without sitting, walk in the mall    Objective        Baseline Isometric Testing on Med X equipment: Testing administered by PT  Date of testin22  ROM 0-36 deg   Max Peak Torque 102    Min Peak Torque 22    Flex/Ext Ratio 5/1   % below normative data -35% average below  but 68 % below at 0 degrees      MOVEMENT LOSS     Norms ROM Loss Initial 22 reassess T Fryer   Flexion Fingers touch toes, sacral angle >/= 70 deg, uniform spinal curvature, posterior weight shift  moderate loss Min loss    Extension ASIS surpasses toes, spine of scapulae surpasses heels, uniform spinal curve major loss Major loss   Side glide Right   major  loss Major loss   Side glide Left   major loss Major loss   Rotation Right PT observes contralateral shoulder major loss Major loss   Rotation Left PT observes contralateral shoulder major loss Major loss      Limitation/Restriction for FOTO 8/18/22 Survey     Therapist reviewed FOTO scores for Joanne Peña on 8/18/2022.   FOTO documents entered into [employer] - see Media section.     Limitation Score: 56% limited  Goal < 40 % limited            Treatment    Pt was instructed in and performed the following:     Joanne received therapeutic exercises to develop/improved posture, cardiovascular endurance, muscular endurance, lumbar/cervical ROM, strength and muscular endurance for 45 minutes including the following exercises:     NuStep 10 min    [facility] 10/11/2022   Visit Number 4   VAS Pain Rating 4   Time -   Lumbar Stretches - Slouch Overcorrection 10   Extension in Standing 10   Flexion in Lying 10   Flexion in Sitting -   Lumbar Extension Seat Pad -   Femur Restraint -   Top Dead Center -   Counterweight -   Lumbar Flexion -   Lumbar Extension -   Lumbar Peak Torque -   Min Torque -   Test Percent Below Normative Data -   Lumbar Weight 40   Repetitions 20   Rating of Perceived Exertion 3   Ice - Z Lie (in min.) -         Peripheral muscle strengthening which included 1 set of 15-20 repetitions at a slow, controlled 10-13 second per rep pace focused on strengthening supporting musculature for improved body mechanics and functional mobility.  Pt and therapist focused on proper form during treatment to ensure optimal strengthening of each targeted muscle group.  Machines were utilized including torso rotation, chest press, rowing, biceps, and triceps. Leg extension, leg curl, hip abd, hip add, and leg press added visit 3       Home Exercises Provided and Patient Education Provided   Stretching: SKTC, Seated lumbar flexion  Strengthening: PPT, SOC  Cardio program (V5): not yet  Lifting education  (V11):not yet  Posture/ Using Lumbar Roll:no using    Education provided:   - Compliance of HEP    Written Home Exercises Provided: Patient instructed to cont prior HEP.  Exercises were reviewed and Joanne was able to demonstrate them prior to the end of the session.  Joanne demonstrated good  understanding of the education provided.     See EMR under Patient Instructions for exercises provided prior visit.    Assessment     Joanne tolerated treatment fair to well in spite of being under the weather. She continues to report 0/10 pain, and no trouble performing activities of daily living. Lower extremity machines deferred today as pt requested to end session early. However, she was able to complete 15 reps on MedX machine at 45 ft lb with appropriate exertion reported.  Will continue to progress as able.     Patient is making good progress towards established goals.  Pt will continue to benefit from skilled outpatient physical therapy to address the deficits stated in the impairment chart, provide pt/family education and to maximize pt's level of independence in the home and community environment.     Anticipated Barriers for therapy: attending with scheduling  Pt's spiritual, cultural and educational needs considered and pt agreeable to plan of care and goals as stated below:       Goals: Short term goals:  6 weeks or 10 visits   1.  Pt will demonstrate increased lumbar ROM by at least 6 degrees from the initial ROM value with improvements noted in functional ROM and ability to perform ADLs.  (approp and ongoing)  2.  Pt will demonstrate increased MedX average isometric strength value  by 10% from initial test resulting in improved ability to perform bending, lifting, and carrying activities safely, confidently.  (approp and ongoing)  3.  Patient report a reduction in worst pain score by 1-2 points for improved tolerance for 4/10 at worst.  (approp and ongoing)  4.  Pt able to perform HEP correctly with minimal  cueing or supervision from therapist to encourage independent management of symptoms. (approp and ongoing)        Long term goals: 10 weeks or 20 visits   1. Pt will demonstrate increased lumbar ROM by at least 12 degrees from initial ROM value, resulting in improved ability to perform functional fwd bending while standing and sitting. (approp and ongoing)  2. Pt will demonstrate increased MedX average isometric strength value  by 40% from initial test resulting in improved ability to perform bending, lifting, and carrying activities safely, confidently at 0-12 degrees for improved ability to walk/ stand.  (approp and ongoing)  3. Pt to demonstrate ability to independently control and reduce their pain through posture positioning and mechanical movements throughout a typical day.  (approp and ongoing)  4.  Pt will demonstrate reduced pain and improved functional outcomes as reported on the FOTO by reaching a limitation score of < or = 40% or less in order to demonstrate subjective improvement in pt's condition.    (approp and ongoing)  5. Pt will demonstrate independence with the HEP at discharge  (approp and ongoing)  6. Sit to stand 30 sec, no use of hands 11 reps,  Standing test: back pain not until 2 -5 min standing, TUG: < 11 sec without hands and no walking aid  (approp and ongoing)  7. Stand to cook meal without sitting, shop in mall with rest breaks  (approp and ongoing)        Plan   Add lumbar ext isometrics, continue progressing peripheral machines as tolerated, and give cardio handouts.      Therapist: Reyna Bean, CESILIA  10/20/2022

## 2022-10-24 ENCOUNTER — CLINICAL SUPPORT (OUTPATIENT)
Dept: REHABILITATION | Facility: HOSPITAL | Age: 72
End: 2022-10-24
Payer: MEDICARE

## 2022-10-24 DIAGNOSIS — R29.898 DECREASED STRENGTH OF TRUNK AND BACK: ICD-10-CM

## 2022-10-24 DIAGNOSIS — R29.3 BAD POSTURE: Primary | ICD-10-CM

## 2022-10-24 PROCEDURE — 97110 THERAPEUTIC EXERCISES: CPT | Mod: PN

## 2022-10-24 NOTE — PROGRESS NOTES
[facility]      Name: Joanne Peña  Clinic Number: [id number]    Therapy Diagnosis:   Encounter Diagnoses   Name Primary?    Bad posture Yes    Decreased strength of trunk and back        Physician: Son Lara,*    Visit Date: 10/24/2022    Physician Orders: PT Eval and Treat    Medical Diagnosis from Referral:   M47.816 (ICD-10-CM) - Lumbar spondylosis   M51.36 (ICD-10-CM) - DDD (degenerative disc disease), lumbar   M46.1 (ICD-10-CM) - Sacroiliitis   Evaluation Date: 2022  Authorization Period Expiration: 23  Plan of Care Expiration: 22  Reassessment Due: 10/22/22  Visit # / Visits authorized:  *educate on cardio next visit*     Time In: 1330  Time Out: 1400  Total Billable Time: 30 minutes     Precautions:  Stroke 2022 effecting balance  Nodule left ovary per patient-waiting for [facility] Physician to call     Pattern of pain determined: 2      Subjective   Joanne reports she is continuing to feel well and states that she is doing the stretches all the time. She would like to be able to walk and stand for longer while shopping. Needs to leave early because of an appointment.     Patient reports tolerating previous visit good.  Patient reports their pain to be 0/10 on a 0-10 scale with 0 being no pain and 10 being the worst pain imaginable.  Pain Location: low back     Occupation: LPN, working up to stroke 2022, [employer], she wants to go back to work    Leisure: play bingo      Pt goals: Pts goals:  Cook a meal without sitting, walk in the mall    Objective        Baseline Isometric Testing on Med X equipment: Testing administered by PT  Date of testin22  ROM 0-36 deg   Max Peak Torque 102    Min Peak Torque 22    Flex/Ext Ratio 5/1   % below normative data -35% average below  but 68 % below at 0 degrees      MOVEMENT LOSS     Norms ROM Loss Initial 22 reassess T Fryer   Flexion Fingers touch toes, sacral angle >/= 70  "deg, uniform spinal curvature, posterior weight shift  moderate loss Min loss    Extension ASIS surpasses toes, spine of scapulae surpasses heels, uniform spinal curve major loss Major loss   Side glide Right   major loss Major loss   Side glide Left   major loss Major loss   Rotation Right PT observes contralateral shoulder major loss Major loss   Rotation Left PT observes contralateral shoulder major loss Major loss      Limitation/Restriction for FOTO 8/18/22 Survey     Therapist reviewed FOTO scores for Joanne Peña on 8/18/2022.   FOTO documents entered into [facility] - see Media section.     Limitation Score: 56% limited  Goal < 40 % limited                Treatment    Pt was instructed in and performed the following:     Joanne received therapeutic exercises to develop/improved posture, cardiovascular endurance, muscular endurance, lumbar/cervical ROM, strength and muscular endurance for 45 minutes including the following exercises:     NuStep 10 min    SOC 10x  Extension in standing 10x  Seated lumbar flexion 10x5"  Seated ball rollouts 3 ways x10 ea    [facility] 10/24/2022   Visit Number 6   VAS Pain Rating 0   Time -   Lumbar Stretches - Slouch Overcorrection 10   Extension in Standing 10   Flexion in Lying -   Flexion in Sitting -   Min Torque -   Test Percent Below Normative Data -   Lumbar Weight 45   Repetitions 20   Rating of Perceived Exertion 2   Ice - Z Lie (in min.) -           Peripheral muscle strengthening which included 1 set of 15-20 repetitions at a slow, controlled 10-13 second per rep pace focused on strengthening supporting musculature for improved body mechanics and functional mobility.  Pt and therapist focused on proper form during treatment to ensure optimal strengthening of each targeted muscle group.  Machines were utilized including torso rotation, chest press, rowing, biceps, and triceps. Leg extension, leg curl, hip abd, hip add, and leg press added visit 3       Home " Exercises Provided and Patient Education Provided   Stretching: SKTC, Seated lumbar flexion  Strengthening: PPT, SOC  Cardio program (V5): not yet  Lifting education (V11):not yet  Posture/ Using Lumbar Roll:no using    Education provided:   - Compliance of HEP    Written Home Exercises Provided: Patient instructed to cont prior HEP.  Exercises were reviewed and Joanne was able to demonstrate them prior to the end of the session.  Joanne demonstrated good  understanding of the education provided.     See EMR under Patient Instructions for exercises provided prior visit.    Assessment     Joanne was able to complete therapy this visit with limited available time due to appointment. She reports overall she is feeling better with less pain however she is not able to stand and walk for long due to fatigue and pain in the back. Added lumbar extension this visit and plan to progress to more postural and stabilization exercises. She did not complete upper body exercises this visit due to time constraints, plan to resume next visit. She was able to maintain resistance on the lumbar medx and complete 20 repetitions at 2/10 RPE, plan to progress as tolerated.     Patient is making good progress towards established goals.  Pt will continue to benefit from skilled outpatient physical therapy to address the deficits stated in the impairment chart, provide pt/family education and to maximize pt's level of independence in the home and community environment.     Anticipated Barriers for therapy: attending with scheduling  Pt's spiritual, cultural and educational needs considered and pt agreeable to plan of care and goals as stated below:       Goals: Short term goals:  6 weeks or 10 visits   1.  Pt will demonstrate increased lumbar ROM by at least 6 degrees from the initial ROM value with improvements noted in functional ROM and ability to perform ADLs.  (approp and ongoing)  2.  Pt will demonstrate increased MedX average isometric  strength value  by 10% from initial test resulting in improved ability to perform bending, lifting, and carrying activities safely, confidently.  (approp and ongoing)  3.  Patient report a reduction in worst pain score by 1-2 points for improved tolerance for 4/10 at worst.  (approp and ongoing)  4.  Pt able to perform HEP correctly with minimal cueing or supervision from therapist to encourage independent management of symptoms. (approp and ongoing)        Long term goals: 10 weeks or 20 visits   1. Pt will demonstrate increased lumbar ROM by at least 12 degrees from initial ROM value, resulting in improved ability to perform functional fwd bending while standing and sitting. (approp and ongoing)  2. Pt will demonstrate increased MedX average isometric strength value  by 40% from initial test resulting in improved ability to perform bending, lifting, and carrying activities safely, confidently at 0-12 degrees for improved ability to walk/ stand.  (approp and ongoing)  3. Pt to demonstrate ability to independently control and reduce their pain through posture positioning and mechanical movements throughout a typical day.  (approp and ongoing)  4.  Pt will demonstrate reduced pain and improved functional outcomes as reported on the FOTO by reaching a limitation score of < or = 40% or less in order to demonstrate subjective improvement in pt's condition.    (approp and ongoing)  5. Pt will demonstrate independence with the HEP at discharge  (approp and ongoing)  6. Sit to stand 30 sec, no use of hands 11 reps,  Standing test: back pain not until 2 -5 min standing, TUG: < 11 sec without hands and no walking aid  (approp and ongoing)  7. Stand to cook meal without sitting, shop in mall with rest breaks  (approp and ongoing)        Plan   Add lumbar ext isometrics, continue progressing peripheral machines as tolerated, and give cardio handouts.      Therapist: Liliya Shaw, PT  10/24/2022

## 2022-10-27 ENCOUNTER — DOCUMENTATION ONLY (OUTPATIENT)
Dept: REHABILITATION | Facility: HOSPITAL | Age: 72
End: 2022-10-27
Payer: MEDICARE

## 2022-10-27 NOTE — PROGRESS NOTES
Patient did not attend scheduled PT [facility] visit today.  Left voicemail reminding her of her next visit 10/31/22 at 11:30.

## 2022-11-04 ENCOUNTER — TELEPHONE (OUTPATIENT)
Dept: FAMILY MEDICINE | Facility: CLINIC | Age: 72
End: 2022-11-04
Payer: MEDICARE

## 2022-11-15 ENCOUNTER — OFFICE VISIT (OUTPATIENT)
Dept: FAMILY MEDICINE | Facility: CLINIC | Age: 72
End: 2022-11-15
Payer: MEDICARE

## 2022-11-15 ENCOUNTER — DOCUMENTATION ONLY (OUTPATIENT)
Dept: REHABILITATION | Facility: HOSPITAL | Age: 72
End: 2022-11-15
Payer: MEDICARE

## 2022-11-15 VITALS
TEMPERATURE: 98 F | WEIGHT: 188.25 LBS | HEART RATE: 108 BPM | DIASTOLIC BLOOD PRESSURE: 84 MMHG | BODY MASS INDEX: 29.55 KG/M2 | SYSTOLIC BLOOD PRESSURE: 137 MMHG | OXYGEN SATURATION: 99 % | HEIGHT: 67 IN

## 2022-11-15 DIAGNOSIS — D50.9 IRON DEFICIENCY ANEMIA, UNSPECIFIED IRON DEFICIENCY ANEMIA TYPE: ICD-10-CM

## 2022-11-15 DIAGNOSIS — E11.29 MICROALBUMINURIA DUE TO TYPE 2 DIABETES MELLITUS: ICD-10-CM

## 2022-11-15 DIAGNOSIS — N63.20 MASS OF LEFT BREAST, UNSPECIFIED QUADRANT: ICD-10-CM

## 2022-11-15 DIAGNOSIS — E11.40 TYPE 2 DIABETES MELLITUS WITH DIABETIC NEUROPATHY, WITHOUT LONG-TERM CURRENT USE OF INSULIN: Primary | ICD-10-CM

## 2022-11-15 DIAGNOSIS — R80.9 MICROALBUMINURIA DUE TO TYPE 2 DIABETES MELLITUS: ICD-10-CM

## 2022-11-15 DIAGNOSIS — E11.69 DYSLIPIDEMIA ASSOCIATED WITH TYPE 2 DIABETES MELLITUS: ICD-10-CM

## 2022-11-15 DIAGNOSIS — I10 ESSENTIAL HYPERTENSION: ICD-10-CM

## 2022-11-15 DIAGNOSIS — N18.31 STAGE 3A CHRONIC KIDNEY DISEASE: ICD-10-CM

## 2022-11-15 DIAGNOSIS — Z12.11 COLON CANCER SCREENING: ICD-10-CM

## 2022-11-15 DIAGNOSIS — Z13.21 ENCOUNTER FOR VITAMIN DEFICIENCY SCREENING: ICD-10-CM

## 2022-11-15 DIAGNOSIS — E78.5 DYSLIPIDEMIA ASSOCIATED WITH TYPE 2 DIABETES MELLITUS: ICD-10-CM

## 2022-11-15 DIAGNOSIS — Z86.73 HISTORY OF CVA (CEREBROVASCULAR ACCIDENT): ICD-10-CM

## 2022-11-15 PROCEDURE — 3288F FALL RISK ASSESSMENT DOCD: CPT | Mod: CPTII,S$GLB,, | Performed by: FAMILY MEDICINE

## 2022-11-15 PROCEDURE — 99999 PR PBB SHADOW E&M-EST. PATIENT-LVL V: CPT | Mod: PBBFAC,,, | Performed by: FAMILY MEDICINE

## 2022-11-15 PROCEDURE — 4010F PR ACE/ARB THEARPY RXD/TAKEN: ICD-10-PCS | Mod: CPTII,S$GLB,, | Performed by: FAMILY MEDICINE

## 2022-11-15 PROCEDURE — 3008F PR BODY MASS INDEX (BMI) DOCUMENTED: ICD-10-PCS | Mod: CPTII,S$GLB,, | Performed by: FAMILY MEDICINE

## 2022-11-15 PROCEDURE — 1160F PR REVIEW ALL MEDS BY PRESCRIBER/CLIN PHARMACIST DOCUMENTED: ICD-10-PCS | Mod: CPTII,S$GLB,, | Performed by: FAMILY MEDICINE

## 2022-11-15 PROCEDURE — 3075F PR MOST RECENT SYSTOLIC BLOOD PRESS GE 130-139MM HG: ICD-10-PCS | Mod: CPTII,S$GLB,, | Performed by: FAMILY MEDICINE

## 2022-11-15 PROCEDURE — 1160F RVW MEDS BY RX/DR IN RCRD: CPT | Mod: CPTII,S$GLB,, | Performed by: FAMILY MEDICINE

## 2022-11-15 PROCEDURE — 3079F PR MOST RECENT DIASTOLIC BLOOD PRESSURE 80-89 MM HG: ICD-10-PCS | Mod: CPTII,S$GLB,, | Performed by: FAMILY MEDICINE

## 2022-11-15 PROCEDURE — 3062F PR POS MACROALBUMINURIA RESULT DOCUMENTED/REVIEW: ICD-10-PCS | Mod: CPTII,S$GLB,, | Performed by: FAMILY MEDICINE

## 2022-11-15 PROCEDURE — 99214 OFFICE O/P EST MOD 30 MIN: CPT | Mod: S$GLB,,, | Performed by: FAMILY MEDICINE

## 2022-11-15 PROCEDURE — 99999 PR PBB SHADOW E&M-EST. PATIENT-LVL V: ICD-10-PCS | Mod: PBBFAC,,, | Performed by: FAMILY MEDICINE

## 2022-11-15 PROCEDURE — 3288F PR FALLS RISK ASSESSMENT DOCUMENTED: ICD-10-PCS | Mod: CPTII,S$GLB,, | Performed by: FAMILY MEDICINE

## 2022-11-15 PROCEDURE — 3066F PR DOCUMENTATION OF TREATMENT FOR NEPHROPATHY: ICD-10-PCS | Mod: CPTII,S$GLB,, | Performed by: FAMILY MEDICINE

## 2022-11-15 PROCEDURE — 1101F PR PT FALLS ASSESS DOC 0-1 FALLS W/OUT INJ PAST YR: ICD-10-PCS | Mod: CPTII,S$GLB,, | Performed by: FAMILY MEDICINE

## 2022-11-15 PROCEDURE — 99499 UNLISTED E&M SERVICE: CPT | Mod: S$GLB,,, | Performed by: FAMILY MEDICINE

## 2022-11-15 PROCEDURE — 4010F ACE/ARB THERAPY RXD/TAKEN: CPT | Mod: CPTII,S$GLB,, | Performed by: FAMILY MEDICINE

## 2022-11-15 PROCEDURE — 1159F PR MEDICATION LIST DOCUMENTED IN MEDICAL RECORD: ICD-10-PCS | Mod: CPTII,S$GLB,, | Performed by: FAMILY MEDICINE

## 2022-11-15 PROCEDURE — 99499 RISK ADDL DX/OHS AUDIT: ICD-10-PCS | Mod: S$GLB,,, | Performed by: FAMILY MEDICINE

## 2022-11-15 PROCEDURE — 1101F PT FALLS ASSESS-DOCD LE1/YR: CPT | Mod: CPTII,S$GLB,, | Performed by: FAMILY MEDICINE

## 2022-11-15 PROCEDURE — 99214 PR OFFICE/OUTPT VISIT, EST, LEVL IV, 30-39 MIN: ICD-10-PCS | Mod: S$GLB,,, | Performed by: FAMILY MEDICINE

## 2022-11-15 PROCEDURE — 3008F BODY MASS INDEX DOCD: CPT | Mod: CPTII,S$GLB,, | Performed by: FAMILY MEDICINE

## 2022-11-15 PROCEDURE — 3051F HG A1C>EQUAL 7.0%<8.0%: CPT | Mod: CPTII,S$GLB,, | Performed by: FAMILY MEDICINE

## 2022-11-15 PROCEDURE — 3079F DIAST BP 80-89 MM HG: CPT | Mod: CPTII,S$GLB,, | Performed by: FAMILY MEDICINE

## 2022-11-15 PROCEDURE — 1159F MED LIST DOCD IN RCRD: CPT | Mod: CPTII,S$GLB,, | Performed by: FAMILY MEDICINE

## 2022-11-15 PROCEDURE — 1125F AMNT PAIN NOTED PAIN PRSNT: CPT | Mod: CPTII,S$GLB,, | Performed by: FAMILY MEDICINE

## 2022-11-15 PROCEDURE — 3075F SYST BP GE 130 - 139MM HG: CPT | Mod: CPTII,S$GLB,, | Performed by: FAMILY MEDICINE

## 2022-11-15 PROCEDURE — 1125F PR PAIN SEVERITY QUANTIFIED, PAIN PRESENT: ICD-10-PCS | Mod: CPTII,S$GLB,, | Performed by: FAMILY MEDICINE

## 2022-11-15 PROCEDURE — 3062F POS MACROALBUMINURIA REV: CPT | Mod: CPTII,S$GLB,, | Performed by: FAMILY MEDICINE

## 2022-11-15 PROCEDURE — 3066F NEPHROPATHY DOC TX: CPT | Mod: CPTII,S$GLB,, | Performed by: FAMILY MEDICINE

## 2022-11-15 PROCEDURE — 3051F PR MOST RECENT HEMOGLOBIN A1C LEVEL 7.0 - < 8.0%: ICD-10-PCS | Mod: CPTII,S$GLB,, | Performed by: FAMILY MEDICINE

## 2022-11-15 RX ORDER — CEPHALEXIN 500 MG/1
CAPSULE ORAL
COMMUNITY
Start: 2022-07-28 | End: 2023-11-10

## 2022-11-15 RX ORDER — INSULIN PUMP SYRINGE, 3 ML
EACH MISCELLANEOUS
Qty: 1 EACH | Refills: 0 | Status: SHIPPED | OUTPATIENT
Start: 2022-11-15

## 2022-11-15 RX ORDER — NEBULIZER AND COMPRESSOR
1 EACH MISCELLANEOUS DAILY
Qty: 1 EACH | Refills: 0 | COMMUNITY
Start: 2022-11-15

## 2022-11-15 RX ORDER — LANCETS
EACH MISCELLANEOUS
Qty: 200 EACH | Refills: 11 | Status: SHIPPED | OUTPATIENT
Start: 2022-11-15

## 2022-11-15 NOTE — PROGRESS NOTES
Patient had appointment with [facility] today that she did not attend.  Therapist, Flo, called her and explained that due to our attendance policy, we would remove other appointments.    Outpatient Physical Therapy Discharge Summary    Date: 11/15/2022      Date of PT eval: 8/18/22  Number of PT visits: 8  Date of last visit: 11/15/22    Assessment:  Unable to assess if goals met secondary to lack of attendance.   Cancel/no shows: 6    Plan: Discharge from outpatient physical therapy due to patient unable to attend consistently

## 2022-11-15 NOTE — PROGRESS NOTES
Assessment & Plan  Type 2 diabetes mellitus with diabetic neuropathy, without long-term current use of insulin  -     blood-glucose meter kit; To check BG daily, to use with insurance preferred meter  Dispense: 1 each; Refill: 0  -     lancets Misc; To check BG daily, to use with insurance preferred meter  Dispense: 200 each; Refill: 11  -     blood sugar diagnostic Strp; To check BG daily, to use with insurance preferred meter  Dispense: 200 each; Refill: 11  -     CBC Auto Differential; Future; Expected date: 11/15/2022  -     Comprehensive Metabolic Panel; Future; Expected date: 11/15/2022  -     Hemoglobin A1C; Future; Expected date: 11/15/2022  -     Lipid Panel; Future; Expected date: 11/15/2022  -     Microalbumin/Creatinine Ratio, Urine; Future; Expected date: 11/15/2022    Glucometer and supplies prescribed.   Routine labs to be scheduled.    Dyslipidemia associated with type 2 diabetes mellitus  -     Lipid Panel; Future; Expected date: 11/15/2022    Microalbuminuria due to type 2 diabetes mellitus  -     Microalbumin/Creatinine Ratio, Urine; Future; Expected date: 11/15/2022    Stage 3a chronic kidney disease  -     Comprehensive Metabolic Panel; Future; Expected date: 11/15/2022    Essential hypertension  -     BLOOD PRESSURE CUFF Misc; 1 Units by Misc.(Non-Drug; Combo Route) route once daily.  Dispense: 1 each; Refill: 0  -     CBC Auto Differential; Future; Expected date: 11/15/2022  -     Comprehensive Metabolic Panel; Future; Expected date: 11/15/2022  -     Hemoglobin A1C; Future; Expected date: 11/15/2022  -     Lipid Panel; Future; Expected date: 11/15/2022    BP elevated today, attributed to pain level by patient. Patient states that her BP usually runs in the 120-130s at home. It was 137/84 yesterday. Continue routine monitoring with home cuff.     History of CVA (cerebrovascular accident)  -     CBC Auto Differential; Future; Expected date: 11/15/2022  -     Comprehensive Metabolic Panel;  Future; Expected date: 11/15/2022  -     Hemoglobin A1C; Future; Expected date: 11/15/2022  -     Lipid Panel; Future; Expected date: 11/15/2022    Iron deficiency anemia, unspecified iron deficiency anemia type  -     CBC Auto Differential; Future; Expected date: 11/15/2022  -     Iron and TIBC; Future; Expected date: 11/15/2022  -     Ferritin; Future; Expected date: 11/15/2022    Encounter for vitamin deficiency screening  -     Vitamin D; Future; Expected date: 11/15/2022    Mass of left breast, unspecified quadrant  -     Mammo Digital Diagnostic Left; Future; Expected date: 11/15/2022    Last diagnostic mammo/US showed retroareolar mass on the left breast, probably benign - repeat short term 6 month diagnostic mammo and US due in February.    Colon cancer screening  -     Ambulatory referral/consult to Endo Procedure ; Future; Expected date: 11/16/2022      Health maintenance reviewed:  -DEXA previously ordered to be scheduled  -Eye exam done with a Dr. Tracy in August - will obtain record    Follow-up: Follow up in about 6 months (around 5/15/2023).  ______________________________________________________________________    Chief Complaint  Chief Complaint   Patient presents with    Follow-up       HPI  Joanne Peña is a 72 y.o. female with medical diagnoses as listed in the medical history and problem list that presents to the office for a follow up of her chronic conditions. Other than chronic low back pain, she denies any complaints today. Requesting glucometer and supplies.    Health Maintenance         Date Due Completion Date    COVID-19 Vaccine (1) Never done ---    Pneumococcal Vaccines (Age 65+) (1 - PCV) Never done ---    Eye Exam Never done ---    TETANUS VACCINE Never done ---    DEXA Scan Never done ---    Colorectal Cancer Screening Never done ---    Shingles Vaccine (1 of 2) Never done ---    Influenza Vaccine (1) Never done ---    Hemoglobin A1c 12/01/2022 6/1/2022    Lipid Panel  06/01/2023 6/1/2022    Foot Exam 06/24/2023 6/24/2022    Override on 6/24/2022: Done    Mammogram 08/17/2023 8/17/2022    Diabetes Urine Screening 09/13/2023 9/13/2022    High Dose Statin 11/15/2023 11/15/2022              PAST MEDICAL HISTORY:  Past Medical History:   Diagnosis Date    Diabetes mellitus     Hyperlipidemia     Hypertension     Stroke        PAST SURGICAL HISTORY:  Past Surgical History:   Procedure Laterality Date    INJECTION OF JOINT Right 5/20/2022    Procedure: Right SI joint injection;  Surgeon: Son Lara DO;  Location: [facility];  Service: Pain Management;  Laterality: Right;       SOCIAL HISTORY:  Social History     Socioeconomic History    Marital status:    Tobacco Use    Smoking status: Former     Packs/day: 0.50     Types: Cigarettes    Smokeless tobacco: Never   Substance and Sexual Activity    Alcohol use: Never    Drug use: No       FAMILY HISTORY:  Family History   Problem Relation Age of Onset    Kidney disease Mother     Heart disease Mother     Cancer Father     Hypertension Brother     Diabetes Daughter     Hypertension Daughter     Cancer Maternal Aunt        ALLERGIES AND MEDICATIONS: updated and reviewed.  Review of patient's allergies indicates:   Allergen Reactions    Lisinopril-hydrochlorothiazide Other (See Comments)     Pt stated it makes her feel drained. She couldn't raise arms over head.    Ampicillin Rash    Darvocet a500 [propoxyphene n-acetaminophen] Other (See Comments)     shaky     Current Outpatient Medications   Medication Sig Dispense Refill    amLODIPine (NORVASC) 10 MG tablet Take 1 tablet (10 mg total) by mouth once daily. Hold if SBP <120 90 tablet 3    ascorbic acid, vitamin C, (VITAMIN C) 500 MG tablet Take 500 mg by mouth once daily.      aspirin (ECOTRIN) 81 MG EC tablet Take 1 tablet (81 mg total) by mouth once daily. 30 tablet 3    atorvastatin (LIPITOR) 80 MG tablet Take 1 tablet (80 mg total) by mouth once daily. 90 tablet 3     cephALEXin (KEFLEX) 500 MG capsule TAKE 2 CAPSULES (1,000 MG TOTAL) BY MOUTH 2 (TWO) TIMES DAILY. FOR 10 DAYS      chlorthalidone (HYGROTEN) 25 MG Tab Take 1 tablet (25 mg total) by mouth once daily. 30 tablet 11    cyclobenzaprine (FLEXERIL) 10 MG tablet Take 1 tablet (10 mg total) by mouth nightly as needed for Muscle spasms. 90 tablet 1    gabapentin (NEURONTIN) 100 MG capsule Take 1 capsule (100 mg total) by mouth 3 (three) times daily. 90 capsule 0    glimepiride (AMARYL) 2 MG tablet Take 1 tablet (2 mg total) by mouth 2 (two) times daily. 180 tablet 3    guanfacine HCl (GUANFACINE ORAL) Take by mouth.      hydrALAZINE (APRESOLINE) 50 MG tablet Take 1 tablet (50 mg total) by mouth every 8 (eight) hours. Hold is SBP <120 90 tablet 1    LORazepam (ATIVAN) 1 MG tablet Take 1 tablet (1 mg total) by mouth once as needed for Anxiety (before procedure). 1 tablet 0    losartan (COZAAR) 100 MG tablet Take 1 tablet (100 mg total) by mouth once daily. Hold if SBP <120 90 tablet 3    meclizine (ANTIVERT) 25 mg tablet TAKE 1 TABLET (25 MG TOTAL) BY MOUTH 2 (TWO) TIMES DAILY AS NEEDED FOR DIZZINESS. 60 tablet 0    methylPREDNISolone (MEDROL DOSEPACK) 4 mg tablet use as directed 1 each 0    multivitamin (THERAGRAN) per tablet Take 1 tablet by mouth once daily.      ondansetron (ZOFRAN-ODT) 4 MG TbDL Dissolve 1 tablet (4 mg total) by mouth every 8 (eight) hours as needed. 20 tablet 0    BLOOD PRESSURE CUFF Misc 1 Units by Misc.(Non-Drug; Combo Route) route once daily. 1 each 0    blood sugar diagnostic Strp To check BG daily, to use with insurance preferred meter 200 each 11    blood-glucose meter kit To check BG daily, to use with insurance preferred meter 1 each 0    lancets Misc To check BG daily, to use with insurance preferred meter 200 each 11     No current facility-administered medications for this visit.         ROS  Review of Systems   Constitutional:  Negative for activity change, fever and unexpected weight change.  "  HENT:  Negative for congestion and sore throat.    Eyes:  Negative for photophobia and visual disturbance.   Respiratory:  Negative for cough and shortness of breath.    Cardiovascular:  Negative for chest pain and leg swelling.   Gastrointestinal:  Negative for abdominal pain, constipation, diarrhea, nausea and vomiting.   Endocrine: Negative for polydipsia, polyphagia and polyuria.   Genitourinary:  Negative for dysuria and urgency.   Musculoskeletal:  Positive for back pain.   Neurological:  Negative for dizziness, weakness and headaches.   Psychiatric/Behavioral:  Negative for dysphoric mood and sleep disturbance. The patient is not nervous/anxious.    All other systems reviewed and are negative.        Physical Exam  Vitals:    11/15/22 1641 11/15/22 1652   BP: (!) 162/82 137/84   BP Location: Right arm    Patient Position: Sitting    BP Method: Medium (Manual)    Pulse: 108    Temp: 97.5 °F (36.4 °C)    TempSrc: Oral    SpO2: 99%    Weight: 85.4 kg (188 lb 4.4 oz)    Height: 5' 7" (1.702 m)     Body mass index is 29.49 kg/m².  Weight: 85.4 kg (188 lb 4.4 oz)   Height: 5' 7" (170.2 cm)   Physical Exam  Constitutional:       General: She is not in acute distress.     Appearance: Normal appearance.   HENT:      Head: Normocephalic and atraumatic.   Neck:      Thyroid: No thyromegaly.      Vascular: No carotid bruit.   Cardiovascular:      Rate and Rhythm: Normal rate and regular rhythm.      Pulses: Normal pulses.      Heart sounds: Normal heart sounds.   Pulmonary:      Effort: Pulmonary effort is normal. No respiratory distress.      Breath sounds: Normal breath sounds.   Musculoskeletal:      Cervical back: Neck supple.      Right lower leg: No edema.      Left lower leg: No edema.   Lymphadenopathy:      Cervical: No cervical adenopathy.   Skin:     General: Skin is warm and dry.      Findings: No rash.   Neurological:      General: No focal deficit present.      Mental Status: She is alert and oriented to " person, place, and time.   Psychiatric:         Mood and Affect: Mood normal.         Behavior: Behavior normal.         Thought Content: Thought content normal.

## 2022-11-23 ENCOUNTER — LAB VISIT (OUTPATIENT)
Dept: LAB | Facility: HOSPITAL | Age: 72
End: 2022-11-23
Attending: FAMILY MEDICINE
Payer: MEDICARE

## 2022-11-23 DIAGNOSIS — E11.29 MICROALBUMINURIA DUE TO TYPE 2 DIABETES MELLITUS: ICD-10-CM

## 2022-11-23 DIAGNOSIS — D50.9 IRON DEFICIENCY ANEMIA, UNSPECIFIED IRON DEFICIENCY ANEMIA TYPE: ICD-10-CM

## 2022-11-23 DIAGNOSIS — Z13.21 ENCOUNTER FOR VITAMIN DEFICIENCY SCREENING: ICD-10-CM

## 2022-11-23 DIAGNOSIS — E78.5 DYSLIPIDEMIA ASSOCIATED WITH TYPE 2 DIABETES MELLITUS: ICD-10-CM

## 2022-11-23 DIAGNOSIS — N18.31 STAGE 3A CHRONIC KIDNEY DISEASE: ICD-10-CM

## 2022-11-23 DIAGNOSIS — E11.40 TYPE 2 DIABETES MELLITUS WITH DIABETIC NEUROPATHY, WITHOUT LONG-TERM CURRENT USE OF INSULIN: ICD-10-CM

## 2022-11-23 DIAGNOSIS — Z86.73 HISTORY OF CVA (CEREBROVASCULAR ACCIDENT): ICD-10-CM

## 2022-11-23 DIAGNOSIS — I10 ESSENTIAL HYPERTENSION: ICD-10-CM

## 2022-11-23 DIAGNOSIS — E11.69 DYSLIPIDEMIA ASSOCIATED WITH TYPE 2 DIABETES MELLITUS: ICD-10-CM

## 2022-11-23 DIAGNOSIS — N83.8 OVARIAN MASS, LEFT: ICD-10-CM

## 2022-11-23 DIAGNOSIS — R80.9 MICROALBUMINURIA DUE TO TYPE 2 DIABETES MELLITUS: ICD-10-CM

## 2022-11-23 DIAGNOSIS — D25.9 UTERINE LEIOMYOMA, UNSPECIFIED LOCATION: ICD-10-CM

## 2022-11-23 LAB
25(OH)D3+25(OH)D2 SERPL-MCNC: 17 NG/ML (ref 30–96)
ALBUMIN SERPL BCP-MCNC: 3.8 G/DL (ref 3.5–5.2)
ALBUMIN/CREAT UR: 230.8 UG/MG (ref 0–30)
ALP SERPL-CCNC: 89 U/L (ref 55–135)
ALT SERPL W/O P-5'-P-CCNC: 14 U/L (ref 10–44)
ANION GAP SERPL CALC-SCNC: 8 MMOL/L (ref 8–16)
AST SERPL-CCNC: 14 U/L (ref 10–40)
BASOPHILS # BLD AUTO: 0.03 K/UL (ref 0–0.2)
BASOPHILS NFR BLD: 0.6 % (ref 0–1.9)
BILIRUB SERPL-MCNC: 0.3 MG/DL (ref 0.1–1)
BUN SERPL-MCNC: 25 MG/DL (ref 8–23)
CALCIUM SERPL-MCNC: 9.8 MG/DL (ref 8.7–10.5)
CHLORIDE SERPL-SCNC: 110 MMOL/L (ref 95–110)
CHOLEST SERPL-MCNC: 195 MG/DL (ref 120–199)
CHOLEST/HDLC SERPL: 4.2 {RATIO} (ref 2–5)
CO2 SERPL-SCNC: 20 MMOL/L (ref 23–29)
CREAT SERPL-MCNC: 1.2 MG/DL (ref 0.5–1.4)
CREAT SERPL-MCNC: 1.2 MG/DL (ref 0.5–1.4)
CREAT UR-MCNC: 91 MG/DL (ref 15–325)
DIFFERENTIAL METHOD: ABNORMAL
EOSINOPHIL # BLD AUTO: 0.2 K/UL (ref 0–0.5)
EOSINOPHIL NFR BLD: 3.2 % (ref 0–8)
ERYTHROCYTE [DISTWIDTH] IN BLOOD BY AUTOMATED COUNT: 16.6 % (ref 11.5–14.5)
EST. GFR  (NO RACE VARIABLE): 48.7 ML/MIN/1.73 M^2
EST. GFR  (NO RACE VARIABLE): 48.7 ML/MIN/1.73 M^2
ESTIMATED AVG GLUCOSE: 160 MG/DL (ref 68–131)
FERRITIN SERPL-MCNC: 36 NG/ML (ref 20–300)
GLUCOSE SERPL-MCNC: 139 MG/DL (ref 70–110)
HBA1C MFR BLD: 7.2 % (ref 4–5.6)
HCT VFR BLD AUTO: 34.5 % (ref 37–48.5)
HDLC SERPL-MCNC: 46 MG/DL (ref 40–75)
HDLC SERPL: 23.6 % (ref 20–50)
HGB BLD-MCNC: 10.2 G/DL (ref 12–16)
IMM GRANULOCYTES # BLD AUTO: 0.01 K/UL (ref 0–0.04)
IMM GRANULOCYTES NFR BLD AUTO: 0.2 % (ref 0–0.5)
IRON SERPL-MCNC: 41 UG/DL (ref 30–160)
LDLC SERPL CALC-MCNC: 116 MG/DL (ref 63–159)
LYMPHOCYTES # BLD AUTO: 0.9 K/UL (ref 1–4.8)
LYMPHOCYTES NFR BLD: 17.5 % (ref 18–48)
MCH RBC QN AUTO: 25.7 PG (ref 27–31)
MCHC RBC AUTO-ENTMCNC: 29.6 G/DL (ref 32–36)
MCV RBC AUTO: 87 FL (ref 82–98)
MICROALBUMIN UR DL<=1MG/L-MCNC: 210 UG/ML
MONOCYTES # BLD AUTO: 0.5 K/UL (ref 0.3–1)
MONOCYTES NFR BLD: 9 % (ref 4–15)
NEUTROPHILS # BLD AUTO: 3.5 K/UL (ref 1.8–7.7)
NEUTROPHILS NFR BLD: 69.5 % (ref 38–73)
NONHDLC SERPL-MCNC: 149 MG/DL
NRBC BLD-RTO: 0 /100 WBC
PLATELET # BLD AUTO: 299 K/UL (ref 150–450)
PMV BLD AUTO: 10.7 FL (ref 9.2–12.9)
POTASSIUM SERPL-SCNC: 4.9 MMOL/L (ref 3.5–5.1)
PROT SERPL-MCNC: 7.6 G/DL (ref 6–8.4)
RBC # BLD AUTO: 3.97 M/UL (ref 4–5.4)
SATURATED IRON: 13 % (ref 20–50)
SODIUM SERPL-SCNC: 138 MMOL/L (ref 136–145)
TOTAL IRON BINDING CAPACITY: 315 UG/DL (ref 250–450)
TRANSFERRIN SERPL-MCNC: 213 MG/DL (ref 200–375)
TRIGL SERPL-MCNC: 165 MG/DL (ref 30–150)
WBC # BLD AUTO: 5.02 K/UL (ref 3.9–12.7)

## 2022-11-23 PROCEDURE — 36415 COLL VENOUS BLD VENIPUNCTURE: CPT | Mod: PO | Performed by: FAMILY MEDICINE

## 2022-11-23 PROCEDURE — 82570 ASSAY OF URINE CREATININE: CPT | Performed by: FAMILY MEDICINE

## 2022-11-23 PROCEDURE — 82306 VITAMIN D 25 HYDROXY: CPT | Performed by: FAMILY MEDICINE

## 2022-11-23 PROCEDURE — 80053 COMPREHEN METABOLIC PANEL: CPT | Performed by: FAMILY MEDICINE

## 2022-11-23 PROCEDURE — 82728 ASSAY OF FERRITIN: CPT | Performed by: FAMILY MEDICINE

## 2022-11-23 PROCEDURE — 84466 ASSAY OF TRANSFERRIN: CPT | Performed by: FAMILY MEDICINE

## 2022-11-23 PROCEDURE — 82043 UR ALBUMIN QUANTITATIVE: CPT | Performed by: FAMILY MEDICINE

## 2022-11-23 PROCEDURE — 80061 LIPID PANEL: CPT | Performed by: FAMILY MEDICINE

## 2022-11-23 PROCEDURE — 85025 COMPLETE CBC W/AUTO DIFF WBC: CPT | Performed by: FAMILY MEDICINE

## 2022-11-23 PROCEDURE — 83036 HEMOGLOBIN GLYCOSYLATED A1C: CPT | Performed by: FAMILY MEDICINE

## 2022-11-30 ENCOUNTER — TELEPHONE (OUTPATIENT)
Dept: FAMILY MEDICINE | Facility: CLINIC | Age: 72
End: 2022-11-30
Payer: MEDICARE

## 2023-01-03 ENCOUNTER — CLINICAL SUPPORT (OUTPATIENT)
Dept: ENDOSCOPY | Facility: HOSPITAL | Age: 73
End: 2023-01-03
Attending: FAMILY MEDICINE
Payer: MEDICARE

## 2023-01-03 VITALS — WEIGHT: 165 LBS | BODY MASS INDEX: 25.9 KG/M2 | HEIGHT: 67 IN

## 2023-01-03 DIAGNOSIS — Z12.11 COLON CANCER SCREENING: ICD-10-CM

## 2023-01-03 RX ORDER — SODIUM, POTASSIUM,MAG SULFATES 17.5-3.13G
1 SOLUTION, RECONSTITUTED, ORAL ORAL DAILY
Qty: 1 KIT | Refills: 0 | Status: SHIPPED | OUTPATIENT
Start: 2023-01-03 | End: 2023-01-05

## 2023-01-10 ENCOUNTER — PES CALL (OUTPATIENT)
Dept: ADMINISTRATIVE | Facility: CLINIC | Age: 73
End: 2023-01-10
Payer: MEDICARE

## 2023-01-19 ENCOUNTER — PATIENT MESSAGE (OUTPATIENT)
Dept: ADMINISTRATIVE | Facility: HOSPITAL | Age: 73
End: 2023-01-19
Payer: MEDICARE

## 2023-01-24 ENCOUNTER — TELEPHONE (OUTPATIENT)
Dept: ADMINISTRATIVE | Facility: CLINIC | Age: 73
End: 2023-01-24
Payer: MEDICARE

## 2023-01-26 ENCOUNTER — OFFICE VISIT (OUTPATIENT)
Dept: FAMILY MEDICINE | Facility: CLINIC | Age: 73
End: 2023-01-26
Payer: MEDICARE

## 2023-01-26 VITALS
TEMPERATURE: 98 F | HEART RATE: 92 BPM | DIASTOLIC BLOOD PRESSURE: 78 MMHG | SYSTOLIC BLOOD PRESSURE: 126 MMHG | HEIGHT: 67 IN | BODY MASS INDEX: 29.5 KG/M2 | OXYGEN SATURATION: 98 % | WEIGHT: 187.94 LBS

## 2023-01-26 DIAGNOSIS — Z00.00 ENCOUNTER FOR PREVENTIVE HEALTH EXAMINATION: Primary | ICD-10-CM

## 2023-01-26 DIAGNOSIS — E78.5 DYSLIPIDEMIA ASSOCIATED WITH TYPE 2 DIABETES MELLITUS: ICD-10-CM

## 2023-01-26 DIAGNOSIS — I10 MALIGNANT ESSENTIAL HYPERTENSION: ICD-10-CM

## 2023-01-26 DIAGNOSIS — E11.65 TYPE 2 DIABETES MELLITUS WITH HYPERGLYCEMIA, WITHOUT LONG-TERM CURRENT USE OF INSULIN: ICD-10-CM

## 2023-01-26 DIAGNOSIS — Z86.73 HISTORY OF STROKE: ICD-10-CM

## 2023-01-26 DIAGNOSIS — M46.1 SACROILIITIS: ICD-10-CM

## 2023-01-26 DIAGNOSIS — N18.31 STAGE 3A CHRONIC KIDNEY DISEASE: ICD-10-CM

## 2023-01-26 DIAGNOSIS — E11.69 DYSLIPIDEMIA ASSOCIATED WITH TYPE 2 DIABETES MELLITUS: ICD-10-CM

## 2023-01-26 PROCEDURE — 1159F PR MEDICATION LIST DOCUMENTED IN MEDICAL RECORD: ICD-10-PCS | Mod: CPTII,S$GLB,, | Performed by: NURSE PRACTITIONER

## 2023-01-26 PROCEDURE — 3288F PR FALLS RISK ASSESSMENT DOCUMENTED: ICD-10-PCS | Mod: CPTII,S$GLB,, | Performed by: NURSE PRACTITIONER

## 2023-01-26 PROCEDURE — G0439 PPPS, SUBSEQ VISIT: HCPCS | Mod: S$GLB,,, | Performed by: NURSE PRACTITIONER

## 2023-01-26 PROCEDURE — 3078F PR MOST RECENT DIASTOLIC BLOOD PRESSURE < 80 MM HG: ICD-10-PCS | Mod: CPTII,S$GLB,, | Performed by: NURSE PRACTITIONER

## 2023-01-26 PROCEDURE — G0439 PR MEDICARE ANNUAL WELLNESS SUBSEQUENT VISIT: ICD-10-PCS | Mod: S$GLB,,, | Performed by: NURSE PRACTITIONER

## 2023-01-26 PROCEDURE — 3074F SYST BP LT 130 MM HG: CPT | Mod: CPTII,S$GLB,, | Performed by: NURSE PRACTITIONER

## 2023-01-26 PROCEDURE — 1170F FXNL STATUS ASSESSED: CPT | Mod: CPTII,S$GLB,, | Performed by: NURSE PRACTITIONER

## 2023-01-26 PROCEDURE — 3288F FALL RISK ASSESSMENT DOCD: CPT | Mod: CPTII,S$GLB,, | Performed by: NURSE PRACTITIONER

## 2023-01-26 PROCEDURE — 99999 PR PBB SHADOW E&M-EST. PATIENT-LVL V: CPT | Mod: PBBFAC,,, | Performed by: NURSE PRACTITIONER

## 2023-01-26 PROCEDURE — 1101F PT FALLS ASSESS-DOCD LE1/YR: CPT | Mod: CPTII,S$GLB,, | Performed by: NURSE PRACTITIONER

## 2023-01-26 PROCEDURE — 99499 UNLISTED E&M SERVICE: CPT | Mod: HCNC,S$GLB,, | Performed by: NURSE PRACTITIONER

## 2023-01-26 PROCEDURE — 1160F PR REVIEW ALL MEDS BY PRESCRIBER/CLIN PHARMACIST DOCUMENTED: ICD-10-PCS | Mod: CPTII,S$GLB,, | Performed by: NURSE PRACTITIONER

## 2023-01-26 PROCEDURE — 3078F DIAST BP <80 MM HG: CPT | Mod: CPTII,S$GLB,, | Performed by: NURSE PRACTITIONER

## 2023-01-26 PROCEDURE — 99999 PR PBB SHADOW E&M-EST. PATIENT-LVL V: ICD-10-PCS | Mod: PBBFAC,,, | Performed by: NURSE PRACTITIONER

## 2023-01-26 PROCEDURE — 3008F PR BODY MASS INDEX (BMI) DOCUMENTED: ICD-10-PCS | Mod: CPTII,S$GLB,, | Performed by: NURSE PRACTITIONER

## 2023-01-26 PROCEDURE — 1101F PR PT FALLS ASSESS DOC 0-1 FALLS W/OUT INJ PAST YR: ICD-10-PCS | Mod: CPTII,S$GLB,, | Performed by: NURSE PRACTITIONER

## 2023-01-26 PROCEDURE — 1160F RVW MEDS BY RX/DR IN RCRD: CPT | Mod: CPTII,S$GLB,, | Performed by: NURSE PRACTITIONER

## 2023-01-26 PROCEDURE — 1126F PR PAIN SEVERITY QUANTIFIED, NO PAIN PRESENT: ICD-10-PCS | Mod: CPTII,S$GLB,, | Performed by: NURSE PRACTITIONER

## 2023-01-26 PROCEDURE — 3074F PR MOST RECENT SYSTOLIC BLOOD PRESSURE < 130 MM HG: ICD-10-PCS | Mod: CPTII,S$GLB,, | Performed by: NURSE PRACTITIONER

## 2023-01-26 PROCEDURE — 3008F BODY MASS INDEX DOCD: CPT | Mod: CPTII,S$GLB,, | Performed by: NURSE PRACTITIONER

## 2023-01-26 PROCEDURE — 1126F AMNT PAIN NOTED NONE PRSNT: CPT | Mod: CPTII,S$GLB,, | Performed by: NURSE PRACTITIONER

## 2023-01-26 PROCEDURE — 1159F MED LIST DOCD IN RCRD: CPT | Mod: CPTII,S$GLB,, | Performed by: NURSE PRACTITIONER

## 2023-01-26 PROCEDURE — 99499 RISK ADDL DX/OHS AUDIT: ICD-10-PCS | Mod: HCNC,S$GLB,, | Performed by: NURSE PRACTITIONER

## 2023-01-26 PROCEDURE — 1170F PR FUNCTIONAL STATUS ASSESSED: ICD-10-PCS | Mod: CPTII,S$GLB,, | Performed by: NURSE PRACTITIONER

## 2023-01-26 NOTE — PROGRESS NOTES
"  Joanne Peña presented for a  Medicare AWV and comprehensive Health Risk Assessment today. The following components were reviewed and updated:    Medical history  Family History  Social history  Allergies and Current Medications  Health Risk Assessment  Health Maintenance  Care Team       ** See Completed Assessments for Annual Wellness Visit within the encounter summary.**       The following assessments were completed:  Living Situation  CAGE  Depression Screening  Timed Get Up and Go  Whisper Test  Cognitive Function Screening  Nutrition Screening  ADL Screening  PAQ Screening          Vitals:    01/26/23 1401   BP: 126/78   Pulse: 92   Temp: 97.6 °F (36.4 °C)   TempSrc: Oral   SpO2: 98%   Weight: 85.2 kg (187 lb 15.1 oz)   Height: 5' 7" (1.702 m)     Body mass index is 29.44 kg/m².  Physical Exam  Vitals and nursing note reviewed.   Constitutional:       Appearance: Normal appearance.   Cardiovascular:      Rate and Rhythm: Normal rate.      Pulses: Normal pulses.      Heart sounds: Normal heart sounds.   Pulmonary:      Effort: Pulmonary effort is normal.      Breath sounds: Normal breath sounds.   Musculoskeletal:         General: Normal range of motion.   Neurological:      Mental Status: She is alert and oriented to person, place, and time.   Psychiatric:         Mood and Affect: Mood normal.         Behavior: Behavior normal.           Diagnoses and health risks identified today and associated recommendations/orders:    1. Encounter for preventive health examination  Pt was seen today for an Annual Wellness visit. Healthcare maintenance and screening recommendations were discussed and updated as indicated. Return in one year for AWV.    Review current opioid prescriptions: n/a  Screen for potential Substance Use Disorders: n/a    2. Type 2 diabetes mellitus with hyperglycemia, without long-term current use of insulin  Hgb A1C 7.2 on 11/23/22. Does not check home fasting cbg, but will check cbg when not " feeling well. Denies recent hypoglycemic episodes. Discussed Hgb A1C and home fasting cbg goals, diet, activity. Understanding verbalized. The current medical regimen is effective;  continue present plan and medications.    3. Dyslipidemia associated with type 2 diabetes mellitus  The current medical regimen is effective;  continue present plan and medications.    4. Stage 3a chronic kidney disease  The current medical regimen is effective;  continue present plan and medications.    5. Sacroiliitis  The current medical regimen is effective;  continue present plan and medications.    6. Malignant essential hypertension  The current medical regimen is effective;  continue present plan and medications.    7. History of stroke  The current medical regimen is effective;  continue present plan and medications.        Provided Joanne with a 5-10 year written screening schedule and personal prevention plan. Recommendations were developed using the USPSTF age appropriate recommendations. Education, counseling, and referrals were provided as needed. After Visit Summary printed and given to patient which includes a list of additional screenings\tests needed.    Follow up in about 1 year (around 1/26/2024).    MARCOS Kang  I offered to discuss advanced care planning, including how to pick a person who would make decisions for you if you were unable to make them for yourself, called a health care power of , and what kind of decisions you might make such as use of life sustaining treatments such as ventilators and tube feeding when faced with a life limiting illness recorded on a living will that they will need to know. (How you want to be cared for as you near the end of your natural life)     X Patient is interested in learning more about how to make advanced directives.  I provided them paperwork and offered to discuss this with them.

## 2023-01-26 NOTE — PATIENT INSTRUCTIONS
Counseling and Referral of Other Preventative  (Italic type indicates deductible and co-insurance are waived)    Patient Name: Joanne Peña  Today's Date: 1/26/2023    Health Maintenance       Date Due Completion Date    COVID-19 Vaccine (1) Never done ---    Pneumococcal Vaccines (Age 65+) (1 - PCV) Never done ---    Eye Exam Never done ---    TETANUS VACCINE Never done ---    DEXA Scan Never done ---    Colorectal Cancer Screening Never done ---    Shingles Vaccine (1 of 2) Never done ---    Influenza Vaccine (1) 06/30/2023 (Originally 9/1/2022) ---    Hemoglobin A1c 05/23/2023 11/23/2022    Foot Exam 06/24/2023 6/24/2022    Override on 6/24/2022: Done    Mammogram 08/17/2023 8/17/2022    High Dose Statin 11/15/2023 11/15/2022    Diabetes Urine Screening 11/23/2023 11/23/2022    Lipid Panel 11/23/2023 11/23/2022        No orders of the defined types were placed in this encounter.    The following information is provided to all patients.  This information is to help you find resources for any of the problems found today that may be affecting your health:                Living healthy guide: www.[facility].[place].gov      Understanding Diabetes: www.diabetes.org      Eating healthy: www.cdc.gov/healthyweight      CDC home safety checklist: www.cdc.gov/steadi/patient.html      Agency on Aging: www.goea.[place].[facility]      Alcoholics anonymous (AA): www.aa.org      Physical Activity: www.mark.nih.gov/nl6ojzk      Tobacco use: www.quitwithusla.org

## 2023-02-06 ENCOUNTER — TELEPHONE (OUTPATIENT)
Dept: ENDOSCOPY | Facility: HOSPITAL | Age: 73
End: 2023-02-06
Payer: MEDICARE

## 2023-02-06 DIAGNOSIS — Z12.11 SPECIAL SCREENING FOR MALIGNANT NEOPLASMS, COLON: Primary | ICD-10-CM

## 2023-02-07 DIAGNOSIS — Z00.00 ENCOUNTER FOR MEDICARE ANNUAL WELLNESS EXAM: ICD-10-CM

## 2023-02-09 DIAGNOSIS — Z00.00 ENCOUNTER FOR MEDICARE ANNUAL WELLNESS EXAM: ICD-10-CM

## 2023-03-13 ENCOUNTER — HOSPITAL ENCOUNTER (OUTPATIENT)
Dept: RADIOLOGY | Facility: HOSPITAL | Age: 73
Discharge: HOME | End: 2023-03-13
Attending: FAMILY MEDICINE
Payer: MEDICARE

## 2023-03-13 ENCOUNTER — PATIENT MESSAGE (OUTPATIENT)
Dept: FAMILY MEDICINE | Facility: CLINIC | Age: 73
End: 2023-03-13
Payer: MEDICARE

## 2023-03-13 DIAGNOSIS — N63.20 MASS OF LEFT BREAST, UNSPECIFIED QUADRANT: ICD-10-CM

## 2023-03-13 DIAGNOSIS — N63.20 BREAST MASS, LEFT: ICD-10-CM

## 2023-03-13 DIAGNOSIS — Z12.31 ENCOUNTER FOR SCREENING MAMMOGRAM FOR MALIGNANT NEOPLASM OF BREAST: Primary | ICD-10-CM

## 2023-03-13 PROCEDURE — 77061 BREAST TOMOSYNTHESIS UNI: CPT | Mod: 26,LT,, | Performed by: RADIOLOGY

## 2023-03-13 PROCEDURE — 77065 MAMMO DIGITAL DIAGNOSTIC LEFT WITH TOMO: ICD-10-PCS | Mod: 26,LT,, | Performed by: RADIOLOGY

## 2023-03-13 PROCEDURE — 76642 US BREAST LEFT LIMITED: ICD-10-PCS | Mod: 26,LT,, | Performed by: RADIOLOGY

## 2023-03-13 PROCEDURE — 77065 DX MAMMO INCL CAD UNI: CPT | Mod: 26,LT,, | Performed by: RADIOLOGY

## 2023-03-13 PROCEDURE — 77065 DX MAMMO INCL CAD UNI: CPT | Mod: TC,LT

## 2023-03-13 PROCEDURE — 76642 ULTRASOUND BREAST LIMITED: CPT | Mod: 26,LT,, | Performed by: RADIOLOGY

## 2023-03-13 PROCEDURE — 76642 ULTRASOUND BREAST LIMITED: CPT | Mod: TC,LT

## 2023-03-13 PROCEDURE — 77061 MAMMO DIGITAL DIAGNOSTIC LEFT WITH TOMO: ICD-10-PCS | Mod: 26,LT,, | Performed by: RADIOLOGY

## 2023-03-13 NOTE — TELEPHONE ENCOUNTER
Uncertain if patient uses MyChart consistently.  Please inform patient that her left diagnostic mammogram and ultrasound were normal.  She can return to yearly screening mammograms.  Her next mammogram will be due in August - please schedule.

## 2023-03-14 ENCOUNTER — PATIENT MESSAGE (OUTPATIENT)
Dept: PAIN MEDICINE | Facility: CLINIC | Age: 73
End: 2023-03-14
Payer: MEDICARE

## 2023-03-15 ENCOUNTER — OFFICE VISIT (OUTPATIENT)
Dept: PAIN MEDICINE | Facility: CLINIC | Age: 73
End: 2023-03-15
Payer: MEDICARE

## 2023-03-15 ENCOUNTER — TELEPHONE (OUTPATIENT)
Dept: PAIN MEDICINE | Facility: CLINIC | Age: 73
End: 2023-03-15
Payer: MEDICARE

## 2023-03-15 VITALS
WEIGHT: 187 LBS | SYSTOLIC BLOOD PRESSURE: 155 MMHG | DIASTOLIC BLOOD PRESSURE: 85 MMHG | RESPIRATION RATE: 18 BRPM | HEART RATE: 88 BPM | HEIGHT: 67 IN | BODY MASS INDEX: 29.35 KG/M2

## 2023-03-15 DIAGNOSIS — E11.65 TYPE 2 DIABETES MELLITUS WITH HYPERGLYCEMIA, WITHOUT LONG-TERM CURRENT USE OF INSULIN: ICD-10-CM

## 2023-03-15 DIAGNOSIS — M47.816 LUMBAR SPONDYLOSIS: Primary | ICD-10-CM

## 2023-03-15 DIAGNOSIS — M51.36 DDD (DEGENERATIVE DISC DISEASE), LUMBAR: ICD-10-CM

## 2023-03-15 DIAGNOSIS — N18.30 STAGE 3 CHRONIC KIDNEY DISEASE, UNSPECIFIED WHETHER STAGE 3A OR 3B CKD: ICD-10-CM

## 2023-03-15 DIAGNOSIS — M46.1 SACROILIITIS: ICD-10-CM

## 2023-03-15 DIAGNOSIS — M54.16 LUMBAR RADICULOPATHY: ICD-10-CM

## 2023-03-15 PROCEDURE — 99999 PR PBB SHADOW E&M-EST. PATIENT-LVL III: ICD-10-PCS | Mod: PBBFAC,,, | Performed by: STUDENT IN AN ORGANIZED HEALTH CARE EDUCATION/TRAINING PROGRAM

## 2023-03-15 PROCEDURE — 99214 OFFICE O/P EST MOD 30 MIN: CPT | Mod: S$GLB,,, | Performed by: STUDENT IN AN ORGANIZED HEALTH CARE EDUCATION/TRAINING PROGRAM

## 2023-03-15 PROCEDURE — 3079F DIAST BP 80-89 MM HG: CPT | Mod: CPTII,S$GLB,, | Performed by: STUDENT IN AN ORGANIZED HEALTH CARE EDUCATION/TRAINING PROGRAM

## 2023-03-15 PROCEDURE — 1160F PR REVIEW ALL MEDS BY PRESCRIBER/CLIN PHARMACIST DOCUMENTED: ICD-10-PCS | Mod: CPTII,S$GLB,, | Performed by: STUDENT IN AN ORGANIZED HEALTH CARE EDUCATION/TRAINING PROGRAM

## 2023-03-15 PROCEDURE — 1160F RVW MEDS BY RX/DR IN RCRD: CPT | Mod: CPTII,S$GLB,, | Performed by: STUDENT IN AN ORGANIZED HEALTH CARE EDUCATION/TRAINING PROGRAM

## 2023-03-15 PROCEDURE — 3288F PR FALLS RISK ASSESSMENT DOCUMENTED: ICD-10-PCS | Mod: CPTII,S$GLB,, | Performed by: STUDENT IN AN ORGANIZED HEALTH CARE EDUCATION/TRAINING PROGRAM

## 2023-03-15 PROCEDURE — 3008F BODY MASS INDEX DOCD: CPT | Mod: CPTII,S$GLB,, | Performed by: STUDENT IN AN ORGANIZED HEALTH CARE EDUCATION/TRAINING PROGRAM

## 2023-03-15 PROCEDURE — 1101F PT FALLS ASSESS-DOCD LE1/YR: CPT | Mod: CPTII,S$GLB,, | Performed by: STUDENT IN AN ORGANIZED HEALTH CARE EDUCATION/TRAINING PROGRAM

## 2023-03-15 PROCEDURE — 1125F AMNT PAIN NOTED PAIN PRSNT: CPT | Mod: CPTII,S$GLB,, | Performed by: STUDENT IN AN ORGANIZED HEALTH CARE EDUCATION/TRAINING PROGRAM

## 2023-03-15 PROCEDURE — 3079F PR MOST RECENT DIASTOLIC BLOOD PRESSURE 80-89 MM HG: ICD-10-PCS | Mod: CPTII,S$GLB,, | Performed by: STUDENT IN AN ORGANIZED HEALTH CARE EDUCATION/TRAINING PROGRAM

## 2023-03-15 PROCEDURE — 99999 PR PBB SHADOW E&M-EST. PATIENT-LVL III: CPT | Mod: PBBFAC,,, | Performed by: STUDENT IN AN ORGANIZED HEALTH CARE EDUCATION/TRAINING PROGRAM

## 2023-03-15 PROCEDURE — 1159F MED LIST DOCD IN RCRD: CPT | Mod: CPTII,S$GLB,, | Performed by: STUDENT IN AN ORGANIZED HEALTH CARE EDUCATION/TRAINING PROGRAM

## 2023-03-15 PROCEDURE — 3077F SYST BP >= 140 MM HG: CPT | Mod: CPTII,S$GLB,, | Performed by: STUDENT IN AN ORGANIZED HEALTH CARE EDUCATION/TRAINING PROGRAM

## 2023-03-15 PROCEDURE — 1101F PR PT FALLS ASSESS DOC 0-1 FALLS W/OUT INJ PAST YR: ICD-10-PCS | Mod: CPTII,S$GLB,, | Performed by: STUDENT IN AN ORGANIZED HEALTH CARE EDUCATION/TRAINING PROGRAM

## 2023-03-15 PROCEDURE — 4010F ACE/ARB THERAPY RXD/TAKEN: CPT | Mod: CPTII,S$GLB,, | Performed by: STUDENT IN AN ORGANIZED HEALTH CARE EDUCATION/TRAINING PROGRAM

## 2023-03-15 PROCEDURE — 4010F PR ACE/ARB THEARPY RXD/TAKEN: ICD-10-PCS | Mod: CPTII,S$GLB,, | Performed by: STUDENT IN AN ORGANIZED HEALTH CARE EDUCATION/TRAINING PROGRAM

## 2023-03-15 PROCEDURE — 99214 PR OFFICE/OUTPT VISIT, EST, LEVL IV, 30-39 MIN: ICD-10-PCS | Mod: S$GLB,,, | Performed by: STUDENT IN AN ORGANIZED HEALTH CARE EDUCATION/TRAINING PROGRAM

## 2023-03-15 PROCEDURE — 3288F FALL RISK ASSESSMENT DOCD: CPT | Mod: CPTII,S$GLB,, | Performed by: STUDENT IN AN ORGANIZED HEALTH CARE EDUCATION/TRAINING PROGRAM

## 2023-03-15 PROCEDURE — 1159F PR MEDICATION LIST DOCUMENTED IN MEDICAL RECORD: ICD-10-PCS | Mod: CPTII,S$GLB,, | Performed by: STUDENT IN AN ORGANIZED HEALTH CARE EDUCATION/TRAINING PROGRAM

## 2023-03-15 PROCEDURE — 3077F PR MOST RECENT SYSTOLIC BLOOD PRESSURE >= 140 MM HG: ICD-10-PCS | Mod: CPTII,S$GLB,, | Performed by: STUDENT IN AN ORGANIZED HEALTH CARE EDUCATION/TRAINING PROGRAM

## 2023-03-15 PROCEDURE — 1125F PR PAIN SEVERITY QUANTIFIED, PAIN PRESENT: ICD-10-PCS | Mod: CPTII,S$GLB,, | Performed by: STUDENT IN AN ORGANIZED HEALTH CARE EDUCATION/TRAINING PROGRAM

## 2023-03-15 PROCEDURE — 3008F PR BODY MASS INDEX (BMI) DOCUMENTED: ICD-10-PCS | Mod: CPTII,S$GLB,, | Performed by: STUDENT IN AN ORGANIZED HEALTH CARE EDUCATION/TRAINING PROGRAM

## 2023-03-15 RX ORDER — CYCLOBENZAPRINE HCL 10 MG
10 TABLET ORAL 2 TIMES DAILY PRN
Qty: 180 TABLET | Refills: 0 | Status: SHIPPED | OUTPATIENT
Start: 2023-03-15 | End: 2023-06-13

## 2023-03-15 NOTE — PROGRESS NOTES
Chronic Pain - f/u    Referring Physician: No ref. provider found    Date: 03/15/2023     Re: Joanne Peña  MR#: [id number]  YOB: 1950  Age: 72 y.o.    Chief Complaint: back pain  No chief complaint on file.    **This note is dictated using the M*Modal Fluency Direct word recognition program. There are word recognition mistakes that are occasionally missed on review.**    ASSESSMENT: 72 y.o. year old female with right sided back/buttock pain pain, consistent with     1. Lumbar spondylosis  cyclobenzaprine (FLEXERIL) 10 MG tablet      2. DDD (degenerative disc disease), lumbar        3. Type 2 diabetes mellitus with hyperglycemia, without long-term current use of insulin        4. Stage 3 chronic kidney disease, unspecified whether stage 3a or 3b CKD          PLAN:     Lumbar spondylosis  -more likely facet arthropathy than DDD causing her axial back pain.  -100% improvement in buttock pain from the SIJ continues @10months, but now with just axial back pain.  -discussed MBB/RFA.  Risks, benefits, and alternatives were discussed with the patient, and She would like to proceed.  -PT referral  -Flexeril at night since patient states it helps.    DDD of lumbar spine  - MRI lumbar results discussed  -discussed importance of core strengthening, back exercises, losing weight  -okay to take tylenol    Sacroiliitis - resolved  -s/p R SIJ on 5/20/22.  Her right sided buttock pain has resolved 100% at 2 months  -FAILED Norco 5mg (cognitive side effects)    Lumbar radiculopathy  -less likely. No imaging available. If SIJ not successful, then will consider lumbar XR/MRI to further assess  -would need clearance to hold ASA    CKD 3   -last CMP showed GFR 53.   -GFR stable  -avoid nephrotoxic medications    Right hip pain  -ortho surgery does not believe that this is coming from the hip.  Will r/o SIJ and back pathology.    Prior TIA  -off plavix. Following with PCP  -continuie ASA 81    Bilateral peripheral  neuropathy  -unclear etiology. Bottom of both feet.  Monitor    DM2  -new A1c = 7.3 on 6/1/22    Anemia of chronic disease / HLD   -discused her blood test results    - RTC 4-6 weeks after RFA  - Counseled patient regarding the importance of weight loss and activity modification and physical therapy.    The above plan and management options were discussed at length with patient. Patient is in agreement with the above and verbalized understanding. It will be communicated with the referring physician via electronic record, fax, or mail.  Lab/study reports reviewed were important and necessary because subsequent medical and treatment recommendations required review of the above lab/study reports. Images viewed/reviewed above were important and necessary because subsequent medical and treatment recommendations required review of the reviewed image(s).     Electronically signed by:  Son Lara DO  03/15/2023     =========================================================================================================    SUBJECTIVE:    Interval History 3/15/2023:     Joanne Peña is a 72 y.o. female presents to the clinic for follow up.  Since last visit the pain has has worsened.  Pain right now is 5/10. Flexeril was helping. Retired in June from the [employer] office.    The pain is located in the lower back  area and radiates to the upper buttocks .  The pain is described as aching, sharp, and throbbing    At BEST  5/10   At WORST  10/10 on the WORST day.    On average pain is rated as 2/10.   Today the pain is rated as 4/10  Symptoms interfere with daily activity and sleeping.   Exacerbating factors: Standing and Walking.    Mitigating factors nothing.     Current pain medications: Flexeril (helpful but ran out), tylenol  Failed Pain Medications: cannot take NSAIDs because of the stroke. Tylenol. Short course Norco (side effects cognition), gabapentin, robaxin, oral steroids    Pain procedures:  s/p  R  SIJ on 5/20 - 100% improvement of buttock pain @ 10 months    Interval History 7/7/2022:     Joanne Peña is a 72 y.o. female presents to the clinic for follow up.  Since last visit the pain has has moderately improved.  Buttocks pain has resolved. Now with axial back pain without radiation.    The pain is located in the lower back area and does not radiate.  The pain is described as aching    At BEST  4/10   At WORST  8/10 on the WORST day.    On average pain is rated as 4/10.   Today the pain is rated as 3/10  Symptoms interfere with daily activity and sleeping.   Exacerbating factors: Standing and Walking.    Mitigating factors heat and massage.     Current pain medications: none  Failed Pain Medications: cannot take NSAIDs because of the stroke. Tylenol. Short course Norco (side effects cognition), gabapentin, robaxin, oral steroids    Interval History 6/6/2022:     Joanne Peña is a 72 y.o. female presents to the clinic for follow up.  Since last visit the pain has has significantly improved.  She is s/p Right SIJ on 5/20/22 with almost 100% improvement of the low back/buttock pain.  She no longer requires a cane or walker to ambulate.  She feels signfiicantly better.  Currently her pain is located in the midline back without radiation. Worse with flexion, standing, walking    The pain is located in the lower back area and does not radiate.  The pain is described as aching    At BEST  4/10   At WORST  8/10 on the WORST day.    On average pain is rated as 4/10.   Today the pain is rated as 6/10  Symptoms interfere with nothing .   Exacerbating factors: Walking.    Mitigating factors laying down, medications and sitting.     Current pain medications: gabapentin. Oral steroids and robaxin helped. Short course Norco  Failed Pain Medications: cannot take NSAIDs because of the stroke. Tylenol. Robaxin    Initial Hx:  Joanne Peña is a 72 y.o. female presents to the clinic for the evaluation of lower  back pain. The pain started over a month ago following no inciting event and symptoms have been worsening.  The patient had a TIA on 2/6/22.  She was on 21 days of Plavix which she has stopped. She takes a daily ASA81.  Does not feel that this is related to the stroke.  Denies any falls or trauma. She started getting around the beginning of March.  She went to the Urgent care on March 17, 2022 because the pain was not going away.  Since then the pain has been worsening. The pain pattern is the same now as it was then. The pain has prevented the patient from walking, and she has required a wheelchair since March 17.     She tried a medrol dose trae, robaxin, gabapentin which helped until she stopped the steroids and the robaxin. She is taking gabapentin 300mg TID.  She is not taking anything else for pain right now. When she gets severe pain she repositions, heating pads, gabapentin. Especially worse while trying to get out of the bed. Changing positions are very bad.    Pain Description:    The pain is located in the lower back area and radiates to the right thigh/groin/ right hip .    At BEST  10/10   At WORST  10/10 on the WORST day.    On average pain is rated as 10/10.   Today the pain is rated as 10/10  The pain is continuous.  The pain is described as aching, sharp, shooting and pulling     Symptoms interfere with daily activity, sleeping and work.   Exacerbating factors: any type of movement.    Mitigating factors heat and medications.   She reports 1 hours of sleep per night.    Physical Therapy/Home Exercise: No, not currently in physical therapy or home exercise program    Current Pain Medications:    - gabapentin. Oral steroids and robaxin helped.    Failed Pain Medications:    - cannot take NSAIDs because of the stroke. Tylenol     Pain Treatment Therapies:    Pain procedures: none  Physical Therapy: last PT 1 month ago.  Patient has a healthy back referral on 5/9  Spinal decompression: none  Joint  replacement: none     Patient denies urinary incontinence and bowel incontinence. (+) bilateral foot numbness x2 weeks.   Patient denies any suicidal or homicidal ideations     report:  Reviewed and consistent with medication use as prescribed.    Imaging:   XR hip:    FINDINGS:  The bones are intact.  There is no evidence for acute fracture or bone destruction.  There are degenerative changes with mild narrowing of the right hip joint space laterally.  Small osteophytes are present at the right hip.  Left hip and sacroiliac joints appear grossly unremarkable.     Impression:     No evidence for acute fracture, bone destruction, or dislocation.     Degenerative changes of the right hip with mild joint space narrowing laterally and small osteophytes.       XR lumbar 2022:    Mild scoliosis with convexity to the left can be seen.  Vertebral bodies are intact.  Disc spaces are maintained.  No significant bony abnormalities are noted    Past Medical History:   Diagnosis Date    Diabetes mellitus     Hyperlipidemia     Hypertension     Stroke      Past Surgical History:   Procedure Laterality Date    INJECTION OF JOINT Right 2022    Procedure: Right SI joint injection;  Surgeon: Son Lara DO;  Location: [facility];  Service: Pain Management;  Laterality: Right;     Social History     Socioeconomic History    Marital status:    Tobacco Use    Smoking status: Former     Packs/day: 0.50     Types: Cigarettes     Quit date: 2022     Years since quittin.1     Passive exposure: Past    Smokeless tobacco: Never   Substance and Sexual Activity    Alcohol use: Yes     Comment: rare    Drug use: No    Sexual activity: Yes     Partners: Male     Family History   Problem Relation Age of Onset    Kidney disease Mother     Heart disease Mother     Cancer Father     Hypertension Brother     Hypertension Daughter     Cancer Maternal Aunt        Review of patient's allergies indicates:   Allergen  Reactions    Lisinopril-hydrochlorothiazide Other (See Comments)     Pt stated it makes her feel drained. She couldn't raise arms over head.    Ampicillin Rash    Darvocet a500 [propoxyphene n-acetaminophen] Other (See Comments)     karl       Current Outpatient Medications   Medication Sig    ACCU-CHEK GUIDE GLUCOSE METER Misc TO CHECK BLOOD GLUCOSE DAILY, TO USE WITH INSURANCE PREFERRED METER    amLODIPine (NORVASC) 10 MG tablet TAKE 1 TABLET (10 MG TOTAL) BY MOUTH ONCE DAILY. HOLD IF SBP <120    ascorbic acid, vitamin C, (VITAMIN C) 500 MG tablet Take 500 mg by mouth once daily.    aspirin (ECOTRIN) 81 MG EC tablet Take 1 tablet (81 mg total) by mouth once daily.    atorvastatin (LIPITOR) 80 MG tablet TAKE 1 TABLET (80 MG TOTAL) BY MOUTH ONCE DAILY.    BLOOD PRESSURE CUFF Misc 1 Units by Misc.(Non-Drug; Combo Route) route once daily.    blood sugar diagnostic Strp To check BG daily, to use with insurance preferred meter    blood-glucose meter kit To check BG daily, to use with insurance preferred meter    cephALEXin (KEFLEX) 500 MG capsule TAKE 2 CAPSULES (1,000 MG TOTAL) BY MOUTH 2 (TWO) TIMES DAILY. FOR 10 DAYS    chlorthalidone (HYGROTEN) 25 MG Tab Take 1 tablet (25 mg total) by mouth once daily.    GAVILYTE-C 240-22.72-6.72 -5.84 gram SolR TAKE 4,000 MLS BY MOUTH ONCE. FOR 1 DOSE    glimepiride (AMARYL) 2 MG tablet TAKE 1 TABLET BY MOUTH 2 TIMES DAILY.    guanfacine HCl (GUANFACINE ORAL) Take by mouth.    lancets Misc To check BG daily, to use with insurance preferred meter    losartan (COZAAR) 100 MG tablet Take 1 tablet (100 mg total) by mouth once daily. Hold if SBP <120    methylPREDNISolone (MEDROL DOSEPACK) 4 mg tablet use as directed    multivitamin (THERAGRAN) per tablet Take 1 tablet by mouth once daily.    ondansetron (ZOFRAN-ODT) 4 MG TbDL Dissolve 1 tablet (4 mg total) by mouth every 8 (eight) hours as needed.    cyclobenzaprine (FLEXERIL) 10 MG tablet Take 1 tablet (10 mg total) by mouth 2 (two)  "times daily as needed for Muscle spasms.    gabapentin (NEURONTIN) 100 MG capsule Take 1 capsule (100 mg total) by mouth 3 (three) times daily.    hydrALAZINE (APRESOLINE) 50 MG tablet Take 1 tablet (50 mg total) by mouth every 8 (eight) hours. Hold is SBP <120 (Patient taking differently: Take 50 mg by mouth every 8 (eight) hours. Hold is SBP <120    Pt is taking once a day)    LORazepam (ATIVAN) 1 MG tablet Take 1 tablet (1 mg total) by mouth once as needed for Anxiety (before procedure).    meclizine (ANTIVERT) 25 mg tablet TAKE 1 TABLET (25 MG TOTAL) BY MOUTH 2 (TWO) TIMES DAILY AS NEEDED FOR DIZZINESS.     No current facility-administered medications for this visit.       REVIEW OF SYSTEMS:    GENERAL:  No weight loss, malaise or fevers. + fevers  HEENT:   No recent changes in vision or hearing  NECK:  Negative for lumps, no difficulty with swallowing.  RESPIRATORY:  Negative for cough, wheezing or shortness of breath, patient denies any recent URI.  CARDIOVASCULAR:  Negative for chest pain, leg swelling or palpitations.  GI:  Negative for abdominal discomfort, blood in stools or black stools or change in bowel habits.  MUSCULOSKELETAL:  See HPI.  SKIN:  Negative for lesions, rash, and itching.  PSYCH:  No mood disorder or recent psychosocial stressors.  Patients sleep is not disturbed secondary to pain.  HEMATOLOGY/LYMPHOLOGY:  Negative for prolonged bleeding, bruising easily or swollen nodes.  Patient is not currently taking any anti-coagulants  NEURO:   No history of headaches, syncope, paralysis, seizures or tremors.  All other reviewed and negative other than HPI.    OBJECTIVE:    BP (!) 155/85   Pulse 88   Resp 18   Ht 5' 7" (1.702 m)   Wt 84.8 kg (187 lb)   BMI 29.29 kg/m²     PHYSICAL EXAMINATION:    GENERAL: Well appearing, in no acute distress, alert and oriented x3.   PSYCH:  Mood and affect appropriate.  SKIN: Skin color, texture, turgor normal, no rashes or lesions.  HEAD/FACE:  Normocephalic, " atraumatic. Cranial nerves grossly intact.  CV: RRR with palpation of the radial artery.  PULM: CTAB. No evidence of respiratory difficulty, symmetric chest rise.  GI:  Soft and non-tender.    BACK:  - No obvious deformity or signs of trauma, Normal lumbar lordotic curve  - Negative spinous process tenderness  - Positive paravertebral tenderness  - Positive pain to palpation over the facet joints of the lumbar spine.   - Negative QL / Iliac crest / Glut tenderness  - Slump test is Negative for radicular pain  - Slump test is Negative for back pain  - Supine Straight leg raising is Negative for radicular pain  - Supine Straight leg raising is Negative for back pain  - Positive pain with lumbar extension, side bending  - Negative Sustained Hip Flexion test (for discogenic pain)  - Negative Altered Gait, Posture  - Axial facet loading test Positive on the bilateral side(s)    SI Joint exam:  - Negative SI joint tenderness to palpation  - Rodolfo's sign Negative  - Yeoman's Test: Unable to Perform for SI joint pain indicating anterior SI ligament involvement. Unable to Perform for anterior thigh pain/paresthesia which indicates femoral nerve stretch.  - Gaenslen's Test:Unable to Perform  - Finger Mikhail's Sign:Negative  - SI compression test:Unable to Perform  - SI distraction test:Unable to Perform  - Thigh Thrust: Unable to Perform  - SI Thrust: Unable to Perform    MUSKULOSKELETAL:    EXTREMITIES:   Hip Exam:  - Log Roll Unable to Perform  - FADIR Unable to Perform  - Stinchfield Unable to Perform  - Hip Scour Unable to Perform  - GTB Tenderness Negative      MUSCULOSKELETAL:  No atrophy or tone abnormalities are noted in the UE or LE.  No deformities, edema, or skin discoloration are noted on visible skin. Good capillary refill.    NEURO: Bilateral upper and lower extremity coordination and muscle stretch reflexes are physiologic and symmetric.      NEUROLOGICAL EXAM:  MENTAL STATUS: A x O x 3, good concentration,  speech is fluent and goal directed  MEMORY: recent and remote are intact  CN: CN2-12 grossly intact  MOTOR: 5/5 in all muscle groups  DTRs: 2+ intact symmetric  Sensation:    -no Loss of sensation in a left lower and right lower L-1, L-2, L-3, L-4 and L-5 bilaterally distribution.

## 2023-03-16 ENCOUNTER — TELEPHONE (OUTPATIENT)
Dept: PAIN MEDICINE | Facility: CLINIC | Age: 73
End: 2023-03-16
Payer: MEDICARE

## 2023-03-16 DIAGNOSIS — M46.1 SACROILIITIS: ICD-10-CM

## 2023-03-16 DIAGNOSIS — M47.816 LUMBAR SPONDYLOSIS: Primary | ICD-10-CM

## 2023-03-16 DIAGNOSIS — M51.36 DDD (DEGENERATIVE DISC DISEASE), LUMBAR: ICD-10-CM

## 2023-03-17 ENCOUNTER — TELEPHONE (OUTPATIENT)
Dept: PAIN MEDICINE | Facility: CLINIC | Age: 73
End: 2023-03-17
Payer: MEDICARE

## 2023-03-17 DIAGNOSIS — M51.36 DDD (DEGENERATIVE DISC DISEASE), LUMBAR: ICD-10-CM

## 2023-03-17 DIAGNOSIS — M54.16 LUMBAR RADICULOPATHY: ICD-10-CM

## 2023-03-17 DIAGNOSIS — M47.816 LUMBAR SPONDYLOSIS: Primary | ICD-10-CM

## 2023-03-17 DIAGNOSIS — M46.1 SACROILIITIS: ICD-10-CM

## 2023-03-23 ENCOUNTER — PATIENT MESSAGE (OUTPATIENT)
Dept: ADMINISTRATIVE | Facility: HOSPITAL | Age: 73
End: 2023-03-23
Payer: MEDICARE

## 2023-04-10 ENCOUNTER — CLINICAL SUPPORT (OUTPATIENT)
Dept: ENDOSCOPY | Facility: HOSPITAL | Age: 73
End: 2023-04-10
Attending: FAMILY MEDICINE
Payer: MEDICARE

## 2023-04-10 VITALS — WEIGHT: 187 LBS | BODY MASS INDEX: 29.35 KG/M2 | HEIGHT: 67 IN

## 2023-04-10 DIAGNOSIS — Z12.11 SPECIAL SCREENING FOR MALIGNANT NEOPLASMS, COLON: ICD-10-CM

## 2023-04-19 ENCOUNTER — TELEPHONE (OUTPATIENT)
Dept: PAIN MEDICINE | Facility: CLINIC | Age: 73
End: 2023-04-19
Payer: MEDICARE

## 2023-04-19 NOTE — TELEPHONE ENCOUNTER
Staff spoke with the patient regarding their procedure with Dr. Lara scheduled on 04/20/2023; the patient was provided the Arrival Information and scheduled time 1:00 PM.     The patient verbalized understanding.    Thank you,    Stephanie Hazel MA      Left vm, pt unavailable

## 2023-04-20 ENCOUNTER — HOSPITAL ENCOUNTER (OUTPATIENT)
Facility: HOSPITAL | Age: 73
Discharge: HOME | End: 2023-04-20
Attending: STUDENT IN AN ORGANIZED HEALTH CARE EDUCATION/TRAINING PROGRAM | Admitting: STUDENT IN AN ORGANIZED HEALTH CARE EDUCATION/TRAINING PROGRAM
Payer: MEDICARE

## 2023-04-20 ENCOUNTER — PATIENT MESSAGE (OUTPATIENT)
Dept: SURGERY | Facility: HOSPITAL | Age: 73
End: 2023-04-20
Payer: MEDICARE

## 2023-04-20 VITALS
SYSTOLIC BLOOD PRESSURE: 132 MMHG | HEART RATE: 72 BPM | RESPIRATION RATE: 18 BRPM | OXYGEN SATURATION: 93 % | DIASTOLIC BLOOD PRESSURE: 64 MMHG | BODY MASS INDEX: 29.35 KG/M2 | TEMPERATURE: 98 F | HEIGHT: 67 IN | WEIGHT: 187 LBS

## 2023-04-20 DIAGNOSIS — M47.816 LUMBAR SPONDYLOSIS: Primary | ICD-10-CM

## 2023-04-20 PROCEDURE — 64493 INJ PARAVERT F JNT L/S 1 LEV: CPT | Mod: 50 | Performed by: STUDENT IN AN ORGANIZED HEALTH CARE EDUCATION/TRAINING PROGRAM

## 2023-04-20 PROCEDURE — 64494 PR INJ DX/THER AGNT PARAVERT FACET JOINT,IMG GUIDE,LUMBAR/SAC, 2ND LEVEL: ICD-10-PCS | Mod: 50,,, | Performed by: STUDENT IN AN ORGANIZED HEALTH CARE EDUCATION/TRAINING PROGRAM

## 2023-04-20 PROCEDURE — 94761 N-INVAS EAR/PLS OXIMETRY MLT: CPT

## 2023-04-20 PROCEDURE — 64495 PR INJ DX/THER AGNT PARAVERT FACET JOINT,IMG GUIDE,LUMBAR/SAC, ADD LEVEL: ICD-10-PCS | Mod: 50,,, | Performed by: STUDENT IN AN ORGANIZED HEALTH CARE EDUCATION/TRAINING PROGRAM

## 2023-04-20 PROCEDURE — 64493 INJ PARAVERT F JNT L/S 1 LEV: CPT | Mod: 50,,, | Performed by: STUDENT IN AN ORGANIZED HEALTH CARE EDUCATION/TRAINING PROGRAM

## 2023-04-20 PROCEDURE — 64494 INJ PARAVERT F JNT L/S 2 LEV: CPT | Mod: 50,,, | Performed by: STUDENT IN AN ORGANIZED HEALTH CARE EDUCATION/TRAINING PROGRAM

## 2023-04-20 PROCEDURE — 25000003 PHARM REV CODE 250: Performed by: STUDENT IN AN ORGANIZED HEALTH CARE EDUCATION/TRAINING PROGRAM

## 2023-04-20 PROCEDURE — 64493 PR INJ DX/THER AGNT PARAVERT FACET JOINT,IMG GUIDE,LUMBAR/SAC,1ST LVL: ICD-10-PCS | Mod: 50,,, | Performed by: STUDENT IN AN ORGANIZED HEALTH CARE EDUCATION/TRAINING PROGRAM

## 2023-04-20 PROCEDURE — 64495 INJ PARAVERT F JNT L/S 3 LEV: CPT | Mod: 50 | Performed by: STUDENT IN AN ORGANIZED HEALTH CARE EDUCATION/TRAINING PROGRAM

## 2023-04-20 PROCEDURE — 64494 INJ PARAVERT F JNT L/S 2 LEV: CPT | Mod: 50 | Performed by: STUDENT IN AN ORGANIZED HEALTH CARE EDUCATION/TRAINING PROGRAM

## 2023-04-20 PROCEDURE — 64495 INJ PARAVERT F JNT L/S 3 LEV: CPT | Mod: 50,,, | Performed by: STUDENT IN AN ORGANIZED HEALTH CARE EDUCATION/TRAINING PROGRAM

## 2023-04-20 RX ORDER — BUPIVACAINE HYDROCHLORIDE 2.5 MG/ML
INJECTION, SOLUTION EPIDURAL; INFILTRATION; INTRACAUDAL
Status: DISCONTINUED | OUTPATIENT
Start: 2023-04-20 | End: 2023-04-20 | Stop reason: HOSPADM

## 2023-04-20 RX ORDER — LIDOCAINE HYDROCHLORIDE 10 MG/ML
INJECTION, SOLUTION EPIDURAL; INFILTRATION; INTRACAUDAL; PERINEURAL
Status: DISCONTINUED | OUTPATIENT
Start: 2023-04-20 | End: 2023-04-20 | Stop reason: HOSPADM

## 2023-04-20 RX ORDER — ALPRAZOLAM 0.5 MG/1
1 TABLET ORAL ONCE
Status: COMPLETED | OUTPATIENT
Start: 2023-04-20 | End: 2023-04-20

## 2023-04-20 RX ADMIN — ALPRAZOLAM 1 MG: 0.5 TABLET ORAL at 10:04

## 2023-04-20 NOTE — H&P
"HPI  Patient presenting for Procedure(s) (LRB):  MBB #1 (B/L) L3,4,5 (Bilateral)     Patient on Anti-coagulation No    No health changes since previous encounter    Past Medical History:   Diagnosis Date    Diabetes mellitus     Hyperlipidemia     Hypertension     Stroke      Past Surgical History:   Procedure Laterality Date    INJECTION OF JOINT Right 5/20/2022    Procedure: Right SI joint injection;  Surgeon: Son Lara DO;  Location: [facility] OR;  Service: Pain Management;  Laterality: Right;     Review of patient's allergies indicates:   Allergen Reactions    Lisinopril-hydrochlorothiazide Other (See Comments)     Pt stated it makes her feel drained. She couldn't raise arms over head.    Ampicillin Rash    Darvocet a500 [propoxyphene n-acetaminophen] Other (See Comments)     shaky      No current facility-administered medications for this encounter.       PMHx, PSHx, Allergies, Medications reviewed in epic    ROS negative except pain complaints in HPI    OBJECTIVE:    BP (!) 150/73 (BP Location: Right arm, Patient Position: Lying)   Pulse 78   Temp 98.1 °F (36.7 °C) (Oral)   Resp 12   Ht 5' 7" (1.702 m)   Wt 84.8 kg (187 lb)   SpO2 98%   Breastfeeding No   BMI 29.29 kg/m²     PHYSICAL EXAMINATION:    GENERAL: Well appearing, in no acute distress, alert and oriented x3.  PSYCH:  Mood and affect appropriate.  SKIN: Skin color, texture, turgor normal, no rashes or lesions which will impact the procedure.  CV: RRR with palpation of the radial artery.  PULM: No evidence of respiratory difficulty, symmetric chest rise. Clear to auscultation.  NEURO: Cranial nerves grossly intact.    Plan:    Proceed with procedure as planned Procedure(s) (LRB):  MBB #1 (B/L) L3,4,5 (Bilateral)    Son Cartagena  04/20/2023           "

## 2023-04-20 NOTE — PLAN OF CARE
Pre op complete. Questions answered. Resting comfortably. Call light in reach. Belongings in locker.

## 2023-04-20 NOTE — OP NOTE
"PROCEDURE: bilateral Lumbar L3, L4, and L5 Medial Branch Nerve Block    Patient Name: Joanne Peña  MRN: [id number]    PROCEDURE DATE: 4/20/2023    BLOCK # 1    DIAGNOSIS: Lumbar spondylosis without Myelopathy  CPT CODE: 09358 (lumbar 1st level), 70354 (2nd level), 61360 (3rd and any additional levels)    POSTPROCEDURE DIAGNOSIS Same    PHYSICIAN: Son Lara DO  NEEDLE TYPE: - 25G 3.5" Spinal Needle  MEDICATIONS INJECTED: 0.25% bupivicaine, 1ml at each level  LOCAL ANESTHETIC USED: Lidocaine 1%, 2ml at each level  CONTRAST: none    Sedation Medications - None    Estimated Blood Loss - <2ml  Drains: None  Specimens Removed: None  Urine Output - Not Measured  Complications: None  Outcome: Good    Informed Consent:  The patient's condition and proposed procedures, risks, and alternatives were discussed with the patient or responsible party.  The patient's / responsible party's questions were answered.   The patient / responsible party appeared to understand and chose to proceed.  Informed consent was obtained.    Procedure in Detail:  The patient was taken back to the OR fluoroscopy suite and placed in a prone position. The skin overlying the injection site was prepped and draped in an aseptic fashion. The target injection site (see above) was identified with fluoroscopy.     Procedural Pause:  A procedural pause verifying correct patient, medical record number, allergies, medications to be administered, current vital signs, and surgical site was performed immediately prior to beginning the procedure.    The skin and subcutaneous tissue overlying the target site of injection was anesthetized using 6 ml of 1% lidocaine MPF with a 25-gauge, 1½ -inch needle.   The above noted needle type was advanced to each target under fluoroscopic guidance parallel to the beam of the fluoroscope utilizing a bullseye approach.      Lumbar Medial Branch Blocks  The needle was then advanced to the dorsal, superior, and " medial aspect of the transverse process(es) adjacent the superior articular process at the levels and on the side(s) specified above.  An oblique view confirmed correct needle placement at the junction of the transverse process and base of the superior articular process.  A lateral image was obtained to confirm needle depth.  At each site the needle(s) rested on periosteum.    L5 Dorsal Ramus Nerve Block at Sacral Ala   The L5 dorsal ramus nerve block on the side(s) specified above was performed using a slightly oblique approach under fluoroscopic guidance, placing the needle within the groove between the sacral ala and the superior articular process of S1.  A 10-20° oblique view confirmed proper needle placement.  A lateral image was obtained to confirm needle depth.  The needle rested on periosteum.      After negative aspiration for heme or CSF, the above noted contrast was injected and persisted at each site under fluoroscopy, demonstrating absence of vascular uptake.   After negative aspiration for heme or CSF, the above noted solution was slowly injected at each site to avoid forcing the solution away from the target point(s).  The needle(s) were then removed.   The same procedural technique outlined above was repeated on the OPPOSITE side.    The heart rate, pulse oximetry, and blood pressure were continuously monitored throughout the procedure.  The procedure was well tolerated. She was carefully escorted to the recovery room in stable condition. Patient was monitored by RN for recovery period.  The patient will be contacted in the next few days to determine extent of relief.  Patient was given post procedure and discharge instructions to follow at home.  The patient was discharged in a stable condition.    Note Electronically Signed by:  Son Cartagena  04/20/2023

## 2023-04-20 NOTE — PATIENT INSTRUCTIONS
[facility]  [facility]  [employer] service # [phone]    MEDIAL BRANCH BLOCK POST-PROCEDURE INSTRUCTIONS:    What you need to do:  Keep a record of your response to the injection you had today.  It is very important that you accurately record how you responded to today's injection.  Please try to separate any soreness from the needle from your usual pain.  We want to know how this affects your usual pain.  Also REMEMBER that today's injection is a DIAGNOSTIC block, the pain relief will only last for a few hours to a few days.  Insurance requires 2 of these blocks before progressing to the ablation.  The sole purpose of this injection is to give us information, and not to treat your pain for an extended period.    Think about the amount of pain that you would have doing the most painful activities (I.e. bending, twisting, walking, standing straight, cleaning, etc...).  Now do those same activities as soon as you get home from the hospital.  Think about how much better you feel doing those activities now that the injection is done.  Does it feel 50%, 80%, 100% better than it would have otherwise?  This is the number you need to send me.    Send me a message through [facility] or leave a message at the phone number above telling me what % of improvement you got from the injection.    If you have difficulty putting a percentage on your pain relief fill this out:  (Rate each question below from 0-10 with 0 being no pain and 10 being the worst imaginable pain)    How bad would your pain get during activities like walking/going up stairs BEFORE the injection? _______    For the first 8 hours AFTER the injection, when you did those same painful activities, what was you best pain score? ____________    Send me those two numbers if you aren't able to give a  percentage.  ---------------------------------------------------------------------------------------------------------------------------------------------------------------------------------------------  What to watch out for:    If you experience any of the following symptoms after your procedure, please notify the messaging service immediately (see above for contact information):   fever (increased oral temperature)   bleeding or swelling at the injection site,    drainage, rash or redness at the injection site    possible signs of infection    increased pain at the injection site   worsening of your usual pain   severe headache   new or worsening numbness    new arm and/or leg weakness, or    changes in bowel and/or bladder function: urinating or defecating on yourself and not knowing that you did it.    PLEASE FOLLOW ALL INSTRUCTIONS CAREFULLY     Do not engage in strenuous activity (e.g., lifting or pushing heavy objects or repeated bending) for 24 hours.     Do not take a bath, swim or use Jacuzzi for 24 hours after procedure. (A shower is fine).   Remove any Band-Aids when you get home.    Use cold/ice, as needed for comfort.  We recommend the use of cold therapy alternating on for 20 minutes, off for 20 minutes.    Do not apply direct heat (heating pad or heat packs) to the injection site for 24 hours.     Resume your usual medications, unless instructed otherwise by your Pain Physician.     If you are on warfarin (Coumadin) or other blood thinner, resume this medication as instructed by your prescribing Physician.    IF AT ANY POINT YOU ARE VERY CONCERNED ABOUT YOUR SYMPTOMS, PLEASE GO TO THE EMERGENCY ROOM.    If you develop worsening pain, weakness, numbness, lose bowel or bladder control (i.e., having an accident where you did not even know you had to go to the bathroom and suddenly noticed you soiled yourself), saddle anesthesia (a loss of sensation restricted to the area of the buttocks, anus and  between the legs -- i.e., those parts of your body that would touch a saddle if you were sitting on one) you need to go immediately to the emergency department for evaluation and treatment.    ----------------------------------------------------------------------------------------------------------------------------------------------------------------  If you received Sedation please read the following instructions:  POST SEDATION INSTRUCTIONS    Today you received intravenous medication (also known as sedation) that was used to help you relax and/or decrease discomfort during your procedure. This medication will be acting in your body for the next 24 hours, so you might feel a little tired or sleepy. This feeling will slowly wear off.   Common side effects associated with these medications include: drowsiness, dizziness, sleepiness, confusion, feeling excited, difficulty remembering things, lack of steadiness with walking or balance, loss of fine muscle control, slowed reflexes, difficulty focusing, and blurred vision.  Some over-the-counter and prescription medications (e.g., muscle relaxants, opioids, mood-altering medications, sedatives/hypnotics, antihistamines) can interact with the intravenous medication you received and cause an increased risk of the side effects listed above in addition to other potentially life threatening side effects. Use extreme caution if you are taking such medications, and consult with your Pain Physician or prescribing physician if you have any questions.  For the next 12-24 hours:    DO NOT--Drive a car, operate machinery or power tools   DO NOT--Drink any alcoholic beverages (not even beer), they may dangerously increase the risk of side effects.    DO NOT--Make any important legal or business decisions or sign important documents.  We advise you to have someone to assist you at home. Move slowly and carefully. Do not make sudden changes in position. Be aware of dizziness or  light-headedness and move accordingly.   If you seek medical treatment within 24 hours, let the nurse or doctor caring for you know that you have received the above medications. If you have any questions or concerns related to your sedation or treatment today please contact us.

## 2023-04-20 NOTE — DISCHARGE SUMMARY
[place] - Surgery (Hospital)  Discharge Note  Short Stay    Procedure(s) (LRB):  MBB #1 (B/L) L3,4,5 (Bilateral)      OUTCOME: Patient tolerated treatment/procedure well without complication and is now ready for discharge.    DISPOSITION: Home or Self Care    FINAL DIAGNOSIS:  <principal problem not specified>    FOLLOWUP: In clinic    DISCHARGE INSTRUCTIONS:  No discharge procedures on file.     TIME SPENT ON DISCHARGE: 10 minutes

## 2023-04-20 NOTE — PLAN OF CARE
VSS.  Patient tolerating oral liquids without difficulty.   No apparent s&s of distress noted at this time, no complaints voiced at this time.   Discharge instructions reviewed with patient/family/friend with good verbal feedback received.   No post op medications ordered.   Patient ready for discharge.

## 2023-05-03 ENCOUNTER — TELEPHONE (OUTPATIENT)
Dept: PAIN MEDICINE | Facility: CLINIC | Age: 73
End: 2023-05-03
Payer: MEDICARE

## 2023-05-04 NOTE — TELEPHONE ENCOUNTER
Contacted pt to advise of procedure location and time of arrival.  Pt. Was unavailable, unable to leave , mailbox was full.

## 2023-05-05 ENCOUNTER — HOSPITAL ENCOUNTER (OUTPATIENT)
Facility: HOSPITAL | Age: 73
Discharge: HOME | End: 2023-05-05
Attending: STUDENT IN AN ORGANIZED HEALTH CARE EDUCATION/TRAINING PROGRAM | Admitting: STUDENT IN AN ORGANIZED HEALTH CARE EDUCATION/TRAINING PROGRAM
Payer: MEDICARE

## 2023-05-05 VITALS
WEIGHT: 187 LBS | DIASTOLIC BLOOD PRESSURE: 77 MMHG | HEIGHT: 67 IN | SYSTOLIC BLOOD PRESSURE: 137 MMHG | TEMPERATURE: 98 F | HEART RATE: 74 BPM | OXYGEN SATURATION: 95 % | RESPIRATION RATE: 18 BRPM | BODY MASS INDEX: 29.35 KG/M2

## 2023-05-05 DIAGNOSIS — M47.816 LUMBAR SPONDYLOSIS: Primary | ICD-10-CM

## 2023-05-05 DIAGNOSIS — M47.816 SPONDYLOSIS OF LUMBAR REGION WITHOUT MYELOPATHY OR RADICULOPATHY: ICD-10-CM

## 2023-05-05 PROCEDURE — 94761 N-INVAS EAR/PLS OXIMETRY MLT: CPT

## 2023-05-05 PROCEDURE — 64494 PR INJ DX/THER AGNT PARAVERT FACET JOINT,IMG GUIDE,LUMBAR/SAC, 2ND LEVEL: ICD-10-PCS | Mod: 50,,, | Performed by: STUDENT IN AN ORGANIZED HEALTH CARE EDUCATION/TRAINING PROGRAM

## 2023-05-05 PROCEDURE — 25000003 PHARM REV CODE 250: Performed by: STUDENT IN AN ORGANIZED HEALTH CARE EDUCATION/TRAINING PROGRAM

## 2023-05-05 PROCEDURE — 64494 INJ PARAVERT F JNT L/S 2 LEV: CPT | Mod: 50 | Performed by: STUDENT IN AN ORGANIZED HEALTH CARE EDUCATION/TRAINING PROGRAM

## 2023-05-05 PROCEDURE — 64493 INJ PARAVERT F JNT L/S 1 LEV: CPT | Mod: 50 | Performed by: STUDENT IN AN ORGANIZED HEALTH CARE EDUCATION/TRAINING PROGRAM

## 2023-05-05 PROCEDURE — 64493 PR INJ DX/THER AGNT PARAVERT FACET JOINT,IMG GUIDE,LUMBAR/SAC,1ST LVL: ICD-10-PCS | Mod: 50,,, | Performed by: STUDENT IN AN ORGANIZED HEALTH CARE EDUCATION/TRAINING PROGRAM

## 2023-05-05 PROCEDURE — 64494 INJ PARAVERT F JNT L/S 2 LEV: CPT | Mod: 50,,, | Performed by: STUDENT IN AN ORGANIZED HEALTH CARE EDUCATION/TRAINING PROGRAM

## 2023-05-05 PROCEDURE — 64493 INJ PARAVERT F JNT L/S 1 LEV: CPT | Mod: 50,,, | Performed by: STUDENT IN AN ORGANIZED HEALTH CARE EDUCATION/TRAINING PROGRAM

## 2023-05-05 RX ORDER — ALPRAZOLAM 0.5 MG/1
1 TABLET ORAL ONCE AS NEEDED
Status: COMPLETED | OUTPATIENT
Start: 2023-05-05 | End: 2023-05-05

## 2023-05-05 RX ORDER — BUPIVACAINE HYDROCHLORIDE 2.5 MG/ML
INJECTION, SOLUTION EPIDURAL; INFILTRATION; INTRACAUDAL
Status: DISCONTINUED | OUTPATIENT
Start: 2023-05-05 | End: 2023-05-05 | Stop reason: HOSPADM

## 2023-05-05 RX ORDER — LIDOCAINE HYDROCHLORIDE 10 MG/ML
INJECTION, SOLUTION EPIDURAL; INFILTRATION; INTRACAUDAL; PERINEURAL
Status: DISCONTINUED | OUTPATIENT
Start: 2023-05-05 | End: 2023-05-05 | Stop reason: HOSPADM

## 2023-05-05 RX ADMIN — ALPRAZOLAM 1 MG: 0.5 TABLET ORAL at 10:05

## 2023-05-05 NOTE — PLAN OF CARE
VSS. Patient able to tolerate oral liquids. Patient denies c/o pain. Dressing intact. No distress noted. Patient states she is ready for discharge. Discharge instructions reviewed with patient and family, verbalized understanding. Family at bedside to help patient dress. Patient wheeled to lobby via staff.

## 2023-05-05 NOTE — OP NOTE
"PROCEDURE: bilateral Lumbar L3, L4, and L5 Medial Branch Nerve Block    Patient Name: Joanne Peña  MRN: [id number]    PROCEDURE DATE: 5/5/2023    BLOCK # 2    DIAGNOSIS: Lumbar spondylosis without Myelopathy  CPT CODE: 02979 (lumbar 1st level), 00314 (2nd level), 70133 (3rd and any additional levels)    POSTPROCEDURE DIAGNOSIS Same    PHYSICIAN: Son Lara DO  NEEDLE TYPE: - 25G 3.5" Spinal Needle  MEDICATIONS INJECTED: 0.25% bupivicaine, 1ml at each level  LOCAL ANESTHETIC USED: Lidocaine 1%, 2ml at each level  CONTRAST: none    Sedation Medications - None    Estimated Blood Loss - <2ml  Drains: None  Specimens Removed: None  Urine Output - Not Measured  Complications: None  Outcome: Good    Informed Consent:  The patient's condition and proposed procedures, risks, and alternatives were discussed with the patient or responsible party.  The patient's / responsible party's questions were answered.   The patient / responsible party appeared to understand and chose to proceed.  Informed consent was obtained.    Procedure in Detail:  The patient was taken back to the OR fluoroscopy suite and placed in a prone position. The skin overlying the injection site was prepped and draped in an aseptic fashion. The target injection site (see above) was identified with fluoroscopy.     Procedural Pause:  A procedural pause verifying correct patient, medical record number, allergies, medications to be administered, current vital signs, and surgical site was performed immediately prior to beginning the procedure.    The skin and subcutaneous tissue overlying the target site of injection was anesthetized using 6 ml of 1% lidocaine MPF with a 25-gauge, 1½ -inch needle.   The above noted needle type was advanced to each target under fluoroscopic guidance parallel to the beam of the fluoroscope utilizing a bullseye approach.      Lumbar Medial Branch Blocks  The needle was then advanced to the dorsal, superior, and " medial aspect of the transverse process(es) adjacent the superior articular process at the levels and on the side(s) specified above.  An oblique view confirmed correct needle placement at the junction of the transverse process and base of the superior articular process.  A lateral image was obtained to confirm needle depth.  At each site the needle(s) rested on periosteum.    L5 Dorsal Ramus Nerve Block at Sacral Ala   The L5 dorsal ramus nerve block on the side(s) specified above was performed using a slightly oblique approach under fluoroscopic guidance, placing the needle within the groove between the sacral ala and the superior articular process of S1.  A 10-20° oblique view confirmed proper needle placement.  A lateral image was obtained to confirm needle depth.  The needle rested on periosteum.      After negative aspiration for heme or CSF, the above noted contrast was injected and persisted at each site under fluoroscopy, demonstrating absence of vascular uptake.   After negative aspiration for heme or CSF, the above noted solution was slowly injected at each site to avoid forcing the solution away from the target point(s).  The needle(s) were then removed.   The same procedural technique outlined above was repeated on the OPPOSITE side.    The heart rate, pulse oximetry, and blood pressure were continuously monitored throughout the procedure.  The procedure was well tolerated. She was carefully escorted to the recovery room in stable condition. Patient was monitored by RN for recovery period.  The patient will be contacted in the next few days to determine extent of relief.  Patient was given post procedure and discharge instructions to follow at home.  The patient was discharged in a stable condition.    Note Electronically Signed by:  Son Cartagena  05/05/2023

## 2023-05-05 NOTE — H&P
HPI  Patient presenting for Procedure(s) (LRB):  MBB #2 (B/L) L3,4,5 (Bilateral)     Patient on Anti-coagulation No    No health changes since previous encounter    Past Medical History:   Diagnosis Date    Diabetes mellitus     Hyperlipidemia     Hypertension     Stroke      Past Surgical History:   Procedure Laterality Date    INJECTION OF ANESTHETIC AGENT AROUND MEDIAL BRANCH NERVES INNERVATING LUMBAR FACET JOINT Bilateral 4/20/2023    Procedure: MBB #1 (B/L) L3,4,5;  Surgeon: Son Lara DO;  Location: [facility] OR;  Service: Pain Management;  Laterality: Bilateral;  ORAL XANAX    INJECTION OF JOINT Right 5/20/2022    Procedure: Right SI joint injection;  Surgeon: Son Lara DO;  Location: [facility] OR;  Service: Pain Management;  Laterality: Right;     Review of patient's allergies indicates:   Allergen Reactions    Lisinopril-hydrochlorothiazide Other (See Comments)     Pt stated it makes her feel drained. She couldn't raise arms over head.    Ampicillin Rash    Darvocet a500 [propoxyphene n-acetaminophen] Other (See Comments)     shaky      No current facility-administered medications for this encounter.       PMHx, PSHx, Allergies, Medications reviewed in epic    ROS negative except pain complaints in HPI    OBJECTIVE:    There were no vitals taken for this visit.    PHYSICAL EXAMINATION:    GENERAL: Well appearing, in no acute distress, alert and oriented x3.  PSYCH:  Mood and affect appropriate.  SKIN: Skin color, texture, turgor normal, no rashes or lesions which will impact the procedure.  CV: RRR with palpation of the radial artery.  PULM: No evidence of respiratory difficulty, symmetric chest rise. Clear to auscultation.  NEURO: Cranial nerves grossly intact.    Plan:    Proceed with procedure as planned Procedure(s) (LRB):  MBB #2 (B/L) L3,4,5 (Bilateral)    Son Cartagena  05/05/2023

## 2023-05-05 NOTE — DISCHARGE SUMMARY
[place] - Surgery (Hospital)  Discharge Note  Short Stay    Procedure(s) (LRB):  MBB #2 (B/L) L3,4,5 (Bilateral)      OUTCOME: Patient tolerated treatment/procedure well without complication and is now ready for discharge.    DISPOSITION: Home or Self Care    FINAL DIAGNOSIS:  <principal problem not specified>    FOLLOWUP: In clinic    DISCHARGE INSTRUCTIONS:  No discharge procedures on file.     TIME SPENT ON DISCHARGE: 10 minutes

## 2023-05-05 NOTE — PATIENT INSTRUCTIONS
[facility]  [facility]  [employer] service # [phone]    MEDIAL BRANCH BLOCK POST-PROCEDURE INSTRUCTIONS:    What you need to do:  Keep a record of your response to the injection you had today.  It is very important that you accurately record how you responded to today's injection.  Please try to separate any soreness from the needle from your usual pain.  We want to know how this affects your usual pain.  Also REMEMBER that today's injection is a DIAGNOSTIC block, the pain relief will only last for a few hours to a few days.  Insurance requires 2 of these blocks before progressing to the ablation.  The sole purpose of this injection is to give us information, and not to treat your pain for an extended period.    Think about the amount of pain that you would have doing the most painful activities (I.e. bending, twisting, walking, standing straight, cleaning, etc...).  Now do those same activities as soon as you get home from the hospital.  Think about how much better you feel doing those activities now that the injection is done.  Does it feel 50%, 80%, 100% better than it would have otherwise?  This is the number you need to send me.    Send me a message through [facility] or leave a message at the phone number above telling me what % of improvement you got from the injection.    If you have difficulty putting a percentage on your pain relief fill this out:  (Rate each question below from 0-10 with 0 being no pain and 10 being the worst imaginable pain)    How bad would your pain get during activities like walking/going up stairs BEFORE the injection? _______    For the first 8 hours AFTER the injection, when you did those same painful activities, what was you best pain score? ____________    Send me those two numbers if you aren't able to give a  percentage.  ---------------------------------------------------------------------------------------------------------------------------------------------------------------------------------------------  What to watch out for:    If you experience any of the following symptoms after your procedure, please notify the messaging service immediately (see above for contact information):   fever (increased oral temperature)   bleeding or swelling at the injection site,    drainage, rash or redness at the injection site    possible signs of infection    increased pain at the injection site   worsening of your usual pain   severe headache   new or worsening numbness    new arm and/or leg weakness, or    changes in bowel and/or bladder function: urinating or defecating on yourself and not knowing that you did it.    PLEASE FOLLOW ALL INSTRUCTIONS CAREFULLY     Do not engage in strenuous activity (e.g., lifting or pushing heavy objects or repeated bending) for 24 hours.     Do not take a bath, swim or use Jacuzzi for 24 hours after procedure. (A shower is fine).   Remove any Band-Aids when you get home.    Use cold/ice, as needed for comfort.  We recommend the use of cold therapy alternating on for 20 minutes, off for 20 minutes.    Do not apply direct heat (heating pad or heat packs) to the injection site for 24 hours.     Resume your usual medications, unless instructed otherwise by your Pain Physician.     If you are on warfarin (Coumadin) or other blood thinner, resume this medication as instructed by your prescribing Physician.    IF AT ANY POINT YOU ARE VERY CONCERNED ABOUT YOUR SYMPTOMS, PLEASE GO TO THE EMERGENCY ROOM.    If you develop worsening pain, weakness, numbness, lose bowel or bladder control (i.e., having an accident where you did not even know you had to go to the bathroom and suddenly noticed you soiled yourself), saddle anesthesia (a loss of sensation restricted to the area of the buttocks, anus and  between the legs -- i.e., those parts of your body that would touch a saddle if you were sitting on one) you need to go immediately to the emergency department for evaluation and treatment.    ----------------------------------------------------------------------------------------------------------------------------------------------------------------  If you received Sedation please read the following instructions:  POST SEDATION INSTRUCTIONS    Today you received intravenous medication (also known as sedation) that was used to help you relax and/or decrease discomfort during your procedure. This medication will be acting in your body for the next 24 hours, so you might feel a little tired or sleepy. This feeling will slowly wear off.   Common side effects associated with these medications include: drowsiness, dizziness, sleepiness, confusion, feeling excited, difficulty remembering things, lack of steadiness with walking or balance, loss of fine muscle control, slowed reflexes, difficulty focusing, and blurred vision.  Some over-the-counter and prescription medications (e.g., muscle relaxants, opioids, mood-altering medications, sedatives/hypnotics, antihistamines) can interact with the intravenous medication you received and cause an increased risk of the side effects listed above in addition to other potentially life threatening side effects. Use extreme caution if you are taking such medications, and consult with your Pain Physician or prescribing physician if you have any questions.  For the next 12-24 hours:    DO NOT--Drive a car, operate machinery or power tools   DO NOT--Drink any alcoholic beverages (not even beer), they may dangerously increase the risk of side effects.    DO NOT--Make any important legal or business decisions or sign important documents.  We advise you to have someone to assist you at home. Move slowly and carefully. Do not make sudden changes in position. Be aware of dizziness or  light-headedness and move accordingly.   If you seek medical treatment within 24 hours, let the nurse or doctor caring for you know that you have received the above medications. If you have any questions or concerns related to your sedation or treatment today please contact us.

## 2023-05-05 NOTE — PROGRESS NOTES
Pre op completed. Patient belongings to be placed in locker. Bed in lowest position. Call light within reach. Family at beside.

## 2023-05-15 ENCOUNTER — PATIENT MESSAGE (OUTPATIENT)
Dept: PAIN MEDICINE | Facility: CLINIC | Age: 73
End: 2023-05-15
Payer: MEDICARE

## 2023-05-18 ENCOUNTER — TELEPHONE (OUTPATIENT)
Dept: PAIN MEDICINE | Facility: CLINIC | Age: 73
End: 2023-05-18
Payer: MEDICARE

## 2023-05-19 ENCOUNTER — HOSPITAL ENCOUNTER (OUTPATIENT)
Facility: HOSPITAL | Age: 73
Discharge: HOME | End: 2023-05-19
Attending: STUDENT IN AN ORGANIZED HEALTH CARE EDUCATION/TRAINING PROGRAM | Admitting: STUDENT IN AN ORGANIZED HEALTH CARE EDUCATION/TRAINING PROGRAM
Payer: MEDICARE

## 2023-05-19 VITALS
DIASTOLIC BLOOD PRESSURE: 69 MMHG | HEIGHT: 67 IN | WEIGHT: 180 LBS | TEMPERATURE: 98 F | RESPIRATION RATE: 14 BRPM | OXYGEN SATURATION: 95 % | BODY MASS INDEX: 28.25 KG/M2 | HEART RATE: 84 BPM | SYSTOLIC BLOOD PRESSURE: 141 MMHG

## 2023-05-19 DIAGNOSIS — M47.816 SPONDYLOSIS OF LUMBAR REGION WITHOUT MYELOPATHY OR RADICULOPATHY: Primary | ICD-10-CM

## 2023-05-19 LAB — GLUCOSE SERPL-MCNC: 173 MG/DL (ref 70–110)

## 2023-05-19 PROCEDURE — 64636 PR DESTROY L/S FACET JNT ADDL: ICD-10-PCS | Mod: 50,,, | Performed by: STUDENT IN AN ORGANIZED HEALTH CARE EDUCATION/TRAINING PROGRAM

## 2023-05-19 PROCEDURE — 64636 DESTROY L/S FACET JNT ADDL: CPT | Mod: 50,,, | Performed by: STUDENT IN AN ORGANIZED HEALTH CARE EDUCATION/TRAINING PROGRAM

## 2023-05-19 PROCEDURE — 25000003 PHARM REV CODE 250: Performed by: STUDENT IN AN ORGANIZED HEALTH CARE EDUCATION/TRAINING PROGRAM

## 2023-05-19 PROCEDURE — 64636 DESTROY L/S FACET JNT ADDL: CPT | Mod: 50 | Performed by: STUDENT IN AN ORGANIZED HEALTH CARE EDUCATION/TRAINING PROGRAM

## 2023-05-19 PROCEDURE — 64635 DESTROY LUMB/SAC FACET JNT: CPT | Mod: 50,,, | Performed by: STUDENT IN AN ORGANIZED HEALTH CARE EDUCATION/TRAINING PROGRAM

## 2023-05-19 PROCEDURE — 82962 GLUCOSE BLOOD TEST: CPT | Performed by: STUDENT IN AN ORGANIZED HEALTH CARE EDUCATION/TRAINING PROGRAM

## 2023-05-19 PROCEDURE — 64635 PR DESTROY LUMB/SAC FACET JNT: ICD-10-PCS | Mod: 50,,, | Performed by: STUDENT IN AN ORGANIZED HEALTH CARE EDUCATION/TRAINING PROGRAM

## 2023-05-19 PROCEDURE — 63600175 PHARM REV CODE 636 W HCPCS: Performed by: STUDENT IN AN ORGANIZED HEALTH CARE EDUCATION/TRAINING PROGRAM

## 2023-05-19 PROCEDURE — 64635 DESTROY LUMB/SAC FACET JNT: CPT | Mod: 50 | Performed by: STUDENT IN AN ORGANIZED HEALTH CARE EDUCATION/TRAINING PROGRAM

## 2023-05-19 RX ORDER — FENTANYL CITRATE 50 UG/ML
25 INJECTION, SOLUTION INTRAMUSCULAR; INTRAVENOUS ONCE AS NEEDED
Status: DISCONTINUED | OUTPATIENT
Start: 2023-05-19 | End: 2023-05-19

## 2023-05-19 RX ORDER — ALPRAZOLAM 0.5 MG/1
1 TABLET, ORALLY DISINTEGRATING ORAL ONCE
Status: COMPLETED | OUTPATIENT
Start: 2023-05-19 | End: 2023-05-19

## 2023-05-19 RX ORDER — SODIUM CHLORIDE 9 MG/ML
500 INJECTION, SOLUTION INTRAVENOUS CONTINUOUS
Status: DISCONTINUED | OUTPATIENT
Start: 2023-05-19 | End: 2023-05-19 | Stop reason: HOSPADM

## 2023-05-19 RX ORDER — ALPRAZOLAM 0.5 MG/1
1 TABLET ORAL ONCE
Status: DISCONTINUED | OUTPATIENT
Start: 2023-05-19 | End: 2023-05-19

## 2023-05-19 RX ORDER — LIDOCAINE HYDROCHLORIDE 20 MG/ML
INJECTION, SOLUTION EPIDURAL; INFILTRATION; INTRACAUDAL; PERINEURAL
Status: DISCONTINUED | OUTPATIENT
Start: 2023-05-19 | End: 2023-05-19 | Stop reason: HOSPADM

## 2023-05-19 RX ORDER — MIDAZOLAM HYDROCHLORIDE 1 MG/ML
2 INJECTION INTRAMUSCULAR; INTRAVENOUS ONCE AS NEEDED
Status: DISCONTINUED | OUTPATIENT
Start: 2023-05-19 | End: 2023-05-19

## 2023-05-19 RX ORDER — DEXAMETHASONE SODIUM PHOSPHATE 100 MG/10ML
INJECTION INTRAMUSCULAR; INTRAVENOUS
Status: DISCONTINUED | OUTPATIENT
Start: 2023-05-19 | End: 2023-05-19 | Stop reason: HOSPADM

## 2023-05-19 RX ADMIN — ALPRAZOLAM 1 MG: 0.5 TABLET, ORALLY DISINTEGRATING ORAL at 10:05

## 2023-05-19 NOTE — OP NOTE
PROCEDURE: bilateral LUMBAR L3, L4, and L5 FACET MEDIAL BRANCH NERVE RADIOFREQUENCY NEUROTOMY    Patient Name: Joanne Peña  MRN: [id number]    PROCEDURE DATE: 5/19/2023    DIAGNOSIS: Lumbar spondylosis without Myelopathy    CPT CODE:   25120 (Destruction by neurolytic agent, paravertebral facet joint nerve(s); lumbar or sacral, single facet joint) 61720 (2nd level)  85636 (each additional level)    POSTPROCEDURE DIAGNOSIS Same    Physician: Son Lara DO  Needle type: 20G 100mm curved RF Needles  Local Anesthetic: Lidocaine 1%, 2ml at each site  Medications Injection Before Ablation: Lidocaine 2%, 1-1.5cc at each site  Medication Injected After Ablation: 1cc of 4ml 0.9% Normal Saline + 8mg Dexamethasone (4mg/ml) mixture at each site.    Sedation Medications - None Oral Xanax 1mg  IV Fluids: See nursing flowsheet  Estimated Blood Loss - <5ml  Drains: None  Specimens Removed: None  Urine Output - Not Measured  Complications: left side during ablation patient started feeling pain down the leg.  Needles were adjusted and ablation continued without incident  Outcome: Good    Informed Consent:  The patient's condition and proposed procedures, risks, and alternatives were discussed with the patient or responsible party.  The patient's / responsible party's questions were answered.   The patient / responsible party appeared to understand and chose to proceed.  Informed consent was obtained.    After obtaining written consent, an IV hep lock was placed. (See nurses documentation for details).     Procedure in Detail:  The patient was taken back to the OR fluoroscopy suite and placed in a prone position supported by pillows to maintain positional alignment.      Procedural Pause:  A procedural pause verifying correct patient, medical record number, allergies, medications to be administered, current vital signs, and surgical site was performed immediately prior to beginning the procedure.  Continuous  hemodynamic monitoring was initiated including blood pressure and pulse oximetry. IV sedation was administered to allow the patient to remain comfortable but conversant throughout the procedure.    The skin overlying the injection site was prepped and draped in an aseptic fashion. The target injection site (see above) was identified with fluoroscopy and marked.   The skin and subcutaneous tissue overlying the target site of injection was anesthetized using the above noted local anesthetic with a 25-gauge, 1½ -inch needle.  The above noted needle type was advanced to each target under fluoroscopic guidance parallel to the beam of the fluoroscope utilizing a bullseye approach.  An AP image of the lumbar spine was used to identify the lumbar vertebral bodies and the sacral ala. The target locations at the above noted side and level corresponding to the above noted transverse processes were identified. The needle was then advanced to the dorsal, superior, and medial aspect of the transverse process(es) adjacent the superior articular process at the levels and on the side(s) specified above.  An oblique view confirmed correct needle placement at the junction of the transverse process and base of the superior articular process.  A lateral image was obtained to confirm needle depth.  At each site the needle(s) rested on periosteum.    L5 Dorsal Ramus Nerve at the Sacral Ala.  Needle positioning of the L5 dorsal ramus nerve on the side(s) specified above was performed using a slightly oblique approach under fluoroscopic guidance, placing the needle within the groove between the sacral ala and the superior articular process of S1. A 10-20°  oblique view confirmed proper needle placement.  A lateral image was obtained to confirm needle depth.  The needle rested on periosteum.      Stimulation was performed at each level once all the cannulae were in position, and the impedance was noted to be in the normal range of 100 -  800 ohms. Good stimulation to the lumbar and buttock region was elicited, indicating correct alignment with the posterior primary ramus. Sensory stimulation at 50Hz below 0.5V was achieved at every level.  Absence of lower extremity motor fasciculation was noted at 2 volts at 2 Hz stimulation during testing. Following this confirmatory stimulation, negative aspiration for heme or CSF was noted at each level. Before ablating the nerve(s), the above noted anesthetic solution was injected at each nerve. After a 30 second delay, lesions were performed at a temperature of 80°C for a total of 90 seconds. After ablating the nerve(s), the above noted anesthetic solution was injected at each nerve. Then, the needle(s) were sequentially removed. At the end of the procedure it was noted that the patient could purposefully move all four extremities.     The same procedural technique outlined above was repeated on the OPPOSITE side.     The patient was monitored after the procedure in the recovery room.  Patient was given post procedure and discharge instructions to follow at home.  After meeting discharge criteria, the patient was discharged in a stable condition.    Note Electronically Signed By:  Son Cartagena  05/19/2023

## 2023-05-19 NOTE — PLAN OF CARE
"Prior to discharge, patient was drowsy.Patient was given juice and prompted to stay awake. Patient monitored for additional post op time. VSS, patient verbalized " I feel like I'm awake and can stand up." Patient was assessed; able to stand and was alert.  "

## 2023-05-19 NOTE — PATIENT INSTRUCTIONS
[facility]  [facility]  [employer] service # [phone]    RADIOFREQUENCY ABLATION POST-PROCEDURE INSTRUCTIONS:    Today you had a procedure called a radiofrequency ablation.  This is a procedure to ablate (or burn) the nerve that send pain signals from the small joints in your back or neck.  The ablation procedure can take 4-6 weeks for the nerve to die off.  Do not be surprised if your normal pain returns after the procedure and then should slowly improve over the course of the next few weeks.  Typically people do not get as much relief from the ablation as they do with the block, but the ablation should last much longer  The nerves will grow back!  The procedure can be repeated (without repeating the blocks) as long as you get 50% or more pain relief for at least 6 months.  So try to pay attention to when/if the pain returns.  The older we are, the slower the nerves regenerate.  I have seen patients get up to 5 years of pain improvement from this procedure, but more common in around 1 year.  It is important to combine this procedure with an increase in appropriate physical activity.  Strengthening the surrounding muscles will help this therapy last longer and provide better pain relief.  If you need a referral to physical therapy please let the office know.  Try to use this as an opportunity to decrease your pain medications if you are on any.  If you do not have a follow up appointment scheduled, please contact our office to get a post-procedure follow up scheduled 4-6 weeks after the procedure.  This can be done as a virtual visit if that is more convenient for you.    The procedure you had also included a steroid medication.  The steroid was mixed with a local anesthetic when it was injected to help prevent post-ablation pain.   If the procedure was in the neck, you may feel some pressure, numbness, or slight weakness in the arm after the procedure for a short period of  time (this is a normal response), if this persists for longer than 1 day please contact our office or go to the emergency room.  If the procedure was in the low back, you may feel some pressure, numbness, or slight weakness in the leg after the procedure for a short period of time (this is a normal response), if this persists for longer than 1 day please contact our office or go to the emergency room.  You may get side effects from the steroid.  This is not uncommon.  Symptoms include: elevated blood sugar, elevated blood pressure, headache, flushing, nausea, insomnia.  These symptoms are transient and will resolve within 1-3 days.  If symptoms last longer than this please contact our office or head to the emergency room.  Steroid medications can take anywhere from 3-14 days to take effect (rarely longer).  You may notice that your pain worsens for a short period of time after the injection, this would not be unusual due to the pressure and trauma from the needle.    What you need to do:    Keep a record of your response to the injection you had today.    How much relief did you get?   When did the relief start and how long did it last?  Were you able to decrease the use of any of your pain medications?  Were you able to increase your level of activity?  How long did the relief last?    What to watch out for:    If you experience any of the following symptoms after your procedure, please notify the messaging service immediately (see above for contact information):   fever (increased oral temperature)   bleeding or swelling at the injection site,    drainage, rash or redness at the injection site    possible signs of infection    increased pain at the injection site   worsening of your usual pain   severe headache   new or worsening numbness    new arm and/or leg weakness, or    changes in bowel and/or bladder function: urinating or defecating on yourself and not knowing that you did it.    PLEASE FOLLOW ALL  INSTRUCTIONS CAREFULLY     Do not engage in strenuous activity (e.g., lifting or pushing heavy objects or repeated bending) for 24-48 hours.     Do not take a bath, swim or use Jacuzzi for 24 hours after procedure. (A shower is fine).   Remove any Band-Aids when you get home.    Use cold/ice, as needed for comfort.  We recommend the use of cold therapy alternating on for 20 minutes, off for 20 minutes.    Do not apply direct heat (heating pad or heat packs) to the injection site for 24 hours.     Resume your usual medications, unless instructed otherwise by your Pain Physician.     If you are on warfarin (Coumadin) or other blood thinner, resume this medication as instructed by your prescribing Physician.    IF AT ANY POINT YOU ARE VERY CONCERNED ABOUT YOUR SYMPTOMS, PLEASE GO TO THE EMERGENCY ROOM.    If you develop worsening pain, weakness, numbness, lose bowel or bladder control (i.e., having an accident where you did not even know you had to go to the bathroom and suddenly noticed you soiled yourself), saddle anesthesia (a loss of sensation restricted to the area of the buttocks, anus and between the legs -- i.e., those parts of your body that would touch a saddle if you were sitting on one) you need to go immediately to the emergency department for evaluation and treatment.    ----------------------------------------------------------------------------------------------------------------------------------------------------------------  If you received Sedation please read the following instructions:  POST SEDATION INSTRUCTIONS    Today you received intravenous medication (also known as sedation) that was used to help you relax and/or decrease discomfort during your procedure. This medication will be acting in your body for the next 24 hours, so you might feel a little tired or sleepy. This feeling will slowly wear off.   Common side effects associated with these medications include: drowsiness, dizziness,  sleepiness, confusion, feeling excited, difficulty remembering things, lack of steadiness with walking or balance, loss of fine muscle control, slowed reflexes, difficulty focusing, and blurred vision.  Some over-the-counter and prescription medications (e.g., muscle relaxants, opioids, mood-altering medications, sedatives/hypnotics, antihistamines) can interact with the intravenous medication you received and cause an increased risk of the side effects listed above in addition to other potentially life threatening side effects. Use extreme caution if you are taking such medications, and consult with your Pain Physician or prescribing physician if you have any questions.  For the next 12-24 hours:    DO NOT--Drive a car, operate machinery or power tools   DO NOT--Drink any alcoholic beverages (not even beer), they may dangerously increase the risk of side effects.    DO NOT--Make any important legal or business decisions or sign important documents.  We advise you to have someone to assist you at home. Move slowly and carefully. Do not make sudden changes in position. Be aware of dizziness or light-headedness and move accordingly.   If you seek medical treatment within 24 hours, let the nurse or doctor caring for you know that you have received the above medications. If you have any questions or concerns related to your sedation or treatment today please contact us.

## 2023-05-19 NOTE — DISCHARGE INSTRUCTIONS
[facility]  [facility]  [employer] service # [phone]    RADIOFREQUENCY ABLATION POST-PROCEDURE INSTRUCTIONS:    Today you had a procedure called a radiofrequency ablation.  This is a procedure to ablate (or burn) the nerve that send pain signals from the small joints in your back or neck.  The ablation procedure can take 4-6 weeks for the nerve to die off.  Do not be surprised if your normal pain returns after the procedure and then should slowly improve over the course of the next few weeks.  Typically people do not get as much relief from the ablation as they do with the block, but the ablation should last much longer  The nerves will grow back!  The procedure can be repeated (without repeating the blocks) as long as you get 50% or more pain relief for at least 6 months.  So try to pay attention to when/if the pain returns.  The older we are, the slower the nerves regenerate.  I have seen patients get up to 5 years of pain improvement from this procedure, but more common in around 1 year.  It is important to combine this procedure with an increase in appropriate physical activity.  Strengthening the surrounding muscles will help this therapy last longer and provide better pain relief.  If you need a referral to physical therapy please let the office know.  Try to use this as an opportunity to decrease your pain medications if you are on any.  If you do not have a follow up appointment scheduled, please contact our office to get a post-procedure follow up scheduled 4-6 weeks after the procedure.  This can be done as a virtual visit if that is more convenient for you.    The procedure you had also included a steroid medication.  The steroid was mixed with a local anesthetic when it was injected to help prevent post-ablation pain.   If the procedure was in the neck, you may feel some pressure, numbness, or slight weakness in the arm after the procedure for a short period of  time (this is a normal response), if this persists for longer than 1 day please contact our office or go to the emergency room.  If the procedure was in the low back, you may feel some pressure, numbness, or slight weakness in the leg after the procedure for a short period of time (this is a normal response), if this persists for longer than 1 day please contact our office or go to the emergency room.  You may get side effects from the steroid.  This is not uncommon.  Symptoms include: elevated blood sugar, elevated blood pressure, headache, flushing, nausea, insomnia.  These symptoms are transient and will resolve within 1-3 days.  If symptoms last longer than this please contact our office or head to the emergency room.  Steroid medications can take anywhere from 3-14 days to take effect (rarely longer).  You may notice that your pain worsens for a short period of time after the injection, this would not be unusual due to the pressure and trauma from the needle.    What you need to do:    Keep a record of your response to the injection you had today.    How much relief did you get?   When did the relief start and how long did it last?  Were you able to decrease the use of any of your pain medications?  Were you able to increase your level of activity?  How long did the relief last?    What to watch out for:    If you experience any of the following symptoms after your procedure, please notify the messaging service immediately (see above for contact information):   fever (increased oral temperature)   bleeding or swelling at the injection site,    drainage, rash or redness at the injection site    possible signs of infection    increased pain at the injection site   worsening of your usual pain   severe headache   new or worsening numbness    new arm and/or leg weakness, or    changes in bowel and/or bladder function: urinating or defecating on yourself and not knowing that you did it.    PLEASE FOLLOW ALL  INSTRUCTIONS CAREFULLY     Do not engage in strenuous activity (e.g., lifting or pushing heavy objects or repeated bending) for 24-48 hours.     Do not take a bath, swim or use Jacuzzi for 24 hours after procedure. (A shower is fine).   Remove any Band-Aids when you get home.    Use cold/ice, as needed for comfort.  We recommend the use of cold therapy alternating on for 20 minutes, off for 20 minutes.    Do not apply direct heat (heating pad or heat packs) to the injection site for 24 hours.     Resume your usual medications, unless instructed otherwise by your Pain Physician.     If you are on warfarin (Coumadin) or other blood thinner, resume this medication as instructed by your prescribing Physician.    IF AT ANY POINT YOU ARE VERY CONCERNED ABOUT YOUR SYMPTOMS, PLEASE GO TO THE EMERGENCY ROOM.    If you develop worsening pain, weakness, numbness, lose bowel or bladder control (i.e., having an accident where you did not even know you had to go to the bathroom and suddenly noticed you soiled yourself), saddle anesthesia (a loss of sensation restricted to the area of the buttocks, anus and between the legs -- i.e., those parts of your body that would touch a saddle if you were sitting on one) you need to go immediately to the emergency department for evaluation and treatment.    ----------------------------------------------------------------------------------------------------------------------------------------------------------------  If you received Sedation please read the following instructions:  POST SEDATION INSTRUCTIONS    Today you received intravenous medication (also known as sedation) that was used to help you relax and/or decrease discomfort during your procedure. This medication will be acting in your body for the next 24 hours, so you might feel a little tired or sleepy. This feeling will slowly wear off.   Common side effects associated with these medications include: drowsiness, dizziness,  sleepiness, confusion, feeling excited, difficulty remembering things, lack of steadiness with walking or balance, loss of fine muscle control, slowed reflexes, difficulty focusing, and blurred vision.  Some over-the-counter and prescription medications (e.g., muscle relaxants, opioids, mood-altering medications, sedatives/hypnotics, antihistamines) can interact with the intravenous medication you received and cause an increased risk of the side effects listed above in addition to other potentially life threatening side effects. Use extreme caution if you are taking such medications, and consult with your Pain Physician or prescribing physician if you have any questions.  For the next 12-24 hours:    DO NOT--Drive a car, operate machinery or power tools   DO NOT--Drink any alcoholic beverages (not even beer), they may dangerously increase the risk of side effects.    DO NOT--Make any important legal or business decisions or sign important documents.  We advise you to have someone to assist you at home. Move slowly and carefully. Do not make sudden changes in position. Be aware of dizziness or light-headedness and move accordingly.   If you seek medical treatment within 24 hours, let the nurse or doctor caring for you know that you have received the above medications. If you have any questions or concerns related to your sedation or treatment today please contact us.

## 2023-05-19 NOTE — H&P
"HPI  Patient presenting for Procedure(s) (LRB):  RFA (B/L) L3,4,5 (Bilateral)     Patient on Anti-coagulation No    No health changes since previous encounter    Past Medical History:   Diagnosis Date    Diabetes mellitus     Hyperlipidemia     Hypertension     Stroke      Past Surgical History:   Procedure Laterality Date    INJECTION OF ANESTHETIC AGENT AROUND MEDIAL BRANCH NERVES INNERVATING LUMBAR FACET JOINT Bilateral 4/20/2023    Procedure: MBB #1 (B/L) L3,4,5;  Surgeon: Son Lara DO;  Location: [facility] OR;  Service: Pain Management;  Laterality: Bilateral;  ORAL XANAX    INJECTION OF ANESTHETIC AGENT AROUND MEDIAL BRANCH NERVES INNERVATING LUMBAR FACET JOINT Bilateral 5/5/2023    Procedure: MBB #2 (B/L) L3,4,5;  Surgeon: Son Lara DO;  Location: [facility] OR;  Service: Pain Management;  Laterality: Bilateral;  Oral Xanax    INJECTION OF JOINT Right 5/20/2022    Procedure: Right SI joint injection;  Surgeon: Son Lara DO;  Location: [facility] OR;  Service: Pain Management;  Laterality: Right;     Review of patient's allergies indicates:   Allergen Reactions    Lisinopril-hydrochlorothiazide Other (See Comments)     Pt stated it makes her feel drained. She couldn't raise arms over head.    Ampicillin Rash    Darvocet a500 [propoxyphene n-acetaminophen] Other (See Comments)     shaky      Current Facility-Administered Medications   Medication    0.9%  NaCl infusion    fentaNYL 50 mcg/mL injection 25 mcg    midazolam (VERSED) 1 mg/mL injection 2 mg       PMHx, PSHx, Allergies, Medications reviewed in epic    ROS negative except pain complaints in HPI    OBJECTIVE:    BP (!) 162/77 (BP Location: Left arm, Patient Position: Lying)   Pulse 88   Temp 98.4 °F (36.9 °C) (Temporal)   Resp 16   Ht 5' 7" (1.702 m)   Wt 81.6 kg (180 lb)   SpO2 95%   Breastfeeding No   BMI 28.19 kg/m²     PHYSICAL EXAMINATION:    GENERAL: Well appearing, in no acute distress, alert and oriented x3.  PSYCH:  " Mood and affect appropriate.  SKIN: Skin color, texture, turgor normal, no rashes or lesions which will impact the procedure.  CV: RRR with palpation of the radial artery.  PULM: No evidence of respiratory difficulty, symmetric chest rise. Clear to auscultation.  NEURO: Cranial nerves grossly intact.    Plan:    Proceed with procedure as planned Procedure(s) (LRB):  RFA (B/L) L3,4,5 (Bilateral)    Son Cartagena  05/19/2023

## 2023-05-19 NOTE — DISCHARGE SUMMARY
[facility]  Discharge Note  Short Stay    Procedure(s) (LRB):  RFA (B/L) L3,4,5 (Bilateral)      OUTCOME: Patient tolerated treatment/procedure well without complication and is now ready for discharge.    DISPOSITION: Home or Self Care    FINAL DIAGNOSIS:  <principal problem not specified>    FOLLOWUP: In clinic    DISCHARGE INSTRUCTIONS:  No discharge procedures on file.     TIME SPENT ON DISCHARGE: 10 minutes

## 2023-05-30 ENCOUNTER — TELEPHONE (OUTPATIENT)
Dept: ENDOSCOPY | Facility: HOSPITAL | Age: 73
End: 2023-05-30

## 2023-06-20 DIAGNOSIS — I10 ESSENTIAL HYPERTENSION: ICD-10-CM

## 2023-06-20 RX ORDER — LOSARTAN POTASSIUM 100 MG/1
100 TABLET ORAL DAILY
Qty: 90 TABLET | Refills: 3 | Status: SHIPPED | OUTPATIENT
Start: 2023-06-20 | End: 2024-06-19

## 2023-06-20 NOTE — TELEPHONE ENCOUNTER
Care Due:                  Date            Visit Type   Department     Provider  --------------------------------------------------------------------------------                                [facility]                              PRIMARY      MED/ INTERNAL  Last Visit: 11-      CARE ([facility])   MED/ PEDS      Teri Soto                              [facility]                              FOLLOWUP/OF  MED/ INTERNAL  Next Visit: 07-      [facility] VISIT   MED/ PEDS      Teri Soto                                                            Last  Test          Frequency    Reason                     Performed    Due Date  --------------------------------------------------------------------------------    HBA1C.......  6 months...  glimepiride..............  11- 05-    [employer] Embedded Care Due Messages. Reference number: [id number].   6/20/2023 3:50:15 AM CDT

## 2023-06-20 NOTE — TELEPHONE ENCOUNTER
Refill Routing Note   Medication(s) are not appropriate for processing by [facility] for the following reason(s):      Required vitals abnormal    [facility] action(s):  Defer Labs due          Appointments  past 12m or future 3m with PCP    Date Provider   Last Visit   11/15/2022 Teri Soto, DO   Next Visit   7/14/2023 Teri Soto, DO   ED visits in past 90 days: 0        Note composed:10:44 AM 06/20/2023

## 2023-06-21 ENCOUNTER — OFFICE VISIT (OUTPATIENT)
Dept: PAIN MEDICINE | Facility: CLINIC | Age: 73
End: 2023-06-21
Payer: MEDICARE

## 2023-06-21 VITALS
HEART RATE: 91 BPM | HEIGHT: 67 IN | DIASTOLIC BLOOD PRESSURE: 82 MMHG | SYSTOLIC BLOOD PRESSURE: 143 MMHG | WEIGHT: 179.88 LBS | BODY MASS INDEX: 28.23 KG/M2

## 2023-06-21 DIAGNOSIS — N18.30 STAGE 3 CHRONIC KIDNEY DISEASE, UNSPECIFIED WHETHER STAGE 3A OR 3B CKD: ICD-10-CM

## 2023-06-21 DIAGNOSIS — M54.16 LUMBAR RADICULOPATHY: ICD-10-CM

## 2023-06-21 DIAGNOSIS — M79.2 NEURITIS: Primary | ICD-10-CM

## 2023-06-21 DIAGNOSIS — M47.816 LUMBAR SPONDYLOSIS: ICD-10-CM

## 2023-06-21 DIAGNOSIS — M46.1 SACROILIITIS: ICD-10-CM

## 2023-06-21 DIAGNOSIS — E11.9 TYPE 2 DIABETES MELLITUS WITHOUT COMPLICATION, UNSPECIFIED WHETHER LONG TERM INSULIN USE: ICD-10-CM

## 2023-06-21 DIAGNOSIS — M51.36 DDD (DEGENERATIVE DISC DISEASE), LUMBAR: ICD-10-CM

## 2023-06-21 PROCEDURE — 99999 PR PBB SHADOW E&M-EST. PATIENT-LVL III: CPT | Mod: PBBFAC,,, | Performed by: STUDENT IN AN ORGANIZED HEALTH CARE EDUCATION/TRAINING PROGRAM

## 2023-06-21 PROCEDURE — 1101F PR PT FALLS ASSESS DOC 0-1 FALLS W/OUT INJ PAST YR: ICD-10-PCS | Mod: CPTII,S$GLB,, | Performed by: STUDENT IN AN ORGANIZED HEALTH CARE EDUCATION/TRAINING PROGRAM

## 2023-06-21 PROCEDURE — 3288F FALL RISK ASSESSMENT DOCD: CPT | Mod: CPTII,S$GLB,, | Performed by: STUDENT IN AN ORGANIZED HEALTH CARE EDUCATION/TRAINING PROGRAM

## 2023-06-21 PROCEDURE — 3077F SYST BP >= 140 MM HG: CPT | Mod: CPTII,S$GLB,, | Performed by: STUDENT IN AN ORGANIZED HEALTH CARE EDUCATION/TRAINING PROGRAM

## 2023-06-21 PROCEDURE — 3288F PR FALLS RISK ASSESSMENT DOCUMENTED: ICD-10-PCS | Mod: CPTII,S$GLB,, | Performed by: STUDENT IN AN ORGANIZED HEALTH CARE EDUCATION/TRAINING PROGRAM

## 2023-06-21 PROCEDURE — 99999 PR PBB SHADOW E&M-EST. PATIENT-LVL III: ICD-10-PCS | Mod: PBBFAC,,, | Performed by: STUDENT IN AN ORGANIZED HEALTH CARE EDUCATION/TRAINING PROGRAM

## 2023-06-21 PROCEDURE — 3008F BODY MASS INDEX DOCD: CPT | Mod: CPTII,S$GLB,, | Performed by: STUDENT IN AN ORGANIZED HEALTH CARE EDUCATION/TRAINING PROGRAM

## 2023-06-21 PROCEDURE — 99214 OFFICE O/P EST MOD 30 MIN: CPT | Mod: S$GLB,,, | Performed by: STUDENT IN AN ORGANIZED HEALTH CARE EDUCATION/TRAINING PROGRAM

## 2023-06-21 PROCEDURE — 1125F PR PAIN SEVERITY QUANTIFIED, PAIN PRESENT: ICD-10-PCS | Mod: CPTII,S$GLB,, | Performed by: STUDENT IN AN ORGANIZED HEALTH CARE EDUCATION/TRAINING PROGRAM

## 2023-06-21 PROCEDURE — 99214 PR OFFICE/OUTPT VISIT, EST, LEVL IV, 30-39 MIN: ICD-10-PCS | Mod: S$GLB,,, | Performed by: STUDENT IN AN ORGANIZED HEALTH CARE EDUCATION/TRAINING PROGRAM

## 2023-06-21 PROCEDURE — 1159F PR MEDICATION LIST DOCUMENTED IN MEDICAL RECORD: ICD-10-PCS | Mod: CPTII,S$GLB,, | Performed by: STUDENT IN AN ORGANIZED HEALTH CARE EDUCATION/TRAINING PROGRAM

## 2023-06-21 PROCEDURE — 4010F ACE/ARB THERAPY RXD/TAKEN: CPT | Mod: CPTII,S$GLB,, | Performed by: STUDENT IN AN ORGANIZED HEALTH CARE EDUCATION/TRAINING PROGRAM

## 2023-06-21 PROCEDURE — 1125F AMNT PAIN NOTED PAIN PRSNT: CPT | Mod: CPTII,S$GLB,, | Performed by: STUDENT IN AN ORGANIZED HEALTH CARE EDUCATION/TRAINING PROGRAM

## 2023-06-21 PROCEDURE — 3008F PR BODY MASS INDEX (BMI) DOCUMENTED: ICD-10-PCS | Mod: CPTII,S$GLB,, | Performed by: STUDENT IN AN ORGANIZED HEALTH CARE EDUCATION/TRAINING PROGRAM

## 2023-06-21 PROCEDURE — 3079F PR MOST RECENT DIASTOLIC BLOOD PRESSURE 80-89 MM HG: ICD-10-PCS | Mod: CPTII,S$GLB,, | Performed by: STUDENT IN AN ORGANIZED HEALTH CARE EDUCATION/TRAINING PROGRAM

## 2023-06-21 PROCEDURE — 3077F PR MOST RECENT SYSTOLIC BLOOD PRESSURE >= 140 MM HG: ICD-10-PCS | Mod: CPTII,S$GLB,, | Performed by: STUDENT IN AN ORGANIZED HEALTH CARE EDUCATION/TRAINING PROGRAM

## 2023-06-21 PROCEDURE — 1159F MED LIST DOCD IN RCRD: CPT | Mod: CPTII,S$GLB,, | Performed by: STUDENT IN AN ORGANIZED HEALTH CARE EDUCATION/TRAINING PROGRAM

## 2023-06-21 PROCEDURE — 1101F PT FALLS ASSESS-DOCD LE1/YR: CPT | Mod: CPTII,S$GLB,, | Performed by: STUDENT IN AN ORGANIZED HEALTH CARE EDUCATION/TRAINING PROGRAM

## 2023-06-21 PROCEDURE — 3079F DIAST BP 80-89 MM HG: CPT | Mod: CPTII,S$GLB,, | Performed by: STUDENT IN AN ORGANIZED HEALTH CARE EDUCATION/TRAINING PROGRAM

## 2023-06-21 PROCEDURE — 4010F PR ACE/ARB THEARPY RXD/TAKEN: ICD-10-PCS | Mod: CPTII,S$GLB,, | Performed by: STUDENT IN AN ORGANIZED HEALTH CARE EDUCATION/TRAINING PROGRAM

## 2023-06-21 RX ORDER — PREGABALIN 75 MG/1
75 CAPSULE ORAL 2 TIMES DAILY
Qty: 60 CAPSULE | Refills: 1 | Status: SHIPPED | OUTPATIENT
Start: 2023-06-21 | End: 2023-07-24

## 2023-06-21 RX ORDER — METHYLPREDNISOLONE 4 MG/1
TABLET ORAL
Qty: 21 EACH | Refills: 0 | Status: SHIPPED | OUTPATIENT
Start: 2023-06-21 | End: 2023-07-12

## 2023-06-21 RX ORDER — CYCLOBENZAPRINE HCL 10 MG
10 TABLET ORAL 3 TIMES DAILY PRN
Qty: 270 TABLET | Refills: 1 | Status: SHIPPED | OUTPATIENT
Start: 2023-06-21 | End: 2023-07-30

## 2023-06-21 NOTE — PROGRESS NOTES
Chronic Pain - f/u    Referring Physician: No ref. provider found    Date: 06/21/2023     Re: Joanne Peña  MR#: [id number]  YOB: 1950  Age: 72 y.o.    Chief Complaint: back pain  Chief Complaint   Patient presents with    Low-back Pain     **This note is dictated using the M*Modal Fluency Direct word recognition program. There are word recognition mistakes that are occasionally missed on review.**    ASSESSMENT: 72 y.o. year old female with right sided back/buttock pain pain, consistent with     1. Neuritis  pregabalin (LYRICA) 75 MG capsule    methylPREDNISolone (MEDROL DOSEPACK) 4 mg tablet    cyclobenzaprine (FLEXERIL) 10 MG tablet      2. Lumbar spondylosis        3. Lumbar radiculopathy        4. DDD (degenerative disc disease), lumbar        5. Sacroiliitis        6. Stage 3 chronic kidney disease, unspecified whether stage 3a or 3b CKD            PLAN:     Lumbar spondylosis  -100% improvement in buttock pain from the SIJ continues @10months, but now with just axial back pain.  -s/p MBB/RFA on 5/19/23.  She reports improvement in her axial back pain, but has more pain along the right iliac crest  -continue PT  -Refill Flexeril  -possible neuritis following ablation. Rx medrol dose trae  -Lyrica 75mg BID x2 months.    DDD of lumbar spine  - MRI lumbar results discussed  -discussed importance of core strengthening, back exercises, losing weight  -okay to take tylenol    Sacroiliitis - resolved  -s/p R SIJ on 5/20/22.  Her right sided buttock pain has resolved 100% at 2 months  -FAILED Norco 5mg (cognitive side effects)    Lumbar radiculopathy  -less likely. No imaging available. If SIJ not successful, then will consider lumbar XR/MRI to further assess  -would need clearance to hold ASA    CKD 3   -last CMP showed GFR 53.   -GFR stable  -avoid nephrotoxic medications    Right hip pain  -ortho surgery does not believe that this is coming from the hip.  Will r/o SIJ and back pathology.    Prior  TIA  -off plavix. Following with PCP  -continuie ASA 81    Bilateral peripheral neuropathy  -unclear etiology. Bottom of both feet.  Monitor    DM2  -new A1c = 7.3 on 6/1/22    Anemia of chronic disease / HLD   -discused her blood test results    - RTC 2 months  - Counseled patient regarding the importance of weight loss and activity modification and physical therapy.    The above plan and management options were discussed at length with patient. Patient is in agreement with the above and verbalized understanding. It will be communicated with the referring physician via electronic record, fax, or mail.  Lab/study reports reviewed were important and necessary because subsequent medical and treatment recommendations required review of the above lab/study reports. Images viewed/reviewed above were important and necessary because subsequent medical and treatment recommendations required review of the reviewed image(s).     Electronically signed by:  Son Lara DO  06/21/2023     =========================================================================================================    SUBJECTIVE:    Interval History 6/21/2023:   Joanne Peña is a 72 y.o. female presents to the clinic for follow up.  Since last visit the pain has has improved in some ways and worsened in others.  The back pain is better since the RFA on 5/19/23, but now she is having more right sided iliac crest pain. Right iliac crest pain started shortly after the ablation. Not as bad as it was previously.    The pain is located in the lower back area and radiates to the upper buttock and midback .  The pain is described as sharp and throbbing    At BEST  5/10   At WORST  8/10 on the WORST day.    On average pain is rated as 5/10.   Today the pain is rated as 7/10  Symptoms interfere with daily activity.   Exacerbating factors: Walking.    Mitigating factors medications.     Current pain medications: Flexeril 10mg TID,   tylenol  Failed Pain Medications: cannot take NSAIDs because of the stroke. Tylenol. Short course Norco (side effects cognition), gabapentin, robaxin, oral steroids    Pain procedures:  s/p  R SIJ on 5/20 - 100% improvement of buttock pain @ 10 months  4/20/23 - b/l L3,4,5 MBB #1 - Oral sedation - has a  - 80%  5/5/23 - b/l L3,4,5 MBB#2 - oral sedation - 80%  5/19/23 - b/l RFA - 50% @4w in axial back pain    Interval History 3/15/2023:   Joanne Peña is a 72 y.o. female presents to the clinic for follow up.  Since last visit the pain has has worsened.  Pain right now is 5/10. Flexeril was helping. Retired in June from the [email or web address].    The pain is located in the lower back  area and radiates to the upper buttocks .  The pain is described as aching, sharp, and throbbing    At BEST  5/10   At WORST  10/10 on the WORST day.    On average pain is rated as 2/10.   Today the pain is rated as 4/10  Symptoms interfere with daily activity and sleeping.   Exacerbating factors: Standing and Walking.    Mitigating factors nothing.     Interval History 7/7/2022:     Joanne Peña is a 72 y.o. female presents to the clinic for follow up.  Since last visit the pain has has moderately improved.  Buttocks pain has resolved. Now with axial back pain without radiation.    The pain is located in the lower back area and does not radiate.  The pain is described as aching    At BEST  4/10   At WORST  8/10 on the WORST day.    On average pain is rated as 4/10.   Today the pain is rated as 3/10  Symptoms interfere with daily activity and sleeping.   Exacerbating factors: Standing and Walking.    Mitigating factors heat and massage.     Current pain medications: none  Failed Pain Medications: cannot take NSAIDs because of the stroke. Tylenol. Short course Norco (side effects cognition), gabapentin, robaxin, oral steroids    Interval History 6/6/2022:     Joanne Peña is a 72 y.o. female presents to the clinic for  follow up.  Since last visit the pain has has significantly improved.  She is s/p Right SIJ on 5/20/22 with almost 100% improvement of the low back/buttock pain.  She no longer requires a cane or walker to ambulate.  She feels signfiicantly better.  Currently her pain is located in the midline back without radiation. Worse with flexion, standing, walking    The pain is located in the lower back area and does not radiate.  The pain is described as aching    At BEST  4/10   At WORST  8/10 on the WORST day.    On average pain is rated as 4/10.   Today the pain is rated as 6/10  Symptoms interfere with nothing .   Exacerbating factors: Walking.    Mitigating factors laying down, medications and sitting.     Current pain medications: gabapentin. Oral steroids and robaxin helped. Short course Norco  Failed Pain Medications: cannot take NSAIDs because of the stroke. Tylenol. Robaxin    Initial Hx:  Joanne Peña is a 72 y.o. female presents to the clinic for the evaluation of lower back pain. The pain started over a month ago following no inciting event and symptoms have been worsening.  The patient had a TIA on 2/6/22.  She was on 21 days of Plavix which she has stopped. She takes a daily ASA81.  Does not feel that this is related to the stroke.  Denies any falls or trauma. She started getting around the beginning of March.  She went to the Urgent care on March 17, 2022 because the pain was not going away.  Since then the pain has been worsening. The pain pattern is the same now as it was then. The pain has prevented the patient from walking, and she has required a wheelchair since March 17.     She tried a medrol dose trae, robaxin, gabapentin which helped until she stopped the steroids and the robaxin. She is taking gabapentin 300mg TID.  She is not taking anything else for pain right now. When she gets severe pain she repositions, heating pads, gabapentin. Especially worse while trying to get out of the bed.  Changing positions are very bad.    Pain Description:    The pain is located in the lower back area and radiates to the right thigh/groin/ right hip .    At BEST  10/10   At WORST  10/10 on the WORST day.    On average pain is rated as 10/10.   Today the pain is rated as 10/10  The pain is continuous.  The pain is described as aching, sharp, shooting and pulling     Symptoms interfere with daily activity, sleeping and work.   Exacerbating factors: any type of movement.    Mitigating factors heat and medications.   She reports 1 hours of sleep per night.    Physical Therapy/Home Exercise: No, not currently in physical therapy or home exercise program    Current Pain Medications:    - gabapentin. Oral steroids and robaxin helped.    Failed Pain Medications:    - cannot take NSAIDs because of the stroke. Tylenol     Pain Treatment Therapies:    Pain procedures: none  Physical Therapy: last PT 1 month ago.  Patient has a healthy back referral on 5/9  Spinal decompression: none  Joint replacement: none     Patient denies urinary incontinence and bowel incontinence. (+) bilateral foot numbness x2 weeks.   Patient denies any suicidal or homicidal ideations     report:  Reviewed and consistent with medication use as prescribed.    Imaging:   XR hip:    FINDINGS:  The bones are intact.  There is no evidence for acute fracture or bone destruction.  There are degenerative changes with mild narrowing of the right hip joint space laterally.  Small osteophytes are present at the right hip.  Left hip and sacroiliac joints appear grossly unremarkable.     Impression:     No evidence for acute fracture, bone destruction, or dislocation.     Degenerative changes of the right hip with mild joint space narrowing laterally and small osteophytes.       XR lumbar 03/2022:    Mild scoliosis with convexity to the left can be seen.  Vertebral bodies are intact.  Disc spaces are maintained.  No significant bony abnormalities are  noted    Past Medical History:   Diagnosis Date    Diabetes mellitus     Hyperlipidemia     Hypertension     Stroke      Past Surgical History:   Procedure Laterality Date    INJECTION OF ANESTHETIC AGENT AROUND MEDIAL BRANCH NERVES INNERVATING LUMBAR FACET JOINT Bilateral 2023    Procedure: MBB #1 (B/L) L3,4,5;  Surgeon: Son Lara DO;  Location: [facility] OR;  Service: Pain Management;  Laterality: Bilateral;  ORAL XANAX    INJECTION OF ANESTHETIC AGENT AROUND MEDIAL BRANCH NERVES INNERVATING LUMBAR FACET JOINT Bilateral 2023    Procedure: MBB #2 (B/L) L3,4,5;  Surgeon: Son Lara DO;  Location: [facility] OR;  Service: Pain Management;  Laterality: Bilateral;  Oral Xanax    INJECTION OF JOINT Right 2022    Procedure: Right SI joint injection;  Surgeon: Son Lara DO;  Location: [facility] OR;  Service: Pain Management;  Laterality: Right;    RADIOFREQUENCY ABLATION OF LUMBAR MEDIAL BRANCH NERVE AT SINGLE LEVEL Bilateral 2023    Procedure: RFA (B/L) L3,4,5;  Surgeon: Son Lara DO;  [facility];  Service: Pain Management;  Laterality: Bilateral;     Social History     Socioeconomic History    Marital status:    Tobacco Use    Smoking status: Former     Packs/day: 0.50     Types: Cigarettes     Quit date: 2022     Years since quittin.3     Passive exposure: Past    Smokeless tobacco: Never   Substance and Sexual Activity    Alcohol use: Yes     Comment: rare    Drug use: No    Sexual activity: Yes     Partners: Male     Family History   Problem Relation Age of Onset    Kidney disease Mother     Heart disease Mother     Cancer Father     Hypertension Brother     Hypertension Daughter     Cancer Maternal Aunt        Review of patient's allergies indicates:   Allergen Reactions    Lisinopril-hydrochlorothiazide Other (See Comments)     Pt stated it makes her feel drained. She couldn't raise arms over head.    Ampicillin Rash     Darvocet a500 [propoxyphene n-acetaminophen] Other (See Comments)     karl       Current Outpatient Medications   Medication Sig    ACCU-CHEK GUIDE GLUCOSE METER Misc TO CHECK BLOOD GLUCOSE DAILY, TO USE WITH INSURANCE PREFERRED METER    amLODIPine (NORVASC) 10 MG tablet TAKE 1 TABLET (10 MG TOTAL) BY MOUTH ONCE DAILY. HOLD IF SBP <120    ascorbic acid, vitamin C, (VITAMIN C) 500 MG tablet Take 500 mg by mouth once daily.    atorvastatin (LIPITOR) 80 MG tablet TAKE 1 TABLET (80 MG TOTAL) BY MOUTH ONCE DAILY.    BLOOD PRESSURE CUFF Misc 1 Units by Misc.(Non-Drug; Combo Route) route once daily.    blood sugar diagnostic Strp To check BG daily, to use with insurance preferred meter    blood-glucose meter kit To check BG daily, to use with insurance preferred meter    cephALEXin (KEFLEX) 500 MG capsule TAKE 2 CAPSULES (1,000 MG TOTAL) BY MOUTH 2 (TWO) TIMES DAILY. FOR 10 DAYS    chlorthalidone (HYGROTEN) 25 MG Tab Take 1 tablet (25 mg total) by mouth once daily.    GAVILYTE-C 240-22.72-6.72 -5.84 gram SolR TAKE 4,000 MLS BY MOUTH ONCE. FOR 1 DOSE    glimepiride (AMARYL) 2 MG tablet TAKE 1 TABLET BY MOUTH 2 TIMES DAILY.    guanfacine HCl (GUANFACINE ORAL) Take by mouth.    lancets Misc To check BG daily, to use with insurance preferred meter    losartan (COZAAR) 100 MG tablet TAKE 1 TABLET (100 MG TOTAL) BY MOUTH ONCE DAILY. HOLD IF SBP <120    multivitamin (THERAGRAN) per tablet Take 1 tablet by mouth once daily.    ondansetron (ZOFRAN-ODT) 4 MG TbDL Dissolve 1 tablet (4 mg total) by mouth every 8 (eight) hours as needed.    aspirin (ECOTRIN) 81 MG EC tablet Take 1 tablet (81 mg total) by mouth once daily.    cyclobenzaprine (FLEXERIL) 10 MG tablet Take 1 tablet (10 mg total) by mouth 3 (three) times daily as needed for Muscle spasms.    hydrALAZINE (APRESOLINE) 50 MG tablet Take 1 tablet (50 mg total) by mouth every 8 (eight) hours. Hold is SBP <120 (Patient taking differently: Take 50 mg by mouth every 8 (eight)  "hours. Hold is SBP <120    Pt is taking once a day)    LORazepam (ATIVAN) 1 MG tablet Take 1 tablet (1 mg total) by mouth once as needed for Anxiety (before procedure).    meclizine (ANTIVERT) 25 mg tablet TAKE 1 TABLET (25 MG TOTAL) BY MOUTH 2 (TWO) TIMES DAILY AS NEEDED FOR DIZZINESS.    methylPREDNISolone (MEDROL DOSEPACK) 4 mg tablet use as directed    pregabalin (LYRICA) 75 MG capsule Take 1 capsule (75 mg total) by mouth 2 (two) times daily.     No current facility-administered medications for this visit.       REVIEW OF SYSTEMS:    GENERAL:  No weight loss, malaise or fevers. + fevers  HEENT:   No recent changes in vision or hearing  NECK:  Negative for lumps, no difficulty with swallowing.  RESPIRATORY:  Negative for cough, wheezing or shortness of breath, patient denies any recent URI.  CARDIOVASCULAR:  Negative for chest pain, leg swelling or palpitations.  GI:  Negative for abdominal discomfort, blood in stools or black stools or change in bowel habits.  MUSCULOSKELETAL:  See HPI.  SKIN:  Negative for lesions, rash, and itching.  PSYCH:  No mood disorder or recent psychosocial stressors.  Patients sleep is not disturbed secondary to pain.  HEMATOLOGY/LYMPHOLOGY:  Negative for prolonged bleeding, bruising easily or swollen nodes.  Patient is not currently taking any anti-coagulants  NEURO:   No history of headaches, syncope, paralysis, seizures or tremors.  All other reviewed and negative other than HPI.    OBJECTIVE:    BP (!) 143/82 (BP Location: Right arm, Patient Position: Sitting)   Pulse 91   Ht 5' 7" (1.702 m)   Wt 81.6 kg (179 lb 14.3 oz)   BMI 28.18 kg/m²     PHYSICAL EXAMINATION:    GENERAL: Well appearing, in no acute distress, alert and oriented x3.   PSYCH:  Mood and affect appropriate.  SKIN: Skin color, texture, turgor normal, no rashes or lesions.  HEAD/FACE:  Normocephalic, atraumatic. Cranial nerves grossly intact.  CV: RRR with palpation of the radial artery.  PULM: CTAB. No evidence " of respiratory difficulty, symmetric chest rise.  GI:  Soft and non-tender.    BACK:  - No obvious deformity or signs of trauma, Normal lumbar lordotic curve  - Negative spinous process tenderness  - Positive paravertebral tenderness  - Positive pain to palpation over the facet joints of the lumbar spine on the right  - Positive right iliac crest  - Slump test is Negative for radicular pain  - Slump test is Negative for back pain  - Supine Straight leg raising is Negative for radicular pain  - Supine Straight leg raising is Negative for back pain  - Positive pain with lumbar extension, side bending  - Negative Sustained Hip Flexion test (for discogenic pain)  - Negative Altered Gait, Posture  - Axial facet loading test Positive on the right side(s)    SI Joint exam:  - Negative SI joint tenderness to palpation  - Rodolfo's sign Negative  - Yeoman's Test: Unable to Perform for SI joint pain indicating anterior SI ligament involvement. Unable to Perform for anterior thigh pain/paresthesia which indicates femoral nerve stretch.  - Gaenslen's Test:Unable to Perform  - Finger Mikhail's Sign:Negative  - SI compression test:Unable to Perform  - SI distraction test:Unable to Perform  - Thigh Thrust: Unable to Perform  - SI Thrust: Unable to Perform    MUSKULOSKELETAL:    EXTREMITIES:   Hip Exam:  - Log Roll Unable to Perform  - FADIR Unable to Perform  - Stinchfield Unable to Perform  - Hip Scour Unable to Perform  - GTB Tenderness Negative      MUSCULOSKELETAL:  No atrophy or tone abnormalities are noted in the UE or LE.  No deformities, edema, or skin discoloration are noted on visible skin. Good capillary refill.    NEURO: Bilateral upper and lower extremity coordination and muscle stretch reflexes are physiologic and symmetric.      NEUROLOGICAL EXAM:  MENTAL STATUS: A x O x 3, good concentration, speech is fluent and goal directed  MEMORY: recent and remote are intact  CN: CN2-12 grossly intact  MOTOR: 5/5 in all muscle  groups  DTRs: 2+ intact symmetric  Sensation:    -no Loss of sensation in a left lower and right lower L-1, L-2, L-3, L-4 and L-5 bilaterally distribution.

## 2023-07-11 ENCOUNTER — ANESTHESIA EVENT (OUTPATIENT)
Dept: ENDOSCOPY | Facility: HOSPITAL | Age: 73
End: 2023-07-11
Payer: MEDICARE

## 2023-07-12 ENCOUNTER — HOSPITAL ENCOUNTER (OUTPATIENT)
Facility: HOSPITAL | Age: 73
Discharge: HOME | End: 2023-07-12
Attending: INTERNAL MEDICINE | Admitting: INTERNAL MEDICINE
Payer: MEDICARE

## 2023-07-12 ENCOUNTER — ANESTHESIA (OUTPATIENT)
Dept: ENDOSCOPY | Facility: HOSPITAL | Age: 73
End: 2023-07-12
Payer: MEDICARE

## 2023-07-12 VITALS
TEMPERATURE: 98 F | RESPIRATION RATE: 20 BRPM | OXYGEN SATURATION: 98 % | SYSTOLIC BLOOD PRESSURE: 136 MMHG | DIASTOLIC BLOOD PRESSURE: 73 MMHG | HEART RATE: 76 BPM

## 2023-07-12 DIAGNOSIS — Z12.11 COLON CANCER SCREENING: ICD-10-CM

## 2023-07-12 LAB — POCT GLUCOSE: 210 MG/DL (ref 70–110)

## 2023-07-12 PROCEDURE — 37000009 HC ANESTHESIA EA ADD 15 MINS: Performed by: INTERNAL MEDICINE

## 2023-07-12 PROCEDURE — 25000003 PHARM REV CODE 250: Performed by: STUDENT IN AN ORGANIZED HEALTH CARE EDUCATION/TRAINING PROGRAM

## 2023-07-12 PROCEDURE — D9220A PRA ANESTHESIA: Mod: PT,ANES,, | Performed by: ANESTHESIOLOGY

## 2023-07-12 PROCEDURE — 45385 COLONOSCOPY W/LESION REMOVAL: CPT | Mod: PT,,, | Performed by: INTERNAL MEDICINE

## 2023-07-12 PROCEDURE — D9220A PRA ANESTHESIA: ICD-10-PCS | Mod: PT,CRNA,, | Performed by: STUDENT IN AN ORGANIZED HEALTH CARE EDUCATION/TRAINING PROGRAM

## 2023-07-12 PROCEDURE — 37000008 HC ANESTHESIA 1ST 15 MINUTES: Performed by: INTERNAL MEDICINE

## 2023-07-12 PROCEDURE — 88305 TISSUE EXAM BY PATHOLOGIST: ICD-10-PCS | Mod: 26,,, | Performed by: PATHOLOGY

## 2023-07-12 PROCEDURE — 45385 PR COLONOSCOPY,REMV LESN,SNARE: ICD-10-PCS | Mod: PT,,, | Performed by: INTERNAL MEDICINE

## 2023-07-12 PROCEDURE — D9220A PRA ANESTHESIA: Mod: PT,CRNA,, | Performed by: STUDENT IN AN ORGANIZED HEALTH CARE EDUCATION/TRAINING PROGRAM

## 2023-07-12 PROCEDURE — 63600175 PHARM REV CODE 636 W HCPCS: Performed by: STUDENT IN AN ORGANIZED HEALTH CARE EDUCATION/TRAINING PROGRAM

## 2023-07-12 PROCEDURE — 27201089 HC SNARE, DISP (ANY): Performed by: INTERNAL MEDICINE

## 2023-07-12 PROCEDURE — 45385 COLONOSCOPY W/LESION REMOVAL: CPT | Mod: PT | Performed by: INTERNAL MEDICINE

## 2023-07-12 PROCEDURE — D9220A PRA ANESTHESIA: ICD-10-PCS | Mod: PT,ANES,, | Performed by: ANESTHESIOLOGY

## 2023-07-12 PROCEDURE — 27200997: Performed by: INTERNAL MEDICINE

## 2023-07-12 PROCEDURE — 88305 TISSUE EXAM BY PATHOLOGIST: CPT | Mod: 26,,, | Performed by: PATHOLOGY

## 2023-07-12 PROCEDURE — 82962 GLUCOSE BLOOD TEST: CPT | Performed by: INTERNAL MEDICINE

## 2023-07-12 PROCEDURE — 88305 TISSUE EXAM BY PATHOLOGIST: CPT | Mod: 59 | Performed by: PATHOLOGY

## 2023-07-12 RX ORDER — LIDOCAINE HYDROCHLORIDE 10 MG/ML
1 INJECTION, SOLUTION EPIDURAL; INFILTRATION; INTRACAUDAL; PERINEURAL ONCE
Status: DISCONTINUED | OUTPATIENT
Start: 2023-07-12 | End: 2023-07-12 | Stop reason: HOSPADM

## 2023-07-12 RX ORDER — LIDOCAINE HYDROCHLORIDE 10 MG/ML
1 INJECTION, SOLUTION EPIDURAL; INFILTRATION; INTRACAUDAL; PERINEURAL ONCE
Status: ACTIVE | OUTPATIENT
Start: 2023-07-12

## 2023-07-12 RX ORDER — PROPOFOL 10 MG/ML
INJECTION, EMULSION INTRAVENOUS
Status: DISCONTINUED
Start: 2023-07-12 | End: 2023-07-12 | Stop reason: HOSPADM

## 2023-07-12 RX ORDER — PROPOFOL 10 MG/ML
VIAL (ML) INTRAVENOUS
Status: DISCONTINUED | OUTPATIENT
Start: 2023-07-12 | End: 2023-07-12

## 2023-07-12 RX ORDER — LIDOCAINE HYDROCHLORIDE 20 MG/ML
INJECTION, SOLUTION EPIDURAL; INFILTRATION; INTRACAUDAL; PERINEURAL
Status: DISCONTINUED
Start: 2023-07-12 | End: 2023-07-12 | Stop reason: HOSPADM

## 2023-07-12 RX ORDER — SODIUM CHLORIDE 9 MG/ML
INJECTION, SOLUTION INTRAVENOUS CONTINUOUS
Status: ACTIVE | OUTPATIENT
Start: 2023-07-12

## 2023-07-12 RX ORDER — LIDOCAINE HYDROCHLORIDE 20 MG/ML
INJECTION INTRAVENOUS
Status: DISCONTINUED | OUTPATIENT
Start: 2023-07-12 | End: 2023-07-12

## 2023-07-12 RX ORDER — SODIUM CHLORIDE 9 MG/ML
INJECTION, SOLUTION INTRAVENOUS CONTINUOUS
Status: DISCONTINUED | OUTPATIENT
Start: 2023-07-12 | End: 2023-07-12 | Stop reason: HOSPADM

## 2023-07-12 RX ORDER — SODIUM CHLORIDE 9 MG/ML
INJECTION, SOLUTION INTRAVENOUS CONTINUOUS
Status: CANCELLED | OUTPATIENT
Start: 2023-07-12

## 2023-07-12 RX ADMIN — PROPOFOL 10 MG: 10 INJECTION, EMULSION INTRAVENOUS at 10:07

## 2023-07-12 RX ADMIN — PROPOFOL 20 MG: 10 INJECTION, EMULSION INTRAVENOUS at 10:07

## 2023-07-12 RX ADMIN — PROPOFOL 30 MG: 10 INJECTION, EMULSION INTRAVENOUS at 10:07

## 2023-07-12 RX ADMIN — LIDOCAINE HYDROCHLORIDE 100 MG: 20 INJECTION, SOLUTION INTRAVENOUS at 10:07

## 2023-07-12 RX ADMIN — SODIUM CHLORIDE: 0.9 INJECTION, SOLUTION INTRAVENOUS at 10:07

## 2023-07-12 RX ADMIN — PROPOFOL 70 MG: 10 INJECTION, EMULSION INTRAVENOUS at 10:07

## 2023-07-12 NOTE — H&P
[facility]  Gastroenterology  H&P    Patient Name: Joanne Peña  MRN: [id number]  Admission Date: 7/12/2023  Code Status: Prior    Attending Provider: sergey lobo  Primary Care Physician: Teri Soto DO  Principal Problem:<principal problem not specified>    Subjective:     History of Present Illness: anemia, colon cancer screen    Past Medical History:   Diagnosis Date    Diabetes mellitus     Hyperlipidemia     Hypertension     Stroke        Past Surgical History:   Procedure Laterality Date    INJECTION OF ANESTHETIC AGENT AROUND MEDIAL BRANCH NERVES INNERVATING LUMBAR FACET JOINT Bilateral 4/20/2023    Procedure: MBB #1 (B/L) L3,4,5;  Surgeon: Son Lara DO;  Location: [facility] OR;  Service: Pain Management;  Laterality: Bilateral;  ORAL XANAX    INJECTION OF ANESTHETIC AGENT AROUND MEDIAL BRANCH NERVES INNERVATING LUMBAR FACET JOINT Bilateral 5/5/2023    Procedure: MBB #2 (B/L) L3,4,5;  Surgeon: Son Lara DO;  Location: [facility] OR;  Service: Pain Management;  Laterality: Bilateral;  Oral Xanax    INJECTION OF JOINT Right 5/20/2022    Procedure: Right SI joint injection;  Surgeon: Son Lara DO;  Location: [facility] OR;  Service: Pain Management;  Laterality: Right;    RADIOFREQUENCY ABLATION OF LUMBAR MEDIAL BRANCH NERVE AT SINGLE LEVEL Bilateral 5/19/2023    Procedure: RFA (B/L) L3,4,5;  Surgeon: Son Lara DO;  Location: [facility] PAIN MANAGEMENT;  Service: Pain Management;  Laterality: Bilateral;       Review of patient's allergies indicates:   Allergen Reactions    Lisinopril-hydrochlorothiazide Other (See Comments)     Pt stated it makes her feel drained. She couldn't raise arms over head.    Ampicillin Rash    Darvocet a500 [propoxyphene n-acetaminophen] Other (See Comments)     shaky     Family History       Problem Relation (Age of Onset)    Cancer Father, Maternal Aunt    Heart disease Mother    Hypertension Brother, Daughter    Kidney disease Mother           Tobacco Use    Smoking status: Former     Packs/day: 0.50     Types: Cigarettes     Quit date: 2022     Years since quittin.4     Passive exposure: Past    Smokeless tobacco: Never   Substance and Sexual Activity    Alcohol use: Yes     Comment: rare    Drug use: No    Sexual activity: Yes     Partners: Male     Review of Systems   Respiratory: Negative.     Cardiovascular: Negative.    Objective:     Vital Signs (Most Recent):    Vital Signs (24h Range):           There is no height or weight on file to calculate BMI.    No intake or output data in the 24 hours ending 23 0953    Lines/Drains/Airways       None                   Physical Exam  Cardiovascular:      Rate and Rhythm: Normal rate and regular rhythm.   Pulmonary:      Effort: Pulmonary effort is normal.      Breath sounds: Normal breath sounds.       Significant Labs:      Significant Imaging:      Assessment/Plan:     There are no hospital problems to display for this patient.      Indication for procedure:    ASA:III  Airway normal  Malampati class:    Personal and family history negative for anesthesia problems    Plan:colonoscopy  Anesthesia plan: general      Ray Sorensen MD  Gastroenterology  [facility] - Endoscopy

## 2023-07-12 NOTE — ANESTHESIA PREPROCEDURE EVALUATION
07/12/2023  Joanne Peña is a 72 y.o., female.      Pre-op Assessment    I have reviewed the Patient Summary Reports.     I have reviewed the Nursing Notes.       Review of Systems  Anesthesia Hx:  No problems with previous Anesthesia  Denies Family Hx of Anesthesia complications.   Denies Personal Hx of Anesthesia complications.   Social:  Former Smoker    Cardiovascular:   Hypertension Denies MI.   Denies Dysrhythmias.  hyperlipidemia 02/2022 Echo: EF 65%; grade 2 diastolic dysfunction; PAP 31   Pulmonary:  Pulmonary Normal    Renal/:   Chronic Renal Disease, CKD    Hepatic/GI:  Hepatic/GI Normal    Neurological:   CVA    Endocrine:   Diabetes        Physical Exam  General: Well nourished, Cooperative, Alert and Oriented    Airway:  Mallampati: II   Mouth Opening: Normal  TM Distance: 4 - 6 cm  Tongue: Normal    Chest/Lungs:  Clear to auscultation, Normal Respiratory Rate    Heart:  Rate: Normal  Rhythm: Regular Rhythm      Wt Readings from Last 3 Encounters:   06/21/23 81.6 kg (179 lb 14.3 oz)   05/19/23 81.6 kg (180 lb)   05/05/23 84.8 kg (187 lb)     Temp Readings from Last 3 Encounters:   05/19/23 36.7 °C (98.1 °F) (Temporal)   05/05/23 36.6 °C (97.9 °F) (Temporal)   04/20/23 36.7 °C (98 °F) (Temporal)     BP Readings from Last 3 Encounters:   06/21/23 (!) 143/82   05/19/23 (!) 141/69   05/05/23 137/77     Pulse Readings from Last 3 Encounters:   06/21/23 91   05/19/23 84   05/05/23 74         Anesthesia Plan  Type of Anesthesia, risks & benefits discussed:    Anesthesia Type: Gen Natural Airway, MAC  Intra-op Monitoring Plan: Standard ASA Monitors  Post Op Pain Control Plan: multimodal analgesia  Induction:  IV  Informed Consent: Informed consent signed with the Patient and all parties understand the risks and agree with anesthesia plan.  All questions answered.   ASA Score: 3  Day of Surgery  Review of History & Physical: H&P Update referred to the surgeon/provider.    Ready For Surgery From Anesthesia Perspective.     .

## 2023-07-12 NOTE — ANESTHESIA POSTPROCEDURE EVALUATION
Anesthesia Post Evaluation    Patient: Joanne Peña    Procedure(s) Performed: Procedure(s) (LRB):  COLONOSCOPY (N/A)    Final Anesthesia Type: general      Patient location during evaluation: GI PACU  Patient participation: Yes- Able to Participate  Level of consciousness: awake and alert  Post-procedure vital signs: reviewed and stable  Pain management: adequate  Airway patency: patent    PONV status at discharge: No PONV  Anesthetic complications: no      Cardiovascular status: hemodynamically stable  Respiratory status: unassisted and spontaneous ventilation  Hydration status: euvolemic  Follow-up not needed.          Vitals Value Taken Time   /73 07/12/23 1137   Temp 36.4 °C (97.6 °F) 07/12/23 1107   Pulse 76 07/12/23 1137   Resp 20 07/12/23 1137   SpO2 98 % 07/12/23 1137         Event Time   Out of Recovery 11:45:00         Pain/Wilma Score: Wilma Score: 10 (7/12/2023 11:37 AM)

## 2023-07-12 NOTE — TRANSFER OF CARE
Anesthesia Transfer of Care Note    Patient: Joanne Peña    Procedure(s) Performed: Procedure(s) (LRB):  COLONOSCOPY (N/A)    Patient location: GI    Anesthesia Type: general    Transport from OR: Transported from OR on room air with adequate spontaneous ventilation    Post pain: adequate analgesia    Post assessment: no apparent anesthetic complications and tolerated procedure well    Post vital signs: stable    Level of consciousness: awake and alert    Nausea/Vomiting: no nausea/vomiting    Complications: none    Transfer of care protocol was followed      Last vitals:   Visit Vitals  /67 (BP Location: Left arm, Patient Position: Lying)   Pulse 74   Temp 36.4 °C (97.6 °F) (Oral)   Resp 12   SpO2 97%   Breastfeeding No

## 2023-07-12 NOTE — OR NURSING
PROCEDURE AND RECOVERY COMPLETED. DR. GRANDE DISCUSSED FINDINGS WITH PATIENT AND DAUGHTER; DISCHARGE INSTRUCTIONS GIVEN AND UNDERSTANDING VERBALIZED; ASSISTED UP WITHOUT UNTOWARD EFFECTS; PATIENT READY DISCHARGE

## 2023-07-12 NOTE — [employer]OVATION PATIENT INSTRUCTIONS
Discharge Summary/Instructions after an Endoscopic Procedure  Patient Name: Joanne Peña  Patient MRN: [id number]  Patient YOB: 1950 Wednesday, July 12, 2023  Ray Sorensen MD  Dear patient,  As a result of recent federal legislation (The Federal Cures Act), you may   receive lab or pathology results from your procedure in your [facility]   account before your physician is able to contact you. Your physician or   their representative will relay the results to you with their   recommendations at their soonest availability.  Thank you,  RESTRICTIONS:  During your procedure today, you received medications for sedation.  These   medications may affect your judgment, balance and coordination.  Therefore,   for 24 hours, you have the following restrictions:   - DO NOT drive a car, operate machinery, make legal/financial decisions,   sign important papers or drink alcohol.    ACTIVITY:  Today: no heavy lifting, straining or running due to procedural   sedation/anesthesia.  The following day: return to full activity including work.  DIET:  Eat and drink normally unless instructed otherwise.     TREATMENT FOR COMMON SIDE EFFECTS:  - Mild abdominal pain, nausea, belching, bloating or excessive gas:  rest,   eat lightly and use a heating pad.  - Sore Throat: treat with throat lozenges and/or gargle with warm salt   water.  - Because air was used during the procedure, expelling large amounts of air   from your rectum or belching is normal.  - If a bowel prep was taken, you may not have a bowel movement for 1-3 days.    This is normal.  SYMPTOMS TO WATCH FOR AND REPORT TO YOUR PHYSICIAN:  1. Abdominal pain or bloating, other than gas cramps.  2. Chest pain.  3. Back pain.  4. Signs of infection such as: chills or fever occurring within 24 hours   after the procedure.  5. Rectal bleeding, which would show as bright red, maroon, or black stools.   (A tablespoon of blood from the rectum is not serious, especially if    hemorrhoids are present.)  6. Vomiting.  7. Weakness or dizziness.  GO DIRECTLY TO THE NEAREST EMERGENCY ROOM IF YOU HAVE ANY OF THE FOLLOWING:      Difficulty breathing              Chills and/or fever over 101 F   Persistent vomiting and/or vomiting blood   Severe abdominal pain   Severe chest pain   Black, tarry stools   Bleeding- more than one tablespoon   Any other symptom or condition that you feel may need urgent attention  Your doctor recommends these additional instructions:  If any biopsies were taken, your doctors clinic will contact you in 1 to 2   weeks with any results.  - Discharge patient to home (ambulatory).   - Patient has a contact number available for emergencies.  The signs and   symptoms of potential delayed complications were discussed with the   patient.  Return to normal activities tomorrow.  Written discharge   instructions were provided to the patient.   - Resume previous diet.   - Continue present medications.   - Return to primary care physician as previously scheduled.   - Repeat colonoscopy in 3 years for surveillance.   - Return to primary care physician as previously scheduled.  For questions, problems or results please call your physician - Ray Sorensen MD at Work:  [phone].  [facility] can be reached at [phone]     IF A COMPLICATION OR EMERGENCY SITUATION ARISES AND YOU ARE UNABLE TO REACH   YOUR PHYSICIAN - GO DIRECTLY TO THE EMERGENCY ROOM.  Ray Sorensen MD  7/12/2023 11:03:12 AM  This report has been verified and signed electronically.  Dear patient,  As a result of recent federal legislation (The Federal Cures Act), you may   receive lab or pathology results from your procedure in your [facility]   account before your physician is able to contact you. Your physician or   their representative will relay the results to you with their   recommendations at their soonest availability.  Thank you,  [employer]ATION

## 2023-07-13 LAB
FINAL PATHOLOGIC DIAGNOSIS: NORMAL
GROSS: NORMAL
Lab: NORMAL

## 2023-07-14 ENCOUNTER — OFFICE VISIT (OUTPATIENT)
Dept: FAMILY MEDICINE | Facility: CLINIC | Age: 73
End: 2023-07-14
Payer: MEDICARE

## 2023-07-14 ENCOUNTER — TELEPHONE (OUTPATIENT)
Dept: FAMILY MEDICINE | Facility: CLINIC | Age: 73
End: 2023-07-14

## 2023-07-14 VITALS
HEART RATE: 101 BPM | HEIGHT: 67 IN | TEMPERATURE: 98 F | OXYGEN SATURATION: 98 % | DIASTOLIC BLOOD PRESSURE: 78 MMHG | WEIGHT: 185.06 LBS | BODY MASS INDEX: 29.04 KG/M2 | SYSTOLIC BLOOD PRESSURE: 124 MMHG

## 2023-07-14 DIAGNOSIS — E11.22 CKD STAGE 3 DUE TO TYPE 2 DIABETES MELLITUS: ICD-10-CM

## 2023-07-14 DIAGNOSIS — E11.69 DYSLIPIDEMIA ASSOCIATED WITH TYPE 2 DIABETES MELLITUS: ICD-10-CM

## 2023-07-14 DIAGNOSIS — J43.9 PULMONARY EMPHYSEMA, UNSPECIFIED EMPHYSEMA TYPE: ICD-10-CM

## 2023-07-14 DIAGNOSIS — E78.5 DYSLIPIDEMIA ASSOCIATED WITH TYPE 2 DIABETES MELLITUS: ICD-10-CM

## 2023-07-14 DIAGNOSIS — I10 ESSENTIAL HYPERTENSION: ICD-10-CM

## 2023-07-14 DIAGNOSIS — E11.40 TYPE 2 DIABETES MELLITUS WITH DIABETIC NEUROPATHY, WITHOUT LONG-TERM CURRENT USE OF INSULIN: Primary | ICD-10-CM

## 2023-07-14 DIAGNOSIS — Z78.0 POSTMENOPAUSE: ICD-10-CM

## 2023-07-14 DIAGNOSIS — N18.30 CKD STAGE 3 DUE TO TYPE 2 DIABETES MELLITUS: ICD-10-CM

## 2023-07-14 DIAGNOSIS — D50.9 IRON DEFICIENCY ANEMIA, UNSPECIFIED IRON DEFICIENCY ANEMIA TYPE: ICD-10-CM

## 2023-07-14 PROCEDURE — 3078F DIAST BP <80 MM HG: CPT | Mod: CPTII,S$GLB,, | Performed by: FAMILY MEDICINE

## 2023-07-14 PROCEDURE — 1159F PR MEDICATION LIST DOCUMENTED IN MEDICAL RECORD: ICD-10-PCS | Mod: CPTII,S$GLB,, | Performed by: FAMILY MEDICINE

## 2023-07-14 PROCEDURE — 3288F PR FALLS RISK ASSESSMENT DOCUMENTED: ICD-10-PCS | Mod: CPTII,S$GLB,, | Performed by: FAMILY MEDICINE

## 2023-07-14 PROCEDURE — 99999 PR PBB SHADOW E&M-EST. PATIENT-LVL V: CPT | Mod: PBBFAC,,, | Performed by: FAMILY MEDICINE

## 2023-07-14 PROCEDURE — 1159F MED LIST DOCD IN RCRD: CPT | Mod: CPTII,S$GLB,, | Performed by: FAMILY MEDICINE

## 2023-07-14 PROCEDURE — 1101F PT FALLS ASSESS-DOCD LE1/YR: CPT | Mod: CPTII,S$GLB,, | Performed by: FAMILY MEDICINE

## 2023-07-14 PROCEDURE — 99214 OFFICE O/P EST MOD 30 MIN: CPT | Mod: S$GLB,,, | Performed by: FAMILY MEDICINE

## 2023-07-14 PROCEDURE — 1160F PR REVIEW ALL MEDS BY PRESCRIBER/CLIN PHARMACIST DOCUMENTED: ICD-10-PCS | Mod: CPTII,S$GLB,, | Performed by: FAMILY MEDICINE

## 2023-07-14 PROCEDURE — 3288F FALL RISK ASSESSMENT DOCD: CPT | Mod: CPTII,S$GLB,, | Performed by: FAMILY MEDICINE

## 2023-07-14 PROCEDURE — 3074F PR MOST RECENT SYSTOLIC BLOOD PRESSURE < 130 MM HG: ICD-10-PCS | Mod: CPTII,S$GLB,, | Performed by: FAMILY MEDICINE

## 2023-07-14 PROCEDURE — 1160F RVW MEDS BY RX/DR IN RCRD: CPT | Mod: CPTII,S$GLB,, | Performed by: FAMILY MEDICINE

## 2023-07-14 PROCEDURE — 1101F PR PT FALLS ASSESS DOC 0-1 FALLS W/OUT INJ PAST YR: ICD-10-PCS | Mod: CPTII,S$GLB,, | Performed by: FAMILY MEDICINE

## 2023-07-14 PROCEDURE — 3008F BODY MASS INDEX DOCD: CPT | Mod: CPTII,S$GLB,, | Performed by: FAMILY MEDICINE

## 2023-07-14 PROCEDURE — 3078F PR MOST RECENT DIASTOLIC BLOOD PRESSURE < 80 MM HG: ICD-10-PCS | Mod: CPTII,S$GLB,, | Performed by: FAMILY MEDICINE

## 2023-07-14 PROCEDURE — 99214 PR OFFICE/OUTPT VISIT, EST, LEVL IV, 30-39 MIN: ICD-10-PCS | Mod: S$GLB,,, | Performed by: FAMILY MEDICINE

## 2023-07-14 PROCEDURE — 4010F PR ACE/ARB THEARPY RXD/TAKEN: ICD-10-PCS | Mod: CPTII,S$GLB,, | Performed by: FAMILY MEDICINE

## 2023-07-14 PROCEDURE — 99999 PR PBB SHADOW E&M-EST. PATIENT-LVL V: ICD-10-PCS | Mod: PBBFAC,,, | Performed by: FAMILY MEDICINE

## 2023-07-14 PROCEDURE — 3008F PR BODY MASS INDEX (BMI) DOCUMENTED: ICD-10-PCS | Mod: CPTII,S$GLB,, | Performed by: FAMILY MEDICINE

## 2023-07-14 PROCEDURE — 1126F PR PAIN SEVERITY QUANTIFIED, NO PAIN PRESENT: ICD-10-PCS | Mod: CPTII,S$GLB,, | Performed by: FAMILY MEDICINE

## 2023-07-14 PROCEDURE — 1126F AMNT PAIN NOTED NONE PRSNT: CPT | Mod: CPTII,S$GLB,, | Performed by: FAMILY MEDICINE

## 2023-07-14 PROCEDURE — 4010F ACE/ARB THERAPY RXD/TAKEN: CPT | Mod: CPTII,S$GLB,, | Performed by: FAMILY MEDICINE

## 2023-07-14 PROCEDURE — 3074F SYST BP LT 130 MM HG: CPT | Mod: CPTII,S$GLB,, | Performed by: FAMILY MEDICINE

## 2023-07-14 NOTE — PROGRESS NOTES
Assessment & Plan:    Type 2 diabetes mellitus with diabetic neuropathy, without long-term current use of insulin  -     CBC Auto Differential; Future; Expected date: 07/14/2023  -     Comprehensive Metabolic Panel; Future; Expected date: 07/14/2023  -     Hemoglobin A1C; Future; Expected date: 07/14/2023  -     Lipid Panel; Future; Expected date: 07/14/2023  -     Microalbumin/Creatinine Ratio, Urine; Future; Expected date: 07/14/2023    Fasting labs to be scheduled.  Foot exam performed.  Encouraged eye exam - patient already has an Optometrist.    Dyslipidemia associated with type 2 diabetes mellitus  -     Lipid Panel; Future; Expected date: 07/14/2023    Essential hypertension  -     CBC Auto Differential; Future; Expected date: 07/14/2023  -     Comprehensive Metabolic Panel; Future; Expected date: 07/14/2023  -     Hemoglobin A1C; Future; Expected date: 07/14/2023  -     Lipid Panel; Future; Expected date: 07/14/2023    Controlled. Continue current therapy.     CKD stage 3 due to type 2 diabetes mellitus  -     Comprehensive Metabolic Panel; Future; Expected date: 07/14/2023    Iron deficiency anemia, unspecified iron deficiency anemia type  -     CBC Auto Differential; Future; Expected date: 07/14/2023  -     Ferritin; Future; Expected date: 07/14/2023  -     Iron and TIBC; Future; Expected date: 07/14/2023    Pulmonary emphysema, unspecified emphysema type  Found incidentally on a CTA head and neck. Former smoker. Patient denies respiratory symptoms. No further management needed.     Postmenopause  -     DXA Bone Density Axial Skeleton 1 or more sites; Future; Expected date: 07/14/2023      Health maintenance reviewed & addressed above.  -Declines all vaccines.     Follow-up: Follow up in about 6 months (around 1/14/2024).  ______________________________________________________________________    Chief Complaint  Chief Complaint   Patient presents with    Hypertension    Diabetes       HPI  Joanne Nadya  Mariana is a 72 y.o. female with medical diagnoses as listed in the medical history and problem list that presents to the office to follow up on her chronic conditions. She is in her usual state of health today.     Health Maintenance         Date Due Completion Date    COVID-19 Vaccine (1) Never done ---    Pneumococcal Vaccines (Age 65+) (1 - PCV) Never done ---    Eye Exam Never done ---    TETANUS VACCINE Never done ---    DEXA Scan Never done ---    Shingles Vaccine (1 of 2) Never done ---    Hemoglobin A1c 05/23/2023 11/23/2022    Influenza Vaccine (1) 09/01/2023 ---    Diabetes Urine Screening 11/23/2023 11/23/2022    Lipid Panel 11/23/2023 11/23/2022    Mammogram 03/13/2024 3/13/2023    High Dose Statin 07/14/2024 7/14/2023    Aspirin/Antiplatelet Therapy 07/14/2024 7/14/2023    Foot Exam 07/14/2024 7/14/2023 (Done)    Override on 7/14/2023: Done    Colorectal Cancer Screening 07/12/2026 7/12/2023              PAST MEDICAL HISTORY:  Past Medical History:   Diagnosis Date    Diabetes mellitus     Hyperlipidemia     Hypertension     Stroke        PAST SURGICAL HISTORY:  Past Surgical History:   Procedure Laterality Date    COLONOSCOPY N/A 7/12/2023    Procedure: COLONOSCOPY;  Surgeon: Ray Sorensen MD;  Location: [facility];  Service: Endoscopy;  Laterality: N/A;  instr via portal  - PC  4/10/23 Daniel, instr via mail & portal, unable to tolerate Golytely- request Miralax/Gatorade - PC  5/30-pt r/s-new instr portal-tb    INJECTION OF ANESTHETIC AGENT AROUND MEDIAL BRANCH NERVES INNERVATING LUMBAR FACET JOINT Bilateral 4/20/2023    Procedure: MBB #1 (B/L) L3,4,5;  Surgeon: Son Lara DO;  Location: [facility] OR;  Service: Pain Management;  Laterality: Bilateral;  ORAL XANAX    INJECTION OF ANESTHETIC AGENT AROUND MEDIAL BRANCH NERVES INNERVATING LUMBAR FACET JOINT Bilateral 5/5/2023    Procedure: MBB #2 (B/L) L3,4,5;  Surgeon: Son Lara DO;  Location: [facility] OR;  Service: Pain Management;   Laterality: Bilateral;  Oral Xanax    INJECTION OF JOINT Right 2022    Procedure: Right SI joint injection;  Surgeon: Son Lara DO;  Location: [facility] OR;  Service: Pain Management;  Laterality: Right;    RADIOFREQUENCY ABLATION OF LUMBAR MEDIAL BRANCH NERVE AT SINGLE LEVEL Bilateral 2023    Procedure: RFA (B/L) L3,4,5;  Surgeon: Son Lara DO;  Location: [facility];  Service: Pain Management;  Laterality: Bilateral;       SOCIAL HISTORY:  Social History     Socioeconomic History    Marital status:    Tobacco Use    Smoking status: Former     Packs/day: 0.50     Types: Cigarettes     Quit date: 2022     Years since quittin.4     Passive exposure: Past    Smokeless tobacco: Never   Substance and Sexual Activity    Alcohol use: Yes     Comment: rare    Drug use: No    Sexual activity: Yes     Partners: Male       FAMILY HISTORY:  Family History   Problem Relation Age of Onset    Kidney disease Mother     Heart disease Mother     Cancer Father     Hypertension Brother     Hypertension Daughter     Cancer Maternal Aunt        ALLERGIES AND MEDICATIONS: updated and reviewed.  Review of patient's allergies indicates:   Allergen Reactions    Lisinopril-hydrochlorothiazide Other (See Comments)     Pt stated it makes her feel drained. She couldn't raise arms over head.    Ampicillin Rash    Darvocet a500 [propoxyphene n-acetaminophen] Other (See Comments)     shaky     Current Outpatient Medications   Medication Sig Dispense Refill    ACCU-CHEK GUIDE GLUCOSE METER Misc TO CHECK BLOOD GLUCOSE DAILY, TO USE WITH INSURANCE PREFERRED METER      amLODIPine (NORVASC) 10 MG tablet TAKE 1 TABLET (10 MG TOTAL) BY MOUTH ONCE DAILY. HOLD IF SBP <120 90 tablet 2    ascorbic acid, vitamin C, (VITAMIN C) 500 MG tablet Take 500 mg by mouth once daily.      aspirin (ECOTRIN) 81 MG EC tablet Take 1 tablet (81 mg total) by mouth once daily. 30 tablet 3    atorvastatin (LIPITOR) 80 MG  tablet TAKE 1 TABLET (80 MG TOTAL) BY MOUTH ONCE DAILY. 90 tablet 3    BLOOD PRESSURE CUFF Misc 1 Units by Misc.(Non-Drug; Combo Route) route once daily. 1 each 0    blood sugar diagnostic Strp To check BG daily, to use with insurance preferred meter 200 each 11    blood-glucose meter kit To check BG daily, to use with insurance preferred meter 1 each 0    cephALEXin (KEFLEX) 500 MG capsule TAKE 2 CAPSULES (1,000 MG TOTAL) BY MOUTH 2 (TWO) TIMES DAILY. FOR 10 DAYS      chlorthalidone (HYGROTEN) 25 MG Tab Take 1 tablet (25 mg total) by mouth once daily. 30 tablet 11    cyclobenzaprine (FLEXERIL) 10 MG tablet Take 1 tablet (10 mg total) by mouth 3 (three) times daily as needed for Muscle spasms. 270 tablet 1    GAVILYTE-C 240-22.72-6.72 -5.84 gram SolR TAKE 4,000 MLS BY MOUTH ONCE. FOR 1 DOSE      glimepiride (AMARYL) 2 MG tablet TAKE 1 TABLET BY MOUTH 2 TIMES DAILY. 180 tablet 3    guanfacine HCl (GUANFACINE ORAL) Take by mouth.      hydrALAZINE (APRESOLINE) 50 MG tablet Take 1 tablet (50 mg total) by mouth every 8 (eight) hours. Hold is SBP <120 (Patient taking differently: Take 50 mg by mouth every 8 (eight) hours. Hold is SBP <120    Pt is taking once a day) 90 tablet 1    lancets Misc To check BG daily, to use with insurance preferred meter 200 each 11    LORazepam (ATIVAN) 1 MG tablet Take 1 tablet (1 mg total) by mouth once as needed for Anxiety (before procedure). 1 tablet 0    losartan (COZAAR) 100 MG tablet TAKE 1 TABLET (100 MG TOTAL) BY MOUTH ONCE DAILY. HOLD IF SBP <120 90 tablet 3    meclizine (ANTIVERT) 25 mg tablet TAKE 1 TABLET (25 MG TOTAL) BY MOUTH 2 (TWO) TIMES DAILY AS NEEDED FOR DIZZINESS. 60 tablet 0    multivitamin (THERAGRAN) per tablet Take 1 tablet by mouth once daily.      ondansetron (ZOFRAN-ODT) 4 MG TbDL Dissolve 1 tablet (4 mg total) by mouth every 8 (eight) hours as needed. 20 tablet 0    pregabalin (LYRICA) 75 MG capsule Take 1 capsule (75 mg total) by mouth 2 (two) times daily. 60  "capsule 1     No current facility-administered medications for this visit.     Facility-Administered Medications Ordered in Other Visits   Medication Dose Route Frequency Provider Last Rate Last Admin    0.9%  NaCl infusion   Intravenous Continuous Ronald Young MD        LIDOcaine (PF) 10 mg/ml (1%) injection 10 mg  1 mL Intradermal Once Ronald Young MD             ROS  Review of Systems   Constitutional:  Negative for activity change and unexpected weight change.   HENT:  Negative for hearing loss, rhinorrhea and trouble swallowing.    Eyes:  Negative for discharge and visual disturbance.   Respiratory:  Negative for chest tightness and wheezing.    Cardiovascular:  Negative for chest pain and palpitations.   Gastrointestinal:  Negative for blood in stool, constipation, diarrhea and vomiting.   Endocrine: Negative for polydipsia and polyuria.   Genitourinary:  Negative for difficulty urinating, dysuria, hematuria and menstrual problem.   Musculoskeletal:  Positive for arthralgias (chronic). Negative for joint swelling and neck pain.   Neurological:  Positive for weakness (chronic, from back). Negative for headaches.   Psychiatric/Behavioral:  Negative for confusion and dysphoric mood.          Physical Exam  Vitals:    07/14/23 1424 07/14/23 1446   BP: (!) 180/84 124/78   BP Location: Right arm    Patient Position: Sitting    BP Method: Medium (Manual)    Pulse: (!) 113 101   Temp: 97.6 °F (36.4 °C)    TempSrc: Oral    SpO2: 98%    Weight: 84 kg (185 lb 1.2 oz)    Height: 5' 7" (1.702 m)     Body mass index is 28.99 kg/m².  Weight: 84 kg (185 lb 1.2 oz)   Height: 5' 7" (170.2 cm)   Physical Exam  Constitutional:       General: She is not in acute distress.     Appearance: Normal appearance.   HENT:      Head: Normocephalic and atraumatic.   Neck:      Thyroid: No thyromegaly.      Vascular: No carotid bruit.   Cardiovascular:      Rate and Rhythm: Normal rate and regular rhythm.      Pulses: Normal pulses. "      Heart sounds: Normal heart sounds.   Pulmonary:      Effort: Pulmonary effort is normal. No respiratory distress.      Breath sounds: Normal breath sounds.   Musculoskeletal:      Cervical back: Neck supple.      Right lower leg: No edema.      Left lower leg: No edema.   Feet:      Comments: Protective Sensation (w/ 10 gram monofilament):  Right: Intact  Left: Intact    Visual Inspection:  Normal -  Bilateral    Pedal Pulses:   Right: Present  Left: Present    Posterior Tibialis Pulses:   Right:Present  Left: Present        Lymphadenopathy:      Cervical: No cervical adenopathy.   Skin:     General: Skin is warm and dry.      Findings: No rash.   Neurological:      General: No focal deficit present.      Mental Status: She is alert and oriented to person, place, and time.   Psychiatric:         Mood and Affect: Mood normal.         Behavior: Behavior normal.         Thought Content: Thought content normal.

## 2023-07-19 ENCOUNTER — LAB VISIT (OUTPATIENT)
Dept: LAB | Facility: HOSPITAL | Age: 73
End: 2023-07-19
Attending: FAMILY MEDICINE
Payer: MEDICARE

## 2023-07-19 DIAGNOSIS — E11.40 TYPE 2 DIABETES MELLITUS WITH DIABETIC NEUROPATHY, WITHOUT LONG-TERM CURRENT USE OF INSULIN: ICD-10-CM

## 2023-07-19 DIAGNOSIS — I10 ESSENTIAL HYPERTENSION: ICD-10-CM

## 2023-07-19 DIAGNOSIS — D50.9 IRON DEFICIENCY ANEMIA, UNSPECIFIED IRON DEFICIENCY ANEMIA TYPE: ICD-10-CM

## 2023-07-19 DIAGNOSIS — E78.5 DYSLIPIDEMIA ASSOCIATED WITH TYPE 2 DIABETES MELLITUS: ICD-10-CM

## 2023-07-19 DIAGNOSIS — N18.30 CKD STAGE 3 DUE TO TYPE 2 DIABETES MELLITUS: ICD-10-CM

## 2023-07-19 DIAGNOSIS — E11.22 CKD STAGE 3 DUE TO TYPE 2 DIABETES MELLITUS: ICD-10-CM

## 2023-07-19 DIAGNOSIS — E11.69 DYSLIPIDEMIA ASSOCIATED WITH TYPE 2 DIABETES MELLITUS: ICD-10-CM

## 2023-07-19 LAB
ALBUMIN SERPL BCP-MCNC: 3.4 G/DL (ref 3.5–5.2)
ALP SERPL-CCNC: 96 U/L (ref 55–135)
ALT SERPL W/O P-5'-P-CCNC: 10 U/L (ref 10–44)
ANION GAP SERPL CALC-SCNC: 10 MMOL/L (ref 8–16)
AST SERPL-CCNC: 10 U/L (ref 10–40)
BASOPHILS # BLD AUTO: 0.02 K/UL (ref 0–0.2)
BASOPHILS NFR BLD: 0.4 % (ref 0–1.9)
BILIRUB SERPL-MCNC: 0.3 MG/DL (ref 0.1–1)
BUN SERPL-MCNC: 25 MG/DL (ref 8–23)
CALCIUM SERPL-MCNC: 9 MG/DL (ref 8.7–10.5)
CHLORIDE SERPL-SCNC: 109 MMOL/L (ref 95–110)
CHOLEST SERPL-MCNC: 181 MG/DL (ref 120–199)
CHOLEST/HDLC SERPL: 4.5 {RATIO} (ref 2–5)
CO2 SERPL-SCNC: 20 MMOL/L (ref 23–29)
CREAT SERPL-MCNC: 1.4 MG/DL (ref 0.5–1.4)
DIFFERENTIAL METHOD: ABNORMAL
EOSINOPHIL # BLD AUTO: 0.2 K/UL (ref 0–0.5)
EOSINOPHIL NFR BLD: 3.8 % (ref 0–8)
ERYTHROCYTE [DISTWIDTH] IN BLOOD BY AUTOMATED COUNT: 14.8 % (ref 11.5–14.5)
EST. GFR  (NO RACE VARIABLE): 40.5 ML/MIN/1.73 M^2
ESTIMATED AVG GLUCOSE: 252 MG/DL (ref 68–131)
FERRITIN SERPL-MCNC: 67 NG/ML (ref 20–300)
GLUCOSE SERPL-MCNC: 211 MG/DL (ref 70–110)
HBA1C MFR BLD: 10.4 % (ref 4–5.6)
HCT VFR BLD AUTO: 33.1 % (ref 37–48.5)
HDLC SERPL-MCNC: 40 MG/DL (ref 40–75)
HDLC SERPL: 22.1 % (ref 20–50)
HGB BLD-MCNC: 9.9 G/DL (ref 12–16)
IMM GRANULOCYTES # BLD AUTO: 0.03 K/UL (ref 0–0.04)
IMM GRANULOCYTES NFR BLD AUTO: 0.6 % (ref 0–0.5)
IRON SERPL-MCNC: 62 UG/DL (ref 30–160)
LDLC SERPL CALC-MCNC: 112.8 MG/DL (ref 63–159)
LYMPHOCYTES # BLD AUTO: 1.1 K/UL (ref 1–4.8)
LYMPHOCYTES NFR BLD: 21.8 % (ref 18–48)
MCH RBC QN AUTO: 24.7 PG (ref 27–31)
MCHC RBC AUTO-ENTMCNC: 29.9 G/DL (ref 32–36)
MCV RBC AUTO: 83 FL (ref 82–98)
MONOCYTES # BLD AUTO: 0.5 K/UL (ref 0.3–1)
MONOCYTES NFR BLD: 8.8 % (ref 4–15)
NEUTROPHILS # BLD AUTO: 3.4 K/UL (ref 1.8–7.7)
NEUTROPHILS NFR BLD: 64.6 % (ref 38–73)
NONHDLC SERPL-MCNC: 141 MG/DL
NRBC BLD-RTO: 0 /100 WBC
PLATELET # BLD AUTO: 355 K/UL (ref 150–450)
PMV BLD AUTO: 9.8 FL (ref 9.2–12.9)
POTASSIUM SERPL-SCNC: 4.8 MMOL/L (ref 3.5–5.1)
PROT SERPL-MCNC: 7.3 G/DL (ref 6–8.4)
RBC # BLD AUTO: 4.01 M/UL (ref 4–5.4)
SATURATED IRON: 23 % (ref 20–50)
SODIUM SERPL-SCNC: 139 MMOL/L (ref 136–145)
TOTAL IRON BINDING CAPACITY: 268 UG/DL (ref 250–450)
TRANSFERRIN SERPL-MCNC: 181 MG/DL (ref 200–375)
TRIGL SERPL-MCNC: 141 MG/DL (ref 30–150)
WBC # BLD AUTO: 5.23 K/UL (ref 3.9–12.7)

## 2023-07-19 PROCEDURE — 85025 COMPLETE CBC W/AUTO DIFF WBC: CPT | Performed by: FAMILY MEDICINE

## 2023-07-19 PROCEDURE — 36415 COLL VENOUS BLD VENIPUNCTURE: CPT | Mod: PO | Performed by: FAMILY MEDICINE

## 2023-07-19 PROCEDURE — 83036 HEMOGLOBIN GLYCOSYLATED A1C: CPT | Performed by: FAMILY MEDICINE

## 2023-07-19 PROCEDURE — 84466 ASSAY OF TRANSFERRIN: CPT | Performed by: FAMILY MEDICINE

## 2023-07-19 PROCEDURE — 82728 ASSAY OF FERRITIN: CPT | Performed by: FAMILY MEDICINE

## 2023-07-19 PROCEDURE — 80053 COMPREHEN METABOLIC PANEL: CPT | Performed by: FAMILY MEDICINE

## 2023-07-19 PROCEDURE — 80061 LIPID PANEL: CPT | Performed by: FAMILY MEDICINE

## 2023-07-19 NOTE — PROGRESS NOTES
Please contact the patient and let her know that her diabetes is uncontrolled with an A1c of 10.4. Recommend follow up within the next 2 weeks to discuss further management. Virtual/telephone is okay.

## 2023-07-20 ENCOUNTER — PATIENT MESSAGE (OUTPATIENT)
Dept: FAMILY MEDICINE | Facility: CLINIC | Age: 73
End: 2023-07-20
Payer: MEDICARE

## 2023-07-20 ENCOUNTER — TELEPHONE (OUTPATIENT)
Dept: FAMILY MEDICINE | Facility: CLINIC | Age: 73
End: 2023-07-20
Payer: MEDICARE

## 2023-07-20 NOTE — TELEPHONE ENCOUNTER
Attempted to contact pt regarding message below. Pt's daughter took the call stating that pt is unavailable and attending a . Daughter states she will notify pt to return call to clinic.

## 2023-07-20 NOTE — TELEPHONE ENCOUNTER
----- Message from Teri Soto DO sent at 7/19/2023  6:32 PM CDT -----  Please contact the patient and let her know that her diabetes is uncontrolled with an A1c of 10.4. Recommend follow up within the next 2 weeks to discuss further management. Virtual/telephone is okay.

## 2023-07-21 NOTE — TELEPHONE ENCOUNTER
This message is for Ms. Peña in regards to his/her appointment 06/06/2022 at 2:30pm    [facility] Policy: For the safety of all patients and staff members. Please review [facility] Patient Visitor policy below.    Patient Visitor Policy:     Due to social distancing and limited seating, the clinic staff asks that all patients arrive on time for their scheduled appointment.  During this visit, we ask all patients to only have one visitor over the age of 18yrs old to accompany them to be seen by Dr. Son Lara.  If the patient does not require assistance with their visit, all visitors remain in the waiting area.  Upon arriving, patients and visitors are required to wear a face mask.  If the patient or visitor does not have a face mask, one will be provided to you when entering the facility.      Address: [facility], [address] [place] [address]    Thank you,  Interventional Pain Staff   [phone]      We are always striving for excellence. Should you receive a patient experience survey via text message, electronically, or by mail, we would appreciate if you would take a few moments to give us your feedback. These surveys let us know our strengths as well as areas of opportunity for improvement to better serve you.    Patient verbalized understanding.    Azathioprine Counseling:  I discussed with the patient the risks of azathioprine including but not limited to myelosuppression, immunosuppression, hepatotoxicity, lymphoma, and infections.  The patient understands that monitoring is required including baseline LFTs, Creatinine, possible TPMP genotyping and weekly CBCs for the first month and then every 2 weeks thereafter.  The patient verbalized understanding of the proper use and possible adverse effects of azathioprine.  All of the patient's questions and concerns were addressed.

## 2023-07-24 ENCOUNTER — TELEPHONE (OUTPATIENT)
Dept: PAIN MEDICINE | Facility: CLINIC | Age: 73
End: 2023-07-24
Payer: MEDICARE

## 2023-07-24 DIAGNOSIS — M54.16 LUMBAR RADICULOPATHY: Primary | ICD-10-CM

## 2023-07-24 RX ORDER — PREGABALIN 25 MG/1
50 CAPSULE ORAL 2 TIMES DAILY
Qty: 120 CAPSULE | Refills: 1 | Status: SHIPPED | OUTPATIENT
Start: 2023-07-24 | End: 2023-11-02

## 2023-07-24 NOTE — TELEPHONE ENCOUNTER
Pregabalin was helpful for her back/leg pain but was causing side effects.  75mg at night was causing her to drop objects and have difficulty gripping things.  She has stopped the medicine.  Will try 25mg qhs and increase to 50mg BID if she tolerates it.    Son Cartagena

## 2023-07-25 ENCOUNTER — TELEPHONE (OUTPATIENT)
Dept: FAMILY MEDICINE | Facility: CLINIC | Age: 73
End: 2023-07-25
Payer: MEDICARE

## 2023-07-25 NOTE — TELEPHONE ENCOUNTER
Spoken with patient daughter and she stated that her mom phone is not capable of doing a virtual appt. And needed to be seen Imperson to discuss the lab results with Dr Soto in two weeks as she was told. Patient has been schedule for 8/7/23 @ 8 am.

## 2023-07-25 NOTE — TELEPHONE ENCOUNTER
----- Message from Babita Gomez sent at 7/24/2023  9:19 AM CDT -----  Regarding: appt access  Contact: [phone]  Joanne Peña calling regarding Appointment Access  (message) for #lab f/u. She states she is supposed to be seen within 2 weeks. Please call

## 2023-07-28 DIAGNOSIS — M79.2 NEURITIS: ICD-10-CM

## 2023-07-30 RX ORDER — CYCLOBENZAPRINE HCL 10 MG
TABLET ORAL
Qty: 90 TABLET | Refills: 1 | Status: SHIPPED | OUTPATIENT
Start: 2023-07-30 | End: 2023-08-07 | Stop reason: SDUPTHER

## 2023-08-05 ENCOUNTER — OFFICE VISIT (OUTPATIENT)
Dept: URGENT CARE | Facility: CLINIC | Age: 73
End: 2023-08-05
Payer: MEDICARE

## 2023-08-05 ENCOUNTER — NURSE TRIAGE (OUTPATIENT)
Dept: ADMINISTRATIVE | Facility: CLINIC | Age: 73
End: 2023-08-05
Payer: MEDICARE

## 2023-08-05 VITALS
DIASTOLIC BLOOD PRESSURE: 89 MMHG | HEART RATE: 89 BPM | TEMPERATURE: 98 F | HEIGHT: 67 IN | OXYGEN SATURATION: 98 % | RESPIRATION RATE: 16 BRPM | BODY MASS INDEX: 29.03 KG/M2 | SYSTOLIC BLOOD PRESSURE: 156 MMHG | WEIGHT: 185 LBS
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
## 2023-08-05 DIAGNOSIS — M25.532 LEFT WRIST PAIN: ICD-10-CM

## 2023-08-05 DIAGNOSIS — R07.81 RIB PAIN ON LEFT SIDE: ICD-10-CM

## 2023-08-05 PROCEDURE — 99213 PR OFFICE/OUTPT VISIT, EST, LEVL III, 20-29 MIN: ICD-10-PCS | Mod: S$GLB,,, | Performed by: NURSE PRACTITIONER

## 2023-08-05 PROCEDURE — 71101 XR RIBS MIN 3 VIEWS W/ PA CHEST LEFT: ICD-10-PCS | Mod: LT,S$GLB,, | Performed by: RADIOLOGY

## 2023-08-05 PROCEDURE — 73110 X-RAY EXAM OF WRIST: CPT | Mod: LT,S$GLB,, | Performed by: RADIOLOGY

## 2023-08-05 PROCEDURE — 71101 X-RAY EXAM UNILAT RIBS/CHEST: CPT | Mod: LT,S$GLB,, | Performed by: RADIOLOGY

## 2023-08-05 PROCEDURE — 73110 XR WRIST COMPLETE 3 VIEWS LEFT: ICD-10-PCS | Mod: LT,S$GLB,, | Performed by: RADIOLOGY

## 2023-08-05 PROCEDURE — 99213 OFFICE O/P EST LOW 20 MIN: CPT | Mod: S$GLB,,, | Performed by: NURSE PRACTITIONER

## 2023-08-05 RX ORDER — METHOCARBAMOL 500 MG/1
500 TABLET, FILM COATED ORAL NIGHTLY PRN
Qty: 30 TABLET | Refills: 0 | Status: SHIPPED | OUTPATIENT
Start: 2023-08-05 | End: 2023-08-05

## 2023-08-05 RX ORDER — METHOCARBAMOL 500 MG/1
500 TABLET, FILM COATED ORAL NIGHTLY PRN
Qty: 14 TABLET | Refills: 0 | Status: SHIPPED | OUTPATIENT
Start: 2023-08-05 | End: 2023-08-07

## 2023-08-05 NOTE — TELEPHONE ENCOUNTER
Fell last night in home, about 9 pm . I was on flat floor, slipped. When I fell, tired to use L arm to brace myself, but I could not. My left side hit floor. My  checked for a bruise but could not see one. Triage done- dispo see provider in 4 hours. Advised she could be seen at an Urgent Care.  Verb understanding.   Reason for Disposition   [1] MODERATE weakness (i.e., interferes with work, school, normal activities) AND [2] new-onset or worsening    Additional Information   Negative: Major injury from dangerous force (e.g., fall > 10 feet or 3 meters)   Negative: [1] Major bleeding (e.g., actively dripping or spurting) AND [2] can't be stopped   Negative: Shock suspected (e.g., cold/pale/clammy skin, too weak to stand)   Negative: Difficult to awaken or acting confused (e.g., disoriented, slurred speech)   Negative: [1] SEVERE weakness (i.e., unable to walk or barely able to walk, requires support) AND [2] new-onset or worsening   Negative: [1] Can't stand (bear weight) or walk AND [2] new-onset after fall   Negative: Sounds like a life-threatening emergency to the triager   Negative: Injury (or injuries) that need emergency care   Negative: Sounds like a serious injury to the triager   Negative: [1] Muscle pain AND [2] dark (cola colored) or red-colored urine   Negative: [1] Unable to get up until help (e.g., caregiver, family, friend) arrived AND [2] on the ground 1 hour or more   Negative: Patient sounds very sick or weak to the triager    Protocols used: Falls and [id number]

## 2023-08-05 NOTE — PATIENT INSTRUCTIONS
- Follow up with your PCP or specialty clinic as directed in the next 1-2 weeks if not improved or as needed.  You can call [phone] to schedule an appointment with the appropriate provider.    - Go to the ER or seek medical attention immediately if you develop new or worsening symptoms.    - You must understand that you have received an Urgent Care treatment only and that you may be released before all of your medical problems are known or treated.   - You, the patient, will arrange for follow up care as instructed.   - If your condition worsens or fails to improve we recommend that you receive another evaluation at the ER immediately or contact your PCP to discuss your concerns or return here.

## 2023-08-05 NOTE — PROGRESS NOTES
"Subjective:      Patient ID: Joanne Peña is a 73 y.o. female.    Vitals:  height is 5' 7" (1.702 m) and weight is 83.9 kg (185 lb). Her oral temperature is 97.9 °F (36.6 °C). Her blood pressure is 156/89 (abnormal) and her pulse is 89. Her respiration is 16 and oxygen saturation is 98%.     Chief Complaint: Fall    73-year-old female with medical history as listed below presents to clinic for evaluation after slip and fall last night.  Patient reports falling into her closet last night, landing on her left side.  She reports left ribcage pain, and left wrist pain.  She denies shortness or breath.  She is not taking any medications for her pain.  She is awake and alert, answers questions appropriately, no acute distress noted on today's visit.    Past Medical History:  No date: Diabetes mellitus  No date: Hyperlipidemia  No date: Hypertension  No date: Stroke      Fall  The accident occurred 12 to 24 hours ago. The fall occurred while walking. She fell from a height of 3 to 5 ft. She landed on Hard floor. There was no blood loss. Point of impact: left chest. Pain location: left rib. The pain is at a severity of 10/10. The pain is severe. The symptoms are aggravated by movement. Pertinent negatives include no abdominal pain, bowel incontinence, fever, headaches, hearing loss, hematuria, loss of consciousness, nausea, numbness, tingling, visual change or vomiting. She has tried nothing for the symptoms. The treatment provided no relief.       Constitution: Negative for activity change, appetite change and fever.   Gastrointestinal:  Negative for abdominal pain, nausea, vomiting and bowel incontinence.   Genitourinary:  Negative for hematuria.   Musculoskeletal:  Positive for pain and joint pain.   Skin:  Negative for erythema.   Neurological:  Negative for dizziness, headaches, loss of consciousness and numbness.      Objective:     Physical Exam   Constitutional: She is oriented to person, place, and time. She " appears well-developed. She does not appear ill.   HENT:   Head: Normocephalic and atraumatic. Head is without abrasion, without contusion and without laceration.   Ears:   Right Ear: External ear normal.   Left Ear: External ear normal.   Nose: Nose normal.   Mouth/Throat: Oropharynx is clear and moist and mucous membranes are normal.   Eyes: Conjunctivae, EOM and lids are normal.   Neck: Trachea normal and phonation normal. Neck supple.   Cardiovascular: Normal rate, regular rhythm and normal heart sounds.   Pulmonary/Chest: Effort normal and breath sounds normal. No stridor. No respiratory distress. She has no wheezes. She exhibits tenderness.       Abdominal: Normal appearance.   Musculoskeletal: Normal range of motion.         General: Tenderness present. No swelling, deformity or signs of injury. Normal range of motion.      Left wrist: She exhibits tenderness and swelling. She exhibits normal range of motion, no effusion and no deformity.        Arms:    Neurological: She is alert and oriented to person, place, and time.   Skin: Skin is warm, dry, intact and no rash. No abrasion, No burn, No bruising, No erythema and No ecchymosis   Psychiatric: Her speech is normal and behavior is normal. Mood, judgment and thought content normal.   Nursing note and vitals reviewed.    X-Ray Wrist Complete 3 views Left    Result Date: 8/5/2023  EXAMINATION: XR WRIST COMPLETE 3 VIEWS LEFT CLINICAL HISTORY: Pain in left wrist TECHNIQUE: PA, lateral, and oblique views of the left wrist were performed. COMPARISON: None FINDINGS: The bones appear intact.  There is no evidence for acute fracture or bone destruction.  There is no evidence for dislocation.  No bony erosions are identified.  No radiopaque soft tissue foreign bodies are identified.     No evidence for acute fracture, bone destruction, or dislocation. Electronically signed by: Magen Cagle MD Date:    08/05/2023 Time:    15:11    XR RIB LEFT W/ PA CHEST    Result  Date: 8/5/2023  EXAMINATION: XR RIBS MIN 3 VIEWS W/ PA CHEST LEFT CLINICAL HISTORY: Unspecified fall, initial encounter TECHNIQUE: A PA radiograph of the chest and AP in oblique radiographs of the left ribs were obtained. COMPARISON: Chest x-ray dated 02/06/2022. FINDINGS: The heart and mediastinal structures appear unchanged.  Pulmonary vasculature is within normal limits.  Linear opacities are identified within the left lung base likely representing linear atelectasis or fibrotic streaks.  There is no evidence for pneumothorax or pleural effusions.  Bony structures appear grossly intact.  Specifically, left ribs appear intact without evidence for acute fracture or bone destruction.     Left ribs appear intact without evidence for acute fracture or bone destruction. Linear opacities within the left lung base likely representing linear atelectasis or fibrotic streak. Electronically signed by: Magen Cagle MD Date:    08/05/2023 Time:    15:10       Assessment:     1. Fall, initial encounter    2. Rib pain on left side    3. Left wrist pain        Plan:       Fall, initial encounter  -     XR RIB LEFT W/ PA CHEST; Future; Expected date: 08/05/2023    Rib pain on left side  -     XR RIB LEFT W/ PA CHEST; Future; Expected date: 08/05/2023  -     methocarbamoL (ROBAXIN) 500 MG Tab; Take 1 tablet (500 mg total) by mouth nightly as needed (muscle spasms).  Dispense: 14 tablet; Refill: 0    Left wrist pain  -     X-Ray Wrist Complete 3 views Left; Future; Expected date: 08/05/2023      - reviewed and discussed x-ray results, no acute fracture or dislocation seen.  Follow-up as scheduled with PCP.  Patient verbalized understanding and is in agreement with plan.    Patient Instructions   - Follow up with your PCP or specialty clinic as directed in the next 1-2 weeks if not improved or as needed.  You can call [phone] to schedule an appointment with the appropriate provider.    - Go to the ER or seek medical attention  immediately if you develop new or worsening symptoms.    - You must understand that you have received an Urgent Care treatment only and that you may be released before all of your medical problems are known or treated.   - You, the patient, will arrange for follow up care as instructed.   - If your condition worsens or fails to improve we recommend that you receive another evaluation at the ER immediately or contact your PCP to discuss your concerns or return here.

## 2023-08-07 ENCOUNTER — TELEPHONE (OUTPATIENT)
Dept: FAMILY MEDICINE | Facility: CLINIC | Age: 73
End: 2023-08-07

## 2023-08-07 ENCOUNTER — OFFICE VISIT (OUTPATIENT)
Dept: FAMILY MEDICINE | Facility: CLINIC | Age: 73
End: 2023-08-07
Payer: MEDICARE

## 2023-08-07 VITALS
BODY MASS INDEX: 29.04 KG/M2 | HEART RATE: 97 BPM | OXYGEN SATURATION: 99 % | WEIGHT: 185.06 LBS | SYSTOLIC BLOOD PRESSURE: 150 MMHG | TEMPERATURE: 98 F | HEIGHT: 67 IN | DIASTOLIC BLOOD PRESSURE: 84 MMHG

## 2023-08-07 DIAGNOSIS — Z86.73 HISTORY OF CVA (CEREBROVASCULAR ACCIDENT): ICD-10-CM

## 2023-08-07 DIAGNOSIS — E11.29 MICROALBUMINURIA DUE TO TYPE 2 DIABETES MELLITUS: ICD-10-CM

## 2023-08-07 DIAGNOSIS — E11.69 DYSLIPIDEMIA ASSOCIATED WITH TYPE 2 DIABETES MELLITUS: ICD-10-CM

## 2023-08-07 DIAGNOSIS — D50.9 IRON DEFICIENCY ANEMIA, UNSPECIFIED IRON DEFICIENCY ANEMIA TYPE: ICD-10-CM

## 2023-08-07 DIAGNOSIS — J43.9 PULMONARY EMPHYSEMA, UNSPECIFIED EMPHYSEMA TYPE: ICD-10-CM

## 2023-08-07 DIAGNOSIS — N18.30 CKD STAGE 3 DUE TO TYPE 2 DIABETES MELLITUS: ICD-10-CM

## 2023-08-07 DIAGNOSIS — E11.40 TYPE 2 DIABETES MELLITUS WITH DIABETIC NEUROPATHY, WITHOUT LONG-TERM CURRENT USE OF INSULIN: Primary | ICD-10-CM

## 2023-08-07 DIAGNOSIS — S20.212D CONTUSION OF RIB ON LEFT SIDE, SUBSEQUENT ENCOUNTER: ICD-10-CM

## 2023-08-07 DIAGNOSIS — E11.22 CKD STAGE 3 DUE TO TYPE 2 DIABETES MELLITUS: ICD-10-CM

## 2023-08-07 DIAGNOSIS — I10 ESSENTIAL HYPERTENSION: ICD-10-CM

## 2023-08-07 DIAGNOSIS — E78.5 DYSLIPIDEMIA ASSOCIATED WITH TYPE 2 DIABETES MELLITUS: ICD-10-CM

## 2023-08-07 DIAGNOSIS — R80.9 MICROALBUMINURIA DUE TO TYPE 2 DIABETES MELLITUS: ICD-10-CM

## 2023-08-07 PROCEDURE — 99214 OFFICE O/P EST MOD 30 MIN: CPT | Mod: S$GLB,,, | Performed by: FAMILY MEDICINE

## 2023-08-07 PROCEDURE — 4010F PR ACE/ARB THEARPY RXD/TAKEN: ICD-10-PCS | Mod: CPTII,S$GLB,, | Performed by: FAMILY MEDICINE

## 2023-08-07 PROCEDURE — 3046F HEMOGLOBIN A1C LEVEL >9.0%: CPT | Mod: CPTII,S$GLB,, | Performed by: FAMILY MEDICINE

## 2023-08-07 PROCEDURE — 1125F PR PAIN SEVERITY QUANTIFIED, PAIN PRESENT: ICD-10-PCS | Mod: CPTII,S$GLB,, | Performed by: FAMILY MEDICINE

## 2023-08-07 PROCEDURE — 99999 PR PBB SHADOW E&M-EST. PATIENT-LVL V: CPT | Mod: PBBFAC,,, | Performed by: FAMILY MEDICINE

## 2023-08-07 PROCEDURE — 3046F PR MOST RECENT HEMOGLOBIN A1C LEVEL > 9.0%: ICD-10-PCS | Mod: CPTII,S$GLB,, | Performed by: FAMILY MEDICINE

## 2023-08-07 PROCEDURE — 1160F RVW MEDS BY RX/DR IN RCRD: CPT | Mod: CPTII,S$GLB,, | Performed by: FAMILY MEDICINE

## 2023-08-07 PROCEDURE — 1100F PTFALLS ASSESS-DOCD GE2>/YR: CPT | Mod: CPTII,S$GLB,, | Performed by: FAMILY MEDICINE

## 2023-08-07 PROCEDURE — 3079F DIAST BP 80-89 MM HG: CPT | Mod: CPTII,S$GLB,, | Performed by: FAMILY MEDICINE

## 2023-08-07 PROCEDURE — 3079F PR MOST RECENT DIASTOLIC BLOOD PRESSURE 80-89 MM HG: ICD-10-PCS | Mod: CPTII,S$GLB,, | Performed by: FAMILY MEDICINE

## 2023-08-07 PROCEDURE — 3008F PR BODY MASS INDEX (BMI) DOCUMENTED: ICD-10-PCS | Mod: CPTII,S$GLB,, | Performed by: FAMILY MEDICINE

## 2023-08-07 PROCEDURE — 1125F AMNT PAIN NOTED PAIN PRSNT: CPT | Mod: CPTII,S$GLB,, | Performed by: FAMILY MEDICINE

## 2023-08-07 PROCEDURE — 3077F PR MOST RECENT SYSTOLIC BLOOD PRESSURE >= 140 MM HG: ICD-10-PCS | Mod: CPTII,S$GLB,, | Performed by: FAMILY MEDICINE

## 2023-08-07 PROCEDURE — 3066F PR DOCUMENTATION OF TREATMENT FOR NEPHROPATHY: ICD-10-PCS | Mod: CPTII,S$GLB,, | Performed by: FAMILY MEDICINE

## 2023-08-07 PROCEDURE — 3060F PR POS MICROALBUMINURIA RESULT DOCUMENTED/REVIEW: ICD-10-PCS | Mod: CPTII,S$GLB,, | Performed by: FAMILY MEDICINE

## 2023-08-07 PROCEDURE — 99214 PR OFFICE/OUTPT VISIT, EST, LEVL IV, 30-39 MIN: ICD-10-PCS | Mod: S$GLB,,, | Performed by: FAMILY MEDICINE

## 2023-08-07 PROCEDURE — 3288F PR FALLS RISK ASSESSMENT DOCUMENTED: ICD-10-PCS | Mod: CPTII,S$GLB,, | Performed by: FAMILY MEDICINE

## 2023-08-07 PROCEDURE — 99999 PR PBB SHADOW E&M-EST. PATIENT-LVL V: ICD-10-PCS | Mod: PBBFAC,,, | Performed by: FAMILY MEDICINE

## 2023-08-07 PROCEDURE — 3077F SYST BP >= 140 MM HG: CPT | Mod: CPTII,S$GLB,, | Performed by: FAMILY MEDICINE

## 2023-08-07 PROCEDURE — 1100F PR PT FALLS ASSESS DOC 2+ FALLS/FALL W/INJURY/YR: ICD-10-PCS | Mod: CPTII,S$GLB,, | Performed by: FAMILY MEDICINE

## 2023-08-07 PROCEDURE — 4010F ACE/ARB THERAPY RXD/TAKEN: CPT | Mod: CPTII,S$GLB,, | Performed by: FAMILY MEDICINE

## 2023-08-07 PROCEDURE — 1159F MED LIST DOCD IN RCRD: CPT | Mod: CPTII,S$GLB,, | Performed by: FAMILY MEDICINE

## 2023-08-07 PROCEDURE — 3066F NEPHROPATHY DOC TX: CPT | Mod: CPTII,S$GLB,, | Performed by: FAMILY MEDICINE

## 2023-08-07 PROCEDURE — 3288F FALL RISK ASSESSMENT DOCD: CPT | Mod: CPTII,S$GLB,, | Performed by: FAMILY MEDICINE

## 2023-08-07 PROCEDURE — 1160F PR REVIEW ALL MEDS BY PRESCRIBER/CLIN PHARMACIST DOCUMENTED: ICD-10-PCS | Mod: CPTII,S$GLB,, | Performed by: FAMILY MEDICINE

## 2023-08-07 PROCEDURE — 3008F BODY MASS INDEX DOCD: CPT | Mod: CPTII,S$GLB,, | Performed by: FAMILY MEDICINE

## 2023-08-07 PROCEDURE — 3060F POS MICROALBUMINURIA REV: CPT | Mod: CPTII,S$GLB,, | Performed by: FAMILY MEDICINE

## 2023-08-07 PROCEDURE — 1159F PR MEDICATION LIST DOCUMENTED IN MEDICAL RECORD: ICD-10-PCS | Mod: CPTII,S$GLB,, | Performed by: FAMILY MEDICINE

## 2023-08-07 RX ORDER — ORAL SEMAGLUTIDE 3 MG/1
3 TABLET ORAL DAILY
Qty: 30 TABLET | Refills: 11 | Status: SHIPPED | OUTPATIENT
Start: 2023-08-07 | End: 2023-11-10

## 2023-08-07 RX ORDER — CYCLOBENZAPRINE HCL 10 MG
TABLET ORAL
Qty: 90 TABLET | Refills: 1 | Status: SHIPPED | OUTPATIENT
Start: 2023-08-07 | End: 2023-08-11 | Stop reason: SDUPTHER

## 2023-08-07 NOTE — PROGRESS NOTES
Assessment & Plan:    Type 2 diabetes mellitus with diabetic neuropathy, without long-term current use of insulin  -     Hemoglobin A1C; Future; Expected date: 08/07/2023  -     Ambulatory referral/consult to Diabetes Education; Future; Expected date: 08/14/2023  -     semaglutide (RYBELSUS) 3 mg tablet; Take 1 tablet (3 mg total) by mouth once daily.  Dispense: 30 tablet; Refill: 11  -     Microalbumin/creatinine urine ratio    Labs reviewed. Uncontrolled. Start Rybelsus.   Counseled on Mediterranean diet and portion control to achieve glycemic control. Referral to diabetes education for further counseling. Dietary handouts provided.  Patient has an eye exam scheduled with Dr. Tracy.  Repeat labs before f/u in 3 mo.    Microalbuminuria due to type 2 diabetes mellitus  -     Microalbumin/creatinine urine ratio    Improving. Monitor with repeat labs.    CKD stage 3 due to type 2 diabetes mellitus  Stable.    Dyslipidemia associated with type 2 diabetes mellitus  -     Lipid Panel; Future; Expected date: 08/07/2023    TC and LDL outside of goal range. She is maxed out on statin.   See above for dietary counseling - will repeat in 3 mo and consider Repatha if not at goal after dietary changes.    Essential hypertension  BP elevated because patient has not taken her medications. Resume antihypertensives and call back with an updated BP reading.    History of CVA (cerebrovascular accident)  -     Lipid Panel; Future; Expected date: 08/07/2023    Iron deficiency anemia, unspecified iron deficiency anemia type  Stable.    Pulmonary emphysema, unspecified emphysema type  Stable.    Contusion of rib on left side, subsequent encounter  -     cyclobenzaprine (FLEXERIL) 10 MG tablet; TAKE 1 TABLET BY MOUTH EVERY DAY AT NIGHT AS NEEDED FOR MUSCLE SPASM  Dispense: 90 tablet; Refill: 1    Refilled.      Health maintenance reviewed & addressed above.  She has a DEXA scheduled.   Declines vaccines.     Follow-up: Follow up in about  3 months (around 11/7/2023).  ______________________________________________________________________    Chief Complaint  Chief Complaint   Patient presents with    Diabetes    Hypertension       HPI  Joanne Peña is a 73 y.o. female with medical diagnoses as listed in the medical history and problem list that presents to the office with her daughter to follow up on her chronic conditions. She c/o left rib and wrist pain after a mechanical fall that prompted an Urgent Care visit on 8/5. Patient admits to an unhealthy diet causing her A1c to increase to 10.4. She has not taken her medications yet today.     Most recent pertinent workup:     Last CBC Results:   Lab Results   Component Value Date    WBC 5.23 07/19/2023    HGB 9.9 (L) 07/19/2023    HCT 33.1 (L) 07/19/2023     07/19/2023       Last CMP Results  Lab Results   Component Value Date     07/19/2023    K 4.8 07/19/2023     07/19/2023    CO2 20 (L) 07/19/2023    BUN 25 (H) 07/19/2023    CREATININE 1.4 07/19/2023    CALCIUM 9.0 07/19/2023    ALBUMIN 3.4 (L) 07/19/2023    AST 10 07/19/2023    ALT 10 07/19/2023       Last Lipids  Lab Results   Component Value Date    CHOL 181 07/19/2023    TRIG 141 07/19/2023    HDL 40 07/19/2023    LDLCALC 112.8 07/19/2023       Last A1C  Lab Results   Component Value Date    HGBA1C 10.4 (H) 07/19/2023         Health Maintenance         Date Due Completion Date    COVID-19 Vaccine (1) Never done ---    Pneumococcal Vaccines (Age 65+) (1 - PCV) Never done ---    Eye Exam Never done ---    TETANUS VACCINE Never done ---    DEXA Scan Never done ---    Shingles Vaccine (1 of 2) Never done ---    Influenza Vaccine (1) 09/01/2023 ---    Hemoglobin A1c 10/19/2023 7/19/2023    Mammogram 03/13/2024 3/13/2023    Foot Exam 07/14/2024 7/14/2023    Override on 7/14/2023: Done    Diabetes Urine Screening 07/19/2024 7/19/2023    Lipid Panel 07/19/2024 7/19/2023    High Dose Statin 08/07/2024 8/7/2023     Aspirin/Antiplatelet Therapy 2024    Colorectal Cancer Screening 2026              PAST MEDICAL HISTORY:  Past Medical History:   Diagnosis Date    Diabetes mellitus     Hyperlipidemia     Hypertension     Stroke        PAST SURGICAL HISTORY:  Past Surgical History:   Procedure Laterality Date    COLONOSCOPY N/A 2023    Procedure: COLONOSCOPY;  Surgeon: Ray Sorensen MD;  Location: [facility] ENDO;  Service: Endoscopy;  Laterality: N/A;  instr via portal  - PC  4/10/23 Daniel, instr via mail & portal, unable to tolerate Golytely- request Miralax/Gatorade - PC  -pt r/s-new instr portal-tb    INJECTION OF ANESTHETIC AGENT AROUND MEDIAL BRANCH NERVES INNERVATING LUMBAR FACET JOINT Bilateral 2023    Procedure: MBB #1 (B/L) L3,4,5;  Surgeon: Son Lara DO;  Location: [facility] OR;  Service: Pain Management;  Laterality: Bilateral;  ORAL XANAX    INJECTION OF ANESTHETIC AGENT AROUND MEDIAL BRANCH NERVES INNERVATING LUMBAR FACET JOINT Bilateral 2023    Procedure: MBB #2 (B/L) L3,4,5;  Surgeon: Son Lara DO;  Location: [facility] OR;  Service: Pain Management;  Laterality: Bilateral;  Oral Xanax    INJECTION OF JOINT Right 2022    Procedure: Right SI joint injection;  Surgeon: Son Lara DO;  Location: [facility] OR;  Service: Pain Management;  Laterality: Right;    RADIOFREQUENCY ABLATION OF LUMBAR MEDIAL BRANCH NERVE AT SINGLE LEVEL Bilateral 2023    Procedure: RFA (B/L) L3,4,5;  Surgeon: Son Lara DO;  [facility];  Service: Pain Management;  Laterality: Bilateral;       SOCIAL HISTORY:  Social History     Socioeconomic History    Marital status:    Tobacco Use    Smoking status: Former     Current packs/day: 0.00     Types: Cigarettes     Quit date: 2022     Years since quittin.4     Passive exposure: Past    Smokeless tobacco: Never   Substance and Sexual Activity    Alcohol use: Yes      Comment: rare    Drug use: No    Sexual activity: Yes     Partners: Male       FAMILY HISTORY:  Family History   Problem Relation Age of Onset    Kidney disease Mother     Heart disease Mother     Cancer Father     Hypertension Brother     Hypertension Daughter     Cancer Maternal Aunt        ALLERGIES AND MEDICATIONS: updated and reviewed.  Review of patient's allergies indicates:   Allergen Reactions    Lisinopril-hydrochlorothiazide Other (See Comments)     Pt stated it makes her feel drained. She couldn't raise arms over head.    Ampicillin Rash    Darvocet a500 [propoxyphene n-acetaminophen] Other (See Comments)     kierstenky     Current Outpatient Medications   Medication Sig Dispense Refill    ACCU-CHEK GUIDE GLUCOSE METER Misc TO CHECK BLOOD GLUCOSE DAILY, TO USE WITH INSURANCE PREFERRED METER      amLODIPine (NORVASC) 10 MG tablet TAKE 1 TABLET (10 MG TOTAL) BY MOUTH ONCE DAILY. HOLD IF SBP <120 90 tablet 2    ascorbic acid, vitamin C, (VITAMIN C) 500 MG tablet Take 500 mg by mouth once daily.      aspirin (ECOTRIN) 81 MG EC tablet Take 1 tablet (81 mg total) by mouth once daily. 30 tablet 3    atorvastatin (LIPITOR) 80 MG tablet TAKE 1 TABLET (80 MG TOTAL) BY MOUTH ONCE DAILY. 90 tablet 3    BLOOD PRESSURE CUFF Misc 1 Units by Misc.(Non-Drug; Combo Route) route once daily. 1 each 0    blood sugar diagnostic Strp To check BG daily, to use with insurance preferred meter 200 each 11    blood-glucose meter kit To check BG daily, to use with insurance preferred meter 1 each 0    cephALEXin (KEFLEX) 500 MG capsule TAKE 2 CAPSULES (1,000 MG TOTAL) BY MOUTH 2 (TWO) TIMES DAILY. FOR 10 DAYS      chlorthalidone (HYGROTEN) 25 MG Tab Take 1 tablet (25 mg total) by mouth once daily. 30 tablet 11    GAVILYTE-C 240-22.72-6.72 -5.84 gram SolR TAKE 4,000 MLS BY MOUTH ONCE. FOR 1 DOSE      glimepiride (AMARYL) 2 MG tablet TAKE 1 TABLET BY MOUTH 2 TIMES DAILY. 180 tablet 3    guanfacine HCl (GUANFACINE  ORAL) Take by mouth.      hydrALAZINE (APRESOLINE) 50 MG tablet Take 1 tablet (50 mg total) by mouth every 8 (eight) hours. Hold is SBP <120 (Patient taking differently: Take 50 mg by mouth every 8 (eight) hours. Hold is SBP <120    Pt is taking once a day) 90 tablet 1    lancets Misc To check BG daily, to use with insurance preferred meter 200 each 11    LORazepam (ATIVAN) 1 MG tablet Take 1 tablet (1 mg total) by mouth once as needed for Anxiety (before procedure). 1 tablet 0    losartan (COZAAR) 100 MG tablet TAKE 1 TABLET (100 MG TOTAL) BY MOUTH ONCE DAILY. HOLD IF SBP <120 90 tablet 3    meclizine (ANTIVERT) 25 mg tablet TAKE 1 TABLET (25 MG TOTAL) BY MOUTH 2 (TWO) TIMES DAILY AS NEEDED FOR DIZZINESS. 60 tablet 0    multivitamin (THERAGRAN) per tablet Take 1 tablet by mouth once daily.      ondansetron (ZOFRAN-ODT) 4 MG TbDL Dissolve 1 tablet (4 mg total) by mouth every 8 (eight) hours as needed. 20 tablet 0    pregabalin (LYRICA) 25 MG capsule Take 2 capsules (50 mg total) by mouth 2 (two) times daily. Start with 1 pill at night, and then increase if you can tolerate it 120 capsule 1    cyclobenzaprine (FLEXERIL) 10 MG tablet TAKE 1 TABLET BY MOUTH EVERY DAY AT NIGHT AS NEEDED FOR MUSCLE SPASM 90 tablet 1    semaglutide (RYBELSUS) 3 mg tablet Take 1 tablet (3 mg total) by mouth once daily. 30 tablet 11     No current facility-administered medications for this visit.     Facility-Administered Medications Ordered in Other Visits   Medication Dose Route Frequency Provider Last Rate Last Admin    0.9%  NaCl infusion   Intravenous Continuous Ronald Young MD        LIDOcaine (PF) 10 mg/ml (1%) injection 10 mg  1 mL Intradermal Once Ronald Young MD             ROS  Review of Systems   Constitutional:  Negative for activity change and unexpected weight change.   Musculoskeletal:  Positive for arthralgias.           Physical Exam  Vitals:    08/07/23 0807   BP: (!) 150/84   BP Location: Right arm  "  Patient Position: Sitting   BP Method: Medium (Manual)   Pulse: 97   Temp: 98 °F (36.7 °C)   TempSrc: Oral   SpO2: 99%   Weight: 84 kg (185 lb 1.2 oz)   Height: 5' 7" (1.702 m)    Body mass index is 28.99 kg/m².  Weight: 84 kg (185 lb 1.2 oz)   Height: 5' 7" (170.2 cm)   Physical Exam  Constitutional:       General: She is not in acute distress.     Appearance: Normal appearance.   HENT:      Head: Normocephalic and atraumatic.   Neck:      Thyroid: No thyromegaly.      Vascular: No carotid bruit.   Cardiovascular:      Rate and Rhythm: Normal rate and regular rhythm.      Pulses: Normal pulses.      Heart sounds: Normal heart sounds.   Pulmonary:      Effort: Pulmonary effort is normal. No respiratory distress.      Breath sounds: Normal breath sounds.   Musculoskeletal:      Cervical back: Neck supple.      Right lower leg: No edema.      Left lower leg: No edema.   Lymphadenopathy:      Cervical: No cervical adenopathy.   Skin:     General: Skin is warm and dry.      Findings: No rash.   Neurological:      Mental Status: She is alert and oriented to person, place, and time. Mental status is at baseline.   Psychiatric:         Mood and Affect: Mood normal.         Behavior: Behavior normal.         Thought Content: Thought content normal.           "

## 2023-08-11 ENCOUNTER — PES CALL (OUTPATIENT)
Dept: ADMINISTRATIVE | Facility: CLINIC | Age: 73
End: 2023-08-11
Payer: MEDICARE

## 2023-08-11 ENCOUNTER — TELEPHONE (OUTPATIENT)
Dept: FAMILY MEDICINE | Facility: CLINIC | Age: 73
End: 2023-08-11
Payer: MEDICARE

## 2023-08-11 DIAGNOSIS — S20.212D CONTUSION OF RIB ON LEFT SIDE, SUBSEQUENT ENCOUNTER: ICD-10-CM

## 2023-08-11 RX ORDER — CYCLOBENZAPRINE HCL 10 MG
10 TABLET ORAL 3 TIMES DAILY PRN
Qty: 90 TABLET | Refills: 1 | Status: SHIPPED | OUTPATIENT
Start: 2023-08-11 | End: 2023-10-16

## 2023-08-11 NOTE — TELEPHONE ENCOUNTER
----- Message from Taylor Hira sent at 8/11/2023  3:36 PM CDT -----  Regarding: Requesting a call back please  Pt called to request a call back from the nurse pt is still having pain in her back.  Pt says pain meds doctor prescribe her does not work. Please give pt a call back .    Pt can be reached at [phone]    Thank you!

## 2023-08-11 NOTE — TELEPHONE ENCOUNTER
Patient states she could not get her (FLEXERIL) filled states pharmacy informed her it was to soon to fill medication. Patient states the (LYRICA) is not working also. Patient states she can not get her (FLEXERIL) filled until 8/24. Patient states she is still in a lot of pain and need something to take. Patient states her symptoms is still the same as when she did when she came into her appointment.

## 2023-08-11 NOTE — TELEPHONE ENCOUNTER
Sent another rx for Flexeril to take up to 3 times a day with a note saying it is okay to fill early. She will have to discuss her other pain medication with her Pain specialist, who manages the Lyrica.

## 2023-08-15 ENCOUNTER — TELEPHONE (OUTPATIENT)
Dept: PHARMACY | Facility: CLINIC | Age: 73
End: 2023-08-15
Payer: MEDICARE

## 2023-08-15 ENCOUNTER — OFFICE VISIT (OUTPATIENT)
Dept: URGENT CARE | Facility: CLINIC | Age: 73
End: 2023-08-15
Payer: MEDICARE

## 2023-08-15 VITALS
BODY MASS INDEX: 29.03 KG/M2 | TEMPERATURE: 98 F | OXYGEN SATURATION: 95 % | RESPIRATION RATE: 14 BRPM | HEART RATE: 83 BPM | WEIGHT: 185 LBS | DIASTOLIC BLOOD PRESSURE: 77 MMHG | SYSTOLIC BLOOD PRESSURE: 131 MMHG | HEIGHT: 67 IN

## 2023-08-15 DIAGNOSIS — R07.81 RIB PAIN ON LEFT SIDE: Primary | ICD-10-CM

## 2023-08-15 DIAGNOSIS — S22.42XD CLOSED FRACTURE OF MULTIPLE RIBS OF LEFT SIDE WITH ROUTINE HEALING, SUBSEQUENT ENCOUNTER: ICD-10-CM

## 2023-08-15 PROCEDURE — 99213 PR OFFICE/OUTPT VISIT, EST, LEVL III, 20-29 MIN: ICD-10-PCS | Mod: S$GLB,,,

## 2023-08-15 PROCEDURE — 99213 OFFICE O/P EST LOW 20 MIN: CPT | Mod: S$GLB,,,

## 2023-08-15 PROCEDURE — 71101 X-RAY EXAM UNILAT RIBS/CHEST: CPT | Mod: LT,S$GLB,, | Performed by: RADIOLOGY

## 2023-08-15 PROCEDURE — 71101 XR RIBS MIN 3 VIEWS W/ PA CHEST LEFT: ICD-10-PCS | Mod: LT,S$GLB,, | Performed by: RADIOLOGY

## 2023-08-15 RX ORDER — LIDOCAINE 50 MG/G
1 PATCH TOPICAL DAILY
Qty: 28 PATCH | Refills: 0 | Status: SHIPPED | OUTPATIENT
Start: 2023-08-15 | End: 2023-09-12

## 2023-08-15 NOTE — PROGRESS NOTES
"Subjective:      Patient ID: Joanne Peña is a 73 y.o. female.    Vitals:  height is 5' 7" (1.702 m) and weight is 83.9 kg (185 lb). Her oral temperature is 97.6 °F (36.4 °C). Her blood pressure is 131/77 and her pulse is 83. Her respiration is 14 and oxygen saturation is 95%.     Chief Complaint: Fall and Back Pain    Pt is here today for left sided upper back pain which radiates to the the left breast. Pt states she had a fall on 8-4-23. Pt was seen when the incident first happened. Pt states the pain is not improving.     Provider note:    74 yo F c/o left rib/breast pain since she fell at home Aug 4th. She was seen at  the following day and xrays were negative. She reports the pain has not improved. She reports pain worsening with deep inhalation and coughing. Denies sob, chest pain, palpitations, numbness/tingling in extremities. She is not taking anything for pain. Rates pain 10/10 and constant    Fall  The accident occurred More than 1 week ago. The fall occurred while walking. She landed on Carpet. The pain is present in the back. The pain is at a severity of 10/10. The symptoms are aggravated by ambulation and movement. Pertinent negatives include no numbness or tingling.       Respiratory:  Negative for chest tightness, cough, shortness of breath and wheezing.    Musculoskeletal:  Positive for pain and trauma. Negative for muscle cramps, muscle ache and history of spine disorder.   Neurological:  Negative for numbness.      Objective:     Physical Exam   Constitutional: She is oriented to person, place, and time. normal  Eyes: Conjunctivae are normal. Pupils are equal, round, and reactive to light. Extraocular movement intact   Cardiovascular: Normal rate and regular rhythm.   Pulmonary/Chest: Effort normal and breath sounds normal. No stridor. No respiratory distress. She has no wheezes. She has no rhonchi. She has no rales. She exhibits no tenderness.   Abdominal: Normal appearance. "   Musculoskeletal: Normal range of motion.         General: Tenderness present. No swelling, deformity or signs of injury. Normal range of motion.      Right lower leg: No edema.      Left lower leg: No edema.      Comments: TTP to left lateral chest. No obvious deformity noted. Skin is warm dry and intact   Neurological: She is alert and oriented to person, place, and time.   Skin: Skin is warm and dry.   Psychiatric: Her behavior is normal.       Assessment:     1. Rib pain on left side    2. Closed fracture of multiple ribs of left side with routine healing, subsequent encounter        Plan:   Pt stable on exam for outpatient treatment. Tylenol/lidocaine patches for pain relief. Pt aware to avoid NSAIDS with renal disease. ER precautions discussed in depth with daughter and pt via telephone. Encouraged frequent deep breathing exercises while holding a pillow to the chest. Discussed could take up to 6 weeks to heal. Recommended following up with PCP to ensure adequate healing. RTC if symptoms worsen or not able to get in with PCP. PT and daughter verbalized understanding of plan of care.       Rib pain on left side  -     XR RIB LEFT W/ PA CHEST; Future; Expected date: 08/15/2023  -     LIDOcaine (LIDODERM) 5 %; Place 1 patch onto the skin once daily. Remove & Discard patch within 12 hours or as directed by MD  Dispense: 28 patch; Refill: 0    Closed fracture of multiple ribs of left side with routine healing, subsequent encounter  -     LIDOcaine (LIDODERM) 5 %; Place 1 patch onto the skin once daily. Remove & Discard patch within 12 hours or as directed by MD  Dispense: 28 patch; Refill: 0           XR RIB LEFT W/ PA CHEST    Result Date: 8/15/2023  EXAMINATION: XR RIBS MIN 3 VIEWS W/ PA CHEST LEFT CLINICAL HISTORY: Pleurodynia COMPARISON: 08/05/2023 FINDINGS: PA chest, four views left ribs. The cardiomediastinal silhouette is prominent, similar to the previous exam noting magnification by technique..  There is  no pleural effusion.  The trachea is midline.  The lungs are symmetrically expanded bilaterally with left basilar dependent atelectasis/scarring..  No large focal consolidation seen.  There is no pneumothorax.  The osseous structures are remarkable for degenerative changes and osteopenia. There is fracture of the posterolateral aspect of left rib 6 and suspected 5...     1. Acute appearing fractures involving the posterolateral aspects of left ribs 5 and 6. 2. No acute cardiopulmonary process.  Stable chronic interstitial findings. Electronically signed by: Jan Martinez MD Date:    08/15/2023 Time:    16:13

## 2023-08-16 ENCOUNTER — OFFICE VISIT (OUTPATIENT)
Dept: FAMILY MEDICINE | Facility: CLINIC | Age: 73
End: 2023-08-16
Payer: MEDICARE

## 2023-08-16 VITALS
HEIGHT: 67 IN | OXYGEN SATURATION: 99 % | TEMPERATURE: 98 F | HEART RATE: 87 BPM | BODY MASS INDEX: 29.36 KG/M2 | SYSTOLIC BLOOD PRESSURE: 138 MMHG | DIASTOLIC BLOOD PRESSURE: 72 MMHG | WEIGHT: 187.06 LBS

## 2023-08-16 DIAGNOSIS — I10 MALIGNANT ESSENTIAL HYPERTENSION: ICD-10-CM

## 2023-08-16 DIAGNOSIS — Z78.0 ASYMPTOMATIC POSTMENOPAUSAL STATUS: ICD-10-CM

## 2023-08-16 DIAGNOSIS — E11.65 TYPE 2 DIABETES MELLITUS WITH HYPERGLYCEMIA, WITHOUT LONG-TERM CURRENT USE OF INSULIN: ICD-10-CM

## 2023-08-16 DIAGNOSIS — S22.42XD CLOSED FRACTURE OF MULTIPLE RIBS OF LEFT SIDE WITH ROUTINE HEALING, SUBSEQUENT ENCOUNTER: Primary | ICD-10-CM

## 2023-08-16 DIAGNOSIS — N18.31 STAGE 3A CHRONIC KIDNEY DISEASE: ICD-10-CM

## 2023-08-16 PROCEDURE — 3078F PR MOST RECENT DIASTOLIC BLOOD PRESSURE < 80 MM HG: ICD-10-PCS | Mod: CPTII,S$GLB,,

## 2023-08-16 PROCEDURE — 3075F SYST BP GE 130 - 139MM HG: CPT | Mod: CPTII,S$GLB,,

## 2023-08-16 PROCEDURE — 3066F NEPHROPATHY DOC TX: CPT | Mod: CPTII,S$GLB,,

## 2023-08-16 PROCEDURE — 99999 PR PBB SHADOW E&M-EST. PATIENT-LVL V: ICD-10-PCS | Mod: PBBFAC,,,

## 2023-08-16 PROCEDURE — 3078F DIAST BP <80 MM HG: CPT | Mod: CPTII,S$GLB,,

## 2023-08-16 PROCEDURE — 1125F AMNT PAIN NOTED PAIN PRSNT: CPT | Mod: CPTII,S$GLB,,

## 2023-08-16 PROCEDURE — 1125F PR PAIN SEVERITY QUANTIFIED, PAIN PRESENT: ICD-10-PCS | Mod: CPTII,S$GLB,,

## 2023-08-16 PROCEDURE — 99999 PR PBB SHADOW E&M-EST. PATIENT-LVL V: CPT | Mod: PBBFAC,,,

## 2023-08-16 PROCEDURE — 1160F RVW MEDS BY RX/DR IN RCRD: CPT | Mod: CPTII,S$GLB,,

## 2023-08-16 PROCEDURE — 4010F PR ACE/ARB THEARPY RXD/TAKEN: ICD-10-PCS | Mod: CPTII,S$GLB,,

## 2023-08-16 PROCEDURE — 3075F PR MOST RECENT SYSTOLIC BLOOD PRESS GE 130-139MM HG: ICD-10-PCS | Mod: CPTII,S$GLB,,

## 2023-08-16 PROCEDURE — 4010F ACE/ARB THERAPY RXD/TAKEN: CPT | Mod: CPTII,S$GLB,,

## 2023-08-16 PROCEDURE — 3046F PR MOST RECENT HEMOGLOBIN A1C LEVEL > 9.0%: ICD-10-PCS | Mod: CPTII,S$GLB,,

## 2023-08-16 PROCEDURE — 3288F PR FALLS RISK ASSESSMENT DOCUMENTED: ICD-10-PCS | Mod: CPTII,S$GLB,,

## 2023-08-16 PROCEDURE — 1160F PR REVIEW ALL MEDS BY PRESCRIBER/CLIN PHARMACIST DOCUMENTED: ICD-10-PCS | Mod: CPTII,S$GLB,,

## 2023-08-16 PROCEDURE — 3288F FALL RISK ASSESSMENT DOCD: CPT | Mod: CPTII,S$GLB,,

## 2023-08-16 PROCEDURE — 3066F PR DOCUMENTATION OF TREATMENT FOR NEPHROPATHY: ICD-10-PCS | Mod: CPTII,S$GLB,,

## 2023-08-16 PROCEDURE — 3008F PR BODY MASS INDEX (BMI) DOCUMENTED: ICD-10-PCS | Mod: CPTII,S$GLB,,

## 2023-08-16 PROCEDURE — 1100F PR PT FALLS ASSESS DOC 2+ FALLS/FALL W/INJURY/YR: ICD-10-PCS | Mod: CPTII,S$GLB,,

## 2023-08-16 PROCEDURE — 99214 PR OFFICE/OUTPT VISIT, EST, LEVL IV, 30-39 MIN: ICD-10-PCS | Mod: S$GLB,,,

## 2023-08-16 PROCEDURE — 3060F POS MICROALBUMINURIA REV: CPT | Mod: CPTII,S$GLB,,

## 2023-08-16 PROCEDURE — 3060F PR POS MICROALBUMINURIA RESULT DOCUMENTED/REVIEW: ICD-10-PCS | Mod: CPTII,S$GLB,,

## 2023-08-16 PROCEDURE — 1159F MED LIST DOCD IN RCRD: CPT | Mod: CPTII,S$GLB,,

## 2023-08-16 PROCEDURE — 1100F PTFALLS ASSESS-DOCD GE2>/YR: CPT | Mod: CPTII,S$GLB,,

## 2023-08-16 PROCEDURE — 99214 OFFICE O/P EST MOD 30 MIN: CPT | Mod: S$GLB,,,

## 2023-08-16 PROCEDURE — 3008F BODY MASS INDEX DOCD: CPT | Mod: CPTII,S$GLB,,

## 2023-08-16 PROCEDURE — 1159F PR MEDICATION LIST DOCUMENTED IN MEDICAL RECORD: ICD-10-PCS | Mod: CPTII,S$GLB,,

## 2023-08-16 PROCEDURE — 3046F HEMOGLOBIN A1C LEVEL >9.0%: CPT | Mod: CPTII,S$GLB,,

## 2023-08-16 NOTE — ASSESSMENT & PLAN NOTE
The current medical regimen is effective;  continue present plan and medications.    Continue sodium restriction, and avoidance of nephrotoxic agents.

## 2023-08-16 NOTE — ASSESSMENT & PLAN NOTE
The current medical regimen is effective;  continue present plan and medications.    Amlodipine 10mg, losartan 100mg

## 2023-08-16 NOTE — PATIENT INSTRUCTIONS
Continue with current treatment plan with lidocaine patches, 650mg tylenol every 6 hours, flexeril, etc.     Incentive spirometer sent to [facility], let us know if they don't have one.     We can repeat chest xray in approx 6 weeks to ensure healing. Go to ED for any acute changes including chest pain, SOB, etc.     Complete DEXA scan to eval bone strength.     Fall risk precautions.     Continue diet/exercise for diabetes control. F/u with Dr. Soto as scheduled. Appt with diabetes education.

## 2023-08-16 NOTE — ASSESSMENT & PLAN NOTE
The current medical regimen is effective;  continue present plan and medications.    Lab Results   Component Value Date    HGBA1C 10.4 (H) 07/19/2023     rybelsus 3mg will start once she gets BG down. Hasn't started with diabetes educator yet. glimeperdie 2mg bid.     Taking 2mg of glimepiride bid.

## 2023-08-16 NOTE — PROGRESS NOTES
HPI     Chief Complaint:  Chief Complaint   Patient presents with    Fall     8/4/23 was the fall. Went to urgent care on the 5th and was told they didn't see anything at that time. Went back on yesteday and they were findings.       Joanne Peña is a 73 y.o. female with multiple medical diagnoses as listed in the medical history and problem list that presents for rib fx f/u.  Pt is new to me but seen in clinic with last appointment 8/7/2023.      HPI    Went to [facility] 8/5 after fall on 8/4, xray or ribs and wrist normal. No signs of fracture, linear atelectasis noted.   Returned to [facility] 8/15 because was still having left sided pain after fall and fractures noted to left ribs 5/6. [facility] encounter notes, objective/subjective data, diagnostics, and plan of care from 8/15 reviewed.  Hasn't started lidocaine patch. Will also  lidocaine patch. Has not taken anything else for pain. Current pain is 8/10. Took robaxin today. Admits pain to left ribs/under left breast with deep inspiration and to palpation. No bruising or crepitus noted. Does not have pulse ox at home. Pt aware of red flags to monitor. Aware of timeframe for healing process.   Assessment:      1. Rib pain on left side    2. Closed fracture of multiple ribs of left side with routine healing, subsequent encounter          Plan:   Pt stable on exam for outpatient treatment. Tylenol/lidocaine patches for pain relief. Pt aware to avoid NSAIDS with renal disease. ER precautions discussed in depth with daughter and pt via telephone. Encouraged frequent deep breathing exercises while holding a pillow to the chest. Discussed could take up to 6 weeks to heal. Recommended following up with PCP to ensure adequate healing. RTC if symptoms worsen or not able to get in with PCP. PT and daughter verbalized understanding of plan of care.         Rib pain on left side  -     XR RIB LEFT W/ PA CHEST; Future; Expected date: 08/15/2023  -     LIDOcaine (LIDODERM) 5 %;  Place 1 patch onto the skin once daily. Remove & Discard patch within 12 hours or as directed by MD  Dispense: 28 patch; Refill: 0     Closed fracture of multiple ribs of left side with routine healing, subsequent encounter  -     LIDOcaine (LIDODERM) 5 %; Place 1 patch onto the skin once daily. Remove & Discard patch within 12 hours or as directed by MD  Dispense: 28 patch; Refill: 0        Due for bone density, scheduled Friday.     Other concerns below  Assessment & Plan       Problem List Items Addressed This Visit          Cardiac/Vascular    Malignant essential hypertension    Current Assessment & Plan     The current medical regimen is effective;  continue present plan and medications.    Amlodipine 10mg, losartan 100mg            Renal/    Stage 3a chronic kidney disease    Current Assessment & Plan     The current medical regimen is effective;  continue present plan and medications.    Continue sodium restriction, and avoidance of nephrotoxic agents.               Endocrine    Type 2 diabetes mellitus with hyperglycemia, without long-term current use of insulin    Current Assessment & Plan     The current medical regimen is effective;  continue present plan and medications.    Lab Results   Component Value Date    HGBA1C 10.4 (H) 07/19/2023     rybelsus 3mg will start once she gets BG down. Hasn't started with diabetes educator yet. glimeperdie 2mg bid.     Taking 2mg of glimepiride bid.     Continue diet/exercise for diabetes control. F/u with Dr. Soto as scheduled. Appt with diabetes education.         Other Visit Diagnoses       Closed fracture of multiple ribs of left side with routine healing, subsequent encounter    -  Primary  Continue with current treatment plan with lidocaine patches, 650mg tylenol every 6 hours, flexeril, etc.     Incentive spirometer sent to [facility], let us know if they don't have one.     We can repeat chest xray in approx 6 weeks to ensure healing. Go to ED for any acute changes  including chest pain, SOB, etc.     Complete DEXA scan to eval bone strength.     Fall risk precautions.     Asymptomatic postmenopausal status        Relevant Orders    Incentive spirometry            --------------------------------------------      Health Maintenance:  Health Maintenance         Date Due Completion Date    COVID-19 Vaccine (1) Never done ---    Pneumococcal Vaccines (Age 65+) (1 - PCV) Never done ---    Eye Exam Never done ---    TETANUS VACCINE Never done ---    Aspirin/Antiplatelet Therapy Never done ---    DEXA Scan Never done ---    Shingles Vaccine (1 of 2) Never done ---    Influenza Vaccine (1) 09/01/2023 ---    Hemoglobin A1c 10/19/2023 7/19/2023    Mammogram 03/13/2024 3/13/2023    Foot Exam 07/14/2024 7/14/2023    Override on 7/14/2023: Done    Diabetes Urine Screening 07/19/2024 7/19/2023    Lipid Panel 07/19/2024 7/19/2023    High Dose Statin 08/15/2024 8/15/2023    Colorectal Cancer Screening 07/12/2026 7/12/2023            Health maintenance reviewed      Follow Up:  Follow up if symptoms worsen or fail to improve.      Discussed DDx, condition, and treatment.   Education sent to patient portal/included in after visit summary.  ED precautions given.   Notify provider if symptoms do not resolve or increase in severity.   Patient verbalizes understanding and agrees with plan of care.    Exam     Review of Systems:  (as noted above)  Review of Systems    Physical Exam:   Physical Exam  Vitals reviewed.   Constitutional:       General: She is not in acute distress.     Appearance: Normal appearance. She is obese. She is not toxic-appearing.   HENT:      Head: Normocephalic and atraumatic.   Cardiovascular:      Rate and Rhythm: Normal rate and regular rhythm.      Pulses: Normal pulses.      Heart sounds: Normal heart sounds. No murmur heard.  Pulmonary:      Effort: Pulmonary effort is normal. No respiratory distress or retractions.      Breath sounds: Normal breath sounds and air  "entry. No stridor or decreased air movement. No wheezing, rhonchi or rales.   Chest:      Chest wall: Tenderness present. No swelling, crepitus or edema. There is no dullness to percussion.       Genitourinary:     General: Normal vulva.   Musculoskeletal:         General: Normal range of motion.   Skin:     General: Skin is warm.      Capillary Refill: Capillary refill takes less than 2 seconds.      Findings: No bruising, erythema, lesion or rash.   Neurological:      General: No focal deficit present.      Mental Status: She is alert and oriented to person, place, and time.   Psychiatric:         Mood and Affect: Mood normal.     Vitals:    08/16/23 1015   BP: 138/72   Pulse: 87   Temp: 97.8 °F (36.6 °C)   TempSrc: Oral   SpO2: 99%   Weight: 84.8 kg (187 lb 1 oz)   Height: 5' 7" (1.702 m)      Body mass index is 29.3 kg/m².        History     Past Medical History:  Past Medical History:   Diagnosis Date    Diabetes mellitus     Hyperlipidemia     Hypertension     Stroke        Past Surgical History:  Past Surgical History:   Procedure Laterality Date    COLONOSCOPY N/A 7/12/2023    Procedure: COLONOSCOPY;  Surgeon: Ray Sorensen MD;  Location: [facility];  Service: Endoscopy;  Laterality: N/A;  instr via portal  - PC  4/10/23 Daniel, instr via mail & portal, unable to tolerate Golytely- request Miralax/Gatorade - PC  5/30-pt r/s-new instr portal-tb    INJECTION OF ANESTHETIC AGENT AROUND MEDIAL BRANCH NERVES INNERVATING LUMBAR FACET JOINT Bilateral 4/20/2023    Procedure: MBB #1 (B/L) L3,4,5;  Surgeon: Son Lara DO;  Location: [facility] OR;  Service: Pain Management;  Laterality: Bilateral;  ORAL XANAX    INJECTION OF ANESTHETIC AGENT AROUND MEDIAL BRANCH NERVES INNERVATING LUMBAR FACET JOINT Bilateral 5/5/2023    Procedure: MBB #2 (B/L) L3,4,5;  Surgeon: Son Lara DO;  Location: [facility] OR;  Service: Pain Management;  Laterality: Bilateral;  Oral Xanax    INJECTION OF JOINT Right " 2022    Procedure: Right SI joint injection;  Surgeon: Son Lara DO;  Location: [facility] OR;  Service: Pain Management;  Laterality: Right;    RADIOFREQUENCY ABLATION OF LUMBAR MEDIAL BRANCH NERVE AT SINGLE LEVEL Bilateral 2023    Procedure: RFA (B/L) L3,4,5;  Surgeon: Son Lara DO;  Location: [facility];  Service: Pain Management;  Laterality: Bilateral;       Social History:  Social History     Socioeconomic History    Marital status:    Tobacco Use    Smoking status: Former     Current packs/day: 0.00     Types: Cigarettes     Quit date: 2022     Years since quittin.5     Passive exposure: Past    Smokeless tobacco: Never   Substance and Sexual Activity    Alcohol use: Yes     Comment: rare    Drug use: No    Sexual activity: Yes     Partners: Male       Family History:  Family History   Problem Relation Age of Onset    Kidney disease Mother     Heart disease Mother     Cancer Father     Hypertension Brother     Hypertension Daughter     Cancer Maternal Aunt        Allergies and Medications: (updated and reviewed)  Review of patient's allergies indicates:   Allergen Reactions    Lisinopril-hydrochlorothiazide Other (See Comments)     Pt stated it makes her feel drained. She couldn't raise arms over head.    Ampicillin Rash    Darvocet a500 [propoxyphene n-acetaminophen] Other (See Comments)     shaky     Current Outpatient Medications   Medication Sig Dispense Refill    ACCU-CHEK GUIDE GLUCOSE METER Misc TO CHECK BLOOD GLUCOSE DAILY, TO USE WITH INSURANCE PREFERRED METER      amLODIPine (NORVASC) 10 MG tablet TAKE 1 TABLET (10 MG TOTAL) BY MOUTH ONCE DAILY. HOLD IF SBP <120 90 tablet 2    ascorbic acid, vitamin C, (VITAMIN C) 500 MG tablet Take 500 mg by mouth once daily.      atorvastatin (LIPITOR) 80 MG tablet TAKE 1 TABLET (80 MG TOTAL) BY MOUTH ONCE DAILY. 90 tablet 3    BLOOD PRESSURE CUFF Misc 1 Units by Misc.(Non-Drug; Combo  Route) route once daily. 1 each 0    blood sugar diagnostic Strp To check BG daily, to use with insurance preferred meter 200 each 11    blood-glucose meter kit To check BG daily, to use with insurance preferred meter 1 each 0    cephALEXin (KEFLEX) 500 MG capsule TAKE 2 CAPSULES (1,000 MG TOTAL) BY MOUTH 2 (TWO) TIMES DAILY. FOR 10 DAYS      cyclobenzaprine (FLEXERIL) 10 MG tablet Take 1 tablet (10 mg total) by mouth 3 (three) times daily as needed for Muscle spasms. 90 tablet 1    GAVILYTE-C 240-22.72-6.72 -5.84 gram SolR TAKE 4,000 MLS BY MOUTH ONCE. FOR 1 DOSE      glimepiride (AMARYL) 2 MG tablet TAKE 1 TABLET BY MOUTH 2 TIMES DAILY. 180 tablet 3    guanfacine HCl (GUANFACINE ORAL) Take by mouth.      lancets Misc To check BG daily, to use with insurance preferred meter 200 each 11    LIDOcaine (LIDODERM) 5 % Place 1 patch onto the skin once daily. Remove & Discard patch within 12 hours or as directed by MD Martinez patch 0    losartan (COZAAR) 100 MG tablet TAKE 1 TABLET (100 MG TOTAL) BY MOUTH ONCE DAILY. HOLD IF SBP <120 90 tablet 3    multivitamin (THERAGRAN) per tablet Take 1 tablet by mouth once daily.      ondansetron (ZOFRAN-ODT) 4 MG TbDL Dissolve 1 tablet (4 mg total) by mouth every 8 (eight) hours as needed. 20 tablet 0    pregabalin (LYRICA) 25 MG capsule Take 2 capsules (50 mg total) by mouth 2 (two) times daily. Start with 1 pill at night, and then increase if you can tolerate it 120 capsule 1    semaglutide (RYBELSUS) 3 mg tablet Take 1 tablet (3 mg total) by mouth once daily. 30 tablet 11    aspirin (ECOTRIN) 81 MG EC tablet Take 1 tablet (81 mg total) by mouth once daily. 30 tablet 3    chlorthalidone (HYGROTEN) 25 MG Tab Take 1 tablet (25 mg total) by mouth once daily. 30 tablet 11    hydrALAZINE (APRESOLINE) 50 MG tablet Take 1 tablet (50 mg total) by mouth every 8 (eight) hours. Hold is SBP <120 (Patient taking differently: Take 50 mg by mouth every 8 (eight) hours. Hold is SBP  <120    Pt is taking once a day) 90 tablet 1    LORazepam (ATIVAN) 1 MG tablet Take 1 tablet (1 mg total) by mouth once as needed for Anxiety (before procedure). 1 tablet 0    meclizine (ANTIVERT) 25 mg tablet TAKE 1 TABLET (25 MG TOTAL) BY MOUTH 2 (TWO) TIMES DAILY AS NEEDED FOR DIZZINESS. 60 tablet 0     No current facility-administered medications for this visit.     Facility-Administered Medications Ordered in Other Visits   Medication Dose Route Frequency Provider Last Rate Last Admin    0.9%  NaCl infusion   Intravenous Continuous Ronald Young MD        LIDOcaine (PF) 10 mg/ml (1%) injection 10 mg  1 mL Intradermal Once Ronald Young MD           Patient Care Team:  Teri Soto DO as PCP - General (Family Medicine)  Tracie Kessler LPN as Care Coordinator  Lb Tracy OD (Optometry)         - The patient is given an After Visit Summary that lists all medications with directions, allergies, education, orders placed during this encounter and follow-up instructions.      - I have reviewed the patient's medical information including past medical, family, and social history sections including the medications and allergies.      - We discussed the patient's current medications.     This note was created by combination of typed  and MModal dictation.  Transcription errors may be present.  If there are any questions, please contact me.

## 2023-08-18 ENCOUNTER — HOSPITAL ENCOUNTER (OUTPATIENT)
Dept: RADIOLOGY | Facility: CLINIC | Age: 73
Discharge: HOME | End: 2023-08-18
Attending: FAMILY MEDICINE
Payer: MEDICARE

## 2023-08-18 DIAGNOSIS — Z78.0 POSTMENOPAUSE: ICD-10-CM

## 2023-08-28 ENCOUNTER — TELEPHONE (OUTPATIENT)
Dept: DIABETES | Facility: CLINIC | Age: 73
End: 2023-08-28
Payer: MEDICARE

## 2023-09-14 DIAGNOSIS — I10 ESSENTIAL HYPERTENSION: ICD-10-CM

## 2023-09-14 RX ORDER — CHLORTHALIDONE 25 MG/1
25 TABLET ORAL
Qty: 90 TABLET | Refills: 3 | Status: SHIPPED | OUTPATIENT
Start: 2023-09-14

## 2023-09-14 NOTE — TELEPHONE ENCOUNTER
Refill Routing Note   Medication(s) are not appropriate for processing by [facility] for the following reason(s):      Drug-disease interaction: chlorthalidone and Stage 3a chronic kidney disease    [facility] action(s):  Defer Care Due:  None identified          Appointments  past 12m or future 3m with PCP    Date Provider   Last Visit   8/7/2023 Teri Soto, DO   Next Visit   11/10/2023 Teri Soto, DO   ED visits in past 90 days: 0        Note composed:3:21 AM 09/14/2023

## 2023-09-14 NOTE — TELEPHONE ENCOUNTER
No care due was identified.  [facility] Embedded Care Due Messages. Reference number: [id number].   9/14/2023 1:28:52 AM CDT

## 2023-09-25 ENCOUNTER — HOSPITAL ENCOUNTER (OUTPATIENT)
Dept: RADIOLOGY | Facility: CLINIC | Age: 73
Discharge: HOME | End: 2023-09-25
Attending: FAMILY MEDICINE
Payer: MEDICARE

## 2023-09-25 DIAGNOSIS — Z78.0 POSTMENOPAUSE: ICD-10-CM

## 2023-09-25 PROCEDURE — 77081 DXA BONE DENSITY APPENDICULR: CPT | Mod: 26,,, | Performed by: INTERNAL MEDICINE

## 2023-09-25 PROCEDURE — 77081 DEXA BONE DENSITY APPENDICULAR SKELETON: ICD-10-PCS | Mod: 26,,, | Performed by: INTERNAL MEDICINE

## 2023-09-25 PROCEDURE — 77081 DXA BONE DENSITY APPENDICULR: CPT | Mod: TC,PO

## 2023-09-27 ENCOUNTER — PATIENT MESSAGE (OUTPATIENT)
Dept: PAIN MEDICINE | Facility: CLINIC | Age: 73
End: 2023-09-27
Payer: MEDICARE

## 2023-10-16 DIAGNOSIS — S20.212D CONTUSION OF RIB ON LEFT SIDE, SUBSEQUENT ENCOUNTER: ICD-10-CM

## 2023-10-16 LAB
LEFT EYE DM RETINOPATHY: NEGATIVE
RIGHT EYE DM RETINOPATHY: NEGATIVE

## 2023-10-16 RX ORDER — CYCLOBENZAPRINE HCL 10 MG
10 TABLET ORAL 2 TIMES DAILY PRN
Qty: 180 TABLET | Refills: 1 | Status: SHIPPED | OUTPATIENT
Start: 2023-10-16 | End: 2023-11-08 | Stop reason: SDUPTHER

## 2023-11-02 DIAGNOSIS — M54.16 LUMBAR RADICULOPATHY: ICD-10-CM

## 2023-11-02 RX ORDER — PREGABALIN 25 MG/1
50 CAPSULE ORAL 2 TIMES DAILY
Qty: 120 CAPSULE | Refills: 1 | Status: SHIPPED | OUTPATIENT
Start: 2023-11-02 | End: 2024-02-15

## 2023-11-06 ENCOUNTER — LAB VISIT (OUTPATIENT)
Dept: LAB | Facility: HOSPITAL | Age: 73
End: 2023-11-06
Attending: FAMILY MEDICINE
Payer: MEDICARE

## 2023-11-06 DIAGNOSIS — E11.69 DYSLIPIDEMIA ASSOCIATED WITH TYPE 2 DIABETES MELLITUS: ICD-10-CM

## 2023-11-06 DIAGNOSIS — E78.5 DYSLIPIDEMIA ASSOCIATED WITH TYPE 2 DIABETES MELLITUS: ICD-10-CM

## 2023-11-06 DIAGNOSIS — Z86.73 HISTORY OF CVA (CEREBROVASCULAR ACCIDENT): ICD-10-CM

## 2023-11-06 DIAGNOSIS — E11.40 TYPE 2 DIABETES MELLITUS WITH DIABETIC NEUROPATHY, WITHOUT LONG-TERM CURRENT USE OF INSULIN: ICD-10-CM

## 2023-11-06 LAB
CHOLEST SERPL-MCNC: 190 MG/DL (ref 120–199)
CHOLEST/HDLC SERPL: 4.3 {RATIO} (ref 2–5)
ESTIMATED AVG GLUCOSE: 229 MG/DL (ref 68–131)
HBA1C MFR BLD: 9.6 % (ref 4–5.6)
HDLC SERPL-MCNC: 44 MG/DL (ref 40–75)
HDLC SERPL: 23.2 % (ref 20–50)
LDLC SERPL CALC-MCNC: 119.2 MG/DL (ref 63–159)
NONHDLC SERPL-MCNC: 146 MG/DL
TRIGL SERPL-MCNC: 134 MG/DL (ref 30–150)

## 2023-11-06 PROCEDURE — 36415 COLL VENOUS BLD VENIPUNCTURE: CPT | Mod: PO | Performed by: FAMILY MEDICINE

## 2023-11-06 PROCEDURE — 80061 LIPID PANEL: CPT | Performed by: FAMILY MEDICINE

## 2023-11-06 PROCEDURE — 83036 HEMOGLOBIN GLYCOSYLATED A1C: CPT | Performed by: FAMILY MEDICINE

## 2023-11-08 ENCOUNTER — TELEPHONE (OUTPATIENT)
Dept: PAIN MEDICINE | Facility: CLINIC | Age: 73
End: 2023-11-08
Payer: MEDICARE

## 2023-11-08 ENCOUNTER — OFFICE VISIT (OUTPATIENT)
Dept: PAIN MEDICINE | Facility: CLINIC | Age: 73
End: 2023-11-08
Payer: MEDICARE

## 2023-11-08 VITALS
BODY MASS INDEX: 29.34 KG/M2 | WEIGHT: 186.94 LBS | HEIGHT: 67 IN | HEART RATE: 74 BPM | DIASTOLIC BLOOD PRESSURE: 84 MMHG | SYSTOLIC BLOOD PRESSURE: 145 MMHG

## 2023-11-08 DIAGNOSIS — S20.212D CONTUSION OF RIB ON LEFT SIDE, SUBSEQUENT ENCOUNTER: ICD-10-CM

## 2023-11-08 DIAGNOSIS — M46.1 SACROILIITIS: Primary | ICD-10-CM

## 2023-11-08 DIAGNOSIS — M54.16 LUMBAR RADICULOPATHY: ICD-10-CM

## 2023-11-08 PROCEDURE — 1159F PR MEDICATION LIST DOCUMENTED IN MEDICAL RECORD: ICD-10-PCS | Mod: CPTII,S$GLB,, | Performed by: STUDENT IN AN ORGANIZED HEALTH CARE EDUCATION/TRAINING PROGRAM

## 2023-11-08 PROCEDURE — 99214 PR OFFICE/OUTPT VISIT, EST, LEVL IV, 30-39 MIN: ICD-10-PCS | Mod: S$GLB,,, | Performed by: STUDENT IN AN ORGANIZED HEALTH CARE EDUCATION/TRAINING PROGRAM

## 2023-11-08 PROCEDURE — 1101F PR PT FALLS ASSESS DOC 0-1 FALLS W/OUT INJ PAST YR: ICD-10-PCS | Mod: CPTII,S$GLB,, | Performed by: STUDENT IN AN ORGANIZED HEALTH CARE EDUCATION/TRAINING PROGRAM

## 2023-11-08 PROCEDURE — 4010F PR ACE/ARB THEARPY RXD/TAKEN: ICD-10-PCS | Mod: CPTII,S$GLB,, | Performed by: STUDENT IN AN ORGANIZED HEALTH CARE EDUCATION/TRAINING PROGRAM

## 2023-11-08 PROCEDURE — 1159F MED LIST DOCD IN RCRD: CPT | Mod: CPTII,S$GLB,, | Performed by: STUDENT IN AN ORGANIZED HEALTH CARE EDUCATION/TRAINING PROGRAM

## 2023-11-08 PROCEDURE — 99214 OFFICE O/P EST MOD 30 MIN: CPT | Mod: S$GLB,,, | Performed by: STUDENT IN AN ORGANIZED HEALTH CARE EDUCATION/TRAINING PROGRAM

## 2023-11-08 PROCEDURE — 3008F PR BODY MASS INDEX (BMI) DOCUMENTED: ICD-10-PCS | Mod: CPTII,S$GLB,, | Performed by: STUDENT IN AN ORGANIZED HEALTH CARE EDUCATION/TRAINING PROGRAM

## 2023-11-08 PROCEDURE — 3008F BODY MASS INDEX DOCD: CPT | Mod: CPTII,S$GLB,, | Performed by: STUDENT IN AN ORGANIZED HEALTH CARE EDUCATION/TRAINING PROGRAM

## 2023-11-08 PROCEDURE — 3046F HEMOGLOBIN A1C LEVEL >9.0%: CPT | Mod: CPTII,S$GLB,, | Performed by: STUDENT IN AN ORGANIZED HEALTH CARE EDUCATION/TRAINING PROGRAM

## 2023-11-08 PROCEDURE — 3288F FALL RISK ASSESSMENT DOCD: CPT | Mod: CPTII,S$GLB,, | Performed by: STUDENT IN AN ORGANIZED HEALTH CARE EDUCATION/TRAINING PROGRAM

## 2023-11-08 PROCEDURE — 3046F PR MOST RECENT HEMOGLOBIN A1C LEVEL > 9.0%: ICD-10-PCS | Mod: CPTII,S$GLB,, | Performed by: STUDENT IN AN ORGANIZED HEALTH CARE EDUCATION/TRAINING PROGRAM

## 2023-11-08 PROCEDURE — 3288F PR FALLS RISK ASSESSMENT DOCUMENTED: ICD-10-PCS | Mod: CPTII,S$GLB,, | Performed by: STUDENT IN AN ORGANIZED HEALTH CARE EDUCATION/TRAINING PROGRAM

## 2023-11-08 PROCEDURE — 3066F PR DOCUMENTATION OF TREATMENT FOR NEPHROPATHY: ICD-10-PCS | Mod: CPTII,S$GLB,, | Performed by: STUDENT IN AN ORGANIZED HEALTH CARE EDUCATION/TRAINING PROGRAM

## 2023-11-08 PROCEDURE — 1101F PT FALLS ASSESS-DOCD LE1/YR: CPT | Mod: CPTII,S$GLB,, | Performed by: STUDENT IN AN ORGANIZED HEALTH CARE EDUCATION/TRAINING PROGRAM

## 2023-11-08 PROCEDURE — 3077F SYST BP >= 140 MM HG: CPT | Mod: CPTII,S$GLB,, | Performed by: STUDENT IN AN ORGANIZED HEALTH CARE EDUCATION/TRAINING PROGRAM

## 2023-11-08 PROCEDURE — 3079F PR MOST RECENT DIASTOLIC BLOOD PRESSURE 80-89 MM HG: ICD-10-PCS | Mod: CPTII,S$GLB,, | Performed by: STUDENT IN AN ORGANIZED HEALTH CARE EDUCATION/TRAINING PROGRAM

## 2023-11-08 PROCEDURE — 3066F NEPHROPATHY DOC TX: CPT | Mod: CPTII,S$GLB,, | Performed by: STUDENT IN AN ORGANIZED HEALTH CARE EDUCATION/TRAINING PROGRAM

## 2023-11-08 PROCEDURE — 3077F PR MOST RECENT SYSTOLIC BLOOD PRESSURE >= 140 MM HG: ICD-10-PCS | Mod: CPTII,S$GLB,, | Performed by: STUDENT IN AN ORGANIZED HEALTH CARE EDUCATION/TRAINING PROGRAM

## 2023-11-08 PROCEDURE — 99999 PR PBB SHADOW E&M-EST. PATIENT-LVL III: CPT | Mod: PBBFAC,,, | Performed by: STUDENT IN AN ORGANIZED HEALTH CARE EDUCATION/TRAINING PROGRAM

## 2023-11-08 PROCEDURE — 3062F PR POS MACROALBUMINURIA RESULT DOCUMENTED/REVIEW: ICD-10-PCS | Mod: CPTII,S$GLB,, | Performed by: STUDENT IN AN ORGANIZED HEALTH CARE EDUCATION/TRAINING PROGRAM

## 2023-11-08 PROCEDURE — 3062F POS MACROALBUMINURIA REV: CPT | Mod: CPTII,S$GLB,, | Performed by: STUDENT IN AN ORGANIZED HEALTH CARE EDUCATION/TRAINING PROGRAM

## 2023-11-08 PROCEDURE — 99999 PR PBB SHADOW E&M-EST. PATIENT-LVL III: ICD-10-PCS | Mod: PBBFAC,,, | Performed by: STUDENT IN AN ORGANIZED HEALTH CARE EDUCATION/TRAINING PROGRAM

## 2023-11-08 PROCEDURE — 4010F ACE/ARB THERAPY RXD/TAKEN: CPT | Mod: CPTII,S$GLB,, | Performed by: STUDENT IN AN ORGANIZED HEALTH CARE EDUCATION/TRAINING PROGRAM

## 2023-11-08 PROCEDURE — 3079F DIAST BP 80-89 MM HG: CPT | Mod: CPTII,S$GLB,, | Performed by: STUDENT IN AN ORGANIZED HEALTH CARE EDUCATION/TRAINING PROGRAM

## 2023-11-08 RX ORDER — CYCLOBENZAPRINE HCL 10 MG
10 TABLET ORAL 2 TIMES DAILY PRN
Qty: 180 TABLET | Refills: 1 | Status: SHIPPED | OUTPATIENT
Start: 2023-11-08 | End: 2024-05-06

## 2023-11-08 NOTE — PROGRESS NOTES
Chronic Pain - f/u    Referring Physician: No ref. provider found    Date: 11/08/2023     Re: Joanne Peña  MR#: [id number]  YOB: 1950  Age: 73 y.o.    Chief Complaint: back pain  No chief complaint on file.    **This note is dictated using the M*Modal Fluency Direct word recognition program. There are word recognition mistakes that are occasionally missed on review.**    ASSESSMENT: 73 y.o. year old female with right sided back/buttock pain pain, consistent with     1. Sacroiliitis  cyclobenzaprine (FLEXERIL) 10 MG tablet    Procedure Request Order for Pain Management      2. Contusion of rib on left side, subsequent encounter        3. Lumbar radiculopathy  cyclobenzaprine (FLEXERIL) 10 MG tablet        PLAN:     Sacroiliitis - returned  -s/p R SIJ on 5/20/22.  Her right sided buttock pain has resolved 100% at 2 months  -FAILED Norco 5mg (cognitive side effects)  -100% improvement in buttock pain from the SIJ [id number]. Now worn off.  -schedule repeat right SIJ on 12/19/23    Lumbar spondylosis  -s/p RFA on 5/19/23.    -Axial back pain is still good at 6m post ablation.  -completed PT  -has Flexeril  -possible neuritis following ablation. Rx medrol dose trae  -Lyrica 50mg BID. Discussed weaning    DDD of lumbar spine  - MRI lumbar results discussed  -discussed importance of core strengthening, back exercises, losing weight  -okay to take tylenol    Lumbar radiculopathy  -less likely. No imaging available. If SIJ not successful, then will consider lumbar XR/MRI to further assess  -would need clearance to hold ASA    CKD 3   -last CMP showed GFR 53.   -GFR stable  -avoid nephrotoxic medications    Right hip pain  -ortho surgery does not believe that this is coming from the hip.  Will r/o SIJ and back pathology.    Prior TIA  -off plavix. Following with PCP  -continuie ASA 81    Bilateral peripheral neuropathy  -unclear etiology. Bottom of both feet.  Monitor    DM2  -new A1c = 7.3 on  6/1/22    Anemia of chronic disease / HLD   -discused her blood test results    - RTC February  - Counseled patient regarding the importance of weight loss and activity modification and physical therapy.    The above plan and management options were discussed at length with patient. Patient is in agreement with the above and verbalized understanding. It will be communicated with the referring physician via electronic record, fax, or mail.  Lab/study reports reviewed were important and necessary because subsequent medical and treatment recommendations required review of the above lab/study reports. Images viewed/reviewed above were important and necessary because subsequent medical and treatment recommendations required review of the reviewed image(s).     Electronically signed by:  Son Lara DO  11/08/2023     =========================================================================================================    SUBJECTIVE:    Interval History 11/8/2023:   Joanne Peña is a 73 y.o. female presents to the clinic for follow up.  Since last visit the pain has has slightly improved.    The pain is located in the lowe darell area and radiates to the right hip .  The pain is described as sharp and stabbing    At BEST  3/10   At WORST  8/10 on the WORST day.    On average pain is rated as 5/10.   Today the pain is rated as 6/10  Symptoms interfere with daily activity.   Exacerbating factors: Sitting, Standing, and Walking.    Mitigating factors medications.     Current pain medications: Flexeril 10mg TID,  tylenol  Failed Pain Medications: cannot take NSAIDs because of the stroke. Tylenol. Short course Norco (side effects cognition), gabapentin, robaxin, oral steroids    Pain procedures:  s/p  R SIJ on 5/2022 - 100% improvement of buttock pain x16 months  4/20/23 - b/l L3,4,5 MBB #1 - Oral sedation - has a  - 80%  5/5/23 - b/l L3,4,5 MBB#2 - oral sedation - 80%  5/19/23 - b/l RFA - 50% @4w in  axial back pain  12/19/23 - right SIJ - oral sedation - ASA ok - morning    Interval History 6/21/2023:   Joanne Peña is a 73 y.o. female presents to the clinic for follow up.  Since last visit the pain has has improved in some ways and worsened in others.  The back pain is better since the RFA on 5/19/23, but now she is having more right sided iliac crest pain. Right iliac crest pain started shortly after the ablation. Not as bad as it was previously.    The pain is located in the lower back area and radiates to the upper buttock and midback .  The pain is described as sharp and throbbing    At BEST  5/10   At WORST  8/10 on the WORST day.    On average pain is rated as 5/10.   Today the pain is rated as 7/10  Symptoms interfere with daily activity.   Exacerbating factors: Walking.    Mitigating factors medications.     Interval History 3/15/2023:   Joanne Peña is a 73 y.o. female presents to the clinic for follow up.  Since last visit the pain has has worsened.  Pain right now is 5/10. Flexeril was helping. Retired in June from the [employer].    The pain is located in the lower back  area and radiates to the upper buttocks .  The pain is described as aching, sharp, and throbbing    At BEST  5/10   At WORST  10/10 on the WORST day.    On average pain is rated as 2/10.   Today the pain is rated as 4/10  Symptoms interfere with daily activity and sleeping.   Exacerbating factors: Standing and Walking.    Mitigating factors nothing.     Interval History 7/7/2022:     Joanne Peña is a 73 y.o. female presents to the clinic for follow up.  Since last visit the pain has has moderately improved.  Buttocks pain has resolved. Now with axial back pain without radiation.    The pain is located in the lower back area and does not radiate.  The pain is described as aching    At BEST  4/10   At WORST  8/10 on the WORST day.    On average pain is rated as 4/10.   Today the pain is rated as 3/10  Symptoms  interfere with daily activity and sleeping.   Exacerbating factors: Standing and Walking.    Mitigating factors heat and massage.     Current pain medications: none  Failed Pain Medications: cannot take NSAIDs because of the stroke. Tylenol. Short course Norco (side effects cognition), gabapentin, robaxin, oral steroids    Interval History 6/6/2022:     Joanne Peña is a 73 y.o. female presents to the clinic for follow up.  Since last visit the pain has has significantly improved.  She is s/p Right SIJ on 5/20/22 with almost 100% improvement of the low back/buttock pain.  She no longer requires a cane or walker to ambulate.  She feels signfiicantly better.  Currently her pain is located in the midline back without radiation. Worse with flexion, standing, walking    The pain is located in the lower back area and does not radiate.  The pain is described as aching    At BEST  4/10   At WORST  8/10 on the WORST day.    On average pain is rated as 4/10.   Today the pain is rated as 6/10  Symptoms interfere with nothing .   Exacerbating factors: Walking.    Mitigating factors laying down, medications and sitting.     Current pain medications: gabapentin. Oral steroids and robaxin helped. Short course Norco  Failed Pain Medications: cannot take NSAIDs because of the stroke. Tylenol. Robaxin    Initial Hx:  Joanne Peña is a 73 y.o. female presents to the clinic for the evaluation of lower back pain. The pain started over a month ago following no inciting event and symptoms have been worsening.  The patient had a TIA on 2/6/22.  She was on 21 days of Plavix which she has stopped. She takes a daily ASA81.  Does not feel that this is related to the stroke.  Denies any falls or trauma. She started getting around the beginning of March.  She went to the Urgent care on March 17, 2022 because the pain was not going away.  Since then the pain has been worsening. The pain pattern is the same now as it was then. The pain  has prevented the patient from walking, and she has required a wheelchair since March 17.     She tried a medrol dose trae, robaxin, gabapentin which helped until she stopped the steroids and the robaxin. She is taking gabapentin 300mg TID.  She is not taking anything else for pain right now. When she gets severe pain she repositions, heating pads, gabapentin. Especially worse while trying to get out of the bed. Changing positions are very bad.    Pain Description:    The pain is located in the lower back area and radiates to the right thigh/groin/ right hip .    At BEST  10/10   At WORST  10/10 on the WORST day.    On average pain is rated as 10/10.   Today the pain is rated as 10/10  The pain is continuous.  The pain is described as aching, sharp, shooting and pulling     Symptoms interfere with daily activity, sleeping and work.   Exacerbating factors: any type of movement.    Mitigating factors heat and medications.   She reports 1 hours of sleep per night.    Physical Therapy/Home Exercise: No, not currently in physical therapy or home exercise program    Current Pain Medications:    - gabapentin. Oral steroids and robaxin helped.    Failed Pain Medications:    - cannot take NSAIDs because of the stroke. Tylenol     Pain Treatment Therapies:    Pain procedures: none  Physical Therapy: last PT 1 month ago.  Patient has a healthy back referral on 5/9  Spinal decompression: none  Joint replacement: none     Patient denies urinary incontinence and bowel incontinence. (+) bilateral foot numbness x2 weeks.   Patient denies any suicidal or homicidal ideations     report:  Reviewed and consistent with medication use as prescribed.    Imaging:   XR hip:    FINDINGS:  The bones are intact.  There is no evidence for acute fracture or bone destruction.  There are degenerative changes with mild narrowing of the right hip joint space laterally.  Small osteophytes are present at the right hip.  Left hip and sacroiliac  joints appear grossly unremarkable.     Impression:     No evidence for acute fracture, bone destruction, or dislocation.     Degenerative changes of the right hip with mild joint space narrowing laterally and small osteophytes.       XR lumbar 03/2022:    Mild scoliosis with convexity to the left can be seen.  Vertebral bodies are intact.  Disc spaces are maintained.  No significant bony abnormalities are noted    Past Medical History:   Diagnosis Date    Diabetes mellitus     Hyperlipidemia     Hypertension     Stroke      Past Surgical History:   Procedure Laterality Date    COLONOSCOPY N/A 7/12/2023    Procedure: COLONOSCOPY;  Surgeon: Ray Sorensen MD;  Location: [facility] ENDO;  Service: Endoscopy;  Laterality: N/A;  instr via portal  - PC  4/10/23 Daniel, instr via mail & portal, unable to tolerate Golytely- request Miralax/Gatorade - PC  5/30-pt r/s-new instr portal-tb    INJECTION OF ANESTHETIC AGENT AROUND MEDIAL BRANCH NERVES INNERVATING LUMBAR FACET JOINT Bilateral 4/20/2023    Procedure: MBB #1 (B/L) L3,4,5;  Surgeon: Son Lara DO;  Location: [facility] OR;  Service: Pain Management;  Laterality: Bilateral;  ORAL XANAX    INJECTION OF ANESTHETIC AGENT AROUND MEDIAL BRANCH NERVES INNERVATING LUMBAR FACET JOINT Bilateral 5/5/2023    Procedure: MBB #2 (B/L) L3,4,5;  Surgeon: Son Lara DO;  Location: [facility] OR;  Service: Pain Management;  Laterality: Bilateral;  Oral Xanax    INJECTION OF JOINT Right 5/20/2022    Procedure: Right SI joint injection;  Surgeon: Son Lara DO;  Location: [facility] OR;  Service: Pain Management;  Laterality: Right;    RADIOFREQUENCY ABLATION OF LUMBAR MEDIAL BRANCH NERVE AT SINGLE LEVEL Bilateral 5/19/2023    Procedure: RFA (B/L) L3,4,5;  Surgeon: Son Lara DO;  Location: [facility] PAIN MANAGEMENT;  Service: Pain Management;  Laterality: Bilateral;     Social History     Socioeconomic History    Marital status:    Tobacco Use    Smoking  status: Former     Current packs/day: 0.00     Types: Cigarettes     Quit date: 2022     Years since quittin.7     Passive exposure: Past    Smokeless tobacco: Never   Substance and Sexual Activity    Alcohol use: Yes     Comment: rare    Drug use: No    Sexual activity: Yes     Partners: Male     Family History   Problem Relation Age of Onset    Kidney disease Mother     Heart disease Mother     Cancer Father     Hypertension Brother     Hypertension Daughter     Cancer Maternal Aunt        Review of patient's allergies indicates:   Allergen Reactions    Lisinopril-hydrochlorothiazide Other (See Comments)     Pt stated it makes her feel drained. She couldn't raise arms over head.    Ampicillin Rash    Darvocet a500 [propoxyphene n-acetaminophen] Other (See Comments)     karl       Current Outpatient Medications   Medication Sig    ACCU-CHEK GUIDE GLUCOSE METER Misc TO CHECK BLOOD GLUCOSE DAILY, TO USE WITH INSURANCE PREFERRED METER    amLODIPine (NORVASC) 10 MG tablet TAKE 1 TABLET (10 MG TOTAL) BY MOUTH ONCE DAILY. HOLD IF SBP <120    ascorbic acid, vitamin C, (VITAMIN C) 500 MG tablet Take 500 mg by mouth once daily.    atorvastatin (LIPITOR) 80 MG tablet TAKE 1 TABLET (80 MG TOTAL) BY MOUTH ONCE DAILY.    BLOOD PRESSURE CUFF Misc 1 Units by Misc.(Non-Drug; Combo Route) route once daily.    blood sugar diagnostic Strp To check BG daily, to use with insurance preferred meter    blood-glucose meter kit To check BG daily, to use with insurance preferred meter    cephALEXin (KEFLEX) 500 MG capsule TAKE 2 CAPSULES (1,000 MG TOTAL) BY MOUTH 2 (TWO) TIMES DAILY. FOR 10 DAYS    chlorthalidone (HYGROTEN) 25 MG Tab TAKE 1 TABLET BY MOUTH EVERY DAY    GAVILYTE-C 240-22.72-6.72 -5.84 gram SolR TAKE 4,000 MLS BY MOUTH ONCE. FOR 1 DOSE    glimepiride (AMARYL) 2 MG tablet TAKE 1 TABLET BY MOUTH 2 TIMES DAILY.    guanfacine HCl (GUANFACINE ORAL) Take by mouth.    lancets Misc To check BG daily, to use with insurance  preferred meter    losartan (COZAAR) 100 MG tablet TAKE 1 TABLET (100 MG TOTAL) BY MOUTH ONCE DAILY. HOLD IF SBP <120    multivitamin (THERAGRAN) per tablet Take 1 tablet by mouth once daily.    ondansetron (ZOFRAN-ODT) 4 MG TbDL Dissolve 1 tablet (4 mg total) by mouth every 8 (eight) hours as needed.    pregabalin (LYRICA) 25 MG capsule TAKE 2 CAPSULES (50 MG TOTAL) BY MOUTH 2 (TWO) TIMES DAILY. START WITH 1 PILL AT NIGHT, AND THEN INCREASE IF YOU CAN TOLERATE IT    semaglutide (RYBELSUS) 3 mg tablet Take 1 tablet (3 mg total) by mouth once daily.    aspirin (ECOTRIN) 81 MG EC tablet Take 1 tablet (81 mg total) by mouth once daily.    cyclobenzaprine (FLEXERIL) 10 MG tablet Take 1 tablet (10 mg total) by mouth 2 (two) times daily as needed for Muscle spasms (back pain).    hydrALAZINE (APRESOLINE) 50 MG tablet Take 1 tablet (50 mg total) by mouth every 8 (eight) hours. Hold is SBP <120 (Patient taking differently: Take 50 mg by mouth every 8 (eight) hours. Hold is SBP <120    Pt is taking once a day)    LORazepam (ATIVAN) 1 MG tablet Take 1 tablet (1 mg total) by mouth once as needed for Anxiety (before procedure).    meclizine (ANTIVERT) 25 mg tablet TAKE 1 TABLET (25 MG TOTAL) BY MOUTH 2 (TWO) TIMES DAILY AS NEEDED FOR DIZZINESS.     No current facility-administered medications for this visit.     Facility-Administered Medications Ordered in Other Visits   Medication    0.9%  NaCl infusion    LIDOcaine (PF) 10 mg/ml (1%) injection 10 mg       REVIEW OF SYSTEMS:    GENERAL:  No weight loss, malaise or fevers.   HEENT:   No recent changes in vision or hearing  NECK:  Negative for lumps, no difficulty with swallowing.  RESPIRATORY:  Negative for cough, wheezing or shortness of breath, patient denies any recent URI.  CARDIOVASCULAR:  Negative for chest pain, leg swelling or palpitations.  GI:  Negative for abdominal discomfort, blood in stools or black stools or change in bowel habits.  MUSCULOSKELETAL:  See  "HPI.  SKIN:  Negative for lesions, rash, and itching.  PSYCH:  No mood disorder or recent psychosocial stressors.  Patients sleep is not disturbed secondary to pain.  HEMATOLOGY/LYMPHOLOGY:  Negative for prolonged bleeding, bruising easily or swollen nodes.  Patient is not currently taking any anti-coagulants  NEURO:   No history of headaches, syncope, paralysis, seizures or tremors.  All other reviewed and negative other than HPI.    OBJECTIVE:    BP (!) 145/84 (BP Location: Left arm, Patient Position: Sitting)   Pulse 74   Ht 5' 7" (1.702 m)   Wt 84.8 kg (186 lb 15.2 oz)   BMI 29.28 kg/m²     PHYSICAL EXAMINATION:    GENERAL: Well appearing, in no acute distress, alert and oriented x3.   PSYCH:  Mood and affect appropriate.  SKIN: Skin color, texture, turgor normal, no rashes or lesions.  HEAD/FACE:  Normocephalic, atraumatic. Cranial nerves grossly intact.  CV: RRR with palpation of the radial artery.  PULM: CTAB. No evidence of respiratory difficulty, symmetric chest rise.  GI:  Soft and non-tender.    BACK:  - No obvious deformity or signs of trauma, Normal lumbar lordotic curve  - Negative spinous process tenderness  - Positive paravertebral tenderness  - Positive pain to palpation over the facet joints of the lumbar spine on the right  - Positive right iliac crest  - Slump test is Negative for radicular pain  - Slump test is Negative for back pain  - Supine Straight leg raising is Negative for radicular pain  - Supine Straight leg raising is Negative for back pain  - Positive pain with lumbar extension, side bending  - Negative Sustained Hip Flexion test (for discogenic pain)  - Negative Altered Gait, Posture  - Axial facet loading test Positive on the right side(s)    SI Joint exam:  - Positive SI joint tenderness to palpation  - Rodolfo's sign Positive  - Yeoman's Test: Negative for SI joint pain indicating anterior SI ligament involvement. Negative for anterior thigh pain/paresthesia which indicates " femoral nerve stretch.  - Gaenslen's Test:Positive  - Finger Mikhail's Sign:Positive  - SI compression test:Positive  - SI distraction test:Negative  - Thigh Thrust: Negative  - SI Thrust: Positive    MUSKULOSKELETAL:    EXTREMITIES:   Hip Exam:  - Log Roll Negative  - FADIR Negative  - Stinchfield Did not perform  - Hip Scour Did not perform  - GTB Tenderness Positive    MUSCULOSKELETAL:  No atrophy or tone abnormalities are noted in the UE or LE.  No deformities, edema, or skin discoloration are noted on visible skin. Good capillary refill.    NEURO: Bilateral upper and lower extremity coordination and muscle stretch reflexes are physiologic and symmetric.      NEUROLOGICAL EXAM:  MENTAL STATUS: A x O x 3, good concentration, speech is fluent and goal directed  MEMORY: recent and remote are intact  CN: CN2-12 grossly intact  MOTOR: 5/5 in all muscle groups  DTRs: 2+ intact symmetric  Sensation:    -no Loss of sensation in a left lower and right lower L-1, L-2, L-3, L-4 and L-5 bilaterally distribution.

## 2023-11-08 NOTE — TELEPHONE ENCOUNTER
----- Message from Son Ruano DO sent at 2023  1:29 PM CST -----  Regarding: Order for RUMA MEYERS    Patient Name: RUMA MEYERS([id number])  Sex: Female  : 1950      PCP: [place]    [place] [facility] [place]     Types of orders made on 2023: Medications, Procedure Request    Order Date:2023  Ordering User:SON RUANO [[id number]  Encounter Provider:Son Ruano DO   [id number]  Authorizing Provider: Son Ruano DO [[id number]]  Department:[facility] PAIN MANAGEMENT[[id number]]    Common Order Information  Procedure -> Sacroiliac Injection (Specify laterality) Cmt: right SIJ    Pre-op Diagnosis -> Sacroiliitis     Order Specific Information  Order: Procedure Request Order for Pain Management [Custom: CPR350]  Order #:          [id number]: 1 FUTURE    Priority: Routine  Cla  ss: Clinic Performed    Future Order Information      Expires on:2024            Expected by:2023                   Comment:23 - right SIJ - oral sedation - morning if possible    Associated Diagnoses      M46.1 Sacroiliitis      Physician -> [email or web address]         Is patient on anti-coagulants? -> No         Facility Name: -> [facility]           Priority: Routine  Class: Clinic Performed      Future Order Information      Expires on:2024            Expected by:2023                   Comment:23 - right SIJ - oral sedation - morning if possible    Associated Diagnoses      M46.1 Sacroiliitis      Procedure -> Sacroiliac Injection (Specify laterality) Cmt: right SIJ        Physician -> hanyu         Is patient on anti-coagulants? -> No         Pre-op Diagnosis -> Sacroiliitis         Facility Name: -  > [facility]

## 2023-11-10 ENCOUNTER — OFFICE VISIT (OUTPATIENT)
Dept: FAMILY MEDICINE | Facility: CLINIC | Age: 73
End: 2023-11-10
Payer: MEDICARE

## 2023-11-10 VITALS
TEMPERATURE: 98 F | SYSTOLIC BLOOD PRESSURE: 132 MMHG | WEIGHT: 191.38 LBS | DIASTOLIC BLOOD PRESSURE: 72 MMHG | BODY MASS INDEX: 30.04 KG/M2 | HEART RATE: 92 BPM | OXYGEN SATURATION: 98 % | HEIGHT: 67 IN

## 2023-11-10 DIAGNOSIS — I10 ESSENTIAL HYPERTENSION: ICD-10-CM

## 2023-11-10 DIAGNOSIS — R80.9 MICROALBUMINURIA DUE TO TYPE 2 DIABETES MELLITUS: ICD-10-CM

## 2023-11-10 DIAGNOSIS — E11.29 MICROALBUMINURIA DUE TO TYPE 2 DIABETES MELLITUS: ICD-10-CM

## 2023-11-10 DIAGNOSIS — Z86.73 HISTORY OF CVA (CEREBROVASCULAR ACCIDENT): ICD-10-CM

## 2023-11-10 DIAGNOSIS — E78.5 DYSLIPIDEMIA ASSOCIATED WITH TYPE 2 DIABETES MELLITUS: ICD-10-CM

## 2023-11-10 DIAGNOSIS — E11.65 UNCONTROLLED TYPE 2 DIABETES MELLITUS WITH HYPERGLYCEMIA: Primary | ICD-10-CM

## 2023-11-10 DIAGNOSIS — E11.69 DYSLIPIDEMIA ASSOCIATED WITH TYPE 2 DIABETES MELLITUS: ICD-10-CM

## 2023-11-10 PROCEDURE — 3046F PR MOST RECENT HEMOGLOBIN A1C LEVEL > 9.0%: ICD-10-PCS | Mod: CPTII,S$GLB,, | Performed by: FAMILY MEDICINE

## 2023-11-10 PROCEDURE — 1159F MED LIST DOCD IN RCRD: CPT | Mod: CPTII,S$GLB,, | Performed by: FAMILY MEDICINE

## 2023-11-10 PROCEDURE — 3062F POS MACROALBUMINURIA REV: CPT | Mod: CPTII,S$GLB,, | Performed by: FAMILY MEDICINE

## 2023-11-10 PROCEDURE — 3075F PR MOST RECENT SYSTOLIC BLOOD PRESS GE 130-139MM HG: ICD-10-PCS | Mod: CPTII,S$GLB,, | Performed by: FAMILY MEDICINE

## 2023-11-10 PROCEDURE — 3288F PR FALLS RISK ASSESSMENT DOCUMENTED: ICD-10-PCS | Mod: CPTII,S$GLB,, | Performed by: FAMILY MEDICINE

## 2023-11-10 PROCEDURE — 1159F PR MEDICATION LIST DOCUMENTED IN MEDICAL RECORD: ICD-10-PCS | Mod: CPTII,S$GLB,, | Performed by: FAMILY MEDICINE

## 2023-11-10 PROCEDURE — 1125F AMNT PAIN NOTED PAIN PRSNT: CPT | Mod: CPTII,S$GLB,, | Performed by: FAMILY MEDICINE

## 2023-11-10 PROCEDURE — 1100F PTFALLS ASSESS-DOCD GE2>/YR: CPT | Mod: CPTII,S$GLB,, | Performed by: FAMILY MEDICINE

## 2023-11-10 PROCEDURE — 3075F SYST BP GE 130 - 139MM HG: CPT | Mod: CPTII,S$GLB,, | Performed by: FAMILY MEDICINE

## 2023-11-10 PROCEDURE — 1100F PR PT FALLS ASSESS DOC 2+ FALLS/FALL W/INJURY/YR: ICD-10-PCS | Mod: CPTII,S$GLB,, | Performed by: FAMILY MEDICINE

## 2023-11-10 PROCEDURE — 3062F PR POS MACROALBUMINURIA RESULT DOCUMENTED/REVIEW: ICD-10-PCS | Mod: CPTII,S$GLB,, | Performed by: FAMILY MEDICINE

## 2023-11-10 PROCEDURE — 3078F DIAST BP <80 MM HG: CPT | Mod: CPTII,S$GLB,, | Performed by: FAMILY MEDICINE

## 2023-11-10 PROCEDURE — 99999 PR PBB SHADOW E&M-EST. PATIENT-LVL V: CPT | Mod: PBBFAC,,, | Performed by: FAMILY MEDICINE

## 2023-11-10 PROCEDURE — 99999 PR PBB SHADOW E&M-EST. PATIENT-LVL V: ICD-10-PCS | Mod: PBBFAC,,, | Performed by: FAMILY MEDICINE

## 2023-11-10 PROCEDURE — 3046F HEMOGLOBIN A1C LEVEL >9.0%: CPT | Mod: CPTII,S$GLB,, | Performed by: FAMILY MEDICINE

## 2023-11-10 PROCEDURE — 3066F PR DOCUMENTATION OF TREATMENT FOR NEPHROPATHY: ICD-10-PCS | Mod: CPTII,S$GLB,, | Performed by: FAMILY MEDICINE

## 2023-11-10 PROCEDURE — 4010F ACE/ARB THERAPY RXD/TAKEN: CPT | Mod: CPTII,S$GLB,, | Performed by: FAMILY MEDICINE

## 2023-11-10 PROCEDURE — 4010F PR ACE/ARB THEARPY RXD/TAKEN: ICD-10-PCS | Mod: CPTII,S$GLB,, | Performed by: FAMILY MEDICINE

## 2023-11-10 PROCEDURE — 99214 OFFICE O/P EST MOD 30 MIN: CPT | Mod: S$GLB,,, | Performed by: FAMILY MEDICINE

## 2023-11-10 PROCEDURE — 3288F FALL RISK ASSESSMENT DOCD: CPT | Mod: CPTII,S$GLB,, | Performed by: FAMILY MEDICINE

## 2023-11-10 PROCEDURE — 1160F RVW MEDS BY RX/DR IN RCRD: CPT | Mod: CPTII,S$GLB,, | Performed by: FAMILY MEDICINE

## 2023-11-10 PROCEDURE — 1160F PR REVIEW ALL MEDS BY PRESCRIBER/CLIN PHARMACIST DOCUMENTED: ICD-10-PCS | Mod: CPTII,S$GLB,, | Performed by: FAMILY MEDICINE

## 2023-11-10 PROCEDURE — 3078F PR MOST RECENT DIASTOLIC BLOOD PRESSURE < 80 MM HG: ICD-10-PCS | Mod: CPTII,S$GLB,, | Performed by: FAMILY MEDICINE

## 2023-11-10 PROCEDURE — 3066F NEPHROPATHY DOC TX: CPT | Mod: CPTII,S$GLB,, | Performed by: FAMILY MEDICINE

## 2023-11-10 PROCEDURE — 3008F BODY MASS INDEX DOCD: CPT | Mod: CPTII,S$GLB,, | Performed by: FAMILY MEDICINE

## 2023-11-10 PROCEDURE — 3008F PR BODY MASS INDEX (BMI) DOCUMENTED: ICD-10-PCS | Mod: CPTII,S$GLB,, | Performed by: FAMILY MEDICINE

## 2023-11-10 PROCEDURE — 99214 PR OFFICE/OUTPT VISIT, EST, LEVL IV, 30-39 MIN: ICD-10-PCS | Mod: S$GLB,,, | Performed by: FAMILY MEDICINE

## 2023-11-10 PROCEDURE — 1125F PR PAIN SEVERITY QUANTIFIED, PAIN PRESENT: ICD-10-PCS | Mod: CPTII,S$GLB,, | Performed by: FAMILY MEDICINE

## 2023-11-10 RX ORDER — TIRZEPATIDE 2.5 MG/.5ML
2.5 INJECTION, SOLUTION SUBCUTANEOUS
Qty: 4 PEN | Refills: 11 | Status: SHIPPED | OUTPATIENT
Start: 2023-11-10 | End: 2023-12-11 | Stop reason: DRUGHIGH

## 2023-11-10 NOTE — PROGRESS NOTES
Assessment & Plan:    Uncontrolled type 2 diabetes mellitus with hyperglycemia  Microalbuminuria due to type 2 diabetes mellitus  -     Diabetes Digital Medicine (DDMP) Enrollment Order  -     tirzepatide (MOUNJARO) 2.5 mg/0.5 mL PnIj; Inject 2.5 mg into the skin every 7 days.  Dispense: 4 pen ; Refill: 11    D/C Rybelsus. Start Mounjaro.   Patient to enroll in digital program.     Dyslipidemia associated with type 2 diabetes mellitus  Controlled. Continue current therapy.     Essential hypertension  Controlled. Continue current therapy.     History of CVA (cerebrovascular accident)    Encouraged COVID, pneumonia, influenza, RSV, shingles, and tetanus booster.    Follow-up: Follow up in about 1 month (around 12/10/2023). Titrate Mounjaro dose at that time if needed.   ______________________________________________________________________    Chief Complaint  Chief Complaint   Patient presents with    Diabetes       HPI  Joanne Peña is a 73 y.o. female with medical diagnoses as listed in the medical history and problem list that presents to the office to follow up on her chronic conditions. Prescribed Rybelsus at her past appointment on 8/7. She states that she never got to pick it up and is requesting the shot instead. She states that she needs a new glucometer.     Most recent pertinent workup:   Last Lipids  Lab Results   Component Value Date    CHOL 190 11/06/2023    TRIG 134 11/06/2023    HDL 44 11/06/2023    LDLCALC 119.2 11/06/2023       Last A1C  Lab Results   Component Value Date    HGBA1C 6.9 (H) 02/12/2024         Health Maintenance         Date Due Completion Date    COVID-19 Vaccine (1) Never done ---    Pneumococcal Vaccines (Age 65+) (1 of 2 - PCV) Never done ---    TETANUS VACCINE Never done ---    Shingles Vaccine (1 of 2) Never done ---    RSV Vaccine (Age 60+ and Pregnant patients) (1 - 1-dose 60+ series) Never done ---    Influenza Vaccine (1) Never done ---    Mammogram 03/13/2024 3/13/2023     Foot Exam 07/14/2024 7/14/2023    Hemoglobin A1c 08/12/2024 2/12/2024    Eye Exam 10/16/2024 10/16/2023    Diabetes Urine Screening 11/06/2024 11/6/2023    Lipid Panel 11/06/2024 11/6/2023    High Dose Statin 02/14/2025 2/14/2024    Colorectal Cancer Screening 07/12/2026 7/12/2023    DEXA Scan 09/25/2026 9/25/2023              PAST MEDICAL HISTORY:  Past Medical History:   Diagnosis Date    Diabetes mellitus     Hyperlipidemia     Hypertension     Stroke        PAST SURGICAL HISTORY:  Past Surgical History:   Procedure Laterality Date    COLONOSCOPY N/A 7/12/2023    Procedure: COLONOSCOPY;  Surgeon: Ray Sorensen MD;  Location: [facility] ENDO;  Service: Endoscopy;  Laterality: N/A;  instr via portal  - PC  4/10/23 Daniel, instr via mail & portal, unable to tolerate Golytely- request Miralax/Gatorade - PC  5/30-pt r/s-new instr portal-tb    INJECTION OF ANESTHETIC AGENT AROUND MEDIAL BRANCH NERVES INNERVATING LUMBAR FACET JOINT Bilateral 4/20/2023    Procedure: MBB #1 (B/L) L3,4,5;  Surgeon: Son Lara DO;  Location: [facility] OR;  Service: Pain Management;  Laterality: Bilateral;  ORAL XANAX    INJECTION OF ANESTHETIC AGENT AROUND MEDIAL BRANCH NERVES INNERVATING LUMBAR FACET JOINT Bilateral 5/5/2023    Procedure: MBB #2 (B/L) L3,4,5;  Surgeon: Son Lara DO;  Location: [facility] OR;  Service: Pain Management;  Laterality: Bilateral;  Oral Xanax    INJECTION OF JOINT Right 5/20/2022    Procedure: Right SI joint injection;  Surgeon: Son Lara DO;  Location: [facility] OR;  Service: Pain Management;  Laterality: Right;    INJECTION, SACROILIAC JOINT Right 12/19/2023    Procedure: RT SI joint Inj;  Surgeon: Son Lara DO;  Location: [facility] PAIN MANAGEMENT;  Service: Pain Management;  Laterality: Right;  oral    RADIOFREQUENCY ABLATION OF LUMBAR MEDIAL BRANCH NERVE AT SINGLE LEVEL Bilateral 5/19/2023    Procedure: RFA (B/L) L3,4,5;  Surgeon: Son Lara DO;  Location: [facility] PAIN  MANAGEMENT;  Service: Pain Management;  Laterality: Bilateral;       SOCIAL HISTORY:  Social History     Socioeconomic History    Marital status:    Tobacco Use    Smoking status: Former     Current packs/day: 0.00     Types: Cigarettes     Quit date: 2022     Years since quittin.0     Passive exposure: Past    Smokeless tobacco: Never   Substance and Sexual Activity    Alcohol use: Yes     Comment: rare    Drug use: No    Sexual activity: Yes     Partners: Male     Social Determinants of Health     Financial Resource Strain: Medium Risk (2024)    Overall Financial Resource Strain (CARDIA)     Difficulty of Paying Living Expenses: Somewhat hard   Food Insecurity: Food Insecurity Present (2024)    Hunger Vital Sign     Worried About Running Out of Food in the Last Year: Sometimes true     Ran Out of Food in the Last Year: Never true   Transportation Needs: No Transportation Needs (2024)    PRAPARE - Transportation     Lack of Transportation (Medical): No     Lack of Transportation (Non-Medical): No   Physical Activity: Unknown (2024)    Exercise Vital Sign     Days of Exercise per Week: 0 days   Social Connections: Unknown (2024)    Social Connection and Isolation Panel [NHANES]     Frequency of Communication with Friends and Family: More than three times a week     Frequency of Social Gatherings with Friends and Family: More than three times a week     Active Member of Clubs or Organizations: No     Attends Club or Organization Meetings: Never   Housing Stability: Low Risk  (2024)    Housing Stability Vital Sign     Unable to Pay for Housing in the Last Year: No     Number of Places Lived in the Last Year: 1     Unstable Housing in the Last Year: No       FAMILY HISTORY:  Family History   Problem Relation Age of Onset    Kidney disease Mother     Heart disease Mother     Cancer Father     Hypertension Brother     Hypertension Daughter     Cancer Maternal Aunt         ALLERGIES AND MEDICATIONS: updated and reviewed.  Review of patient's allergies indicates:   Allergen Reactions    Lisinopril-hydrochlorothiazide Other (See Comments)     Pt stated it makes her feel drained. She couldn't raise arms over head.    Ampicillin Rash    Darvocet a500 [propoxyphene n-acetaminophen] Other (See Comments)     shaky     Current Outpatient Medications   Medication Sig Dispense Refill    ACCU-CHEK GUIDE GLUCOSE METER Misc TO CHECK BLOOD GLUCOSE DAILY, TO USE WITH INSURANCE PREFERRED METER      ascorbic acid, vitamin C, (VITAMIN C) 500 MG tablet Take 500 mg by mouth once daily.      atorvastatin (LIPITOR) 80 MG tablet TAKE 1 TABLET (80 MG TOTAL) BY MOUTH ONCE DAILY. 90 tablet 3    BLOOD PRESSURE CUFF Misc 1 Units by Misc.(Non-Drug; Combo Route) route once daily. 1 each 0    blood sugar diagnostic Strp To check BG daily, to use with insurance preferred meter 200 each 11    blood-glucose meter kit To check BG daily, to use with insurance preferred meter 1 each 0    chlorthalidone (HYGROTEN) 25 MG Tab TAKE 1 TABLET BY MOUTH EVERY DAY 90 tablet 3    cyclobenzaprine (FLEXERIL) 10 MG tablet Take 1 tablet (10 mg total) by mouth 2 (two) times daily as needed for Muscle spasms (back pain). 180 tablet 1    GAVILYTE-C 240-22.72-6.72 -5.84 gram SolR TAKE 4,000 MLS BY MOUTH ONCE. FOR 1 DOSE      guanfacine HCl (GUANFACINE ORAL) Take by mouth.      lancets Misc To check BG daily, to use with insurance preferred meter 200 each 11    losartan (COZAAR) 100 MG tablet TAKE 1 TABLET (100 MG TOTAL) BY MOUTH ONCE DAILY. HOLD IF SBP <120 90 tablet 3    multivitamin (THERAGRAN) per tablet Take 1 tablet by mouth once daily.      ondansetron (ZOFRAN-ODT) 4 MG TbDL Dissolve 1 tablet (4 mg total) by mouth every 8 (eight) hours as needed. 20 tablet 0    pregabalin (LYRICA) 25 MG capsule TAKE 2 CAPSULES (50 MG TOTAL) BY MOUTH 2 (TWO) TIMES DAILY. START WITH 1 PILL AT NIGHT, AND THEN INCREASE IF YOU CAN TOLERATE IT  "(Patient not taking: Reported on 2/15/2024) 120 capsule 1    amLODIPine (NORVASC) 10 MG tablet TAKE 1 TABLET BY MOUTH ONCE DAILY. HOLD IF SBP <120 90 tablet 3    aspirin (ECOTRIN) 81 MG EC tablet Take 1 tablet (81 mg total) by mouth once daily. 30 tablet 3    FLUAD QUAD 2023-24,65Y UP,,PF, 60 mcg (15 mcg x 4)/0.5 mL Syrg       glimepiride (AMARYL) 2 MG tablet Take 2 mg by mouth 2 (two) times daily.      hydrALAZINE (APRESOLINE) 50 MG tablet Take 1 tablet (50 mg total) by mouth every 8 (eight) hours. Hold is SBP <120 (Patient taking differently: Take 50 mg by mouth every 8 (eight) hours. Hold is SBP <120    Pt is taking once a day) 90 tablet 1    HYDROcodone-acetaminophen (NORCO) 5-325 mg per tablet Take 1 tablet by mouth every 6 (six) hours as needed for Pain. (Patient not taking: Reported on 2/15/2024) 20 tablet 0    LORazepam (ATIVAN) 1 MG tablet Take 1 tablet (1 mg total) by mouth once as needed for Anxiety (before procedure). (Patient not taking: Reported on 2/15/2024) 1 tablet 0    meclizine (ANTIVERT) 25 mg tablet TAKE 1 TABLET (25 MG TOTAL) BY MOUTH 2 (TWO) TIMES DAILY AS NEEDED FOR DIZZINESS. 60 tablet 0    tirzepatide (MOUNJARO) 5 mg/0.5 mL PnIj Inject 5 mg into the skin every 7 days. 4 pen 11     No current facility-administered medications for this visit.     Facility-Administered Medications Ordered in Other Visits   Medication Dose Route Frequency Provider Last Rate Last Admin    0.9%  NaCl infusion   Intravenous Continuous Ronald Young MD        LIDOcaine (PF) 10 mg/ml (1%) injection 10 mg  1 mL Intradermal Once Ronald Young MD             ROS  Review of Systems   Constitutional:  Negative for activity change and fever.   Musculoskeletal:  Positive for arthralgias.           Physical Exam  Vitals:    11/10/23 0815   BP: 132/72   Pulse: 92   Temp: 98.3 °F (36.8 °C)   SpO2: 98%   Weight: 86.8 kg (191 lb 5.8 oz)   Height: 5' 7" (1.702 m)    Body mass index is 29.97 kg/m².  Weight: 86.8 kg (191 " "lb 5.8 oz)   Height: 5' 7" (170.2 cm)   Physical Exam  Constitutional:       General: She is not in acute distress.     Appearance: Normal appearance.   HENT:      Head: Normocephalic and atraumatic.   Skin:     General: Skin is warm and dry.   Neurological:      Mental Status: She is alert. Mental status is at baseline.   Psychiatric:         Mood and Affect: Mood normal.         Behavior: Behavior normal.             "

## 2023-12-08 ENCOUNTER — TELEPHONE (OUTPATIENT)
Dept: FAMILY MEDICINE | Facility: CLINIC | Age: 73
End: 2023-12-08
Payer: MEDICARE

## 2023-12-08 NOTE — TELEPHONE ENCOUNTER
----- Message from Linette Pineda sent at 12/8/2023  9:39 AM CST -----  Regarding: self  Type: Lab    Caller is requesting to schedule their Lab appointment prior to annual appointment.    Order is not listed in EPIC.  Please enter order and contact patient to schedule.    Name of Caller: self     Preferred Date and Time of Labs:    Date of EPP Appointment: 12/11/2023    Where would they like the lab performed? [facility]    Would the patient rather a call back or a response via [facility]?  Call back    Best Call Back Number:[phone]    Additional Information: pt stated she wanted to know if provider can put some labs order in or appt she have 12/11/2023 on the behalf of new med she's taking, pt will like call back         Thanks

## 2023-12-08 NOTE — TELEPHONE ENCOUNTER
Spoke to patient in reference to her getting labs drawn was requesting Ha1c. Informed patient that she just got it drawn in November 2023 & her next scheduled draw is feb 2024. Provider will be notified if she wants additional labs drawn.

## 2023-12-11 ENCOUNTER — OFFICE VISIT (OUTPATIENT)
Dept: FAMILY MEDICINE | Facility: CLINIC | Age: 73
End: 2023-12-11
Payer: MEDICARE

## 2023-12-11 VITALS
TEMPERATURE: 98 F | WEIGHT: 187.38 LBS | DIASTOLIC BLOOD PRESSURE: 84 MMHG | BODY MASS INDEX: 29.41 KG/M2 | SYSTOLIC BLOOD PRESSURE: 130 MMHG | HEIGHT: 67 IN

## 2023-12-11 DIAGNOSIS — I10 ESSENTIAL HYPERTENSION: ICD-10-CM

## 2023-12-11 DIAGNOSIS — E11.65 UNCONTROLLED TYPE 2 DIABETES MELLITUS WITH HYPERGLYCEMIA: Primary | ICD-10-CM

## 2023-12-11 PROCEDURE — 1159F MED LIST DOCD IN RCRD: CPT | Mod: CPTII,S$GLB,, | Performed by: FAMILY MEDICINE

## 2023-12-11 PROCEDURE — 3288F PR FALLS RISK ASSESSMENT DOCUMENTED: ICD-10-PCS | Mod: CPTII,S$GLB,, | Performed by: FAMILY MEDICINE

## 2023-12-11 PROCEDURE — 3075F PR MOST RECENT SYSTOLIC BLOOD PRESS GE 130-139MM HG: ICD-10-PCS | Mod: CPTII,S$GLB,, | Performed by: FAMILY MEDICINE

## 2023-12-11 PROCEDURE — 3288F FALL RISK ASSESSMENT DOCD: CPT | Mod: CPTII,S$GLB,, | Performed by: FAMILY MEDICINE

## 2023-12-11 PROCEDURE — 99214 OFFICE O/P EST MOD 30 MIN: CPT | Mod: S$GLB,,, | Performed by: FAMILY MEDICINE

## 2023-12-11 PROCEDURE — 3079F DIAST BP 80-89 MM HG: CPT | Mod: CPTII,S$GLB,, | Performed by: FAMILY MEDICINE

## 2023-12-11 PROCEDURE — 3062F PR POS MACROALBUMINURIA RESULT DOCUMENTED/REVIEW: ICD-10-PCS | Mod: CPTII,S$GLB,, | Performed by: FAMILY MEDICINE

## 2023-12-11 PROCEDURE — 3066F NEPHROPATHY DOC TX: CPT | Mod: CPTII,S$GLB,, | Performed by: FAMILY MEDICINE

## 2023-12-11 PROCEDURE — 1160F PR REVIEW ALL MEDS BY PRESCRIBER/CLIN PHARMACIST DOCUMENTED: ICD-10-PCS | Mod: CPTII,S$GLB,, | Performed by: FAMILY MEDICINE

## 2023-12-11 PROCEDURE — 99999 PR PBB SHADOW E&M-EST. PATIENT-LVL III: ICD-10-PCS | Mod: PBBFAC,,, | Performed by: FAMILY MEDICINE

## 2023-12-11 PROCEDURE — 4010F ACE/ARB THERAPY RXD/TAKEN: CPT | Mod: CPTII,S$GLB,, | Performed by: FAMILY MEDICINE

## 2023-12-11 PROCEDURE — 3046F HEMOGLOBIN A1C LEVEL >9.0%: CPT | Mod: CPTII,S$GLB,, | Performed by: FAMILY MEDICINE

## 2023-12-11 PROCEDURE — 99999 PR PBB SHADOW E&M-EST. PATIENT-LVL III: CPT | Mod: PBBFAC,,, | Performed by: FAMILY MEDICINE

## 2023-12-11 PROCEDURE — 3008F PR BODY MASS INDEX (BMI) DOCUMENTED: ICD-10-PCS | Mod: CPTII,S$GLB,, | Performed by: FAMILY MEDICINE

## 2023-12-11 PROCEDURE — 1160F RVW MEDS BY RX/DR IN RCRD: CPT | Mod: CPTII,S$GLB,, | Performed by: FAMILY MEDICINE

## 2023-12-11 PROCEDURE — 3079F PR MOST RECENT DIASTOLIC BLOOD PRESSURE 80-89 MM HG: ICD-10-PCS | Mod: CPTII,S$GLB,, | Performed by: FAMILY MEDICINE

## 2023-12-11 PROCEDURE — 3075F SYST BP GE 130 - 139MM HG: CPT | Mod: CPTII,S$GLB,, | Performed by: FAMILY MEDICINE

## 2023-12-11 PROCEDURE — 99214 PR OFFICE/OUTPT VISIT, EST, LEVL IV, 30-39 MIN: ICD-10-PCS | Mod: S$GLB,,, | Performed by: FAMILY MEDICINE

## 2023-12-11 PROCEDURE — 3062F POS MACROALBUMINURIA REV: CPT | Mod: CPTII,S$GLB,, | Performed by: FAMILY MEDICINE

## 2023-12-11 PROCEDURE — 1101F PR PT FALLS ASSESS DOC 0-1 FALLS W/OUT INJ PAST YR: ICD-10-PCS | Mod: CPTII,S$GLB,, | Performed by: FAMILY MEDICINE

## 2023-12-11 PROCEDURE — 4010F PR ACE/ARB THEARPY RXD/TAKEN: ICD-10-PCS | Mod: CPTII,S$GLB,, | Performed by: FAMILY MEDICINE

## 2023-12-11 PROCEDURE — 1159F PR MEDICATION LIST DOCUMENTED IN MEDICAL RECORD: ICD-10-PCS | Mod: CPTII,S$GLB,, | Performed by: FAMILY MEDICINE

## 2023-12-11 PROCEDURE — 1101F PT FALLS ASSESS-DOCD LE1/YR: CPT | Mod: CPTII,S$GLB,, | Performed by: FAMILY MEDICINE

## 2023-12-11 PROCEDURE — 3066F PR DOCUMENTATION OF TREATMENT FOR NEPHROPATHY: ICD-10-PCS | Mod: CPTII,S$GLB,, | Performed by: FAMILY MEDICINE

## 2023-12-11 PROCEDURE — 3046F PR MOST RECENT HEMOGLOBIN A1C LEVEL > 9.0%: ICD-10-PCS | Mod: CPTII,S$GLB,, | Performed by: FAMILY MEDICINE

## 2023-12-11 PROCEDURE — 3008F BODY MASS INDEX DOCD: CPT | Mod: CPTII,S$GLB,, | Performed by: FAMILY MEDICINE

## 2023-12-11 RX ORDER — TIRZEPATIDE 5 MG/.5ML
5 INJECTION, SOLUTION SUBCUTANEOUS
Qty: 4 PEN | Refills: 11 | Status: SHIPPED | OUTPATIENT
Start: 2023-12-11 | End: 2024-12-10

## 2023-12-11 NOTE — PATIENT INSTRUCTIONS
You are due for your flu shot, pneumonia shot, RSV, tetanus booster, shingles vaccines, and COVID booster.

## 2023-12-11 NOTE — PROGRESS NOTES
Assessment & Plan:    Uncontrolled type 2 diabetes mellitus with hyperglycemia  -     tirzepatide (MOUNJARO) 5 mg/0.5 mL PnIj; Inject 5 mg into the skin every 7 days.  Dispense: 4 pen ; Refill: 11  -     Hemoglobin A1C; Future; Expected date: 12/11/2023    Improving. D/C Amaryl. Increase Mounjaro to 5 mg.   Due for repeat A1c in 2 months.    Essential hypertension  Controlled. Continue current therapy.     Encouraged influenza, pneumococcal, RSV, tetanus booster, shingles vaccines, and COVID booster.     Follow-up: Follow up in about 2 months (around 2/11/2024).  ______________________________________________________________________    Chief Complaint  Chief Complaint   Patient presents with    Diabetes       HPI  Joanne Peña is a 73 y.o. female with medical diagnoses as listed in the medical history and problem list that presents to the office for a 1 month follow up of uncontrolled type 2 diabetes. Patient was last seen in the office on 11/10 and was started on Mounjaro, which she has been taking and is tolerating well. She has been checking her BG daily on a log to review. Her average BG has improved from an average of 250s to 110s-130s with an occasional reading at 140 or 170s. She is skipping the Amaryl when her BG is in the 80-90s. She has lost 5 lbs since her last visit.     Most recent pertinent workup:     Last A1C  Lab Results   Component Value Date    HGBA1C 9.6 (H) 11/06/2023         Health Maintenance         Date Due Completion Date    COVID-19 Vaccine (1) Never done ---    Pneumococcal Vaccines (Age 65+) (1 - PCV) Never done ---    Eye Exam Never done ---    TETANUS VACCINE Never done ---    Aspirin/Antiplatelet Therapy Never done ---    Shingles Vaccine (1 of 2) Never done ---    RSV Vaccine (Age 60+ and Pregnant patients) (1 - 1-dose 60+ series) Never done ---    Influenza Vaccine (1) Never done ---    Hemoglobin A1c 02/06/2024 11/6/2023    Mammogram 03/13/2024 3/13/2023    Foot Exam  07/14/2024 7/14/2023    Override on 7/14/2023: Done    Diabetes Urine Screening 11/06/2024 11/6/2023    Lipid Panel 11/06/2024 11/6/2023    High Dose Statin 11/10/2024 11/10/2023    Colorectal Cancer Screening 07/12/2026 7/12/2023    DEXA Scan 09/25/2026 9/25/2023              PAST MEDICAL HISTORY:  Past Medical History:   Diagnosis Date    Diabetes mellitus     Hyperlipidemia     Hypertension     Stroke        PAST SURGICAL HISTORY:  Past Surgical History:   Procedure Laterality Date    COLONOSCOPY N/A 7/12/2023    Procedure: COLONOSCOPY;  Surgeon: Ray Sorensen MD;  Location: [facility] ENDO;  Service: Endoscopy;  Laterality: N/A;  instr via portal  - PC  4/10/23 Daniel, instr via mail & portal, unable to tolerate Golytely- request Miralax/Gatorade - PC  5/30-pt r/s-new instr portal-tb    INJECTION OF ANESTHETIC AGENT AROUND MEDIAL BRANCH NERVES INNERVATING LUMBAR FACET JOINT Bilateral 4/20/2023    Procedure: MBB #1 (B/L) L3,4,5;  Surgeon: Son Lara DO;  Location: [facility] OR;  Service: Pain Management;  Laterality: Bilateral;  ORAL XANAX    INJECTION OF ANESTHETIC AGENT AROUND MEDIAL BRANCH NERVES INNERVATING LUMBAR FACET JOINT Bilateral 5/5/2023    Procedure: MBB #2 (B/L) L3,4,5;  Surgeon: Son Lara DO;  Location: [facility] OR;  Service: Pain Management;  Laterality: Bilateral;  Oral Xanax    INJECTION OF JOINT Right 5/20/2022    Procedure: Right SI joint injection;  Surgeon: Son Lara DO;  Location: [facility] OR;  Service: Pain Management;  Laterality: Right;    RADIOFREQUENCY ABLATION OF LUMBAR MEDIAL BRANCH NERVE AT SINGLE LEVEL Bilateral 5/19/2023    Procedure: RFA (B/L) L3,4,5;  Surgeon: Son Lara DO;  Location: [facility] PAIN MANAGEMENT;  Service: Pain Management;  Laterality: Bilateral;       SOCIAL HISTORY:  Social History     Socioeconomic History    Marital status:    Tobacco Use    Smoking status: Former     Current packs/day: 0.00     Types: Cigarettes     Quit date:  2022     Years since quittin.8     Passive exposure: Past    Smokeless tobacco: Never   Substance and Sexual Activity    Alcohol use: Yes     Comment: rare    Drug use: No    Sexual activity: Yes     Partners: Male       FAMILY HISTORY:  Family History   Problem Relation Age of Onset    Kidney disease Mother     Heart disease Mother     Cancer Father     Hypertension Brother     Hypertension Daughter     Cancer Maternal Aunt        ALLERGIES AND MEDICATIONS: updated and reviewed.  Review of patient's allergies indicates:   Allergen Reactions    Lisinopril-hydrochlorothiazide Other (See Comments)     Pt stated it makes her feel drained. She couldn't raise arms over head.    Ampicillin Rash    Darvocet a500 [propoxyphene n-acetaminophen] Other (See Comments)     karl     Current Outpatient Medications   Medication Sig Dispense Refill    ACCU-CHEK GUIDE GLUCOSE METER Misc TO CHECK BLOOD GLUCOSE DAILY, TO USE WITH INSURANCE PREFERRED METER      amLODIPine (NORVASC) 10 MG tablet TAKE 1 TABLET (10 MG TOTAL) BY MOUTH ONCE DAILY. HOLD IF SBP <120 90 tablet 2    ascorbic acid, vitamin C, (VITAMIN C) 500 MG tablet Take 500 mg by mouth once daily.      atorvastatin (LIPITOR) 80 MG tablet TAKE 1 TABLET (80 MG TOTAL) BY MOUTH ONCE DAILY. 90 tablet 3    BLOOD PRESSURE CUFF Misc 1 Units by Misc.(Non-Drug; Combo Route) route once daily. 1 each 0    blood sugar diagnostic Strp To check BG daily, to use with insurance preferred meter 200 each 11    blood-glucose meter kit To check BG daily, to use with insurance preferred meter 1 each 0    chlorthalidone (HYGROTEN) 25 MG Tab TAKE 1 TABLET BY MOUTH EVERY DAY 90 tablet 3    cyclobenzaprine (FLEXERIL) 10 MG tablet Take 1 tablet (10 mg total) by mouth 2 (two) times daily as needed for Muscle spasms (back pain). 180 tablet 1    lancets Misc To check BG daily, to use with insurance preferred meter 200 each 11    losartan (COZAAR) 100 MG tablet TAKE 1 TABLET (100 MG TOTAL) BY MOUTH  ONCE DAILY. HOLD IF SBP <120 90 tablet 3    aspirin (ECOTRIN) 81 MG EC tablet Take 1 tablet (81 mg total) by mouth once daily. 30 tablet 3    GAVILYTE-C 240-22.72-6.72 -5.84 gram SolR TAKE 4,000 MLS BY MOUTH ONCE. FOR 1 DOSE      guanfacine HCl (GUANFACINE ORAL) Take by mouth.      hydrALAZINE (APRESOLINE) 50 MG tablet Take 1 tablet (50 mg total) by mouth every 8 (eight) hours. Hold is SBP <120 (Patient taking differently: Take 50 mg by mouth every 8 (eight) hours. Hold is SBP <120    Pt is taking once a day) 90 tablet 1    LORazepam (ATIVAN) 1 MG tablet Take 1 tablet (1 mg total) by mouth once as needed for Anxiety (before procedure). 1 tablet 0    meclizine (ANTIVERT) 25 mg tablet TAKE 1 TABLET (25 MG TOTAL) BY MOUTH 2 (TWO) TIMES DAILY AS NEEDED FOR DIZZINESS. 60 tablet 0    multivitamin (THERAGRAN) per tablet Take 1 tablet by mouth once daily.      ondansetron (ZOFRAN-ODT) 4 MG TbDL Dissolve 1 tablet (4 mg total) by mouth every 8 (eight) hours as needed. (Patient not taking: Reported on 12/11/2023) 20 tablet 0    pregabalin (LYRICA) 25 MG capsule TAKE 2 CAPSULES (50 MG TOTAL) BY MOUTH 2 (TWO) TIMES DAILY. START WITH 1 PILL AT NIGHT, AND THEN INCREASE IF YOU CAN TOLERATE IT (Patient not taking: Reported on 12/11/2023) 120 capsule 1    tirzepatide (MOUNJARO) 5 mg/0.5 mL PnIj Inject 5 mg into the skin every 7 days. 4 pen 11     No current facility-administered medications for this visit.     Facility-Administered Medications Ordered in Other Visits   Medication Dose Route Frequency Provider Last Rate Last Admin    0.9%  NaCl infusion   Intravenous Continuous Ronald Young MD        LIDOcaine (PF) 10 mg/ml (1%) injection 10 mg  1 mL Intradermal Once Ronald Young MD             ROS  Review of Systems   Constitutional:  Negative for activity change and fever.   Musculoskeletal:  Positive for arthralgias (chronic).           Physical Exam  Vitals:    12/11/23 0808   BP: 130/84   Temp: 97.9 °F (36.6 °C)  "  TempSrc: Oral   Weight: 85 kg (187 lb 6.3 oz)   Height: 5' 7" (1.702 m)    Body mass index is 29.35 kg/m².  Weight: 85 kg (187 lb 6.3 oz)   Height: 5' 7" (170.2 cm)   Physical Exam  Constitutional:       General: She is not in acute distress.  HENT:      Head: Normocephalic and atraumatic.   Neurological:      Mental Status: She is alert. Mental status is at baseline.   Psychiatric:         Mood and Affect: Mood normal.         Behavior: Behavior normal.             "

## 2023-12-12 ENCOUNTER — TELEPHONE (OUTPATIENT)
Dept: PAIN MEDICINE | Facility: CLINIC | Age: 73
End: 2023-12-12
Payer: MEDICARE

## 2023-12-18 NOTE — PRE-PROCEDURE INSTRUCTIONS
12/18 Unable to reach pt via phone. Left message with instructions along with a request for a call back. The following was sent to pt portal.     Dear Joanne ,     You are scheduled for a procedure with Dr. Son Lara on 12/19/2023.  Your scheduled arrival time is 08:25 am.  This arrival time is roughly 1 hour before your anticipated procedure time to allow sufficient time for pre-op..  Please wear comfortable clothes.  Most patients do not need to change into a gown.  Please do not wear a dress.  This procedure will take place at the [facility] at the corner of [facility] and [facility].  [facility] [place] to [employer].  The address is:     [address] [place] [address].  [place] [id number]     After entering the building, you will proceed to the second floor where you can check in with registration. You should take any medications that you routinely take for blood pressure, heart medications, thyroid, cholesterol, etc.      The fasting restrictions are dependent on whether or not you are receiving sedation.  Sedation is not available for all procedures.      Your fasting instructions are as follow:  Oral Sedation. You do not need to fast before this procedure.  You can eat and drink like normal.  You CANNOT drive yourself and must have a .     If you are on blood thinners, you need to follow the anticoagulation instructions that had been discussed previously.  You should only stop the blood thinners if it was approved by your primary care physician or your cardiologist.  In the event that you are not able to stop your blood thinners, a blood thinner was not listed on your medication list, or we were not able to get clearance from your cardiologist, then the procedure may have to be postponed/canceled.      IF you were told to stop your blood thinners, this is how long you should generally hold some of the more common ones.  Remember that stopping  blood thinners is only necessary for certain procedures. If you are unsure of your instructions, please call us.   Aspirin - 5 days  Plavix/Clopidogrel - 7 days  Warfarin / Coumadin - 5 days  Eliquis - 3 days  Pradaxa/Dabigatran - 4 days  Xarelto/Rivaroxaban - 3 days     If you are a diabetic, do not take your medication if you will be fasting, but bring it with you. Please plan on being here for roughly 2 hours.     Please call us if you have been sick (running fever, having any flu-like symptoms) or have been taking antibiotics in the past 2 weeks or had any outpatient procedures other than with us (colonoscopy, endoscopy, OBGYN, dental, etc.). If you have been previously COVID positive, you will need to hold off on your procedure until you are symptom free for 10 days. If you did not have any symptoms, you can have your procedure 10 days from your positive test result.       *HOLD ALL VITAMINS, MINERALS, HERBS (INCLUDING HERBAL TEAS) AND SUPPLEMENTS  *SHOWER WITH ANTIBACTERIAL SOAP (EX. DIAL) NIGHT BEFORE AND MORNING OF PROCEDURE  *DO NOT APPLY ANY LOTIONS, OILS, POWDERS, PERFUME/COLOGNE, OINTMENTS, GELS, CREAMS, MAKEUP OR DEODORANT TO YOUR SKIN MORNING OF PROCEDURE  *LEAVE JEWELRY AND ANY VALUABLES AT HOME  *WEAR LOOSE COMFORTABLE CLOTHING (PREFERABLY A BUTTON UP SHIRT)        Thank you,  [facility]  [place], LPN [facility]  Pre-Admit

## 2023-12-19 ENCOUNTER — HOSPITAL ENCOUNTER (OUTPATIENT)
Facility: HOSPITAL | Age: 73
Discharge: HOME | End: 2023-12-19
Attending: STUDENT IN AN ORGANIZED HEALTH CARE EDUCATION/TRAINING PROGRAM | Admitting: STUDENT IN AN ORGANIZED HEALTH CARE EDUCATION/TRAINING PROGRAM
Payer: MEDICARE

## 2023-12-19 VITALS
WEIGHT: 168 LBS | SYSTOLIC BLOOD PRESSURE: 128 MMHG | RESPIRATION RATE: 16 BRPM | TEMPERATURE: 98 F | DIASTOLIC BLOOD PRESSURE: 64 MMHG | OXYGEN SATURATION: 93 % | HEIGHT: 67 IN | BODY MASS INDEX: 26.37 KG/M2 | HEART RATE: 90 BPM

## 2023-12-19 DIAGNOSIS — M46.1 SACROILIITIS: Primary | ICD-10-CM

## 2023-12-19 DIAGNOSIS — G89.29 CHRONIC PAIN: ICD-10-CM

## 2023-12-19 LAB — POCT GLUCOSE: 107 MG/DL (ref 70–110)

## 2023-12-19 PROCEDURE — 27096 INJECT SACROILIAC JOINT: CPT | Mod: RT,,, | Performed by: STUDENT IN AN ORGANIZED HEALTH CARE EDUCATION/TRAINING PROGRAM

## 2023-12-19 PROCEDURE — 25000003 PHARM REV CODE 250: Performed by: STUDENT IN AN ORGANIZED HEALTH CARE EDUCATION/TRAINING PROGRAM

## 2023-12-19 PROCEDURE — 63600175 PHARM REV CODE 636 W HCPCS: Performed by: STUDENT IN AN ORGANIZED HEALTH CARE EDUCATION/TRAINING PROGRAM

## 2023-12-19 PROCEDURE — 27096 INJECT SACROILIAC JOINT: CPT | Mod: RT | Performed by: STUDENT IN AN ORGANIZED HEALTH CARE EDUCATION/TRAINING PROGRAM

## 2023-12-19 PROCEDURE — 27096 PR INJECTION,SACROILIAC JOINT: ICD-10-PCS | Mod: RT,,, | Performed by: STUDENT IN AN ORGANIZED HEALTH CARE EDUCATION/TRAINING PROGRAM

## 2023-12-19 RX ORDER — BUPIVACAINE HYDROCHLORIDE 2.5 MG/ML
INJECTION, SOLUTION EPIDURAL; INFILTRATION; INTRACAUDAL
Status: DISCONTINUED | OUTPATIENT
Start: 2023-12-19 | End: 2023-12-19 | Stop reason: HOSPADM

## 2023-12-19 RX ORDER — TRIAMCINOLONE ACETONIDE 40 MG/ML
INJECTION, SUSPENSION INTRA-ARTICULAR; INTRAMUSCULAR
Status: DISCONTINUED | OUTPATIENT
Start: 2023-12-19 | End: 2023-12-19 | Stop reason: HOSPADM

## 2023-12-19 RX ORDER — LIDOCAINE HYDROCHLORIDE 20 MG/ML
INJECTION, SOLUTION EPIDURAL; INFILTRATION; INTRACAUDAL; PERINEURAL
Status: DISCONTINUED | OUTPATIENT
Start: 2023-12-19 | End: 2023-12-19 | Stop reason: HOSPADM

## 2023-12-19 RX ORDER — ALPRAZOLAM 0.5 MG/1
0.5 TABLET, ORALLY DISINTEGRATING ORAL ONCE
Status: COMPLETED | OUTPATIENT
Start: 2023-12-19 | End: 2023-12-19

## 2023-12-19 RX ADMIN — ALPRAZOLAM 0.5 MG: 0.5 TABLET, ORALLY DISINTEGRATING ORAL at 09:12

## 2023-12-19 NOTE — H&P
HPI  Patient presenting for Procedure(s) (LRB):  RT SI joint Inj (Right)     Patient on Anti-coagulation No    No health changes since previous encounter    Past Medical History:   Diagnosis Date    Diabetes mellitus     Hyperlipidemia     Hypertension     Stroke      Past Surgical History:   Procedure Laterality Date    COLONOSCOPY N/A 7/12/2023    Procedure: COLONOSCOPY;  Surgeon: Ray oSrensen MD;  Location: [facility] ENDO;  Service: Endoscopy;  Laterality: N/A;  instr via portal  - PC  4/10/23 Daniel, instr via mail & portal, unable to tolerate Golytely- request Miralax/Gatorade - PC  5/30-pt r/s-new instr portal-tb    INJECTION OF ANESTHETIC AGENT AROUND MEDIAL BRANCH NERVES INNERVATING LUMBAR FACET JOINT Bilateral 4/20/2023    Procedure: MBB #1 (B/L) L3,4,5;  Surgeon: Son Lara DO;  Location: [facility] OR;  Service: Pain Management;  Laterality: Bilateral;  ORAL XANAX    INJECTION OF ANESTHETIC AGENT AROUND MEDIAL BRANCH NERVES INNERVATING LUMBAR FACET JOINT Bilateral 5/5/2023    Procedure: MBB #2 (B/L) L3,4,5;  Surgeon: Son Lara DO;  Location: [facility] OR;  Service: Pain Management;  Laterality: Bilateral;  Oral Xanax    INJECTION OF JOINT Right 5/20/2022    Procedure: Right SI joint injection;  Surgeon: Son Lara DO;  Location: [facility] OR;  Service: Pain Management;  Laterality: Right;    RADIOFREQUENCY ABLATION OF LUMBAR MEDIAL BRANCH NERVE AT SINGLE LEVEL Bilateral 5/19/2023    Procedure: RFA (B/L) L3,4,5;  Surgeon: Son Lara DO;  Location: [facility] PAIN MANAGEMENT;  Service: Pain Management;  Laterality: Bilateral;     Review of patient's allergies indicates:   Allergen Reactions    Lisinopril-hydrochlorothiazide Other (See Comments)     Pt stated it makes her feel drained. She couldn't raise arms over head.    Ampicillin Rash    Darvocet a500 [propoxyphene n-acetaminophen] Other (See Comments)     shaky      No current facility-administered medications for this encounter.      Facility-Administered Medications Ordered in Other Encounters   Medication    0.9%  NaCl infusion    LIDOcaine (PF) 10 mg/ml (1%) injection 10 mg       PMHx, PSHx, Allergies, Medications reviewed in epic    ROS negative except pain complaints in HPI    OBJECTIVE:    There were no vitals taken for this visit.    PHYSICAL EXAMINATION:    GENERAL: Well appearing, in no acute distress, alert and oriented x3.  PSYCH:  Mood and affect appropriate.  SKIN: Skin color, texture, turgor normal, no rashes or lesions which will impact the procedure.  CV: RRR with palpation of the radial artery.  PULM: No evidence of respiratory difficulty, symmetric chest rise. Clear to auscultation.  NEURO: Cranial nerves grossly intact.    Plan:    Proceed with procedure as planned Procedure(s) (LRB):  RT SI joint Inj (Right)    Son Cartagena  12/19/2023

## 2023-12-19 NOTE — PLAN OF CARE
VSS. Consent and site sarah pending. All patient concerns addressed. Patient's family available for ride home. Patient belongings placed behind stretcher. Pre procedure complete. Call light in reach of patient. Side rails up x2.

## 2023-12-19 NOTE — DISCHARGE INSTRUCTIONS
[facility]  [facility]  Session[facility] service # [phone]    POST-PROCEDURE INSTRUCTIONS:    Today you had an injection that included a steroid medications.  The steroid may or may not have been mixed with a local anesthetic when it was injected.   If the injection was in the neck, you may feel some pressure, numbness, or slight weakness in the arm after the procedure for a short period of time (this is a normal response), if this persists for longer than 1 day please contact our office or go to the emergency room.  If the injection was in the low back, you may feel some pressure, numbness, or slight weakness in the leg after the procedure for a short period of time (this is a normal response), if this persists for longer than 1 day please contact our office or go to the emergency room.  You may get side effects from the steroid.  This is not uncommon.  Symptoms include: elevated blood sugar, elevated blood pressure, headache, flushing, nausea, insomnia.  These symptoms are transient and will resolve within 1-3 days.  If symptoms last longer than this please contact our office or head to the emergency room.  Steroid medications can take anywhere from 3-14 days to take effect (rarely longer).  You may notice that your pain worsens for a short period of time after the injection, this would not be unusual due to the pressure and trauma from the needle.    If you do not have a follow up appointment scheduled, please contact my office (or the office of the physician who referred you for the procedure) to get a post-procedure follow up scheduled 2-4 weeks after the procedure.  This can be done as a virtual visit if that is more convenient for you.      What you need to do:    Keep a record of your response to the injection you had today.    How much relief did you get?   When did the relief start and how long did it last?  Were you able to decrease the use of any of your pain  medications?  Were you able to increase your level of activity?  How long did the relief last?    What to watch out for:    If you experience any of the following symptoms after your procedure, please notify the messaging service immediately (see above for contact information):   fever (increased oral temperature)   bleeding or swelling at the injection site,    drainage, rash or redness at the injection site    possible signs of infection    increased pain at the injection site   worsening of your usual pain   severe headache   new or worsening numbness    new arm and/or leg weakness, or    changes in bowel and/or bladder function: urinating or defecating on yourself and not knowing that you did it.    PLEASE FOLLOW ALL INSTRUCTIONS CAREFULLY     Do not engage in strenuous activity (e.g., lifting or pushing heavy objects or repeated bending) for 24 hours.     Do not take a bath, swim or use Jacuzzi for 24 hours after procedure. (A shower is fine).   Remove any Band-Aids when you get home.    Use cold/ice, as needed for comfort.  We recommend the use of cold therapy alternating on for 20 minutes, off for 20 minutes.    Do not apply direct heat (heating pad or heat packs) to the injection site for 24 hours.     Resume your usual medications, unless instructed otherwise by your Pain Physician.     If you are on warfarin (Coumadin) or other blood thinner, resume this medication as instructed by your prescribing Physician.    IF AT ANY POINT YOU ARE VERY CONCERNED ABOUT YOUR SYMPTOMS, PLEASE GO TO THE EMERGENCY ROOM.    If you develop worsening pain, weakness, numbness, lose bowel or bladder control (i.e., having an accident where you did not even know you had to go to the bathroom and suddenly noticed you soiled yourself), saddle anesthesia (a loss of sensation restricted to the area of the buttocks, anus and between the legs -- i.e., those parts of your body that would touch a saddle if you were sitting on one) you  need to go immediately to the emergency department for evaluation and treatment.    ----------------------------------------------------------------------------------------------------------------------------------------------------------------  If you received Sedation please read the following instructions:  POST SEDATION INSTRUCTIONS    Today you received intravenous medication (also known as sedation) that was used to help you relax and/or decrease discomfort during your procedure. This medication will be acting in your body for the next 24 hours, so you might feel a little tired or sleepy. This feeling will slowly wear off.   Common side effects associated with these medications include: drowsiness, dizziness, sleepiness, confusion, feeling excited, difficulty remembering things, lack of steadiness with walking or balance, loss of fine muscle control, slowed reflexes, difficulty focusing, and blurred vision.  Some over-the-counter and prescription medications (e.g., muscle relaxants, opioids, mood-altering medications, sedatives/hypnotics, antihistamines) can interact with the intravenous medication you received and cause an increased risk of the side effects listed above in addition to other potentially life threatening side effects. Use extreme caution if you are taking such medications, and consult with your Pain Physician or prescribing physician if you have any questions.  For the next 12-24 hours:    DO NOT--Drive a car, operate machinery or power tools   DO NOT--Drink any alcoholic beverages (not even beer), they may dangerously increase the risk of side effects.    DO NOT--Make any important legal or business decisions or sign important documents.  We advise you to have someone to assist you at home. Move slowly and carefully. Do not make sudden changes in position. Be aware of dizziness or light-headedness and move accordingly.   If you seek medical treatment within 24 hours, let the nurse or doctor  caring for you know that you have received the above medications. If you have any questions or concerns related to your sedation or treatment today please contact us.

## 2023-12-19 NOTE — OP NOTE
"PROCEDURE: right Sacroiliac Joint Injection    Patient Name: Joanne Peña  MRN: [id number]    PROCEDURE DATE: 12/19/2023    Injection # 1 this year    DIAGNOSIS: Sacroiliitis (M46.1)  CPT CODE: 95532    POSTPROCEDURE DIAGNOSIS Same    PHYSICIAN: Son Lara DO  NEEDLE TYPE: - 22G 3.5" Spinal Needle  MEDICATIONS INJECTED: 5ml 0.25% Bupivacaine + 20mg Kenalog (40mg/ml) at each site  LOCAL ANESTHETIC USED: Lidocaine 1%, 3-4ml at each level  CONTRAST: 1-2ml Omni 300    Sedation Medications - Oral Sedation     Estimated Blood Loss - <2ml  Drains: None  Specimens Removed: None  Urine Output - Not Measured  Complications: None  Outcome: good    Informed Consent:  The patient's condition and proposed procedures, risks, and alternatives were discussed with the patient or responsible party.  The patient's / responsible party's questions were answered.   The patient / responsible party appeared to understand and chose to proceed.  Informed consent was obtained.    Procedure in Detail:  The patient was taken back to the OR fluoroscopy suite and placed in a prone position. The skin overlying the injection site was prepped and draped in an aseptic fashion. The target injection site (see above) was identified with fluoroscopy.     Procedural Pause:  A procedural pause verifying correct patient, medical record number, allergies, medications to be administered, current vital signs, and surgical site was performed immediately prior to beginning the procedure.    The skin and subcutaneous tissue overlying the target site of injection was anesthetized using 2-3 ml of 1% lidocaine MPF with a 25-gauge, 1½ -inch needle.  The above noted spinal needle was directed into the inferior aspect of the above noted sacroiliac joint using a posterior approach.  A "giving way" at the needle hub was noted once the dorsal sacroiliac and interosseous ligaments were engaged.  After negative aspiration for heme, the above noted contrast " was injected, outlining the coin-shaped inferior recess of the joint.  Provocation response consisting of intense buttock pain was positive.  After negative aspiration for heme, the above noted solution was slowly injected.  The needle was then retracted approximately jail and the needle track was flushed with 0.5 mL of lidocaine 1%.  The needle(s) was then removed. A sterile bandage was placed over the injection site.     The same procedural technique outlined above was not repeated on the OPPOSITE side.    The heart rate, pulse oximetry, and blood pressure were continuously monitored throughout the procedure.  The procedure was well tolerated. She was carefully escorted to the recovery room in stable condition. Patient was monitored by RN for recovery period.  The patient will be contacted in the next few days to determine extent of relief.  Patient was given post procedure and discharge instructions to follow at home.  The patient was discharged in a stable condition.    Note Electronically Signed By:  Son Cartagena  12/19/2023

## 2023-12-19 NOTE — PLAN OF CARE
PT VS stable in recovery. Discharge instructions reviewed with patient and verbalized understanding. Questions encouraged and answered.

## 2023-12-28 ENCOUNTER — PATIENT OUTREACH (OUTPATIENT)
Dept: ADMINISTRATIVE | Facility: HOSPITAL | Age: 73
End: 2023-12-28
Payer: MEDICARE

## 2023-12-28 RX ORDER — INFLUENZA A VIRUS A/VICTORIA/4897/2022 IVR-238 (H1N1) ANTIGEN (FORMALDEHYDE INACTIVATED), INFLUENZA A VIRUS A/DARWIN/6/2021 IVR-227 (H3N2) ANTIGEN (FORMALDEHYDE INACTIVATED), INFLUENZA B VIRUS B/AUSTRIA/1359417/2021 BVR-26 ANTIGEN (FORMALDEHYDE INACTIVATED), INFLUENZA B VIRUS B/PHUKET/3073/2013 BVR-1B ANTIGEN (FORMALDEHYDE INACTIVATED) 15; 15; 15; 15 UG/.5ML; UG/.5ML; UG/.5ML; UG/.5ML
INJECTION, SUSPENSION INTRAMUSCULAR
COMMUNITY
Start: 2023-09-19

## 2024-01-08 ENCOUNTER — NURSE TRIAGE (OUTPATIENT)
Dept: ADMINISTRATIVE | Facility: CLINIC | Age: 74
End: 2024-01-08
Payer: MEDICARE

## 2024-01-08 DIAGNOSIS — I10 ESSENTIAL HYPERTENSION: ICD-10-CM

## 2024-01-08 DIAGNOSIS — Z86.73 HISTORY OF CVA (CEREBROVASCULAR ACCIDENT): ICD-10-CM

## 2024-01-08 RX ORDER — AMLODIPINE BESYLATE 10 MG/1
TABLET ORAL
Qty: 90 TABLET | Refills: 3 | Status: SHIPPED | OUTPATIENT
Start: 2024-01-08

## 2024-01-08 NOTE — TELEPHONE ENCOUNTER
No care due was identified.  [facility] Embedded Care Due Messages. Reference number: [id number].   1/08/2024 12:13:20 AM CST

## 2024-01-08 NOTE — TELEPHONE ENCOUNTER
Pt states that she had a fall on Saturday from standing position. Now c/o tailbone pain 10/10. Pt states that she can barely walk. Advised to go to ED now and to call 911 if immediate transportation isn't available. VU. Encounter route to provider.     Reason for Disposition   Can't walk or very difficult to walk    Additional Information   Negative: Dangerous mechanism of injury (e.g., fall > 10 feet or 3 meters, trampoline)(Exception: Pain began > 1 hour after injury.)   Negative: Sounds like a life-threatening emergency to the triager    Protocols used: Tailbone Injury-A-AH

## 2024-01-09 ENCOUNTER — OFFICE VISIT (OUTPATIENT)
Dept: FAMILY MEDICINE | Facility: CLINIC | Age: 74
End: 2024-01-09
Payer: MEDICARE

## 2024-01-09 ENCOUNTER — HOSPITAL ENCOUNTER (OUTPATIENT)
Dept: RADIOLOGY | Facility: HOSPITAL | Age: 74
Discharge: HOME | End: 2024-01-09
Payer: MEDICARE

## 2024-01-09 VITALS
WEIGHT: 183.75 LBS | DIASTOLIC BLOOD PRESSURE: 80 MMHG | HEIGHT: 67 IN | OXYGEN SATURATION: 93 % | SYSTOLIC BLOOD PRESSURE: 128 MMHG | HEART RATE: 91 BPM | TEMPERATURE: 98 F | BODY MASS INDEX: 28.84 KG/M2

## 2024-01-09 DIAGNOSIS — S39.92XA INJURY OF COCCYX, INITIAL ENCOUNTER: Primary | ICD-10-CM
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
## 2024-01-09 DIAGNOSIS — I10 ESSENTIAL HYPERTENSION: ICD-10-CM

## 2024-01-09 DIAGNOSIS — S39.92XA INJURY OF COCCYX, INITIAL ENCOUNTER: ICD-10-CM

## 2024-01-09 PROCEDURE — 72220 X-RAY EXAM SACRUM TAILBONE: CPT | Mod: TC,FY,PO

## 2024-01-09 PROCEDURE — 3079F DIAST BP 80-89 MM HG: CPT | Mod: CPTII,S$GLB,,

## 2024-01-09 PROCEDURE — 1159F MED LIST DOCD IN RCRD: CPT | Mod: CPTII,S$GLB,,

## 2024-01-09 PROCEDURE — 3074F SYST BP LT 130 MM HG: CPT | Mod: CPTII,S$GLB,,

## 2024-01-09 PROCEDURE — 99214 OFFICE O/P EST MOD 30 MIN: CPT | Mod: S$GLB,,,

## 2024-01-09 PROCEDURE — 1100F PTFALLS ASSESS-DOCD GE2>/YR: CPT | Mod: CPTII,S$GLB,,

## 2024-01-09 PROCEDURE — 99999 PR PBB SHADOW E&M-EST. PATIENT-LVL V: CPT | Mod: PBBFAC,,,

## 2024-01-09 PROCEDURE — 72220 X-RAY EXAM SACRUM TAILBONE: CPT | Mod: 26,,, | Performed by: RADIOLOGY

## 2024-01-09 PROCEDURE — 3008F BODY MASS INDEX DOCD: CPT | Mod: CPTII,S$GLB,,

## 2024-01-09 PROCEDURE — 3288F FALL RISK ASSESSMENT DOCD: CPT | Mod: CPTII,S$GLB,,

## 2024-01-09 RX ORDER — HYDROCODONE BITARTRATE AND ACETAMINOPHEN 5; 325 MG/1; MG/1
1 TABLET ORAL EVERY 6 HOURS PRN
Qty: 20 TABLET | Refills: 0 | Status: SHIPPED | OUTPATIENT
Start: 2024-01-09

## 2024-01-09 NOTE — PROGRESS NOTES
HPI     Chief Complaint:  Chief Complaint   Patient presents with    Tailbone Pain       Joanne Peña is a 73 y.o. female with multiple medical diagnoses as listed in the medical history and problem list that presents for tailbone injury.  Pt is not known to me with her last appointment 12/11/2023.      HPI  Pt presents for fall on Saturday with injury to tailbone. While looking in pantry at home, fell down on bottom and hit back on leg of table. Unsure if LOC. Did not hit head. Last fall in August, tripped over something.Tailbone pain is 10/10 with movement. Tried Robaxin with no relief.       Assessment & Plan     Problem List Items Addressed This Visit    None  Visit Diagnoses       Injury of coccyx, initial encounter    -  Primary  Fell down while looking in pantry at home, fell down on bottom and hit back on leg of table. Unsure if LOC. Did not hit head. Last fall in August, tripped over something.Tailbone pain is 10/10 with movement. Tried Robaxin with no relief.  We will order x-ray of the sacrum and coccyx.  We will refer to orthopedics.  We will order Atalissa for acute pain related to fall.    Relevant Medications    HYDROcodone-acetaminophen (NORCO) 5-325 mg per tablet    Other Relevant Orders    X-Ray Sacrum And Coccyx (Completed)    Ambulatory referral/consult to Orthopedics    Fall, initial encounter      While looking in pantry at home, fell down on bottom and hit back on leg of table. Unsure if LOC. Did not hit head. Last fall in August, tripped over something.Tailbone pain is 10/10 with movement. Tried Robaxin with no relief.     Essential hypertension      BP in clinic today 128/80. The current medical regimen is effective;  continue present plan and medications.                  --------------------------------------------      Health Maintenance:  Health Maintenance         Date Due Completion Date    COVID-19 Vaccine (1) Never done ---    Pneumococcal Vaccines (Age 65+) (1 - PCV) Never done  "---    TETANUS VACCINE Never done ---    Aspirin/Antiplatelet Therapy Never done ---    Shingles Vaccine (1 of 2) Never done ---    RSV Vaccine (Age 60+ and Pregnant patients) (1 - 1-dose 60+ series) Never done ---    Influenza Vaccine (1) Never done ---    Hemoglobin A1c 02/06/2024 11/6/2023    Mammogram 03/13/2024 3/13/2023    Foot Exam 07/14/2024 7/14/2023    Eye Exam 10/16/2024 10/16/2023    Diabetes Urine Screening 11/06/2024 11/6/2023    Lipid Panel 11/06/2024 11/6/2023    High Dose Statin 01/09/2025 1/9/2024    Colorectal Cancer Screening 07/12/2026 7/12/2023    DEXA Scan 09/25/2026 9/25/2023            Health maintenance reviewed    Follow Up:  No follow-ups on file.    Exam     Review of Systems:  (as noted above)  Review of Systems   Constitutional:  Negative for fever.   HENT:  Negative for trouble swallowing.    Eyes:  Negative for visual disturbance.   Respiratory:  Negative for chest tightness and shortness of breath.    Cardiovascular:  Negative for chest pain.   Gastrointestinal:  Negative for blood in stool.   Musculoskeletal:  Positive for arthralgias, back pain and gait problem.       Physical Exam:   Physical Exam  Constitutional:       General: She is not in acute distress.     Appearance: She is ill-appearing. She is not diaphoretic.   HENT:      Head: Normocephalic and atraumatic.   Cardiovascular:      Rate and Rhythm: Normal rate and regular rhythm.      Heart sounds: No murmur heard.     No friction rub. No gallop.   Pulmonary:      Effort: No respiratory distress.   Chest:      Chest wall: No tenderness.   Musculoskeletal:      Cervical back: No rigidity.      Comments: Palpable tenderness to coccyx   Neurological:      Mental Status: She is alert and oriented to person, place, and time.      Gait: Gait abnormal.       Vitals:    01/09/24 1442   BP: 128/80   Pulse: 91   Temp: 98 °F (36.7 °C)   TempSrc: Oral   SpO2: (!) 93%   Weight: 83.3 kg (183 lb 12.1 oz)   Height: 5' 7" (1.702 m)    "   Body mass index is 28.78 kg/m².        History     Past Medical History:  Past Medical History:   Diagnosis Date    Diabetes mellitus     Hyperlipidemia     Hypertension     Stroke        Past Surgical History:  Past Surgical History:   Procedure Laterality Date    COLONOSCOPY N/A 7/12/2023    Procedure: COLONOSCOPY;  Surgeon: Ray Sorensen MD;  Location: [facility] ENDO;  Service: Endoscopy;  Laterality: N/A;  instr via portal  - PC  4/10/23 Daniel, instr via mail & portal, unable to tolerate Golytely- request Miralax/Gatorade - PC  5/30-pt r/s-new instr portal-tb    INJECTION OF ANESTHETIC AGENT AROUND MEDIAL BRANCH NERVES INNERVATING LUMBAR FACET JOINT Bilateral 4/20/2023    Procedure: MBB #1 (B/L) L3,4,5;  Surgeon: Son Lara DO;  Location: [facility] OR;  Service: Pain Management;  Laterality: Bilateral;  ORAL XANAX    INJECTION OF ANESTHETIC AGENT AROUND MEDIAL BRANCH NERVES INNERVATING LUMBAR FACET JOINT Bilateral 5/5/2023    Procedure: MBB #2 (B/L) L3,4,5;  Surgeon: Son Lara DO;  Location: [facility] OR;  Service: Pain Management;  Laterality: Bilateral;  Oral Xanax    INJECTION OF JOINT Right 5/20/2022    Procedure: Right SI joint injection;  Surgeon: Son Lara DO;  Location: [facility] OR;  Service: Pain Management;  Laterality: Right;    INJECTION, SACROILIAC JOINT Right 12/19/2023    Procedure: RT SI joint Inj;  Surgeon: Son Lara DO;  Location: [facility] PAIN MANAGEMENT;  Service: Pain Management;  Laterality: Right;  oral    RADIOFREQUENCY ABLATION OF LUMBAR MEDIAL BRANCH NERVE AT SINGLE LEVEL Bilateral 5/19/2023    Procedure: RFA (B/L) L3,4,5;  Surgeon: Son Lara DO;  Location: [facility] PAIN MANAGEMENT;  Service: Pain Management;  Laterality: Bilateral;       Social History:  Social History     Socioeconomic History    Marital status:    Tobacco Use    Smoking status: Former     Current packs/day: 0.00     Types: Cigarettes     Quit date: 2/6/2022     Years  since quittin.9     Passive exposure: Past    Smokeless tobacco: Never   Substance and Sexual Activity    Alcohol use: Yes     Comment: rare    Drug use: No    Sexual activity: Yes     Partners: Male       Family History:  Family History   Problem Relation Age of Onset    Kidney disease Mother     Heart disease Mother     Cancer Father     Hypertension Brother     Hypertension Daughter     Cancer Maternal Aunt        Allergies and Medications: (updated and reviewed)  Review of patient's allergies indicates:   Allergen Reactions    Lisinopril-hydrochlorothiazide Other (See Comments)     Pt stated it makes her feel drained. She couldn't raise arms over head.    Ampicillin Rash    Darvocet a500 [propoxyphene n-acetaminophen] Other (See Comments)     shaky     Current Outpatient Medications   Medication Sig Dispense Refill    ACCU-CHEK GUIDE GLUCOSE METER Misc TO CHECK BLOOD GLUCOSE DAILY, TO USE WITH INSURANCE PREFERRED METER      amLODIPine (NORVASC) 10 MG tablet TAKE 1 TABLET BY MOUTH ONCE DAILY. HOLD IF SBP <120 90 tablet 3    ascorbic acid, vitamin C, (VITAMIN C) 500 MG tablet Take 500 mg by mouth once daily.      atorvastatin (LIPITOR) 80 MG tablet TAKE 1 TABLET (80 MG TOTAL) BY MOUTH ONCE DAILY. 90 tablet 3    BLOOD PRESSURE CUFF Misc 1 Units by Misc.(Non-Drug; Combo Route) route once daily. 1 each 0    blood sugar diagnostic Strp To check BG daily, to use with insurance preferred meter 200 each 11    blood-glucose meter kit To check BG daily, to use with insurance preferred meter 1 each 0    chlorthalidone (HYGROTEN) 25 MG Tab TAKE 1 TABLET BY MOUTH EVERY DAY 90 tablet 3    cyclobenzaprine (FLEXERIL) 10 MG tablet Take 1 tablet (10 mg total) by mouth 2 (two) times daily as needed for Muscle spasms (back pain). 180 tablet 1    FLUAD QUAD -,65Y UP,,PF, 60 mcg (15 mcg x 4)/0.5 mL Syrg       GAVILYTE-C 240-22.72-6.72 -5.84 gram SolR TAKE 4,000 MLS BY MOUTH ONCE. FOR 1 DOSE      guanfacine HCl (GUANFACINE  ORAL) Take by mouth.      lancets Misc To check BG daily, to use with insurance preferred meter 200 each 11    losartan (COZAAR) 100 MG tablet TAKE 1 TABLET (100 MG TOTAL) BY MOUTH ONCE DAILY. HOLD IF SBP <120 90 tablet 3    multivitamin (THERAGRAN) per tablet Take 1 tablet by mouth once daily.      ondansetron (ZOFRAN-ODT) 4 MG TbDL Dissolve 1 tablet (4 mg total) by mouth every 8 (eight) hours as needed. 20 tablet 0    tirzepatide (MOUNJARO) 5 mg/0.5 mL PnIj Inject 5 mg into the skin every 7 days. 4 pen 11    aspirin (ECOTRIN) 81 MG EC tablet Take 1 tablet (81 mg total) by mouth once daily. 30 tablet 3    hydrALAZINE (APRESOLINE) 50 MG tablet Take 1 tablet (50 mg total) by mouth every 8 (eight) hours. Hold is SBP <120 (Patient taking differently: Take 50 mg by mouth every 8 (eight) hours. Hold is SBP <120    Pt is taking once a day) 90 tablet 1    HYDROcodone-acetaminophen (NORCO) 5-325 mg per tablet Take 1 tablet by mouth every 6 (six) hours as needed for Pain. 20 tablet 0    LORazepam (ATIVAN) 1 MG tablet Take 1 tablet (1 mg total) by mouth once as needed for Anxiety (before procedure). 1 tablet 0    meclizine (ANTIVERT) 25 mg tablet TAKE 1 TABLET (25 MG TOTAL) BY MOUTH 2 (TWO) TIMES DAILY AS NEEDED FOR DIZZINESS. 60 tablet 0    pregabalin (LYRICA) 25 MG capsule TAKE 2 CAPSULES (50 MG TOTAL) BY MOUTH 2 (TWO) TIMES DAILY. START WITH 1 PILL AT NIGHT, AND THEN INCREASE IF YOU CAN TOLERATE  capsule 1     No current facility-administered medications for this visit.     Facility-Administered Medications Ordered in Other Visits   Medication Dose Route Frequency Provider Last Rate Last Admin    0.9%  NaCl infusion   Intravenous Continuous Ronald Young MD        LIDOcaine (PF) 10 mg/ml (1%) injection 10 mg  1 mL Intradermal Once Ronald Young MD           Patient Care Team:  Teri Soto DO as PCP - General (Family Medicine)  Tracie Kessler LPN as Care Coordinator  Lb Tracy OD  (Optometry)         - The patient is given an After Visit Summary that lists all medications with directions, allergies, education, orders placed during this encounter and follow-up instructions.      - I have reviewed the patient's medical information including past medical, family, and social history sections including the medications and allergies.      - We discussed the patient's current medications.     This note was created by combination of typed  and MModal dictation.  Transcription errors may be present.  If there are any questions, please contact me.       Glenna Cross NP

## 2024-01-09 NOTE — TELEPHONE ENCOUNTER
Refill Decision Note   Joanne Mariana  is requesting a refill authorization.  Brief Assessment and Rationale for Refill:  Approve     Medication Therapy Plan:         Comments:     Note composed:6:22 PM 01/08/2024

## 2024-01-10 ENCOUNTER — TELEPHONE (OUTPATIENT)
Dept: FAMILY MEDICINE | Facility: CLINIC | Age: 74
End: 2024-01-10
Payer: MEDICARE

## 2024-01-10 NOTE — TELEPHONE ENCOUNTER
----- Message from Glenna Cross NP sent at 1/10/2024  2:12 PM CST -----  Please call patient as x-ray shows possible fracture to coccyx. We will need to do a CT and she needs to follow up with ortho as soon as possible.  ----- Message -----  From: Interface, Rad Results In  Sent: 1/9/2024   4:19 PM CST  To: Glenna Cross NP

## 2024-01-19 ENCOUNTER — TELEPHONE (OUTPATIENT)
Dept: FAMILY MEDICINE | Facility: CLINIC | Age: 74
End: 2024-01-19
Payer: MEDICARE

## 2024-01-19 ENCOUNTER — HOSPITAL ENCOUNTER (OUTPATIENT)
Dept: RADIOLOGY | Facility: HOSPITAL | Age: 74
Discharge: HOME | End: 2024-01-19
Payer: MEDICARE

## 2024-01-19 DIAGNOSIS — S39.92XA INJURY OF COCCYX, INITIAL ENCOUNTER: ICD-10-CM

## 2024-01-19 PROCEDURE — 72192 CT PELVIS W/O DYE: CPT | Mod: TC

## 2024-01-19 PROCEDURE — 72192 CT PELVIS W/O DYE: CPT | Mod: 26,,, | Performed by: RADIOLOGY

## 2024-01-19 NOTE — TELEPHONE ENCOUNTER
Discussed CT result with patient. Continues to take pain medication. Has an appt scheduled with orthopedics.

## 2024-01-26 ENCOUNTER — TELEPHONE (OUTPATIENT)
Dept: PAIN MEDICINE | Facility: CLINIC | Age: 74
End: 2024-01-26
Payer: MEDICARE

## 2024-01-29 ENCOUNTER — TELEPHONE (OUTPATIENT)
Dept: PAIN MEDICINE | Facility: CLINIC | Age: 74
End: 2024-01-29
Payer: MEDICARE

## 2024-01-29 NOTE — TELEPHONE ENCOUNTER
Called patient to inform her, dr don will be in sx 2/14 PM. Offered  1145 slot the same day. She states she will call her daughter and get back to us. Appointment moved to 1145 spot to hold it.

## 2024-02-05 DIAGNOSIS — M54.50 LOW BACK PAIN, UNSPECIFIED BACK PAIN LATERALITY, UNSPECIFIED CHRONICITY, UNSPECIFIED WHETHER SCIATICA PRESENT: Primary | ICD-10-CM

## 2024-02-08 ENCOUNTER — HOSPITAL ENCOUNTER (OUTPATIENT)
Dept: RADIOLOGY | Facility: HOSPITAL | Age: 74
Discharge: HOME | End: 2024-02-08
Attending: ORTHOPAEDIC SURGERY
Payer: MEDICARE

## 2024-02-08 ENCOUNTER — OFFICE VISIT (OUTPATIENT)
Dept: ORTHOPEDICS | Facility: CLINIC | Age: 74
End: 2024-02-08
Payer: MEDICARE

## 2024-02-08 VITALS — WEIGHT: 179.38 LBS | BODY MASS INDEX: 28.16 KG/M2 | HEIGHT: 67 IN

## 2024-02-08 DIAGNOSIS — M54.50 LOW BACK PAIN, UNSPECIFIED BACK PAIN LATERALITY, UNSPECIFIED CHRONICITY, UNSPECIFIED WHETHER SCIATICA PRESENT: ICD-10-CM

## 2024-02-08 DIAGNOSIS — S39.92XA INJURY OF COCCYX, INITIAL ENCOUNTER: Primary | ICD-10-CM

## 2024-02-08 PROCEDURE — 1125F AMNT PAIN NOTED PAIN PRSNT: CPT | Mod: CPTII,S$GLB,, | Performed by: ORTHOPAEDIC SURGERY

## 2024-02-08 PROCEDURE — 99214 OFFICE O/P EST MOD 30 MIN: CPT | Mod: GC,S$GLB,, | Performed by: ORTHOPAEDIC SURGERY

## 2024-02-08 PROCEDURE — 72110 X-RAY EXAM L-2 SPINE 4/>VWS: CPT | Mod: 26,,, | Performed by: RADIOLOGY

## 2024-02-08 PROCEDURE — 72110 X-RAY EXAM L-2 SPINE 4/>VWS: CPT | Mod: TC

## 2024-02-08 PROCEDURE — 3008F BODY MASS INDEX DOCD: CPT | Mod: CPTII,S$GLB,, | Performed by: ORTHOPAEDIC SURGERY

## 2024-02-08 PROCEDURE — 99999 PR PBB SHADOW E&M-EST. PATIENT-LVL IV: CPT | Mod: PBBFAC,,, | Performed by: ORTHOPAEDIC SURGERY

## 2024-02-08 PROCEDURE — 1159F MED LIST DOCD IN RCRD: CPT | Mod: CPTII,S$GLB,, | Performed by: ORTHOPAEDIC SURGERY

## 2024-02-08 PROCEDURE — 1160F RVW MEDS BY RX/DR IN RCRD: CPT | Mod: CPTII,S$GLB,, | Performed by: ORTHOPAEDIC SURGERY

## 2024-02-08 RX ORDER — GLIMEPIRIDE 2 MG/1
2 TABLET ORAL 2 TIMES DAILY
COMMUNITY
Start: 2024-02-02 | End: 2024-02-15

## 2024-02-08 NOTE — PROGRESS NOTES
DATE: 2/8/2024  PATIENT: Joanne Peña    Attending Physician: Jj Hickey M.D.    CHIEF COMPLAINT: coccyx fracture     HISTORY:  Joanne Peña is a 73 y.o. female who presents for re-evaluation of her recently diagnosed coccyx fracture, which she sustained around 1 month ago.  Patient reportedly had a ground level fall secondary to possible syncopal event.  She was diagnosed with a coccyx fracture and provided a donut pillow in addition to Norco and Flexeril.  States since that time, her pain has been well-controlled and rates her pain today around a 7/10.  She denies any numbness/tingling or subjective weakness.      The Patient denies myelopathic symptoms such as handwriting changes or difficulty with buttons/coins/keys. Denies perineal paresthesias, bowel/bladder dysfunction.    The patient does not smoke, or endorse IVDU. She has DM (HA1C of 9.6; takes Mounjaro). The patient is not on any blood thinners and does not take chronic narcotics. She is a retired nurse.    PAST MEDICAL/SURGICAL HISTORY:  Past Medical History:   Diagnosis Date    Diabetes mellitus     Hyperlipidemia     Hypertension     Stroke      Past Surgical History:   Procedure Laterality Date    COLONOSCOPY N/A 7/12/2023    Procedure: COLONOSCOPY;  Surgeon: Ray Sorensen MD;  Location: [facility] ENDO;  Service: Endoscopy;  Laterality: N/A;  instr via portal  - PC  4/10/23 Daniel, instr via mail & portal, unable to tolerate Golytely- request Miralax/Gatorade - PC  5/30-pt r/s-new instr portal-tb    INJECTION OF ANESTHETIC AGENT AROUND MEDIAL BRANCH NERVES INNERVATING LUMBAR FACET JOINT Bilateral 4/20/2023    Procedure: MBB #1 (B/L) L3,4,5;  Surgeon: Son Lara DO;  Location: [facility] OR;  Service: Pain Management;  Laterality: Bilateral;  ORAL XANAX    INJECTION OF ANESTHETIC AGENT AROUND MEDIAL BRANCH NERVES INNERVATING LUMBAR FACET JOINT Bilateral 5/5/2023    Procedure: MBB #2 (B/L) L3,4,5;  Surgeon: Son Lara DO;   Location: [facility] OR;  Service: Pain Management;  Laterality: Bilateral;  Oral Xanax    INJECTION OF JOINT Right 5/20/2022    Procedure: Right SI joint injection;  Surgeon: Son Lara DO;  Location: [facility] OR;  Service: Pain Management;  Laterality: Right;    INJECTION, SACROILIAC JOINT Right 12/19/2023    Procedure: RT SI joint Inj;  Surgeon: Son Lara DO;  Location: [facility] PAIN MANAGEMENT;  Service: Pain Management;  Laterality: Right;  oral    RADIOFREQUENCY ABLATION OF LUMBAR MEDIAL BRANCH NERVE AT SINGLE LEVEL Bilateral 5/19/2023    Procedure: RFA (B/L) L3,4,5;  Surgeon: Son Lara DO;  Location: [facility] PAIN [facility]EMENT;  Service: Pain Management;  Laterality: Bilateral;       Current Medications:   Current Outpatient Medications:     ACCU-CHEK GUIDE GLUCOSE METER Misc, TO CHECK BLOOD GLUCOSE DAILY, TO USE WITH INSURANCE PREFERRED METER, Disp: , Rfl:     amLODIPine (NORVASC) 10 MG tablet, TAKE 1 TABLET BY MOUTH ONCE DAILY. HOLD IF SBP <120, Disp: 90 tablet, Rfl: 3    ascorbic acid, vitamin C, (VITAMIN C) 500 MG tablet, Take 500 mg by mouth once daily., Disp: , Rfl:     atorvastatin (LIPITOR) 80 MG tablet, TAKE 1 TABLET (80 MG TOTAL) BY MOUTH ONCE DAILY., Disp: 90 tablet, Rfl: 3    BLOOD PRESSURE CUFF Misc, 1 Units by Misc.(Non-Drug; Combo Route) route once daily., Disp: 1 each, Rfl: 0    blood sugar diagnostic Strp, To check BG daily, to use with insurance preferred meter, Disp: 200 each, Rfl: 11    blood-glucose meter kit, To check BG daily, to use with insurance preferred meter, Disp: 1 each, Rfl: 0    chlorthalidone (HYGROTEN) 25 MG Tab, TAKE 1 TABLET BY MOUTH EVERY DAY, Disp: 90 tablet, Rfl: 3    cyclobenzaprine (FLEXERIL) 10 MG tablet, Take 1 tablet (10 mg total) by mouth 2 (two) times daily as needed for Muscle spasms (back pain)., Disp: 180 tablet, Rfl: 1    FLUAD QUAD 2023-24,65Y UP,,PF, 60 mcg (15 mcg x 4)/0.5 mL Syrg, , Disp: , Rfl:     GAVILYTE-C 240-22.72-6.72 -5.84 gram  SolR, TAKE 4,000 MLS BY MOUTH ONCE. FOR 1 DOSE, Disp: , Rfl:     glimepiride (AMARYL) 2 MG tablet, Take 2 mg by mouth 2 (two) times daily., Disp: , Rfl:     guanfacine HCl (GUANFACINE ORAL), Take by mouth., Disp: , Rfl:     HYDROcodone-acetaminophen (NORCO) 5-325 mg per tablet, Take 1 tablet by mouth every 6 (six) hours as needed for Pain., Disp: 20 tablet, Rfl: 0    lancets Misc, To check BG daily, to use with insurance preferred meter, Disp: 200 each, Rfl: 11    losartan (COZAAR) 100 MG tablet, TAKE 1 TABLET (100 MG TOTAL) BY MOUTH ONCE DAILY. HOLD IF SBP <120, Disp: 90 tablet, Rfl: 3    multivitamin (THERAGRAN) per tablet, Take 1 tablet by mouth once daily., Disp: , Rfl:     ondansetron (ZOFRAN-ODT) 4 MG TbDL, Dissolve 1 tablet (4 mg total) by mouth every 8 (eight) hours as needed., Disp: 20 tablet, Rfl: 0    tirzepatide (MOUNJARO) 5 mg/0.5 mL PnIj, Inject 5 mg into the skin every 7 days., Disp: 4 pen , Rfl: 11    aspirin (ECOTRIN) 81 MG EC tablet, Take 1 tablet (81 mg total) by mouth once daily., Disp: 30 tablet, Rfl: 3    hydrALAZINE (APRESOLINE) 50 MG tablet, Take 1 tablet (50 mg total) by mouth every 8 (eight) hours. Hold is SBP <120 (Patient taking differently: Take 50 mg by mouth every 8 (eight) hours. Hold is SBP <120  Pt is taking once a day), Disp: 90 tablet, Rfl: 1    LORazepam (ATIVAN) 1 MG tablet, Take 1 tablet (1 mg total) by mouth once as needed for Anxiety (before procedure)., Disp: 1 tablet, Rfl: 0    meclizine (ANTIVERT) 25 mg tablet, TAKE 1 TABLET (25 MG TOTAL) BY MOUTH 2 (TWO) TIMES DAILY AS NEEDED FOR DIZZINESS., Disp: 60 tablet, Rfl: 0    pregabalin (LYRICA) 25 MG capsule, TAKE 2 CAPSULES (50 MG TOTAL) BY MOUTH 2 (TWO) TIMES DAILY. START WITH 1 PILL AT NIGHT, AND THEN INCREASE IF YOU CAN TOLERATE IT, Disp: 120 capsule, Rfl: 1  No current facility-administered medications for this visit.    Facility-Administered Medications Ordered in Other Visits:     0.9%  NaCl infusion, , Intravenous,  "Continuous, Ronald Young MD    LIDOcaine (PF) 10 mg/ml (1%) injection 10 mg, 1 mL, Intradermal, Once, Ronald Young MD    Social History:   Social History     Socioeconomic History    Marital status:    Tobacco Use    Smoking status: Former     Current packs/day: 0.00     Types: Cigarettes     Quit date: 2022     Years since quittin.0     Passive exposure: Past    Smokeless tobacco: Never   Substance and Sexual Activity    Alcohol use: Yes     Comment: rare    Drug use: No    Sexual activity: Yes     Partners: Male       REVIEW OF SYSTEMS:  Constitution: Negative. Negative for chills, fever and night sweats.   Cardiovascular: Negative for chest pain and syncope.   Respiratory: Negative for cough and shortness of breath.   Gastrointestinal: See HPI. Negative for nausea/vomiting. Negative for abdominal pain.  Genitourinary: See HPI. Negative for discoloration or dysuria.  Hematologic/Lymphatic:  Negative for bleeding/clotting disorders.   Musculoskeletal: Negative for falls and muscle weakness.   Neurological: See HPI.  No history of seizures.  No history of cranial surgery or shunts.  Neurological: See HPI. No seizures.   Endocrine: Negative for polydipsia, polyphagia and polyuria.   Allergic/Immunologic: Negative for hives and persistent infections.     EXAM:  Ht 5' 7" (1.702 m)   Wt 81.4 kg (179 lb 5.5 oz)   BMI 28.09 kg/m²     PHYSICAL EXAMINATION:    General: The patient is a well appearing 73 y.o. female in no apparent distress, the patient is orientatied to person, place and time.  Psych: Normal mood and affect  HEENT: Vision grossly intact, hearing intact to the spoken word.  Lungs: Respirations unlabored.  Gait: Normal station and gait, no difficulty with toe or heel walk.   Skin: Dorsal lumbar skin negative for rashes, lesions, hairy patches and surgical scars. There is no lumbar tenderness to palpation.  Range of motion: Lumbar range of motion is acceptable.  Spinal Balance: Global " saggital and coronal spinal balance acceptable, no significant for scoliosis and kyphosis.  Musculoskeletal: No pain with the range of motion of the bilateral hips. No trochanteric tenderness to palpation.  Vascular: Bilateral lower extremities warm and well perfused, Dorsalis pedis pulses 2+ bilaterally.  Neurological: Normal strength and tone in all major motor groups in the bilateral lower extremities. Normal sensation to light touch in the L2-S1 dermatomes bilaterally.  Deep tendon reflexes symmetric in the bilateral lower extremities.  Negative Babinski bilaterally. Straight leg raise negative bilaterally.    IMAGING:   Today I independently reviewed the following images and my interpretations are as follows:    AP, Lat and Flex/Ex  upright L-spine demonstrate a nondisplaced fracture of the coccyx, lumbar spondylosis, and va low-grade degenerative spondylolisthesis at L4-5.    DEXA in 2023 showed DR T-score of 0.9.    Body mass index is 28.09 kg/m².  Hemoglobin A1C   Date Value Ref Range Status   11/06/2023 9.6 (H) 4.0 - 5.6 % Final     Comment:     ADA Screening Guidelines:  5.7-6.4%  Consistent with prediabetes  >or=6.5%  Consistent with diabetes    High levels of fetal hemoglobin interfere with the HbA1C  assay. Heterozygous hemoglobin variants (HbS, HgC, etc)do  not significantly interfere with this assay.   However, presence of multiple variants may affect accuracy.     07/19/2023 10.4 (H) 4.0 - 5.6 % Final     Comment:     ADA Screening Guidelines:  5.7-6.4%  Consistent with prediabetes  >or=6.5%  Consistent with diabetes    High levels of fetal hemoglobin interfere with the HbA1C  assay. Heterozygous hemoglobin variants (HbS, HgC, etc)do  not significantly interfere with this assay.   However, presence of multiple variants may affect accuracy.     11/23/2022 7.2 (H) 4.0 - 5.6 % Final     Comment:     ADA Screening Guidelines:  5.7-6.4%  Consistent with prediabetes  >or=6.5%  Consistent with  diabetes    High levels of fetal hemoglobin interfere with the HbA1C  assay. Heterozygous hemoglobin variants (HbS, HgC, etc)do  not significantly interfere with this assay.   However, presence of multiple variants may affect accuracy.         ASSESSMENT/PLAN:    Joanne was seen today for low-back pain and back pain.    Diagnoses and all orders for this visit:    Injury of coccyx, initial encounter  -     Ambulatory referral/consult to Orthopedics      Follow up if symptoms worsen or fail to improve.    Patient has a nondisplaced fracture of her coccyx. I discussed the natural history of their diagnoses as well as surgical and nonsurgical treatment options. I educated the patient on the importance of core/back strengthening, correct posture, bending/lifting ergonomics, and low-impact aerobic exercises (walking, elliptical, and aquatherapy). Continue medications previously prescribed. No surgery indicated. Patient will follow up in clinic as needed.     I have personally examined the patient and agree with the above plan.    Jj Hickey MD  Orthopaedic Spine Surgeon  Department of Orthopaedic Surgery  [phone]

## 2024-02-09 ENCOUNTER — PATIENT MESSAGE (OUTPATIENT)
Dept: FAMILY MEDICINE | Facility: CLINIC | Age: 74
End: 2024-02-09
Payer: MEDICARE

## 2024-02-12 ENCOUNTER — LAB VISIT (OUTPATIENT)
Dept: LAB | Facility: HOSPITAL | Age: 74
End: 2024-02-12
Attending: FAMILY MEDICINE
Payer: MEDICARE

## 2024-02-12 DIAGNOSIS — E11.65 UNCONTROLLED TYPE 2 DIABETES MELLITUS WITH HYPERGLYCEMIA: ICD-10-CM

## 2024-02-12 LAB
ESTIMATED AVG GLUCOSE: 151 MG/DL (ref 68–131)
HBA1C MFR BLD: 6.9 % (ref 4–5.6)

## 2024-02-12 PROCEDURE — 83036 HEMOGLOBIN GLYCOSYLATED A1C: CPT | Performed by: FAMILY MEDICINE

## 2024-02-12 PROCEDURE — 36415 COLL VENOUS BLD VENIPUNCTURE: CPT | Mod: PO | Performed by: FAMILY MEDICINE

## 2024-02-14 ENCOUNTER — OFFICE VISIT (OUTPATIENT)
Dept: PAIN MEDICINE | Facility: CLINIC | Age: 74
End: 2024-02-14
Payer: MEDICARE

## 2024-02-14 VITALS
DIASTOLIC BLOOD PRESSURE: 82 MMHG | BODY MASS INDEX: 28.16 KG/M2 | HEIGHT: 67 IN | HEART RATE: 88 BPM | WEIGHT: 179.44 LBS | SYSTOLIC BLOOD PRESSURE: 131 MMHG

## 2024-02-14 DIAGNOSIS — S32.10XA CLOSED FRACTURE OF SACRUM AND COCCYX, INITIAL ENCOUNTER: ICD-10-CM

## 2024-02-14 DIAGNOSIS — S32.2XXA CLOSED FRACTURE OF SACRUM AND COCCYX, INITIAL ENCOUNTER: ICD-10-CM

## 2024-02-14 DIAGNOSIS — M46.1 SACROILIITIS: Primary | ICD-10-CM

## 2024-02-14 PROCEDURE — 3044F HG A1C LEVEL LT 7.0%: CPT | Mod: CPTII,S$GLB,, | Performed by: STUDENT IN AN ORGANIZED HEALTH CARE EDUCATION/TRAINING PROGRAM

## 2024-02-14 PROCEDURE — 3075F SYST BP GE 130 - 139MM HG: CPT | Mod: CPTII,S$GLB,, | Performed by: STUDENT IN AN ORGANIZED HEALTH CARE EDUCATION/TRAINING PROGRAM

## 2024-02-14 PROCEDURE — 99214 OFFICE O/P EST MOD 30 MIN: CPT | Mod: S$GLB,,, | Performed by: STUDENT IN AN ORGANIZED HEALTH CARE EDUCATION/TRAINING PROGRAM

## 2024-02-14 PROCEDURE — 1159F MED LIST DOCD IN RCRD: CPT | Mod: CPTII,S$GLB,, | Performed by: STUDENT IN AN ORGANIZED HEALTH CARE EDUCATION/TRAINING PROGRAM

## 2024-02-14 PROCEDURE — 3008F BODY MASS INDEX DOCD: CPT | Mod: CPTII,S$GLB,, | Performed by: STUDENT IN AN ORGANIZED HEALTH CARE EDUCATION/TRAINING PROGRAM

## 2024-02-14 PROCEDURE — 1100F PTFALLS ASSESS-DOCD GE2>/YR: CPT | Mod: CPTII,S$GLB,, | Performed by: STUDENT IN AN ORGANIZED HEALTH CARE EDUCATION/TRAINING PROGRAM

## 2024-02-14 PROCEDURE — 99999 PR PBB SHADOW E&M-EST. PATIENT-LVL IV: CPT | Mod: PBBFAC,,, | Performed by: STUDENT IN AN ORGANIZED HEALTH CARE EDUCATION/TRAINING PROGRAM

## 2024-02-14 PROCEDURE — 3288F FALL RISK ASSESSMENT DOCD: CPT | Mod: CPTII,S$GLB,, | Performed by: STUDENT IN AN ORGANIZED HEALTH CARE EDUCATION/TRAINING PROGRAM

## 2024-02-14 PROCEDURE — 1125F AMNT PAIN NOTED PAIN PRSNT: CPT | Mod: CPTII,S$GLB,, | Performed by: STUDENT IN AN ORGANIZED HEALTH CARE EDUCATION/TRAINING PROGRAM

## 2024-02-14 PROCEDURE — 3079F DIAST BP 80-89 MM HG: CPT | Mod: CPTII,S$GLB,, | Performed by: STUDENT IN AN ORGANIZED HEALTH CARE EDUCATION/TRAINING PROGRAM

## 2024-02-14 NOTE — PROGRESS NOTES
Chronic Pain - f/u    Referring Physician: No ref. provider found    Date: 02/14/2024     Re: Joanne Peña  MR#: [id number]  YOB: 1950  Age: 73 y.o.    Chief Complaint: back pain  Chief Complaint   Patient presents with    Low-back Pain     **This note is dictated using the M*Modal Fluency Direct word recognition program. There are word recognition mistakes that are occasionally missed on review.**    ASSESSMENT: 73 y.o. year old female with right sided back/buttock pain pain, consistent with     1. Sacroiliitis        2. Closed fracture of sacrum and coccyx, initial encounter          PLAN:     Coccyx fracture  -improving on its own  -had syncopal episode. Recommended she have an appointment with cardiology to work up syncope causes    Sacroiliitis - improved  -s/p R SIJ on 5/20/22.  Her right sided buttock pain has resolved 100% at 2 months  -FAILED Norco 5mg (cognitive side effects)  -100% improvement in buttock pain from the SIJ [id number]. Now worn off.  12/19/23 - repeat right SIJ on 12/19/23 - 70-80% @ 2m    Lumbar spondylosis  -s/p RFA on 5/19/23.    -Axial back pain is still good at 6m post ablation.  -completed PT  -has Flexeril. Refill when needed  -possible neuritis following ablation. Rx medrol dose trae  -stopped Lyrica 50mg BID    DDD of lumbar spine  - MRI lumbar results discussed  -discussed importance of core strengthening, back exercises, losing weight  -okay to take tylenol    Lumbar radiculopathy  -less likely. No imaging available. If SIJ not successful, then will consider lumbar XR/MRI to further assess  -would need clearance to hold ASA    CKD 3   -last CMP showed GFR 53.   -GFR stable  -avoid nephrotoxic medications    Right hip pain  -ortho surgery does not believe that this is coming from the hip.  Will r/o SIJ and back pathology.    Prior TIA  -off plavix. Following with PCP  -continuie ASA 81    Bilateral peripheral neuropathy  -unclear etiology. Bottom of both feet.   Monitor    DM2  -new A1c = 7.3 on 6/1/22    Anemia of chronic disease / HLD   -discused her blood test results    - RTC 6 months  - Counseled patient regarding the importance of weight loss and activity modification and physical therapy.    The above plan and management options were discussed at length with patient. Patient is in agreement with the above and verbalized understanding. It will be communicated with the referring physician via electronic record, fax, or mail.  Lab/study reports reviewed were important and necessary because subsequent medical and treatment recommendations required review of the above lab/study reports. Images viewed/reviewed above were important and necessary because subsequent medical and treatment recommendations required review of the reviewed image(s).     Electronically signed by:  Son Lara DO  02/14/2024     =========================================================================================================    SUBJECTIVE:    Interval History 2/14/2024:   Joanne Peña is a 73 y.o. female presents to the clinic for follow up.  Since last visit the pain has has improved. Patient was doing well with her back pain but had a fall on 1/4/24 and fractured her coccyx.  She thinks she had a syncopal episode and fell. The coccyx pain has been improving.  Her SI pain is 70-80% improved.    The pain is located in the lower back area and does not radiate.  The pain is described as aching and throbbing    At BEST  4/10   At WORST  7/10 on the WORST day.    On average pain is rated as 4/10.   Today the pain is rated as 3/10  Symptoms interfere with daily activity.   Exacerbating factors: Sitting and Walking.    Mitigating factors medications and rest.     Current pain medications: Flexeril 10mg TID,  tylenol  Failed Pain Medications: cannot take NSAIDs because of the stroke. Tylenol. Short course Norco (side effects cognition), gabapentin, robaxin, oral steroids    Pain  procedures:  s/p  R SIJ on 5/2022 - 100% improvement of buttock pain x16 months  4/20/23 - b/l L3,4,5 MBB #1 - Oral sedation - has a  - 80%  5/5/23 - b/l L3,4,5 MBB#2 - oral sedation - 80%  5/19/23 - b/l RFA - 50% @4w in axial back pain  12/19/23 - right SIJ - oral sedation - ASA ok - morning    Interval History 11/8/2023:   Joanne Peña is a 73 y.o. female presents to the clinic for follow up.  Since last visit the pain has has slightly improved.    The pain is located in the lowe darell area and radiates to the right hip .  The pain is described as sharp and stabbing    At BEST  3/10   At WORST  8/10 on the WORST day.    On average pain is rated as 5/10.   Today the pain is rated as 6/10  Symptoms interfere with daily activity.   Exacerbating factors: Sitting, Standing, and Walking.    Mitigating factors medications.     Interval History 6/21/2023:   Joanne Peña is a 73 y.o. female presents to the clinic for follow up.  Since last visit the pain has has improved in some ways and worsened in others.  The back pain is better since the RFA on 5/19/23, but now she is having more right sided iliac crest pain. Right iliac crest pain started shortly after the ablation. Not as bad as it was previously.    The pain is located in the lower back area and radiates to the upper buttock and midback .  The pain is described as sharp and throbbing    At BEST  5/10   At WORST  8/10 on the WORST day.    On average pain is rated as 5/10.   Today the pain is rated as 7/10  Symptoms interfere with daily activity.   Exacerbating factors: Walking.    Mitigating factors medications.     Interval History 3/15/2023:   Joanne Peña is a 73 y.o. female presents to the clinic for follow up.  Since last visit the pain has has worsened.  Pain right now is 5/10. Flexeril was helping. Retired in June from the [employer] office.    The pain is located in the lower back  area and radiates to the upper buttocks .  The pain is  described as aching, sharp, and throbbing    At BEST  5/10   At WORST  10/10 on the WORST day.    On average pain is rated as 2/10.   Today the pain is rated as 4/10  Symptoms interfere with daily activity and sleeping.   Exacerbating factors: Standing and Walking.    Mitigating factors nothing.     Interval History 7/7/2022:     Joanne Peña is a 73 y.o. female presents to the clinic for follow up.  Since last visit the pain has has moderately improved.  Buttocks pain has resolved. Now with axial back pain without radiation.    The pain is located in the lower back area and does not radiate.  The pain is described as aching    At BEST  4/10   At WORST  8/10 on the WORST day.    On average pain is rated as 4/10.   Today the pain is rated as 3/10  Symptoms interfere with daily activity and sleeping.   Exacerbating factors: Standing and Walking.    Mitigating factors heat and massage.     Current pain medications: none  Failed Pain Medications: cannot take NSAIDs because of the stroke. Tylenol. Short course Norco (side effects cognition), gabapentin, robaxin, oral steroids    Interval History 6/6/2022:     Joanne Peña is a 73 y.o. female presents to the clinic for follow up.  Since last visit the pain has has significantly improved.  She is s/p Right SIJ on 5/20/22 with almost 100% improvement of the low back/buttock pain.  She no longer requires a cane or walker to ambulate.  She feels signfiicantly better.  Currently her pain is located in the midline back without radiation. Worse with flexion, standing, walking    The pain is located in the lower back area and does not radiate.  The pain is described as aching    At BEST  4/10   At WORST  8/10 on the WORST day.    On average pain is rated as 4/10.   Today the pain is rated as 6/10  Symptoms interfere with nothing .   Exacerbating factors: Walking.    Mitigating factors laying down, medications and sitting.     Current pain medications: gabapentin. Oral  steroids and robaxin helped. Short course Norco  Failed Pain Medications: cannot take NSAIDs because of the stroke. Tylenol. Robaxin    Initial Hx:  Joanne Peña is a 73 y.o. female presents to the clinic for the evaluation of lower back pain. The pain started over a month ago following no inciting event and symptoms have been worsening.  The patient had a TIA on 2/6/22.  She was on 21 days of Plavix which she has stopped. She takes a daily ASA81.  Does not feel that this is related to the stroke.  Denies any falls or trauma. She started getting around the beginning of March.  She went to the Urgent care on March 17, 2022 because the pain was not going away.  Since then the pain has been worsening. The pain pattern is the same now as it was then. The pain has prevented the patient from walking, and she has required a wheelchair since March 17.     She tried a medrol dose trae, robaxin, gabapentin which helped until she stopped the steroids and the robaxin. She is taking gabapentin 300mg TID.  She is not taking anything else for pain right now. When she gets severe pain she repositions, heating pads, gabapentin. Especially worse while trying to get out of the bed. Changing positions are very bad.    Pain Description:    The pain is located in the lower back area and radiates to the right thigh/groin/ right hip .    At BEST  10/10   At WORST  10/10 on the WORST day.    On average pain is rated as 10/10.   Today the pain is rated as 10/10  The pain is continuous.  The pain is described as aching, sharp, shooting and pulling     Symptoms interfere with daily activity, sleeping and work.   Exacerbating factors: any type of movement.    Mitigating factors heat and medications.   She reports 1 hours of sleep per night.    Physical Therapy/Home Exercise: No, not currently in physical therapy or home exercise program    Current Pain Medications:    - gabapentin. Oral steroids and robaxin helped.    Failed Pain  Medications:    - cannot take NSAIDs because of the stroke. Tylenol     Pain Treatment Therapies:    Pain procedures: none  Physical Therapy: last PT 1 month ago.  Patient has a healthy back referral on 5/9  Spinal decompression: none  Joint replacement: none     Patient denies urinary incontinence and bowel incontinence. (+) bilateral foot numbness x2 weeks.   Patient denies any suicidal or homicidal ideations     report:  Reviewed and consistent with medication use as prescribed.    Imaging:   XR hip:    FINDINGS:  The bones are intact.  There is no evidence for acute fracture or bone destruction.  There are degenerative changes with mild narrowing of the right hip joint space laterally.  Small osteophytes are present at the right hip.  Left hip and sacroiliac joints appear grossly unremarkable.     Impression:     No evidence for acute fracture, bone destruction, or dislocation.     Degenerative changes of the right hip with mild joint space narrowing laterally and small osteophytes.       XR lumbar 03/2022:    Mild scoliosis with convexity to the left can be seen.  Vertebral bodies are intact.  Disc spaces are maintained.  No significant bony abnormalities are noted    Past Medical History:   Diagnosis Date    Diabetes mellitus     Hyperlipidemia     Hypertension     Stroke      Past Surgical History:   Procedure Laterality Date    COLONOSCOPY N/A 7/12/2023    Procedure: COLONOSCOPY;  Surgeon: Ray Sorensen MD;  Location: [facility] ENDO;  Service: Endoscopy;  Laterality: N/A;  instr via portal  - PC  4/10/23 Daniel, instr via mail & portal, unable to tolerate Golytely- request Miralax/Gatorade - PC  5/30-pt r/s-new instr portal-tb    INJECTION OF ANESTHETIC AGENT AROUND MEDIAL BRANCH NERVES INNERVATING LUMBAR FACET JOINT Bilateral 4/20/2023    Procedure: MBB #1 (B/L) L3,4,5;  Surgeon: Son Lara DO;  Location: [facility] OR;  Service: Pain Management;  Laterality: Bilateral;  ORAL XANAX    INJECTION OF  ANESTHETIC AGENT AROUND MEDIAL BRANCH NERVES INNERVATING LUMBAR FACET JOINT Bilateral 2023    Procedure: MBB #2 (B/L) L3,4,5;  Surgeon: Son Lara DO;  Location: [facility] OR;  Service: Pain Management;  Laterality: Bilateral;  Oral Xanax    INJECTION OF JOINT Right 2022    Procedure: Right SI joint injection;  Surgeon: Son Lara DO;  Location: [facility] OR;  Service: Pain Management;  Laterality: Right;    INJECTION, SACROILIAC JOINT Right 2023    Procedure: RT SI joint Inj;  Surgeon: Son Lara DO;  Location: [facility] PAIN MANAGEMENT;  Service: Pain Management;  Laterality: Right;  oral    RADIOFREQUENCY ABLATION OF LUMBAR MEDIAL BRANCH NERVE AT SINGLE LEVEL Bilateral 2023    Procedure: RFA (B/L) L3,4,5;  Surgeon: Son Lara DO;  Location: [facility] PAIN MANAGEMENT;  Service: Pain Management;  Laterality: Bilateral;     Social History     Socioeconomic History    Marital status:    Tobacco Use    Smoking status: Former     Current packs/day: 0.00     Types: Cigarettes     Quit date: 2022     Years since quittin.0     Passive exposure: Past    Smokeless tobacco: Never   Substance and Sexual Activity    Alcohol use: Yes     Comment: rare    Drug use: No    Sexual activity: Yes     Partners: Male     Social Determinants of Health     Financial Resource Strain: Medium Risk (2024)    Overall Financial Resource Strain (CARDIA)     Difficulty of Paying Living Expenses: Somewhat hard   Food Insecurity: Food Insecurity Present (2024)    Hunger Vital Sign     Worried About Running Out of Food in the Last Year: Sometimes true     Ran Out of Food in the Last Year: Never true   Transportation Needs: No Transportation Needs (2024)    PRAPARE - Transportation     Lack of Transportation (Medical): No     Lack of Transportation (Non-Medical): No   Physical Activity: Unknown (2024)    Exercise Vital Sign     Days of Exercise per Week: 0 days    Social Connections: Unknown (2/14/2024)    Social Connection and Isolation Panel [NHANES]     Frequency of Communication with Friends and Family: More than three times a week     Frequency of Social Gatherings with Friends and Family: More than three times a week     Active Member of Clubs or Organizations: No     Attends Club or Organization Meetings: Never   Housing Stability: Low Risk  (2/14/2024)    Housing Stability Vital Sign     Unable to Pay for Housing in the Last Year: No     Number of Places Lived in the Last Year: 1     Unstable Housing in the Last Year: No     Family History   Problem Relation Age of Onset    Kidney disease Mother     Heart disease Mother     Cancer Father     Hypertension Brother     Hypertension Daughter     Cancer Maternal Aunt        Review of patient's allergies indicates:   Allergen Reactions    Lisinopril-hydrochlorothiazide Other (See Comments)     Pt stated it makes her feel drained. She couldn't raise arms over head.    Ampicillin Rash    Darvocet a500 [propoxyphene n-acetaminophen] Other (See Comments)     karl       Current Outpatient Medications   Medication Sig    ACCU-CHEK GUIDE GLUCOSE METER Misc TO CHECK BLOOD GLUCOSE DAILY, TO USE WITH INSURANCE PREFERRED METER    amLODIPine (NORVASC) 10 MG tablet TAKE 1 TABLET BY MOUTH ONCE DAILY. HOLD IF SBP <120    ascorbic acid, vitamin C, (VITAMIN C) 500 MG tablet Take 500 mg by mouth once daily.    atorvastatin (LIPITOR) 80 MG tablet TAKE 1 TABLET (80 MG TOTAL) BY MOUTH ONCE DAILY.    BLOOD PRESSURE CUFF Misc 1 Units by Misc.(Non-Drug; Combo Route) route once daily.    blood sugar diagnostic Strp To check BG daily, to use with insurance preferred meter    blood-glucose meter kit To check BG daily, to use with insurance preferred meter    chlorthalidone (HYGROTEN) 25 MG Tab TAKE 1 TABLET BY MOUTH EVERY DAY    cyclobenzaprine (FLEXERIL) 10 MG tablet Take 1 tablet (10 mg total) by mouth 2 (two) times daily as needed for Muscle  spasms (back pain).    FLUAD QUAD 2023-24,65Y UP,,PF, 60 mcg (15 mcg x 4)/0.5 mL Syrg     GAVILYTE-C 240-22.72-6.72 -5.84 gram SolR TAKE 4,000 MLS BY MOUTH ONCE. FOR 1 DOSE    glimepiride (AMARYL) 2 MG tablet Take 2 mg by mouth 2 (two) times daily.    guanfacine HCl (GUANFACINE ORAL) Take by mouth.    HYDROcodone-acetaminophen (NORCO) 5-325 mg per tablet Take 1 tablet by mouth every 6 (six) hours as needed for Pain.    lancets Misc To check BG daily, to use with insurance preferred meter    losartan (COZAAR) 100 MG tablet TAKE 1 TABLET (100 MG TOTAL) BY MOUTH ONCE DAILY. HOLD IF SBP <120    multivitamin (THERAGRAN) per tablet Take 1 tablet by mouth once daily.    ondansetron (ZOFRAN-ODT) 4 MG TbDL Dissolve 1 tablet (4 mg total) by mouth every 8 (eight) hours as needed.    tirzepatide (MOUNJARO) 5 mg/0.5 mL PnIj Inject 5 mg into the skin every 7 days.    aspirin (ECOTRIN) 81 MG EC tablet Take 1 tablet (81 mg total) by mouth once daily.    hydrALAZINE (APRESOLINE) 50 MG tablet Take 1 tablet (50 mg total) by mouth every 8 (eight) hours. Hold is SBP <120 (Patient taking differently: Take 50 mg by mouth every 8 (eight) hours. Hold is SBP <120    Pt is taking once a day)    LORazepam (ATIVAN) 1 MG tablet Take 1 tablet (1 mg total) by mouth once as needed for Anxiety (before procedure).    meclizine (ANTIVERT) 25 mg tablet TAKE 1 TABLET (25 MG TOTAL) BY MOUTH 2 (TWO) TIMES DAILY AS NEEDED FOR DIZZINESS.    pregabalin (LYRICA) 25 MG capsule TAKE 2 CAPSULES (50 MG TOTAL) BY MOUTH 2 (TWO) TIMES DAILY. START WITH 1 PILL AT NIGHT, AND THEN INCREASE IF YOU CAN TOLERATE IT     No current facility-administered medications for this visit.     Facility-Administered Medications Ordered in Other Visits   Medication    0.9%  NaCl infusion    LIDOcaine (PF) 10 mg/ml (1%) injection 10 mg       REVIEW OF SYSTEMS:    GENERAL:  No weight loss, malaise or fevers.   HEENT:   No recent changes in vision or hearing  NECK:  Negative for lumps,  "no difficulty with swallowing.  RESPIRATORY:  Negative for cough, wheezing or shortness of breath, patient denies any recent URI.  CARDIOVASCULAR:  Negative for chest pain, leg swelling or palpitations.  GI:  Negative for abdominal discomfort, blood in stools or black stools or change in bowel habits.  MUSCULOSKELETAL:  See HPI.  SKIN:  Negative for lesions, rash, and itching.  PSYCH:  No mood disorder or recent psychosocial stressors.  Patients sleep is not disturbed secondary to pain.  HEMATOLOGY/LYMPHOLOGY:  Negative for prolonged bleeding, bruising easily or swollen nodes.  Patient is not currently taking any anti-coagulants  NEURO:   No history of headaches, syncope, paralysis, seizures or tremors.  All other reviewed and negative other than HPI.    OBJECTIVE:    /82 (BP Location: Right arm, Patient Position: Sitting)   Pulse 88   Ht 5' 7" (1.702 m)   Wt 81.4 kg (179 lb 7.3 oz)   BMI 28.11 kg/m²     PHYSICAL EXAMINATION:    GENERAL: Well appearing, in no acute distress, alert and oriented x3.   PSYCH:  Mood and affect appropriate.  SKIN: Skin color, texture, turgor normal, no rashes or lesions.  HEAD/FACE:  Normocephalic, atraumatic. Cranial nerves grossly intact.  CV: RRR with palpation of the radial artery.  PULM: CTAB. No evidence of respiratory difficulty, symmetric chest rise.  GI:  Soft and non-tender.    BACK:  - No obvious deformity or signs of trauma, Normal lumbar lordotic curve  - Negative spinous process tenderness  - Negative paravertebral tenderness  - Positive pain to palpation over the facet joints of the lumbar spine on the right  - Positive right iliac crest  - Slump test is Negative for radicular pain  - Slump test is Negative for back pain  - Supine Straight leg raising is Negative for radicular pain  - Supine Straight leg raising is Negative for back pain  - Positive pain with lumbar extension, side bending  - Negative Sustained Hip Flexion test (for discogenic pain)  - Negative " Altered Gait, Posture  - Axial facet loading test Positive on the right side(s)    SI Joint exam:  - Negative SI joint tenderness to palpation  - Rodolfo's sign Positive  - Yeoman's Test: Negative for SI joint pain indicating anterior SI ligament involvement. Negative for anterior thigh pain/paresthesia which indicates femoral nerve stretch.  - Gaenslen's Test:Positive  - Finger Mikhail's Sign:Positive  - SI compression test:Positive  - SI distraction test:Negative  - Thigh Thrust: Negative  - SI Thrust: Positive    MUSKULOSKELETAL:    EXTREMITIES:   Hip Exam:  - Log Roll Negative  - FADIR Negative  - Stinchfield Did not perform  - Hip Scour Did not perform  - GTB Tenderness Positive    MUSCULOSKELETAL:  No atrophy or tone abnormalities are noted in the UE or LE.  No deformities, edema, or skin discoloration are noted on visible skin. Good capillary refill.    NEURO: Bilateral upper and lower extremity coordination and muscle stretch reflexes are physiologic and symmetric.      NEUROLOGICAL EXAM:  MENTAL STATUS: A x O x 3, good concentration, speech is fluent and goal directed  MEMORY: recent and remote are intact  CN: CN2-12 grossly intact  MOTOR: 5/5 in all muscle groups  DTRs: 2+ intact symmetric  Sensation:    -no Loss of sensation in a left lower and right lower L-1, L-2, L-3, L-4 and L-5 bilaterally distribution.

## 2024-02-15 ENCOUNTER — OFFICE VISIT (OUTPATIENT)
Dept: FAMILY MEDICINE | Facility: CLINIC | Age: 74
End: 2024-02-15
Payer: MEDICARE

## 2024-02-15 VITALS
SYSTOLIC BLOOD PRESSURE: 120 MMHG | TEMPERATURE: 98 F | BODY MASS INDEX: 28.46 KG/M2 | DIASTOLIC BLOOD PRESSURE: 74 MMHG | WEIGHT: 181.31 LBS | HEIGHT: 67 IN | OXYGEN SATURATION: 95 % | HEART RATE: 95 BPM

## 2024-02-15 DIAGNOSIS — N18.32 STAGE 3B CHRONIC KIDNEY DISEASE: ICD-10-CM

## 2024-02-15 DIAGNOSIS — E11.29 MICROALBUMINURIA DUE TO TYPE 2 DIABETES MELLITUS: ICD-10-CM

## 2024-02-15 DIAGNOSIS — I10 ESSENTIAL HYPERTENSION: ICD-10-CM

## 2024-02-15 DIAGNOSIS — R55 PRE-SYNCOPE: ICD-10-CM

## 2024-02-15 DIAGNOSIS — R80.9 MICROALBUMINURIA DUE TO TYPE 2 DIABETES MELLITUS: ICD-10-CM

## 2024-02-15 DIAGNOSIS — E78.5 DYSLIPIDEMIA ASSOCIATED WITH TYPE 2 DIABETES MELLITUS: ICD-10-CM

## 2024-02-15 DIAGNOSIS — E11.65 TYPE 2 DIABETES MELLITUS WITH HYPERGLYCEMIA, WITHOUT LONG-TERM CURRENT USE OF INSULIN: Primary | ICD-10-CM

## 2024-02-15 DIAGNOSIS — E11.69 DYSLIPIDEMIA ASSOCIATED WITH TYPE 2 DIABETES MELLITUS: ICD-10-CM

## 2024-02-15 DIAGNOSIS — Z86.73 HISTORY OF CVA (CEREBROVASCULAR ACCIDENT): ICD-10-CM

## 2024-02-15 DIAGNOSIS — Z23 NEED FOR INFLUENZA VACCINATION: ICD-10-CM

## 2024-02-15 DIAGNOSIS — Z12.31 SCREENING MAMMOGRAM FOR BREAST CANCER: ICD-10-CM

## 2024-02-15 DIAGNOSIS — N61.0 MASTITIS: ICD-10-CM

## 2024-02-15 DIAGNOSIS — R52 PAIN AT INJECTION SITE, INITIAL ENCOUNTER: ICD-10-CM

## 2024-02-15 DIAGNOSIS — T80.89XA PAIN AT INJECTION SITE, INITIAL ENCOUNTER: ICD-10-CM

## 2024-02-15 DIAGNOSIS — E78.5 HYPERLIPIDEMIA, UNSPECIFIED HYPERLIPIDEMIA TYPE: ICD-10-CM

## 2024-02-15 DIAGNOSIS — Z23 PNEUMOCOCCAL VACCINE ADMINISTERED: ICD-10-CM

## 2024-02-15 PROBLEM — N18.31 STAGE 3A CHRONIC KIDNEY DISEASE: Status: RESOLVED | Noted: 2023-01-26 | Resolved: 2024-02-15

## 2024-02-15 PROBLEM — J43.9 PULMONARY EMPHYSEMA, UNSPECIFIED EMPHYSEMA TYPE: Status: RESOLVED | Noted: 2023-07-14 | Resolved: 2024-02-15

## 2024-02-15 PROCEDURE — 1126F AMNT PAIN NOTED NONE PRSNT: CPT | Mod: CPTII,S$GLB,, | Performed by: FAMILY MEDICINE

## 2024-02-15 PROCEDURE — G0009 ADMIN PNEUMOCOCCAL VACCINE: HCPCS | Mod: S$GLB,,, | Performed by: FAMILY MEDICINE

## 2024-02-15 PROCEDURE — 1100F PTFALLS ASSESS-DOCD GE2>/YR: CPT | Mod: CPTII,S$GLB,, | Performed by: FAMILY MEDICINE

## 2024-02-15 PROCEDURE — 3288F FALL RISK ASSESSMENT DOCD: CPT | Mod: CPTII,S$GLB,, | Performed by: FAMILY MEDICINE

## 2024-02-15 PROCEDURE — 3044F HG A1C LEVEL LT 7.0%: CPT | Mod: CPTII,S$GLB,, | Performed by: FAMILY MEDICINE

## 2024-02-15 PROCEDURE — 90677 PCV20 VACCINE IM: CPT | Mod: S$GLB,,, | Performed by: FAMILY MEDICINE

## 2024-02-15 PROCEDURE — 1160F RVW MEDS BY RX/DR IN RCRD: CPT | Mod: CPTII,S$GLB,, | Performed by: FAMILY MEDICINE

## 2024-02-15 PROCEDURE — 90694 VACC AIIV4 NO PRSRV 0.5ML IM: CPT | Mod: S$GLB,,, | Performed by: FAMILY MEDICINE

## 2024-02-15 PROCEDURE — G0008 ADMIN INFLUENZA VIRUS VAC: HCPCS | Mod: S$GLB,,, | Performed by: FAMILY MEDICINE

## 2024-02-15 PROCEDURE — 3078F DIAST BP <80 MM HG: CPT | Mod: CPTII,S$GLB,, | Performed by: FAMILY MEDICINE

## 2024-02-15 PROCEDURE — 1159F MED LIST DOCD IN RCRD: CPT | Mod: CPTII,S$GLB,, | Performed by: FAMILY MEDICINE

## 2024-02-15 PROCEDURE — 99214 OFFICE O/P EST MOD 30 MIN: CPT | Mod: S$GLB,,, | Performed by: FAMILY MEDICINE

## 2024-02-15 PROCEDURE — 3074F SYST BP LT 130 MM HG: CPT | Mod: CPTII,S$GLB,, | Performed by: FAMILY MEDICINE

## 2024-02-15 PROCEDURE — 3008F BODY MASS INDEX DOCD: CPT | Mod: CPTII,S$GLB,, | Performed by: FAMILY MEDICINE

## 2024-02-15 PROCEDURE — 99999 PR PBB SHADOW E&M-EST. PATIENT-LVL V: CPT | Mod: PBBFAC,,, | Performed by: FAMILY MEDICINE

## 2024-02-15 RX ORDER — ACETAMINOPHEN 325 MG/1
650 TABLET ORAL
Status: COMPLETED | OUTPATIENT
Start: 2024-02-15 | End: 2024-02-15

## 2024-02-15 RX ORDER — CEPHALEXIN 500 MG/1
1000 TABLET ORAL 2 TIMES DAILY
Qty: 40 TABLET | Refills: 0 | Status: SHIPPED | OUTPATIENT
Start: 2024-02-15 | End: 2024-02-25

## 2024-02-15 RX ADMIN — ACETAMINOPHEN 650 MG: 325 TABLET ORAL at 08:02

## 2024-02-15 NOTE — PROGRESS NOTES
Assessment & Plan:    Type 2 diabetes mellitus with hyperglycemia, without long-term current use of insulin  -     Hemoglobin A1C; Future; Expected date: 02/15/2024  -     Lipid Panel; Future; Expected date: 02/15/2024  -     CBC Auto Differential; Future; Expected date: 02/15/2024  -     Comprehensive Metabolic Panel; Future; Expected date: 02/15/2024  -     Microalbumin/Creatinine Ratio, Urine; Future; Expected date: 02/15/2024  -     Diabetes Digital Medicine (DDMP) Enrollment Order    A1c reviewed. She is at goal <7.0. Continue current therapy plan.  Repeat labs before follow up in 3 mo - may graduate to 6 month follow ups if she remains at goal.    Microalbuminuria due to type 2 diabetes mellitus  -     Microalbumin/Creatinine Ratio, Urine; Future; Expected date: 02/15/2024    Dyslipidemia associated with type 2 diabetes mellitus  -     Lipid Panel; Future; Expected date: 02/15/2024    History of CVA (cerebrovascular accident)  -     Lipid Panel; Future; Expected date: 02/15/2024    Essential hypertension  -     Hemoglobin A1C; Future; Expected date: 02/15/2024  -     Lipid Panel; Future; Expected date: 02/15/2024  -     CBC Auto Differential; Future; Expected date: 02/15/2024  -     Comprehensive Metabolic Panel; Future; Expected date: 02/15/2024  -     Hypertension Digital Medicine (HDMP) Enrollment Order    Controlled. Continue current therapy.     Pre-syncope  -     Orthostatic blood pressure    Orthostatics: supine - 140/80, sitting - 130/80, standing - 122/90. Unremarkable to explain pre-syncopal episode. No change to patient's antihypertensives at this time.    Review of patient's record shows a largely unremarkable event monitor, other than brief episodes of non-sustained VT and AT, in June 2022. Follow up ILR was recommended by EP, which was never done. Encouraged follow up with EP.     Mastitis  -     cephalexin (KEFTAB) 500 mg tablet; Take 2 tablets (1,000 mg total) by mouth 2 (two) times daily. for  "10 days  Dispense: 40 tablet; Refill: 0    Start Keflex. Due for screening mammogram.    Stage 3b chronic kidney disease  -     Comprehensive Metabolic Panel; Future; Expected date: 02/15/2024    Screening mammogram for breast cancer  -     Mammo Digital Screening Bilat; Future; Expected date: 02/15/2024    Pain at injection site, initial encounter  -     acetaminophen tablet 650 mg    Need for influenza vaccination  -     Influenza (FLUAD) - Quadrivalent (Adjuvanted) *Preferred* (65+) (PF)    Pneumococcal vaccine administered  -     (In Office Administered) Pneumococcal Conjugate Vaccine (20 Valent) (IM) (Preferred)      Health maintenance reviewed & addressed above.    Follow-up: Follow up in about 3 months (around 5/15/2024).  ______________________________________________________________________    Chief Complaint  Chief Complaint   Patient presents with    Diabetes     Follow up       HPI  Joanne Peña is a 73 y.o. female with medical diagnoses as listed in the medical history and problem list that presents to the office for management of type 2 diabetes mellitis for which she was last seen on 12/11/23. Patient is taking Mounjaro 5 mg weekly. She admits to not checking her BG but is interested in the digital program after she gets a new phone. She c/o recurrent white discharge from the left nipple. She was last treated for mastitis with Keflex in 2022. Agreeable to PNA and influenza vaccines but is requesting dose of Tylenol for arm pain. Patient had a pre-syncopal episode last month that resulted in a sacral fracture. She states that her vision "blacked out" shortly after getting up to go into her pantry but she did not pass out. Denies any further episodes.     Most recent pertinent workup:     Last A1C  Lab Results   Component Value Date    HGBA1C 6.9 (H) 02/12/2024         Health Maintenance         Date Due Completion Date    COVID-19 Vaccine (1) Never done ---    TETANUS VACCINE Never done ---    " Shingles Vaccine (1 of 2) Never done ---    RSV Vaccine (Age 60+ and Pregnant patients) (1 - 1-dose 60+ series) Never done ---    Mammogram 03/13/2024 3/13/2023    Foot Exam 07/14/2024 7/14/2023    Hemoglobin A1c 08/12/2024 2/12/2024    Eye Exam 10/16/2024 10/16/2023    Diabetes Urine Screening 11/06/2024 11/6/2023    Lipid Panel 11/06/2024 11/6/2023    High Dose Statin 02/15/2025 2/15/2024    Colorectal Cancer Screening 07/12/2026 7/12/2023    DEXA Scan 09/25/2026 9/25/2023              PAST MEDICAL HISTORY:  Past Medical History:   Diagnosis Date    Diabetes mellitus     Hyperlipidemia     Hypertension     Stroke        PAST SURGICAL HISTORY:  Past Surgical History:   Procedure Laterality Date    COLONOSCOPY N/A 7/12/2023    Procedure: COLONOSCOPY;  Surgeon: Ray Sorensen MD;  Location: [facility];  Service: Endoscopy;  Laterality: N/A;  instr via portal  - PC  4/10/23 Daniel, instr via mail & portal, unable to tolerate Golytely- request Miralax/Gatorade - PC  5/30-pt r/s-new instr portal-tb    INJECTION OF ANESTHETIC AGENT AROUND MEDIAL BRANCH NERVES INNERVATING LUMBAR FACET JOINT Bilateral 4/20/2023    Procedure: MBB #1 (B/L) L3,4,5;  Surgeon: Son Lara DO;  Location: [facility] OR;  Service: Pain Management;  Laterality: Bilateral;  ORAL XANAX    INJECTION OF ANESTHETIC AGENT AROUND MEDIAL BRANCH NERVES INNERVATING LUMBAR FACET JOINT Bilateral 5/5/2023    Procedure: MBB #2 (B/L) L3,4,5;  Surgeon: Son Lara DO;  Location: [facility] OR;  Service: Pain Management;  Laterality: Bilateral;  Oral Xanax    INJECTION OF JOINT Right 5/20/2022    Procedure: Right SI joint injection;  Surgeon: Son Lara DO;  Location: [facility] OR;  Service: Pain Management;  Laterality: Right;    INJECTION, SACROILIAC JOINT Right 12/19/2023    Procedure: RT SI joint Inj;  Surgeon: Son Lara DO;  Location: [facility] PAIN [facility]EMENT;  Service: Pain Management;  Laterality: Right;  oral    RADIOFREQUENCY  ABLATION OF LUMBAR MEDIAL BRANCH NERVE AT SINGLE LEVEL Bilateral 2023    Procedure: RFA (B/L) L3,4,5;  Surgeon: Son Lara DO;  Location: [facility]IN [facility];  Service: Pain Management;  Laterality: Bilateral;       SOCIAL HISTORY:  Social History     Socioeconomic History    Marital status:    Tobacco Use    Smoking status: Former     Current packs/day: 0.00     Types: Cigarettes     Quit date: 2022     Years since quittin.0     Passive exposure: Past    Smokeless tobacco: Never   Substance and Sexual Activity    Alcohol use: Yes     Comment: rare    Drug use: No    Sexual activity: Yes     Partners: Male     Social Determinants of Health     Financial Resource Strain: Medium Risk (2024)    Overall Financial Resource Strain (CARDIA)     Difficulty of Paying Living Expenses: Somewhat hard   Food Insecurity: Food Insecurity Present (2024)    Hunger Vital Sign     Worried About Running Out of Food in the Last Year: Sometimes true     Ran Out of Food in the Last Year: Never true   Transportation Needs: No Transportation Needs (2024)    PRAPARE - Transportation     Lack of Transportation (Medical): No     Lack of Transportation (Non-Medical): No   Physical Activity: Unknown (2024)    Exercise Vital Sign     Days of Exercise per Week: 0 days   Social Connections: Unknown (2024)    Social Connection and Isolation Panel [NHANES]     Frequency of Communication with Friends and Family: More than three times a week     Frequency of Social Gatherings with Friends and Family: More than three times a week     Active Member of Clubs or Organizations: No     Attends Club or Organization Meetings: Never   Housing Stability: Low Risk  (2024)    Housing Stability Vital Sign     Unable to Pay for Housing in the Last Year: No     Number of Places Lived in the Last Year: 1     Unstable Housing in the Last Year: No       FAMILY HISTORY:  Family History   Problem Relation Age  of Onset    Kidney disease Mother     Heart disease Mother     Cancer Father     Hypertension Brother     Hypertension Daughter     Cancer Maternal Aunt        ALLERGIES AND MEDICATIONS: updated and reviewed.  Review of patient's allergies indicates:   Allergen Reactions    Lisinopril-hydrochlorothiazide Other (See Comments)     Pt stated it makes her feel drained. She couldn't raise arms over head.    Ampicillin Rash    Darvocet a500 [propoxyphene n-acetaminophen] Other (See Comments)     shaky     Current Outpatient Medications   Medication Sig Dispense Refill    ACCU-CHEK GUIDE GLUCOSE METER Misc TO CHECK BLOOD GLUCOSE DAILY, TO USE WITH INSURANCE PREFERRED METER      amLODIPine (NORVASC) 10 MG tablet TAKE 1 TABLET BY MOUTH ONCE DAILY. HOLD IF SBP <120 90 tablet 3    aspirin (ECOTRIN) 81 MG EC tablet Take 1 tablet (81 mg total) by mouth once daily. 30 tablet 3    atorvastatin (LIPITOR) 80 MG tablet TAKE 1 TABLET (80 MG TOTAL) BY MOUTH ONCE DAILY. 90 tablet 3    BLOOD PRESSURE CUFF Misc 1 Units by Misc.(Non-Drug; Combo Route) route once daily. 1 each 0    blood sugar diagnostic Strp To check BG daily, to use with insurance preferred meter 200 each 11    blood-glucose meter kit To check BG daily, to use with insurance preferred meter 1 each 0    chlorthalidone (HYGROTEN) 25 MG Tab TAKE 1 TABLET BY MOUTH EVERY DAY 90 tablet 3    cyclobenzaprine (FLEXERIL) 10 MG tablet Take 1 tablet (10 mg total) by mouth 2 (two) times daily as needed for Muscle spasms (back pain). 180 tablet 1    hydrALAZINE (APRESOLINE) 50 MG tablet Take 1 tablet (50 mg total) by mouth every 8 (eight) hours. Hold is SBP <120 (Patient taking differently: Take 50 mg by mouth every 8 (eight) hours. Hold is SBP <120    Pt is taking once a day) 90 tablet 1    lancets Misc To check BG daily, to use with insurance preferred meter 200 each 11    losartan (COZAAR) 100 MG tablet TAKE 1 TABLET (100 MG TOTAL) BY MOUTH ONCE DAILY. HOLD IF SBP <120 90 tablet 3     meclizine (ANTIVERT) 25 mg tablet TAKE 1 TABLET (25 MG TOTAL) BY MOUTH 2 (TWO) TIMES DAILY AS NEEDED FOR DIZZINESS. 60 tablet 0    multivitamin (THERAGRAN) per tablet Take 1 tablet by mouth once daily.      ondansetron (ZOFRAN-ODT) 4 MG TbDL Dissolve 1 tablet (4 mg total) by mouth every 8 (eight) hours as needed. 20 tablet 0    tirzepatide (MOUNJARO) 5 mg/0.5 mL PnIj Inject 5 mg into the skin every 7 days. 4 pen 11    ascorbic acid, vitamin C, (VITAMIN C) 500 MG tablet Take 500 mg by mouth once daily.      cephalexin (KEFTAB) 500 mg tablet Take 2 tablets (1,000 mg total) by mouth 2 (two) times daily. for 10 days 40 tablet 0    FLUAD QUAD 2023-24,65Y UP,,PF, 60 mcg (15 mcg x 4)/0.5 mL Syrg       GAVILYTE-C 240-22.72-6.72 -5.84 gram SolR TAKE 4,000 MLS BY MOUTH ONCE. FOR 1 DOSE      guanfacine HCl (GUANFACINE ORAL) Take by mouth.      HYDROcodone-acetaminophen (NORCO) 5-325 mg per tablet Take 1 tablet by mouth every 6 (six) hours as needed for Pain. (Patient not taking: Reported on 2/15/2024) 20 tablet 0    LORazepam (ATIVAN) 1 MG tablet Take 1 tablet (1 mg total) by mouth once as needed for Anxiety (before procedure). (Patient not taking: Reported on 2/15/2024) 1 tablet 0    pregabalin (LYRICA) 25 MG capsule TAKE 2 CAPSULES (50 MG TOTAL) BY MOUTH 2 (TWO) TIMES DAILY. START WITH 1 PILL AT NIGHT, AND THEN INCREASE IF YOU CAN TOLERATE IT (Patient not taking: Reported on 2/15/2024) 120 capsule 1     No current facility-administered medications for this visit.     Facility-Administered Medications Ordered in Other Visits   Medication Dose Route Frequency Provider Last Rate Last Admin    0.9%  NaCl infusion   Intravenous Continuous Ronald Young MD        LIDOcaine (PF) 10 mg/ml (1%) injection 10 mg  1 mL Intradermal Once Ronald Young MD             ROS  Review of Systems   Constitutional:  Positive for activity change.   Musculoskeletal:  Positive for back pain and gait problem.   Neurological:  Negative for  "syncope.           Physical Exam  Vitals:    02/15/24 0802   BP: 120/74   Pulse: 95   Temp: 97.8 °F (36.6 °C)   TempSrc: Oral   SpO2: 95%   Weight: 82.2 kg (181 lb 5.3 oz)   Height: 5' 7" (1.702 m)    Body mass index is 28.4 kg/m².  Weight: 82.2 kg (181 lb 5.3 oz)   Height: 5' 7" (170.2 cm)   Physical Exam  Constitutional:       General: She is not in acute distress.  HENT:      Head: Normocephalic and atraumatic.   Cardiovascular:      Rate and Rhythm: Normal rate and regular rhythm.   Chest:   Breasts:     Left: Swelling and nipple discharge (white) present. No mass or tenderness.   Skin:     General: Skin is warm and dry.   Neurological:      Mental Status: She is alert. Mental status is at baseline.   Psychiatric:         Mood and Affect: Mood normal.         Behavior: Behavior normal.             "

## 2024-02-15 NOTE — TELEPHONE ENCOUNTER
No care due was identified.  Health [facility] Embedded Care Due Messages. Reference number: [id number].   2/15/2024 11:06:03 AM CST

## 2024-02-16 RX ORDER — ATORVASTATIN CALCIUM 80 MG/1
80 TABLET, FILM COATED ORAL DAILY
Qty: 90 TABLET | Refills: 1 | Status: SHIPPED | OUTPATIENT
Start: 2024-02-16 | End: 2025-02-15

## 2024-02-16 NOTE — TELEPHONE ENCOUNTER
Refill Decision Note   Joanne Mariana  is requesting a refill authorization.  Brief Assessment and Rationale for Refill:  Approve     Medication Therapy Plan:         Comments:     Note composed:4:28 AM 02/16/2024

## 2024-02-28 ENCOUNTER — OFFICE VISIT (OUTPATIENT)
Dept: FAMILY MEDICINE | Facility: CLINIC | Age: 74
End: 2024-02-28
Payer: MEDICARE

## 2024-02-28 VITALS
HEART RATE: 96 BPM | HEIGHT: 67 IN | SYSTOLIC BLOOD PRESSURE: 132 MMHG | TEMPERATURE: 98 F | DIASTOLIC BLOOD PRESSURE: 70 MMHG | BODY MASS INDEX: 28.41 KG/M2 | OXYGEN SATURATION: 97 % | WEIGHT: 181 LBS

## 2024-02-28 DIAGNOSIS — M79.671 RIGHT FOOT PAIN: Primary | ICD-10-CM

## 2024-02-28 DIAGNOSIS — M25.571 ACUTE RIGHT ANKLE PAIN: ICD-10-CM

## 2024-02-28 DIAGNOSIS — E11.65 TYPE 2 DIABETES MELLITUS WITH HYPERGLYCEMIA, WITHOUT LONG-TERM CURRENT USE OF INSULIN: ICD-10-CM

## 2024-02-28 DIAGNOSIS — I10 ESSENTIAL HYPERTENSION: ICD-10-CM

## 2024-02-28 PROCEDURE — 1159F MED LIST DOCD IN RCRD: CPT | Mod: CPTII,S$GLB,,

## 2024-02-28 PROCEDURE — 99999 PR PBB SHADOW E&M-EST. PATIENT-LVL V: CPT | Mod: PBBFAC,,,

## 2024-02-28 PROCEDURE — 3078F DIAST BP <80 MM HG: CPT | Mod: CPTII,S$GLB,,

## 2024-02-28 PROCEDURE — 99214 OFFICE O/P EST MOD 30 MIN: CPT | Mod: S$GLB,,,

## 2024-02-28 PROCEDURE — 3044F HG A1C LEVEL LT 7.0%: CPT | Mod: CPTII,S$GLB,,

## 2024-02-28 PROCEDURE — 3075F SYST BP GE 130 - 139MM HG: CPT | Mod: CPTII,S$GLB,,

## 2024-02-28 PROCEDURE — 1101F PT FALLS ASSESS-DOCD LE1/YR: CPT | Mod: CPTII,S$GLB,,

## 2024-02-28 PROCEDURE — 3008F BODY MASS INDEX DOCD: CPT | Mod: CPTII,S$GLB,,

## 2024-02-28 PROCEDURE — 3288F FALL RISK ASSESSMENT DOCD: CPT | Mod: CPTII,S$GLB,,

## 2024-02-28 RX ORDER — METHYLPREDNISOLONE 4 MG/1
TABLET ORAL
Qty: 1 EACH | Refills: 0 | Status: SHIPPED | OUTPATIENT
Start: 2024-02-28

## 2024-02-28 NOTE — PROGRESS NOTES
HPI     Chief Complaint:  Chief Complaint   Patient presents with    Ankle Pain       Joanne Peña is a 73 y.o. female with multiple medical diagnoses as listed in the medical history and problem list that presents for ankle pain.  Pt is known to me with her last appointment 2/15/2024.      Ankle Pain   The incident occurred 5 to 7 days ago. There was no injury mechanism. The pain is present in the right ankle and right foot. The pain is at a severity of 10/10. The pain is severe. Associated symptoms include an inability to bear weight. The symptoms are aggravated by weight bearing.           Assessment & Plan     Problem List Items Addressed This Visit    None  Visit Diagnoses       Right foot pain    -  Primary  Right ankle pain and foot pain that started 3 days ago.  Denies any injury to the ankle or foot.  On physical exam mild swelling to the ankle and tenderness to the lateral aspect of the foot and ankle.  We will order right foot and ankle x-ray.  We will order uric acid level to rule out gout.  We will start on Medrol Dosepak to help with the pain and inflammation.  Instructed patient to monitor blood sugar closely and to eat a healthy diet in order to counteract any hyperglycemia related to steroids.    Relevant Medications    methylPREDNISolone (MEDROL DOSEPACK) 4 mg tablet    Other Relevant Orders    X-Ray Ankle Complete Right    X-Ray Foot Complete Right    URIC ACID    Type 2 diabetes mellitus with hyperglycemia, without long-term current use of insulin      Most recent A1C 6.9. Continue healthy diet and exercise to maintain normal blood sugar.    Acute right ankle pain      Right ankle pain and foot pain that started 3 days ago.  Denies any injury to the ankle or foot.  On physical exam mild swelling to the ankle and tenderness to the lateral aspect of the foot and ankle.  We will order right foot and ankle x-ray.    Essential hypertension      BP in clinic today 132/70. The current medical  regimen is effective;  continue present plan and medications.                  --------------------------------------------      Health Maintenance:  Health Maintenance         Date Due Completion Date    COVID-19 Vaccine (1) Never done ---    TETANUS VACCINE Never done ---    Shingles Vaccine (1 of 2) Never done ---    RSV Vaccine (Age 60+ and Pregnant patients) (1 - 1-dose 60+ series) Never done ---    Mammogram 03/13/2024 3/13/2023    Foot Exam 07/14/2024 7/14/2023    Hemoglobin A1c 08/12/2024 2/12/2024    Eye Exam 10/16/2024 10/16/2023    Diabetes Urine Screening 11/06/2024 11/6/2023    Lipid Panel 11/06/2024 11/6/2023    High Dose Statin 02/16/2025 2/16/2024    Colorectal Cancer Screening 07/12/2026 7/12/2023    DEXA Scan 09/25/2026 9/25/2023            Health maintenance reviewed    Follow Up:  No follow-ups on file.    Exam     Review of Systems:  (as noted above)  Review of Systems   Constitutional:  Negative for fever.   HENT:  Negative for trouble swallowing.    Eyes:  Negative for visual disturbance.   Respiratory:  Negative for chest tightness and shortness of breath.    Cardiovascular:  Negative for chest pain.   Gastrointestinal:  Negative for blood in stool.   Musculoskeletal:  Positive for arthralgias, gait problem and joint swelling.       Physical Exam:   Physical Exam  Constitutional:       General: She is not in acute distress.     Appearance: She is not ill-appearing or diaphoretic.      Comments: Ambulating with cane and wheelchair   HENT:      Head: Normocephalic and atraumatic.   Cardiovascular:      Rate and Rhythm: Normal rate and regular rhythm.      Heart sounds: No murmur heard.     No friction rub. No gallop.   Pulmonary:      Effort: No respiratory distress.   Chest:      Chest wall: No tenderness.   Musculoskeletal:      Cervical back: No rigidity.      Right ankle: Swelling and deformity present. No ecchymosis. Tenderness present over the lateral malleolus.      Right foot: Swelling  "and bony tenderness present.   Neurological:      Mental Status: She is alert and oriented to person, place, and time.       Vitals:    02/28/24 1649   BP: 132/70   Pulse: 96   Temp: 98.3 °F (36.8 °C)   TempSrc: Oral   SpO2: 97%   Weight: 82.1 kg (181 lb)   Height: 5' 7" (1.702 m)      Body mass index is 28.35 kg/m².        History     Past Medical History:  Past Medical History:   Diagnosis Date    Diabetes mellitus     Hyperlipidemia     Hypertension     Stroke        Past Surgical History:  Past Surgical History:   Procedure Laterality Date    COLONOSCOPY N/A 7/12/2023    Procedure: COLONOSCOPY;  Surgeon: Ray Sorensen MD;  Location: [facility] ENDO;  Service: Endoscopy;  Laterality: N/A;  instr via portal  - PC  4/10/23 Daniel, instr via mail & portal, unable to tolerate Golytely- request Miralax/Gatorade - PC  5/30-pt r/s-new instr portal-tb    INJECTION OF ANESTHETIC AGENT AROUND MEDIAL BRANCH NERVES INNERVATING LUMBAR FACET JOINT Bilateral 4/20/2023    Procedure: MBB #1 (B/L) L3,4,5;  Surgeon: Son Lara DO;  Location: [facility] OR;  Service: Pain Management;  Laterality: Bilateral;  ORAL XANAX    INJECTION OF ANESTHETIC AGENT AROUND MEDIAL BRANCH NERVES INNERVATING LUMBAR FACET JOINT Bilateral 5/5/2023    Procedure: MBB #2 (B/L) L3,4,5;  Surgeon: Son Lara DO;  Location: [facility] OR;  Service: Pain Management;  Laterality: Bilateral;  Oral Xanax    INJECTION OF JOINT Right 5/20/2022    Procedure: Right SI joint injection;  Surgeon: Son Lara DO;  Location: [facility] OR;  Service: Pain Management;  Laterality: Right;    INJECTION, SACROILIAC JOINT Right 12/19/2023    Procedure: RT SI joint Inj;  Surgeon: Son Lara DO;  Location: [facility] PAIN MANAGEMENT;  Service: Pain Management;  Laterality: Right;  oral    RADIOFREQUENCY ABLATION OF LUMBAR MEDIAL BRANCH NERVE AT SINGLE LEVEL Bilateral 5/19/2023    Procedure: RFA (B/L) L3,4,5;  Surgeon: Son Lara DO;  Location: [facility]" PAIN MANAGEMENT;  Service: Pain Management;  Laterality: Bilateral;       Social History:  Social History     Socioeconomic History    Marital status:    Tobacco Use    Smoking status: Former     Current packs/day: 0.00     Types: Cigarettes     Quit date: 2022     Years since quittin.0     Passive exposure: Past    Smokeless tobacco: Never   Substance and Sexual Activity    Alcohol use: Yes     Comment: rare    Drug use: No    Sexual activity: Yes     Partners: Male     Social Determinants of Health     Financial Resource Strain: Medium Risk (2024)    Overall Financial Resource Strain (CARDIA)     Difficulty of Paying Living Expenses: Somewhat hard   Food Insecurity: Food Insecurity Present (2024)    Hunger Vital Sign     Worried About Running Out of Food in the Last Year: Sometimes true     Ran Out of Food in the Last Year: Never true   Transportation Needs: No Transportation Needs (2024)    PRAPARE - Transportation     Lack of Transportation (Medical): No     Lack of Transportation (Non-Medical): No   Physical Activity: Unknown (2024)    Exercise Vital Sign     Days of Exercise per Week: 0 days   Social Connections: Unknown (2024)    Social Connection and Isolation Panel [NHANES]     Frequency of Communication with Friends and Family: More than three times a week     Frequency of Social Gatherings with Friends and Family: More than three times a week     Active Member of Clubs or Organizations: No     Attends Club or Organization Meetings: Never   Housing Stability: Low Risk  (2024)    Housing Stability Vital Sign     Unable to Pay for Housing in the Last Year: No     Number of Places Lived in the Last Year: 1     Unstable Housing in the Last Year: No       Family History:  Family History   Problem Relation Age of Onset    Kidney disease Mother     Heart disease Mother     Cancer Father     Hypertension Brother     Hypertension Daughter     Cancer Maternal Aunt         Allergies and Medications: (updated and reviewed)  Review of patient's allergies indicates:   Allergen Reactions    Lisinopril-hydrochlorothiazide Other (See Comments)     Pt stated it makes her feel drained. She couldn't raise arms over head.    Ampicillin Rash    Darvocet a500 [propoxyphene n-acetaminophen] Other (See Comments)     shaky     Current Outpatient Medications   Medication Sig Dispense Refill    ACCU-CHEK GUIDE GLUCOSE METER Misc TO CHECK BLOOD GLUCOSE DAILY, TO USE WITH INSURANCE PREFERRED METER      amLODIPine (NORVASC) 10 MG tablet TAKE 1 TABLET BY MOUTH ONCE DAILY. HOLD IF SBP <120 90 tablet 3    atorvastatin (LIPITOR) 80 MG tablet TAKE 1 TABLET (80 MG TOTAL) BY MOUTH ONCE DAILY. 90 tablet 1    BLOOD PRESSURE CUFF Misc 1 Units by Misc.(Non-Drug; Combo Route) route once daily. 1 each 0    blood sugar diagnostic Strp To check BG daily, to use with insurance preferred meter 200 each 11    blood-glucose meter kit To check BG daily, to use with insurance preferred meter 1 each 0    chlorthalidone (HYGROTEN) 25 MG Tab TAKE 1 TABLET BY MOUTH EVERY DAY 90 tablet 3    cyclobenzaprine (FLEXERIL) 10 MG tablet Take 1 tablet (10 mg total) by mouth 2 (two) times daily as needed for Muscle spasms (back pain). 180 tablet 1    FLUAD QUAD 2023-24,65Y UP,,PF, 60 mcg (15 mcg x 4)/0.5 mL Syrg       lancets Misc To check BG daily, to use with insurance preferred meter 200 each 11    losartan (COZAAR) 100 MG tablet TAKE 1 TABLET (100 MG TOTAL) BY MOUTH ONCE DAILY. HOLD IF SBP <120 90 tablet 3    ondansetron (ZOFRAN-ODT) 4 MG TbDL Dissolve 1 tablet (4 mg total) by mouth every 8 (eight) hours as needed. 20 tablet 0    tirzepatide (MOUNJARO) 5 mg/0.5 mL PnIj Inject 5 mg into the skin every 7 days. 4 pen 11    ascorbic acid, vitamin C, (VITAMIN C) 500 MG tablet Take 500 mg by mouth once daily.      aspirin (ECOTRIN) 81 MG EC tablet Take 1 tablet (81 mg total) by mouth once daily. 30 tablet 3    GAVILYTE-C  240-22.72-6.72 -5.84 gram SolR TAKE 4,000 MLS BY MOUTH ONCE. FOR 1 DOSE      guanfacine HCl (GUANFACINE ORAL) Take by mouth.      hydrALAZINE (APRESOLINE) 50 MG tablet Take 1 tablet (50 mg total) by mouth every 8 (eight) hours. Hold is SBP <120 (Patient taking differently: Take 50 mg by mouth every 8 (eight) hours. Hold is SBP <120    Pt is taking once a day) 90 tablet 1    HYDROcodone-acetaminophen (NORCO) 5-325 mg per tablet Take 1 tablet by mouth every 6 (six) hours as needed for Pain. (Patient not taking: Reported on 2/15/2024) 20 tablet 0    LORazepam (ATIVAN) 1 MG tablet Take 1 tablet (1 mg total) by mouth once as needed for Anxiety (before procedure). (Patient not taking: Reported on 2/15/2024) 1 tablet 0    meclizine (ANTIVERT) 25 mg tablet TAKE 1 TABLET (25 MG TOTAL) BY MOUTH 2 (TWO) TIMES DAILY AS NEEDED FOR DIZZINESS. 60 tablet 0    methylPREDNISolone (MEDROL DOSEPACK) 4 mg tablet use as directed 1 each 0    multivitamin (THERAGRAN) per tablet Take 1 tablet by mouth once daily.      pregabalin (LYRICA) 25 MG capsule TAKE 2 CAPSULES (50 MG TOTAL) BY MOUTH 2 (TWO) TIMES DAILY. START WITH 1 PILL AT NIGHT, AND THEN INCREASE IF YOU CAN TOLERATE IT (Patient not taking: Reported on 2/15/2024) 120 capsule 1     No current facility-administered medications for this visit.     Facility-Administered Medications Ordered in Other Visits   Medication Dose Route Frequency Provider Last Rate Last Admin    0.9%  NaCl infusion   Intravenous Continuous Ronald Young MD        LIDOcaine (PF) 10 mg/ml (1%) injection 10 mg  1 mL Intradermal Once Ronald Young MD           Patient Care Team:  Teri Soto DO as PCP - General (Family Medicine)  Tracie Kessler LPN as Care Coordinator  Lb Tracy OD (Optometry)         - The patient is given an After Visit Summary that lists all medications with directions, allergies, education, orders placed during this encounter and follow-up instructions.      - I  have reviewed the patient's medical information including past medical, family, and social history sections including the medications and allergies.      - We discussed the patient's current medications.     This note was created by combination of typed  and MModal dictation.  Transcription errors may be present.  If there are any questions, please contact me.       Glenna Cross NP

## 2024-02-29 ENCOUNTER — TELEPHONE (OUTPATIENT)
Dept: FAMILY MEDICINE | Facility: CLINIC | Age: 74
End: 2024-02-29
Payer: MEDICARE

## 2024-02-29 ENCOUNTER — HOSPITAL ENCOUNTER (OUTPATIENT)
Dept: RADIOLOGY | Facility: HOSPITAL | Age: 74
Discharge: HOME | End: 2024-02-29
Payer: MEDICARE

## 2024-02-29 DIAGNOSIS — M79.671 RIGHT FOOT PAIN: ICD-10-CM

## 2024-02-29 PROCEDURE — 73630 X-RAY EXAM OF FOOT: CPT | Mod: 26,RT,, | Performed by: RADIOLOGY

## 2024-02-29 PROCEDURE — 73630 X-RAY EXAM OF FOOT: CPT | Mod: TC,FY,PO,RT

## 2024-02-29 PROCEDURE — 73610 X-RAY EXAM OF ANKLE: CPT | Mod: TC,FY,PO,RT

## 2024-02-29 PROCEDURE — 73610 X-RAY EXAM OF ANKLE: CPT | Mod: 26,RT,, | Performed by: RADIOLOGY

## 2024-03-01 ENCOUNTER — TELEPHONE (OUTPATIENT)
Dept: FAMILY MEDICINE | Facility: CLINIC | Age: 74
End: 2024-03-01
Payer: MEDICARE

## 2024-03-01 NOTE — TELEPHONE ENCOUNTER
----- Message from Sherrie Brown sent at 3/1/2024  3:22 PM CST -----  Regarding: self [phone]  .Type: Patient Call Back    Who called:self    What is the request in detail: patient requesting a call back to discuss recent lab results.    Can the clinic reply by [facility]? no    Would the patient rather a call back or a response via [facility]? Call back    Best call back number [phone]

## 2024-03-04 ENCOUNTER — TELEPHONE (OUTPATIENT)
Dept: FAMILY MEDICINE | Facility: CLINIC | Age: 74
End: 2024-03-04
Payer: MEDICARE

## 2024-03-04 DIAGNOSIS — M79.671 RIGHT FOOT PAIN: Primary | ICD-10-CM

## 2024-03-04 NOTE — TELEPHONE ENCOUNTER
Discussed x-ray result and lab results. Foot pain has improved since starting the steroid. Will refer to ortho to evaluated possible stress fracture in the foot.

## 2024-03-14 ENCOUNTER — NURSE TRIAGE (OUTPATIENT)
Dept: ADMINISTRATIVE | Facility: CLINIC | Age: 74
End: 2024-03-14
Payer: MEDICARE

## 2024-03-15 ENCOUNTER — OFFICE VISIT (OUTPATIENT)
Dept: FAMILY MEDICINE | Facility: CLINIC | Age: 74
End: 2024-03-15
Payer: MEDICARE

## 2024-03-15 ENCOUNTER — LAB VISIT (OUTPATIENT)
Dept: LAB | Facility: HOSPITAL | Age: 74
End: 2024-03-15
Payer: MEDICARE

## 2024-03-15 VITALS
SYSTOLIC BLOOD PRESSURE: 128 MMHG | BODY MASS INDEX: 28.02 KG/M2 | HEIGHT: 67 IN | WEIGHT: 178.56 LBS | TEMPERATURE: 98 F | DIASTOLIC BLOOD PRESSURE: 76 MMHG

## 2024-03-15 DIAGNOSIS — E86.0 DEHYDRATION: ICD-10-CM

## 2024-03-15 DIAGNOSIS — R42 DIZZINESS: Primary | ICD-10-CM

## 2024-03-15 DIAGNOSIS — R42 DIZZINESS: ICD-10-CM

## 2024-03-15 DIAGNOSIS — R93.89 ABNORMAL FINDINGS ON DIAGNOSTIC IMAGING OF OTHER SPECIFIED BODY STRUCTURES: ICD-10-CM

## 2024-03-15 DIAGNOSIS — I10 ESSENTIAL HYPERTENSION, BENIGN: ICD-10-CM

## 2024-03-15 LAB
ALBUMIN SERPL BCP-MCNC: 3.3 G/DL (ref 3.5–5.2)
ALP SERPL-CCNC: 82 U/L (ref 55–135)
ALT SERPL W/O P-5'-P-CCNC: 10 U/L (ref 10–44)
ANION GAP SERPL CALC-SCNC: 9 MMOL/L (ref 8–16)
AST SERPL-CCNC: 8 U/L (ref 10–40)
BASOPHILS # BLD AUTO: 0.03 K/UL (ref 0–0.2)
BASOPHILS NFR BLD: 0.4 % (ref 0–1.9)
BILIRUB SERPL-MCNC: 0.3 MG/DL (ref 0.1–1)
BUN SERPL-MCNC: 25 MG/DL (ref 8–23)
CALCIUM SERPL-MCNC: 10.1 MG/DL (ref 8.7–10.5)
CHLORIDE SERPL-SCNC: 111 MMOL/L (ref 95–110)
CO2 SERPL-SCNC: 21 MMOL/L (ref 23–29)
CREAT SERPL-MCNC: 1.4 MG/DL (ref 0.5–1.4)
DIFFERENTIAL METHOD BLD: ABNORMAL
EOSINOPHIL # BLD AUTO: 0.2 K/UL (ref 0–0.5)
EOSINOPHIL NFR BLD: 2.3 % (ref 0–8)
ERYTHROCYTE [DISTWIDTH] IN BLOOD BY AUTOMATED COUNT: 16 % (ref 11.5–14.5)
EST. GFR  (NO RACE VARIABLE): 40.2 ML/MIN/1.73 M^2
GLUCOSE SERPL-MCNC: 153 MG/DL (ref 70–110)
HCT VFR BLD AUTO: 32.5 % (ref 37–48.5)
HGB BLD-MCNC: 9.9 G/DL (ref 12–16)
IMM GRANULOCYTES # BLD AUTO: 0.08 K/UL (ref 0–0.04)
IMM GRANULOCYTES NFR BLD AUTO: 1.1 % (ref 0–0.5)
LYMPHOCYTES # BLD AUTO: 1.4 K/UL (ref 1–4.8)
LYMPHOCYTES NFR BLD: 18.6 % (ref 18–48)
MCH RBC QN AUTO: 26.2 PG (ref 27–31)
MCHC RBC AUTO-ENTMCNC: 30.5 G/DL (ref 32–36)
MCV RBC AUTO: 86 FL (ref 82–98)
MONOCYTES # BLD AUTO: 0.6 K/UL (ref 0.3–1)
MONOCYTES NFR BLD: 7.7 % (ref 4–15)
NEUTROPHILS # BLD AUTO: 5.2 K/UL (ref 1.8–7.7)
NEUTROPHILS NFR BLD: 69.9 % (ref 38–73)
NRBC BLD-RTO: 0 /100 WBC
PLATELET # BLD AUTO: 318 K/UL (ref 150–450)
PMV BLD AUTO: 10.8 FL (ref 9.2–12.9)
POTASSIUM SERPL-SCNC: 4.6 MMOL/L (ref 3.5–5.1)
PROT SERPL-MCNC: 6.6 G/DL (ref 6–8.4)
RBC # BLD AUTO: 3.78 M/UL (ref 4–5.4)
SODIUM SERPL-SCNC: 141 MMOL/L (ref 136–145)
TSH SERPL DL<=0.005 MIU/L-ACNC: 1.49 UIU/ML (ref 0.4–4)
WBC # BLD AUTO: 7.38 K/UL (ref 3.9–12.7)

## 2024-03-15 PROCEDURE — 85025 COMPLETE CBC W/AUTO DIFF WBC: CPT | Performed by: FAMILY MEDICINE

## 2024-03-15 PROCEDURE — 3044F HG A1C LEVEL LT 7.0%: CPT | Mod: CPTII,S$GLB,, | Performed by: FAMILY MEDICINE

## 2024-03-15 PROCEDURE — 1159F MED LIST DOCD IN RCRD: CPT | Mod: CPTII,S$GLB,, | Performed by: FAMILY MEDICINE

## 2024-03-15 PROCEDURE — 3078F DIAST BP <80 MM HG: CPT | Mod: CPTII,S$GLB,, | Performed by: FAMILY MEDICINE

## 2024-03-15 PROCEDURE — 4010F ACE/ARB THERAPY RXD/TAKEN: CPT | Mod: CPTII,S$GLB,, | Performed by: FAMILY MEDICINE

## 2024-03-15 PROCEDURE — 99999 PR PBB SHADOW E&M-EST. PATIENT-LVL IV: CPT | Mod: PBBFAC,,, | Performed by: FAMILY MEDICINE

## 2024-03-15 PROCEDURE — 3074F SYST BP LT 130 MM HG: CPT | Mod: CPTII,S$GLB,, | Performed by: FAMILY MEDICINE

## 2024-03-15 PROCEDURE — 84443 ASSAY THYROID STIM HORMONE: CPT | Performed by: FAMILY MEDICINE

## 2024-03-15 PROCEDURE — 3288F FALL RISK ASSESSMENT DOCD: CPT | Mod: CPTII,S$GLB,, | Performed by: FAMILY MEDICINE

## 2024-03-15 PROCEDURE — 1101F PT FALLS ASSESS-DOCD LE1/YR: CPT | Mod: CPTII,S$GLB,, | Performed by: FAMILY MEDICINE

## 2024-03-15 PROCEDURE — 99214 OFFICE O/P EST MOD 30 MIN: CPT | Mod: S$GLB,,, | Performed by: FAMILY MEDICINE

## 2024-03-15 PROCEDURE — 80053 COMPREHEN METABOLIC PANEL: CPT | Performed by: FAMILY MEDICINE

## 2024-03-15 PROCEDURE — 3008F BODY MASS INDEX DOCD: CPT | Mod: CPTII,S$GLB,, | Performed by: FAMILY MEDICINE

## 2024-03-15 PROCEDURE — 36415 COLL VENOUS BLD VENIPUNCTURE: CPT | Mod: PO | Performed by: FAMILY MEDICINE

## 2024-03-15 PROCEDURE — 1126F AMNT PAIN NOTED NONE PRSNT: CPT | Mod: CPTII,S$GLB,, | Performed by: FAMILY MEDICINE

## 2024-03-15 NOTE — TELEPHONE ENCOUNTER
Had a dizzy spell today. Was almost like when she had the TIA. Dizzy spell was about 5 pm. Call dropped while triage nurse was attempting to schedule an appt. Numerous documented attempts made to contact the patient without success. Message left with appt information.  Reason for Disposition   [1] NO dizziness now AND [2] one or more stroke risk factors (i.e., hypertension, diabetes, prior stroke/TIA/heart attack)    Additional Information   Negative: [1] Weakness (i.e., paralysis, loss of muscle strength) of the face, arm or leg on one side of the body AND [2] sudden onset AND [3] present now   Negative: [1] Numbness (i.e., loss of sensation) of the face, arm or leg on one side of the body AND [2] sudden onset AND [3] present now   Negative: [1] Loss of speech or garbled speech AND [2] sudden onset AND [3] present now   Negative: Difficult to awaken or acting confused (e.g., disoriented, slurred speech)   Negative: Sounds like a life-threatening emergency to the triager   Negative: Followed a head injury   Negative: Followed an ear injury   Negative: Localized weakness or numbness is main symptom   Negative: Dizziness relates to riding in a car, going to an amusement park, etc.   Negative: [1] Dizziness is main symptom AND [2] NO spinning sensation (i.e., vertigo)   Negative: SEVERE dizziness (vertigo) (e.g., unable to walk without assistance)   Negative: Severe headache (e.g., excruciating)   (Exception: Similar to previous migraines.)   Negative: [1] Dizziness (vertigo) present now AND [2] one or more STROKE RISK FACTORS (i.e., hypertension, diabetes, prior stroke/TIA, heart attack)  (Exception: Prior doctor or NP/PA evaluation for this AND no different/worse than usual.)   Negative: [1] Dizziness (vertigo) present now AND [2] age > 59   (Exception: Prior doctor or NP/PA evaluation for this AND no different/worse than usual.)   Negative: [1] Weakness (i.e., paralysis, loss of muscle strength) of the face, arm /  hand, or leg / foot on one side of the body AND [2] sudden onset AND [3] brief (now gone)   Negative: [1] Numbness (i.e., loss of sensation) of the face, arm / hand, or leg / foot on one side of the body AND [2] sudden onset AND [3] brief (now gone)   Negative: [1] Loss of speech or garbled speech AND [2] sudden onset AND [3] brief (now gone)   Negative: Loss of vision or double vision  (Exception: Similar to previous migraines.)   Negative: Patient sounds very sick or weak to the triager   Negative: Taking a medicine that could cause dizziness (e.g., phenytoin [Dilantin], carbamazepine [Tegretol], primidone [Mysoline])   Negative: [1] MODERATE dizziness (e.g., vertigo; feels very unsteady, interferes with normal activities) AND [2] has NOT been evaluated by doctor (or NP/PA) for this    Protocols used: Dizziness - Vertigo-A-

## 2024-03-15 NOTE — PROGRESS NOTES
[facility]  Progress Note    SUBJECTIVE:     Chief Complaint   Patient presents with    Dizziness       HPI   Joanne Peña  is a 73 y.o. female here for c/o having an episode of dizziness past week. Was driving so had to pull over. Onset was sudden, and lasted about 1-2 minutes. [place] like the room was spinning. She was not looking around, just straight ahead when it happened. Admits doesn't drink any water.    Review of patient's allergies indicates:   Allergen Reactions    Lisinopril-hydrochlorothiazide Other (See Comments)     Pt stated it makes her feel drained. She couldn't raise arms over head.    Ampicillin Rash    Darvocet a500 [propoxyphene n-acetaminophen] Other (See Comments)     shaky       Past Medical History:   Diagnosis Date    Diabetes mellitus     Hyperlipidemia     Hypertension     Stroke      Past Surgical History:   Procedure Laterality Date    COLONOSCOPY N/A 7/12/2023    Procedure: COLONOSCOPY;  Surgeon: Ray Sorensen MD;  Location: [facility];  Service: Endoscopy;  Laterality: N/A;  instr via portal  - PC  4/10/23 Daniel, instr via mail & portal, unable to tolerate Golytely- request Miralax/Gatorade - PC  5/30-pt r/s-new instr portal-tb    INJECTION OF ANESTHETIC AGENT AROUND MEDIAL BRANCH NERVES INNERVATING LUMBAR FACET JOINT Bilateral 4/20/2023    Procedure: MBB #1 (B/L) L3,4,5;  Surgeon: Son Lara DO;  Location: [facility] OR;  Service: Pain Management;  Laterality: Bilateral;  ORAL XANAX    INJECTION OF ANESTHETIC AGENT AROUND MEDIAL BRANCH NERVES INNERVATING LUMBAR FACET JOINT Bilateral 5/5/2023    Procedure: MBB #2 (B/L) L3,4,5;  Surgeon: Son Lara DO;  Location: [facility] OR;  Service: Pain Management;  Laterality: Bilateral;  Oral Xanax    INJECTION OF JOINT Right 5/20/2022    Procedure: Right SI joint injection;  Surgeon: Son Lara DO;  Location: [facility] OR;  Service: Pain Management;  Laterality: Right;    INJECTION, SACROILIAC JOINT Right  2023    Procedure: RT SI joint Inj;  Surgeon: Son Lara DO;  Location: [facility] PAIN MANAGEMENT;  Service: Pain Management;  Laterality: Right;  oral    RADIOFREQUENCY ABLATION OF LUMBAR MEDIAL BRANCH NERVE AT SINGLE LEVEL Bilateral 2023    Procedure: RFA (B/L) L3,4,5;  Surgeon: Son Lara DO;  Location: [facility] PAIN MANAGEMENT;  Service: Pain Management;  Laterality: Bilateral;     Family History   Problem Relation Age of Onset    Kidney disease Mother     Heart disease Mother     Cancer Father     Hypertension Brother     Hypertension Daughter     Cancer Maternal Aunt      Social History     Tobacco Use    Smoking status: Former     Current packs/day: 0.00     Types: Cigarettes     Quit date: 2022     Years since quittin.1     Passive exposure: Past    Smokeless tobacco: Never   Substance Use Topics    Alcohol use: Yes     Comment: rare    Drug use: No        Review of Systems   Constitutional:  Negative for chills and fever.   HENT: Negative.     Respiratory: Negative.  Negative for shortness of breath.    Cardiovascular: Negative.  Negative for chest pain.   Gastrointestinal: Negative.  Negative for abdominal pain, nausea and vomiting.   Genitourinary: Negative.    Neurological:  Positive for dizziness. Negative for headaches.   All other systems reviewed and are negative.    OBJECTIVE:     Vitals:    03/15/24 0954   BP: 128/76   Temp: 98.2 °F (36.8 °C)     Body mass index is 27.97 kg/m².    Physical Exam  Constitutional:       General: She is not in acute distress.     Appearance: Normal appearance. She is not diaphoretic.   HENT:      Head: Normocephalic and atraumatic.      Right Ear: External ear normal. No hemotympanum. Tympanic membrane is not perforated, erythematous, retracted or bulging.      Left Ear: External ear normal. No hemotympanum. Tympanic membrane is not perforated, erythematous, retracted or bulging.      Mouth/Throat:      Pharynx: No oropharyngeal  exudate.   Eyes:      Conjunctiva/sclera: Conjunctivae normal.   Pulmonary:      Effort: Pulmonary effort is normal.   Neurological:      Mental Status: She is alert and oriented to person, place, and time.         Old records were reviewed. Labs and/or images were independently reviewed.    ASSESSMENT     1. Dizziness    2. Dehydration    3. Essential hypertension, benign    4. Abnormal findings on diagnostic imaging of other specified body structures        PLAN:     Dizziness/Dehydration  -     CBC Auto Differential; Future  -     Comprehensive Metabolic Panel; Future  -     TSH; Future  -     likely dehydration. Advised to increase water intake. BP good, and ears are clear. Check labs.    Essential hypertension, benign   -     Stable. Continue current regimen.      RTC PRN  30 minutes of total time spent on the encounter, which includes face to face time and non-face to face time preparing to see the patient (eg, review of tests), Obtaining and/or reviewing separately obtained history, Documenting clinical information in the electronic or other health record, Independently interpreting results (not separately reported), communicating results to the patient/family/caregiver, and/or Care coordination (not separately reported).     Ivan Francis MD  03/15/2024 10:16 AM

## 2024-03-21 ENCOUNTER — TELEPHONE (OUTPATIENT)
Dept: ELECTROPHYSIOLOGY | Facility: CLINIC | Age: 74
End: 2024-03-21
Payer: MEDICARE

## 2024-03-27 ENCOUNTER — HOSPITAL ENCOUNTER (OUTPATIENT)
Dept: RADIOLOGY | Facility: HOSPITAL | Age: 74
Discharge: HOME | End: 2024-03-27
Attending: FAMILY MEDICINE
Payer: MEDICARE

## 2024-03-27 DIAGNOSIS — Z12.31 SCREENING MAMMOGRAM FOR BREAST CANCER: ICD-10-CM

## 2024-03-27 PROCEDURE — 77067 SCR MAMMO BI INCL CAD: CPT | Mod: 26,,, | Performed by: RADIOLOGY

## 2024-03-27 PROCEDURE — 77063 BREAST TOMOSYNTHESIS BI: CPT | Mod: TC,PO

## 2024-03-27 PROCEDURE — 77063 BREAST TOMOSYNTHESIS BI: CPT | Mod: 26,,, | Performed by: RADIOLOGY

## 2024-04-04 DIAGNOSIS — I51.7 LEFT ATRIAL ENLARGEMENT: Primary | ICD-10-CM

## 2024-04-09 ENCOUNTER — OFFICE VISIT (OUTPATIENT)
Dept: ELECTROPHYSIOLOGY | Facility: CLINIC | Age: 74
End: 2024-04-09
Payer: MEDICARE

## 2024-04-09 ENCOUNTER — HOSPITAL ENCOUNTER (OUTPATIENT)
Dept: CARDIOLOGY | Facility: CLINIC | Age: 74
Discharge: HOME | End: 2024-04-09
Payer: MEDICARE

## 2024-04-09 VITALS
WEIGHT: 170 LBS | BODY MASS INDEX: 26.68 KG/M2 | HEART RATE: 88 BPM | SYSTOLIC BLOOD PRESSURE: 125 MMHG | HEIGHT: 67 IN | DIASTOLIC BLOOD PRESSURE: 64 MMHG

## 2024-04-09 DIAGNOSIS — E11.69 DYSLIPIDEMIA ASSOCIATED WITH TYPE 2 DIABETES MELLITUS: ICD-10-CM

## 2024-04-09 DIAGNOSIS — N18.32 STAGE 3B CHRONIC KIDNEY DISEASE: ICD-10-CM

## 2024-04-09 DIAGNOSIS — E78.5 DYSLIPIDEMIA ASSOCIATED WITH TYPE 2 DIABETES MELLITUS: ICD-10-CM

## 2024-04-09 DIAGNOSIS — R26.89 IMPAIRED BALANCE AS LATE EFFECT OF CEREBROVASCULAR ACCIDENT: ICD-10-CM

## 2024-04-09 DIAGNOSIS — R42 DIZZINESS: ICD-10-CM

## 2024-04-09 DIAGNOSIS — I51.7 LEFT ATRIAL ENLARGEMENT: ICD-10-CM

## 2024-04-09 DIAGNOSIS — I69.398 IMPAIRED BALANCE AS LATE EFFECT OF CEREBROVASCULAR ACCIDENT: ICD-10-CM

## 2024-04-09 DIAGNOSIS — Z86.73 HISTORY OF STROKE: Primary | ICD-10-CM

## 2024-04-09 DIAGNOSIS — I44.7 LBBB (LEFT BUNDLE BRANCH BLOCK): ICD-10-CM

## 2024-04-09 LAB
OHS QRS DURATION: 136 MS
OHS QTC CALCULATION: 503 MS

## 2024-04-09 PROCEDURE — 3288F FALL RISK ASSESSMENT DOCD: CPT | Mod: CPTII,S$GLB,, | Performed by: INTERNAL MEDICINE

## 2024-04-09 PROCEDURE — 99215 OFFICE O/P EST HI 40 MIN: CPT | Mod: S$GLB,,, | Performed by: INTERNAL MEDICINE

## 2024-04-09 PROCEDURE — 3044F HG A1C LEVEL LT 7.0%: CPT | Mod: CPTII,S$GLB,, | Performed by: INTERNAL MEDICINE

## 2024-04-09 PROCEDURE — 1101F PT FALLS ASSESS-DOCD LE1/YR: CPT | Mod: CPTII,S$GLB,, | Performed by: INTERNAL MEDICINE

## 2024-04-09 PROCEDURE — 3074F SYST BP LT 130 MM HG: CPT | Mod: CPTII,S$GLB,, | Performed by: INTERNAL MEDICINE

## 2024-04-09 PROCEDURE — 1159F MED LIST DOCD IN RCRD: CPT | Mod: CPTII,S$GLB,, | Performed by: INTERNAL MEDICINE

## 2024-04-09 PROCEDURE — 4010F ACE/ARB THERAPY RXD/TAKEN: CPT | Mod: CPTII,S$GLB,, | Performed by: INTERNAL MEDICINE

## 2024-04-09 PROCEDURE — 93005 ELECTROCARDIOGRAM TRACING: CPT | Mod: S$GLB,,, | Performed by: INTERNAL MEDICINE

## 2024-04-09 PROCEDURE — 3078F DIAST BP <80 MM HG: CPT | Mod: CPTII,S$GLB,, | Performed by: INTERNAL MEDICINE

## 2024-04-09 PROCEDURE — 3008F BODY MASS INDEX DOCD: CPT | Mod: CPTII,S$GLB,, | Performed by: INTERNAL MEDICINE

## 2024-04-09 PROCEDURE — 99999 PR PBB SHADOW E&M-EST. PATIENT-LVL IV: CPT | Mod: PBBFAC,,, | Performed by: INTERNAL MEDICINE

## 2024-04-09 PROCEDURE — 93010 ELECTROCARDIOGRAM REPORT: CPT | Mod: S$GLB,,, | Performed by: INTERNAL MEDICINE

## 2024-04-09 PROCEDURE — 1126F AMNT PAIN NOTED NONE PRSNT: CPT | Mod: CPTII,S$GLB,, | Performed by: INTERNAL MEDICINE

## 2024-04-09 PROCEDURE — 1160F RVW MEDS BY RX/DR IN RCRD: CPT | Mod: CPTII,S$GLB,, | Performed by: INTERNAL MEDICINE

## 2024-04-09 NOTE — PROGRESS NOTES
Subjective:    Patient ID:  Joanne Peña is a 73 y.o. female who presents for evaluation of history of stroke    Referring Physician: Nathaniel Regalado MD  Primary Care Physician: DO MADHAV Fernandez  Prior Hx:  I had the pleasure of seeing Mrs. Peña today in our electrophysiology clinic in follow-up for her cryptogenic stroke. 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 She was in her usual state of health until 2/21/2022 when she developed sudden onset of dizziness, blurry vision, trouble speaking, and nausea/vomiting which subsequently resolved. She was evaluated in the ER. Scans noted evolving left cerebellar and genny infarct. She was discharged on plavix x 3 weeks then switched to aspirin. ECHO noted preserved LV function and a negative bubble study but with severe left atrial enlargement. She was in sinus rhythm. Discussed ILR for long-term rhythm monitoring if an extended monitor showed no AF. 14 day holter noted nsAT but no AF. She elected to not proceed with ILR implant at that time.    Interim Hx:  Mrs. Peña returns for follow-up. Reports she had an episode of sudden weakness resulting in a fall on her tail bone in January of 2024. Unsure of LOC but did not hit her head. She is interested in proceeding with ILR.    I reviewed available electrocardiograms including today's in clinic ECG which note sinus rhythm with LBBB.    Review of Systems   Constitutional: Negative for fever and malaise/fatigue.   HENT:  Negative for congestion and sore throat.    Eyes:  Negative for blurred vision and visual disturbance.   Cardiovascular:  Positive for near-syncope. Negative for chest pain, dyspnea on exertion, irregular heartbeat, palpitations and syncope.   Respiratory:  Negative for cough and shortness of breath.    Hematologic/Lymphatic: Negative for bleeding problem. Does not bruise/bleed easily.   Skin:  Negative.    Musculoskeletal:  Positive for arthritis and joint pain.   Gastrointestinal:  Negative for bloating, abdominal pain, hematochezia and melena.   Neurological:  Negative for focal weakness and weakness.   Psychiatric/Behavioral: Negative.          Objective:    Physical Exam  Vitals reviewed.   Constitutional:       General: She is not in acute distress.     Appearance: She is well-developed. She is not diaphoretic.   HENT:      Head: Normocephalic and atraumatic.   Eyes:      General:         Right eye: No discharge.         Left eye: No discharge.      Conjunctiva/sclera: Conjunctivae normal.   Cardiovascular:      Rate and Rhythm: Normal rate and regular rhythm.      Heart sounds: No murmur heard.     No friction rub. No gallop.   Pulmonary:      Effort: Pulmonary effort is normal. No respiratory distress.      Breath sounds: Normal breath sounds. No wheezing or rales.   Abdominal:      General: Bowel sounds are normal. There is no distension.      Palpations: Abdomen is soft.      Tenderness: There is no abdominal tenderness.   Musculoskeletal:      Cervical back: Neck supple.   Skin:     General: Skin is warm and dry.   Neurological:      Mental Status: She is alert and oriented to person, place, and time.   Psychiatric:         Behavior: Behavior normal.         Thought Content: Thought content normal.         Judgment: Judgment normal.           Assessment:       1. History of stroke    2. Impaired balance as late effect of cerebrovascular accident    3. Left atrial enlargement    4. Stage 3b chronic kidney disease    5. Dyslipidemia associated with type 2 diabetes mellitus    6. Dizziness    7. 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 We discussed the potential etiology of undiagnosed atrial arrhythmias (atrial fibrillation, atrial flutter) as  possible culprits for recurrent strokes. We discussed how stroke prevention strategies are different in the setting of atrial fibrillation and flutter with use of anticoagulation. To date no atrial arrhythmias have been observed however she has risk factors for atrial fibrillation. Discussed the benefit of long-term heart rhythm monitoring with an implantable loop recorder. Discussed the risks of implantation including pain, infection, bleeding and rare risk of device erosion. She desires to proceed. Prior extended holter noted no AF. Now having falls with questionable near syncope and new LBBB. ILR implantation for bradycardia monitoring is also warranted.    Plan  ILR implant   ECHO    Thank you for allowing me to participate in the care of this patient. Please do not hesitate to call me with any questions or concerns.    Hunter Rich MD, PhD  Cardiac Electrophysiology

## 2024-04-18 ENCOUNTER — HOSPITAL ENCOUNTER (OUTPATIENT)
Dept: CARDIOLOGY | Facility: HOSPITAL | Age: 74
Discharge: HOME | End: 2024-04-18
Attending: INTERNAL MEDICINE
Payer: MEDICARE

## 2024-04-18 ENCOUNTER — PATIENT MESSAGE (OUTPATIENT)
Dept: ELECTROPHYSIOLOGY | Facility: CLINIC | Age: 74
End: 2024-04-18
Payer: MEDICARE

## 2024-04-18 ENCOUNTER — TELEPHONE (OUTPATIENT)
Dept: ELECTROPHYSIOLOGY | Facility: CLINIC | Age: 74
End: 2024-04-18
Payer: MEDICARE

## 2024-04-18 DIAGNOSIS — R42 DIZZINESS: ICD-10-CM

## 2024-04-18 DIAGNOSIS — I44.7 LBBB (LEFT BUNDLE BRANCH BLOCK): ICD-10-CM

## 2024-04-18 LAB
ASCENDING AORTA: 3.28 CM
AV INDEX (PROSTH): 0.52
AV MEAN GRADIENT: 7 MMHG
AV PEAK GRADIENT: 14 MMHG
AV VALVE AREA BY VELOCITY RATIO: 1.48 CM²
AV VALVE AREA: 1.63 CM²
AV VELOCITY RATIO: 0.47
CV ECHO LV RWT: 0.67 CM
DOP CALC AO PEAK VEL: 1.84 M/S
DOP CALC AO VTI: 28.1 CM
DOP CALC LVOT AREA: 3.1 CM2
DOP CALC LVOT DIAMETER: 2 CM
DOP CALC LVOT PEAK VEL: 0.87 M/S
DOP CALC LVOT STROKE VOLUME: 45.84 CM3
DOP CALCLVOT PEAK VEL VTI: 14.6 CM
ECHO LV POSTERIOR WALL: 1.48 CM (ref 0.6–1.1)
FRACTIONAL SHORTENING: 18 % (ref 28–44)
INTERVENTRICULAR SEPTUM: 1.49 CM (ref 0.6–1.1)
IVC DIAMETER: 1.57 CM
LA MAJOR: 4.8 CM
LA MINOR: 4.11 CM
LA WIDTH: 4.1 CM
LEFT ATRIUM SIZE: 3.64 CM
LEFT ATRIUM VOLUME: 56.17 CM3
LEFT INTERNAL DIMENSION IN SYSTOLE: 3.63 CM (ref 2.1–4)
LEFT VENTRICLE DIASTOLIC VOLUME: 87.47 ML
LEFT VENTRICLE SYSTOLIC VOLUME: 55.49 ML
LEFT VENTRICULAR INTERNAL DIMENSION IN DIASTOLE: 4.4 CM (ref 3.5–6)
LEFT VENTRICULAR MASS: 262.79 G
LVOT MG: 1.87 MMHG
LVOT MV: 0.65 CM/S
MV PEAK E VEL: 0.45 M/S
OHS CV RV/LV RATIO: 0.76 CM
PISA TR MAX VEL: 1.79 M/S
PV PEAK GRADIENT: 4 MMHG
PV PEAK VELOCITY: 0.97 M/S
RA MAJOR: 3.89 CM
RA PRESSURE ESTIMATED: 3 MMHG
RA WIDTH: 3.39 CM
RIGHT VENTRICULAR END-DIASTOLIC DIMENSION: 3.36 CM
RV TB RVSP: 5 MMHG
SINUS: 3.54 CM
STJ: 2.03 CM
TR MAX PG: 13 MMHG
TRICUSPID ANNULAR PLANE SYSTOLIC EXCURSION: 1.59 CM
TV REST PULMONARY ARTERY PRESSURE: 16 MMHG

## 2024-04-18 PROCEDURE — 93306 TTE W/DOPPLER COMPLETE: CPT

## 2024-04-18 PROCEDURE — 93306 TTE W/DOPPLER COMPLETE: CPT | Mod: 26,,, | Performed by: INTERNAL MEDICINE

## 2024-04-18 NOTE — TELEPHONE ENCOUNTER
Attempted to reach patient and then daughter regarding ECHO results and recommendation for a PET stress test (LVEF not normal with wall motion abnormalities, could be all due to left bundle). Would like to do this prior to ILR implant. No answer on either phone. Will try tomorrow.

## 2024-04-19 ENCOUNTER — TELEPHONE (OUTPATIENT)
Dept: ELECTROPHYSIOLOGY | Facility: CLINIC | Age: 74
End: 2024-04-19
Payer: MEDICARE

## 2024-04-19 DIAGNOSIS — R93.1 ABNORMAL ECHOCARDIOGRAM: Primary | ICD-10-CM

## 2024-04-19 DIAGNOSIS — R42 DIZZINESS: ICD-10-CM

## 2024-04-19 RX ORDER — MECLIZINE HYDROCHLORIDE 25 MG/1
25 TABLET ORAL 2 TIMES DAILY PRN
Qty: 60 TABLET | Refills: 0 | Status: SHIPPED | OUTPATIENT
Start: 2024-04-19 | End: 2024-05-17

## 2024-04-19 NOTE — TELEPHONE ENCOUNTER
----- Message from Yana Car MA sent at 4/19/2024  3:29 PM CDT -----  Regarding: RE: Results  Patient wants to speak with you or Dr Rich regarding results.    Thanks  ----- Message -----  From: Casie Renner  Sent: 4/19/2024   2:07 PM CDT  To: [facility] Staff  Subject: Results                                          Patient calling to speak to staff regarding her test results. Please call back @ [phone]. Thank you Casie

## 2024-04-19 NOTE — TELEPHONE ENCOUNTER
No care due was identified.  [facility] Embedded Care Due Messages. Reference number: [id number].   4/19/2024 11:17:29 AM CDT

## 2024-04-19 NOTE — TELEPHONE ENCOUNTER
----- Message from Geno Batista sent at 4/19/2024 10:23 AM CDT -----  Can the clinic reply in [facility]: no           Please refill the medication(s) listed below. Please call the patient when the prescription(s) is ready for  at this phone number   Telephone Information:  Mobile          [phone]               Medication #1 antibert                  Preferred Pharmacy:       [facility]/pharmacy #[id number] - [place] - [phone]  [place] [id number]  Phone: [phone] Fax: [phone]

## 2024-04-19 NOTE — TELEPHONE ENCOUNTER
Called patient again to discuss ECHO results and recommendation for a PET stress test prior to ILR. No answer. Voicemail left.

## 2024-04-22 ENCOUNTER — PATIENT MESSAGE (OUTPATIENT)
Dept: ELECTROPHYSIOLOGY | Facility: CLINIC | Age: 74
End: 2024-04-22
Payer: MEDICARE

## 2024-04-22 ENCOUNTER — TELEPHONE (OUTPATIENT)
Dept: ELECTROPHYSIOLOGY | Facility: CLINIC | Age: 74
End: 2024-04-22
Payer: MEDICARE

## 2024-04-24 ENCOUNTER — TELEPHONE (OUTPATIENT)
Dept: ELECTROPHYSIOLOGY | Facility: CLINIC | Age: 74
End: 2024-04-24
Payer: MEDICARE

## 2024-04-24 ENCOUNTER — PATIENT MESSAGE (OUTPATIENT)
Dept: ELECTROPHYSIOLOGY | Facility: CLINIC | Age: 74
End: 2024-04-24
Payer: MEDICARE

## 2024-04-29 DIAGNOSIS — E11.65 TYPE 2 DIABETES MELLITUS WITH HYPERGLYCEMIA, WITHOUT LONG-TERM CURRENT USE OF INSULIN: ICD-10-CM

## 2024-04-29 DIAGNOSIS — I49.9 CARDIAC ARRHYTHMIA, UNSPECIFIED CARDIAC ARRHYTHMIA TYPE: ICD-10-CM

## 2024-04-29 DIAGNOSIS — Z86.73 HISTORY OF STROKE: Primary | ICD-10-CM

## 2024-04-29 DIAGNOSIS — N18.32 STAGE 3B CHRONIC KIDNEY DISEASE: ICD-10-CM

## 2024-04-29 DIAGNOSIS — I44.7 LBBB (LEFT BUNDLE BRANCH BLOCK): ICD-10-CM

## 2024-04-29 DIAGNOSIS — I10 MALIGNANT ESSENTIAL HYPERTENSION: ICD-10-CM

## 2024-05-12 DIAGNOSIS — M54.16 LUMBAR RADICULOPATHY: ICD-10-CM

## 2024-05-12 DIAGNOSIS — M46.1 SACROILIITIS: ICD-10-CM

## 2024-05-13 ENCOUNTER — LAB VISIT (OUTPATIENT)
Dept: LAB | Facility: HOSPITAL | Age: 74
End: 2024-05-13
Attending: FAMILY MEDICINE
Payer: MEDICARE

## 2024-05-13 DIAGNOSIS — N18.32 STAGE 3B CHRONIC KIDNEY DISEASE: ICD-10-CM

## 2024-05-13 DIAGNOSIS — E11.65 TYPE 2 DIABETES MELLITUS WITH HYPERGLYCEMIA, WITHOUT LONG-TERM CURRENT USE OF INSULIN: ICD-10-CM

## 2024-05-13 DIAGNOSIS — E11.69 DYSLIPIDEMIA ASSOCIATED WITH TYPE 2 DIABETES MELLITUS: ICD-10-CM

## 2024-05-13 DIAGNOSIS — E78.5 DYSLIPIDEMIA ASSOCIATED WITH TYPE 2 DIABETES MELLITUS: ICD-10-CM

## 2024-05-13 DIAGNOSIS — Z86.73 HISTORY OF CVA (CEREBROVASCULAR ACCIDENT): ICD-10-CM

## 2024-05-13 DIAGNOSIS — I10 ESSENTIAL HYPERTENSION: ICD-10-CM

## 2024-05-13 LAB
ALBUMIN SERPL BCP-MCNC: 3.7 G/DL (ref 3.5–5.2)
ALP SERPL-CCNC: 80 U/L (ref 55–135)
ALT SERPL W/O P-5'-P-CCNC: 18 U/L (ref 10–44)
ANION GAP SERPL CALC-SCNC: 10 MMOL/L (ref 8–16)
AST SERPL-CCNC: 14 U/L (ref 10–40)
BASOPHILS # BLD AUTO: 0.02 K/UL (ref 0–0.2)
BASOPHILS NFR BLD: 0.3 % (ref 0–1.9)
BILIRUB SERPL-MCNC: 0.4 MG/DL (ref 0.1–1)
BUN SERPL-MCNC: 24 MG/DL (ref 8–23)
CALCIUM SERPL-MCNC: 9.3 MG/DL (ref 8.7–10.5)
CHLORIDE SERPL-SCNC: 112 MMOL/L (ref 95–110)
CHOLEST SERPL-MCNC: 188 MG/DL (ref 120–199)
CHOLEST/HDLC SERPL: 4.2 {RATIO} (ref 2–5)
CO2 SERPL-SCNC: 20 MMOL/L (ref 23–29)
CREAT SERPL-MCNC: 1.3 MG/DL (ref 0.5–1.4)
DIFFERENTIAL METHOD BLD: ABNORMAL
EOSINOPHIL # BLD AUTO: 0.1 K/UL (ref 0–0.5)
EOSINOPHIL NFR BLD: 2.3 % (ref 0–8)
ERYTHROCYTE [DISTWIDTH] IN BLOOD BY AUTOMATED COUNT: 16.1 % (ref 11.5–14.5)
EST. GFR  (NO RACE VARIABLE): 44 ML/MIN/1.73 M^2
ESTIMATED AVG GLUCOSE: 169 MG/DL (ref 68–131)
GLUCOSE SERPL-MCNC: 187 MG/DL (ref 70–110)
HBA1C MFR BLD: 7.5 % (ref 4–5.6)
HCT VFR BLD AUTO: 34.1 % (ref 37–48.5)
HDLC SERPL-MCNC: 45 MG/DL (ref 40–75)
HDLC SERPL: 23.9 % (ref 20–50)
HGB BLD-MCNC: 10.1 G/DL (ref 12–16)
IMM GRANULOCYTES # BLD AUTO: 0.04 K/UL (ref 0–0.04)
IMM GRANULOCYTES NFR BLD AUTO: 0.7 % (ref 0–0.5)
LDLC SERPL CALC-MCNC: 114.4 MG/DL (ref 63–159)
LYMPHOCYTES # BLD AUTO: 1.4 K/UL (ref 1–4.8)
LYMPHOCYTES NFR BLD: 22.3 % (ref 18–48)
MCH RBC QN AUTO: 25.6 PG (ref 27–31)
MCHC RBC AUTO-ENTMCNC: 29.6 G/DL (ref 32–36)
MCV RBC AUTO: 86 FL (ref 82–98)
MONOCYTES # BLD AUTO: 0.6 K/UL (ref 0.3–1)
MONOCYTES NFR BLD: 9.2 % (ref 4–15)
NEUTROPHILS # BLD AUTO: 4 K/UL (ref 1.8–7.7)
NEUTROPHILS NFR BLD: 65.2 % (ref 38–73)
NONHDLC SERPL-MCNC: 143 MG/DL
NRBC BLD-RTO: 1 /100 WBC
PLATELET # BLD AUTO: 384 K/UL (ref 150–450)
PMV BLD AUTO: 11.1 FL (ref 9.2–12.9)
POTASSIUM SERPL-SCNC: 4.5 MMOL/L (ref 3.5–5.1)
PROT SERPL-MCNC: 7.5 G/DL (ref 6–8.4)
RBC # BLD AUTO: 3.95 M/UL (ref 4–5.4)
SODIUM SERPL-SCNC: 142 MMOL/L (ref 136–145)
TRIGL SERPL-MCNC: 143 MG/DL (ref 30–150)
WBC # BLD AUTO: 6.11 K/UL (ref 3.9–12.7)

## 2024-05-13 PROCEDURE — 83036 HEMOGLOBIN GLYCOSYLATED A1C: CPT | Performed by: FAMILY MEDICINE

## 2024-05-13 PROCEDURE — 80053 COMPREHEN METABOLIC PANEL: CPT | Performed by: FAMILY MEDICINE

## 2024-05-13 PROCEDURE — 85025 COMPLETE CBC W/AUTO DIFF WBC: CPT | Performed by: FAMILY MEDICINE

## 2024-05-13 PROCEDURE — 80061 LIPID PANEL: CPT | Performed by: FAMILY MEDICINE

## 2024-05-13 PROCEDURE — 36415 COLL VENOUS BLD VENIPUNCTURE: CPT | Mod: PO | Performed by: FAMILY MEDICINE

## 2024-05-13 RX ORDER — CYCLOBENZAPRINE HCL 10 MG
10 TABLET ORAL 2 TIMES DAILY PRN
Qty: 180 TABLET | Refills: 1 | Status: SHIPPED | OUTPATIENT
Start: 2024-05-13 | End: 2024-11-09

## 2024-05-15 DIAGNOSIS — E11.65 UNCONTROLLED TYPE 2 DIABETES MELLITUS WITH HYPERGLYCEMIA: ICD-10-CM

## 2024-05-15 NOTE — TELEPHONE ENCOUNTER
No care due was identified.  [facility] Embedded Care Due Messages. Reference number: [id number].   5/15/2024 4:46:10 PM CDT

## 2024-05-15 NOTE — TELEPHONE ENCOUNTER
----- Message from Nora Nielson sent at 5/15/2024  4:25 PM CDT -----  Regarding: call back  Type: Patient Call Back    Who called: pt     What is the request in detail: requesting a call to discuss status of tirzepatide (MOUNJARO) 5 mg/0.5 mL PnIj rx, states pharmacy told her to contact provider to ensure authorized refills? May need prior auth submitted, pt is unsure     Can the clinic reply by [facility]?no    Would the patient rather a call back or a response via [facility]? call    Best call back number: [phone]     Additional Information:

## 2024-05-15 NOTE — TELEPHONE ENCOUNTER
Attempted to return to call to pt, no answer, left VM instructing pt that refill request has been submitted to PCP for review.

## 2024-05-15 NOTE — TELEPHONE ENCOUNTER
Pt returned call to clinic and states [facility] told her they do not have any more refills on file for Mounjaro. Pt informed that refill request has been sent to Dr. Soto for review.

## 2024-05-16 ENCOUNTER — PATIENT MESSAGE (OUTPATIENT)
Dept: ELECTROPHYSIOLOGY | Facility: CLINIC | Age: 74
End: 2024-05-16
Payer: MEDICARE

## 2024-05-16 ENCOUNTER — OFFICE VISIT (OUTPATIENT)
Dept: FAMILY MEDICINE | Facility: CLINIC | Age: 74
End: 2024-05-16
Payer: MEDICARE

## 2024-05-16 VITALS
WEIGHT: 183 LBS | SYSTOLIC BLOOD PRESSURE: 132 MMHG | TEMPERATURE: 98 F | HEIGHT: 67 IN | BODY MASS INDEX: 28.72 KG/M2 | OXYGEN SATURATION: 96 % | DIASTOLIC BLOOD PRESSURE: 78 MMHG | HEART RATE: 97 BPM

## 2024-05-16 DIAGNOSIS — E11.22 CKD STAGE 3 DUE TO TYPE 2 DIABETES MELLITUS: ICD-10-CM

## 2024-05-16 DIAGNOSIS — E11.69 DYSLIPIDEMIA ASSOCIATED WITH TYPE 2 DIABETES MELLITUS: ICD-10-CM

## 2024-05-16 DIAGNOSIS — N18.30 CKD STAGE 3 DUE TO TYPE 2 DIABETES MELLITUS: ICD-10-CM

## 2024-05-16 DIAGNOSIS — N18.32 ANEMIA OF CHRONIC RENAL FAILURE, STAGE 3B: ICD-10-CM

## 2024-05-16 DIAGNOSIS — E11.65 UNCONTROLLED TYPE 2 DIABETES MELLITUS WITH HYPERGLYCEMIA: Primary | ICD-10-CM

## 2024-05-16 DIAGNOSIS — Z86.73 HISTORY OF CVA (CEREBROVASCULAR ACCIDENT): ICD-10-CM

## 2024-05-16 DIAGNOSIS — E78.5 DYSLIPIDEMIA ASSOCIATED WITH TYPE 2 DIABETES MELLITUS: ICD-10-CM

## 2024-05-16 DIAGNOSIS — I10 ESSENTIAL HYPERTENSION: ICD-10-CM

## 2024-05-16 DIAGNOSIS — I50.42 CHRONIC COMBINED SYSTOLIC AND DIASTOLIC CONGESTIVE HEART FAILURE: ICD-10-CM

## 2024-05-16 DIAGNOSIS — D63.1 ANEMIA OF CHRONIC RENAL FAILURE, STAGE 3B: ICD-10-CM

## 2024-05-16 DIAGNOSIS — R80.9 MICROALBUMINURIA DUE TO TYPE 2 DIABETES MELLITUS: ICD-10-CM

## 2024-05-16 DIAGNOSIS — E11.29 MICROALBUMINURIA DUE TO TYPE 2 DIABETES MELLITUS: ICD-10-CM

## 2024-05-16 PROCEDURE — 3062F POS MACROALBUMINURIA REV: CPT | Mod: CPTII,S$GLB,, | Performed by: FAMILY MEDICINE

## 2024-05-16 PROCEDURE — 3075F SYST BP GE 130 - 139MM HG: CPT | Mod: CPTII,S$GLB,, | Performed by: FAMILY MEDICINE

## 2024-05-16 PROCEDURE — 3051F HG A1C>EQUAL 7.0%<8.0%: CPT | Mod: CPTII,S$GLB,, | Performed by: FAMILY MEDICINE

## 2024-05-16 PROCEDURE — 1101F PT FALLS ASSESS-DOCD LE1/YR: CPT | Mod: CPTII,S$GLB,, | Performed by: FAMILY MEDICINE

## 2024-05-16 PROCEDURE — 3066F NEPHROPATHY DOC TX: CPT | Mod: CPTII,S$GLB,, | Performed by: FAMILY MEDICINE

## 2024-05-16 PROCEDURE — 3288F FALL RISK ASSESSMENT DOCD: CPT | Mod: CPTII,S$GLB,, | Performed by: FAMILY MEDICINE

## 2024-05-16 PROCEDURE — 99214 OFFICE O/P EST MOD 30 MIN: CPT | Mod: S$GLB,,, | Performed by: FAMILY MEDICINE

## 2024-05-16 PROCEDURE — 3008F BODY MASS INDEX DOCD: CPT | Mod: CPTII,S$GLB,, | Performed by: FAMILY MEDICINE

## 2024-05-16 PROCEDURE — 1159F MED LIST DOCD IN RCRD: CPT | Mod: CPTII,S$GLB,, | Performed by: FAMILY MEDICINE

## 2024-05-16 PROCEDURE — 4010F ACE/ARB THERAPY RXD/TAKEN: CPT | Mod: CPTII,S$GLB,, | Performed by: FAMILY MEDICINE

## 2024-05-16 PROCEDURE — 1160F RVW MEDS BY RX/DR IN RCRD: CPT | Mod: CPTII,S$GLB,, | Performed by: FAMILY MEDICINE

## 2024-05-16 PROCEDURE — 3078F DIAST BP <80 MM HG: CPT | Mod: CPTII,S$GLB,, | Performed by: FAMILY MEDICINE

## 2024-05-16 PROCEDURE — 99999 PR PBB SHADOW E&M-EST. PATIENT-LVL V: CPT | Mod: PBBFAC,,, | Performed by: FAMILY MEDICINE

## 2024-05-16 PROCEDURE — 1126F AMNT PAIN NOTED NONE PRSNT: CPT | Mod: CPTII,S$GLB,, | Performed by: FAMILY MEDICINE

## 2024-05-16 RX ORDER — TIRZEPATIDE 5 MG/.5ML
5 INJECTION, SOLUTION SUBCUTANEOUS
Qty: 2 ML | Refills: 11 | Status: SHIPPED | OUTPATIENT
Start: 2024-05-16 | End: 2025-05-16

## 2024-05-16 RX ORDER — TIRZEPATIDE 5 MG/.5ML
5 INJECTION, SOLUTION SUBCUTANEOUS
Qty: 4 PEN | Refills: 11 | Status: SHIPPED | OUTPATIENT
Start: 2024-05-16 | End: 2024-05-16

## 2024-05-16 NOTE — PROGRESS NOTES
Assessment & Plan:    Uncontrolled type 2 diabetes mellitus with hyperglycemia  -     Hemoglobin A1C; Future; Expected date: 05/16/2024  -     tirzepatide (MOUNJARO) 5 mg/0.5 mL PnIj; Inject 5 mg into the skin every 7 days.  Dispense: 2 mL; Refill: 11    Encouraged patient to call anytime her medication is on back order. She was told by the pharmacy that her Mounjaro would be filled today.   Discussed avoidance of over-consumption of carbs and sweets.  Repeat A1c in 3 mo.  Foot exam to be performed at next visit.     Dyslipidemia associated with type 2 diabetes mellitus  Continue statin.     Microalbuminuria due to type 2 diabetes mellitus  Chronic. Continue plan for glycemic control and ARB.    CKD stage 3 due to type 2 diabetes mellitus  Chronic, stable.    Anemia of chronic renal failure, stage 3b  Chronic, stable.    Essential hypertension  Controlled. Continue current therapy.     History of CVA (cerebrovascular accident)  Continue ASA, statin, BP control. Patient anticipating having a loop recorder placed by EP to evaluate for an undiagnosed arrhythmia possibly causing recurrent strokes.       Health maintenance reviewed & addressed above.    Encouraged shingles, RSV, and tetanus vaccines.    Follow-up: Follow up in about 3 months (around 8/16/2024).  ______________________________________________________________________    Chief Complaint  Chief Complaint   Patient presents with    Diabetes       HPI  Joanne Peña is a 73 y.o. female with medical diagnoses as listed in the medical history and problem list that presents to the office to follow up on her chronic conditions. She was last seen in the office on 2/15/24. She is prescribed Mounjaro 5 mg, which was adequately controlling her BG. Estimated BG ranged in the 90-100s. However, she has not been able to get this filled over the last 3 weeks. She restarted her Amaryl to try to lower her BG.     Most recent pertinent workup:     Last CBC Results:   Lab  Results   Component Value Date    WBC 6.11 05/13/2024    HGB 10.1 (L) 05/13/2024    HCT 34.1 (L) 05/13/2024     05/13/2024       Last CMP Results  Lab Results   Component Value Date     05/13/2024    K 4.5 05/13/2024     (H) 05/13/2024    CO2 20 (L) 05/13/2024    BUN 24 (H) 05/13/2024    CREATININE 1.3 05/13/2024    CALCIUM 9.3 05/13/2024    ALBUMIN 3.7 05/13/2024    AST 14 05/13/2024    ALT 18 05/13/2024       Last Lipids  Lab Results   Component Value Date    CHOL 188 05/13/2024    TRIG 143 05/13/2024    HDL 45 05/13/2024    LDLCALC 114.4 05/13/2024       Last A1C  Lab Results   Component Value Date    HGBA1C 7.5 (H) 05/13/2024         Health Maintenance         Date Due Completion Date    TETANUS VACCINE Never done ---    Shingles Vaccine (1 of 2) Never done ---    RSV Vaccine (Age 60+ and Pregnant patients) (1 - 1-dose 60+ series) Never done ---    COVID-19 Vaccine (1 - 2023-24 season) Never done ---    Foot Exam 07/14/2024 7/14/2023    Eye Exam 10/16/2024 10/16/2023    Hemoglobin A1c 11/13/2024 5/13/2024    Mammogram 03/27/2025 3/27/2024    Diabetes Urine Screening 05/13/2025 5/13/2024    Lipid Panel 05/13/2025 5/13/2024    Colorectal Cancer Screening 07/12/2026 7/12/2023    DEXA Scan 09/25/2026 9/25/2023              PAST MEDICAL HISTORY:  Past Medical History:   Diagnosis Date    Diabetes mellitus     Hyperlipidemia     Hypertension     Stroke        PAST SURGICAL HISTORY:  Past Surgical History:   Procedure Laterality Date    COLONOSCOPY N/A 7/12/2023    Procedure: COLONOSCOPY;  Surgeon: Ray Sorensen MD;  Location: [facility];  Service: Endoscopy;  Laterality: N/A;  instr via portal  - PC  4/10/23 Daniel, instr via mail & portal, unable to tolerate Golytely- request Miralax/Gatorade - PC  5/30-pt r/s-new instr portal-tb    INJECTION OF ANESTHETIC AGENT AROUND MEDIAL BRANCH NERVES INNERVATING LUMBAR FACET JOINT Bilateral 4/20/2023    Procedure: MBB #1 (B/L) L3,4,5;  Surgeon: Son  DO Marco;  Location: [facility] OR;  Service: Pain Management;  Laterality: Bilateral;  ORAL XANAX    INJECTION OF ANESTHETIC AGENT AROUND MEDIAL BRANCH NERVES INNERVATING LUMBAR FACET JOINT Bilateral 2023    Procedure: MBB #2 (B/L) L3,4,5;  Surgeon: Son Lara DO;  Location: [facility] OR;  Service: Pain Management;  Laterality: Bilateral;  Oral Xanax    INJECTION OF JOINT Right 2022    Procedure: Right SI joint injection;  Surgeon: Son Lara DO;  Location: [facility] OR;  Service: Pain Management;  Laterality: Right;    INJECTION, SACROILIAC JOINT Right 2023    Procedure: RT SI joint Inj;  Surgeon: Son Lara DO;  Location: [facility] PAIN [facility]EMENT;  Service: Pain Management;  Laterality: Right;  oral    RADIOFREQUENCY ABLATION OF LUMBAR MEDIAL BRANCH NERVE AT SINGLE LEVEL Bilateral 2023    Procedure: RFA (B/L) L3,4,5;  Surgeon: Son Lara DO;  Location: [facility] PAIN [facility];  Service: Pain Management;  Laterality: Bilateral;       SOCIAL HISTORY:  Social History     Socioeconomic History    Marital status:    Tobacco Use    Smoking status: Former     Current packs/day: 0.00     Types: Cigarettes     Quit date: 2022     Years since quittin.2     Passive exposure: Past    Smokeless tobacco: Never   Substance and Sexual Activity    Alcohol use: Yes     Comment: rare    Drug use: No    Sexual activity: Yes     Partners: Male     Social Determinants of Health     Financial Resource Strain: Medium Risk (2024)    Overall Financial Resource Strain (CARDIA)     Difficulty of Paying Living Expenses: Somewhat hard   Food Insecurity: Food Insecurity Present (2024)    Hunger Vital Sign     Worried About Running Out of Food in the Last Year: Sometimes true     Ran Out of Food in the Last Year: Never true   Transportation Needs: No Transportation Needs (2024)    PRAPARE - Transportation     Lack of Transportation (Medical): No     Lack of  Transportation (Non-Medical): No   Physical Activity: Unknown (2/14/2024)    Exercise Vital Sign     Days of Exercise per Week: 0 days   Housing Stability: Low Risk  (2/14/2024)    Housing Stability Vital Sign     Unable to Pay for Housing in the Last Year: No     Number of Places Lived in the Last Year: 1     Unstable Housing in the Last Year: No       FAMILY HISTORY:  Family History   Problem Relation Name Age of Onset    Kidney disease Mother      Heart disease Mother      Cancer Father      Hypertension Brother      Hypertension Daughter      Cancer Maternal Aunt         ALLERGIES AND MEDICATIONS: updated and reviewed.  Review of patient's allergies indicates:   Allergen Reactions    Lisinopril-hydrochlorothiazide Other (See Comments)     Pt stated it makes her feel drained. She couldn't raise arms over head.    Ampicillin Rash    Darvocet a500 [propoxyphene n-acetaminophen] Other (See Comments)     karl     Current Outpatient Medications   Medication Sig Dispense Refill    ACCU-CHEK GUIDE GLUCOSE METER Misc TO CHECK BLOOD GLUCOSE DAILY, TO USE WITH INSURANCE PREFERRED METER      amLODIPine (NORVASC) 10 MG tablet TAKE 1 TABLET BY MOUTH ONCE DAILY. HOLD IF SBP <120 90 tablet 3    ascorbic acid, vitamin C, (VITAMIN C) 500 MG tablet Take 500 mg by mouth once daily.      aspirin (ECOTRIN) 81 MG EC tablet Take 1 tablet (81 mg total) by mouth once daily. 30 tablet 3    atorvastatin (LIPITOR) 80 MG tablet TAKE 1 TABLET (80 MG TOTAL) BY MOUTH ONCE DAILY. 90 tablet 1    BLOOD PRESSURE CUFF Misc 1 Units by Misc.(Non-Drug; Combo Route) route once daily. 1 each 0    blood sugar diagnostic Strp To check BG daily, to use with insurance preferred meter 200 each 11    blood-glucose meter kit To check BG daily, to use with insurance preferred meter 1 each 0    chlorthalidone (HYGROTEN) 25 MG Tab TAKE 1 TABLET BY MOUTH EVERY DAY 90 tablet 3    cyclobenzaprine (FLEXERIL) 10 MG tablet TAKE 1 TABLET (10 MG TOTAL) BY MOUTH 2 (TWO)  TIMES DAILY AS NEEDED FOR MUSCLE SPASMS (BACK PAIN). 180 tablet 1    FLUAD QUAD 2023-24,65Y UP,,PF, 60 mcg (15 mcg x 4)/0.5 mL Syrg       GAVILYTE-C 240-22.72-6.72 -5.84 gram SolR       guanfacine HCl (GUANFACINE ORAL) Take by mouth.      hydrALAZINE (APRESOLINE) 50 MG tablet Take 1 tablet (50 mg total) by mouth every 8 (eight) hours. Hold is SBP <120 (Patient taking differently: Take 50 mg by mouth every 8 (eight) hours. Hold is SBP <120    Pt is taking once a day) 90 tablet 1    HYDROcodone-acetaminophen (NORCO) 5-325 mg per tablet Take 1 tablet by mouth every 6 (six) hours as needed for Pain. 20 tablet 0    lancets Misc To check BG daily, to use with insurance preferred meter 200 each 11    losartan (COZAAR) 100 MG tablet TAKE 1 TABLET (100 MG TOTAL) BY MOUTH ONCE DAILY. HOLD IF SBP <120 90 tablet 3    meclizine (ANTIVERT) 25 mg tablet Take 1 tablet (25 mg total) by mouth 2 (two) times daily as needed for Dizziness. 60 tablet 0    methylPREDNISolone (MEDROL DOSEPACK) 4 mg tablet use as directed 1 each 0    multivitamin (THERAGRAN) per tablet Take 1 tablet by mouth once daily.      ondansetron (ZOFRAN-ODT) 4 MG TbDL Dissolve 1 tablet (4 mg total) by mouth every 8 (eight) hours as needed. 20 tablet 0    tirzepatide (MOUNJARO) 5 mg/0.5 mL PnIj Inject 5 mg into the skin every 7 days. 2 mL 11     No current facility-administered medications for this visit.     Facility-Administered Medications Ordered in Other Visits   Medication Dose Route Frequency Provider Last Rate Last Admin    0.9%  NaCl infusion   Intravenous Continuous Ronald Young MD        LIDOcaine (PF) 10 mg/ml (1%) injection 10 mg  1 mL Intradermal Once Ronald Young MD             ROS  Review of Systems   Constitutional:  Negative for activity change, appetite change and unexpected weight change.           Physical Exam  Vitals:    05/16/24 0955 05/16/24 1018   BP: (!) 140/84 132/78   BP Location: Right arm    Patient Position: Sitting    BP  "Method: Medium (Manual)    Pulse: 97    Temp: 98.1 °F (36.7 °C)    TempSrc: Oral    SpO2: 96%    Weight: 83 kg (182 lb 15.7 oz)    Height: 5' 7" (1.702 m)     Body mass index is 28.66 kg/m².  Weight: 83 kg (182 lb 15.7 oz)   Height: 5' 7" (170.2 cm)   Physical Exam  Constitutional:       General: She is not in acute distress.     Appearance: Normal appearance.   HENT:      Head: Normocephalic and atraumatic.   Skin:     General: Skin is warm and dry.   Neurological:      Mental Status: She is alert. Mental status is at baseline.   Psychiatric:         Mood and Affect: Mood normal.         Behavior: Behavior normal.             "

## 2024-05-17 DIAGNOSIS — R42 DIZZINESS: ICD-10-CM

## 2024-05-17 RX ORDER — MECLIZINE HYDROCHLORIDE 25 MG/1
25 TABLET ORAL 2 TIMES DAILY PRN
Qty: 60 TABLET | Refills: 0 | Status: SHIPPED | OUTPATIENT
Start: 2024-05-17 | End: 2024-06-16

## 2024-05-19 ENCOUNTER — PATIENT MESSAGE (OUTPATIENT)
Dept: ELECTROPHYSIOLOGY | Facility: CLINIC | Age: 74
End: 2024-05-19
Payer: MEDICARE

## 2024-05-31 ENCOUNTER — TELEPHONE (OUTPATIENT)
Dept: CARDIOLOGY | Facility: HOSPITAL | Age: 74
End: 2024-05-31
Payer: MEDICARE

## 2024-06-03 ENCOUNTER — TELEPHONE (OUTPATIENT)
Dept: ELECTROPHYSIOLOGY | Facility: CLINIC | Age: 74
End: 2024-06-03
Payer: MEDICARE

## 2024-06-03 NOTE — TELEPHONE ENCOUNTER
----- Message from Hunter Rich MD sent at 6/3/2024  4:02 PM CDT -----  0.5mg of xanax PO x 1 dose 30 minutes prior  ----- Message -----  From: Cristina Garcia RN  Sent: 6/3/2024   3:12 PM CDT  To: Hunter Rich MD    Please see message received below. Patient is scheduled for PET stress test tomorrow and requesting medication for help relax prior. Is this something you are OK with providing?      Didi Mascorro Maryann E, RN  Caller: Unspecified (Today,  1:08 PM)    Pt [phone] is scheduled for a PET test on tomorrow and she will pam something to help her relax. Please ask the doctor to call her something in to her local pharmacy which is listed below. Let her know when it done.

## 2024-06-03 NOTE — TELEPHONE ENCOUNTER
----- Message from Hunter Rich MD sent at 6/3/2024  4:02 PM CDT -----  0.5mg of xanax PO x 1 dose 30 minutes prior  ----- Message -----  From: Cristina Garcia RN  Sent: 6/3/2024   3:12 PM CDT  To: Hunter Rich MD    Please see message received below. Patient is scheduled for PET stress test tomorrow and requesting medication for help relax prior. Is this something you are OK with providing?      Didi Mascorro Maryann E, RN  Caller: Unspecified (Today,  1:08 PM)    Pt [phone] is scheduled for a PET test on tomorrow and she will pam something to help her relax. Please ask the doctor to call her something in to her local pharmacy which is listed below. Let her know when it done.

## 2024-06-03 NOTE — TELEPHONE ENCOUNTER
Prescription for Xanax 0.5 mg x 1 dose called into patient's pharmacy to be taken prior to PET stress test. Patient notified.

## 2024-06-04 ENCOUNTER — PATIENT MESSAGE (OUTPATIENT)
Dept: ELECTROPHYSIOLOGY | Facility: CLINIC | Age: 74
End: 2024-06-04
Payer: MEDICARE

## 2024-06-04 ENCOUNTER — HOSPITAL ENCOUNTER (OUTPATIENT)
Dept: CARDIOLOGY | Facility: HOSPITAL | Age: 74
Discharge: HOME | End: 2024-06-04
Attending: INTERNAL MEDICINE
Payer: MEDICARE

## 2024-06-04 VITALS
DIASTOLIC BLOOD PRESSURE: 66 MMHG | BODY MASS INDEX: 28.56 KG/M2 | WEIGHT: 182 LBS | HEIGHT: 67 IN | HEART RATE: 94 BPM | SYSTOLIC BLOOD PRESSURE: 121 MMHG | RESPIRATION RATE: 16 BRPM

## 2024-06-04 DIAGNOSIS — R93.1 ABNORMAL ECHOCARDIOGRAM: ICD-10-CM

## 2024-06-04 LAB
CFR FLOW - ANTERIOR: 1.66
CFR FLOW - INFERIOR: 1.78
CFR FLOW - LATERAL: 1.54
CFR FLOW - MAX: 2.17
CFR FLOW - MIN: 1.2
CFR FLOW - SEPTAL: 1.88
CFR FLOW - WHOLE HEART: 1.72
CV STRESS BASE HR: 84 BPM
DIASTOLIC BLOOD PRESSURE: 74 MMHG
EJECTION FRACTION- HIGH: 59 %
END DIASTOLIC INDEX-HIGH: 155 ML/M2
END DIASTOLIC INDEX-LOW: 91 ML/M2
END SYSTOLIC INDEX-HIGH: 78 ML/M2
END SYSTOLIC INDEX-LOW: 40 ML/M2
NUC REST DIASTOLIC VOLUME INDEX: 114
NUC REST EJECTION FRACTION: 32
NUC REST SYSTOLIC VOLUME INDEX: 78
NUC STRESS DIASTOLIC VOLUME INDEX: 118
NUC STRESS EJECTION FRACTION: 36 %
NUC STRESS SYSTOLIC VOLUME INDEX: 76
OHS CV CPX 1 MINUTE RECOVERY HEART RATE: 100 BPM
OHS CV CPX 85 PERCENT MAX PREDICTED HEART RATE MALE: 127
OHS CV CPX MAX PREDICTED HEART RATE: 149
OHS CV CPX PATIENT IS FEMALE: 1
OHS CV CPX PATIENT IS MALE: 0
OHS CV CPX PEAK DIASTOLIC BLOOD PRESSURE: 75 MMHG
OHS CV CPX PEAK HEAR RATE: 87 BPM
OHS CV CPX PEAK RATE PRESSURE PRODUCT: NORMAL
OHS CV CPX PEAK SYSTOLIC BLOOD PRESSURE: 126 MMHG
OHS CV CPX PERCENT MAX PREDICTED HEART RATE ACHIEVED: 61
OHS CV CPX RATE PRESSURE PRODUCT PRESENTING: NORMAL
REST FLOW - ANTERIOR: 0.87 CC/MIN/G
REST FLOW - INFERIOR: 0.82 CC/MIN/G
REST FLOW - LATERAL: 1.24 CC/MIN/G
REST FLOW - MAX: 1.42 CC/MIN/G
REST FLOW - MIN: 0.4 CC/MIN/G
REST FLOW - SEPTAL: 0.68 CC/MIN/G
REST FLOW - WHOLE HEART: 0.9 CC/MIN/G
RETIRED EF AND QEF - SEE NOTES: 47 %
STRESS FLOW - ANTERIOR: 1.43 CC/MIN/G
STRESS FLOW - INFERIOR: 1.45 CC/MIN/G
STRESS FLOW - LATERAL: 1.92 CC/MIN/G
STRESS FLOW - MAX: 2.3 CC/MIN/G
STRESS FLOW - MIN: 0.51 CC/MIN/G
STRESS FLOW - SEPTAL: 1.28 CC/MIN/G
STRESS FLOW - WHOLE HEART: 1.52 CC/MIN/G
SYSTOLIC BLOOD PRESSURE: 143 MMHG

## 2024-06-04 PROCEDURE — 63600175 PHARM REV CODE 636 W HCPCS: Mod: HCNC | Performed by: INTERNAL MEDICINE

## 2024-06-04 PROCEDURE — 93017 CV STRESS TEST TRACING ONLY: CPT | Mod: HCNC

## 2024-06-04 PROCEDURE — A9555 RB82 RUBIDIUM: HCPCS | Mod: HCNC | Performed by: INTERNAL MEDICINE

## 2024-06-04 PROCEDURE — 93016 CV STRESS TEST SUPVJ ONLY: CPT | Mod: HCNC,,, | Performed by: INTERNAL MEDICINE

## 2024-06-04 PROCEDURE — 93018 CV STRESS TEST I&R ONLY: CPT | Mod: HCNC,,, | Performed by: INTERNAL MEDICINE

## 2024-06-04 PROCEDURE — 78434 AQMBF PET REST & RX STRESS: CPT | Mod: 26,HCNC,, | Performed by: INTERNAL MEDICINE

## 2024-06-04 PROCEDURE — 78431 MYOCRD IMG PET RST&STRS CT: CPT | Mod: 26,HCNC,, | Performed by: INTERNAL MEDICINE

## 2024-06-04 RX ORDER — REGADENOSON 0.08 MG/ML
0.4 INJECTION, SOLUTION INTRAVENOUS
Status: COMPLETED | OUTPATIENT
Start: 2024-06-04 | End: 2024-06-04

## 2024-06-04 RX ORDER — AMINOPHYLLINE 25 MG/ML
75 INJECTION, SOLUTION INTRAVENOUS
Status: COMPLETED | OUTPATIENT
Start: 2024-06-04 | End: 2024-06-04

## 2024-06-04 RX ADMIN — AMINOPHYLLINE 75 MG: 25 INJECTION, SOLUTION INTRAVENOUS at 12:06

## 2024-06-04 RX ADMIN — RUBIDIUM CHLORIDE RB-82 25.3 MILLICURIE: 150 INJECTION, SOLUTION INTRAVENOUS at 12:06

## 2024-06-04 RX ADMIN — REGADENOSON 0.4 MG: 0.08 INJECTION, SOLUTION INTRAVENOUS at 12:06

## 2024-06-04 RX ADMIN — RUBIDIUM CHLORIDE RB-82 24.9 MILLICURIE: 150 INJECTION, SOLUTION INTRAVENOUS at 12:06

## 2024-06-06 ENCOUNTER — TELEPHONE (OUTPATIENT)
Dept: ELECTROPHYSIOLOGY | Facility: CLINIC | Age: 74
End: 2024-06-06
Payer: MEDICARE

## 2024-06-06 NOTE — TELEPHONE ENCOUNTER
----- Message from Taylor Guzman RN sent at 6/6/2024  9:58 AM CDT -----  Regarding: PET stress results  Patient is calling asking about her PET stress results. Please call.

## 2024-06-06 NOTE — TELEPHONE ENCOUNTER
Spoke with patient and advised that her stress test showed no evidence of ischemia and no arrhythmias during the test. Advised that per Dr King, we will proceed with ILR implant as scheduled.

## 2024-06-12 ENCOUNTER — TELEPHONE (OUTPATIENT)
Dept: ELECTROPHYSIOLOGY | Facility: CLINIC | Age: 74
End: 2024-06-12
Payer: MEDICARE

## 2024-06-20 ENCOUNTER — PATIENT MESSAGE (OUTPATIENT)
Dept: ELECTROPHYSIOLOGY | Facility: CLINIC | Age: 74
End: 2024-06-20
Payer: MEDICARE

## 2024-06-20 ENCOUNTER — TELEPHONE (OUTPATIENT)
Dept: ELECTROPHYSIOLOGY | Facility: CLINIC | Age: 74
End: 2024-06-20
Payer: MEDICARE

## 2024-06-20 NOTE — TELEPHONE ENCOUNTER
Spoke with patient regarding her medication Mounjaro. She reports that the last dose taken was on 6/14/2024, and understands that she is unable to receive sedation for her ILR implant as requested due to taking the Mounjaro 6 days ago which is not sufficient time. Offered to proceed with local anesthesia or reschedule to Monday, 6/24/2024 at which time she would be able to receive sedation. Patient opted to proceed as scheduled with local anesthesia.       CONFIRMED change in procedure arrival time to 11:30 am       Reiterated instructions including:    -Directions to check in desk    - Confirms no new meds prescribed or med changes since scheduling -     -Pre-procedure LABS reviewed with Dr Rich.    -Confirmed absence or presence of implanted device/stimulator -N/A    -Confirmed no recent or current fever, cough, or shortness of breath .    -Confirmed no redness, rash, irritation, or yeast infection to skin/ chest.      -Bathe night prior and morning prior to procedure with Hibiclens solution or an antibacterial soap  -Reviewed current visitor policy    Patient verbalized understanding of above, denies any further questions and appreciated the call.

## 2024-06-20 NOTE — TELEPHONE ENCOUNTER
Attempted to contact patient to confirm procedure details for ILR implant scheduled on 6/21/2024 but no answer received. Left detailed voicemail including: change in procedure date/arrival time,  and request for return call to discuss newly prescribed medication noted, Mounjaro. Patient portal message sent.

## 2024-06-21 ENCOUNTER — HOSPITAL ENCOUNTER (OUTPATIENT)
Facility: HOSPITAL | Age: 74
Discharge: HOME | End: 2024-06-21
Attending: INTERNAL MEDICINE | Admitting: INTERNAL MEDICINE
Payer: MEDICARE

## 2024-06-21 VITALS
BODY MASS INDEX: 25.9 KG/M2 | HEIGHT: 67 IN | RESPIRATION RATE: 18 BRPM | OXYGEN SATURATION: 98 % | SYSTOLIC BLOOD PRESSURE: 142 MMHG | DIASTOLIC BLOOD PRESSURE: 79 MMHG | HEART RATE: 83 BPM | TEMPERATURE: 98 F | WEIGHT: 165 LBS

## 2024-06-21 DIAGNOSIS — N18.32 STAGE 3B CHRONIC KIDNEY DISEASE: ICD-10-CM

## 2024-06-21 DIAGNOSIS — Z95.9 CARDIAC DEVICE IN SITU: ICD-10-CM

## 2024-06-21 DIAGNOSIS — I44.7 LBBB (LEFT BUNDLE BRANCH BLOCK): ICD-10-CM

## 2024-06-21 DIAGNOSIS — I49.9 ARRHYTHMIA: ICD-10-CM

## 2024-06-21 DIAGNOSIS — Z86.73 HISTORY OF STROKE: ICD-10-CM

## 2024-06-21 DIAGNOSIS — E11.65 TYPE 2 DIABETES MELLITUS WITH HYPERGLYCEMIA, WITHOUT LONG-TERM CURRENT USE OF INSULIN: ICD-10-CM

## 2024-06-21 DIAGNOSIS — I10 MALIGNANT ESSENTIAL HYPERTENSION: ICD-10-CM

## 2024-06-21 LAB
OHS QRS DURATION: 140 MS
OHS QTC CALCULATION: 497 MS
POCT GLUCOSE: 88 MG/DL (ref 70–110)

## 2024-06-21 PROCEDURE — 63600175 PHARM REV CODE 636 W HCPCS: Mod: HCNC | Performed by: NURSE PRACTITIONER

## 2024-06-21 PROCEDURE — 33285 INSJ SUBQ CAR RHYTHM MNTR: CPT | Mod: HCNC | Performed by: INTERNAL MEDICINE

## 2024-06-21 PROCEDURE — 82962 GLUCOSE BLOOD TEST: CPT | Mod: HCNC | Performed by: INTERNAL MEDICINE

## 2024-06-21 PROCEDURE — 25000003 PHARM REV CODE 250: Mod: HCNC | Performed by: NURSE PRACTITIONER

## 2024-06-21 PROCEDURE — C1764 EVENT RECORDER, CARDIAC: HCPCS | Mod: HCNC | Performed by: INTERNAL MEDICINE

## 2024-06-21 PROCEDURE — 93010 ELECTROCARDIOGRAM REPORT: CPT | Mod: HCNC,,, | Performed by: INTERNAL MEDICINE

## 2024-06-21 PROCEDURE — 25000003 PHARM REV CODE 250: Mod: HCNC | Performed by: INTERNAL MEDICINE

## 2024-06-21 PROCEDURE — 33285 INSJ SUBQ CAR RHYTHM MNTR: CPT | Mod: HCNC,,, | Performed by: INTERNAL MEDICINE

## 2024-06-21 PROCEDURE — 93005 ELECTROCARDIOGRAM TRACING: CPT | Mod: HCNC

## 2024-06-21 DEVICE — INSERTABLE CARDIAC MONITOR
Type: IMPLANTABLE DEVICE | Site: HEART | Status: FUNCTIONAL
Brand: LUX-DX II+™

## 2024-06-21 RX ORDER — LIDOCAINE HYDROCHLORIDE AND EPINEPHRINE 10; 10 MG/ML; UG/ML
INJECTION, SOLUTION INFILTRATION; PERINEURAL
Status: DISCONTINUED | OUTPATIENT
Start: 2024-06-21 | End: 2024-06-21 | Stop reason: HOSPADM

## 2024-06-21 RX ORDER — ACETAMINOPHEN 325 MG/1
650 TABLET ORAL EVERY 4 HOURS PRN
Status: DISCONTINUED | OUTPATIENT
Start: 2024-06-21 | End: 2024-06-21 | Stop reason: HOSPADM

## 2024-06-21 RX ADMIN — VANCOMYCIN HYDROCHLORIDE 1000 MG: 1 INJECTION, POWDER, LYOPHILIZED, FOR SOLUTION INTRAVENOUS at 01:06

## 2024-06-21 NOTE — HOSPITAL COURSE
Patient underwent successful ILR placement without complications.   Not on any anticoagulation therapy. No changes to medications.

## 2024-06-21 NOTE — SUBJECTIVE & OBJECTIVE
Past Medical History:   Diagnosis Date    Diabetes mellitus     Hyperlipidemia     Hypertension     Stroke        Past Surgical History:   Procedure Laterality Date    COLONOSCOPY N/A 7/12/2023    Procedure: COLONOSCOPY;  Surgeon: Ray Sorensen MD;  Location: [facility] ENDO;  Service: Endoscopy;  Laterality: N/A;  instr via portal  - PC  4/10/23 Daniel, instr via mail & portal, unable to tolerate Golytely- request Miralax/Gatorade - PC  5/30-pt r/s-new instr portal-tb    INJECTION OF ANESTHETIC AGENT AROUND MEDIAL BRANCH NERVES INNERVATING LUMBAR FACET JOINT Bilateral 4/20/2023    Procedure: MBB #1 (B/L) L3,4,5;  Surgeon: Son Lara DO;  Location: [facility] OR;  Service: Pain Management;  Laterality: Bilateral;  ORAL XANAX    INJECTION OF ANESTHETIC AGENT AROUND MEDIAL BRANCH NERVES INNERVATING LUMBAR FACET JOINT Bilateral 5/5/2023    Procedure: MBB #2 (B/L) L3,4,5;  Surgeon: Son Lara DO;  Location: [facility] OR;  Service: Pain Management;  Laterality: Bilateral;  Oral Xanax    INJECTION OF JOINT Right 5/20/2022    Procedure: Right SI joint injection;  Surgeon: Son Lara DO;  Location: [facility] OR;  Service: Pain Management;  Laterality: Right;    INJECTION, SACROILIAC JOINT Right 12/19/2023    Procedure: RT SI joint Inj;  Surgeon: Son Lara DO;  Location: [facility] PAIN MANAGEMENT;  Service: Pain Management;  Laterality: Right;  oral    RADIOFREQUENCY ABLATION OF LUMBAR MEDIAL BRANCH NERVE AT SINGLE LEVEL Bilateral 5/19/2023    Procedure: RFA (B/L) L3,4,5;  Surgeon: Son Lara DO;  Location: [facility] PAIN MANAGEMENT;  Service: Pain Management;  Laterality: Bilateral;       Review of patient's allergies indicates:   Allergen Reactions    Lisinopril-hydrochlorothiazide Other (See Comments)     Pt stated it makes her feel drained. She couldn't raise arms over head.    Ampicillin Rash    Darvocet a500 [propoxyphene n-acetaminophen] Other (See Comments)     karl       Current  Facility-Administered Medications on File Prior to Encounter   Medication    0.9%  NaCl infusion    LIDOcaine (PF) 10 mg/ml (1%) injection 10 mg     Current Outpatient Medications on File Prior to Encounter   Medication Sig    ACCU-CHEK GUIDE GLUCOSE METER Misc TO CHECK BLOOD GLUCOSE DAILY, TO USE WITH INSURANCE PREFERRED METER    amLODIPine (NORVASC) 10 MG tablet TAKE 1 TABLET BY MOUTH ONCE DAILY. HOLD IF SBP <120    ascorbic acid, vitamin C, (VITAMIN C) 500 MG tablet Take 500 mg by mouth once daily.    aspirin (ECOTRIN) 81 MG EC tablet Take 1 tablet (81 mg total) by mouth once daily.    atorvastatin (LIPITOR) 80 MG tablet TAKE 1 TABLET (80 MG TOTAL) BY MOUTH ONCE DAILY.    BLOOD PRESSURE CUFF Misc 1 Units by Misc.(Non-Drug; Combo Route) route once daily.    blood sugar diagnostic Strp To check BG daily, to use with insurance preferred meter    blood-glucose meter kit To check BG daily, to use with insurance preferred meter    chlorthalidone (HYGROTEN) 25 MG Tab TAKE 1 TABLET BY MOUTH EVERY DAY    FLUAD QUAD 2023-24,65Y UP,,PF, 60 mcg (15 mcg x 4)/0.5 mL Syrg     GAVILYTE-C 240-22.72-6.72 -5.84 gram SolR     guanfacine HCl (GUANFACINE ORAL) Take by mouth.    hydrALAZINE (APRESOLINE) 50 MG tablet Take 1 tablet (50 mg total) by mouth every 8 (eight) hours. Hold is SBP <120 (Patient taking differently: Take 50 mg by mouth every 8 (eight) hours. Hold is SBP <120    Pt is taking once a day)    HYDROcodone-acetaminophen (NORCO) 5-325 mg per tablet Take 1 tablet by mouth every 6 (six) hours as needed for Pain.    lancets Misc To check BG daily, to use with insurance preferred meter    losartan (COZAAR) 100 MG tablet TAKE 1 TABLET (100 MG TOTAL) BY MOUTH ONCE DAILY. HOLD IF SBP <120    methylPREDNISolone (MEDROL DOSEPACK) 4 mg tablet use as directed    multivitamin (THERAGRAN) per tablet Take 1 tablet by mouth once daily.    ondansetron (ZOFRAN-ODT) 4 MG TbDL Dissolve 1 tablet (4 mg total) by mouth every 8 (eight) hours as  needed.     Family History       Problem Relation (Age of Onset)    Cancer Father, Maternal Aunt    Heart disease Mother    Hypertension Brother, Daughter    Kidney disease Mother          Tobacco Use    Smoking status: Former     Current packs/day: 0.00     Types: Cigarettes     Quit date: 2022     Years since quittin.3     Passive exposure: Past    Smokeless tobacco: Never   Substance and Sexual Activity    Alcohol use: Yes     Comment: rare    Drug use: No    Sexual activity: Yes     Partners: Male     Review of Systems   All other systems reviewed and are negative.    Objective:     Vital Signs (Most Recent):    Vital Signs (24h Range):             There is no height or weight on file to calculate BMI.             Physical Exam  Vitals and nursing note reviewed.   Constitutional:       Appearance: Normal appearance.   Cardiovascular:      Rate and Rhythm: Normal rate and regular rhythm.      Pulses: Normal pulses.      Heart sounds: Normal heart sounds.   Pulmonary:      Effort: Pulmonary effort is normal.      Breath sounds: Normal breath sounds.   Musculoskeletal:      Right lower leg: No edema.      Left lower leg: No edema.   Neurological:      Mental Status: She is alert.            Significant Labs: All pertinent lab results from the last 24 hours have been reviewed.

## 2024-06-21 NOTE — H&P
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 She was in her usual state of health until 2/21/2022 when she developed sudden onset of dizziness, blurry vision, trouble speaking, and nausea/vomiting which subsequently resolved. She was evaluated in the ER. Scans noted evolving left cerebellar and genny infarct. She was discharged on plavix x 3 weeks then switched to aspirin. ECHO noted preserved LV function and a negative bubble study but with severe left atrial enlargement. She was in sinus rhythm. Discussed ILR for long-term rhythm monitoring if an extended monitor showed no AF. 14 day holter noted nsAT but no AF.      Reports she had an episode of sudden weakness resulting in a fall on her tail bone in January of 2024. Unsure of LOC but did not hit her head. She is interested in proceeding with ILR for which she is here today.     I reviewed available electrocardiograms including today's in clinic ECG which note sinus rhythm with LBBB.    Past Medical History:   Diagnosis Date    Diabetes mellitus     Hyperlipidemia     Hypertension     Stroke        Past Surgical History:   Procedure Laterality Date    COLONOSCOPY N/A 7/12/2023    Procedure: COLONOSCOPY;  Surgeon: Ray Sorensen MD;  Location: [facility];  Service: Endoscopy;  Laterality: N/A;  instr via portal  - PC  4/10/23 Daniel, instr via mail & portal, unable to tolerate Golytely- request Miralax/Gatorade - PC  5/30-pt r/s-new instr portal-tb    INJECTION OF ANESTHETIC AGENT AROUND MEDIAL BRANCH NERVES INNERVATING LUMBAR FACET JOINT Bilateral 4/20/2023    Procedure: MBB #1 (B/L) L3,4,5;  Surgeon: Son  DO Marco;  Location: [facility] OR;  Service: Pain Management;  Laterality: Bilateral;  ORAL XANAX    INJECTION OF ANESTHETIC AGENT AROUND MEDIAL BRANCH NERVES INNERVATING LUMBAR FACET JOINT Bilateral 5/5/2023    Procedure: MBB #2 (B/L) L3,4,5;  Surgeon: Son Lara DO;  Location: [facility] OR;  Service: Pain Management;  Laterality: Bilateral;  Oral Xanax    INJECTION OF JOINT Right 5/20/2022    Procedure: Right SI joint injection;  Surgeon: Son Lara DO;  Location: [facility] OR;  Service: Pain Management;  Laterality: Right;    INJECTION, SACROILIAC JOINT Right 12/19/2023    Procedure: RT SI joint Inj;  Surgeon: Son Lara DO;  Location: [facility] PAIN MANAGEMENT;  Service: Pain Management;  Laterality: Right;  oral    RADIOFREQUENCY ABLATION OF LUMBAR MEDIAL BRANCH NERVE AT SINGLE LEVEL Bilateral 5/19/2023    Procedure: RFA (B/L) L3,4,5;  Surgeon: Son Lara DO;  Location: [facility] PAIN MANAGEMENT;  Service: Pain Management;  Laterality: Bilateral;       Review of patient's allergies indicates:   Allergen Reactions    Lisinopril-hydrochlorothiazide Other (See Comments)     Pt stated it makes her feel drained. She couldn't raise arms over head.    Ampicillin Rash    Darvocet a500 [propoxyphene n-acetaminophen] Other (See Comments)     shaky       Current Facility-Administered Medications on File Prior to Encounter   Medication    0.9%  NaCl infusion    LIDOcaine (PF) 10 mg/ml (1%) injection 10 mg     Current Outpatient Medications on File Prior to Encounter   Medication Sig    ACCU-CHEK GUIDE GLUCOSE METER Misc TO CHECK BLOOD GLUCOSE DAILY, TO USE WITH INSURANCE PREFERRED METER    amLODIPine (NORVASC) 10 MG tablet TAKE 1 TABLET BY MOUTH ONCE DAILY. HOLD IF SBP <120    ascorbic acid, vitamin C, (VITAMIN C) 500 MG tablet Take 500 mg by mouth once daily.    aspirin (ECOTRIN) 81 MG EC tablet Take 1 tablet (81 mg total) by mouth once daily.    atorvastatin (LIPITOR) 80 MG tablet TAKE  1 TABLET (80 MG TOTAL) BY MOUTH ONCE DAILY.    BLOOD PRESSURE CUFF Misc 1 Units by Misc.(Non-Drug; Combo Route) route once daily.    blood sugar diagnostic Strp To check BG daily, to use with insurance preferred meter    blood-glucose meter kit To check BG daily, to use with insurance preferred meter    chlorthalidone (HYGROTEN) 25 MG Tab TAKE 1 TABLET BY MOUTH EVERY DAY    FLUAD QUAD -24,65Y UP,,PF, 60 mcg (15 mcg x 4)/0.5 mL Syrg     GAVILYTE-C 240-22.72-6.72 -5.84 gram SolR     guanfacine HCl (GUANFACINE ORAL) Take by mouth.    hydrALAZINE (APRESOLINE) 50 MG tablet Take 1 tablet (50 mg total) by mouth every 8 (eight) hours. Hold is SBP <120 (Patient taking differently: Take 50 mg by mouth every 8 (eight) hours. Hold is SBP <120    Pt is taking once a day)    HYDROcodone-acetaminophen (NORCO) 5-325 mg per tablet Take 1 tablet by mouth every 6 (six) hours as needed for Pain.    lancets Misc To check BG daily, to use with insurance preferred meter    losartan (COZAAR) 100 MG tablet TAKE 1 TABLET (100 MG TOTAL) BY MOUTH ONCE DAILY. HOLD IF SBP <120    methylPREDNISolone (MEDROL DOSEPACK) 4 mg tablet use as directed    multivitamin (THERAGRAN) per tablet Take 1 tablet by mouth once daily.    ondansetron (ZOFRAN-ODT) 4 MG TbDL Dissolve 1 tablet (4 mg total) by mouth every 8 (eight) hours as needed.     Family History       Problem Relation (Age of Onset)    Cancer Father, Maternal Aunt    Heart disease Mother    Hypertension Brother, Daughter    Kidney disease Mother          Tobacco Use    Smoking status: Former     Current packs/day: 0.00     Types: Cigarettes     Quit date: 2022     Years since quittin.3     Passive exposure: Past    Smokeless tobacco: Never   Substance and Sexual Activity    Alcohol use: Yes     Comment: rare    Drug use: No    Sexual activity: Yes     Partners: Male     Review of Systems   All other systems reviewed and are negative.    Objective:     Vital Signs (Most Recent):     Vital Signs (24h Range):             There is no height or weight on file to calculate BMI.             Physical Exam  Vitals and nursing note reviewed.   Constitutional:       Appearance: Normal appearance.   Cardiovascular:      Rate and Rhythm: Normal rate and regular rhythm.      Pulses: Normal pulses.      Heart sounds: Normal heart sounds.   Pulmonary:      Effort: Pulmonary effort is normal.      Breath sounds: Normal breath sounds.   Musculoskeletal:      Right lower leg: No edema.      Left lower leg: No edema.   Neurological:      Mental Status: She is alert.            Significant Labs: All pertinent lab results from the last 24 hours have been reviewed.    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 We discussed the potential etiology of undiagnosed atrial arrhythmias (atrial fibrillation, atrial flutter) as possible culprits for recurrent strokes. We discussed how stroke prevention strategies are different in the setting of atrial fibrillation and flutter with use of anticoagulation. To date no atrial arrhythmias have been observed however she has risk factors for atrial fibrillation. Discussed the benefit of long-term heart rhythm monitoring with an implantable loop recorder. Discussed the risks of implantation including pain, infection, bleeding and rare risk of device erosion. She desires to proceed. Prior extended holter noted no AF. Now having falls with questionable near syncope and new LBBB. ILR implantation for bradycardia monitoring is also warranted.     Plan  ILR implant . Discussed risks and benefits. Consents signed.           Gaby Garcia MD  Cardiac Electrophysiology  [facility] - Short Stay Cardiac Unit

## 2024-06-21 NOTE — HPI
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 She was in her usual state of health until 2/21/2022 when she developed sudden onset of dizziness, blurry vision, trouble speaking, and nausea/vomiting which subsequently resolved. She was evaluated in the ER. Scans noted evolving left cerebellar and genny infarct. She was discharged on plavix x 3 weeks then switched to aspirin. ECHO noted preserved LV function and a negative bubble study but with severe left atrial enlargement. She was in sinus rhythm. Discussed ILR for long-term rhythm monitoring if an extended monitor showed no AF. 14 day holter noted nsAT but no AF.      Reports she had an episode of sudden weakness resulting in a fall on her tail bone in January of 2024. Unsure of LOC but did not hit her head. She is interested in proceeding with ILR for which she is here today.     I reviewed available electrocardiograms including today's in clinic ECG which note sinus rhythm with LBBB.

## 2024-06-21 NOTE — DISCHARGE INSTRUCTIONS
Post-Procedure Patient Discharge Instructions  Loop Recorders     Wound Care  You are discharged with a standard dressing over the incision, you may remove the dressing after 24 hours.   There is Dermabond (clear glue) over your incision, do not scrub it off. It acts as a barrier and will eventually disappear.  Do not get the incision wet for 48 hours following the procedure. You may sponge bath during this period, working around the incision. After 48 hours, you may shower, but you should still try to keep this area as dry as possible, and avoid direct water contact to the incision (allow the water to hit back of your shoulder rather than directly on the incision). Gently pat the incision dry if it does get wet.  You may take regular showers after 2 weeks, unless otherwise indicated at your follow-up visit.  Do not submerge the incision in a tub, pool, or body of water for 6 weeks.  If you notice unusual swelling, redness, drainage, have more device site pain, chest pain, shortness of breath, or have a fever greater than 100 degrees, call our device clinic immediately: [phone] or [phone] during normal office hours. You may call [phone] after-hours or on weekends and ask for the electrophysiologist on call.    Activity   If you were driving prior to the procedure, you may resume driving right after your procedure (as long you did not receive any sedation). If you have a history of passing out or a history of certain arrhythmias, there may be driving restrictions unrelated to the procedure. Please clarify with your physician if this is the case.  Avoid rough contact at the device site for 6 weeks.  You may participate in sexual activity unless otherwise instructed.  You may return to work the follow day unless told otherwise by your provider.    Long-Term Instructions  Metal Detectors at Airports: Metal detectors at airports do not interfere with you loop recorder.  MRI: You may have an MRI  with an implantable loop recorder. All that is required is that you send a transmission to download your information before and after you have your MRI.    Long-Term Follow-Up  Your device has the ability to transmit device information from home to the doctor's office using a home monitoring system.  This remote system takes the place of a doctor's visit. Your device will be checked from home every 31 days. Every 12 months, you will be asked to come into the office for an in-office check.  Your device should last in the range of 3 years.         Any need to reschedule or cancel procedures, or any questions regarding your procedures should be addressed directly with the Arrhythmia Department Nurses at the following phone number: [phone].

## 2024-06-21 NOTE — PROGRESS NOTES
Pt DC'd per MD order. Discharge instructions given including activity, wound care, S&S of infections, future appointments, and when to call MD. Medications reviewed including when to take next dose.PIV DC'd, catheter tip intact. Pt left unit via wheelchair with family.

## 2024-06-21 NOTE — DISCHARGE SUMMARY
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 She was in her usual state of health until 2/21/2022 when she developed sudden onset of dizziness, blurry vision, trouble speaking, and nausea/vomiting which subsequently resolved. She was evaluated in the ER. Scans noted evolving left cerebellar and genny infarct. She was discharged on plavix x 3 weeks then switched to aspirin. ECHO noted preserved LV function and a negative bubble study but with severe left atrial enlargement. She was in sinus rhythm. Discussed ILR for long-term rhythm monitoring if an extended monitor showed no AF. 14 day holter noted nsAT but no AF.      Reports she had an episode of sudden weakness resulting in a fall on her tail bone in January of 2024. Unsure of LOC but did not hit her head. She is interested in proceeding with ILR for which she is here today.     I reviewed available electrocardiograms including today's in clinic ECG which note sinus rhythm with LBBB.    Procedure(s) (LRB):  Insertion, Implantable Loop Recorder (Left)     Indwelling Lines/Drains at time of discharge:  Lines/Drains/Airways       None                   Hospital Course:  Patient underwent successful ILR placement without complications.   Not on any anticoagulation therapy. No changes to medications.     Goals of Care Treatment Preferences:  Code Status: Full Code      Consults:     Significant Diagnostic Studies: N/A    Pending Diagnostic Studies:       Procedure Component Value Units Date/Time    EKG  12-lead [[id number]]     Order Status: Sent Lab Status: No result             Final Active Diagnoses:    Diagnosis Date Noted POA    PRINCIPAL PROBLEM:  History of stroke [Z86.73] 06/14/2022 Not Applicable      Problems Resolved During this Admission:     No new Assessment & Plan notes have been filed under this hospital service since the last note was generated.  Service: Arrhythmia      Discharged Condition: stable    Disposition:     Follow Up:   Follow-up Information       Hunter Rich MD Follow up in 3 month(s).    Specialties: Electrophysiology, Cardiology  Contact information:  [address]  [facility] [address]  [phone]                           Patient Instructions:   No discharge procedures on file.  Medications:  Reconciled Home Medications:      Medication List        CHANGE how you take these medications      hydrALAZINE 50 MG tablet  Commonly known as: APRESOLINE  Take 1 tablet (50 mg total) by mouth every 8 (eight) hours. Hold is SBP <120  What changed: additional instructions            CONTINUE taking these medications      amLODIPine 10 MG tablet  Commonly known as: NORVASC  TAKE 1 TABLET BY MOUTH ONCE DAILY. HOLD IF SBP <120     aspirin 81 MG EC tablet  Commonly known as: ECOTRIN  Take 1 tablet (81 mg total) by mouth once daily.     atorvastatin 80 MG tablet  Commonly known as: LIPITOR  TAKE 1 TABLET (80 MG TOTAL) BY MOUTH ONCE DAILY.     BLOOD PRESSURE CUFF Misc  Generic drug: miscellaneous medical supply  1 Units by Misc.(Non-Drug; Combo Route) route once daily.     blood sugar diagnostic Strp  To check BG daily, to use with insurance preferred meter     * blood-glucose meter kit  To check BG daily, to use with insurance preferred meter     * ACCU-CHEK GUIDE GLUCOSE METER Misc  Generic drug: blood-glucose meter  TO CHECK BLOOD GLUCOSE DAILY, TO USE WITH INSURANCE PREFERRED METER     chlorthalidone 25 MG Tab  Commonly known as: HYGROTEN  TAKE 1 TABLET BY MOUTH EVERY DAY      cyclobenzaprine 10 MG tablet  Commonly known as: FLEXERIL  TAKE 1 TABLET (10 MG TOTAL) BY MOUTH 2 (TWO) TIMES DAILY AS NEEDED FOR MUSCLE SPASMS (BACK PAIN).     FLUAD QUAD 2023-24(65Y UP)(PF) 60 mcg (15 mcg x 4)/0.5 mL Syrg  Generic drug: flu vac 2023 [id number](PF)     GAVILYTE-C 240-22.72-6.72 -5.84 gram Solr  Generic drug: polyethylene glycol     HYDROcodone-acetaminophen 5-325 mg per tablet  Commonly known as: NORCO  Take 1 tablet by mouth every 6 (six) hours as needed for Pain.     lancets Misc  To check BG daily, to use with insurance preferred meter     losartan 100 MG tablet  Commonly known as: COZAAR  TAKE 1 TABLET (100 MG TOTAL) BY MOUTH ONCE DAILY. HOLD IF SBP <120     meclizine 25 mg tablet  Commonly known as: ANTIVERT  TAKE 1 TABLET (25 MG TOTAL) BY MOUTH 2 (TWO) TIMES DAILY AS NEEDED FOR DIZZINESS.     MOUNJARO 5 mg/0.5 mL Pnij  Generic drug: tirzepatide  Inject 5 mg into the skin every 7 days.     multivitamin per tablet  Commonly known as: THERAGRAN  Take 1 tablet by mouth once daily.     ondansetron 4 MG Tbdl  Commonly known as: ZOFRAN-ODT  Dissolve 1 tablet (4 mg total) by mouth every 8 (eight) hours as needed.           * This list has 2 medication(s) that are the same as other medications prescribed for you. Read the directions carefully, and ask your doctor or other care provider to review them with you.                ASK your doctor about these medications      ascorbic acid (vitamin C) 500 MG tablet  Commonly known as: VITAMIN C  Take 500 mg by mouth once daily.     GUANFACINE ORAL  Take by mouth.     methylPREDNISolone 4 mg tablet  Commonly known as: MEDROL DOSEPACK  use as directed              Time spent on the discharge of patient: 45 minutes    Gaby Garcia MD  Cardiac Electrophysiology  [facility] - Short Stay Cardiac Unit

## 2024-06-21 NOTE — PLAN OF CARE
Patient arrived to room. PIV placed. Admit assessment completed. Plan of care discussed with patient. Will monitor. Call light within reach, instructed in use.  POC BS 88

## 2024-06-21 NOTE — PLAN OF CARE
Received report from Charity. Patient s/p loop recorder, AAOx3. VSS, no c/o pain or discomfort at this time, resp even and unlabored. L chest wall incision JINA c DB. No active bleeding. No hematoma noted. Post procedure protocol reviewed with patient and patient's family. Understanding verbalized. Family members at bedside. Nurse call bell within reach.

## 2024-06-21 NOTE — ASSESSMENT & PLAN NOTE
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 We discussed the potential etiology of undiagnosed atrial arrhythmias (atrial fibrillation, atrial flutter) as possible culprits for recurrent strokes. We discussed how stroke prevention strategies are different in the setting of atrial fibrillation and flutter with use of anticoagulation. To date no atrial arrhythmias have been observed however she has risk factors for atrial fibrillation. Discussed the benefit of long-term heart rhythm monitoring with an implantable loop recorder. Discussed the risks of implantation including pain, infection, bleeding and rare risk of device erosion. She desires to proceed. Prior extended holter noted no AF. Now having falls with questionable near syncope and new LBBB. ILR implantation for bradycardia monitoring is also warranted.     Plan  ILR implant . Discussed risks and benefits. Consents signed.

## 2024-06-21 NOTE — Clinical Note
The chest was prepped. The site was prepped with ChloraPrep. The patient was draped. 0 (no pain/absence of nonverbal indicators of pain)

## 2024-06-26 DIAGNOSIS — I44.7 LBBB (LEFT BUNDLE BRANCH BLOCK): Primary | ICD-10-CM

## 2024-06-27 ENCOUNTER — OFFICE VISIT (OUTPATIENT)
Dept: FAMILY MEDICINE | Facility: CLINIC | Age: 74
End: 2024-06-27
Payer: MEDICARE

## 2024-06-27 VITALS
TEMPERATURE: 99 F | DIASTOLIC BLOOD PRESSURE: 72 MMHG | HEART RATE: 95 BPM | OXYGEN SATURATION: 97 % | WEIGHT: 175.25 LBS | HEIGHT: 67 IN | BODY MASS INDEX: 27.51 KG/M2 | SYSTOLIC BLOOD PRESSURE: 112 MMHG

## 2024-06-27 DIAGNOSIS — E11.65 UNCONTROLLED TYPE 2 DIABETES MELLITUS WITH HYPERGLYCEMIA: ICD-10-CM

## 2024-06-27 DIAGNOSIS — L02.92 BOIL: Primary | ICD-10-CM

## 2024-06-27 DIAGNOSIS — Z23 NEED FOR SHINGLES VACCINE: ICD-10-CM

## 2024-06-27 DIAGNOSIS — R42 DIZZINESS: ICD-10-CM

## 2024-06-27 DIAGNOSIS — I10 ESSENTIAL HYPERTENSION: ICD-10-CM

## 2024-06-27 PROCEDURE — 1126F AMNT PAIN NOTED NONE PRSNT: CPT | Mod: HCNC,CPTII,S$GLB, | Performed by: NURSE PRACTITIONER

## 2024-06-27 PROCEDURE — 3288F FALL RISK ASSESSMENT DOCD: CPT | Mod: HCNC,CPTII,S$GLB, | Performed by: NURSE PRACTITIONER

## 2024-06-27 PROCEDURE — 99214 OFFICE O/P EST MOD 30 MIN: CPT | Mod: HCNC,S$GLB,, | Performed by: NURSE PRACTITIONER

## 2024-06-27 PROCEDURE — 4010F ACE/ARB THERAPY RXD/TAKEN: CPT | Mod: HCNC,CPTII,S$GLB, | Performed by: NURSE PRACTITIONER

## 2024-06-27 PROCEDURE — 3078F DIAST BP <80 MM HG: CPT | Mod: HCNC,CPTII,S$GLB, | Performed by: NURSE PRACTITIONER

## 2024-06-27 PROCEDURE — 3008F BODY MASS INDEX DOCD: CPT | Mod: HCNC,CPTII,S$GLB, | Performed by: NURSE PRACTITIONER

## 2024-06-27 PROCEDURE — 3066F NEPHROPATHY DOC TX: CPT | Mod: HCNC,CPTII,S$GLB, | Performed by: NURSE PRACTITIONER

## 2024-06-27 PROCEDURE — 99999 PR PBB SHADOW E&M-EST. PATIENT-LVL V: CPT | Mod: PBBFAC,HCNC,, | Performed by: NURSE PRACTITIONER

## 2024-06-27 PROCEDURE — 3074F SYST BP LT 130 MM HG: CPT | Mod: HCNC,CPTII,S$GLB, | Performed by: NURSE PRACTITIONER

## 2024-06-27 PROCEDURE — 1159F MED LIST DOCD IN RCRD: CPT | Mod: HCNC,CPTII,S$GLB, | Performed by: NURSE PRACTITIONER

## 2024-06-27 PROCEDURE — 3062F POS MACROALBUMINURIA REV: CPT | Mod: HCNC,CPTII,S$GLB, | Performed by: NURSE PRACTITIONER

## 2024-06-27 PROCEDURE — 3051F HG A1C>EQUAL 7.0%<8.0%: CPT | Mod: HCNC,CPTII,S$GLB, | Performed by: NURSE PRACTITIONER

## 2024-06-27 PROCEDURE — 1100F PTFALLS ASSESS-DOCD GE2>/YR: CPT | Mod: HCNC,CPTII,S$GLB, | Performed by: NURSE PRACTITIONER

## 2024-06-27 RX ORDER — MECLIZINE HYDROCHLORIDE 25 MG/1
25 TABLET ORAL 2 TIMES DAILY PRN
Qty: 60 TABLET | Refills: 0 | Status: SHIPPED | OUTPATIENT
Start: 2024-06-27 | End: 2024-07-27

## 2024-06-27 RX ORDER — ZOSTER VACCINE RECOMBINANT, ADJUVANTED 50 MCG/0.5
0.5 KIT INTRAMUSCULAR ONCE
Qty: 1 EACH | Refills: 0 | Status: SHIPPED | OUTPATIENT
Start: 2024-06-27 | End: 2024-06-27

## 2024-06-27 NOTE — TELEPHONE ENCOUNTER
Refill Routing Note   Medication(s) are not appropriate for processing by [facility] for the following reason(s):        Required labs abnormal    [facility] action(s):  Defer      Medication Therapy Plan: significant increase in Cr    Pharmacist review requested: Yes     Appointments  past 12m or future 3m with PCP    Date Provider   Last Visit   5/16/2024 Teri Soot, DO   Next Visit   8/19/2024 Teri Soto, DO   ED visits in past 90 days: 0        Note composed:6:30 PM 06/27/2024

## 2024-06-27 NOTE — PROGRESS NOTES
HPI     Joanne Peña is a 73 y.o. female with multiple medical diagnoses as listed in the medical history and problem list that presents for   Chief Complaint   Patient presents with    Cyst     Knot above the the lip area, injections needed.       HPI    Boil:  Pt reports she first noticed a boil to her right upper lip >2 yrs ago. States mass has enlarged gradually over time and has now become uncomfortable. Denies drainage. Denies injury. Denies taking medication for this condition previously. Denies fever or chills.      Assessment & Plan     Problem List Items Addressed This Visit          Other    Dizziness    The current medical regimen is effective;  continue present plan      Relevant Medications    meclizine (ANTIVERT) 25 mg tablet     Other Visit Diagnoses       Boil    -  Primary    No s/s of active infection noted. Afebrile. Minimal tenderness to mass above right upper lip on exam. No redness or discoloration. No increased warmth.     Pt has a large black head over site. This provider attempted to drain black head with minimal success.    Refer to dermatology    Discussed DDx, condition, and treatment.   Education sent to patient portal/included in after visit summary.  ED precautions given.   Notify provider if symptoms do not resolve or increase in severity.   Patient verbalizes understanding and agrees with plan of care.        Relevant Orders    Ambulatory referral/consult to Dermatology    Uncontrolled type 2 diabetes mellitus with hyperglycemia        -discussed with patient about routine diabetic care that includes but are not limited to regular eye exams, skin care, daily foot exam, proper nutrition, regular BG monitoring at home (fasting and mealtime) and medication compliance in a diabetic.  Target morning BS  and meal-time BS <180    Enrolled in digital medicine program for this diagnosis      Relevant Orders    Diabetes Digital Medicine (DDMP) Enrollment Order (Completed)     [employer] Digital Medicine (HDMP) Enrollment Order (Completed)           Need for shingles vaccine        Relevant Medications    varicella-zoster [id number]-AS01B, PF, (SHINGRIX, PF,) 50 mcg/0.5 mL injection            --------------------------------------------      Health Maintenance:  Health Maintenance         Date Due Completion Date    TETANUS VACCINE Never done ---    Shingles Vaccine (1 of 2) Never done ---    RSV Vaccine (Age 60+ and Pregnant patients) (1 - 1-dose 60+ series) Never done ---    COVID-19 Vaccine (1 - 2023-24 season) Never done ---    Foot Exam 07/14/2024 7/14/2023    Eye Exam 10/16/2024 10/16/2023    Hemoglobin A1c 11/13/2024 5/13/2024    Mammogram 03/27/2025 3/27/2024    Diabetes Urine Screening 05/13/2025 5/13/2024    Lipid Panel 05/13/2025 5/13/2024    Colorectal Cancer Screening 07/12/2026 7/12/2023    DEXA Scan 09/25/2026 9/25/2023            Shingles vaccine ordered and Advised patient on the importance of completing overdue health maintenance items    Follow Up:  Follow up in about 2 weeks (around 7/11/2024), or if symptoms worsen or fail to improve.    Exam     Review of Systems:  (as noted above)  Review of Systems   Constitutional:  Negative for fever.   HENT:  Negative for trouble swallowing.    Eyes:  Negative for visual disturbance.   Respiratory:  Negative for chest tightness and shortness of breath.    Cardiovascular:  Negative for chest pain.   Gastrointestinal:  Negative for blood in stool.       Physical Exam:   Physical Exam  Constitutional:       General: She is not in acute distress.     Appearance: She is not ill-appearing or diaphoretic.   HENT:      Head: Normocephalic and atraumatic.        Comments: Red = location of mass  Cardiovascular:      Rate and Rhythm: Normal rate and regular rhythm.   Pulmonary:      Effort: Pulmonary effort is normal. No respiratory distress.   Chest:      Chest wall: No tenderness.   Neurological:      General: No focal deficit present.       "Mental Status: She is alert and oriented to person, place, and time.       Vitals:    06/27/24 0941   BP: 112/72   BP Location: Left arm   Patient Position: Sitting   BP Method: Medium (Automatic)   Pulse: 95   Temp: 98.7 °F (37.1 °C)   TempSrc: Oral   SpO2: 97%   Weight: 79.5 kg (175 lb 4.3 oz)   Height: 5' 7" (1.702 m)      Body mass index is 27.45 kg/m².        History     Past Medical History:  Past Medical History:   Diagnosis Date    Diabetes mellitus     Hyperlipidemia     Hypertension     Stroke        Past Surgical History:  Past Surgical History:   Procedure Laterality Date    COLONOSCOPY N/A 7/12/2023    Procedure: COLONOSCOPY;  Surgeon: Ray Sorensen MD;  Location: [facility] ENDO;  Service: Endoscopy;  Laterality: N/A;  instr via portal  - PC  4/10/23 Daniel, instr via mail & portal, unable to tolerate Golytely- request Miralax/Gatorade - PC  5/30-pt r/s-new instr portal-tb    INJECTION OF ANESTHETIC AGENT AROUND MEDIAL BRANCH NERVES INNERVATING LUMBAR FACET JOINT Bilateral 4/20/2023    Procedure: MBB #1 (B/L) L3,4,5;  Surgeon: Son Lara DO;  Location: [facility] OR;  Service: Pain Management;  Laterality: Bilateral;  ORAL XANAX    INJECTION OF ANESTHETIC AGENT AROUND MEDIAL BRANCH NERVES INNERVATING LUMBAR FACET JOINT Bilateral 5/5/2023    Procedure: MBB #2 (B/L) L3,4,5;  Surgeon: Son Lara DO;  Location: [facility] OR;  Service: Pain Management;  Laterality: Bilateral;  Oral Xanax    INJECTION OF JOINT Right 5/20/2022    Procedure: Right SI joint injection;  Surgeon: Son Lara DO;  Location: [facility] OR;  Service: Pain Management;  Laterality: Right;    INJECTION, SACROILIAC JOINT Right 12/19/2023    Procedure: RT SI joint Inj;  Surgeon: Son Lara DO;  Location: [facility] PAIN MANAGEMENT;  Service: Pain Management;  Laterality: Right;  oral    INSERTION OF IMPLANTABLE LOOP RECORDER Left 6/21/2024    Procedure: Insertion, Implantable Loop Recorder;  Surgeon: Hunter Rich, " MD;  Location: [facility] EP LAB;  Service: Cardiology;  Laterality: Left;  CVA, ILR, BSCI, Local, ID, 3 Prep    RADIOFREQUENCY ABLATION OF LUMBAR MEDIAL BRANCH NERVE AT SINGLE LEVEL Bilateral 2023    Procedure: RFA (B/L) L3,4,5;  Surgeon: Son Lara DO;  Location: [facility] PAIN MANAGEMENT;  Service: Pain Management;  Laterality: Bilateral;       Social History:  Social History     Socioeconomic History    Marital status:    Tobacco Use    Smoking status: Former     Current packs/day: 0.00     Types: Cigarettes     Quit date: 2022     Years since quittin.3     Passive exposure: Past    Smokeless tobacco: Never   Substance and Sexual Activity    Alcohol use: Never     Comment: rare    Drug use: No    Sexual activity: Not Currently     Partners: Male     Social Determinants of Health     Financial Resource Strain: Medium Risk (2024)    Overall Financial Resource Strain (CARDIA)     Difficulty of Paying Living Expenses: Somewhat hard   Food Insecurity: Food Insecurity Present (2024)    Hunger Vital Sign     Worried About Running Out of Food in the Last Year: Sometimes true     Ran Out of Food in the Last Year: Never true   Transportation Needs: No Transportation Needs (2024)    PRAPARE - Transportation     Lack of Transportation (Medical): No     Lack of Transportation (Non-Medical): No   Physical Activity: Unknown (2024)    Exercise Vital Sign     Days of Exercise per Week: 0 days   Housing Stability: Low Risk  (2024)    Housing Stability Vital Sign     Unable to Pay for Housing in the Last Year: No     Number of Places Lived in the Last Year: 1     Unstable Housing in the Last Year: No       Family History:  Family History   Problem Relation Name Age of Onset    Kidney disease Mother      Heart disease Mother      Cancer Father      Hypertension Brother      Hypertension Daughter      Cancer Maternal Aunt         Allergies and Medications: (updated and reviewed)  Review  of patient's allergies indicates:   Allergen Reactions    Lisinopril-hydrochlorothiazide Other (See Comments)     Pt stated it makes her feel drained. She couldn't raise arms over head.    Ampicillin Rash    Darvocet a500 [propoxyphene n-acetaminophen] Other (See Comments)     shaky     Current Outpatient Medications   Medication Sig Dispense Refill    ACCU-CHEK GUIDE GLUCOSE METER Misc TO CHECK BLOOD GLUCOSE DAILY, TO USE WITH INSURANCE PREFERRED METER      amLODIPine (NORVASC) 10 MG tablet TAKE 1 TABLET BY MOUTH ONCE DAILY. HOLD IF SBP <120 90 tablet 3    atorvastatin (LIPITOR) 80 MG tablet TAKE 1 TABLET (80 MG TOTAL) BY MOUTH ONCE DAILY. 90 tablet 1    BLOOD PRESSURE CUFF Misc 1 Units by Misc.(Non-Drug; Combo Route) route once daily. 1 each 0    blood sugar diagnostic Strp To check BG daily, to use with insurance preferred meter 200 each 11    blood-glucose meter kit To check BG daily, to use with insurance preferred meter 1 each 0    chlorthalidone (HYGROTEN) 25 MG Tab TAKE 1 TABLET BY MOUTH EVERY DAY 90 tablet 3    cyclobenzaprine (FLEXERIL) 10 MG tablet TAKE 1 TABLET (10 MG TOTAL) BY MOUTH 2 (TWO) TIMES DAILY AS NEEDED FOR MUSCLE SPASMS (BACK PAIN). 180 tablet 1    FLUAD QUAD 2023-24,65Y UP,,PF, 60 mcg (15 mcg x 4)/0.5 mL Syrg       lancets Misc To check BG daily, to use with insurance preferred meter 200 each 11    losartan (COZAAR) 100 MG tablet TAKE 1 TABLET (100 MG TOTAL) BY MOUTH ONCE DAILY. HOLD IF SBP <120 90 tablet 3    multivitamin (THERAGRAN) per tablet Take 1 tablet by mouth once daily.      tirzepatide (MOUNJARO) 5 mg/0.5 mL PnIj Inject 5 mg into the skin every 7 days. 2 mL 11    ascorbic acid, vitamin C, (VITAMIN C) 500 MG tablet Take 500 mg by mouth once daily. (Patient not taking: Reported on 6/27/2024)      aspirin (ECOTRIN) 81 MG EC tablet Take 1 tablet (81 mg total) by mouth once daily. 30 tablet 3    GAVILYTE-C 240-22.72-6.72 -5.84 gram SolR  (Patient not taking: Reported on 6/27/2024)       guanfacine HCl (GUANFACINE ORAL) Take by mouth. (Patient not taking: Reported on 6/27/2024)      hydrALAZINE (APRESOLINE) 50 MG tablet Take 1 tablet (50 mg total) by mouth every 8 (eight) hours. Hold is SBP <120 (Patient not taking: Reported on 6/27/2024) 90 tablet 1    HYDROcodone-acetaminophen (NORCO) 5-325 mg per tablet Take 1 tablet by mouth every 6 (six) hours as needed for Pain. (Patient not taking: Reported on 6/27/2024) 20 tablet 0    meclizine (ANTIVERT) 25 mg tablet Take 1 tablet (25 mg total) by mouth 2 (two) times daily as needed for Dizziness. 60 tablet 0    methylPREDNISolone (MEDROL DOSEPACK) 4 mg tablet use as directed (Patient not taking: Reported on 6/27/2024) 1 each 0    ondansetron (ZOFRAN-ODT) 4 MG TbDL Dissolve 1 tablet (4 mg total) by mouth every 8 (eight) hours as needed. (Patient not taking: Reported on 6/27/2024) 20 tablet 0    varicella-zoster gE-AS01B, PF, (SHINGRIX, PF,) 50 mcg/0.5 mL injection Inject 0.5 mLs into the muscle once. for 1 dose 1 each 0     No current facility-administered medications for this visit.     Facility-Administered Medications Ordered in Other Visits   Medication Dose Route Frequency Provider Last Rate Last Admin    0.9%  NaCl infusion   Intravenous Continuous Ronald Young MD        LIDOcaine (PF) 10 mg/ml (1%) injection 10 mg  1 mL Intradermal Once Ronald Young MD           Patient Care Team:  Teri Soto DO as PCP - General (Family Medicine)  Tracie Kessler LPN as Care Coordinator  Lb Tracy OD (Optometry)         - The patient is given an After Visit Summary that lists all medications with directions, allergies, education, orders placed during this encounter and follow-up instructions.      - I have reviewed the patient's medical information including past medical, family, and social history sections including the medications and allergies.      - We discussed the patient's current medications.     This note was created by  combination of typed  and MModal dictation.  Transcription errors may be present.  If there are any questions, please contact me.       Vicente Sorensen NP

## 2024-06-27 NOTE — PATIENT INSTRUCTIONS
Medical Fitness--[phone]  Imaging, Xray, CT, MRI, Ultrasound---[phone]  Bariatrics---[phone]  Breast Surgery---[phone]  Case Management---[phone]  Colonoscopy---[phone]  DME---[phone]  Infectious Disease---[phone]  Interventional Radiology---[phone]  Medical Records---[phone]  [facility]---[phone]  Optometry/Ophthalmology---[phone]  [facility]---[phone]  Physical Therapy---[phone]  Psychiatry---[phone] or [phone]  Plastic Surgery---[phone]  Recovery--[phone] option 2, or [phone].  Sleep Study---[phone]  Smoking Cessation---[phone]  Wound Care---[phone]  Referral Desk---[phone]

## 2024-06-27 NOTE — TELEPHONE ENCOUNTER
Refill Routing Note   Medication(s) are not appropriate for processing by [facility] for the following reason(s):        Required labs abnormal    [facility] action(s):  Defer             Pharmacist review requested: Yes     Appointments  past 12m or future 3m with PCP    Date Provider   Last Visit   5/16/2024 Teri Soto, DO   Next Visit   8/19/2024 Teri Soto, DO   ED visits in past 90 days: 0        Note composed:5:59 PM 06/27/2024

## 2024-06-27 NOTE — TELEPHONE ENCOUNTER
No care due was identified.  [facility] Embedded Care Due Messages. Reference number: [id number].   6/27/2024 5:51:19 PM CDT

## 2024-06-28 RX ORDER — LOSARTAN POTASSIUM 100 MG/1
100 TABLET ORAL DAILY
Qty: 90 TABLET | Refills: 3 | Status: SHIPPED | OUTPATIENT
Start: 2024-06-28 | End: 2025-06-28

## 2024-07-01 ENCOUNTER — TELEPHONE (OUTPATIENT)
Dept: ELECTROPHYSIOLOGY | Facility: CLINIC | Age: 74
End: 2024-07-01

## 2024-07-01 ENCOUNTER — CLINICAL SUPPORT (OUTPATIENT)
Dept: CARDIOLOGY | Facility: HOSPITAL | Age: 74
End: 2024-07-01
Attending: INTERNAL MEDICINE
Payer: MEDICARE

## 2024-07-01 DIAGNOSIS — N18.32 STAGE 3B CHRONIC KIDNEY DISEASE: ICD-10-CM

## 2024-07-01 DIAGNOSIS — I10 MALIGNANT ESSENTIAL HYPERTENSION: ICD-10-CM

## 2024-07-01 DIAGNOSIS — I49.9 CARDIAC ARRHYTHMIA, UNSPECIFIED CARDIAC ARRHYTHMIA TYPE: ICD-10-CM

## 2024-07-01 DIAGNOSIS — Z86.73 HISTORY OF STROKE: ICD-10-CM

## 2024-07-01 DIAGNOSIS — E11.65 TYPE 2 DIABETES MELLITUS WITH HYPERGLYCEMIA, WITHOUT LONG-TERM CURRENT USE OF INSULIN: ICD-10-CM

## 2024-07-01 DIAGNOSIS — I44.7 LBBB (LEFT BUNDLE BRANCH BLOCK): ICD-10-CM

## 2024-07-01 NOTE — TELEPHONE ENCOUNTER
Patient seen today in device clinic s/p ILR implant on 6/21/24 for cyptogenic stroke.     R waves at time of implant measures 0.11 mV- today in clinic R waves were consistently undersensed. Sensitivity reprogrammed to most sensitive (0.025mV) and frequent intermittent R waves still noted to be undersensed.     R waves that were sensed measured approx 0.03 mV.   No pauses or bradycardic events recorded although undersensing noted on previous presenting ECG's- [place] assisting and will be reaching out to [facility].    Informed patient we will continue to monitor through home monitoring as healing progresses and someone would reach out to her if explant and reimplant is required.

## 2024-07-06 LAB — OHS CV DC REMOTE DEVICE TYPE: NORMAL

## 2024-07-08 ENCOUNTER — TELEPHONE (OUTPATIENT)
Dept: ELECTROPHYSIOLOGY | Facility: CLINIC | Age: 74
End: 2024-07-08
Payer: MEDICARE

## 2024-07-08 ENCOUNTER — PATIENT MESSAGE (OUTPATIENT)
Dept: ELECTROPHYSIOLOGY | Facility: CLINIC | Age: 74
End: 2024-07-08
Payer: MEDICARE

## 2024-07-08 NOTE — TELEPHONE ENCOUNTER
Spoke with patient regarding her ILR undersensing from time to time. I recommend moving it to a different location. The patient is ok with that but would like sedation this time. She would need to hold her Ozempic 7 days prior. She then told me she was trying to get a hold of someone last week that she thinks there may be an eschar from where we had to apply bovie electrocautery due to bleeding that wouldn't stop with pressure during the procedure.    I will ask our device clinic to bring her in tomorrow if possible for would check and we can discuss moving the device.

## 2024-07-08 NOTE — TELEPHONE ENCOUNTER
ILR update: Over the past week- no arrhythmias have been recorded- proper sensing on presenting ECGs on 6/7 past days. Undersensing noted on 6/6/2024 presenting ECG.

## 2024-07-08 NOTE — TELEPHONE ENCOUNTER
LVM on the patient's daughters cell regarding appt in device clinic 7/9/24 @ 11:40 am. Left clinic call back number and instructions to contact clinic if this appt time does not work.

## 2024-07-09 ENCOUNTER — CLINICAL SUPPORT (OUTPATIENT)
Dept: CARDIOLOGY | Facility: HOSPITAL | Age: 74
End: 2024-07-09
Attending: INTERNAL MEDICINE
Payer: MEDICARE

## 2024-07-09 ENCOUNTER — PATIENT MESSAGE (OUTPATIENT)
Dept: ELECTROPHYSIOLOGY | Facility: CLINIC | Age: 74
End: 2024-07-09
Payer: MEDICARE

## 2024-07-09 ENCOUNTER — DOCUMENTATION ONLY (OUTPATIENT)
Dept: CARDIOLOGY | Facility: HOSPITAL | Age: 74
End: 2024-07-09
Payer: MEDICARE

## 2024-07-09 DIAGNOSIS — I44.7 LBBB (LEFT BUNDLE BRANCH BLOCK): ICD-10-CM

## 2024-07-09 DIAGNOSIS — Z95.818 STATUS POST PLACEMENT OF IMPLANTABLE LOOP RECORDER: ICD-10-CM

## 2024-07-09 DIAGNOSIS — Z86.73 HISTORY OF STROKE: ICD-10-CM

## 2024-07-09 DIAGNOSIS — E11.65 TYPE 2 DIABETES MELLITUS WITH HYPERGLYCEMIA, WITHOUT LONG-TERM CURRENT USE OF INSULIN: Primary | ICD-10-CM

## 2024-07-09 DIAGNOSIS — N18.32 STAGE 3B CHRONIC KIDNEY DISEASE: ICD-10-CM

## 2024-07-09 DIAGNOSIS — Z01.818 PRE-OP TESTING: ICD-10-CM

## 2024-07-09 DIAGNOSIS — I49.9 CARDIAC ARRHYTHMIA, UNSPECIFIED CARDIAC ARRHYTHMIA TYPE: ICD-10-CM

## 2024-07-09 NOTE — PROGRESS NOTES
S/p Loop implant 6/21/24. Pt seen in clinic to assess wound. Removed band aid. On assessment, wound no longer has derma bond or suture noted. Center of wound shows signs of granulation, hard and non-moveable. No redness, swelling, drainage or tenderness to site. Assessed by Dr. Rich. Plan is to remove and reinsert Loop for better sensing tomorrow morning. Pt redressed wound with a band aid. Provided pt with Hibiclens.

## 2024-07-10 ENCOUNTER — HOSPITAL ENCOUNTER (OUTPATIENT)
Facility: HOSPITAL | Age: 74
Discharge: HOME | End: 2024-07-10
Attending: INTERNAL MEDICINE | Admitting: INTERNAL MEDICINE
Payer: MEDICARE

## 2024-07-10 VITALS
DIASTOLIC BLOOD PRESSURE: 66 MMHG | OXYGEN SATURATION: 95 % | HEART RATE: 76 BPM | WEIGHT: 160 LBS | HEIGHT: 67 IN | TEMPERATURE: 98 F | BODY MASS INDEX: 25.11 KG/M2 | RESPIRATION RATE: 18 BRPM | SYSTOLIC BLOOD PRESSURE: 125 MMHG

## 2024-07-10 DIAGNOSIS — E11.65 TYPE 2 DIABETES MELLITUS WITH HYPERGLYCEMIA, WITHOUT LONG-TERM CURRENT USE OF INSULIN: ICD-10-CM

## 2024-07-10 DIAGNOSIS — Z95.9 CARDIAC DEVICE IN SITU: ICD-10-CM

## 2024-07-10 DIAGNOSIS — Z86.73 HISTORY OF STROKE: ICD-10-CM

## 2024-07-10 DIAGNOSIS — N18.32 STAGE 3B CHRONIC KIDNEY DISEASE: ICD-10-CM

## 2024-07-10 DIAGNOSIS — I10 MALIGNANT ESSENTIAL HYPERTENSION: ICD-10-CM

## 2024-07-10 DIAGNOSIS — I44.7 LBBB (LEFT BUNDLE BRANCH BLOCK): ICD-10-CM

## 2024-07-10 LAB
OHS QRS DURATION: 138 MS
OHS QTC CALCULATION: 508 MS
POCT GLUCOSE: 131 MG/DL (ref 70–110)
POCT GLUCOSE: 160 MG/DL (ref 70–110)

## 2024-07-10 PROCEDURE — 25000003 PHARM REV CODE 250: Mod: HCNC | Performed by: INTERNAL MEDICINE

## 2024-07-10 PROCEDURE — 63600175 PHARM REV CODE 636 W HCPCS: Mod: HCNC | Performed by: NURSE PRACTITIONER

## 2024-07-10 PROCEDURE — 27201423 OPTIME MED/SURG SUP & DEVICES STERILE SUPPLY: Mod: HCNC | Performed by: INTERNAL MEDICINE

## 2024-07-10 PROCEDURE — 99153 MOD SED SAME PHYS/QHP EA: CPT | Mod: HCNC | Performed by: INTERNAL MEDICINE

## 2024-07-10 PROCEDURE — 99152 MOD SED SAME PHYS/QHP 5/>YRS: CPT | Mod: HCNC | Performed by: INTERNAL MEDICINE

## 2024-07-10 PROCEDURE — 99152 MOD SED SAME PHYS/QHP 5/>YRS: CPT | Mod: HCNC,,, | Performed by: INTERNAL MEDICINE

## 2024-07-10 PROCEDURE — 63600175 PHARM REV CODE 636 W HCPCS: Mod: HCNC | Performed by: INTERNAL MEDICINE

## 2024-07-10 PROCEDURE — 33286 RMVL SUBQ CAR RHYTHM MNTR: CPT | Mod: HCNC,,, | Performed by: INTERNAL MEDICINE

## 2024-07-10 PROCEDURE — 93005 ELECTROCARDIOGRAM TRACING: CPT | Mod: HCNC

## 2024-07-10 PROCEDURE — 33286 RMVL SUBQ CAR RHYTHM MNTR: CPT | Mod: HCNC | Performed by: INTERNAL MEDICINE

## 2024-07-10 PROCEDURE — 25000003 PHARM REV CODE 250: Mod: HCNC | Performed by: NURSE PRACTITIONER

## 2024-07-10 PROCEDURE — 93010 ELECTROCARDIOGRAM REPORT: CPT | Mod: HCNC,,, | Performed by: INTERNAL MEDICINE

## 2024-07-10 PROCEDURE — 82962 GLUCOSE BLOOD TEST: CPT | Mod: HCNC | Performed by: INTERNAL MEDICINE

## 2024-07-10 RX ORDER — LIDOCAINE HYDROCHLORIDE 20 MG/ML
INJECTION, SOLUTION INFILTRATION; PERINEURAL
Status: DISCONTINUED | OUTPATIENT
Start: 2024-07-10 | End: 2024-07-10 | Stop reason: HOSPADM

## 2024-07-10 RX ORDER — MIDAZOLAM HYDROCHLORIDE 1 MG/ML
INJECTION, SOLUTION INTRAMUSCULAR; INTRAVENOUS
Status: DISCONTINUED | OUTPATIENT
Start: 2024-07-10 | End: 2024-07-10 | Stop reason: HOSPADM

## 2024-07-10 RX ORDER — DOXYCYCLINE HYCLATE 100 MG
100 TABLET ORAL 2 TIMES DAILY
Qty: 14 TABLET | Refills: 0 | Status: SHIPPED | OUTPATIENT
Start: 2024-07-10 | End: 2024-07-17

## 2024-07-10 RX ORDER — VANCOMYCIN HYDROCHLORIDE 1 G/20ML
INJECTION, POWDER, LYOPHILIZED, FOR SOLUTION INTRAVENOUS
Status: DISCONTINUED | OUTPATIENT
Start: 2024-07-10 | End: 2024-07-10 | Stop reason: HOSPADM

## 2024-07-10 RX ORDER — ACETAMINOPHEN 325 MG/1
650 TABLET ORAL EVERY 4 HOURS PRN
Status: DISCONTINUED | OUTPATIENT
Start: 2024-07-10 | End: 2024-07-10 | Stop reason: HOSPADM

## 2024-07-10 RX ADMIN — VANCOMYCIN HYDROCHLORIDE 1000 MG: 1 INJECTION, POWDER, LYOPHILIZED, FOR SOLUTION INTRAVENOUS at 07:07

## 2024-07-10 NOTE — NURSING
Pt DC'd per orders.  DC paperwork reviewed w pt and daughter.  Pt verbalized DC instructions.    IV removed. Tele DC'd. Pt was able to drink, eat, walk, and urinate with no difficulties.    Pt being wheeled off unit per daughter in wheelchair.

## 2024-07-10 NOTE — DISCHARGE SUMMARY
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 Outpatient cardiac monitoring did not show any atrial fibrillation and she subsequently underwent ILR insertion with Dr. Rich last month. She had an eschar develop over the site that is healing poorly, and has had undersensing of her device. She presents today for extraction and possible re implant of ILR.     Hospital Course:   Mrs Peña underwent successful removal of ILR. She will complete a course of antibiotics prior to re implantation. She tolerated the procedure well with no immediate complications. She completed bed rest and was discharged home in stable condition.     Follow up:   Follow up with device clinic in 1 week    Disposition:   Home or Self Care         Medication List        START taking these medications      doxycycline 100 MG tablet  Commonly known as: VIBRA-TABS  Take 1 tablet (100 mg total) by mouth 2 (two) times daily. for 7 days            CONTINUE taking these medications      amLODIPine 10 MG tablet  Commonly known as: NORVASC  TAKE 1 TABLET BY MOUTH ONCE DAILY. HOLD IF SBP <120     aspirin 81 MG EC tablet  Commonly known as: ECOTRIN  Take 1 tablet (81 mg total) by mouth once daily.     atorvastatin 80 MG tablet  Commonly known as: LIPITOR  TAKE 1 TABLET (80 MG TOTAL) BY MOUTH ONCE DAILY.     BLOOD PRESSURE CUFF Misc  Generic drug: miscellaneous medical supply  1 Units by Misc.(Non-Drug; Combo Route) route once daily.     blood sugar diagnostic Strp  To check BG daily, to use with insurance preferred meter     * blood-glucose meter kit  To check BG daily, to  use with insurance preferred meter     * ACCU-CHEK GUIDE GLUCOSE METER Misc  Generic drug: blood-glucose meter     chlorthalidone 25 MG Tab  Commonly known as: HYGROTEN  TAKE 1 TABLET BY MOUTH EVERY DAY     cyclobenzaprine 10 MG tablet  Commonly known as: FLEXERIL  TAKE 1 TABLET (10 MG TOTAL) BY MOUTH 2 (TWO) TIMES DAILY AS NEEDED FOR MUSCLE SPASMS (BACK PAIN).     FLUAD QUAD 2023-24(65Y UP)(PF) 60 mcg (15 mcg x 4)/0.5 mL Syrg  Generic drug: flu vac 2023 [id number](PF)     hydrALAZINE 50 MG tablet  Commonly known as: APRESOLINE  Take 1 tablet (50 mg total) by mouth every 8 (eight) hours. Hold is SBP <120     lancets Misc  To check BG daily, to use with insurance preferred meter     losartan 100 MG tablet  Commonly known as: COZAAR  TAKE 1 TABLET (100 MG TOTAL) BY MOUTH ONCE DAILY. HOLD IF SBP <120     meclizine 25 mg tablet  Commonly known as: ANTIVERT  Take 1 tablet (25 mg total) by mouth 2 (two) times daily as needed for Dizziness.     MOUNJARO 5 mg/0.5 mL Pnij  Generic drug: tirzepatide  Inject 5 mg into the skin every 7 days.     multivitamin per tablet  Commonly known as: THERAGRAN           * This list has 2 medication(s) that are the same as other medications prescribed for you. Read the directions carefully, and ask your doctor or other care provider to review them with you.                ASK your doctor about these medications      ascorbic acid (vitamin C) 500 MG tablet  Commonly known as: VITAMIN C               Where to Get Your Medications        These medications were sent to [facility]  [address] [address]      Hours: Always Open Phone: [phone]   doxycycline 100 MG tablet           Ervin Carter MD  [facility]  Cardiovascular Disease, PGY-VII

## 2024-07-10 NOTE — NURSING
Called into lab to verify if pt needed full 2 hours of bedrest, per RN MD stated pt is stable for DC at this time.    Pt walked around the unit per orders. Pt's dressing L chest wall remained CDI.   No s/s of bleeding noted. VSS.  NAD noted. Will DC pt per orders.

## 2024-07-10 NOTE — H&P
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 Outpatient cardiac monitoring did not show any atrial fibrillation and she subsequently underwent ILR insertion with Dr. Rich last month. She had an eschar develop over the site that is healing poorly, and has had undersensing of her device. She presents today for extraction and possible re implant of ILR.       Medications - Outpatient  Prior to Admission medications    Medication Sig Start Date End Date Taking? Authorizing Provider   amLODIPine (NORVASC) 10 MG tablet TAKE 1 TABLET BY MOUTH ONCE DAILY. HOLD IF SBP <120 1/8/24  Yes Teri Soto, DO   aspirin (ECOTRIN) 81 MG EC tablet Take 1 tablet (81 mg total) by mouth once daily. 2/11/22 7/10/24 Yes Madiha Kowalski, DO   atorvastatin (LIPITOR) 80 MG tablet TAKE 1 TABLET (80 MG TOTAL) BY MOUTH ONCE DAILY. 2/16/24 2/15/25 Yes Teri Soto, DO   chlorthalidone (HYGROTEN) 25 MG Tab TAKE 1 TABLET BY MOUTH EVERY DAY 9/14/23  Yes Teri Soto, DO   cyclobenzaprine (FLEXERIL) 10 MG tablet TAKE 1 TABLET (10 MG TOTAL) BY MOUTH 2 (TWO) TIMES DAILY AS NEEDED FOR MUSCLE SPASMS (BACK PAIN). 5/13/24 11/9/24 Yes Son Lara,    losartan (COZAAR) 100 MG tablet TAKE 1 TABLET (100 MG TOTAL) BY MOUTH ONCE DAILY. HOLD IF SBP <120 6/28/24 6/28/25 Yes Teri Soto, DO   meclizine (ANTIVERT) 25 mg tablet Take 1 tablet (25 mg total) by mouth 2 (two) times daily as needed for Dizziness. 6/27/24 7/27/24 Yes Vicente Sorensen NP   multivitamin (THERAGRAN) per tablet Take 1 tablet by mouth once daily.    Yes Provider, Historical   ACCU-CHEK GUIDE GLUCOSE METER Misc TO CHECK BLOOD GLUCOSE DAILY, TO USE WITH INSURANCE PREFERRED METER 11/15/22   Provider, Historical   ascorbic acid, vitamin C, (VITAMIN C) 500 MG tablet Take 500 mg by mouth once daily.  Patient not taking: Reported on 6/27/2024    Provider, Historical   BLOOD PRESSURE CUFF Misc 1 Units by Misc.(Non-Drug; Combo Route) route once daily. 11/15/22   Teri Soto, DO   blood sugar diagnostic Strp To check BG daily, to use with insurance preferred meter 11/15/22   Teri Soto, DO   blood-glucose meter kit To check BG daily, to use with insurance preferred meter 11/15/22   Teri Soto, DO   FLUAD QUAD 2023-24,65Y UP,,PF, 60 mcg (15 mcg x 4)/0.5 mL Syrg  9/19/23   Provider, Historical   hydrALAZINE (APRESOLINE) 50 MG tablet Take 1 tablet (50 mg total) by mouth every 8 (eight) hours. Hold is SBP <120  Patient not taking: Reported on 6/27/2024 2/22/22 5/16/24  Luda Francis NP   lancets Misc To check BG daily, to use with insurance preferred meter 11/15/22   Teri Soto,    tirzepatide (MOUNJARO) 5 mg/0.5 mL PnIj Inject 5 mg into the skin every 7 days. 5/16/24 5/16/25  Teri Soto, DO   GAVILYTE-C 240-22.72-6.72 -5.84 gram SolR  1/21/23 7/10/24  Provider, Historical   guanfacine HCl (GUANFACINE ORAL) Take by mouth.  Patient not taking: Reported on 6/27/2024  7/10/24  Provider, Historical   HYDROcodone-acetaminophen (NORCO) 5-325 mg per tablet Take 1 tablet by mouth every 6 (six) hours as needed for Pain.  Patient not taking: Reported on 6/27/2024 1/9/24 7/10/24  Glenna Cross, NP   methylPREDNISolone (MEDROL DOSEPACK) 4 mg tablet use as directed  Patient not taking: Reported on 6/27/2024 2/28/24 7/10/24  Glenna Cross NP   ondansetron (ZOFRAN-ODT) 4 MG TbDL Dissolve 1 tablet (4 mg total) by mouth every 8 (eight) hours as needed.  Patient not taking: Reported on 6/27/2024 2/22/22 7/10/24  Luda Francis NP          Medications - Current  Scheduled Meds:  Continuous Infusions:  PRN Meds:.  Current Facility-Administered Medications:     vancomycin (VANCOCIN) IV (PEDS and ADULTS), 1,000 mg, Intravenous, On Call Procedure      Allergies  Review of patient's allergies indicates:   Allergen Reactions    Lisinopril-hydrochlorothiazide Other (See Comments)     Pt stated it makes her feel drained. She couldn't raise arms over head.    Ampicillin Rash    Darvocet a500 [propoxyphene n-acetaminophen] Other (See Comments)     shaky         Past Medical History  Past Medical History:   Diagnosis Date    Diabetes mellitus     Hyperlipidemia     Hypertension     Stroke          Past Surgical History  Past Surgical History:   Procedure Laterality Date    COLONOSCOPY N/A 7/12/2023    Procedure: COLONOSCOPY;  Surgeon: Ray Sorensen MD;  Location: [facility] ENDO;  Service: Endoscopy;  Laterality: N/A;  instr via portal  - PC  4/10/23 Daniel, instr via mail & portal, unable to tolerate Golytely- request Miralax/Gatorade - PC  5/30-pt r/s-new instr portal-tb    INJECTION OF ANESTHETIC AGENT AROUND MEDIAL BRANCH NERVES INNERVATING LUMBAR FACET JOINT Bilateral 4/20/2023    Procedure: MBB #1 (B/L) L3,4,5;  Surgeon: Son Lara DO;  Location: [facility] OR;  Service: Pain Management;  Laterality: Bilateral;  ORAL XANAX    INJECTION OF ANESTHETIC AGENT AROUND MEDIAL BRANCH NERVES INNERVATING LUMBAR FACET JOINT Bilateral 5/5/2023    Procedure: MBB #2 (B/L) L3,4,5;  Surgeon: Son Lara DO;  Location: [facility] OR;  Service: Pain Management;  Laterality: Bilateral;  Oral Xanax    INJECTION OF JOINT Right 5/20/2022    Procedure: Right SI joint injection;  Surgeon: Son Lara DO;  Location: [facility] OR;  Service: Pain Management;  Laterality: Right;    INJECTION, SACROILIAC JOINT Right 12/19/2023    Procedure: RT SI joint Inj;  Surgeon: Son Lara DO;  Location: [facility] PAIN MANAGEMENT;  Service: Pain Management;  Laterality:  Right;  oral    INSERTION OF IMPLANTABLE LOOP RECORDER Left 2024    Procedure: Insertion, Implantable Loop Recorder;  Surgeon: Hunter Rich MD;  Location: [facility] EP LAB;  Service: Cardiology;  Laterality: Left;  CVA, ILR, BSCI, Local, VT, 3 Prep    RADIOFREQUENCY ABLATION OF LUMBAR MEDIAL BRANCH NERVE AT SINGLE LEVEL Bilateral 2023    Procedure: RFA (B/L) L3,4,5;  Surgeon: Son Lara DO;  Location: [facility] PAIN MANAGEMENT;  Service: Pain Management;  Laterality: Bilateral;         Social History  Social History     Socioeconomic History    Marital status:    Tobacco Use    Smoking status: Former     Current packs/day: 0.00     Types: Cigarettes     Quit date: 2022     Years since quittin.4     Passive exposure: Past    Smokeless tobacco: Never   Substance and Sexual Activity    Alcohol use: Not Currently    Drug use: No    Sexual activity: Not Currently     Partners: Male     Social Determinants of Health     Financial Resource Strain: Medium Risk (2024)    Overall Financial Resource Strain (CARDIA)     Difficulty of Paying Living Expenses: Somewhat hard   Food Insecurity: Food Insecurity Present (2024)    Hunger Vital Sign     Worried About Running Out of Food in the Last Year: Sometimes true     Ran Out of Food in the Last Year: Never true   Transportation Needs: No Transportation Needs (2024)    PRAPARE - Transportation     Lack of Transportation (Medical): No     Lack of Transportation (Non-Medical): No   Physical Activity: Unknown (2024)    Exercise Vital Sign     Days of Exercise per Week: 0 days   Housing Stability: Low Risk  (2024)    Housing Stability Vital Sign     Unable to Pay for Housing in the Last Year: No     Number of Places Lived in the Last Year: 1     Unstable Housing in the Last Year: No         ROS  10 point ROS performed and negative except as stated in HPI     Physical Examination         Vital Signs  Vitals  Temp: 98.2 °F (36.8  "°C)  Temp Source: Temporal  Pulse: 85  Heart Rate Source: SpO2  Resp: 18  SpO2: 96 %  BP: 136/73  MAP (mmHg): 97  BP Location: Right arm  BP Method: Automatic  Patient Position: Lying          24 Hour VS Range    Temp:  [98.2 °F (36.8 °C)]   Pulse:  [85]   Resp:  [18]   BP: (136-141)/(73-75)   SpO2:  [96 %]   No intake or output data in the 24 hours ending 07/10/24 0755        Physical Exam:   Constitutional: no acute distress  HEENT: NCAT, EOMI, no scleral icterus  Cardiovascular: Regular rate and rhythm  Pulmonary: Normal respiratory effort   Abdomen: nontender, non-distended   Neuro: alert and oriented, no focal deficits  Extremities: warm, no edema   MSK: no deformities  Integument: intact, no rashes       Data       Recent Labs   Lab 07/09/24  1637   WBC 5.56   HGB 10.7*   HCT 34.9*           No results for input(s): "PROTIME", "INR" in the last 168 hours.     Recent Labs   Lab 07/09/24  1637      K 4.4   *   CO2 21*   BUN 20   CREATININE 1.3   ANIONGAP 7*   CALCIUM 8.7        No results for input(s): "PROT", "ALBUMIN", "BILITOT", "ALKPHOS", "AST", "ALT" in the last 168 hours.     No results for input(s): "TROPONINI" in the last 168 hours.     No results found for: "BNP"    No results for input(s): "LABBLOO" in the last 168 hours.         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     Cryptogenic stroke  ILR problem:   Risks/benefits/alternatives discussed with patient and she agrees to proceed. Consents signed.   -To EP lab for ILR removal +/- re implant           Ervin Carter MD  [facility]   Cardiovascular Disease PGY-VII      "

## 2024-07-10 NOTE — DISCHARGE INSTRUCTIONS
Post-Procedure Patient Discharge Instructions  Loop Recorders     Wound Care  You are discharged with a standard dressing over the incision, you may remove the dressing after 24 hours.   There is Dermabond (clear glue) over your incision, do not scrub it off. It acts as a barrier and will eventually disappear.  Do not get the incision wet for 48 hours following the procedure. You may sponge bath during this period, working around the incision. After 48 hours, you may shower, but you should still try to keep this area as dry as possible, and avoid direct water contact to the incision (allow the water to hit back of your shoulder rather than directly on the incision). Gently pat the incision dry if it does get wet.  You may take regular showers after 2 weeks, unless otherwise indicated at your follow-up visit.  Do not submerge the incision in a tub, pool, or body of water for 6 weeks. (Until site is sealed)  If you notice unusual swelling, redness, drainage, have more device site pain, chest pain, shortness of breath, or have a fever greater than 100 degrees, call our device clinic immediately: [phone] or [phone] during normal office hours. You may call [phone] after-hours or on weekends and ask for the electrophysiologist on call.    Activity   If you were driving prior to the procedure, you may resume driving right after your procedure (as long you did not receive any sedation). If you have a history of passing out or a history of certain arrhythmias, there may be driving restrictions unrelated to the procedure. Please clarify with your physician if this is the case.  Avoid rough contact at the device site for 6 weeks.  You may participate in sexual activity unless otherwise instructed.  You may return to work the follow day unless told otherwise by your provider.        Any need to reschedule or cancel procedures, or any questions regarding your procedures should be addressed directly with  the Arrhythmia Department Nurses at the following phone number: [phone].

## 2024-07-10 NOTE — Clinical Note
The patient's elbows and knees were padded, heels floated, warming blanket given, and safety bed rails in place

## 2024-07-10 NOTE — NURSING
Received report from Lian ALDRIDGE. Patient s/p loop recorder removal, AAOx4.   VSS, no c/o pain or discomfort at this time, resp even and unlabored.   Dermabond located L chest wall. No active bleeding. No hematoma noted.     Post procedure protocol reviewed with patient and patient's family. Understanding verbalized. Family members at bedside. Nurse call bell within reach.     Tele maintained.  BG is 160.

## 2024-07-16 DIAGNOSIS — Z01.818 PRE-OP TESTING: Primary | ICD-10-CM

## 2024-07-16 DIAGNOSIS — I49.9 CARDIAC ARRHYTHMIA, UNSPECIFIED CARDIAC ARRHYTHMIA TYPE: ICD-10-CM

## 2024-07-16 DIAGNOSIS — I10 MALIGNANT ESSENTIAL HYPERTENSION: ICD-10-CM

## 2024-07-16 DIAGNOSIS — N18.32 STAGE 3B CHRONIC KIDNEY DISEASE: ICD-10-CM

## 2024-07-16 DIAGNOSIS — E11.65 TYPE 2 DIABETES MELLITUS WITH HYPERGLYCEMIA, WITHOUT LONG-TERM CURRENT USE OF INSULIN: ICD-10-CM

## 2024-07-18 ENCOUNTER — PATIENT MESSAGE (OUTPATIENT)
Dept: ELECTROPHYSIOLOGY | Facility: CLINIC | Age: 74
End: 2024-07-18
Payer: MEDICARE

## 2024-07-24 DIAGNOSIS — I10 ESSENTIAL HYPERTENSION: ICD-10-CM

## 2024-07-24 NOTE — TELEPHONE ENCOUNTER
No care due was identified.  [facility] Embedded Care Due Messages. Reference number: [id number].   7/24/2024 3:37:46 PM CDT

## 2024-07-25 NOTE — TELEPHONE ENCOUNTER
Refill Routing Note   Medication(s) are not appropriate for processing by [facility] for the following reason(s):        Drug-disease interaction    [facility] action(s):  Defer           Pharmacist review requested: Yes     Appointments  past 12m or future 3m with PCP    Date Provider   Last Visit   5/16/2024 Teri Soto, DO   Next Visit   8/19/2024 Teri Soto, DO   ED visits in past 90 days: 0        Note composed:12:23 AM 07/25/2024

## 2024-07-25 NOTE — TELEPHONE ENCOUNTER
Refill Routing Note   Medication(s) are not appropriate for processing by [facility] for the following reason(s):     DDI not previously overridden by current provider--after initial override, the Refill Center will be able to continue overrides      Drug-disease interaction: chlorthalidone and Stage 3b chronic kidney disease     [facility] action(s):  Defer           Pharmacist review requested: Yes     Appointments  past 12m or future 3m with PCP    Date Provider   Last Visit   5/16/2024 Teri Soto, DO   Next Visit   8/19/2024 Teri Soto, DO   ED visits in past 90 days: 0        Note composed:7:31 PM 07/24/2024

## 2024-07-26 RX ORDER — CHLORTHALIDONE 25 MG/1
25 TABLET ORAL
Qty: 90 TABLET | Refills: 3 | Status: SHIPPED | OUTPATIENT
Start: 2024-07-26

## 2024-08-01 ENCOUNTER — TELEPHONE (OUTPATIENT)
Dept: ELECTROPHYSIOLOGY | Facility: CLINIC | Age: 74
End: 2024-08-01
Payer: MEDICARE

## 2024-08-01 NOTE — TELEPHONE ENCOUNTER
----- Message from Laura Augustine RN sent at 8/1/2024  8:30 AM CDT -----  Good morning,     This patient had an Loop implanted but was not sensing and will be having it replaced. Her original 3 mos follow up appt was made for 9/26.    Her new ILR will be implanted 8/30/24 so the follow up appt should be rescheduled 3-4 mos after then.     Thanks!

## 2024-08-05 ENCOUNTER — TELEPHONE (OUTPATIENT)
Dept: FAMILY MEDICINE | Facility: CLINIC | Age: 74
End: 2024-08-05
Payer: MEDICARE

## 2024-08-05 DIAGNOSIS — E11.65 UNCONTROLLED TYPE 2 DIABETES MELLITUS WITH HYPERGLYCEMIA: Primary | ICD-10-CM

## 2024-08-05 RX ORDER — SEMAGLUTIDE 2.68 MG/ML
2 INJECTION, SOLUTION SUBCUTANEOUS
Qty: 3 ML | Refills: 11 | Status: SHIPPED | OUTPATIENT
Start: 2024-08-05 | End: 2025-08-05

## 2024-08-10 ENCOUNTER — OFFICE VISIT (OUTPATIENT)
Dept: URGENT CARE | Facility: CLINIC | Age: 74
End: 2024-08-10
Payer: MEDICARE

## 2024-08-10 VITALS
SYSTOLIC BLOOD PRESSURE: 137 MMHG | OXYGEN SATURATION: 97 % | RESPIRATION RATE: 18 BRPM | BODY MASS INDEX: 26.68 KG/M2 | DIASTOLIC BLOOD PRESSURE: 86 MMHG | TEMPERATURE: 98 F | HEIGHT: 67 IN | WEIGHT: 170 LBS | HEART RATE: 67 BPM

## 2024-08-10 DIAGNOSIS — N64.4 BREAST PAIN, RIGHT: Primary | ICD-10-CM

## 2024-08-10 PROCEDURE — 99214 OFFICE O/P EST MOD 30 MIN: CPT | Mod: ,,, | Performed by: FAMILY MEDICINE

## 2024-08-10 RX ORDER — ZOSTER VACCINE RECOMBINANT, ADJUVANTED 50 MCG/0.5
KIT INTRAMUSCULAR
COMMUNITY
Start: 2024-06-27

## 2024-08-10 RX ORDER — AMINOPHYLLINE 25 MG/ML
INJECTION, SOLUTION INTRAVENOUS
COMMUNITY
Start: 2024-06-04

## 2024-08-10 RX ORDER — ALPRAZOLAM 0.5 MG/1
0.5 TABLET ORAL
COMMUNITY
Start: 2024-06-04

## 2024-08-10 RX ORDER — IBUPROFEN 800 MG/1
800 TABLET ORAL 2 TIMES DAILY
Qty: 14 TABLET | Refills: 0 | Status: SHIPPED | OUTPATIENT
Start: 2024-08-10 | End: 2024-08-17

## 2024-08-10 RX ORDER — REGADENOSON 0.08 MG/ML
INJECTION, SOLUTION INTRAVENOUS
COMMUNITY
Start: 2024-06-04

## 2024-08-10 RX ORDER — VANCOMYCIN HYDROCHLORIDE 1 G/20ML
INJECTION, POWDER, LYOPHILIZED, FOR SOLUTION INTRAVENOUS
COMMUNITY
Start: 2024-06-21

## 2024-08-10 NOTE — PROGRESS NOTES
"Subjective:      Patient ID: Joanne Peña is a 74 y.o. female.    Vitals:  height is 5' 7" (1.702 m) and weight is 77.1 kg (170 lb). Her oral temperature is 98 °F (36.7 °C). Her blood pressure is 137/86 and her pulse is 67. Her respiration is 18 and oxygen saturation is 97%.     Chief Complaint: Breast Pain    Pt states she started having Rt breast pian since last nigh . Pt says it hurts like an achy pain with  warmness and no drainage . Pt took No OTC meds for the pain .   ROS   Objective:     Physical Exam   Constitutional: She is oriented to person, place, and time. She appears well-developed. She is cooperative.   HENT:   Head: Normocephalic and atraumatic.   Ears:   Right Ear: Hearing, tympanic membrane, external ear and ear canal normal.   Left Ear: Hearing, tympanic membrane, external ear and ear canal normal.   Nose: Nose normal. No mucosal edema or nasal deformity. No epistaxis. Right sinus exhibits no maxillary sinus tenderness and no frontal sinus tenderness. Left sinus exhibits no maxillary sinus tenderness and no frontal sinus tenderness.   Mouth/Throat: Uvula is midline, oropharynx is clear and moist and mucous membranes are normal. No trismus in the jaw. Normal dentition. No uvula swelling.   Eyes: Conjunctivae and lids are normal.   Neck: Trachea normal and phonation normal. Neck supple.   Cardiovascular: Normal rate, regular rhythm, normal heart sounds and normal pulses.   Pulmonary/Chest: Effort normal and breath sounds normal.   Abdominal: Normal appearance and bowel sounds are normal. Soft.   Musculoskeletal: Normal range of motion.         General: Normal range of motion.        Arms:    Neurological: She is alert and oriented to person, place, and time. She exhibits normal muscle tone.   Skin: Skin is warm, dry and intact.   Psychiatric: Her speech is normal and behavior is normal. Judgment and thought content normal.   Nursing note and vitals reviewed.      Assessment:     1. Breast " pain, right        Plan:       Breast pain, right    Patient is advised to contact her pcp in the next couple of days and request for a diagnostic mammogram.  I will call in something to help her deal with the pain.  ED precautions discussed.

## 2024-08-11 ENCOUNTER — NURSE TRIAGE (OUTPATIENT)
Dept: ADMINISTRATIVE | Facility: CLINIC | Age: 74
End: 2024-08-11
Payer: MEDICARE

## 2024-08-12 ENCOUNTER — OFFICE VISIT (OUTPATIENT)
Dept: FAMILY MEDICINE | Facility: CLINIC | Age: 74
End: 2024-08-12
Payer: MEDICARE

## 2024-08-12 VITALS
TEMPERATURE: 98 F | WEIGHT: 177.5 LBS | HEIGHT: 67 IN | BODY MASS INDEX: 27.86 KG/M2 | SYSTOLIC BLOOD PRESSURE: 122 MMHG | DIASTOLIC BLOOD PRESSURE: 60 MMHG | HEART RATE: 96 BPM | OXYGEN SATURATION: 97 %

## 2024-08-12 DIAGNOSIS — N64.4 BREAST PAIN, RIGHT: Primary | ICD-10-CM

## 2024-08-12 DIAGNOSIS — R42 DIZZINESS: ICD-10-CM

## 2024-08-12 DIAGNOSIS — N61.0 ACUTE MASTITIS OF RIGHT BREAST: ICD-10-CM

## 2024-08-12 PROCEDURE — 1160F RVW MEDS BY RX/DR IN RCRD: CPT | Mod: HCNC,CPTII,S$GLB, | Performed by: INTERNAL MEDICINE

## 2024-08-12 PROCEDURE — 3078F DIAST BP <80 MM HG: CPT | Mod: HCNC,CPTII,S$GLB, | Performed by: INTERNAL MEDICINE

## 2024-08-12 PROCEDURE — 99999 PR PBB SHADOW E&M-EST. PATIENT-LVL IV: CPT | Mod: PBBFAC,HCNC,, | Performed by: INTERNAL MEDICINE

## 2024-08-12 PROCEDURE — 3288F FALL RISK ASSESSMENT DOCD: CPT | Mod: HCNC,CPTII,S$GLB, | Performed by: INTERNAL MEDICINE

## 2024-08-12 PROCEDURE — 1159F MED LIST DOCD IN RCRD: CPT | Mod: HCNC,CPTII,S$GLB, | Performed by: INTERNAL MEDICINE

## 2024-08-12 PROCEDURE — 3074F SYST BP LT 130 MM HG: CPT | Mod: HCNC,CPTII,S$GLB, | Performed by: INTERNAL MEDICINE

## 2024-08-12 PROCEDURE — 3066F NEPHROPATHY DOC TX: CPT | Mod: HCNC,CPTII,S$GLB, | Performed by: INTERNAL MEDICINE

## 2024-08-12 PROCEDURE — 1125F AMNT PAIN NOTED PAIN PRSNT: CPT | Mod: HCNC,CPTII,S$GLB, | Performed by: INTERNAL MEDICINE

## 2024-08-12 PROCEDURE — 3062F POS MACROALBUMINURIA REV: CPT | Mod: HCNC,CPTII,S$GLB, | Performed by: INTERNAL MEDICINE

## 2024-08-12 PROCEDURE — 3051F HG A1C>EQUAL 7.0%<8.0%: CPT | Mod: HCNC,CPTII,S$GLB, | Performed by: INTERNAL MEDICINE

## 2024-08-12 PROCEDURE — 1101F PT FALLS ASSESS-DOCD LE1/YR: CPT | Mod: HCNC,CPTII,S$GLB, | Performed by: INTERNAL MEDICINE

## 2024-08-12 PROCEDURE — 99214 OFFICE O/P EST MOD 30 MIN: CPT | Mod: HCNC,S$GLB,, | Performed by: INTERNAL MEDICINE

## 2024-08-12 PROCEDURE — 3008F BODY MASS INDEX DOCD: CPT | Mod: HCNC,CPTII,S$GLB, | Performed by: INTERNAL MEDICINE

## 2024-08-12 PROCEDURE — 4010F ACE/ARB THERAPY RXD/TAKEN: CPT | Mod: HCNC,CPTII,S$GLB, | Performed by: INTERNAL MEDICINE

## 2024-08-12 RX ORDER — CEFADROXIL 500 MG/1
500 CAPSULE ORAL EVERY 12 HOURS
Qty: 10 CAPSULE | Refills: 0 | Status: SHIPPED | OUTPATIENT
Start: 2024-08-12 | End: 2024-08-17

## 2024-08-12 RX ORDER — MECLIZINE HYDROCHLORIDE 25 MG/1
25 TABLET ORAL 2 TIMES DAILY PRN
Qty: 14 TABLET | Refills: 0 | Status: SHIPPED | OUTPATIENT
Start: 2024-08-12 | End: 2024-09-11

## 2024-08-12 NOTE — TELEPHONE ENCOUNTER
Pt states that she was seen in  on yesterday with  right breast pain. Pt calling for f/u appt with provider for order for mammogram. Appt scheduled per protocol. VU. Encounter routed to provider.     Reason for Disposition   Requesting regular office appointment    Protocols used: Information Only Call - No Triage-A-

## 2024-08-12 NOTE — PROGRESS NOTES
This note was created by combination of typed  and M-Modal dictation.  Transcription errors may be present.  If there are any questions, please contact me.    Assessment and Plan:   Assessment and Plan    Breast pain, right  Acute mastitis of right breast  -history of mastitis on the left side.  Will treat empirically for mastitis was cefadroxil twice a day for 5 days   Recent mammogram was normal.    Check breast ultrasound after about a month (after treatment for mastitis)  If symptoms persist will have her see breast surgery  -     US Breast Right Limited; Future; Expected date: 08/12/2024  -     cefadroxil (DURICEF) 500 MG Cap; Take 1 capsule (500 mg total) by mouth every 12 (twelve) hours. for 5 days  Dispense: 10 capsule; Refill: 0    Dizziness  -has been taking meclizine chronically for couple of years now.  When she stopped she had an episode of dizziness for which she was undergoing cardiac workup.    Discussed chronic use of meclizine can cause vestibular suppression and would recommend against chronic use.    Refilled for p.r.n. use if she were to get another flare but if she continues to have dizziness and cardiac workup negative would recommend she see ENT and or Neurology  -     meclizine (ANTIVERT) 25 mg tablet; Take 1 tablet (25 mg total) by mouth 2 (two) times daily as needed for Dizziness.  Dispense: 14 tablet; Refill: 0    Not taking Ozempic due to cost.  Recommended she discuss in follow-up with her PCP      Medications Discontinued During This Encounter   Medication Reason    LIDOcaine (PF) 10 mg/ml (1%) injection 10 mg     SHINGRIX, PF, 50 mcg/0.5 mL injection     regadenoson (LEXISCAN) 0.4 mg/5 mL Syrg     ibuprofen (ADVIL,MOTRIN) 800 MG tablet     FLUAD QUAD 2023-24,65Y UP,,PF, 60 mcg (15 mcg x 4)/0.5 mL Syrg     aminophylline 250 mg/10 mL injection     0.9%  NaCl infusion     meclizine (ANTIVERT) 25 mg tablet Reorder       meds sent this encounter:  Medications Ordered This  Encounter   Medications    cefadroxil (DURICEF) 500 MG Cap     Sig: Take 1 capsule (500 mg total) by mouth every 12 (twelve) hours. for 5 days     Dispense:  10 capsule     Refill:  0    meclizine (ANTIVERT) 25 mg tablet     Sig: Take 1 tablet (25 mg total) by mouth 2 (two) times daily as needed for Dizziness.     Dispense:  14 tablet     Refill:  0         Follow Up:   Future Appointments   Date Time Provider Department Center   8/15/2024  8:45 AM LAB, [facility]   2024  8:00 AM Teri Soto, DO [facility] FAM MED [place]   2024 10:15 AM Son Lara, [facility] PAINMG [place]   2024  9:30 AM 3PREP, LILIB[facility] NOM SSCU [facility]   2024 10:00 AM PACEMAKER, ICD [facility] ARRHPRO [facility]   2024  4:00 PM Lilo Mccollum, FNP-C DESC FAMCTR [place]   2024 10:45 AM EKG, APPT NOM EKG [facility]   2024 11:00 AM Vonnie Todd, NP NOM[facility] ARRHYTH [facility]         Subjective:   Subjective   Chief Complaint   Patient presents with    Follow-up     Urgent care        HPI  Joanne is a 74 y.o. female.    Social History     Tobacco Use    Smoking status: Former     Current packs/day: 0.00     Types: Cigarettes     Quit date: 2022     Years since quittin.5     Passive exposure: Past    Smokeless tobacco: Never   Substance Use Topics    Alcohol use: Not Currently          Social History     Social History Narrative    Not on file       No LMP recorded. Patient is postmenopausal.    Last appointment with this clinic was 2024. Last visit with me Visit date not found   To summarize last visit and events leading up to today:  New to me   DM2  Combined HF  HTN, CKD 3a  History of CVA, undergoing embolic workup with ILR  24 TTE LVEF 40-45%. DD, undetermined grade  24 PET stress test no clinically significant perfusion abnormalities. There is a small (<10%) amount of mild resting heterogeneity in the anteroseptal wall(s) that improves with stress, indicative of  non-obstructive disease.   CT attenuation images demonstrate mild diffuse coronary calcifications in the LAD territory and no aortic calcifications     8/10/24 UC for R chest pain/breast pain questionable lump.   3/27/24 mammo BIRADS 1 neg      Today's visit:    Sx started on Friday night  With pain in the inferior R breast   And feels firm though not a definite knot  So went to   No drainage no swelling.    History of mastitis L side 7/2022 with discharge and induration treated with abx.   No pain with inspiration  Somewhat improved compared to Friday  Taking tylenol.  Avoids NSAIDS with history of TIA.      Review of meds - not taking ozempic - too expensive    In requesting refill on meclizine.  History of dizziness and she was prescribed this many years ago and has been taking it scheduled.  However when she recently requested refill it was denied, stating that there were some sort of interaction with her other medications.    She had an episode of dizziness off of it and for that she is undergoing workup with Cardiology.  Discussed that chronic use of meclizine may result in vestibular suppression and increase risk of falls etc.       Patient Care Team:  Teri Soto DO as PCP - General (Family Medicine)  Tracie Kessler LPN as Care Coordinator  Lb Tracy OD (Optometry)      Patient Active Problem List    Diagnosis Date Noted    LBBB (left bundle branch block) 04/09/2024    Dyslipidemia associated with type 2 diabetes mellitus 02/15/2024    Stage 3b chronic kidney disease 02/15/2024    Sacroiliitis 01/26/2023    Bad posture 08/18/2022    Decreased ROM of trunk and back 08/18/2022    Decreased strength of trunk and back 08/18/2022    History of stroke 06/14/2022    Left atrial enlargement 04/07/2022    Impaired balance as late effect of cerebrovascular accident 03/02/2022    Impaired gait and mobility 03/02/2022    Dizziness 02/21/2022    Left pontine stroke 02/06/2022    Normocytic anemia  02/06/2022    Quit smoking within past year 02/06/2022    Overweight with body mass index (BMI) of 25 to 25.9 in adult 02/06/2022    Mixed hyperlipidemia 02/04/2016    Malignant essential hypertension 01/29/2016       PAST MEDICAL PROBLEMS, PAST SURGICAL HISTORY: please see relevant portions of the electronic medical record    ALLERGIES AND MEDICATIONS: updated and reviewed.  Medication List with Changes/Refills   Current Medications    ACCU-CHEK GUIDE GLUCOSE METER MISC    TO CHECK BLOOD GLUCOSE DAILY, TO USE WITH INSURANCE PREFERRED METER    ALPRAZOLAM (XANAX) 0.5 MG TABLET    Take 0.5 mg by mouth.    AMINOPHYLLINE 250 MG/10 ML INJECTION        AMLODIPINE (NORVASC) 10 MG TABLET    TAKE 1 TABLET BY MOUTH ONCE DAILY. HOLD IF SBP <120    ASCORBIC ACID, VITAMIN C, (VITAMIN C) 500 MG TABLET    Take 500 mg by mouth once daily.    ASPIRIN (ECOTRIN) 81 MG EC TABLET    Take 1 tablet (81 mg total) by mouth once daily.    ATORVASTATIN (LIPITOR) 80 MG TABLET    TAKE 1 TABLET (80 MG TOTAL) BY MOUTH ONCE DAILY.    BLOOD PRESSURE CUFF MISC    1 Units by Misc.(Non-Drug; Combo Route) route once daily.    BLOOD SUGAR DIAGNOSTIC STRP    To check BG daily, to use with insurance preferred meter    BLOOD-GLUCOSE METER KIT    To check BG daily, to use with insurance preferred meter    CHLORTHALIDONE (HYGROTEN) 25 MG TAB    TAKE 1 TABLET BY MOUTH EVERY DAY    CYCLOBENZAPRINE (FLEXERIL) 10 MG TABLET    TAKE 1 TABLET (10 MG TOTAL) BY MOUTH 2 (TWO) TIMES DAILY AS NEEDED FOR MUSCLE SPASMS (BACK PAIN).    FLUAD QUAD 2023-24,65Y UP,,PF, 60 MCG (15 MCG X 4)/0.5 ML SYRG        HYDRALAZINE (APRESOLINE) 50 MG TABLET    Take 1 tablet (50 mg total) by mouth every 8 (eight) hours. Hold is SBP <120    IBUPROFEN (ADVIL,MOTRIN) 800 MG TABLET    Take 1 tablet (800 mg total) by mouth 2 (two) times daily. for 7 days    LANCETS MISC    To check BG daily, to use with insurance preferred meter    LOSARTAN (COZAAR) 100 MG TABLET    TAKE 1 TABLET (100 MG TOTAL)  "BY MOUTH ONCE DAILY. HOLD IF SBP <120    MECLIZINE (ANTIVERT) 25 MG TABLET    Take 1 tablet (25 mg total) by mouth 2 (two) times daily as needed for Dizziness.    MULTIVITAMIN (THERAGRAN) PER TABLET    Take 1 tablet by mouth once daily.    REGADENOSON (LEXISCAN) 0.4 MG/5 ML SYRG        SEMAGLUTIDE (OZEMPIC) 2 MG/DOSE (8 MG/3 ML) PNIJ    Inject 2 mg into the skin every 7 days.    SHINGRIX, PF, 50 MCG/0.5 ML INJECTION        VANCOMYCIN (VANCOCIN) 1,000 MG INJECTION             Objective:   Objective   Physical Exam   Vitals:    08/12/24 0949   BP: 122/60   Pulse: 96   Temp: 98.2 °F (36.8 °C)   TempSrc: Oral   SpO2: 97%   Weight: 80.5 kg (177 lb 7.5 oz)   Height: 5' 7" (1.702 m)    Body mass index is 27.8 kg/m².  Weight: 80.5 kg (177 lb 7.5 oz)   Height: 5' 7" (170.2 cm)     Physical Exam  Constitutional:       General: She is not in acute distress.     Appearance: She is well-developed.   HENT:      Head: Normocephalic and atraumatic.   Eyes:      General: No scleral icterus.  Pulmonary:      Effort: Pulmonary effort is normal.   Chest:          Comments: At about the 8 o'clock position, does feel like there is some induration/firmness, she is sensitive to touch, the overlying skin is unremarkable without erythema, no pustules no comedone.  Does seem to go in a wedge shape  No right-sided axillary lymphadenopathy  Skin:     General: Skin is warm and dry.   Neurological:      Mental Status: She is alert and oriented to person, place, and time.   Psychiatric:         Behavior: Behavior normal.         Thought Content: Thought content normal.                " 2018

## 2024-08-13 ENCOUNTER — TELEPHONE (OUTPATIENT)
Dept: FAMILY MEDICINE | Facility: CLINIC | Age: 74
End: 2024-08-13
Payer: MEDICARE

## 2024-08-13 NOTE — TELEPHONE ENCOUNTER
----- Message from Jerri Lynne sent at 8/13/2024  3:27 PM CDT -----  Name of Who is calling :RUMA MEYERS [[id number]]        What is the request in detail:Pt would like a call back.        Can the clinic reply by [facility]:no            What [facility] to call back if not [facility]:[phone]

## 2024-08-16 ENCOUNTER — LAB VISIT (OUTPATIENT)
Dept: LAB | Facility: HOSPITAL | Age: 74
End: 2024-08-16
Attending: FAMILY MEDICINE
Payer: MEDICARE

## 2024-08-16 DIAGNOSIS — Z01.818 PRE-OP TESTING: ICD-10-CM

## 2024-08-16 DIAGNOSIS — E11.65 UNCONTROLLED TYPE 2 DIABETES MELLITUS WITH HYPERGLYCEMIA: ICD-10-CM

## 2024-08-16 DIAGNOSIS — I49.9 CARDIAC ARRHYTHMIA, UNSPECIFIED CARDIAC ARRHYTHMIA TYPE: ICD-10-CM

## 2024-08-16 DIAGNOSIS — E11.65 TYPE 2 DIABETES MELLITUS WITH HYPERGLYCEMIA, WITHOUT LONG-TERM CURRENT USE OF INSULIN: ICD-10-CM

## 2024-08-16 DIAGNOSIS — I10 MALIGNANT ESSENTIAL HYPERTENSION: ICD-10-CM

## 2024-08-16 DIAGNOSIS — N18.32 STAGE 3B CHRONIC KIDNEY DISEASE: ICD-10-CM

## 2024-08-16 LAB
ANION GAP SERPL CALC-SCNC: 10 MMOL/L (ref 8–16)
APTT PPP: 31.9 SEC (ref 21–32)
BASOPHILS # BLD AUTO: 0.03 K/UL (ref 0–0.2)
BASOPHILS NFR BLD: 0.6 % (ref 0–1.9)
BUN SERPL-MCNC: 26 MG/DL (ref 8–23)
CALCIUM SERPL-MCNC: 9 MG/DL (ref 8.7–10.5)
CHLORIDE SERPL-SCNC: 112 MMOL/L (ref 95–110)
CO2 SERPL-SCNC: 18 MMOL/L (ref 23–29)
CREAT SERPL-MCNC: 1.3 MG/DL (ref 0.5–1.4)
DIFFERENTIAL METHOD BLD: ABNORMAL
EOSINOPHIL # BLD AUTO: 0.2 K/UL (ref 0–0.5)
EOSINOPHIL NFR BLD: 3 % (ref 0–8)
ERYTHROCYTE [DISTWIDTH] IN BLOOD BY AUTOMATED COUNT: 16.8 % (ref 11.5–14.5)
EST. GFR  (NO RACE VARIABLE): 43.7 ML/MIN/1.73 M^2
ESTIMATED AVG GLUCOSE: 151 MG/DL (ref 68–131)
GLUCOSE SERPL-MCNC: 121 MG/DL (ref 70–110)
HBA1C MFR BLD: 6.9 % (ref 4–5.6)
HCT VFR BLD AUTO: 35.8 % (ref 37–48.5)
HGB BLD-MCNC: 10.4 G/DL (ref 12–16)
IMM GRANULOCYTES # BLD AUTO: 0.03 K/UL (ref 0–0.04)
IMM GRANULOCYTES NFR BLD AUTO: 0.6 % (ref 0–0.5)
INR PPP: 0.9 (ref 0.8–1.2)
LYMPHOCYTES # BLD AUTO: 1.4 K/UL (ref 1–4.8)
LYMPHOCYTES NFR BLD: 27 % (ref 18–48)
MCH RBC QN AUTO: 24.5 PG (ref 27–31)
MCHC RBC AUTO-ENTMCNC: 29.1 G/DL (ref 32–36)
MCV RBC AUTO: 84 FL (ref 82–98)
MONOCYTES # BLD AUTO: 0.5 K/UL (ref 0.3–1)
MONOCYTES NFR BLD: 8.7 % (ref 4–15)
NEUTROPHILS # BLD AUTO: 3.2 K/UL (ref 1.8–7.7)
NEUTROPHILS NFR BLD: 60.1 % (ref 38–73)
NRBC BLD-RTO: 0 /100 WBC
PLATELET # BLD AUTO: 328 K/UL (ref 150–450)
PMV BLD AUTO: 10.7 FL (ref 9.2–12.9)
POTASSIUM SERPL-SCNC: 4.6 MMOL/L (ref 3.5–5.1)
PROTHROMBIN TIME: 10.4 SEC (ref 9–12.5)
RBC # BLD AUTO: 4.25 M/UL (ref 4–5.4)
SODIUM SERPL-SCNC: 140 MMOL/L (ref 136–145)
WBC # BLD AUTO: 5.29 K/UL (ref 3.9–12.7)

## 2024-08-16 PROCEDURE — 80048 BASIC METABOLIC PNL TOTAL CA: CPT | Mod: HCNC | Performed by: INTERNAL MEDICINE

## 2024-08-16 PROCEDURE — 83036 HEMOGLOBIN GLYCOSYLATED A1C: CPT | Mod: HCNC | Performed by: FAMILY MEDICINE

## 2024-08-16 PROCEDURE — 85730 THROMBOPLASTIN TIME PARTIAL: CPT | Mod: HCNC | Performed by: INTERNAL MEDICINE

## 2024-08-16 PROCEDURE — 85610 PROTHROMBIN TIME: CPT | Mod: HCNC | Performed by: INTERNAL MEDICINE

## 2024-08-16 PROCEDURE — 36415 COLL VENOUS BLD VENIPUNCTURE: CPT | Mod: HCNC,PO | Performed by: INTERNAL MEDICINE

## 2024-08-16 PROCEDURE — 85025 COMPLETE CBC W/AUTO DIFF WBC: CPT | Mod: HCNC | Performed by: INTERNAL MEDICINE

## 2024-08-19 ENCOUNTER — OFFICE VISIT (OUTPATIENT)
Dept: FAMILY MEDICINE | Facility: CLINIC | Age: 74
End: 2024-08-19
Payer: MEDICARE

## 2024-08-19 VITALS
SYSTOLIC BLOOD PRESSURE: 120 MMHG | TEMPERATURE: 98 F | WEIGHT: 179.44 LBS | HEIGHT: 67 IN | OXYGEN SATURATION: 99 % | HEART RATE: 105 BPM | BODY MASS INDEX: 28.16 KG/M2 | DIASTOLIC BLOOD PRESSURE: 74 MMHG

## 2024-08-19 DIAGNOSIS — R80.9 MICROALBUMINURIA DUE TO TYPE 2 DIABETES MELLITUS: ICD-10-CM

## 2024-08-19 DIAGNOSIS — D63.1 ANEMIA OF CHRONIC RENAL FAILURE, STAGE 3B: ICD-10-CM

## 2024-08-19 DIAGNOSIS — N18.32 ANEMIA OF CHRONIC RENAL FAILURE, STAGE 3B: ICD-10-CM

## 2024-08-19 DIAGNOSIS — E11.22 TYPE 2 DIABETES MELLITUS WITH STAGE 3B CHRONIC KIDNEY DISEASE, WITHOUT LONG-TERM CURRENT USE OF INSULIN: Primary | ICD-10-CM

## 2024-08-19 DIAGNOSIS — E78.5 DYSLIPIDEMIA ASSOCIATED WITH TYPE 2 DIABETES MELLITUS: ICD-10-CM

## 2024-08-19 DIAGNOSIS — Z86.73 HISTORY OF CVA (CEREBROVASCULAR ACCIDENT): ICD-10-CM

## 2024-08-19 DIAGNOSIS — N18.32 TYPE 2 DIABETES MELLITUS WITH STAGE 3B CHRONIC KIDNEY DISEASE, WITHOUT LONG-TERM CURRENT USE OF INSULIN: Primary | ICD-10-CM

## 2024-08-19 DIAGNOSIS — E11.69 DYSLIPIDEMIA ASSOCIATED WITH TYPE 2 DIABETES MELLITUS: ICD-10-CM

## 2024-08-19 DIAGNOSIS — I10 ESSENTIAL HYPERTENSION: ICD-10-CM

## 2024-08-19 DIAGNOSIS — E11.29 MICROALBUMINURIA DUE TO TYPE 2 DIABETES MELLITUS: ICD-10-CM

## 2024-08-19 PROCEDURE — 1101F PT FALLS ASSESS-DOCD LE1/YR: CPT | Mod: HCNC,CPTII,S$GLB, | Performed by: FAMILY MEDICINE

## 2024-08-19 PROCEDURE — 3078F DIAST BP <80 MM HG: CPT | Mod: HCNC,CPTII,S$GLB, | Performed by: FAMILY MEDICINE

## 2024-08-19 PROCEDURE — 3008F BODY MASS INDEX DOCD: CPT | Mod: HCNC,CPTII,S$GLB, | Performed by: FAMILY MEDICINE

## 2024-08-19 PROCEDURE — 3044F HG A1C LEVEL LT 7.0%: CPT | Mod: HCNC,CPTII,S$GLB, | Performed by: FAMILY MEDICINE

## 2024-08-19 PROCEDURE — 3062F POS MACROALBUMINURIA REV: CPT | Mod: HCNC,CPTII,S$GLB, | Performed by: FAMILY MEDICINE

## 2024-08-19 PROCEDURE — 1159F MED LIST DOCD IN RCRD: CPT | Mod: HCNC,CPTII,S$GLB, | Performed by: FAMILY MEDICINE

## 2024-08-19 PROCEDURE — 3066F NEPHROPATHY DOC TX: CPT | Mod: HCNC,CPTII,S$GLB, | Performed by: FAMILY MEDICINE

## 2024-08-19 PROCEDURE — 99214 OFFICE O/P EST MOD 30 MIN: CPT | Mod: HCNC,S$GLB,, | Performed by: FAMILY MEDICINE

## 2024-08-19 PROCEDURE — 4010F ACE/ARB THERAPY RXD/TAKEN: CPT | Mod: HCNC,CPTII,S$GLB, | Performed by: FAMILY MEDICINE

## 2024-08-19 PROCEDURE — 3288F FALL RISK ASSESSMENT DOCD: CPT | Mod: HCNC,CPTII,S$GLB, | Performed by: FAMILY MEDICINE

## 2024-08-19 PROCEDURE — 99999 PR PBB SHADOW E&M-EST. PATIENT-LVL III: CPT | Mod: PBBFAC,HCNC,, | Performed by: FAMILY MEDICINE

## 2024-08-19 PROCEDURE — 1160F RVW MEDS BY RX/DR IN RCRD: CPT | Mod: HCNC,CPTII,S$GLB, | Performed by: FAMILY MEDICINE

## 2024-08-19 PROCEDURE — 3074F SYST BP LT 130 MM HG: CPT | Mod: HCNC,CPTII,S$GLB, | Performed by: FAMILY MEDICINE

## 2024-08-19 PROCEDURE — 1126F AMNT PAIN NOTED NONE PRSNT: CPT | Mod: HCNC,CPTII,S$GLB, | Performed by: FAMILY MEDICINE

## 2024-08-19 NOTE — PROGRESS NOTES
Assessment & Plan:    Type 2 diabetes mellitus with stage 3b chronic kidney disease, without long-term current use of insulin  -     empagliflozin (JARDIANCE) 10 mg tablet; Take 1 tablet (10 mg total) by mouth once daily.  Dispense: 30 tablet; Refill: 11  -     Hemoglobin A1C; Future; Expected date: 08/19/2024    Switch to Jardiance if not cost prohibitive.   Monitor the BG to ensure she is at goal .  Repeat A1c in 3 mo.  Foot exam performed.  Reminded patient to schedule eye exam.    Dyslipidemia associated with type 2 diabetes mellitus  -     Lipid Panel; Future; Expected date: 08/19/2024    Anemia of chronic renal failure, stage 3b  Chronic, stable.    Microalbuminuria due to type 2 diabetes mellitus  See above regarding glycemic control.    Essential hypertension  Controlled. Continue current therapy.     History of CVA (cerebrovascular accident)  -     Lipid Panel; Future; Expected date: 08/19/2024      Health maintenance reviewed & addressed above.    Follow-up: Follow up in about 3 months (around 11/19/2024).  ______________________________________________________________________    Chief Complaint  Chief Complaint   Patient presents with    Follow-up       HPI  Joanne Peña is a 74 y.o. female with medical diagnoses as listed in the medical history and problem list that presents to the office to follow up on type 2 diabetes. She has been out of her Mounjaro for 3 weeks. The cost has gone up to about $250 because she is in a donut hole. Ozempic was prescribed and was nearly as expensive. She has been taking Amaryl. BG range 117-160s, depending on whether she has her coffee drink. She is scheduled to have a loop recorder placed on 8/30. Otherwise, she is in her usual state of health today and does not have any new significant complaints. She has occasional right shoulder pain but this is attributed to OA.     Most recent pertinent workup:       Last A1C  Lab Results   Component Value Date    HGBA1C  6.9 (H) 08/16/2024         Health Maintenance         Date Due Completion Date    TETANUS VACCINE Never done ---    Shingles Vaccine (1 of 2) Never done ---    RSV Vaccine (Age 60+ and Pregnant patients) (1 - 1-dose 60+ series) Never done ---    COVID-19 Vaccine (1 - 2023-24 season) Never done ---    Eye Exam 10/16/2024 10/16/2023    Influenza Vaccine (1) 09/01/2024 2/15/2024    Hemoglobin A1c 02/16/2025 8/16/2024    Mammogram 03/27/2025 3/27/2024    Diabetes Urine Screening 05/13/2025 5/13/2024    Lipid Panel 05/13/2025 5/13/2024    Foot Exam 08/19/2025 8/19/2024 (Done)    Override on 8/19/2024: Done    Colorectal Cancer Screening 07/12/2026 7/12/2023    DEXA Scan 09/25/2026 9/25/2023              PAST MEDICAL HISTORY:  Past Medical History:   Diagnosis Date    Diabetes mellitus     Hyperlipidemia     Hypertension     Stroke        PAST SURGICAL HISTORY:  Past Surgical History:   Procedure Laterality Date    COLONOSCOPY N/A 7/12/2023    Procedure: COLONOSCOPY;  Surgeon: Ray Sorensen MD;  Location: [facility] ENDO;  Service: Endoscopy;  Laterality: N/A;  instr via portal  - PC  4/10/23 Daniel, instr via mail & portal, unable to tolerate Golytely- request Miralax/Gatorade - PC  5/30-pt r/s-new instr portal-tb    INJECTION OF ANESTHETIC AGENT AROUND MEDIAL BRANCH NERVES INNERVATING LUMBAR FACET JOINT Bilateral 4/20/2023    Procedure: MBB #1 (B/L) L3,4,5;  Surgeon: Son Lara DO;  Location: [facility] OR;  Service: Pain Management;  Laterality: Bilateral;  ORAL XANAX    INJECTION OF ANESTHETIC AGENT AROUND MEDIAL BRANCH NERVES INNERVATING LUMBAR FACET JOINT Bilateral 5/5/2023    Procedure: MBB #2 (B/L) L3,4,5;  Surgeon: Son Lara DO;  Location: [facility] OR;  Service: Pain Management;  Laterality: Bilateral;  Oral Xanax    INJECTION OF JOINT Right 5/20/2022    Procedure: Right SI joint injection;  Surgeon: Son Lara DO;  Location: [facility] OR;  Service: Pain Management;  Laterality: Right;     INJECTION, SACROILIAC JOINT Right 2023    Procedure: RT SI joint Inj;  Surgeon: Son Lara DO;  Location: [facility] PAIN MANAGEMENT;  Service: Pain Management;  Laterality: Right;  oral    INSERTION OF IMPLANTABLE LOOP RECORDER Left 2024    Procedure: Insertion, Implantable Loop Recorder;  Surgeon: Hunter Rich MD;  Location: [facility] EP LAB;  Service: Cardiology;  Laterality: Left;  CVA, ILR, BSCI, Local, MO, 3 Prep    RADIOFREQUENCY ABLATION OF LUMBAR MEDIAL BRANCH NERVE AT SINGLE LEVEL Bilateral 2023    Procedure: RFA (B/L) L3,4,5;  Surgeon: Son Lara DO;  Location: [facility] PAIN MANAGEMENT;  Service: Pain Management;  Laterality: Bilateral;    REMOVAL OF IMPLANTABLE LOOP RECORDER N/A 7/10/2024    Procedure: REMOVAL, IMPLANTABLE LOOP RECORDER;  Surgeon: Hunter Rich MD;  Location: [facility] EP LAB;  Service: Cardiology;  Laterality: N/A;       SOCIAL HISTORY:  Social History     Socioeconomic History    Marital status:    Tobacco Use    Smoking status: Former     Current packs/day: 0.00     Types: Cigarettes     Quit date: 2022     Years since quittin.5     Passive exposure: Past    Smokeless tobacco: Never   Substance and Sexual Activity    Alcohol use: Not Currently    Drug use: No    Sexual activity: Not Currently     Partners: Male     Social Determinants of Health     Financial Resource Strain: Medium Risk (2024)    Overall Financial Resource Strain (CARDIA)     Difficulty of Paying Living Expenses: Somewhat hard   Food Insecurity: Food Insecurity Present (2024)    Hunger Vital Sign     Worried About Running Out of Food in the Last Year: Sometimes true     Ran Out of Food in the Last Year: Never true   Transportation Needs: No Transportation Needs (2024)    PRAPARE - Transportation     Lack of Transportation (Medical): No     Lack of Transportation (Non-Medical): No   Physical Activity: Unknown (2024)    Exercise Vital Sign     Days of Exercise  per Week: 0 days   Housing Stability: Low Risk  (2/14/2024)    Housing Stability Vital Sign     Unable to Pay for Housing in the Last Year: No     Number of Places Lived in the Last Year: 1     Unstable Housing in the Last Year: No       FAMILY HISTORY:  Family History   Problem Relation Name Age of Onset    Kidney disease Mother      Heart disease Mother      Cancer Father      Hypertension Brother      Hypertension Daughter      Cancer Maternal Aunt         ALLERGIES AND MEDICATIONS: updated and reviewed.  Review of patient's allergies indicates:   Allergen Reactions    Lisinopril-hydrochlorothiazide Other (See Comments)     Pt stated it makes her feel drained. She couldn't raise arms over head.    Ampicillin Rash    Darvocet a500 [propoxyphene n-acetaminophen] Other (See Comments)     karl     Current Outpatient Medications   Medication Sig Dispense Refill    ACCU-CHEK GUIDE GLUCOSE METER Misc TO CHECK BLOOD GLUCOSE DAILY, TO USE WITH INSURANCE PREFERRED METER      amLODIPine (NORVASC) 10 MG tablet TAKE 1 TABLET BY MOUTH ONCE DAILY. HOLD IF SBP <120 90 tablet 3    ascorbic acid, vitamin C, (VITAMIN C) 500 MG tablet Take 500 mg by mouth once daily.      atorvastatin (LIPITOR) 80 MG tablet TAKE 1 TABLET (80 MG TOTAL) BY MOUTH ONCE DAILY. 90 tablet 1    BLOOD PRESSURE CUFF Misc 1 Units by Misc.(Non-Drug; Combo Route) route once daily. 1 each 0    blood sugar diagnostic Strp To check BG daily, to use with insurance preferred meter 200 each 11    blood-glucose meter kit To check BG daily, to use with insurance preferred meter 1 each 0    chlorthalidone (HYGROTEN) 25 MG Tab TAKE 1 TABLET BY MOUTH EVERY DAY 90 tablet 3    cyclobenzaprine (FLEXERIL) 10 MG tablet TAKE 1 TABLET (10 MG TOTAL) BY MOUTH 2 (TWO) TIMES DAILY AS NEEDED FOR MUSCLE SPASMS (BACK PAIN). 180 tablet 1    lancets Misc To check BG daily, to use with insurance preferred meter 200 each 11    losartan (COZAAR) 100 MG tablet TAKE 1 TABLET (100 MG TOTAL) BY  "MOUTH ONCE DAILY. HOLD IF SBP <120 90 tablet 3    meclizine (ANTIVERT) 25 mg tablet Take 1 tablet (25 mg total) by mouth 2 (two) times daily as needed for Dizziness. 14 tablet 0    multivitamin (THERAGRAN) per tablet Take 1 tablet by mouth once daily.      aspirin (ECOTRIN) 81 MG EC tablet Take 1 tablet (81 mg total) by mouth once daily. 30 tablet 3    empagliflozin (JARDIANCE) 10 mg tablet Take 1 tablet (10 mg total) by mouth once daily. 30 tablet 11    hydrALAZINE (APRESOLINE) 50 MG tablet Take 1 tablet (50 mg total) by mouth every 8 (eight) hours. Hold is SBP <120 (Patient not taking: Reported on 6/27/2024) 90 tablet 1    semaglutide (OZEMPIC) 2 mg/dose (8 mg/3 mL) PnIj Inject 2 mg into the skin every 7 days. (Patient not taking: Reported on 8/19/2024) 3 mL 11     No current facility-administered medications for this visit.         ROS  Review of Systems   Constitutional:  Negative for activity change.   Musculoskeletal:  Positive for arthralgias.           Physical Exam  Vitals:    08/19/24 0757   BP: 120/74   Pulse: 105   Temp: 98 °F (36.7 °C)   TempSrc: Oral   SpO2: 99%   Weight: 81.4 kg (179 lb 7.3 oz)   Height: 5' 7" (1.702 m)    Body mass index is 28.11 kg/m².  Weight: 81.4 kg (179 lb 7.3 oz)   Height: 5' 7" (170.2 cm)   Physical Exam  Constitutional:       General: She is not in acute distress.  HENT:      Head: Normocephalic and atraumatic.   Feet:      Comments: Protective Sensation (w/ 10 gram monofilament):  Right: Intact  Left: Intact    Visual Inspection:  Normal -  Bilateral    Pedal Pulses:   Right: Present  Left: Present    Posterior Tibialis Pulses:   Right:Present  Left: Present      Neurological:      Mental Status: She is alert. Mental status is at baseline.   Psychiatric:         Mood and Affect: Mood normal.         Behavior: Behavior normal.             "

## 2024-08-21 ENCOUNTER — OFFICE VISIT (OUTPATIENT)
Dept: PAIN MEDICINE | Facility: CLINIC | Age: 74
End: 2024-08-21
Payer: MEDICARE

## 2024-08-21 VITALS
BODY MASS INDEX: 28.16 KG/M2 | SYSTOLIC BLOOD PRESSURE: 132 MMHG | WEIGHT: 179.44 LBS | HEART RATE: 86 BPM | HEIGHT: 67 IN | DIASTOLIC BLOOD PRESSURE: 82 MMHG

## 2024-08-21 DIAGNOSIS — M75.91 SUPRASPINATUS TENDONITIS, RIGHT: ICD-10-CM

## 2024-08-21 DIAGNOSIS — M25.819 SHOULDER IMPINGEMENT: Primary | ICD-10-CM

## 2024-08-21 DIAGNOSIS — M75.21 BICEPS TENDINITIS OF RIGHT UPPER EXTREMITY: ICD-10-CM

## 2024-08-21 PROCEDURE — 99999 PR PBB SHADOW E&M-EST. PATIENT-LVL IV: CPT | Mod: PBBFAC,HCNC,, | Performed by: STUDENT IN AN ORGANIZED HEALTH CARE EDUCATION/TRAINING PROGRAM

## 2024-08-21 PROCEDURE — 99214 OFFICE O/P EST MOD 30 MIN: CPT | Mod: HCNC,S$GLB,, | Performed by: STUDENT IN AN ORGANIZED HEALTH CARE EDUCATION/TRAINING PROGRAM

## 2024-08-21 PROCEDURE — 4010F ACE/ARB THERAPY RXD/TAKEN: CPT | Mod: HCNC,CPTII,S$GLB, | Performed by: STUDENT IN AN ORGANIZED HEALTH CARE EDUCATION/TRAINING PROGRAM

## 2024-08-21 PROCEDURE — 1125F AMNT PAIN NOTED PAIN PRSNT: CPT | Mod: HCNC,CPTII,S$GLB, | Performed by: STUDENT IN AN ORGANIZED HEALTH CARE EDUCATION/TRAINING PROGRAM

## 2024-08-21 PROCEDURE — 3079F DIAST BP 80-89 MM HG: CPT | Mod: HCNC,CPTII,S$GLB, | Performed by: STUDENT IN AN ORGANIZED HEALTH CARE EDUCATION/TRAINING PROGRAM

## 2024-08-21 PROCEDURE — 3066F NEPHROPATHY DOC TX: CPT | Mod: HCNC,CPTII,S$GLB, | Performed by: STUDENT IN AN ORGANIZED HEALTH CARE EDUCATION/TRAINING PROGRAM

## 2024-08-21 PROCEDURE — 3075F SYST BP GE 130 - 139MM HG: CPT | Mod: HCNC,CPTII,S$GLB, | Performed by: STUDENT IN AN ORGANIZED HEALTH CARE EDUCATION/TRAINING PROGRAM

## 2024-08-21 PROCEDURE — 3062F POS MACROALBUMINURIA REV: CPT | Mod: HCNC,CPTII,S$GLB, | Performed by: STUDENT IN AN ORGANIZED HEALTH CARE EDUCATION/TRAINING PROGRAM

## 2024-08-21 PROCEDURE — 3288F FALL RISK ASSESSMENT DOCD: CPT | Mod: HCNC,CPTII,S$GLB, | Performed by: STUDENT IN AN ORGANIZED HEALTH CARE EDUCATION/TRAINING PROGRAM

## 2024-08-21 PROCEDURE — 1159F MED LIST DOCD IN RCRD: CPT | Mod: HCNC,CPTII,S$GLB, | Performed by: STUDENT IN AN ORGANIZED HEALTH CARE EDUCATION/TRAINING PROGRAM

## 2024-08-21 PROCEDURE — 3008F BODY MASS INDEX DOCD: CPT | Mod: HCNC,CPTII,S$GLB, | Performed by: STUDENT IN AN ORGANIZED HEALTH CARE EDUCATION/TRAINING PROGRAM

## 2024-08-21 PROCEDURE — 3044F HG A1C LEVEL LT 7.0%: CPT | Mod: HCNC,CPTII,S$GLB, | Performed by: STUDENT IN AN ORGANIZED HEALTH CARE EDUCATION/TRAINING PROGRAM

## 2024-08-21 PROCEDURE — 1101F PT FALLS ASSESS-DOCD LE1/YR: CPT | Mod: HCNC,CPTII,S$GLB, | Performed by: STUDENT IN AN ORGANIZED HEALTH CARE EDUCATION/TRAINING PROGRAM

## 2024-08-21 NOTE — PROGRESS NOTES
Chronic Pain - f/u    Referring Physician: No ref. provider found    Date: 08/21/2024     Re: Joanne Peña  MR#: [id number]  YOB: 1950  Age: 74 y.o.    Chief Complaint: back pain  Chief Complaint   Patient presents with    Low-back Pain     **This note is dictated using the M*Modal Fluency Direct word recognition program. There are word recognition mistakes that are occasionally missed on review.**    ASSESSMENT: 74 y.o. year old female with right sided back/buttock pain pain, consistent with     1. Shoulder impingement  Ambulatory referral/consult to Physical/Occupational Therapy      2. Biceps tendinitis of right upper extremity  Ambulatory referral/consult to Physical/Occupational Therapy      3. Supraspinatus tendonitis, right  Ambulatory referral/consult to Physical/Occupational Therapy        PLAN:     Right shoudler impignement, biceps tendonitis, and supraspinatus tendonitis  -PT for 1 month  -Do voltaren 4x/day for 2 weeks and then PRN  -if pain persists then will get Xray and MRI    Syncope  -had syncopal episode. Following with cards.  She had a loop recorder but they have to put another one in.    Coccyx fracture - healed on its own    Sacroiliitis - improved  -s/p R SIJ on 5/20/22.  Her right sided buttock pain has resolved 100% at 2 months  -FAILED Norco 5mg (cognitive side effects)  -100% improvement in buttock pain from the SIJ [id number]. Now worn off.  12/19/23 - repeat right SIJ on 12/19/23 - 70-80% @ 2m, 50%@8m    Lumbar spondylosis  -s/p RFA on 5/19/23.    -Axial back pain is still good at 6m post ablation.  -completed PT  -has Flexeril. Refill when needed  -possible neuritis following ablation. Rx medrol dose trae  -stopped Lyrica 50mg BID    DDD of lumbar spine  - MRI lumbar results discussed  -discussed importance of core strengthening, back exercises, losing weight  -okay to take tylenol    Lumbar radiculopathy  -less likely. No imaging available. If SIJ not successful, then  will consider lumbar XR/MRI to further assess  -would need clearance to hold ASA    CKD 3   -last CMP showed GFR 53.   -GFR stable  -avoid nephrotoxic medications    Right hip pain  -ortho surgery does not believe that this is coming from the hip.  Will r/o SIJ and back pathology.    Prior TIA  -off plavix. Following with PCP  -continuie ASA 81    Bilateral peripheral neuropathy  -unclear etiology. Bottom of both feet.  Monitor    DM2  -new A1c = 7.3 on 6/1/22    Anemia of chronic disease / HLD   -discused her blood test results    - RTC 6 months  - Counseled patient regarding the importance of weight loss and activity modification and physical therapy.    The above plan and management options were discussed at length with patient. Patient is in agreement with the above and verbalized understanding. It will be communicated with the referring physician via electronic record, fax, or mail.  Lab/study reports reviewed were important and necessary because subsequent medical and treatment recommendations required review of the above lab/study reports. Images viewed/reviewed above were important and necessary because subsequent medical and treatment recommendations required review of the reviewed image(s).     Electronically signed by:  Son Lara DO  08/21/2024     =========================================================================================================    SUBJECTIVE:    Interval History 8/21/2024:   Joanne Peña is a 74 y.o. female presents to the clinic for follow up.  Since last visit the pain has has improved.  She is on Jardiance for diabetes. The patient is having a new pain in the right shoulder.     The pain is located in the lower back area and does not radiate.  The pain is described as aching and throbbing    At BEST  2/10   At WORST  10/10 on the WORST day.    On average pain is rated as 8/10.   Today the pain is rated as 5/10  Symptoms interfere with daily activity.    Exacerbating factors: Walking.    Mitigating factors medications.     Current pain medications: Flexeril 10mg TID,  tylenol  Failed Pain Medications: cannot take NSAIDs because of the stroke. Tylenol. Short course Norco (side effects cognition), gabapentin, robaxin, oral steroids    Pain procedures:  s/p  R SIJ on 5/2022 - 100% improvement of buttock pain x16 months  4/20/23 - b/l L3,4,5 MBB #1 - Oral sedation - has a  - 80%  5/5/23 - b/l L3,4,5 MBB#2 - oral sedation - 80%  5/19/23 - b/l RFA - 50% @4w in axial back pain  12/19/23 - right SIJ - oral sedation - ASA ok - morning - 70@@2m, 50%@8m    Interval History 2/14/2024:   Joanne Peña is a 74 y.o. female presents to the clinic for follow up.  Since last visit the pain has has improved. Patient was doing well with her back pain but had a fall on 1/4/24 and fractured her coccyx.  She thinks she had a syncopal episode and fell. The coccyx pain has been improving.  Her SI pain is 70-80% improved.    The pain is located in the lower back area and does not radiate.  The pain is described as aching and throbbing    At BEST  4/10   At WORST  7/10 on the WORST day.    On average pain is rated as 4/10.   Today the pain is rated as 3/10  Symptoms interfere with daily activity.   Exacerbating factors: Sitting and Walking.    Mitigating factors medications and rest.     Interval History 11/8/2023:   Joanne Peña is a 74 y.o. female presents to the clinic for follow up.  Since last visit the pain has has slightly improved.    The pain is located in the lowe darell area and radiates to the right hip .  The pain is described as sharp and stabbing    At BEST  3/10   At WORST  8/10 on the WORST day.    On average pain is rated as 5/10.   Today the pain is rated as 6/10  Symptoms interfere with daily activity.   Exacerbating factors: Sitting, Standing, and Walking.    Mitigating factors medications.     Interval History 6/21/2023:   Joanne Peña is a 74 y.o.  female presents to the clinic for follow up.  Since last visit the pain has has improved in some ways and worsened in others.  The back pain is better since the RFA on 5/19/23, but now she is having more right sided iliac crest pain. Right iliac crest pain started shortly after the ablation. Not as bad as it was previously.    The pain is located in the lower back area and radiates to the upper buttock and midback .  The pain is described as sharp and throbbing    At BEST  5/10   At WORST  8/10 on the WORST day.    On average pain is rated as 5/10.   Today the pain is rated as 7/10  Symptoms interfere with daily activity.   Exacerbating factors: Walking.    Mitigating factors medications.     Interval History 3/15/2023:   Joanne Peña is a 74 y.o. female presents to the clinic for follow up.  Since last visit the pain has has worsened.  Pain right now is 5/10. Flexeril was helping. Retired in June from the [employer].    The pain is located in the lower back  area and radiates to the upper buttocks .  The pain is described as aching, sharp, and throbbing    At BEST  5/10   At WORST  10/10 on the WORST day.    On average pain is rated as 2/10.   Today the pain is rated as 4/10  Symptoms interfere with daily activity and sleeping.   Exacerbating factors: Standing and Walking.    Mitigating factors nothing.     Interval History 7/7/2022:     Joanne Peña is a 74 y.o. female presents to the clinic for follow up.  Since last visit the pain has has moderately improved.  Buttocks pain has resolved. Now with axial back pain without radiation.    The pain is located in the lower back area and does not radiate.  The pain is described as aching    At BEST  4/10   At WORST  8/10 on the WORST day.    On average pain is rated as 4/10.   Today the pain is rated as 3/10  Symptoms interfere with daily activity and sleeping.   Exacerbating factors: Standing and Walking.    Mitigating factors heat and massage.      Current pain medications: none  Failed Pain Medications: cannot take NSAIDs because of the stroke. Tylenol. Short course Norco (side effects cognition), gabapentin, robaxin, oral steroids    Interval History 6/6/2022:     Joanne Peña is a 74 y.o. female presents to the clinic for follow up.  Since last visit the pain has has significantly improved.  She is s/p Right SIJ on 5/20/22 with almost 100% improvement of the low back/buttock pain.  She no longer requires a cane or walker to ambulate.  She feels signfiicantly better.  Currently her pain is located in the midline back without radiation. Worse with flexion, standing, walking    The pain is located in the lower back area and does not radiate.  The pain is described as aching    At BEST  4/10   At WORST  8/10 on the WORST day.    On average pain is rated as 4/10.   Today the pain is rated as 6/10  Symptoms interfere with nothing .   Exacerbating factors: Walking.    Mitigating factors laying down, medications and sitting.     Current pain medications: gabapentin. Oral steroids and robaxin helped. Short course Norco  Failed Pain Medications: cannot take NSAIDs because of the stroke. Tylenol. Robaxin    Initial Hx:  Joanne Peña is a 74 y.o. female presents to the clinic for the evaluation of lower back pain. The pain started over a month ago following no inciting event and symptoms have been worsening.  The patient had a TIA on 2/6/22.  She was on 21 days of Plavix which she has stopped. She takes a daily ASA81.  Does not feel that this is related to the stroke.  Denies any falls or trauma. She started getting around the beginning of March.  She went to the Urgent care on March 17, 2022 because the pain was not going away.  Since then the pain has been worsening. The pain pattern is the same now as it was then. The pain has prevented the patient from walking, and she has required a wheelchair since March 17.     She tried a medrol dose trae,  robaxin, gabapentin which helped until she stopped the steroids and the robaxin. She is taking gabapentin 300mg TID.  She is not taking anything else for pain right now. When she gets severe pain she repositions, heating pads, gabapentin. Especially worse while trying to get out of the bed. Changing positions are very bad.    Pain Description:    The pain is located in the lower back area and radiates to the right thigh/groin/ right hip .    At BEST  10/10   At WORST  10/10 on the WORST day.    On average pain is rated as 10/10.   Today the pain is rated as 10/10  The pain is continuous.  The pain is described as aching, sharp, shooting and pulling     Symptoms interfere with daily activity, sleeping and work.   Exacerbating factors: any type of movement.    Mitigating factors heat and medications.   She reports 1 hours of sleep per night.    Physical Therapy/Home Exercise: No, not currently in physical therapy or home exercise program    Current Pain Medications:    - gabapentin. Oral steroids and robaxin helped.    Failed Pain Medications:    - cannot take NSAIDs because of the stroke. Tylenol     Pain Treatment Therapies:    Pain procedures: none  Physical Therapy: last PT 1 month ago.  Patient has a healthy back referral on 5/9  Spinal decompression: none  Joint replacement: none     Patient denies urinary incontinence and bowel incontinence. (+) bilateral foot numbness x2 weeks.   Patient denies any suicidal or homicidal ideations     report:  Reviewed and consistent with medication use as prescribed.    Imaging:   XR hip:    FINDINGS:  The bones are intact.  There is no evidence for acute fracture or bone destruction.  There are degenerative changes with mild narrowing of the right hip joint space laterally.  Small osteophytes are present at the right hip.  Left hip and sacroiliac joints appear grossly unremarkable.     Impression:     No evidence for acute fracture, bone destruction, or dislocation.      Degenerative changes of the right hip with mild joint space narrowing laterally and small osteophytes.       XR lumbar 03/2022:    Mild scoliosis with convexity to the left can be seen.  Vertebral bodies are intact.  Disc spaces are maintained.  No significant bony abnormalities are noted    Past Medical History:   Diagnosis Date    Diabetes mellitus     Hyperlipidemia     Hypertension     Stroke      Past Surgical History:   Procedure Laterality Date    COLONOSCOPY N/A 7/12/2023    Procedure: COLONOSCOPY;  Surgeon: Ray Sorensen MD;  Location: [facility] ENDO;  Service: Endoscopy;  Laterality: N/A;  instr via portal  - PC  4/10/23 Daniel, instr via mail & portal, unable to tolerate Golytely- request Miralax/Gatorade - PC  5/30-pt r/s-new instr portal-tb    INJECTION OF ANESTHETIC AGENT AROUND MEDIAL BRANCH NERVES INNERVATING LUMBAR FACET JOINT Bilateral 4/20/2023    Procedure: MBB #1 (B/L) L3,4,5;  Surgeon: Son Lara DO;  Location: [facility] OR;  Service: Pain Management;  Laterality: Bilateral;  ORAL XANAX    INJECTION OF ANESTHETIC AGENT AROUND MEDIAL BRANCH NERVES INNERVATING LUMBAR FACET JOINT Bilateral 5/5/2023    Procedure: MBB #2 (B/L) L3,4,5;  Surgeon: Son Lara DO;  Location: [facility] OR;  Service: Pain Management;  Laterality: Bilateral;  Oral Xanax    INJECTION OF JOINT Right 5/20/2022    Procedure: Right SI joint injection;  Surgeon: Son Lara DO;  Location: [facility] OR;  Service: Pain Management;  Laterality: Right;    INJECTION, SACROILIAC JOINT Right 12/19/2023    Procedure: RT SI joint Inj;  Surgeon: Son Lara DO;  Location: [facility] PAIN MANAGEMENT;  Service: Pain Management;  Laterality: Right;  oral    INSERTION OF IMPLANTABLE LOOP RECORDER Left 6/21/2024    Procedure: Insertion, Implantable Loop Recorder;  Surgeon: Hunter Rich MD;  Location: [facility] EP LAB;  Service: Cardiology;  Laterality: Left;  CVA, ILR, BSCI, Local, NM, 3 Prep    RADIOFREQUENCY ABLATION OF  LUMBAR MEDIAL BRANCH NERVE AT SINGLE LEVEL Bilateral 2023    Procedure: RFA (B/L) L3,4,5;  Surgeon: Son Lara DO;  Location: [facility] PAIN MANAGEMENT;  Service: Pain Management;  Laterality: Bilateral;    REMOVAL OF IMPLANTABLE LOOP RECORDER N/A 7/10/2024    Procedure: REMOVAL, IMPLANTABLE LOOP RECORDER;  Surgeon: Hunter Rich MD;  Location: [facility] EP LAB;  Service: Cardiology;  Laterality: N/A;     Social History     Socioeconomic History    Marital status:    Tobacco Use    Smoking status: Former     Current packs/day: 0.00     Types: Cigarettes     Quit date: 2022     Years since quittin.5     Passive exposure: Past    Smokeless tobacco: Never   Substance and Sexual Activity    Alcohol use: Not Currently    Drug use: No    Sexual activity: Not Currently     Partners: Male     Social Determinants of Health     Financial Resource Strain: Medium Risk (2024)    Overall Financial Resource Strain (CARDIA)     Difficulty of Paying Living Expenses: Somewhat hard   Food Insecurity: Food Insecurity Present (2024)    Hunger Vital Sign     Worried About Running Out of Food in the Last Year: Sometimes true     Ran Out of Food in the Last Year: Never true   Transportation Needs: No Transportation Needs (2024)    PRAPARE - Transportation     Lack of Transportation (Medical): No     Lack of Transportation (Non-Medical): No   Physical Activity: Unknown (2024)    Exercise Vital Sign     Days of Exercise per Week: 0 days   Housing Stability: Low Risk  (2024)    Housing Stability Vital Sign     Unable to Pay for Housing in the Last Year: No     Number of Places Lived in the Last Year: 1     Unstable Housing in the Last Year: No     Family History   Problem Relation Name Age of Onset    Kidney disease Mother      Heart disease Mother      Cancer Father      Hypertension Brother      Hypertension Daughter      Cancer Maternal Aunt         Review of patient's allergies indicates:    Allergen Reactions    Lisinopril-hydrochlorothiazide Other (See Comments)     Pt stated it makes her feel drained. She couldn't raise arms over head.    Ampicillin Rash    Darvocet a500 [propoxyphene n-acetaminophen] Other (See Comments)     karl       Current Outpatient Medications   Medication Sig    ACCU-CHEK GUIDE GLUCOSE METER Misc TO CHECK BLOOD GLUCOSE DAILY, TO USE WITH INSURANCE PREFERRED METER    amLODIPine (NORVASC) 10 MG tablet TAKE 1 TABLET BY MOUTH ONCE DAILY. HOLD IF SBP <120    ascorbic acid, vitamin C, (VITAMIN C) 500 MG tablet Take 500 mg by mouth once daily.    atorvastatin (LIPITOR) 80 MG tablet TAKE 1 TABLET (80 MG TOTAL) BY MOUTH ONCE DAILY.    BLOOD PRESSURE CUFF Misc 1 Units by Misc.(Non-Drug; Combo Route) route once daily.    blood sugar diagnostic Strp To check BG daily, to use with insurance preferred meter    blood-glucose meter kit To check BG daily, to use with insurance preferred meter    chlorthalidone (HYGROTEN) 25 MG Tab TAKE 1 TABLET BY MOUTH EVERY DAY    cyclobenzaprine (FLEXERIL) 10 MG tablet TAKE 1 TABLET (10 MG TOTAL) BY MOUTH 2 (TWO) TIMES DAILY AS NEEDED FOR MUSCLE SPASMS (BACK PAIN).    empagliflozin (JARDIANCE) 10 mg tablet Take 1 tablet (10 mg total) by mouth once daily.    lancets Misc To check BG daily, to use with insurance preferred meter    losartan (COZAAR) 100 MG tablet TAKE 1 TABLET (100 MG TOTAL) BY MOUTH ONCE DAILY. HOLD IF SBP <120    meclizine (ANTIVERT) 25 mg tablet Take 1 tablet (25 mg total) by mouth 2 (two) times daily as needed for Dizziness.    multivitamin (THERAGRAN) per tablet Take 1 tablet by mouth once daily.    semaglutide (OZEMPIC) 2 mg/dose (8 mg/3 mL) PnIj Inject 2 mg into the skin every 7 days.    aspirin (ECOTRIN) 81 MG EC tablet Take 1 tablet (81 mg total) by mouth once daily.    hydrALAZINE (APRESOLINE) 50 MG tablet Take 1 tablet (50 mg total) by mouth every 8 (eight) hours. Hold is SBP <120 (Patient not taking: Reported on 6/27/2024)  "    No current facility-administered medications for this visit.       REVIEW OF SYSTEMS:    GENERAL:  No weight loss, malaise or fevers.   HEENT:   No recent changes in vision or hearing  NECK:  Negative for lumps, no difficulty with swallowing.  RESPIRATORY:  Negative for cough, wheezing or shortness of breath, patient denies any recent URI.  CARDIOVASCULAR:  Negative for chest pain, leg swelling or palpitations.  GI:  Negative for abdominal discomfort, blood in stools or black stools or change in bowel habits.  MUSCULOSKELETAL:  See HPI.  SKIN:  Negative for lesions, rash, and itching.  PSYCH:  No mood disorder or recent psychosocial stressors.  Patients sleep is not disturbed secondary to pain.  HEMATOLOGY/LYMPHOLOGY:  Negative for prolonged bleeding, bruising easily or swollen nodes.  Patient is not currently taking any anti-coagulants  NEURO:   No history of headaches, syncope, paralysis, seizures or tremors.  All other reviewed and negative other than HPI.    OBJECTIVE:    /82 (BP Location: Left arm, Patient Position: Sitting)   Pulse 86   Ht 5' 7" (1.702 m)   Wt 81.4 kg (179 lb 7.3 oz)   BMI 28.11 kg/m²     PHYSICAL EXAMINATION:    GENERAL: Well appearing, in no acute distress, alert and oriented x3.   PSYCH:  Mood and affect appropriate.  SKIN: Skin color, texture, turgor normal, no rashes or lesions.  HEAD/FACE:  Normocephalic, atraumatic. Cranial nerves grossly intact.  CV: RRR with palpation of the radial artery.  PULM: CTAB. No evidence of respiratory difficulty, symmetric chest rise.  GI:  Soft and non-tender.    BACK:  - No obvious deformity or signs of trauma, Normal lumbar lordotic curve  - Negative spinous process tenderness  - Negative paravertebral tenderness  - Positive pain to palpation over the facet joints of the lumbar spine on the right  - Positive right iliac crest  - Slump test is Negative for radicular pain  - Slump test is Negative for back pain  - Supine Straight leg raising " is Negative for radicular pain  - Supine Straight leg raising is Negative for back pain  - Positive pain with lumbar extension, side bending  - Negative Sustained Hip Flexion test (for discogenic pain)  - Negative Altered Gait, Posture  - Axial facet loading test Positive on the right side(s)    SI Joint exam:  - Positive SI joint tenderness to palpation  - Rodolfo's sign Positive  - Yeoman's Test: Negative for SI joint pain indicating anterior SI ligament involvement. Negative for anterior thigh pain/paresthesia which indicates femoral nerve stretch.  - Gaenslen's Test:Positive  - Finger Mikhail's Sign:Positive  - SI compression test:Positive  - SI distraction test:Negative  - Thigh Thrust: Negative  - SI Thrust: Positive    MUSKULOSKELETAL:    Right shoulder:  (+) empty can  (+) neer  (+) argueta  (+) speeds    EXTREMITIES:   Hip Exam:  - Log Roll Negative  - FADIR Negative  - Stinchfield Did not perform  - Hip Scour Did not perform  - GTB Tenderness Positive    MUSCULOSKELETAL:  No atrophy or tone abnormalities are noted in the UE or LE.  No deformities, edema, or skin discoloration are noted on visible skin. Good capillary refill.    NEURO: Bilateral upper and lower extremity coordination and muscle stretch reflexes are physiologic and symmetric.      NEUROLOGICAL EXAM:  MENTAL STATUS: A x O x 3, good concentration, speech is fluent and goal directed  MEMORY: recent and remote are intact  CN: CN2-12 grossly intact  MOTOR: 5/5 in all muscle groups  DTRs: 2+ intact symmetric  Sensation:    -no Loss of sensation in a left lower and right lower L-1, L-2, L-3, L-4 and L-5 bilaterally distribution.

## 2024-08-29 ENCOUNTER — TELEPHONE (OUTPATIENT)
Dept: ELECTROPHYSIOLOGY | Facility: CLINIC | Age: 74
End: 2024-08-29
Payer: MEDICARE

## 2024-08-29 NOTE — TELEPHONE ENCOUNTER
Spoke to Patient .     CONFIRMED procedure arrival time of 9:30 am on 8:30am for ILR implant with Dr Lizama    Reiterated instructions including:    -Directions to check in desk    -NPO after midnight night prior to procedure. Fasting upon arrival to the hospital the day of the procedure.     -High importance of HOLDING Losartan the day of the procedure. Patient reports that she is no longer taking Mounjaro due to cost, stopped last month. Reports that Jardiance has been prescribed, but will not be started until after the procedure. Currently taking home supply of Amaryl until Jardiance is started. Instructed to hold Amaryl the morning of the procedure as well.         -Pre-procedure LABS reviewed with no alerts noted.    -Confirmed absence or presence of implanted device/stimulator - N/A    -Confirmed no recent or current fever, cough, or shortness of breath .    -Confirmed no redness, rash, irritation, or yeast infection to skin/ chest area.     -  -Bathe night prior and morning prior to procedure with Hibiclens solution or an antibacterial soap  -Reviewed current visitor policy    Patient verbalized understanding of above, denies any further questions and appreciated the call.

## 2024-08-30 ENCOUNTER — HOSPITAL ENCOUNTER (OUTPATIENT)
Facility: HOSPITAL | Age: 74
Discharge: HOME | End: 2024-08-30
Attending: INTERNAL MEDICINE | Admitting: INTERNAL MEDICINE
Payer: MEDICARE

## 2024-08-30 VITALS
SYSTOLIC BLOOD PRESSURE: 136 MMHG | OXYGEN SATURATION: 94 % | TEMPERATURE: 97 F | DIASTOLIC BLOOD PRESSURE: 81 MMHG | RESPIRATION RATE: 20 BRPM | HEART RATE: 88 BPM

## 2024-08-30 DIAGNOSIS — Z86.73 HISTORY OF STROKE: ICD-10-CM

## 2024-08-30 DIAGNOSIS — E11.65 TYPE 2 DIABETES MELLITUS WITH HYPERGLYCEMIA, WITHOUT LONG-TERM CURRENT USE OF INSULIN: ICD-10-CM

## 2024-08-30 DIAGNOSIS — N18.32 STAGE 3B CHRONIC KIDNEY DISEASE: ICD-10-CM

## 2024-08-30 DIAGNOSIS — I44.7 LBBB (LEFT BUNDLE BRANCH BLOCK): ICD-10-CM

## 2024-08-30 DIAGNOSIS — I10 MALIGNANT ESSENTIAL HYPERTENSION: ICD-10-CM

## 2024-08-30 DIAGNOSIS — Z95.9 CARDIAC DEVICE IN SITU: ICD-10-CM

## 2024-08-30 LAB
OHS QRS DURATION: 134 MS
OHS QTC CALCULATION: 507 MS
POCT GLUCOSE: 141 MG/DL (ref 70–110)
POCT GLUCOSE: 151 MG/DL (ref 70–110)

## 2024-08-30 PROCEDURE — 82962 GLUCOSE BLOOD TEST: CPT | Mod: HCNC | Performed by: INTERNAL MEDICINE

## 2024-08-30 PROCEDURE — 33285 INSJ SUBQ CAR RHYTHM MNTR: CPT | Mod: HCNC | Performed by: INTERNAL MEDICINE

## 2024-08-30 PROCEDURE — 25000003 PHARM REV CODE 250: Mod: HCNC | Performed by: INTERNAL MEDICINE

## 2024-08-30 PROCEDURE — 33285 INSJ SUBQ CAR RHYTHM MNTR: CPT | Mod: HCNC,,, | Performed by: INTERNAL MEDICINE

## 2024-08-30 PROCEDURE — 93005 ELECTROCARDIOGRAM TRACING: CPT | Mod: HCNC

## 2024-08-30 PROCEDURE — 93010 ELECTROCARDIOGRAM REPORT: CPT | Mod: HCNC,,, | Performed by: INTERNAL MEDICINE

## 2024-08-30 PROCEDURE — 63600175 PHARM REV CODE 636 W HCPCS: Mod: HCNC | Performed by: INTERNAL MEDICINE

## 2024-08-30 PROCEDURE — C1764 EVENT RECORDER, CARDIAC: HCPCS | Mod: HCNC | Performed by: INTERNAL MEDICINE

## 2024-08-30 DEVICE — ICM LNQ22 LINQ II PRIME US
Type: IMPLANTABLE DEVICE | Site: CHEST | Status: FUNCTIONAL
Brand: LINQ II™

## 2024-08-30 RX ORDER — FENTANYL CITRATE 50 UG/ML
INJECTION, SOLUTION INTRAMUSCULAR; INTRAVENOUS
Status: DISCONTINUED | OUTPATIENT
Start: 2024-08-30 | End: 2024-08-30 | Stop reason: HOSPADM

## 2024-08-30 RX ORDER — LIDOCAINE HYDROCHLORIDE AND EPINEPHRINE 10; 10 MG/ML; UG/ML
INJECTION, SOLUTION INFILTRATION; PERINEURAL
Status: DISCONTINUED | OUTPATIENT
Start: 2024-08-30 | End: 2024-08-30 | Stop reason: HOSPADM

## 2024-08-30 RX ORDER — ACETAMINOPHEN 325 MG/1
650 TABLET ORAL EVERY 4 HOURS PRN
Status: DISCONTINUED | OUTPATIENT
Start: 2024-08-30 | End: 2024-08-30 | Stop reason: HOSPADM

## 2024-08-30 RX ORDER — MIDAZOLAM HYDROCHLORIDE 1 MG/ML
INJECTION, SOLUTION INTRAMUSCULAR; INTRAVENOUS
Status: DISCONTINUED | OUTPATIENT
Start: 2024-08-30 | End: 2024-08-30 | Stop reason: HOSPADM

## 2024-08-30 RX ORDER — DOXYCYCLINE HYCLATE 100 MG
100 TABLET ORAL 2 TIMES DAILY
Qty: 10 TABLET | Refills: 0 | Status: SHIPPED | OUTPATIENT
Start: 2024-08-30 | End: 2024-08-30 | Stop reason: HOSPADM

## 2024-08-30 RX ADMIN — VANCOMYCIN HYDROCHLORIDE 1000 MG: 1 INJECTION, POWDER, LYOPHILIZED, FOR SOLUTION INTRAVENOUS at 10:08

## 2024-08-30 NOTE — DISCHARGE INSTRUCTIONS
Post-Procedure Patient Discharge Instructions  Loop Recorders     Wound Care  You are discharged with a standard dressing over the incision, you may remove the dressing after 24 hours.   There is Dermabond (clear glue) over your incision, do not scrub it off. It acts as a barrier and will eventually disappear.  Do not get the incision wet for 48 hours following the procedure. You may sponge bath during this period, working around the incision. After 48 hours, you may shower, but you should still try to keep this area as dry as possible, and avoid direct water contact to the incision (allow the water to hit back of your shoulder rather than directly on the incision). Gently pat the incision dry if it does get wet.  You may take regular showers after 2 weeks, unless otherwise indicated at your follow-up visit.  Do not submerge the incision in a tub, pool, or body of water for 6 weeks.  If you notice unusual swelling, redness, drainage, have more device site pain, chest pain, shortness of breath, or have a fever greater than 100 degrees, call our device clinic immediately: [phone] or [phone] during normal office hours. You may call [phone] after-hours or on weekends and ask for the electrophysiologist on call.    Activity   If you were driving prior to the procedure, you may resume driving right after your procedure (as long you did not receive any sedation). If you have a history of passing out or a history of certain arrhythmias, there may be driving restrictions unrelated to the procedure. Please clarify with your physician if this is the case.  Avoid rough contact at the device site for 6 weeks.  You may participate in sexual activity unless otherwise instructed.  You may return to work the follow day unless told otherwise by your provider.    Long-Term Instructions  Metal Detectors at Airports: Metal detectors at airports do not interfere with you loop recorder.  MRI: You may have an MRI  with an implantable loop recorder. All that is required is that you send a transmission to download your information before and after you have your MRI.    Long-Term Follow-Up  Your device has the ability to transmit device information from home to the doctor's office using a home monitoring system.  This remote system takes the place of a doctor's visit. Your device will be checked from home every 31 days. Every 12 months, you will be asked to come into the office for an in-office check.  Your device should last in the range of 3 years.         Any need to reschedule or cancel procedures, or any questions regarding your procedures should be addressed directly with the Arrhythmia Department Nurses at the following phone number: [phone].

## 2024-08-30 NOTE — PLAN OF CARE
Pt discharged in care of family via wheelchair.VS stable.Dressing Clean dry and Intact.Hep lock dc'd.Discharge instructions given and verbalized understanding.

## 2024-08-30 NOTE — H&P
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 Outpatient cardiac monitoring did not show any atrial fibrillation and she subsequently underwent ILR insertion with Dr. Rich in June. She had an eschar develop over the site that is healing poorly, and had undersensing of her R-waves due to breast tissue. Eschar over incision and ILR removed 7/10/24. Completed antibiotics and incision well healed with no signs of infection. Denies any palpitations, dizziness, fever/chills. She presents today for re-implant of ILR.       History:     Past Medical History:   Diagnosis Date    Diabetes mellitus     Hyperlipidemia     Hypertension     Stroke       Past Surgical History:   Procedure Laterality Date    COLONOSCOPY N/A 7/12/2023    Procedure: COLONOSCOPY;  Surgeon: Ray Sorensen MD;  Location: [facility];  Service: Endoscopy;  Laterality: N/A;  instr via portal  - PC  4/10/23 Daniel, instr via mail & portal, unable to tolerate Golytely- request Miralax/Gatorade - PC  5/30-pt r/s-new instr portal-tb    INJECTION OF ANESTHETIC AGENT AROUND MEDIAL BRANCH NERVES INNERVATING LUMBAR FACET JOINT Bilateral 4/20/2023    Procedure: MBB #1 (B/L) L3,4,5;  Surgeon: Son Lara DO;  Location: [facility] OR;  Service: Pain Management;  Laterality: Bilateral;  ORAL XANAX    INJECTION OF ANESTHETIC AGENT AROUND MEDIAL BRANCH NERVES INNERVATING LUMBAR FACET JOINT Bilateral 5/5/2023    Procedure: MBB #2 (B/L) L3,4,5;  Surgeon: Son Lara DO;  Location: [facility] OR;  Service: Pain Management;  Laterality: Bilateral;  Oral Xanax    INJECTION OF JOINT Right 5/20/2022    Procedure: Right SI joint injection;  Surgeon: Son Lara DO;  Location: [facility] OR;  Service: Pain Management;  Laterality: Right;     INJECTION, SACROILIAC JOINT Right 12/19/2023    Procedure: RT SI joint Inj;  Surgeon: Son Lara DO;  Location: [facility] PAIN [facility]NAGEMENT;  Service: Pain Management;  Laterality: Right;  oral    INSERTION OF IMPLANTABLE LOOP RECORDER Left 6/21/2024    Procedure: Insertion, Implantable Loop Recorder;  Surgeon: Hunter Rich MD;  Location: [facility] EP LAB;  Service: Cardiology;  Laterality: Left;  CVA, ILR, BSCI, Local, [place], 3 Prep    RADIOFREQUENCY ABLATION OF LUMBAR MEDIAL BRANCH NERVE AT SINGLE LEVEL Bilateral 5/19/2023    Procedure: RFA (B/L) L3,4,5;  Surgeon: Son Lara DO;  Location: [facility] PAIN MANAGEMENT;  Service: Pain Management;  Laterality: Bilateral;    REMOVAL OF IMPLANTABLE LOOP RECORDER N/A 7/10/2024    Procedure: REMOVAL, IMPLANTABLE LOOP RECORDER;  Surgeon: Hunter Rich MD;  Location: [facility] EP LAB;  Service: Cardiology;  Laterality: N/A;      Meds:     Review of patient's allergies indicates:   Allergen Reactions    Lisinopril-hydrochlorothiazide Other (See Comments)     Pt stated it makes her feel drained. She couldn't raise arms over head.    Ampicillin Rash    Darvocet a500 [propoxyphene n-acetaminophen] Other (See Comments)     shaky     Current Outpatient Medications   Medication Instructions    ACCU-CHEK GUIDE GLUCOSE METER Misc TO CHECK BLOOD GLUCOSE DAILY, TO USE WITH INSURANCE PREFERRED METER    amLODIPine (NORVASC) 10 MG tablet TAKE 1 TABLET BY MOUTH ONCE DAILY. HOLD IF SBP <120    ascorbic acid (vitamin C) (VITAMIN C) 500 mg, Oral, Daily    aspirin (ECOTRIN) 81 mg, Oral, Daily    atorvastatin (LIPITOR) 80 mg, Oral, Daily    BLOOD PRESSURE CUFF Misc 1 Units, Misc.(Non-Drug; Combo Route), Daily    blood sugar diagnostic Strp To check BG daily, to use with insurance preferred meter    blood-glucose meter kit To check BG daily, to use with insurance preferred meter    chlorthalidone (HYGROTEN) 25 mg, Oral    cyclobenzaprine (FLEXERIL) 10 mg, Oral, 2 times daily PRN     empagliflozin (JARDIANCE) 10 mg, Oral, Daily    hydrALAZINE (APRESOLINE) 50 mg, Oral, Every 8 hours, Hold is SBP <120    lancets Misc To check BG daily, to use with insurance preferred meter    losartan (COZAAR) 100 mg, Oral, Daily, Hold if SBP <120    meclizine (ANTIVERT) 25 mg, Oral, 2 times daily PRN    multivitamin (THERAGRAN) per tablet 1 tablet, Oral, Daily     Review of Systems   Constitutional:  Negative for chills, diaphoresis, fever and malaise/fatigue.   HENT:  Negative for sore throat.    Eyes:  Negative for double vision.   Respiratory:  Negative for cough, shortness of breath and wheezing.    Cardiovascular:  Negative for chest pain, palpitations, orthopnea, claudication, leg swelling and PND.   Gastrointestinal:  Negative for abdominal pain, blood in stool, constipation, heartburn, nausea and vomiting.   Genitourinary:  Negative for hematuria.   Musculoskeletal:  Negative for falls and myalgias.   Neurological:  Negative for dizziness, loss of consciousness, weakness and headaches.   Psychiatric/Behavioral:  The patient is not nervous/anxious.      Objective:   Breastfeeding No   Physical Exam  Constitutional:       General: She is not in acute distress.  HENT:      Head: Normocephalic and atraumatic.   Eyes:      Pupils: Pupils are equal, round, and reactive to light.   Neck:      Vascular: No carotid bruit or JVD.   Cardiovascular:      Rate and Rhythm: Normal rate and regular rhythm.      Heart sounds: Normal heart sounds. No murmur heard.     No friction rub. No gallop.   Pulmonary:      Effort: Pulmonary effort is normal. No respiratory distress.      Breath sounds: Normal breath sounds. No wheezing or rales.   Chest:      Chest wall: No tenderness.   Abdominal:      General: Bowel sounds are normal. There is no distension.      Palpations: Abdomen is soft.      Tenderness: There is no abdominal tenderness.   Musculoskeletal:      Right lower leg: No edema.      Left lower leg: No edema.  "  Skin:     General: Skin is warm and dry.      Findings: No erythema.      Comments: Healed incision L upper breast at prior ILR site.   Neurological:      Mental Status: She is alert and oriented to person, place, and time.   Psychiatric:         Mood and Affect: Mood and affect normal.         Cognition and Memory: Memory normal.         Judgment: Judgment normal.       Labs:     Lab Results   Component Value Date     08/16/2024    K 4.6 08/16/2024     (H) 08/16/2024    CO2 18 (L) 08/16/2024    BUN 26 (H) 08/16/2024    CREATININE 1.3 08/16/2024    ANIONGAP 10 08/16/2024     Lab Results   Component Value Date    HGBA1C 6.9 (H) 08/16/2024     No results found for: "BNP", "BNPTRIAGEBLO" Lab Results   Component Value Date    WBC 5.29 08/16/2024    HGB 10.4 (L) 08/16/2024    HCT 35.8 (L) 08/16/2024    HCT 37 02/06/2022     08/16/2024    GRAN 3.2 08/16/2024    GRAN 60.1 08/16/2024     Lab Results   Component Value Date    CHOL 188 05/13/2024    HDL 45 05/13/2024    LDLCALC 114.4 05/13/2024    TRIG 143 05/13/2024          Assessment & Plan:     74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 Recently had ILR removed due to eschar 7/10/24. Completed antibiotics.      Cryptogenic stroke  ILR problem:   Risks/benefits/alternatives discussed with patient and she agrees to proceed. Consents signed.   -To EP lab for ILR re-implant more towards sternum than prior L upper breast  - Antibiotics for 5 days post-op  - Patient requested sedation    Anticoagulation: none  Antiplatelet: ASA, held today    The indication(s), risks, benefits and alternatives of the procedure were explained to the patient, patient's family and/or surrogate decision maker. Risks include (but not limited to) bleeding, pain, infection. All questions were answered. Patient is understanding of these risks, and wishes to proceed. Consents signed. "     Case discussed with Dr. Rich.    Signed:  Jose Ramon Martinez MD  Cardiovascular Disease PGY-VI  [facility]

## 2024-08-30 NOTE — DISCHARGE SUMMARY
Electrophysiology  Discharge Summary      Admit Date: 8/30/2024  Discharge Date:  8/30/2024  Attending Physician: Hunter Rich MD  Discharge Physician: Jose Ramon Martinez MD    Principal Diagnoses: <principal problem not specified>  Indication for Admission: Insertion, Implantable Loop Recorder (Left)    Discharged Condition: Good    Hospital Course:   Patient underwent successful implantation of ILR (Medtronic) for monitoring in setting of prior cryptogenic stroke. S/p ILR insertion.  Patient tolerated procedure.  Dressing intact.  D/c'd home with instructions.        Recommendations  1. Follow up in device clinic in 1 week for device and wound checks.   2. Patient can shower tomorrow. Stand with back to shower head. Do not scrub incision. There is glue over the incision. Let the glue fall off naturally.    Diet: Cardiac diet    Activity: Ad margaret, wound care instructions provided  Because you have received sedation for this procedure:  -Limit activity for the remainder of the day.  -Do not smoke for at least 6 hours and until you are fully awake and alert.  -Do not drink alcoholic beverage for 24 hours.  -Do not drive for 24 hours.  -Defer important decision making until the following day.     Go to the Emergency Department if you develop:   -Bleeding  -Weakness or numbness  -Visual, gait or speech disturbance  -New chest pain, palpitations, shortness of breath, rapid heart beat, or fainting  -Fever    Disposition: Home or Self Care    Follow Up:   Follow-up Information       DEVICE CHECK CLINIC Follow up in 1 week(s).    Why: For wound re-check             Hunter Rich MD Follow up in 3 month(s).    Specialties: Electrophysiology, Cardiology  Contact information:  [address] [place] [id number]  [phone]                             Discharge Medications:      Medication List        CONTINUE taking these medications      amLODIPine 10 MG tablet  Commonly known as: NORVASC  TAKE 1 TABLET BY MOUTH  ONCE DAILY. HOLD IF SBP <120     ascorbic acid (vitamin C) 500 MG tablet  Commonly known as: VITAMIN C     aspirin 81 MG EC tablet  Commonly known as: ECOTRIN  Take 1 tablet (81 mg total) by mouth once daily.     atorvastatin 80 MG tablet  Commonly known as: LIPITOR  TAKE 1 TABLET (80 MG TOTAL) BY MOUTH ONCE DAILY.     BLOOD PRESSURE CUFF Misc  Generic drug: miscellaneous medical supply  1 Units by Misc.(Non-Drug; Combo Route) route once daily.     blood sugar diagnostic Strp  To check BG daily, to use with insurance preferred meter     * blood-glucose meter kit  To check BG daily, to use with insurance preferred meter     * ACCU-CHEK GUIDE GLUCOSE METER Misc  Generic drug: blood-glucose meter     chlorthalidone 25 MG Tab  Commonly known as: HYGROTEN  TAKE 1 TABLET BY MOUTH EVERY DAY     cyclobenzaprine 10 MG tablet  Commonly known as: FLEXERIL  TAKE 1 TABLET (10 MG TOTAL) BY MOUTH 2 (TWO) TIMES DAILY AS NEEDED FOR MUSCLE SPASMS (BACK PAIN).     empagliflozin 10 mg tablet  Commonly known as: JARDIANCE  Take 1 tablet (10 mg total) by mouth once daily.     hydrALAZINE 50 MG tablet  Commonly known as: APRESOLINE  Take 1 tablet (50 mg total) by mouth every 8 (eight) hours. Hold is SBP <120     lancets Misc  To check BG daily, to use with insurance preferred meter     losartan 100 MG tablet  Commonly known as: COZAAR  TAKE 1 TABLET (100 MG TOTAL) BY MOUTH ONCE DAILY. HOLD IF SBP <120     meclizine 25 mg tablet  Commonly known as: ANTIVERT  Take 1 tablet (25 mg total) by mouth 2 (two) times daily as needed for Dizziness.     multivitamin per tablet  Commonly known as: THERAGRAN           * This list has 2 medication(s) that are the same as other medications prescribed for you. Read the directions carefully, and ask your doctor or other care provider to review them with you.                Jose Ramon Martinez MD  Cardiovascular Disease PGY-VI  [facility]

## 2024-08-30 NOTE — PLAN OF CARE
Pt received from ep lab via stretcher.Vs stableNo c/o discomfort.Chest with dermabond CD&I.Family at bedside and pt oriented to room.

## 2024-09-03 ENCOUNTER — OFFICE VISIT (OUTPATIENT)
Dept: URGENT CARE | Facility: CLINIC | Age: 74
End: 2024-09-03
Payer: MEDICARE

## 2024-09-03 ENCOUNTER — NURSE TRIAGE (OUTPATIENT)
Dept: ADMINISTRATIVE | Facility: CLINIC | Age: 74
End: 2024-09-03
Payer: MEDICARE

## 2024-09-03 VITALS
HEART RATE: 95 BPM | SYSTOLIC BLOOD PRESSURE: 148 MMHG | HEIGHT: 67 IN | WEIGHT: 179 LBS | DIASTOLIC BLOOD PRESSURE: 84 MMHG | BODY MASS INDEX: 28.09 KG/M2 | RESPIRATION RATE: 18 BRPM | OXYGEN SATURATION: 96 % | TEMPERATURE: 99 F

## 2024-09-03 DIAGNOSIS — M25.562 PAIN AND SWELLING OF LEFT KNEE: Primary | ICD-10-CM

## 2024-09-03 DIAGNOSIS — M25.462 PAIN AND SWELLING OF LEFT KNEE: Primary | ICD-10-CM

## 2024-09-03 PROCEDURE — 73562 X-RAY EXAM OF KNEE 3: CPT | Mod: LT,S$GLB,, | Performed by: RADIOLOGY

## 2024-09-03 PROCEDURE — 99213 OFFICE O/P EST LOW 20 MIN: CPT | Mod: S$GLB,,,

## 2024-09-03 RX ORDER — METHYLPREDNISOLONE 4 MG/1
TABLET ORAL
Qty: 21 EACH | Refills: 0 | Status: SHIPPED | OUTPATIENT
Start: 2024-09-03 | End: 2024-09-24

## 2024-09-03 NOTE — PROGRESS NOTES
"Subjective:      Patient ID: Joanne Pñea is a 74 y.o. female.    Vitals:  height is 5' 7" (1.702 m) and weight is 81.2 kg (179 lb). Her oral temperature is 99.1 °F (37.3 °C). Her blood pressure is 148/84 (abnormal) and her pulse is 95. Her respiration is 18 and oxygen saturation is 96%.     Chief Complaint: Knee Pain    74-year-old female here with atraumatic left knee pain and swelling that started last night.  Pain located over the medial aspect of her left knee.  Denies any preceding injury or trauma.  She has not taken anything for the pain yet.  Denies history of gout.  Although, she did have elevated uric acid on 02/29/2024.  She wrapped her knee in [place] which helps.  Pain does not radiate.  Denies any numbness or tingling.    Knee Pain   Incident onset: last night. The incident occurred at home. There was no injury mechanism. The pain is present in the left knee. The quality of the pain is described as aching. The pain is at a severity of 10/10. The pain is moderate. The pain has been Constant since onset. Associated symptoms include an inability to bear weight. Pertinent negatives include no numbness. She reports no foreign bodies present. The symptoms are aggravated by movement and weight bearing. She has tried nothing for the symptoms.       Constitution: Negative for chills and fever.   Musculoskeletal:  Positive for joint pain and joint swelling.   Skin:  Negative for rash, erythema and bruising.   Neurological:  Negative for numbness and tingling.      Objective:     Physical Exam   Constitutional: She is oriented to person, place, and time. She appears well-developed.   HENT:   Head: Normocephalic and atraumatic.   Ears:   Right Ear: External ear normal.   Left Ear: External ear normal.   Nose: Nose normal.   Mouth/Throat: Oropharynx is clear and moist.   Eyes: Conjunctivae, EOM and lids are normal.   Neck: Trachea normal and phonation normal. Neck supple.   Musculoskeletal: Normal range of " motion.         General: Normal range of motion.      Comments: Tenderness and swelling over the medial aspect of the right knee.  Full range of motion of the left knee, but there is pain with flexion and extension.  No erythema.  No calf tenderness.  Neurovascularly intact distally.   Neurological: She is alert and oriented to person, place, and time.   Skin: Skin is warm, dry and intact. No erythema   Psychiatric: Her speech is normal and behavior is normal. Judgment and thought content normal.   Nursing note and vitals reviewed.    X-Ray Knee 3 View Left    Result Date: 9/3/2024  EXAMINATION: XR KNEE 3 VIEW LEFT CLINICAL HISTORY: Pain in left knee TECHNIQUE: AP, lateral, and Merchant views of the left knee were performed. COMPARISON: None FINDINGS: There is osteopenia.  There is no acute fracture or dislocation.  Alignment is normal.  There is mild joint space narrowing of the medial and lateral compartments and the patellofemoral compartment, with tricompartmental marginal osteophytes.  There is no joint effusion.  There is a remote fracture of the proximal fibular metaphysis.     No acute osseous abnormality. Tricompartmental osteoarthritis as above. Electronically signed by: Keagan Sutherland Date:    09/03/2024 Time:    18:25    Assessment:     1. Pain and swelling of left knee        Plan:       Pain and swelling of left knee  -     X-Ray Knee 3 View Left; Future; Expected date: 09/03/2024  -     methylPREDNISolone (MEDROL DOSEPACK) 4 mg tablet; use as directed  Dispense: 21 each; Refill: 0    74-year-old female here for atraumatic pain and swelling of left knee that occurred last night.  Blood pressure mildly elevated, but other vital signs are within normal limits.  There is some tenderness over the medial aspect of the left knee.  Full range of motion of her left knee and flexion and extension.  No erythema, bruising, or other overlying skin changes.  X-ray with no acute fracture, but there are findings of  arthritis.  Acute gout versus osteoarthritis.  Doubt infection such as cellulitis or septic joint/bursitis.  Will treat with Medrol Dosepak.  Patient does have history of diabetes, reports that his well-controlled.  She takes every day, sugars yesterday was 114.  Last  A1c 6.9 (08/16/2024).  Will avoid NSAIDs due to history of CKD.  Ace wrap to left knee.        Patient Instructions   Take the steroids as prescribed.  Please be aware that steroids can increase your blood pressure and your blood sugar levels.  Please go close eye on your blood pressure and blood sugar levels.    You can take Tylenol for the pain.    Ace wrap to the left knee for compression.    - Follow up with your PCP or specialty clinic as directed in the next 1-2 weeks if not improved or as needed.  You can call [phone] to schedule an appointment with the appropriate provider.    - Go to the ER or seek medical attention immediately if you develop new or worsening symptoms.    - You must understand that you have received an Urgent Care treatment only and that you may be released before all of your medical problems are known or treated.   - You, the patient, will arrange for follow up care as instructed.   - If your condition worsens or fails to improve we recommend that you receive another evaluation at the ER immediately or contact your PCP to discuss your concerns or return here.

## 2024-09-03 NOTE — PATIENT INSTRUCTIONS
Take the steroids as prescribed.  Please be aware that steroids can increase your blood pressure and your blood sugar levels.  Please go close eye on your blood pressure and blood sugar levels.    You can take Tylenol for the pain.    Ace wrap to the left knee for compression.    - Follow up with your PCP or specialty clinic as directed in the next 1-2 weeks if not improved or as needed.  You can call [phone] to schedule an appointment with the appropriate provider.    - Go to the ER or seek medical attention immediately if you develop new or worsening symptoms.    - You must understand that you have received an Urgent Care treatment only and that you may be released before all of your medical problems are known or treated.   - You, the patient, will arrange for follow up care as instructed.   - If your condition worsens or fails to improve we recommend that you receive another evaluation at the ER immediately or contact your PCP to discuss your concerns or return here.

## 2024-09-03 NOTE — TELEPHONE ENCOUNTER
Pt c/o right knee pain 10/10 since last night. States that she also has slight swelling to right knee. Pt also c/o pain radiating to right thigh. Advised to be seen today per protocol. Pt states that daughter will not get off work until after 4:30 pm. Advised to go to ED/UC per protocol. VU. Encounter routed to provider.   Reason for Disposition   SEVERE pain (e.g., excruciating, unable to walk)    Additional Information   Negative: Sounds like a life-threatening emergency to the triager   Negative: Swollen knee joint and fever   Negative: Patient sounds very sick or weak to the triager   Negative: Can't move swollen joint at all   Negative: Thigh or calf pain and only 1 side and present > 1 hour   Negative: Thigh or calf swelling and only 1 side    Protocols used: Knee Pain-A-[place]

## 2024-09-06 ENCOUNTER — CLINICAL SUPPORT (OUTPATIENT)
Dept: CARDIOLOGY | Facility: HOSPITAL | Age: 74
End: 2024-09-06
Attending: INTERNAL MEDICINE
Payer: MEDICARE

## 2024-09-06 DIAGNOSIS — Z86.73 HISTORY OF STROKE: ICD-10-CM

## 2024-09-06 DIAGNOSIS — Z95.818 STATUS POST PLACEMENT OF IMPLANTABLE LOOP RECORDER: ICD-10-CM

## 2024-09-06 PROCEDURE — 93285 PRGRMG DEV EVAL SCRMS IP: CPT | Mod: 26,HCNC,, | Performed by: INTERNAL MEDICINE

## 2024-09-07 ENCOUNTER — PATIENT MESSAGE (OUTPATIENT)
Dept: ADMINISTRATIVE | Facility: CLINIC | Age: 74
End: 2024-09-07
Payer: MEDICARE

## 2024-09-10 ENCOUNTER — TELEPHONE (OUTPATIENT)
Dept: ADMINISTRATIVE | Facility: CLINIC | Age: 74
End: 2024-09-10
Payer: MEDICARE

## 2024-09-11 LAB
OHS CV AF BURDEN PERCENT: < 1
OHS CV DC REMOTE DEVICE TYPE: NORMAL

## 2024-09-16 ENCOUNTER — HOSPITAL ENCOUNTER (OUTPATIENT)
Dept: RADIOLOGY | Facility: HOSPITAL | Age: 74
Discharge: HOME | End: 2024-09-16
Attending: INTERNAL MEDICINE
Payer: MEDICARE

## 2024-09-16 DIAGNOSIS — N61.0 ACUTE MASTITIS OF RIGHT BREAST: ICD-10-CM

## 2024-09-16 DIAGNOSIS — N64.4 BREAST PAIN, RIGHT: ICD-10-CM

## 2024-09-16 PROCEDURE — 76642 ULTRASOUND BREAST LIMITED: CPT | Mod: TC,HCNC,RT

## 2024-09-16 PROCEDURE — 77061 BREAST TOMOSYNTHESIS UNI: CPT | Mod: TC,HCNC,RT

## 2024-09-16 NOTE — PROGRESS NOTES
Diagnostic mammogram normal.  No suspicious mass palpated on clinical breast exam.  Return to routine screening interval

## 2024-09-18 ENCOUNTER — DOCUMENTATION ONLY (OUTPATIENT)
Dept: REHABILITATION | Facility: HOSPITAL | Age: 74
End: 2024-09-18

## 2024-09-18 NOTE — PROGRESS NOTES
Cancellation Note    Patient: Joanne Peña  Date of Session: 9/18/2024  MRN: [id number]    Joanne Peña cancelled her scheduled therapy evaluation appointment today secondary to having a flat tire.  This is the second evaluation appointment that Joanne has not attended. Next appointment is scheduled for 9/23/24 and will follow up with patient at that time. No charges have been posted today.       ELIZABETH Maldonado  9/18/2024

## 2024-09-23 ENCOUNTER — DOCUMENTATION ONLY (OUTPATIENT)
Dept: REHABILITATION | Facility: HOSPITAL | Age: 74
End: 2024-09-23

## 2024-09-23 NOTE — PROGRESS NOTES
No Show Note/Documentation    Patient: Joanne Peña  Date of Session: 9/23/2024  MRN: [id number]    Joanne Peña did not attend her scheduled therapy appointment today. She did not call to cancel nor reschedule. This is the second  appointment that Joanne has not attended.  No charges have been posted today.       ELIZABETH Maldonado  9/23/2024

## 2024-09-27 DIAGNOSIS — E78.5 HYPERLIPIDEMIA, UNSPECIFIED HYPERLIPIDEMIA TYPE: ICD-10-CM

## 2024-09-27 DIAGNOSIS — M46.1 SACROILIITIS: ICD-10-CM

## 2024-09-27 DIAGNOSIS — M54.16 LUMBAR RADICULOPATHY: ICD-10-CM

## 2024-09-27 RX ORDER — CYCLOBENZAPRINE HCL 10 MG
10 TABLET ORAL 2 TIMES DAILY PRN
Qty: 180 TABLET | Refills: 1 | Status: SHIPPED | OUTPATIENT
Start: 2024-09-27 | End: 2025-03-26

## 2024-09-27 NOTE — TELEPHONE ENCOUNTER
No care due was identified.  [facility] Embedded Care Due Messages. Reference number: [id number].   9/27/2024 3:40:26 PM CDT

## 2024-09-28 RX ORDER — ATORVASTATIN CALCIUM 80 MG/1
80 TABLET, FILM COATED ORAL DAILY
Qty: 90 TABLET | Refills: 2 | Status: SHIPPED | OUTPATIENT
Start: 2024-09-28 | End: 2025-06-25

## 2024-09-28 NOTE — TELEPHONE ENCOUNTER
Refill Decision Note   Joanne Peña  is requesting a refill authorization.  Brief Assessment and Rationale for Refill:  Approve     Medication Therapy Plan:       Medication Reconciliation Completed: No   Comments:     No Care Gaps recommended.     Note composed:10:10 AM 09/28/2024

## 2024-09-30 ENCOUNTER — CLINICAL SUPPORT (OUTPATIENT)
Dept: CARDIOLOGY | Facility: HOSPITAL | Age: 74
End: 2024-09-30
Attending: INTERNAL MEDICINE
Payer: MEDICARE

## 2024-09-30 DIAGNOSIS — I49.8 OTHER SPECIFIED CARDIAC ARRHYTHMIAS: ICD-10-CM

## 2024-09-30 PROCEDURE — 93298 REM INTERROG DEV EVAL SCRMS: CPT | Mod: 26,HCNC,, | Performed by: INTERNAL MEDICINE

## 2024-10-02 LAB
OHS CV AF BURDEN PERCENT: < 1
OHS CV DC REMOTE DEVICE TYPE: NORMAL

## 2024-10-23 DIAGNOSIS — E11.9 TYPE 2 DIABETES MELLITUS WITHOUT COMPLICATION, UNSPECIFIED WHETHER LONG TERM INSULIN USE: ICD-10-CM

## 2024-10-31 ENCOUNTER — CLINICAL SUPPORT (OUTPATIENT)
Dept: CARDIOLOGY | Facility: HOSPITAL | Age: 74
End: 2024-10-31
Payer: MEDICARE

## 2024-10-31 ENCOUNTER — CLINICAL SUPPORT (OUTPATIENT)
Dept: CARDIOLOGY | Facility: HOSPITAL | Age: 74
End: 2024-10-31
Attending: INTERNAL MEDICINE
Payer: MEDICARE

## 2024-10-31 DIAGNOSIS — I49.8 OTHER SPECIFIED CARDIAC ARRHYTHMIAS: ICD-10-CM

## 2024-10-31 PROCEDURE — 93298 REM INTERROG DEV EVAL SCRMS: CPT | Mod: HCNC | Performed by: INTERNAL MEDICINE

## 2024-10-31 PROCEDURE — 93298 REM INTERROG DEV EVAL SCRMS: CPT | Mod: 26,HCNC,, | Performed by: INTERNAL MEDICINE

## 2024-11-14 ENCOUNTER — LAB VISIT (OUTPATIENT)
Dept: LAB | Facility: HOSPITAL | Age: 74
End: 2024-11-14
Attending: FAMILY MEDICINE
Payer: MEDICARE

## 2024-11-14 DIAGNOSIS — N18.32 TYPE 2 DIABETES MELLITUS WITH STAGE 3B CHRONIC KIDNEY DISEASE, WITHOUT LONG-TERM CURRENT USE OF INSULIN: ICD-10-CM

## 2024-11-14 DIAGNOSIS — E78.5 DYSLIPIDEMIA ASSOCIATED WITH TYPE 2 DIABETES MELLITUS: ICD-10-CM

## 2024-11-14 DIAGNOSIS — E11.69 DYSLIPIDEMIA ASSOCIATED WITH TYPE 2 DIABETES MELLITUS: ICD-10-CM

## 2024-11-14 DIAGNOSIS — Z86.73 HISTORY OF CVA (CEREBROVASCULAR ACCIDENT): ICD-10-CM

## 2024-11-14 DIAGNOSIS — E11.22 TYPE 2 DIABETES MELLITUS WITH STAGE 3B CHRONIC KIDNEY DISEASE, WITHOUT LONG-TERM CURRENT USE OF INSULIN: ICD-10-CM

## 2024-11-14 LAB
CHOLEST SERPL-MCNC: 241 MG/DL (ref 120–199)
CHOLEST/HDLC SERPL: 5.6 {RATIO} (ref 2–5)
ESTIMATED AVG GLUCOSE: 235 MG/DL (ref 68–131)
HBA1C MFR BLD: 9.8 % (ref 4–5.6)
HDLC SERPL-MCNC: 43 MG/DL (ref 40–75)
HDLC SERPL: 17.8 % (ref 20–50)
LDLC SERPL CALC-MCNC: 154.2 MG/DL (ref 63–159)
NONHDLC SERPL-MCNC: 198 MG/DL
TRIGL SERPL-MCNC: 219 MG/DL (ref 30–150)

## 2024-11-14 PROCEDURE — 36415 COLL VENOUS BLD VENIPUNCTURE: CPT | Mod: HCNC,PO | Performed by: FAMILY MEDICINE

## 2024-11-14 PROCEDURE — 83036 HEMOGLOBIN GLYCOSYLATED A1C: CPT | Mod: HCNC | Performed by: FAMILY MEDICINE

## 2024-11-14 PROCEDURE — 80061 LIPID PANEL: CPT | Mod: HCNC | Performed by: FAMILY MEDICINE

## 2024-11-19 ENCOUNTER — OFFICE VISIT (OUTPATIENT)
Dept: FAMILY MEDICINE | Facility: CLINIC | Age: 74
End: 2024-11-19
Payer: MEDICARE

## 2024-11-19 VITALS
OXYGEN SATURATION: 94 % | SYSTOLIC BLOOD PRESSURE: 104 MMHG | WEIGHT: 176.69 LBS | DIASTOLIC BLOOD PRESSURE: 72 MMHG | TEMPERATURE: 98 F | BODY MASS INDEX: 27.73 KG/M2 | HEART RATE: 96 BPM | HEIGHT: 67 IN

## 2024-11-19 DIAGNOSIS — Z86.73 HISTORY OF CVA (CEREBROVASCULAR ACCIDENT): ICD-10-CM

## 2024-11-19 DIAGNOSIS — E11.29 MICROALBUMINURIA DUE TO TYPE 2 DIABETES MELLITUS: ICD-10-CM

## 2024-11-19 DIAGNOSIS — Z23 NEED FOR INFLUENZA VACCINATION: ICD-10-CM

## 2024-11-19 DIAGNOSIS — E11.69 HYPERLIPIDEMIA ASSOCIATED WITH TYPE 2 DIABETES MELLITUS: ICD-10-CM

## 2024-11-19 DIAGNOSIS — N18.32 STAGE 3B CHRONIC KIDNEY DISEASE: ICD-10-CM

## 2024-11-19 DIAGNOSIS — I10 ESSENTIAL HYPERTENSION: ICD-10-CM

## 2024-11-19 DIAGNOSIS — Z01.818 PRE-OP EVALUATION: Primary | ICD-10-CM

## 2024-11-19 DIAGNOSIS — H26.9 CATARACT OF LEFT EYE, UNSPECIFIED CATARACT TYPE: ICD-10-CM

## 2024-11-19 DIAGNOSIS — D63.1 ANEMIA OF CHRONIC RENAL FAILURE, STAGE 3B: ICD-10-CM

## 2024-11-19 DIAGNOSIS — N18.32 ANEMIA OF CHRONIC RENAL FAILURE, STAGE 3B: ICD-10-CM

## 2024-11-19 DIAGNOSIS — E78.5 HYPERLIPIDEMIA ASSOCIATED WITH TYPE 2 DIABETES MELLITUS: ICD-10-CM

## 2024-11-19 DIAGNOSIS — E11.65 UNCONTROLLED TYPE 2 DIABETES MELLITUS WITH HYPERGLYCEMIA: ICD-10-CM

## 2024-11-19 DIAGNOSIS — R80.9 MICROALBUMINURIA DUE TO TYPE 2 DIABETES MELLITUS: ICD-10-CM

## 2024-11-19 PROCEDURE — G0008 ADMIN INFLUENZA VIRUS VAC: HCPCS | Mod: HCNC,S$GLB,, | Performed by: FAMILY MEDICINE

## 2024-11-19 PROCEDURE — 3288F FALL RISK ASSESSMENT DOCD: CPT | Mod: HCNC,CPTII,S$GLB, | Performed by: FAMILY MEDICINE

## 2024-11-19 PROCEDURE — 3046F HEMOGLOBIN A1C LEVEL >9.0%: CPT | Mod: HCNC,CPTII,S$GLB, | Performed by: FAMILY MEDICINE

## 2024-11-19 PROCEDURE — 4010F ACE/ARB THERAPY RXD/TAKEN: CPT | Mod: HCNC,CPTII,S$GLB, | Performed by: FAMILY MEDICINE

## 2024-11-19 PROCEDURE — 3008F BODY MASS INDEX DOCD: CPT | Mod: HCNC,CPTII,S$GLB, | Performed by: FAMILY MEDICINE

## 2024-11-19 PROCEDURE — 3066F NEPHROPATHY DOC TX: CPT | Mod: HCNC,CPTII,S$GLB, | Performed by: FAMILY MEDICINE

## 2024-11-19 PROCEDURE — 1159F MED LIST DOCD IN RCRD: CPT | Mod: HCNC,CPTII,S$GLB, | Performed by: FAMILY MEDICINE

## 2024-11-19 PROCEDURE — 1160F RVW MEDS BY RX/DR IN RCRD: CPT | Mod: HCNC,CPTII,S$GLB, | Performed by: FAMILY MEDICINE

## 2024-11-19 PROCEDURE — 3078F DIAST BP <80 MM HG: CPT | Mod: HCNC,CPTII,S$GLB, | Performed by: FAMILY MEDICINE

## 2024-11-19 PROCEDURE — 90653 IIV ADJUVANT VACCINE IM: CPT | Mod: HCNC,S$GLB,, | Performed by: FAMILY MEDICINE

## 2024-11-19 PROCEDURE — 3074F SYST BP LT 130 MM HG: CPT | Mod: HCNC,CPTII,S$GLB, | Performed by: FAMILY MEDICINE

## 2024-11-19 PROCEDURE — 1125F AMNT PAIN NOTED PAIN PRSNT: CPT | Mod: HCNC,CPTII,S$GLB, | Performed by: FAMILY MEDICINE

## 2024-11-19 PROCEDURE — 3062F POS MACROALBUMINURIA REV: CPT | Mod: HCNC,CPTII,S$GLB, | Performed by: FAMILY MEDICINE

## 2024-11-19 PROCEDURE — 99214 OFFICE O/P EST MOD 30 MIN: CPT | Mod: HCNC,25,S$GLB, | Performed by: FAMILY MEDICINE

## 2024-11-19 PROCEDURE — 99999 PR PBB SHADOW E&M-EST. PATIENT-LVL IV: CPT | Mod: PBBFAC,HCNC,, | Performed by: FAMILY MEDICINE

## 2024-11-19 PROCEDURE — 1101F PT FALLS ASSESS-DOCD LE1/YR: CPT | Mod: HCNC,CPTII,S$GLB, | Performed by: FAMILY MEDICINE

## 2024-11-19 RX ORDER — LANCETS
EACH MISCELLANEOUS
Qty: 200 EACH | Refills: 11 | Status: SHIPPED | OUTPATIENT
Start: 2024-11-19

## 2024-11-19 RX ORDER — INSULIN LISPRO 100 [IU]/ML
10 INJECTION, SUSPENSION SUBCUTANEOUS
Qty: 6 ML | Refills: 11 | Status: SHIPPED | OUTPATIENT
Start: 2024-11-19 | End: 2025-11-19

## 2024-11-19 RX ORDER — AMLODIPINE BESYLATE 10 MG/1
TABLET ORAL
Qty: 90 TABLET | Refills: 3 | Status: SHIPPED | OUTPATIENT
Start: 2024-11-19

## 2024-11-19 NOTE — PROGRESS NOTES
Assessment & Plan:    Pre-op evaluation  Cataract of left eye, unspecified cataract type  Patient informed that I am unable to clear her for surgery due to uncontrolled diabetes. Form was completed and will be faxed.    Uncontrolled type 2 diabetes mellitus with hyperglycemia  -     empagliflozin (JARDIANCE) 25 mg tablet; Take 1 tablet (25 mg total) by mouth once daily.  Dispense: 30 tablet; Refill: 11  -     insulin lispro protamine-insulin lispro (HUMALOG MIX 75-25,U-100,INSULN) 100 unit/mL (75-25) Susp; Inject 10 Units into the skin 2 (two) times daily before meals.  Dispense: 6 mL; Refill: 11  -     lancets Misc; To check BG daily, to use with insurance preferred meter  Dispense: 200 each; Refill: 11  -     blood sugar diagnostic Strp; To check BG daily, to use with insurance preferred meter  Dispense: 200 each; Refill: 11  -     CBC Auto Differential; Future; Expected date: 11/19/2024  -     Comprehensive Metabolic Panel; Future; Expected date: 11/19/2024  -     Hemoglobin A1C; Future; Expected date: 11/19/2024  -     Lipid Panel; Future; Expected date: 11/19/2024  -     Microalbumin/Creatinine Ratio, Urine; Future; Expected date: 11/19/2024    Uncontrolled with A1c 9.8.   Raise Jardiance to 25 mg.   Start Humalog as prescribed above.  Supplies refilled. Reviewed goal BG .  Reinforced importance of avoidance of processed sugar and appropriate portioning of carbs.  Repeat labs in 3 mo.    Hyperlipidemia associated with type 2 diabetes mellitus  -     Lipid Panel; Future; Expected date: 11/19/2024    Uncontrolled due to dietary non-adherence. Advised to avoid fried/fast food, cheese in excess, and butter.  Continue high dose statin.  Consider adding Repatha if uncontrolled at next visit.    Stage 3b chronic kidney disease  Microalbuminuria due to type 2 diabetes mellitus  -     Microalbumin/Creatinine Ratio, Urine; Future; Expected date: 11/19/2024  -     Comprehensive Metabolic Panel; Future; Expected  "date: 11/19/2024    Anemia of chronic renal failure, stage 3b  -     CBC Auto Differential; Future; Expected date: 11/19/2024    Essential hypertension  -     CBC Auto Differential; Future; Expected date: 11/19/2024  -     Comprehensive Metabolic Panel; Future; Expected date: 11/19/2024  -     Hemoglobin A1C; Future; Expected date: 11/19/2024  -     Lipid Panel; Future; Expected date: 11/19/2024    Controlled. Continue current therapy.     History of CVA (cerebrovascular accident)  -     Lipid Panel; Future; Expected date: 11/19/2024    Need for influenza vaccination  -     influenza (adjuvanted) (Fluad) 45 mcg/0.5 mL IM vaccine (> or = 64 yo) 0.5 mL    Follow-up: Follow up in about 3 months (around 2/19/2025).  ______________________________________________________________________    Chief Complaint  Chief Complaint   Patient presents with    Diabetes       HPI  Joanne Peña is a 74 y.o. female with medical diagnoses as listed in the medical history and problem list that presents to the office to follow up on her chronic conditions. Patient is requesting surgical clearance for left eye cataract surgery on 12/19. Admits to eating whatever she wants and BG ranges in the 200s. Denies hypoglycemia. Took a Medrol dose pack through Urgent Care for left knee pain, which also raised her blood sugar. She was taking Ozempic but this became expensive when she was in the "donut hole". Taking Jardiance and tolerating this well.     Most recent pertinent workup:       Last Lipids  Lab Results   Component Value Date    CHOL 241 (H) 11/14/2024    TRIG 219 (H) 11/14/2024    HDL 43 11/14/2024    LDLCALC 154.2 11/14/2024       Last A1C  Lab Results   Component Value Date    HGBA1C 9.8 (H) 11/14/2024         Health Maintenance         Date Due Completion Date    TETANUS VACCINE Never done ---    Shingles Vaccine (1 of 2) Never done ---    RSV Vaccine (Age 60+ and Pregnant patients) (1 - Risk 60-74 years 1-dose series) Never done " ---    COVID-19 Vaccine (1 - 2024-25 season) Never done ---    Eye Exam 10/16/2024 10/16/2023    Hemoglobin A1c 02/14/2025 11/14/2024    Diabetes Urine Screening 05/13/2025 5/13/2024    Foot Exam 08/19/2025 8/19/2024    Override on 8/19/2024: Done    Mammogram 09/16/2025 9/16/2024    Lipid Panel 11/14/2025 11/14/2024    Colorectal Cancer Screening 07/12/2026 7/12/2023    DEXA Scan 09/25/2026 9/25/2023              PAST MEDICAL HISTORY:  Past Medical History:   Diagnosis Date    Diabetes mellitus     Hyperlipidemia     Hypertension     Stroke        PAST SURGICAL HISTORY:  Past Surgical History:   Procedure Laterality Date    COLONOSCOPY N/A 7/12/2023    Procedure: COLONOSCOPY;  Surgeon: Ray Sorensen MD;  Location: [facility];  Service: Endoscopy;  Laterality: N/A;  instr via portal  - PC  4/10/23 Daniel, instr via mail & portal, unable to tolerate Golytely- request Miralax/Gatorade - PC  5/30-pt r/s-new instr portal-tb    INJECTION OF ANESTHETIC AGENT AROUND MEDIAL BRANCH NERVES INNERVATING LUMBAR FACET JOINT Bilateral 4/20/2023    Procedure: MBB #1 (B/L) L3,4,5;  Surgeon: Son Lara DO;  Location: [facility];  Service: Pain Management;  Laterality: Bilateral;  ORAL XANAX    INJECTION OF ANESTHETIC AGENT AROUND MEDIAL BRANCH NERVES INNERVATING LUMBAR FACET JOINT Bilateral 5/5/2023    Procedure: MBB #2 (B/L) L3,4,5;  Surgeon: Son Lara DO;  Location: [facility] OR;  Service: Pain Management;  Laterality: Bilateral;  Oral Xanax    INJECTION OF JOINT Right 5/20/2022    Procedure: Right SI joint injection;  Surgeon: Son Lara DO;  Location: [facility] OR;  Service: Pain Management;  Laterality: Right;    INJECTION, SACROILIAC JOINT Right 12/19/2023    Procedure: RT SI joint Inj;  Surgeon: Son Lara DO;  Location: [facility];  Service: Pain Management;  Laterality: Right;  oral    INSERTION OF IMPLANTABLE LOOP RECORDER Left 6/21/2024    Procedure: Insertion, Implantable Loop  Recorder;  Surgeon: Hunter Rich MD;  Location: [facility] EP LAB;  Service: Cardiology;  Laterality: Left;  CVA, ILR, BSCI, Local, [place], 3 Prep    INSERTION OF IMPLANTABLE LOOP RECORDER Left 2024    Procedure: Insertion, Implantable Loop Recorder;  Surgeon: Hunter Rich MD;  Location: [facility] EP LAB;  Service: Cardiology;  Laterality: Left;  CVERVIN, RODNEY,MDT, RN [place], [place], 3 Prep    RADIOFREQUENCY ABLATION OF LUMBAR MEDIAL BRANCH NERVE AT SINGLE LEVEL Bilateral 2023    Procedure: RFA (B/L) L3,4,5;  Surgeon: Son Lara DO;  Location: [facility]IN [facility];  Service: Pain Management;  Laterality: Bilateral;    REMOVAL OF IMPLANTABLE LOOP RECORDER N/A 7/10/2024    Procedure: REMOVAL, IMPLANTABLE LOOP RECORDER;  Surgeon: Hunter Rich MD;  Location: [facility] EP LAB;  Service: Cardiology;  Laterality: N/A;       SOCIAL HISTORY:  Social History     Socioeconomic History    Marital status:    Tobacco Use    Smoking status: Former     Current packs/day: 0.00     Types: Cigarettes     Quit date: 2022     Years since quittin.7     Passive exposure: Past    Smokeless tobacco: Never   Substance and Sexual Activity    Alcohol use: Not Currently    Drug use: No    Sexual activity: Not Currently     Partners: Male     Social Drivers of Health     Financial Resource Strain: Medium Risk (2024)    Overall Financial Resource Strain (CARDIA)     Difficulty of Paying Living Expenses: Somewhat hard   Food Insecurity: Food Insecurity Present (2024)    Hunger Vital Sign     Worried About Running Out of Food in the Last Year: Sometimes true     Ran Out of Food in the Last Year: Never true   Transportation Needs: No Transportation Needs (2024)    PRAPARE - Transportation     Lack of Transportation (Medical): No     Lack of Transportation (Non-Medical): No   Physical Activity: Unknown (2024)    Exercise Vital Sign     Days of Exercise per Week: 0 days   Housing Stability: Low Risk  (2024)     Housing Stability Vital Sign     Unable to Pay for Housing in the Last Year: No     Number of Places Lived in the Last Year: 1     Unstable Housing in the Last Year: No       FAMILY HISTORY:  Family History   Problem Relation Name Age of Onset    Kidney disease Mother      Heart disease Mother      Cancer Father      Hypertension Brother      Hypertension Daughter      Cancer Maternal Aunt         ALLERGIES AND MEDICATIONS: updated and reviewed.  Review of patient's allergies indicates:   Allergen Reactions    Lisinopril-hydrochlorothiazide Other (See Comments)     Pt stated it makes her feel drained. She couldn't raise arms over head.    Ampicillin Rash    Darvocet a500 [propoxyphene n-acetaminophen] Other (See Comments)     karl     Current Outpatient Medications   Medication Sig Dispense Refill    ACCU-CHEK GUIDE GLUCOSE METER Misc TO CHECK BLOOD GLUCOSE DAILY, TO USE WITH INSURANCE PREFERRED METER      amLODIPine (NORVASC) 10 MG tablet TAKE 1 TABLET BY MOUTH ONCE DAILY. HOLD IF SBP <120 90 tablet 3    ascorbic acid, vitamin C, (VITAMIN C) 500 MG tablet Take 500 mg by mouth once daily.      atorvastatin (LIPITOR) 80 MG tablet TAKE 1 TABLET (80 MG TOTAL) BY MOUTH ONCE DAILY. 90 tablet 2    BLOOD PRESSURE CUFF Misc 1 Units by Misc.(Non-Drug; Combo Route) route once daily. 1 each 0    blood sugar diagnostic Strp To check BG daily, to use with insurance preferred meter 200 each 11    blood-glucose meter kit To check BG daily, to use with insurance preferred meter 1 each 0    chlorthalidone (HYGROTEN) 25 MG Tab TAKE 1 TABLET BY MOUTH EVERY DAY 90 tablet 3    cyclobenzaprine (FLEXERIL) 10 MG tablet TAKE 1 TABLET (10 MG TOTAL) BY MOUTH 2 (TWO) TIMES DAILY AS NEEDED FOR MUSCLE SPASMS (BACK PAIN). 180 tablet 1    lancets Misc To check BG daily, to use with insurance preferred meter 200 each 11    losartan (COZAAR) 100 MG tablet TAKE 1 TABLET (100 MG TOTAL) BY MOUTH ONCE DAILY. HOLD IF SBP <120 90 tablet 3    multivitamin  "(THERAGRAN) per tablet Take 1 tablet by mouth once daily.      aspirin (ECOTRIN) 81 MG EC tablet Take 1 tablet (81 mg total) by mouth once daily. 30 tablet 3    blood sugar diagnostic Strp To check BG daily, to use with insurance preferred meter 200 each 11    empagliflozin (JARDIANCE) 25 mg tablet Take 1 tablet (25 mg total) by mouth once daily. 30 tablet 11    hydrALAZINE (APRESOLINE) 50 MG tablet Take 1 tablet (50 mg total) by mouth every 8 (eight) hours. Hold is SBP <120 (Patient not taking: Reported on 6/27/2024) 90 tablet 1    insulin lispro protamine-insulin lispro (HUMALOG MIX 75-25,U-100,INSULN) 100 unit/mL (75-25) Susp Inject 10 Units into the skin 2 (two) times daily before meals. 6 mL 11    lancets Misc To check BG daily, to use with insurance preferred meter 200 each 11    meclizine (ANTIVERT) 25 mg tablet Take 1 tablet (25 mg total) by mouth 2 (two) times daily as needed for Dizziness. 14 tablet 0     No current facility-administered medications for this visit.         ROS  Review of Systems   Eyes:  Positive for visual disturbance.           Physical Exam  Vitals:    11/19/24 0817   BP: 104/72   Pulse: 96   Temp: 97.8 °F (36.6 °C)   TempSrc: Oral   SpO2: (!) 94%   Weight: 80.2 kg (176 lb 11.2 oz)   Height: 5' 7" (1.702 m)    Body mass index is 27.67 kg/m².  Weight: 80.2 kg (176 lb 11.2 oz)   Height: 5' 7" (170.2 cm)   Physical Exam  Constitutional:       General: She is not in acute distress.     Appearance: Normal appearance.   HENT:      Head: Normocephalic and atraumatic.   Skin:     General: Skin is warm and dry.   Neurological:      Mental Status: She is alert. Mental status is at baseline.   Psychiatric:         Mood and Affect: Mood normal.         Behavior: Behavior normal.             "

## 2024-11-19 NOTE — TELEPHONE ENCOUNTER
No care due was identified.  Health [facility] Embedded Care Due Messages. Reference number: [id number].   11/19/2024 11:07:31 AM CST

## 2024-11-19 NOTE — TELEPHONE ENCOUNTER
Refill Decision Note   Joanne Mariana  is requesting a refill authorization.  Brief Assessment and Rationale for Refill:  Approve     Medication Therapy Plan:         Comments:     Note composed:1:32 PM 11/19/2024

## 2024-11-21 ENCOUNTER — TELEPHONE (OUTPATIENT)
Dept: FAMILY MEDICINE | Facility: CLINIC | Age: 74
End: 2024-11-21
Payer: MEDICARE

## 2024-11-21 DIAGNOSIS — E11.65 UNCONTROLLED TYPE 2 DIABETES MELLITUS WITH HYPERGLYCEMIA: Primary | ICD-10-CM

## 2024-11-21 NOTE — TELEPHONE ENCOUNTER
No care due was identified.  Health [facility] Embedded Care Due Messages. Reference number: [id number].   11/21/2024 4:18:44 PM CST

## 2024-11-21 NOTE — TELEPHONE ENCOUNTER
----- Message from Summer sent at 11/21/2024  2:57 PM CST -----  Patient Returning Call    Who Called? PT     Who Left Message for Patient? Catherine Coulter LPN    Does the patient know what this is regarding?:  PT CALLED TO GIVE THE INFORMATION ON WHICH DEXCOM METER HER INSURANCE WILL COVER. THEY'LL FREESTYLE PATCH METER     Would the patient rather a call back? YES      Best Call Back Number: [phone]    Additional Information:  THANK YOU

## 2024-11-21 NOTE — TELEPHONE ENCOUNTER
Spoke with patient and she informed me that she reached out to her insurance company and they will pay for her to have a Dexcom machine. Patient does not know which device they will cover and will reach out to the insurance company again and will call be with this information for order placement.

## 2024-11-21 NOTE — TELEPHONE ENCOUNTER
----- Message from Martina sent at 11/20/2024 12:50 PM CST -----  Contact: Pt [phone]  [email or web address]     Patient is calling for Medical Advice regarding:Medication     Patient wants a call back or thru [facility]:Call back     Comments:Pt would like a call back regarding medication.Pt states she just left doctor office yesterday     Please advise patient replies from provider may take up to 48 hours.

## 2024-11-22 ENCOUNTER — TELEPHONE (OUTPATIENT)
Dept: FAMILY MEDICINE | Facility: CLINIC | Age: 74
End: 2024-11-22
Payer: MEDICARE

## 2024-11-22 LAB
OHS CV AF BURDEN PERCENT: < 1
OHS CV DC REMOTE DEVICE TYPE: NORMAL

## 2024-11-22 RX ORDER — INSULIN PUMP SYRINGE, 3 ML
EACH MISCELLANEOUS
Qty: 1 EACH | Refills: 0 | Status: SHIPPED | OUTPATIENT
Start: 2024-11-22 | End: 2025-11-21

## 2024-11-22 NOTE — TELEPHONE ENCOUNTER
When You Have Gastrointestinal (GI) Bleeding    Blood in your vomit or stool can be a sign of gastrointestinal (GI) bleeding. GI bleeding can be scary. But the cause may not be serious. You should always see a doctor if GI bleeding occurs.  The GI tract  The GI tract is the path through which food travels in the body. Food passes from the mouth down the esophagus (the tube from the mouth to the stomach). Food begins to break down in the stomach. It then moves through the duodenum, the first part of the small intestine. Nutrients are absorbed as food travels through the small intestine. What is left passes into the colon (large intestine) as waste. The colon removes water from the waste. Waste continues from the colon to the rectum (where stool is stored). Waste then leaves the body through the anus.  Causes of GI bleeding  GI bleeding can be caused by many different problems. Some of the more common causes include:  · Swollen veins in the anus (hemorrhoids)  · Swollen veins in the esophagus (varices)  · Sore on the lining of the GI tract (ulcer)  · Cuts or scrapes in the mouth or throat  · Infection caused by germs such as bacteria or parasites  · Food allergies, such as milk allergy in young children  · Medicines  · Inflammation of the GI tract (gastritis or esophagitis)  · Colitis (Crohn's disease or ulcerative colitis  · Cancer (tumors or polyps)  · Abnormal pouches in the colon (diverticula)  · Tears in the esophagus or anus  · Nosebleed  · Abnormal blood vessels in the GI tract (angiodysplasia)  Diagnosing the cause of blood in stool  If blood is coming out in your stool, you may have a lower GI tract problem or a very fast upper GI tract bleed. Bleeding from the GI tract can be bright red. Or it may look dark and tarry. Tests may also find blood in your stool that cant be seen with the eye (occult blood). To find out the cause, tests that may be ordered include:  · Blood tests. A blood sample is taken and  ----- Message from Med Assistant Holden sent at 11/22/2024 11:32 AM CST -----  Who called:  Pt   What is the request in detail:  Pt needs clarification insulin lispro protamine-insulin lispro (HUMALOG MIX 75-25,U-100,INSULN) 100 unit/mL (75-25) Susp . Says that it suppose be the pre-filled syringes but that's not what she received at the pharmacy   Can the clinic reply by [facility]? No  No   Would the patient rather a call back or a response via [facility]? Call back  Callback   Best call back number:  Telephone Information:  Mobile        [phone]     Additional Information:    Thank you.   sent to a lab for exam.  · Hemoccult test. Checks a stool sample for blood.  · Stool culture. Checks a stool sample for bacteria or parasites.  · X-ray, ultrasound, or CT scan. Imaging tests that take pictures of the digestive tract.  · Colonoscopy or sigmoidoscopy. This test uses a flexible tube with a tiny camera. The tube is inserted through your anus into your rectum to see the inside of your colon. Your provider can also take a tiny tissue sample (biopsy) and treat a bleeding source  Diagnosing the cause of blood in vomit  If you are vomiting blood or something that looks like coffee grounds, you may have an upper GI tract problem. To find the cause, tests that may be done include:  · Upper Endoscopy. A flexible tube with a tiny camera is inserted through your mouth and throat to see inside your upper GI tract. This lets your provider take a tiny tissue sample (biopsy) and treat a bleeding source.  · Nasogastric lavage. This can tell if you have upper GI or lower GI bleeding.  · X-ray, ultrasound, or CT scan. Imaging tests that take pictures of your digestive tract.  · Upper GI series. X-rays of the upper part of your GI tract taken from inside your body.  · Enteroscopy. This sends a flexible tube or a small, swallowed capsule camera into your small intestine.  When to call your healthcare provider  Call your healthcare provider right away if you have any of the following:  · Bleeding from your mouth or anus that can't be stopped  · Fever of 100.4°F (38.0°) or higher  · Bleeding along with feeling lightheaded or dizzy  · Signs of fluid loss (dehydration). These include a dry, sticky mouth, decreased urine output; and very dark urine.  · Belly (abdominal) pain   Date Last Reviewed: 7/1/2016  © 2400-1556 [employer]. [address] [place] [address], [place] [address]. All rights reserved. This information is not intended as a substitute for professional medical care. Always follow your healthcare  professional's instructions.        Be Involved in Your Health Care: Taking Medicines    When medicines are taken as directed, they can greatly improve your health. But if they are not taken as instructed, they may not work. In some cases, not taking them correctly can be harmful. To help make sure that your treatment remains effective and safe, understand your medicines and how to take them.  Reviewing your medicines  Medicines can interact with each other. They can also interact with herbal remedies and supplements. In either case, it can be harmful to your health. This is why your healthcare provider needs to know about every medicine, herb, and supplement that you take. Schedule a visit with your healthcare provider or pharmacist to discuss all your medicines. When you go in for your visit, have either one of the following:  · A bag filled with bottles of all your medicines. Include all prescription and over-the-counter medicines. Also include any vitamins and herbal remedies that you take. Keep medicines in their labeled bottles.  OR  · A list of all the medicines, vitamins, herbs, and supplements that you take. Be sure to list how much you take and how often you take it.  Your healthcare provider or pharmacist will review your medicines with you. He or she can decide whether you are at risk for any medicine interactions. Depending on what you take, your prescriptions may be adjusted.  Remembering to take medicines  Remembering to take medicines on time can be hard. Here are some tips to help you:  · Develop a routine. For example, take your medicine at the same time each day, such as after you brush your teeth. This helps remind you to take it.  · Schedule reminders on your computer, PDA, watch, or cell phone.  · If you take more than one medicine, use a pillbox. Get one that lists the days of the week. If you take pills more than once a day, get a pillbox that has spaces for morning, noon, and evening. As you  fill the pillbox, have the bottles with labels in front of you. This helps you avoid confusing pills that look alike.  · Keep your medicine routine when youre away from home. When traveling, make sure you bring enough for your entire trip. When traveling by air, keep your medicines in your carry-on. Be sure you have your prescription labels (either the original package or a photocopy). To learn more about traveling with medicines, visit: www.[employer].gov.  Working with your healthcare provider  Follow up with your healthcare provider. This lets him or her see how well you are doing on the medicine. It may take a few tries to find the right dosage, medicine, or combination of medicines for you. Make sure you talk with your healthcare provider about the medicines you take and how they make you feel. And never stop taking a medicine without talking to your healthcare provider first. Some medicines cannot be stopped suddenly. Others can cause problems if they are not taken for the prescribed amount of time.  Taking medicine safely  Below are some tips for taking medicine safely. If you have any questions about your medicines, call your healthcare provider or pharmacist:  · Make sure to refill your prescription while you still have some left, so you dont run out. Ask your healthcare provider for an extra written prescription in case of an emergency. If you use mail order, be sure to order with enough time for the refill to arrive while you still have some left.  · Be sure refills are correct before taking them.  · Hold on to the instructions that come with each medicine.  · Dont take less than prescribed to save money. Talk to your healthcare provider if you cant afford your medicines. There are choices that can help you.  · Never share medicines.  · Store medicines in a cool, dry, dark place. A steamy bathroom is often not the best place.  Date Last Reviewed: 2/13/2016  © 1834-1388 [employer]. [address]  [place] [address]. All rights reserved. This information is not intended as a substitute for professional medical care. Always follow your healthcare professional's instructions.

## 2024-11-25 ENCOUNTER — OFFICE VISIT (OUTPATIENT)
Dept: URGENT CARE | Facility: CLINIC | Age: 74
End: 2024-11-25
Payer: MEDICARE

## 2024-11-25 VITALS
TEMPERATURE: 99 F | HEIGHT: 67 IN | OXYGEN SATURATION: 95 % | WEIGHT: 175.25 LBS | DIASTOLIC BLOOD PRESSURE: 65 MMHG | BODY MASS INDEX: 27.51 KG/M2 | HEART RATE: 92 BPM | SYSTOLIC BLOOD PRESSURE: 99 MMHG | RESPIRATION RATE: 18 BRPM

## 2024-11-25 DIAGNOSIS — R53.83 FATIGUE, UNSPECIFIED TYPE: Primary | ICD-10-CM

## 2024-11-25 DIAGNOSIS — I10 HYPERTENSION, UNSPECIFIED TYPE: ICD-10-CM

## 2024-11-25 DIAGNOSIS — E11.65 UNCONTROLLED TYPE 2 DIABETES MELLITUS WITH HYPERGLYCEMIA: ICD-10-CM

## 2024-11-25 LAB — GLUCOSE SERPL-MCNC: 267 MG/DL (ref 70–110)

## 2024-11-25 PROCEDURE — 99214 OFFICE O/P EST MOD 30 MIN: CPT | Mod: S$GLB,,, | Performed by: PHYSICIAN ASSISTANT

## 2024-11-25 PROCEDURE — 82962 GLUCOSE BLOOD TEST: CPT | Mod: S$GLB,,, | Performed by: PHYSICIAN ASSISTANT

## 2024-11-25 NOTE — PROGRESS NOTES
"Subjective:      Patient ID: Joanne Peña is a 74 y.o. female.    Vitals:  height is 5' 7" (1.702 m) and weight is 79.5 kg (175 lb 4.3 oz). Her oral temperature is 98.7 °F (37.1 °C). Her blood pressure is 99/65 and her pulse is 92. Her respiration is 18 and oxygen saturation is 95%.     Chief Complaint: Fatigue    Patient presents with chief complaint of fatigue/tiredness that began this morning.  Patient states that she began feeling weak and tired this morning.  Patient states that earlier she had some feeling of dizziness when she would stand up and move around.  She took the meclizine that she has for vertigo and this resolved.  She states that she ate, took a shower, and is feeling much better.  Patient states that she currently still feels a bit tired but otherwise feels okay.  She denies any other symptoms at this time.  Patient denies headache, blurred vision, slurred speech, weakness, numbness, paresthesia, chest pain, palpitations.  She denies any urinary symptoms.  No blood loss or fluid loss.  No nausea, vomiting, diarrhea.  No fever or URI symptoms.  Patient got a flu shot last week.  Denies any other focal or neurological symptoms.  She does have history of TIA in 2022, also has loop recorder in which is being monitored.  Patient came in because she thought it might be her blood pressure and with history of stroke she wanted to get checked out although she is feeling much better.  Of note, patient has history of type 2 DM, states that blood sugar is monitored at home daily, has had readings in the 200s to 300, last week medications were changed to help combat this uncontrolled DM.  She is also waiting to  her insulin.  Patient is currently taking chlorthalidone 25 mg, amlodipine 10 mg, losartan 100 mg q.d..  Patient denies any recent change.  On prescription from PCP it says to hold amlodipine and/or losartan for blood pressure less than 120 systolic.  Patient has been taking these 3 every " day and has not monitor blood pressure at home.    Fatigue  This is a new problem. The current episode started today. The problem occurs constantly. The problem has been gradually improving. Associated symptoms include fatigue and vertigo. Pertinent negatives include no abdominal pain, anorexia, arthralgias, change in bowel habit, chest pain, chills, congestion, coughing, diaphoresis, fever, headaches, joint swelling, myalgias, nausea, neck pain, numbness, rash, sore throat, swollen glands, urinary symptoms, visual change, vomiting or weakness. Nothing aggravates the symptoms. She has tried nothing for the symptoms. The treatment provided no relief.     Past Medical History:   Diagnosis Date    Diabetes mellitus     Hyperlipidemia     Hypertension     Stroke      Patient Active Problem List   Diagnosis    Malignant essential hypertension    Mixed hyperlipidemia    Left pontine stroke    Normocytic anemia    Quit smoking within past year    Overweight with body mass index (BMI) of 25 to 25.9 in adult    Dizziness    Impaired balance as late effect of cerebrovascular accident    Impaired gait and mobility    Left atrial enlargement    History of stroke    Bad posture    Decreased ROM of trunk and back    Decreased strength of trunk and back    Sacroiliitis    Dyslipidemia associated with type 2 diabetes mellitus    Stage 3b chronic kidney disease    LBBB (left bundle branch block)         Constitution: Positive for fatigue and generalized weakness. Negative for chills, sweating and fever.   HENT:  Negative for ear pain, foreign body in ear, congestion and sore throat.    Neck: Negative for neck pain and neck stiffness.   Cardiovascular:  Negative for chest pain, leg swelling and palpitations.   Eyes:  Negative for eye itching, eye pain and eye redness.   Respiratory:  Negative for cough, sputum production and shortness of breath.    Gastrointestinal:  Negative for abdominal pain, nausea, vomiting and diarrhea.    Genitourinary:  Negative for dysuria, frequency, urgency and hematuria.   Musculoskeletal:  Negative for pain, joint pain, joint swelling and muscle ache.   Skin:  Negative for color change and rash.   Neurological:  Positive for history of vertigo. Negative for dizziness, light-headedness, facial drooping, headaches, disorientation, altered mental status, loss of consciousness, numbness, tingling, seizures and tremors.   Psychiatric/Behavioral:  Negative for altered mental status and disorientation.       Objective:     Physical Exam   Constitutional: She is oriented to person, place, and time. She appears well-developed.  Non-toxic appearance. She does not appear ill. No distress.   HENT:   Head: Normocephalic and atraumatic.   Ears:   Right Ear: Hearing, tympanic membrane, external ear and ear canal normal.   Left Ear: Hearing, tympanic membrane, external ear and ear canal normal.   Nose: Nose normal. No mucosal edema, rhinorrhea or nasal deformity. No epistaxis. Right sinus exhibits no maxillary sinus tenderness and no frontal sinus tenderness. Left sinus exhibits no maxillary sinus tenderness and no frontal sinus tenderness.   Mouth/Throat: Uvula is midline, oropharynx is clear and moist and mucous membranes are normal. No trismus in the jaw. Normal dentition. No uvula swelling. No posterior oropharyngeal erythema.   Eyes: Conjunctivae, EOM and lids are normal. Pupils are equal, round, and reactive to light. No scleral icterus. Extraocular movement intact   Neck: Trachea normal and phonation normal. Neck supple. No neck rigidity present.   Cardiovascular: Normal rate, regular rhythm, normal heart sounds and normal pulses.   Pulmonary/Chest: Effort normal and breath sounds normal. No accessory muscle usage or stridor. No tachypnea and no bradypnea. No respiratory distress. She has no decreased breath sounds. She has no wheezes. She has no rhonchi. She has no rales.   Abdominal: Normal appearance and bowel  sounds are normal. She exhibits no distension. Soft. There is no abdominal tenderness.   Musculoskeletal: Normal range of motion.         General: No deformity. Normal range of motion.      Right lower leg: No edema.      Left lower leg: No edema.   Lymphadenopathy:     She has no cervical adenopathy.   Neurological: no focal deficit. She is alert and oriented to person, place, and time. She has normal motor skills, normal sensation and intact cranial nerves (2-12). She displays no weakness, no atrophy, no tremor, facial symmetry and no dysarthria. No cranial nerve deficit or sensory deficit. She exhibits normal muscle tone. She has a normal Finger-Nose-Finger Test. Coordination: Rapid alternating movements normal. She displays no seizure activity. Gait and coordination normal. Coordination and gait normal. GCS eye subscore is 4. GCS verbal subscore is 5. GCS motor subscore is 6.      Comments: Alert, oriented x 3. EOMI, PERRLA. Cranial nerves intact: facial expressions (smile, raising eyebrows, shutting eyes, pursed lips) symmetric. Shoulder shrug str.5/5 bilaterally. Jaw is midline without deviation. Tongue protrudes at midline without fasciculations.  Uvula rises at midline. Sensation to face in distribution of CN V1, V2, and V3 intact. Sensation to upper and lower extremities intact. Finger to nose, rapid rhythmic alternating movements are intact and smooth bilaterally. Patient ambulates unassisted without rigidity or ataxia. Romberg negative. Voice quality, comprehension, articulation, coherence assessed as appropriate.   Bilateral shoulders, elbows, wrists, knees exhibit full range of motion and 5/5 strength.   strength 5/5 bilaterally.     Skin: Skin is warm, dry, intact, not diaphoretic and not pale.   Psychiatric: Her speech is normal and behavior is normal. Judgment and thought content normal.   Nursing note and vitals reviewed.    Vitals:    11/25/24 1358   BP: 99/65   BP Location: Left arm   Patient  "Position: Sitting   Pulse: 92   Resp: 18   Temp: 98.7 °F (37.1 °C)   TempSrc: Oral   SpO2: 95%   Weight: 79.5 kg (175 lb 4.3 oz)   Height: 5' 7" (1.702 m)     Orthostatic vitals below:  Lying 5 min: 94/67 HR 92  Standing 1min: 97/68 HR 91  Standing 3min: 101/73 HR 96     Results for orders placed or performed in visit on 11/25/24   POCT Glucose, Hand-Held Device    Collection Time: 11/25/24  2:16 PM   Result Value Ref Range    POC Glucose 267 (A) 70 - 110 MG/DL         Assessment:     1. Fatigue, unspecified type    2. Uncontrolled type 2 diabetes mellitus with hyperglycemia    3. Hypertension, unspecified type        Plan:       Fatigue, unspecified type  -     POCT Glucose, Hand-Held Device  -     Orthostatic vital signs    Uncontrolled type 2 diabetes mellitus with hyperglycemia    Hypertension, unspecified type      74-year-old female presenting with nonspecific tiredness that began this morning.  It is greatly improved, she had some dizziness earlier that resolved with her meclizine that she takes p.r.n. for vertigo.  Patient's only current symptom is tiredness.  No other new associated symptoms otherwise.  Neuro exam is normal, no focal symptoms.  Exam unremarkable.  Her vitals are okay but blood pressure is somewhat low which may be causing her symptoms, PCP instructed to hold amlodipine/losartan for blood pressure less than 120 systolic, so have instructed her to hold the amlodipine and monitor blood pressure closely home until it returns to her normal blood pressure that she is used to.  Discussed EKG and UA and we opted to hold for now as patient has no urinary symptoms and to monitor these closely, EKG deferred as patient has no CP, dyspnea, current dizziness, and she also has loop recorder in place.  Have discussed limitations of urgent care and patient expresses understanding that her symptom of tiredness is very nonspecific and we can not rule out all potential causes in the setting and that she should " monitor closely, go to the ER for any new or worsening symptoms, otherwise follow up closely with PCP.  Patient expresses understanding.  Discussed ddx, home care, tx options, and given follow up precautions.  I have reviewed the patient's chart to view previous visits, labs, and imaging to assess PMH and look for any trends or previous treatments.

## 2024-11-25 NOTE — PATIENT INSTRUCTIONS
- Rest.    - Drink plenty of fluids.      - as discussed, hold amlodipine or losartan for systolic blood pressure (top number) less than 120.  Monitor blood pressure at home and keep a log.  Follow up closely with PCP.    - Follow up with your PCP or specialty clinic as directed in the next 1-2 days if not improved or as needed.  You can call [phone] to schedule an appointment with the appropriate provider.    - Go to the ER or seek medical attention immediately if you develop new or worsening symptoms.     - You must understand that you have received an Urgent Care treatment only and that you may be released before all of your medical problems are known or treated.   - You, the patient, will arrange for follow up care as instructed.   - If your condition worsens or fails to improve we recommend that you receive another evaluation at the ER immediately or contact your PCP to discuss your concerns or return here.

## 2024-12-01 ENCOUNTER — CLINICAL SUPPORT (OUTPATIENT)
Dept: CARDIOLOGY | Facility: HOSPITAL | Age: 74
End: 2024-12-01
Payer: MEDICARE

## 2024-12-01 ENCOUNTER — CLINICAL SUPPORT (OUTPATIENT)
Dept: CARDIOLOGY | Facility: HOSPITAL | Age: 74
End: 2024-12-01
Attending: INTERNAL MEDICINE
Payer: MEDICARE

## 2024-12-01 DIAGNOSIS — I49.8 OTHER SPECIFIED CARDIAC ARRHYTHMIAS: ICD-10-CM

## 2024-12-01 PROCEDURE — 93298 REM INTERROG DEV EVAL SCRMS: CPT | Mod: 26,HCNC,, | Performed by: INTERNAL MEDICINE

## 2024-12-01 PROCEDURE — 93298 REM INTERROG DEV EVAL SCRMS: CPT | Mod: HCNC | Performed by: INTERNAL MEDICINE

## 2024-12-04 NOTE — PROGRESS NOTES
Ms. Peña is a patient of Dr. Rich and was last seen in clinic 4/19/2024.      Subjective:   Patient ID:  Joanne Peña is a 74 y.o. female who presents for follow up of Loop recorder  .     HPI:    Ms. Peña is a 74 y.o. female with HTN, DM, CVA here for follow up after loop recorder implantation.    Background:    Referring Physician: Nathaniel Regalado MD  Primary Care Physician: Teri Soto DO     Mrs. Peña has a hx of hypertension, diabetes mellitus type 2 and recent cardioembolic stroke. She was in her usual state of health until 2/21/2022 when she developed sudden onset of dizziness, blurry vision, trouble speaking, and nausea/vomiting which subsequently resolved. She was evaluated in the ER. Scans noted evolving left cerebellar and genny infarct. She was discharged on plavix x 3 weeks then switched to aspirin. ECHO noted preserved LV function and a negative bubble study but with severe left atrial enlargement. She was in sinus rhythm. Discussed ILR for long-term rhythm monitoring if an extended monitor showed no AF. 14 day holter noted nsAT but no AF. She elected to not proceed with ILR implant at that time.     4/19/2024: Mrs. Peña returns for follow-up. Reports she had an episode of sudden weakness resulting in a fall on her tail bone in January of 2024. Unsure of LOC but did not hit her head. She is interested in proceeding with ILR.  Available electrocardiograms including today's in clinic ECG which note sinus rhythm with LBBB.  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 We discussed the potential etiology of undiagnosed atrial arrhythmias (atrial fibrillation, atrial flutter) as possible culprits for recurrent strokes. We discussed how stroke prevention strategies are different in the setting of atrial fibrillation and flutter with use of anticoagulation. To date no atrial arrhythmias  have been observed however she has risk factors for atrial fibrillation. Discussed the benefit of long-term heart rhythm monitoring with an implantable loop recorder. Discussed the risks of implantation including pain, infection, bleeding and rare risk of device erosion. She desires to proceed. Prior extended holter noted no AF. Now having falls with questionable near syncope and new LBBB. ILR implantation for bradycardia monitoring is also warranted.   Plan  ILR implant   ECHO       Update (12/05/2024):    Echo 4/18/2024: EF 40-45%    PET stress 6/4/2024: There are no clinically significant perfusion abnormalities. There is a small (<10%) amount of mild resting heterogeneity in the anteroseptal wall(s) that improves with stress, indicative of non-obstructive disease.     6/21/2024: ILR placement    7/10/2024: She had an eschar develop over the site that is healing poorly, and has had undersensing of her device. Mrs Peña underwent successful removal of ILR. She will complete a course of antibiotics prior to re implantation.    8/30/2024: Patient underwent successful implantation of ILR ([employer]) for monitoring in setting of prior cryptogenic stroke.     9/6/2024 device check: Events: AF for 2 mins, egm c/w SR with ectopy, some undersensing noted.Reprogramming at this visit:  Increased sensitivity from 0.035 to 0.025 mV    Today she says she is feeling at baseline. No palpitations, CP. Chronic mild BARR. No syncope reported.    ILR has shown no AF to date. Symptom episodes with no significant findings.    I have personally reviewed the patient's EKG today, which shows SR with LBBB at 92bpm. HI interval is 140. QRS is 142. QTc is 514.    Relevant Cardiac Test Results:    2D Echo (4/18/2024):    Left Ventricle: The left ventricle is normal in size. Moderately increased wall thickness. There is moderate concentric hypertrophy. Regional wall motion abnormalities present (basal-mid anteroseptal and inferoseptal HK).  There is mildly reduced systolic function with a visually estimated ejection fraction of 40 - 45%. There is diastolic dysfunction but grade cannot be determined.    Right Ventricle: Normal right ventricular cavity size. Systolic function is normal.    Current Outpatient Medications   Medication Sig    ACCU-CHEK GUIDE GLUCOSE METER Misc TO CHECK BLOOD GLUCOSE DAILY, TO USE WITH INSURANCE PREFERRED METER    amLODIPine (NORVASC) 10 MG tablet TAKE 1 TABLET BY MOUTH ONCE DAILY. HOLD IF SBP <120    ascorbic acid, vitamin C, (VITAMIN C) 500 MG tablet Take 500 mg by mouth once daily. (Patient not taking: Reported on 11/25/2024)    aspirin (ECOTRIN) 81 MG EC tablet Take 1 tablet (81 mg total) by mouth once daily.    atorvastatin (LIPITOR) 80 MG tablet TAKE 1 TABLET (80 MG TOTAL) BY MOUTH ONCE DAILY.    BLOOD PRESSURE CUFF Misc 1 Units by Misc.(Non-Drug; Combo Route) route once daily.    blood sugar diagnostic Strp To check BG daily, to use with insurance preferred meter    blood sugar diagnostic Strp To check BG daily, to use with insurance preferred meter    blood-glucose meter kit To check BG daily, to use with insurance preferred meter    blood-glucose meter kit Use as instructed    chlorthalidone (HYGROTEN) 25 MG Tab TAKE 1 TABLET BY MOUTH EVERY DAY    cyclobenzaprine (FLEXERIL) 10 MG tablet TAKE 1 TABLET (10 MG TOTAL) BY MOUTH 2 (TWO) TIMES DAILY AS NEEDED FOR MUSCLE SPASMS (BACK PAIN).    empagliflozin (JARDIANCE) 25 mg tablet Take 1 tablet (25 mg total) by mouth once daily.    hydrALAZINE (APRESOLINE) 50 MG tablet Take 1 tablet (50 mg total) by mouth every 8 (eight) hours. Hold is SBP <120 (Patient not taking: Reported on 6/27/2024)    insulin lispro protamine-insulin lispro (HUMALOG MIX 75-25,U-100,INSULN) 100 unit/mL (75-25) Susp Inject 10 Units into the skin 2 (two) times daily before meals. (Patient not taking: Reported on 11/25/2024)    lancets Misc To check BG daily, to use with insurance preferred meter     "lancets Misc To check BG daily, to use with insurance preferred meter    losartan (COZAAR) 100 MG tablet TAKE 1 TABLET (100 MG TOTAL) BY MOUTH ONCE DAILY. HOLD IF SBP <120    meclizine (ANTIVERT) 25 mg tablet Take 1 tablet (25 mg total) by mouth 2 (two) times daily as needed for Dizziness.    multivitamin (THERAGRAN) per tablet Take 1 tablet by mouth once daily.     No current facility-administered medications for this visit.       Review of Systems   Constitutional: Negative for malaise/fatigue.   Cardiovascular:  Positive for dyspnea on exertion. Negative for chest pain, irregular heartbeat, leg swelling and palpitations.   Respiratory:  Negative for shortness of breath.    Hematologic/Lymphatic: Negative for bleeding problem.   Skin:  Negative for rash.   Musculoskeletal:  Negative for myalgias.   Gastrointestinal:  Negative for hematemesis, hematochezia and nausea.   Genitourinary:  Negative for hematuria.   Neurological:  Positive for light-headedness.   Psychiatric/Behavioral:  Negative for altered mental status.    Allergic/Immunologic: Negative for persistent infections.       Objective:          /72 (BP Location: Right arm, Patient Position: Sitting)   Pulse 92   Ht 5' 7" (1.702 m)   Wt 79.9 kg (176 lb 2.4 oz)   BMI 27.59 kg/m²     Physical Exam  Vitals and nursing note reviewed.   Constitutional:       Appearance: Normal appearance. She is well-developed.   HENT:      Head: Normocephalic.      Nose: Nose normal.   Eyes:      Pupils: Pupils are equal, round, and reactive to light.   Cardiovascular:      Rate and Rhythm: Normal rate and regular rhythm.   Pulmonary:      Effort: No respiratory distress.   Musculoskeletal:         General: Normal range of motion.   Skin:     General: Skin is warm and dry.      Findings: No erythema.   Neurological:      Mental Status: She is alert and oriented to person, place, and time.   Psychiatric:         Speech: Speech normal.         Behavior: Behavior normal. "           Lab Results   Component Value Date     08/16/2024    K 4.6 08/16/2024    MG 2.1 02/22/2022    BUN 26 (H) 08/16/2024    CREATININE 1.3 08/16/2024    ALT 18 05/13/2024    AST 14 05/13/2024    HGB 10.4 (L) 08/16/2024    HCT 35.8 (L) 08/16/2024    HCT 37 02/06/2022    TSH 1.490 03/15/2024    LDLCALC 154.2 11/14/2024       Recent Labs   Lab 02/07/22  0237 02/21/22  1444 02/22/22  0324 08/16/24  0931   INR 1.0 1.0 1.0 0.9       Assessment:     1. LBBB (left bundle branch block)    2. Malignant essential hypertension    3. History of stroke    4. NICM (nonischemic cardiomyopathy)        Plan:     In summary, Ms. Peña is a 74 y.o. female with HTN, DM, CVA here for follow up after loop recorder implantation.  Pt is 3 mo s/p ILR implantation. (Initial ILR implanted 6/2024 but was removed due to eschar and undersensing). Echo showed EF 40-45%. PET stress showed no significant perfusion abnormalities.   Review of ECGs show LBBB 140ms or less. No significant CHF symptoms. Will refer to cardiology for CHF medication optimization. Consider CRT in the future if indicated.  No AF identified on device to date. We reviewed process of monitoring with monthly reports. Home monitor functioning.     Cardiology referral  Continue monthly loop monitoring  RTC 6 mo, sooner if needed    *A copy of this note has been sent to Dr. Rich*    Follow up in about 6 months (around 6/5/2025).    ------------------------------------------------------------------    KEENAN Clarke, NP-C  Cardiac Electrophysiology

## 2024-12-05 ENCOUNTER — OFFICE VISIT (OUTPATIENT)
Dept: ELECTROPHYSIOLOGY | Facility: CLINIC | Age: 74
End: 2024-12-05
Payer: MEDICARE

## 2024-12-05 ENCOUNTER — HOSPITAL ENCOUNTER (OUTPATIENT)
Dept: CARDIOLOGY | Facility: CLINIC | Age: 74
Discharge: HOME | End: 2024-12-05
Payer: MEDICARE

## 2024-12-05 VITALS
WEIGHT: 176.13 LBS | HEIGHT: 67 IN | BODY MASS INDEX: 27.64 KG/M2 | HEART RATE: 92 BPM | SYSTOLIC BLOOD PRESSURE: 115 MMHG | DIASTOLIC BLOOD PRESSURE: 72 MMHG

## 2024-12-05 DIAGNOSIS — I10 MALIGNANT ESSENTIAL HYPERTENSION: ICD-10-CM

## 2024-12-05 DIAGNOSIS — I44.7 LBBB (LEFT BUNDLE BRANCH BLOCK): Primary | ICD-10-CM

## 2024-12-05 DIAGNOSIS — Z86.73 HISTORY OF STROKE: ICD-10-CM

## 2024-12-05 DIAGNOSIS — I42.8 NICM (NONISCHEMIC CARDIOMYOPATHY): ICD-10-CM

## 2024-12-05 DIAGNOSIS — I44.7 LBBB (LEFT BUNDLE BRANCH BLOCK): ICD-10-CM

## 2024-12-05 LAB
OHS QRS DURATION: 142 MS
OHS QTC CALCULATION: 514 MS

## 2024-12-05 PROCEDURE — 93010 ELECTROCARDIOGRAM REPORT: CPT | Mod: HCNC,S$GLB,, | Performed by: INTERNAL MEDICINE

## 2024-12-05 PROCEDURE — 99999 PR PBB SHADOW E&M-EST. PATIENT-LVL V: CPT | Mod: PBBFAC,HCNC,, | Performed by: NURSE PRACTITIONER

## 2024-12-05 PROCEDURE — 93005 ELECTROCARDIOGRAM TRACING: CPT | Mod: HCNC,S$GLB,, | Performed by: INTERNAL MEDICINE

## 2024-12-05 RX ORDER — OFLOXACIN 3 MG/ML
SOLUTION/ DROPS OPHTHALMIC
COMMUNITY
Start: 2024-11-11

## 2024-12-05 RX ORDER — PREDNISOLONE ACETATE 10 MG/ML
SUSPENSION/ DROPS OPHTHALMIC
COMMUNITY
Start: 2024-11-11

## 2024-12-10 ENCOUNTER — OFFICE VISIT (OUTPATIENT)
Dept: CARDIOLOGY | Facility: CLINIC | Age: 74
End: 2024-12-10
Payer: MEDICARE

## 2024-12-10 VITALS
BODY MASS INDEX: 27.78 KG/M2 | DIASTOLIC BLOOD PRESSURE: 78 MMHG | HEART RATE: 98 BPM | HEIGHT: 67 IN | OXYGEN SATURATION: 95 % | RESPIRATION RATE: 15 BRPM | SYSTOLIC BLOOD PRESSURE: 120 MMHG | WEIGHT: 177 LBS

## 2024-12-10 DIAGNOSIS — I42.8 NICM (NONISCHEMIC CARDIOMYOPATHY): Primary | ICD-10-CM

## 2024-12-10 PROCEDURE — 3066F NEPHROPATHY DOC TX: CPT | Mod: HCNC,CPTII,S$GLB, | Performed by: INTERNAL MEDICINE

## 2024-12-10 PROCEDURE — 1159F MED LIST DOCD IN RCRD: CPT | Mod: HCNC,CPTII,S$GLB, | Performed by: INTERNAL MEDICINE

## 2024-12-10 PROCEDURE — 3046F HEMOGLOBIN A1C LEVEL >9.0%: CPT | Mod: HCNC,CPTII,S$GLB, | Performed by: INTERNAL MEDICINE

## 2024-12-10 PROCEDURE — 3074F SYST BP LT 130 MM HG: CPT | Mod: HCNC,CPTII,S$GLB, | Performed by: INTERNAL MEDICINE

## 2024-12-10 PROCEDURE — 1125F AMNT PAIN NOTED PAIN PRSNT: CPT | Mod: HCNC,CPTII,S$GLB, | Performed by: INTERNAL MEDICINE

## 2024-12-10 PROCEDURE — 3008F BODY MASS INDEX DOCD: CPT | Mod: HCNC,CPTII,S$GLB, | Performed by: INTERNAL MEDICINE

## 2024-12-10 PROCEDURE — 93000 ELECTROCARDIOGRAM COMPLETE: CPT | Mod: HCNC,S$GLB,, | Performed by: INTERNAL MEDICINE

## 2024-12-10 PROCEDURE — 3078F DIAST BP <80 MM HG: CPT | Mod: HCNC,CPTII,S$GLB, | Performed by: INTERNAL MEDICINE

## 2024-12-10 PROCEDURE — 4010F ACE/ARB THERAPY RXD/TAKEN: CPT | Mod: HCNC,CPTII,S$GLB, | Performed by: INTERNAL MEDICINE

## 2024-12-10 PROCEDURE — 99999 PR PBB SHADOW E&M-EST. PATIENT-LVL V: CPT | Mod: PBBFAC,HCNC,, | Performed by: INTERNAL MEDICINE

## 2024-12-10 PROCEDURE — 3288F FALL RISK ASSESSMENT DOCD: CPT | Mod: HCNC,CPTII,S$GLB, | Performed by: INTERNAL MEDICINE

## 2024-12-10 PROCEDURE — 3062F POS MACROALBUMINURIA REV: CPT | Mod: HCNC,CPTII,S$GLB, | Performed by: INTERNAL MEDICINE

## 2024-12-10 PROCEDURE — 99213 OFFICE O/P EST LOW 20 MIN: CPT | Mod: HCNC,S$GLB,, | Performed by: INTERNAL MEDICINE

## 2024-12-10 PROCEDURE — 1101F PT FALLS ASSESS-DOCD LE1/YR: CPT | Mod: HCNC,CPTII,S$GLB, | Performed by: INTERNAL MEDICINE

## 2024-12-10 RX ORDER — SPIRONOLACTONE 25 MG/1
25 TABLET ORAL DAILY
Qty: 90 TABLET | Refills: 3 | Status: SHIPPED | OUTPATIENT
Start: 2024-12-10

## 2024-12-10 RX ORDER — METOPROLOL SUCCINATE 25 MG/1
25 TABLET, EXTENDED RELEASE ORAL DAILY
Qty: 90 TABLET | Refills: 3 | Status: SHIPPED | OUTPATIENT
Start: 2024-12-10 | End: 2025-12-10

## 2024-12-10 NOTE — PROGRESS NOTES
CARDIOVASCULAR PROGRESS NOTE    REASON FOR CONSULT:   Joanne Peña is a 74 y.o. female who presents for establishment of cardiology care.    HISTORY OF PRESENT ILLNESS:     She has past medical history of hypertension, hyperlipidemia, cardioembolic stroke (no documented atrial fibrillation on monitoring so far - has ILR in place), nonischemic cardiomyopathy with EF around 40-45%, and type 2 diabetes mellitus.    She used to follow up at [facility] for her cardiology issues and then switched her cardiology care to [facility].  She still follows up with EP at [facility].    She does not have any specific complaints today.  No chest pain or palpitations.  She does have exertional shortness of breath but that has not changed in the last 2 years.  No pedal edema.  No orthopnea or PND.    She is a retired nurse by profession.  She quit smoking 2 years ago.  She does not drink.    PAST MEDICAL HISTORY:     Past Medical History:   Diagnosis Date    Diabetes mellitus     Hyperlipidemia     Hypertension     Stroke        PAST SURGICAL HISTORY:     Past Surgical History:   Procedure Laterality Date    COLONOSCOPY N/A 7/12/2023    Procedure: COLONOSCOPY;  Surgeon: Ray Sorensen MD;  Location: [facility] ENDO;  Service: Endoscopy;  Laterality: N/A;  instr via portal  - PC  4/10/23 Daniel, instr via mail & portal, unable to tolerate Golytely- request Miralax/Gatorade - PC  5/30-pt r/s-new instr portal-tb    INJECTION OF ANESTHETIC AGENT AROUND MEDIAL BRANCH NERVES INNERVATING LUMBAR FACET JOINT Bilateral 4/20/2023    Procedure: MBB #1 (B/L) L3,4,5;  Surgeon: Son Lara DO;  Location: [facility] OR;  Service: Pain Management;  Laterality: Bilateral;  ORAL XANAX    INJECTION OF ANESTHETIC AGENT AROUND MEDIAL BRANCH NERVES INNERVATING LUMBAR FACET JOINT Bilateral 5/5/2023    Procedure: MBB #2 (B/L) L3,4,5;  Surgeon: Son Lara DO;  Location: [facility] OR;  Service: Pain  Management;  Laterality: Bilateral;  Oral Xanax    INJECTION OF JOINT Right 5/20/2022    Procedure: Right SI joint injection;  Surgeon: Son Lara DO;  Location: [facility] OR;  Service: Pain Management;  Laterality: Right;    INJECTION, SACROILIAC JOINT Right 12/19/2023    Procedure: RT SI joint Inj;  Surgeon: Son Lara DO;  Location: [facility] PAIN MANAGEMENT;  Service: Pain Management;  Laterality: Right;  oral    INSERTION OF IMPLANTABLE LOOP RECORDER Left 6/21/2024    Procedure: Insertion, Implantable Loop Recorder;  Surgeon: Hunter Rich MD;  Location: [facility] EP LAB;  Service: Cardiology;  Laterality: Left;  CVA, ILR, BSCI, Local, UT, 3 Prep    INSERTION OF IMPLANTABLE LOOP RECORDER Left 8/30/2024    Procedure: Insertion, Implantable Loop Recorder;  Surgeon: Hunter Rich MD;  Location: [facility] EP LAB;  Service: Cardiology;  Laterality: Left;  CVA, ILBENJIE,MDT, RN Sedate, UT, 3 Prep    RADIOFREQUENCY ABLATION OF LUMBAR MEDIAL BRANCH NERVE AT SINGLE LEVEL Bilateral 5/19/2023    Procedure: RFA (B/L) L3,4,5;  Surgeon: Son Lara DO;  Location: [facility] PAIN MANAGEMENT;  Service: Pain Management;  Laterality: Bilateral;    REMOVAL OF IMPLANTABLE LOOP RECORDER N/A 7/10/2024    Procedure: REMOVAL, IMPLANTABLE LOOP RECORDER;  Surgeon: Hunter Rich MD;  Location: [facility] EP LAB;  Service: Cardiology;  Laterality: N/A;       ALLERGIES AND MEDICATION:     Review of patient's allergies indicates:   Allergen Reactions    Lisinopril-hydrochlorothiazide Other (See Comments)     Pt stated it makes her feel drained. She couldn't raise arms over head.    Ampicillin Rash    Darvocet a500 [propoxyphene n-acetaminophen] Other (See Comments)     shaky        Medication List            Accurate as of December 10, 2024  9:52 AM. If you have any questions, ask your nurse or doctor.                START taking these medications      metoprolol succinate 25 MG 24 hr tablet  Commonly known as: TOPROL-XL  Take 1 tablet  (25 mg total) by mouth once daily.  Started by: Álvaro Rudd MD     spironolactone 25 MG tablet  Commonly known as: ALDACTONE  Take 1 tablet (25 mg total) by mouth once daily.  Started by: Álvaro Rudd MD            CONTINUE taking these medications      amLODIPine 10 MG tablet  Commonly known as: NORVASC  TAKE 1 TABLET BY MOUTH ONCE DAILY. HOLD IF SBP <120     ascorbic acid (vitamin C) 500 MG tablet  Commonly known as: VITAMIN C     aspirin 81 MG EC tablet  Commonly known as: ECOTRIN  Take 1 tablet (81 mg total) by mouth once daily.     atorvastatin 80 MG tablet  Commonly known as: LIPITOR  TAKE 1 TABLET (80 MG TOTAL) BY MOUTH ONCE DAILY.     BLOOD PRESSURE CUFF Misc  Generic drug: miscellaneous medical supply  1 Units by Misc.(Non-Drug; Combo Route) route once daily.     * blood sugar diagnostic Strp  To check BG daily, to use with insurance preferred meter     * blood sugar diagnostic Strp  To check BG daily, to use with insurance preferred meter     * blood-glucose meter kit  To check BG daily, to use with insurance preferred meter     * ACCU-CHEK GUIDE GLUCOSE METER Misc  Generic drug: blood-glucose meter     * blood-glucose meter kit  Use as instructed     cyclobenzaprine 10 MG tablet  Commonly known as: FLEXERIL  TAKE 1 TABLET (10 MG TOTAL) BY MOUTH 2 (TWO) TIMES DAILY AS NEEDED FOR MUSCLE SPASMS (BACK PAIN).     empagliflozin 25 mg tablet  Commonly known as: JARDIANCE  Take 1 tablet (25 mg total) by mouth once daily.     HumaLOG Mix 75-25(U-100)Insuln 100 unit/mL (75-25) Susp  Generic drug: insulin lispro protamine-insulin lispro  Inject 10 Units into the skin 2 (two) times daily before meals.     hydrALAZINE 50 MG tablet  Commonly known as: APRESOLINE  Take 1 tablet (50 mg total) by mouth every 8 (eight) hours. Hold is SBP <120     * lancets Misc  To check BG daily, to use with insurance preferred meter     * lancets Misc  To check BG daily, to use with insurance preferred meter      losartan 100 MG tablet  Commonly known as: COZAAR  TAKE 1 TABLET (100 MG TOTAL) BY MOUTH ONCE DAILY. HOLD IF SBP <120     meclizine 25 mg tablet  Commonly known as: ANTIVERT  Take 1 tablet (25 mg total) by mouth 2 (two) times daily as needed for Dizziness.     multivitamin per tablet  Commonly known as: THERAGRAN     ofloxacin 0.3 % ophthalmic solution  Commonly known as: OCUFLOX     prednisoLONE acetate 1 % Drps  Commonly known as: PRED FORTE           * This list has 7 medication(s) that are the same as other medications prescribed for you. Read the directions carefully, and ask your doctor or other care provider to review them with you.                STOP taking these medications      chlorthalidone 25 MG Tab  Commonly known as: HYGROTEN  Stopped by: Álvaro Rudd MD               Where to Get Your Medications        These medications were sent to [facility]/pharmacy #[id number] - [place], [place] - [address]  [address], [place] [address]      Phone: [phone]   metoprolol succinate 25 MG 24 hr tablet  spironolactone 25 MG tablet         SOCIAL HISTORY:     Social History     Socioeconomic History    Marital status:    Tobacco Use    Smoking status: Former     Current packs/day: 0.00     Types: Cigarettes     Quit date: 2022     Years since quittin.8     Passive exposure: Past    Smokeless tobacco: Never   Substance and Sexual Activity    Alcohol use: Not Currently    Drug use: No    Sexual activity: Not Currently     Partners: Male     Social Drivers of Health     Financial Resource Strain: Medium Risk (2024)    Overall Financial Resource Strain (CARDIA)     Difficulty of Paying Living Expenses: Somewhat hard   Food Insecurity: Food Insecurity Present (2024)    Hunger Vital Sign     Worried About Running Out of Food in the Last Year: Sometimes true     Ran Out of Food in the Last Year: Never true   Transportation Needs: No Transportation Needs (2024)    PRAPARE - Transportation  "    Lack of Transportation (Medical): No     Lack of Transportation (Non-Medical): No   Physical Activity: Unknown (2/14/2024)    Exercise Vital Sign     Days of Exercise per Week: 0 days   Housing Stability: Low Risk  (2/14/2024)    Housing Stability Vital Sign     Unable to Pay for Housing in the Last Year: No     Number of Places Lived in the Last Year: 1     Unstable Housing in the Last Year: No       FAMILY HISTORY:     Family History   Problem Relation Name Age of Onset    Kidney disease Mother      Heart disease Mother      Cancer Father      Hypertension Brother      Hypertension Daughter      Cancer Maternal Aunt         REVIEW OF SYSTEMS:   ROS    Constitution: Negative for chills, fever, weight gain and weight loss.   Eyes: Negative for blurry vision, visual changes    Cardiovascular: Negative for chest pain. Negative for claudication, dyspnea on exertion, leg swelling, orthopnea, palpitations, paroxysmal nocturnal dyspnea.   Respiratory:  Has some exertional shortness of breath, Negative for cough.    Endocrine: Negative for heat or cold intolerance    Hematologic/Lymphatic: Negative for easy bruising or bleeding    Skin: Negative for color change and rash.   Musculoskeletal: Negative for neck pain, arthralgias, myalgias    Gastrointestinal: Negative for abdominal pain, nausea, vomiting, diarrhea  Neurological: Negative for dizziness, light-headedness and loss of balance.   Psychiatric/Behavioral: Negative for altered mental status.    PHYSICAL EXAM:     Vitals:    12/10/24 0911   BP: 120/78   Pulse: 98   Resp: 15    Body mass index is 27.73 kg/m².  Weight: 80.3 kg (177 lb 0.5 oz)   Height: 5' 7" (170.2 cm)     Gen: NAD  Head/Eyes/Ears/Nose: MMM, good dentition   Neck: No carotid bruits, no JVD  Lung: Clear to auscultation bilaterally, no wheezes/rales/ronchi, symmetrical lung expansion with inspiration  Heart: Normal S1/S2, regular rate and rhythm, no murmurs/rubs/gallops  Abdomen: Soft, NT/ND, no " masses  Extremities: No lower extremity edema.  No wounds or other skin lesions  Skin: Normal color and turgor. No wounds rashes, no petechia, no ecchymoses.   Neuro: AAOx3    DATA:     Laboratory:  CBC:  Recent Labs   Lab 06/04/24  1220 07/09/24  1637 08/16/24  0931   WBC 5.65 5.56 5.29   Hemoglobin 10.7 L 10.7 L 10.4 L   Hematocrit 36.9 L 34.9 L 35.8 L   Platelets 285 330 328       CHEMISTRIES:  Recent Labs   Lab 02/06/22  1624 02/07/22  0237 02/08/22  0459 02/19/22  1058 02/21/22  1444 02/22/22  0324 05/06/22  1501 06/01/22  0858 09/13/22  0916 03/15/24  1008 05/13/24  0805 06/04/24  1220 07/09/24  1637 08/16/24  0931   Glucose 262 H 133 H   < > 174 H 128 H 83 116 H 168 H   < > 153 H 187 H 118 H 151 H 121 H   Sodium 138 141   < > 141 140 142 137 137   < > 141 142 137 141 140   Potassium 3.4 L 3.2 L   < > 4.0 3.8 3.8 3.6 4.2   < > 4.6 4.5 5.0 4.4 4.6   BUN 17 16   < > 18 17 19 21 20   < > 25 H 24 H 35 H 20 26 H   Creatinine 1.3 0.9   < > 0.9 0.9 1.2 1.1 1.1   < > 1.4 1.3 1.8 H 1.3 1.3   eGFR if non  42 A >60   < > >60.0 >60 46 A 51.3 A 51.3 A  --   --   --   --   --   --    eGFR  --   --   --   --   --   --   --   --    < > 40.2 A 44.0 A 29.8 A 44.0 A 43.7 A   Calcium 8.9 8.4 L   < > 9.1 9.4 9.2 9.2 9.4   < > 10.1 9.3 9.4 8.7 9.0   Magnesium 1.9 1.9  --   --   --  2.1  --   --   --   --   --   --   --   --     < > = values in this interval not displayed.       CARDIAC BIOMARKERS:  Recent Labs   Lab 02/07/22  0237 02/22/22  0324   CPK 55 43   CPK MB 1.4 1.2   Troponin I 0.019 0.022       HBA1C:  Hemoglobin A1C   Date Value Ref Range Status   11/14/2024 9.8 (H) 4.0 - 5.6 % Final     Comment:     ADA Screening Guidelines:  5.7-6.4%  Consistent with prediabetes  >or=6.5%  Consistent with diabetes    High levels of fetal hemoglobin interfere with the HbA1C  assay. Heterozygous hemoglobin variants (HbS, HgC, etc)do  not significantly interfere with this assay.   However, presence of multiple variants may  affect accuracy.     08/16/2024 6.9 (H) 4.0 - 5.6 % Final     Comment:     ADA Screening Guidelines:  5.7-6.4%  Consistent with prediabetes  >or=6.5%  Consistent with diabetes    High levels of fetal hemoglobin interfere with the HbA1C  assay. Heterozygous hemoglobin variants (HbS, HgC, etc)do  not significantly interfere with this assay.   However, presence of multiple variants may affect accuracy.     05/13/2024 7.5 (H) 4.0 - 5.6 % Final     Comment:     ADA Screening Guidelines:  5.7-6.4%  Consistent with prediabetes  >or=6.5%  Consistent with diabetes    High levels of fetal hemoglobin interfere with the HbA1C  assay. Heterozygous hemoglobin variants (HbS, HgC, etc)do  not significantly interfere with this assay.   However, presence of multiple variants may affect accuracy.          COAGS:  Recent Labs   Lab 02/21/22  1444 02/22/22  0324 08/16/24  0931   INR 1.0 1.0 0.9       LIPIDS/LFTS:  Recent Labs   Lab 07/19/23  0828 11/06/23  0822 03/15/24  1008 05/13/24  0805 11/14/24  0910   Cholesterol 181 190  --  188 241 H   Triglycerides 141 134  --  143 219 H   HDL 40 44  --  45 43   LDL Cholesterol 112.8 119.2  --  114.4 154.2   Non-HDL Cholesterol 141 146  --  143 198   AST 10  --  8 L 14  --    ALT 10  --  10 18  --          CARDIAC DIAGNOSTICS:  :     EKG:  EKG done in the office today showed sinus rhythm, left atrial enlargement and left bundle-branch block    EKG done on 5th December 2024 showed sinus rhythm and left bundle-branch block    ECHO (4/2024)    Left Ventricle: The left ventricle is normal in size. Moderately increased wall thickness. There is moderate concentric hypertrophy. Regional wall motion abnormalities present (basal-mid anteroseptal and inferoseptal HK). There is mildly reduced systolic function with a visually estimated ejection fraction of 40 - 45%. There is diastolic dysfunction but grade cannot be determined.    Right Ventricle: Normal right ventricular cavity size. Systolic function is  normal.    3.  STRESS TEST (PET scan 6/2024)    There are no clinically significant perfusion abnormalities. There is a small (<10%) amount of mild resting heterogeneity in the anteroseptal wall(s) that improves with stress, indicative of non-obstructive disease.    The whole heart absolute myocardial perfusion values averaged 0.90 cc/min/g at rest, which is normal; 1.52 cc/min/g at stress, which is mildly reduced; and CFR is 1.72, which is mildly reduced.    CT attenuation images demonstrate mild diffuse coronary calcifications in the LAD territory and no aortic calcifications.    The gated perfusion images showed an ejection fraction of 32% at rest and 36% during stress. A normal ejection fraction is greater than 47%.    There is moderate global hypokinesis at rest and during stress.    The LV cavity size is normal at rest and stress.    The ECG portion of the study is negative for ischemia.    There were no arrhythmias during stress.    The patient reported no chest pain during the stress test.    There are no prior studies for comparison.    4.  CARDIAC CATHETERIZATION  NA    5. OTHERS   (11/2024)  A1C 9.8 (11/2024)    ASSESSMENT:   Nonischemic cardiomyopathy with EF around 40-45%  Hypertension  Hyperlipidemia  Left bundle-branch block  Cardioembolic stroke (no documented Afib - his ILR in place)  Type 2 diabetes mellitus    Recent (9/30/2024) [employer] implantable loop recorder interrogation revealed 0% atrial fibrillation burden.    She is not on complete guideline directed medical therapy for systolic heart failure.  Otherwise she appears to be in euvolemic and in well perfused state.    She is following up with primary care physician for better diabetes mellitus control.  Recent lipid profile showed elevated LDL level.  She says that she was out of atorvastatin for almost a week.  Next lipid profile early next year.    PLAN:   Continue with losartan 100 mg daily  Discontinue HCTZ and add metoprolol  succinate 25 mg daily  Add spironolactone 25 mg daily.  BMP next week (script given)  Continue with empagliflozin 25 mg daily  Repeat echo in 3 months after maximally tolerated guideline directed medical therapy  Continue with aspirin/high-intensity statin  Lipid profile early next year  Blood pressure stable.  Continue amlodipine 10 mg daily for now.  May need to replace amlodipine/losartan with Entresto if repeat echo shows reduced systolic function  ILR interrogation per EP at [facility]  Needs better diabetes control.  We will defer that to PCP  Dietary restriction and daily exercise    Follow up in 3 months    Álvaro Rudd MD  [facility]

## 2024-12-11 LAB
OHS QRS DURATION: 138 MS
OHS QTC CALCULATION: 513 MS

## 2024-12-16 ENCOUNTER — PATIENT MESSAGE (OUTPATIENT)
Dept: CARDIOLOGY | Facility: CLINIC | Age: 74
End: 2024-12-16
Payer: MEDICARE

## 2024-12-16 DIAGNOSIS — I42.8 NICM (NONISCHEMIC CARDIOMYOPATHY): Primary | ICD-10-CM

## 2024-12-16 LAB
OHS CV AF BURDEN PERCENT: < 1
OHS CV DC REMOTE DEVICE TYPE: NORMAL

## 2024-12-16 NOTE — TELEPHONE ENCOUNTER
High.  I spoke with the patient.  She will get blood work tomorrow.  For scooter, let us involve the primary care physician.

## 2024-12-16 NOTE — TELEPHONE ENCOUNTER
Patient states that since she has started the aldactone and metoprolol she has been experiencing low bp and heart rate and also is experience loss of equilibrium. Patient wants to know if she should stop medication or decrease them. Patient also wants for Dr. Rudd to write an order or rx for a scooter so that she can move around better without the SOB. Please advise

## 2024-12-17 ENCOUNTER — LAB VISIT (OUTPATIENT)
Dept: LAB | Facility: HOSPITAL | Age: 74
End: 2024-12-17
Attending: INTERNAL MEDICINE
Payer: MEDICARE

## 2024-12-17 DIAGNOSIS — I42.8 NICM (NONISCHEMIC CARDIOMYOPATHY): ICD-10-CM

## 2024-12-17 LAB
ANION GAP SERPL CALC-SCNC: 10 MMOL/L (ref 8–16)
BUN SERPL-MCNC: 31 MG/DL (ref 8–23)
CALCIUM SERPL-MCNC: 9.8 MG/DL (ref 8.7–10.5)
CHLORIDE SERPL-SCNC: 109 MMOL/L (ref 95–110)
CO2 SERPL-SCNC: 19 MMOL/L (ref 23–29)
CREAT SERPL-MCNC: 2.1 MG/DL (ref 0.5–1.4)
EST. GFR  (NO RACE VARIABLE): 24.6 ML/MIN/1.73 M^2
GLUCOSE SERPL-MCNC: 240 MG/DL (ref 70–110)
POTASSIUM SERPL-SCNC: 5 MMOL/L (ref 3.5–5.1)
SODIUM SERPL-SCNC: 138 MMOL/L (ref 136–145)

## 2024-12-17 PROCEDURE — 80048 BASIC METABOLIC PNL TOTAL CA: CPT | Mod: HCNC | Performed by: INTERNAL MEDICINE

## 2024-12-17 PROCEDURE — 36415 COLL VENOUS BLD VENIPUNCTURE: CPT | Mod: HCNC,PO | Performed by: INTERNAL MEDICINE

## 2024-12-20 DIAGNOSIS — N18.32 STAGE 3B CHRONIC KIDNEY DISEASE: Primary | ICD-10-CM

## 2024-12-23 ENCOUNTER — PATIENT MESSAGE (OUTPATIENT)
Dept: CARDIOLOGY | Facility: CLINIC | Age: 74
End: 2024-12-23
Payer: MEDICARE

## 2024-12-23 ENCOUNTER — LAB VISIT (OUTPATIENT)
Dept: LAB | Facility: HOSPITAL | Age: 74
End: 2024-12-23
Attending: INTERNAL MEDICINE
Payer: MEDICARE

## 2024-12-23 DIAGNOSIS — N18.32 STAGE 3B CHRONIC KIDNEY DISEASE: ICD-10-CM

## 2024-12-23 LAB
ANION GAP SERPL CALC-SCNC: 9 MMOL/L (ref 8–16)
BUN SERPL-MCNC: 27 MG/DL (ref 8–23)
CALCIUM SERPL-MCNC: 9.3 MG/DL (ref 8.7–10.5)
CHLORIDE SERPL-SCNC: 108 MMOL/L (ref 95–110)
CO2 SERPL-SCNC: 20 MMOL/L (ref 23–29)
CREAT SERPL-MCNC: 2 MG/DL (ref 0.5–1.4)
EST. GFR  (NO RACE VARIABLE): 26.1 ML/MIN/1.73 M^2
GLUCOSE SERPL-MCNC: 290 MG/DL (ref 70–110)
POTASSIUM SERPL-SCNC: 4.6 MMOL/L (ref 3.5–5.1)
SODIUM SERPL-SCNC: 137 MMOL/L (ref 136–145)

## 2024-12-23 PROCEDURE — 80048 BASIC METABOLIC PNL TOTAL CA: CPT | Mod: HCNC | Performed by: INTERNAL MEDICINE

## 2024-12-23 PROCEDURE — 36415 COLL VENOUS BLD VENIPUNCTURE: CPT | Mod: HCNC,PO | Performed by: INTERNAL MEDICINE

## 2024-12-30 ENCOUNTER — TELEPHONE (OUTPATIENT)
Dept: CARDIOLOGY | Facility: CLINIC | Age: 74
End: 2024-12-30
Payer: MEDICARE

## 2024-12-30 ENCOUNTER — OFFICE VISIT (OUTPATIENT)
Dept: FAMILY MEDICINE | Facility: CLINIC | Age: 74
End: 2024-12-30
Payer: MEDICARE

## 2024-12-30 ENCOUNTER — HOSPITAL ENCOUNTER (OUTPATIENT)
Dept: RADIOLOGY | Facility: HOSPITAL | Age: 74
Discharge: HOME | End: 2024-12-30
Payer: MEDICARE

## 2024-12-30 ENCOUNTER — TELEPHONE (OUTPATIENT)
Dept: NEPHROLOGY | Facility: CLINIC | Age: 74
End: 2024-12-30
Payer: MEDICARE

## 2024-12-30 VITALS
OXYGEN SATURATION: 95 % | TEMPERATURE: 98 F | HEIGHT: 67 IN | DIASTOLIC BLOOD PRESSURE: 78 MMHG | HEART RATE: 95 BPM | BODY MASS INDEX: 27.14 KG/M2 | WEIGHT: 172.94 LBS | SYSTOLIC BLOOD PRESSURE: 116 MMHG

## 2024-12-30 DIAGNOSIS — I10 MALIGNANT ESSENTIAL HYPERTENSION: ICD-10-CM

## 2024-12-30 DIAGNOSIS — J06.9 VIRAL URI WITH COUGH: Primary | ICD-10-CM

## 2024-12-30 DIAGNOSIS — D16.9: ICD-10-CM

## 2024-12-30 DIAGNOSIS — N18.30 STAGE 3 CHRONIC KIDNEY DISEASE, UNSPECIFIED WHETHER STAGE 3A OR 3B CKD: Primary | ICD-10-CM

## 2024-12-30 DIAGNOSIS — R42 DIZZINESS: ICD-10-CM

## 2024-12-30 DIAGNOSIS — I10 ESSENTIAL HYPERTENSION: ICD-10-CM

## 2024-12-30 DIAGNOSIS — N18.32 TYPE 2 DIABETES MELLITUS WITH STAGE 3B CHRONIC KIDNEY DISEASE, WITHOUT LONG-TERM CURRENT USE OF INSULIN: ICD-10-CM

## 2024-12-30 DIAGNOSIS — M79.674 TOE PAIN, RIGHT: ICD-10-CM

## 2024-12-30 DIAGNOSIS — E11.22 TYPE 2 DIABETES MELLITUS WITH STAGE 3B CHRONIC KIDNEY DISEASE, WITHOUT LONG-TERM CURRENT USE OF INSULIN: ICD-10-CM

## 2024-12-30 DIAGNOSIS — J06.9 VIRAL URI WITH COUGH: ICD-10-CM

## 2024-12-30 LAB — SARS-COV-2 RNA RESP QL NAA+PROBE: NOT DETECTED

## 2024-12-30 PROCEDURE — 3074F SYST BP LT 130 MM HG: CPT | Mod: HCNC,CPTII,S$GLB,

## 2024-12-30 PROCEDURE — 3288F FALL RISK ASSESSMENT DOCD: CPT | Mod: HCNC,CPTII,S$GLB,

## 2024-12-30 PROCEDURE — 4010F ACE/ARB THERAPY RXD/TAKEN: CPT | Mod: HCNC,CPTII,S$GLB,

## 2024-12-30 PROCEDURE — 1125F AMNT PAIN NOTED PAIN PRSNT: CPT | Mod: HCNC,CPTII,S$GLB,

## 2024-12-30 PROCEDURE — 3008F BODY MASS INDEX DOCD: CPT | Mod: HCNC,CPTII,S$GLB,

## 2024-12-30 PROCEDURE — 3078F DIAST BP <80 MM HG: CPT | Mod: HCNC,CPTII,S$GLB,

## 2024-12-30 PROCEDURE — 73660 X-RAY EXAM OF TOE(S): CPT | Mod: TC,HCNC,FY,PO,RT

## 2024-12-30 PROCEDURE — 99999 PR PBB SHADOW E&M-EST. PATIENT-LVL V: CPT | Mod: PBBFAC,HCNC,,

## 2024-12-30 PROCEDURE — 87635 SARS-COV-2 COVID-19 AMP PRB: CPT | Mod: HCNC

## 2024-12-30 PROCEDURE — 3046F HEMOGLOBIN A1C LEVEL >9.0%: CPT | Mod: HCNC,CPTII,S$GLB,

## 2024-12-30 PROCEDURE — 1101F PT FALLS ASSESS-DOCD LE1/YR: CPT | Mod: HCNC,CPTII,S$GLB,

## 2024-12-30 PROCEDURE — 99214 OFFICE O/P EST MOD 30 MIN: CPT | Mod: HCNC,S$GLB,,

## 2024-12-30 PROCEDURE — 1159F MED LIST DOCD IN RCRD: CPT | Mod: HCNC,CPTII,S$GLB,

## 2024-12-30 PROCEDURE — 3062F POS MACROALBUMINURIA REV: CPT | Mod: HCNC,CPTII,S$GLB,

## 2024-12-30 PROCEDURE — 3066F NEPHROPATHY DOC TX: CPT | Mod: HCNC,CPTII,S$GLB,

## 2024-12-30 PROCEDURE — 73660 X-RAY EXAM OF TOE(S): CPT | Mod: 26,HCNC,RT, | Performed by: RADIOLOGY

## 2024-12-30 RX ORDER — LOSARTAN POTASSIUM 100 MG/1
100 TABLET ORAL DAILY
Qty: 90 TABLET | Refills: 0 | Status: ON HOLD | OUTPATIENT
Start: 2024-12-30 | End: 2025-03-30

## 2024-12-30 RX ORDER — PROMETHAZINE HYDROCHLORIDE AND DEXTROMETHORPHAN HYDROBROMIDE 6.25; 15 MG/5ML; MG/5ML
5 SYRUP ORAL NIGHTLY PRN
Qty: 118 ML | Refills: 0 | Status: ON HOLD | OUTPATIENT
Start: 2024-12-30

## 2024-12-30 RX ORDER — BENZONATATE 200 MG/1
200 CAPSULE ORAL 3 TIMES DAILY PRN
Qty: 15 CAPSULE | Refills: 0 | Status: ON HOLD | OUTPATIENT
Start: 2024-12-30

## 2024-12-30 RX ORDER — MECLIZINE HYDROCHLORIDE 25 MG/1
25 TABLET ORAL 2 TIMES DAILY PRN
Qty: 30 TABLET | Refills: 0 | Status: ON HOLD | OUTPATIENT
Start: 2024-12-30 | End: 2025-01-29

## 2024-12-30 NOTE — TELEPHONE ENCOUNTER
Patient: Aaron Dunn Date: 2024   : 1940    84 year old male      INPATIENT WOUND CARE PROGRESS NOTE    Supervising Wound Care / Hyperbaric Medicine Physician: Dr. Nam Rouse  Consulting Provider:  Emperatriz Mackey NP  Date of Consultation/Last Comprehensive Exam:  24  Referring  Provider:  Dr. Pennington     SUBJECTIVE:    Chief Complaint:  24      Wound/Ulcer Present:    Other, Left leg wound     Additional Wound Category:  None     Maximum Baseline Ambulatory Status:  Unable to assess    History of Present Illness:  This is a 84 year old male seen for initial wound care start of care on 24. Admitted to [facility] on 24 for anemia. RN flowsheet documentation reviewed, left leg wound started as a blister, unclear if it was present on admission. Patient is a poor historian. All history obtained from the chart. Non-diabetic. Never smoker.   Past medical history of end-stage renal disease on hemodialysis Tuesday, Thursday,  Saturday, A-fib on Eliquis, coronary artery disease s/p PTCA on 2024 on Plavix essential hypertension, GI bleed presented to the hospital with generalized weakness. S/p wound debridement with Dr. Gudino on 24. Seen 24 for ongoing wound care follow-up.     Current Treatment Regimen:  Dressing:   Wet to moist    Frequency:  Twice a day   Changed by:  Staff/bedside nurse    Review of Systems:  Review of systems not obtained due to patient factors.    Past Medical History:   Diagnosis Date    Anemia     B12 deficiency     Calculus, kidney     Cellulitis     Claustrophobia     Diverticulosis of colon     Epididymitis     HTN (hypertension)     Kidney stones     Macular degeneration     Obesity     Occipital neuralgia     TMJ syndrome     Vitamin D deficiency      Past Surgical History:   Procedure Laterality Date    Kidney stone surgery      Total knee replacement      Ureteroscopy      Varicose vein surgery       Social History     Socioeconomic  Staff spoke with the patient regarding their procedure with Dr. Lara scheduled on 05/19/2023; the patient was provided the Arrival Information and scheduled time 10:30 AM.     The patient verbalized understanding.  Spoke to daughter Megha and she verbalized comprehension.      Thank you,      Stephanie Hazel MA   History    Marital status: Single     Spouse name: Not on file    Number of children: 0    Years of education: Not on file    Highest education level: Not on file   Occupational History    Occupation: retired   Tobacco Use    Smoking status: Never    Smokeless tobacco: Never   Substance and Sexual Activity    Alcohol use: Yes     Comment: sarah    Drug use: Never    Sexual activity: Not on file   Other Topics Concern    Not on file   Social History Narrative    Not on file     Social Determinants of Health     Financial Resource Strain: Low Risk  (12/24/2024)    Financial Resource Strain     Unable to Get: None   Food Insecurity: Low Risk  (12/19/2024)    Food Insecurity     Worried about Food: Never true     Food is Gone: Never true   Transportation Needs: Not At Risk (12/19/2024)    Transportation Needs     Lack of Reliable Transportation: No   Recent Concern: Transportation Needs - At Risk (10/11/2024)    Transportation Needs     Lack of Reliable Transportation: Yes   Physical Activity: Not on file   Stress: Not on file   Social Connections: High Risk (12/19/2024)    Social Connections     Social Connectivity: Less than once a week   Interpersonal Safety: Low Risk  (12/19/2024)    Interpersonal Safety     How often physically hurt: Never     How often insulted or talked down to: Never     How often threatened with harm: Never     How often scream or curse at: Never     Family History   Problem Relation Age of Onset    Diabetes Mother     Heart Mother     Stroke Father     Patient is unaware of any medical problems Brother     Patient is unaware of any medical problems Brother     Patient is unaware of any medical problems Brother      Current Facility-Administered Medications   Medication    acetaminophen (TYLENOL) tablet 650 mg    diphenhydrAMINE (BENADRYL) capsule 25 mg    sodium chloride 0.9% infusion    [Held by provider] apixaBAN (ELIQUIS) tablet 2.5 mg    traZODone (DESYREL) tablet 50 mg    naLOXone (NARCAN)  injection 0.2 mg    dextrose (GLUTOSE) 40 % gel 30 g    dextrose 50 % injection 25 g    insulin lispro (ADMELOG, HumaLOG) sliding scale injection 0-8 Units    nalbuphine (NUBAIN) injection 2.5 mg    diphenhydrAMINE (BENADRYL) injection 12.5 mg    clopidogrel (PLAVIX) tablet 75 mg    albumin human (SPA) 25 % injection 12.5 g    metoPROLOL succinate (TOPROL-XL) ER tablet 12.5 mg    AMIODarone (PACERONE) tablet 200 mg    sodium citrate anticoagulant 4 % flush 3 mL    alteplase (CATHFLO ACTIVASE) injection 2 mg    sodium chloride (NORMAL SALINE) 0.9 % bolus 100-200 mL    vancomycin (VANCOCIN) capsule 500 mg    cholestyramine/aspartame (QUESTRAN LIGHT) packet 4 g    albumin human (SPA) 25 % injection 25 g    atorvastatin (LIPITOR) tablet 40 mg    folic acid (FOLATE) tablet 1 mg    tamsulosin (FLOMAX) capsule 0.4 mg    sodium chloride 0.9 % injection 10 mL    sodium chloride 0.9 % injection 2 mL    sodium chloride (NORMAL SALINE) 0.9 % bolus 500 mL    acetaminophen (TYLENOL) tablet 650 mg    Or    acetaminophen (TYLENOL) suppository 650 mg    ondansetron (ZOFRAN ODT) disintegrating tablet 4 mg    Or    ondansetron (ZOFRAN) injection 4 mg    Potassium Standard Replacement Protocol (Levels 3.5 and lower)    Magnesium Standard Replacement Protocol    morphine injection 2 mg    pantoprazole (PROTONIX INJECT) injection 40 mg    HYDROcodone-acetaminophen (NORCO) 5-325 MG per tablet 1 tablet    ipratropium-albuterol (DUONEB) 0.5-2.5 (3) MG/3ML nebulizer solution 3 mL      ALLERGIES:  Peritoneal dialysis solutions, Amlodipine, Doxycycline, Hydralazine, Lisinopril, Omeprazole, Sulfa antibiotics, and Warfarin    OBJECTIVE:  Vital Signs:  Visit Vitals  /51   Pulse 68   Temp 98.4 °F (36.9 °C)   Resp 18   Ht 5' 9\" (1.753 m)   Wt 105.1 kg (231 lb 11.3 oz)   SpO2 92%   BMI 34.22 kg/m²         Physical Exam:  General appearance: Appears stated age, Alert, well-developed, well-nourished, obese, oriented to Person, in no distress,  and cooperative  Pulmonary: normal respiratory effort    Wound/ulcer assessment -   Left lateral/posterior calf with full-thickness open wound with mix of granulation tissue and fibrin. Wound exquisitely tender with light palpation      Wound Bed Quality:    Granulation tissue and Fibrin      Clementine-wound Quality:    Intact    Additional Descriptors:  drainage    Wound Measurements Per Flowsheet:       Wound Leg Left Posterior Blister (Active)   Wound Care Team Consult Date 24 1005   Wound Length (cm) 12.2 cm 24 1200   Wound Width (cm) 8 cm 24 1200   Wound Depth (cm) 1 cm 24 1200   Wound Surface Area (cm^2) 97.6 cm^2 24 1200   Wound Volume (cm^3) 97.6 cm^3 24 1200   Number of days:        Wound Leg Left Other (comment) (Active)   Number of days: 2     PROCEDURE:  None performed    Procedure was Performed by:  Not applicable    Laboratory assessments reviewed:  No results found for: \"PAB\"   Albumin (g/dL)   Date Value   2024 1.5 (L)   2024 1.8 (L)   10/16/2024 2.6 (L)      No results available in last 24 hours    Lab Results   Component Value Date    WBC 5.4 2024    GLUCOSE 95 2024    .6 (H) 2024    RESR 24 (H) 2024    CREATININE 4.94 (H) 2024        Culture results:   No results found for the last 90 days.  Filters applied: Specimen Type.      Diagnostic Assessments Reviewed:  No new update    Nutritional Assessment:  Prealbumin and/or Albumin reviewed    Wound treatment goals are palliative:  No    DIAGNOSES:  End stage renal disease on hemodialysis  Left leg wound     Medical Decision Makin24 - Left leg with 100%, necrotic, exquisitely tender wound with heavy foul smelling drainage. Etiology unknown. Differential diagnosis includes but is not limited to calciphylaxis, venous leg ulcer and pressure injury   24 - Left leg s/p surgical debridement. Appropriate for NPWT.      Local wound care   Stop Santyl    Stop BID wet to moist   Begin wound VAC to left lateral leg at -125mmHg. Black peel and place foam.Changed weekly and PRN.  If problems with the wound VAC, remove VAC and place a NS, VASHE or Dakins moist gauze packing followed by dry secondary dressing. Changed daily and PRN.    Compression   Elevate Bilateral lower extremities whenever able to aid in chronic edema control.     Off-loading   Patient needs aggressive off-loading with low air loss mattress or equivalent, frequent turning/repositioning at least every 2 hours, offloading chair cushion and heel offloading boots at all times while in bed.     Diabetes   Non-diabetic     Nutrition   Consume protein daily in your diet.  Also try to drink a protein shake daily and take a multivitamin.  You must consume nutrition regularly three times a day to aid in wound healing.    Smoking cessation   Never smoker      Vascular   CRT < 2 seconds   Order placed for left lower extremity arterial duplex to rule out underlying PAD     Infectious Disease   No signs and symptoms of wound infection. Odor likely from liquefaction necrosis      Follow-up   Will continue to follow   Ok for discharge once medically stable from a wound care standpoint   Discharge planning ongoing. Discharge destination planned for SNF    Plan of Care:  Advanced Wound Care Recommendations:  See above   Percent Wound Closure from consult:  See wound measurements   Care plan to augment wound closure:  Not applicable.  Date of consult 12/27/24    Patient stable. All questions were answered.    Significant note data copied forward from my prior note and reviewed by me as needed in the formulation of this note and plan.    Emperatriz Mackey NP  [facility]  [phone]  Available via secure chat Monday, Wednesday and Thursday [phone] to [phone]

## 2024-12-30 NOTE — TELEPHONE ENCOUNTER
----- Message from Med Assistant Alex sent at 12/30/2024  9:31 AM CST -----  Regarding: appointment  Good morning seasons greeting, Dr. Rudd would like for you to assist this patient with scheduling an appointment for  Stage 3 chronic kidney disease, unspecified whether stage 3a or 3b CKD [N18.30].  Please reach out to the patient with scheduled appointment date and time.  Thank you.

## 2024-12-30 NOTE — PROGRESS NOTES
HPI     Chief Complaint:  No chief complaint on file.      Joanne Peña is a 74 y.o. female with multiple medical diagnoses as listed in the medical history and problem list that presents for No chief complaint on file.  .   Patient is not known to me with her last appointment in this department on 11/19/2024.      HPI    Cough with sinus drip and chills x 2-3 days. No sick contacts. No chest pain or SOB but admits was wheezing last night. Cough worse at night. Taking coricidin but worried about glucose. Former smoker. Quit 2 years ago    Toe pain - right 3rd toe after hitting on scale.     DM - controlled.   Assessment & Plan       1. Viral URI with cough  R/o COVID, consider antivirals  Symptom mgmt for now  Symptom mgmt for URI/colds:     - You can take over-the-counter claritin, zyrtec, allegra, or xyzal as directed. These are antihistamines that can help with runny nose, nasal congestion, sneezing, and helps to dry up post-nasal drip, which usually causes sore throat and cough.              - You can use Flonase (fluticasone) nasal spray as directed for sinus congestion and postnasal drip. This is a steroid nasal spray that works locally over time to decrease the inflammation in your nose/sinuses and help with allergic symptoms. This is not an quick- relief spray like afrin, but it works well if used daily.  Discontinue if you develop nose bleed  - use nasal saline prior to Flonase.  - Use Ocean Spray Nasal Saline 1-3 puffs each nostril every 2-3 hours then blow out onto tissue. This is to irrigate the nasal passage way to clear the sinus openings. Use until sinus problem resolved.     - you can take plain Mucinex (guaifenesin) 1200 mg twice a day to help loosen mucous.      -warm salt water gargles can help with sore throat     - warm tea with honey can help with cough. Honey is a natural cough suppressant.     - Dextromethorphan (DM) is a cough suppressant over the counter (ie. mucinex DM,  robitussin, delsym; dayquil/nyquil has DM as well.)     - Go to the ER if you develop new or worsening symptoms.       -     X-Ray Toe 2 or More Views Right; Future; Expected date: 12/30/2024  -     benzonatate (TESSALON) 200 MG capsule; Take 1 capsule (200 mg total) by mouth 3 (three) times daily as needed for Cough.  Dispense: 15 capsule; Refill: 0    2. Essential hypertension  BP stable. The current medical regimen is effective. Continue Losartan as prescribed.     -     losartan (COZAAR) 100 MG tablet; Take 1 tablet (100 mg total) by mouth once daily. Hold if SBP <120  Dispense: 90 tablet; Refill: 0    3. Dizziness   stable. The current medical regimen is effective. Continue meclizine as prescribed.     -     meclizine (ANTIVERT) 25 mg tablet; Take 1 tablet (25 mg total) by mouth 2 (two) times daily as needed for Dizziness.  Dispense: 30 tablet; Refill: 0    4. Toe pain, right  Xray today given pain  Advised to buddy tape  Pending xray, will refer to podiatry as needed    5. Malignant essential hypertension  Stable, asymptomatic chronic condition.  Will continue to maximize risk factor reduction and adjust medication as needed    6. Type 2 diabetes mellitus with stage 3b chronic kidney disease, without long-term current use of insulin- uncontrolled  Lab Results   Component Value Date    HGBA1C 9.8 (H) 11/14/2024   Managed by PCP. Uncontrolled. Has repeat labs in 2 months. Aware of need to monitor glucose closely.     7. Bizarre parosteal osteochondromatous proliferation  Noted on xray. Will refer to podiatry.   -     Ambulatory referral/consult to Podiatry; Future; Expected date: 01/06/2025    Other orders  -     COVID-19 Routine Screening  -     promethazine-dextromethorphan (PROMETHAZINE-DM) 6.25-15 mg/5 mL Syrp; Take 5 mLs by mouth nightly as needed (cough).  Dispense: 118 mL; Refill: 0          --------------------------------------------      Health Maintenance:  Health Maintenance         Date Due  Completion Date    TETANUS VACCINE Never done ---    Shingles Vaccine (1 of 2) Never done ---    RSV Vaccine (Age 60+ and Pregnant patients) (1 - Risk 60-74 years 1-dose series) Never done ---    COVID-19 Vaccine (1 - 2024-25 season) Never done ---    Hemoglobin A1c 02/14/2025 11/14/2024    Diabetes Urine Screening 05/13/2025 5/13/2024    Foot Exam 08/19/2025 8/19/2024    Override on 8/19/2024: Done    Mammogram 09/16/2025 9/16/2024    Eye Exam 11/04/2025 11/4/2024    Lipid Panel 11/14/2025 11/14/2024    Colorectal Cancer Screening 07/12/2026 7/12/2023    DEXA Scan 09/25/2026 9/25/2023            Health maintenance reviewed    Follow Up:  No follow-ups on file.    Discussed DDx, condition, and treatment.   Education sent to patient portal/included in after visit summary.  ED precautions given.   Notify provider if symptoms do not resolve or increase in severity.   Patient verbalizes understanding and agrees with plan of care.    Exam     Review of Systems:  (as noted above)  Review of Systems    Physical Exam:   Physical Exam  Constitutional:       General: She is not in acute distress.     Appearance: Normal appearance. She is not toxic-appearing.   HENT:      Head: Normocephalic and atraumatic.   Eyes:      Conjunctiva/sclera: Conjunctivae normal.   Cardiovascular:      Rate and Rhythm: Normal rate and regular rhythm.      Pulses: Normal pulses.   Pulmonary:      Effort: Pulmonary effort is normal. No respiratory distress.      Breath sounds: Normal breath sounds.   Abdominal:      General: Bowel sounds are normal.   Musculoskeletal:      Cervical back: Normal range of motion.      Right foot: Normal capillary refill. Swelling and tenderness present. Normal pulse.        Feet:       Comments: Mild swelling with bruising and TTP   Skin:     Capillary Refill: Capillary refill takes less than 2 seconds.   Neurological:      General: No focal deficit present.      Mental Status: She is alert and oriented to person,  "place, and time.   Psychiatric:         Mood and Affect: Mood normal.       Vitals:    12/30/24 1405   BP: 116/78   BP Location: Left arm   Patient Position: Sitting   Pulse: 95   Temp: 98.3 °F (36.8 °C)   TempSrc: Oral   SpO2: 95%   Weight: 78.4 kg (172 lb 15.2 oz)   Height: 5' 7" (1.702 m)      Body mass index is 27.09 kg/m².        History     Past Medical History:  Past Medical History:   Diagnosis Date    Diabetes mellitus     Hyperlipidemia     Hypertension     Stroke        Past Surgical History:  Past Surgical History:   Procedure Laterality Date    COLONOSCOPY N/A 7/12/2023    Procedure: COLONOSCOPY;  Surgeon: Ray Sorensen MD;  Location: [facility] ENDO;  Service: Endoscopy;  Laterality: N/A;  instr via portal  - PC  4/10/23 Daniel, instr via mail & portal, unable to tolerate Golytely- request Miralax/Gatorade - PC  5/30-pt r/s-new instr portal-tb    INJECTION OF ANESTHETIC AGENT AROUND MEDIAL BRANCH NERVES INNERVATING LUMBAR FACET JOINT Bilateral 4/20/2023    Procedure: MBB #1 (B/L) L3,4,5;  Surgeon: Son Lara DO;  Location: [facility] OR;  Service: Pain Management;  Laterality: Bilateral;  ORAL XANAX    INJECTION OF ANESTHETIC AGENT AROUND MEDIAL BRANCH NERVES INNERVATING LUMBAR FACET JOINT Bilateral 5/5/2023    Procedure: MBB #2 (B/L) L3,4,5;  Surgeon: Son Lara DO;  Location: [facility] OR;  Service: Pain Management;  Laterality: Bilateral;  Oral Xanax    INJECTION OF JOINT Right 5/20/2022    Procedure: Right SI joint injection;  Surgeon: Son Lara DO;  Location: [facility] OR;  Service: Pain Management;  Laterality: Right;    INJECTION, SACROILIAC JOINT Right 12/19/2023    Procedure: RT SI joint Inj;  Surgeon: Son Lara DO;  Location: [facility] PAIN MANAGEMENT;  Service: Pain Management;  Laterality: Right;  oral    INSERTION OF IMPLANTABLE LOOP RECORDER Left 6/21/2024    Procedure: Insertion, Implantable Loop Recorder;  Surgeon: Hunter Rich MD;  Location: [facility] LAB;  " Service: Cardiology;  Laterality: Left;  CVA, ILR, BSCI, Local, SD, 3 Prep    INSERTION OF IMPLANTABLE LOOP RECORDER Left 2024    Procedure: Insertion, Implantable Loop Recorder;  Surgeon: Hunter Rich MD;  Location: [facility] EP LAB;  Service: Cardiology;  Laterality: Left;  RODNEY GONZÁLES,MDT, RN Sedate, SD, 3 Prep    RADIOFREQUENCY ABLATION OF LUMBAR MEDIAL BRANCH NERVE AT SINGLE LEVEL Bilateral 2023    Procedure: RFA (B/L) L3,4,5;  Surgeon: Son Lara DO;  Location: [facility] PAIN MANAGEMENT;  Service: Pain Management;  Laterality: Bilateral;    REMOVAL OF IMPLANTABLE LOOP RECORDER N/A 7/10/2024    Procedure: REMOVAL, IMPLANTABLE LOOP RECORDER;  Surgeon: Hunter Rich MD;  Location: [facility] EP LAB;  Service: Cardiology;  Laterality: N/A;       Social History:  Social History     Socioeconomic History    Marital status:    Tobacco Use    Smoking status: Former     Current packs/day: 0.00     Types: Cigarettes     Quit date: 2022     Years since quittin.8     Passive exposure: Past    Smokeless tobacco: Never    Tobacco comments:     Patient Quit Smoking on 2022.   Substance and Sexual Activity    Alcohol use: Not Currently    Drug use: No    Sexual activity: Not Currently     Partners: Male     Social Drivers of Health     Financial Resource Strain: Medium Risk (2024)    Overall Financial Resource Strain (CARDIA)     Difficulty of Paying Living Expenses: Somewhat hard   Food Insecurity: Food Insecurity Present (2024)    Hunger Vital Sign     Worried About Running Out of Food in the Last Year: Sometimes true     Ran Out of Food in the Last Year: Never true   Transportation Needs: No Transportation Needs (2024)    PRAPARE - Transportation     Lack of Transportation (Medical): No     Lack of Transportation (Non-Medical): No   Physical Activity: Unknown (2024)    Exercise Vital Sign     Days of Exercise per Week: 0 days   Housing Stability: Low Risk  (2024)     Housing Stability Vital Sign     Unable to Pay for Housing in the Last Year: No     Number of Places Lived in the Last Year: 1     Unstable Housing in the Last Year: No       Family History:  Family History   Problem Relation Name Age of Onset    Kidney disease Mother      Heart disease Mother      Cancer Father      Hypertension Brother      Hypertension Daughter      Cancer Maternal Aunt         Allergies and Medications: (updated and reviewed)  Review of patient's allergies indicates:   Allergen Reactions    Lisinopril-hydrochlorothiazide Other (See Comments)     Pt stated it makes her feel drained. She couldn't raise arms over head.    Ampicillin Rash    Darvocet a500 [propoxyphene n-acetaminophen] Other (See Comments)     karl     Current Outpatient Medications   Medication Sig Dispense Refill    ACCU-CHEK GUIDE GLUCOSE METER Misc TO CHECK BLOOD GLUCOSE DAILY, TO USE WITH INSURANCE PREFERRED METER      amLODIPine (NORVASC) 10 MG tablet TAKE 1 TABLET BY MOUTH ONCE DAILY. HOLD IF SBP <120 90 tablet 3    ascorbic acid, vitamin C, (VITAMIN C) 500 MG tablet Take 500 mg by mouth once daily.      aspirin (ECOTRIN) 81 MG EC tablet Take 1 tablet (81 mg total) by mouth once daily. 30 tablet 3    atorvastatin (LIPITOR) 80 MG tablet TAKE 1 TABLET (80 MG TOTAL) BY MOUTH ONCE DAILY. 90 tablet 2    BLOOD PRESSURE CUFF Misc 1 Units by Misc.(Non-Drug; Combo Route) route once daily. 1 each 0    blood sugar diagnostic Strp To check BG daily, to use with insurance preferred meter 200 each 11    blood sugar diagnostic Strp To check BG daily, to use with insurance preferred meter 200 each 11    blood-glucose meter kit To check BG daily, to use with insurance preferred meter 1 each 0    blood-glucose meter kit Use as instructed 1 each 0    cyclobenzaprine (FLEXERIL) 10 MG tablet TAKE 1 TABLET (10 MG TOTAL) BY MOUTH 2 (TWO) TIMES DAILY AS NEEDED FOR MUSCLE SPASMS (BACK PAIN). 180 tablet 1    empagliflozin (JARDIANCE) 25 mg tablet  Take 1 tablet (25 mg total) by mouth once daily. 30 tablet 11    insulin lispro protamine-insulin lispro (HUMALOG MIX 75-25,U-100,INSULN) 100 unit/mL (75-25) Susp Inject 10 Units into the skin 2 (two) times daily before meals. 6 mL 11    lancets Misc To check BG daily, to use with insurance preferred meter 200 each 11    lancets Misc To check BG daily, to use with insurance preferred meter 200 each 11    metoprolol succinate (TOPROL-XL) 25 MG 24 hr tablet Take 1 tablet (25 mg total) by mouth once daily. 90 tablet 3    multivitamin (THERAGRAN) per tablet Take 1 tablet by mouth once daily.      ofloxacin (OCUFLOX) 0.3 % ophthalmic solution       prednisoLONE acetate (PRED FORTE) 1 % DrpS       benzonatate (TESSALON) 200 MG capsule Take 1 capsule (200 mg total) by mouth 3 (three) times daily as needed for Cough. 15 capsule 0    hydrALAZINE (APRESOLINE) 50 MG tablet Take 1 tablet (50 mg total) by mouth every 8 (eight) hours. Hold is SBP <120 90 tablet 1    losartan (COZAAR) 100 MG tablet Take 1 tablet (100 mg total) by mouth once daily. Hold if SBP <120 90 tablet 0    meclizine (ANTIVERT) 25 mg tablet Take 1 tablet (25 mg total) by mouth 2 (two) times daily as needed for Dizziness. 30 tablet 0    promethazine-dextromethorphan (PROMETHAZINE-DM) 6.25-15 mg/5 mL Syrp Take 5 mLs by mouth nightly as needed (cough). 118 mL 0     No current facility-administered medications for this visit.       Patient Care Team:  Teri Soto DO as PCP - General (Family Medicine)  Tracie Kessler LPN as Care Coordinator  Lb Tracy OD (Optometry)         - The patient is given an After Visit Summary that lists all medications with directions, allergies, education, orders placed during this encounter and follow-up instructions.      - I have reviewed the patient's medical information including past medical, family, and social history sections including the medications and allergies.      - We discussed the patient's current  medications.     This note was created by combination of typed  and MModal dictation.  Transcription errors may be present.  If there are any questions, please contact me.

## 2025-01-01 ENCOUNTER — CLINICAL SUPPORT (OUTPATIENT)
Dept: CARDIOLOGY | Facility: HOSPITAL | Age: 75
End: 2025-01-01
Payer: MEDICARE

## 2025-01-01 DIAGNOSIS — I49.8 OTHER SPECIFIED CARDIAC ARRHYTHMIAS: ICD-10-CM

## 2025-01-02 ENCOUNTER — CLINICAL SUPPORT (OUTPATIENT)
Dept: CARDIOLOGY | Facility: HOSPITAL | Age: 75
End: 2025-01-02
Attending: INTERNAL MEDICINE
Payer: MEDICARE

## 2025-01-02 DIAGNOSIS — I49.8 OTHER SPECIFIED CARDIAC ARRHYTHMIAS: ICD-10-CM

## 2025-01-02 PROCEDURE — 93298 REM INTERROG DEV EVAL SCRMS: CPT | Mod: HCNC | Performed by: INTERNAL MEDICINE

## 2025-01-03 ENCOUNTER — HOSPITAL ENCOUNTER (INPATIENT)
Facility: HOSPITAL | Age: 75
LOS: 4 days | Discharge: HOME | DRG: 189 | End: 2025-01-07
Attending: EMERGENCY MEDICINE | Admitting: STUDENT IN AN ORGANIZED HEALTH CARE EDUCATION/TRAINING PROGRAM
Payer: MEDICARE

## 2025-01-03 DIAGNOSIS — R07.9 CHEST PAIN: ICD-10-CM

## 2025-01-03 DIAGNOSIS — J96.01 ACUTE HYPOXIC RESPIRATORY FAILURE: ICD-10-CM

## 2025-01-03 DIAGNOSIS — I50.23 ACUTE ON CHRONIC SYSTOLIC HEART FAILURE: ICD-10-CM

## 2025-01-03 DIAGNOSIS — J81.0 ACUTE PULMONARY EDEMA: Primary | ICD-10-CM

## 2025-01-03 DIAGNOSIS — R06.02 SHORTNESS OF BREATH: ICD-10-CM

## 2025-01-03 DIAGNOSIS — I50.9 HEART FAILURE: ICD-10-CM

## 2025-01-03 LAB
ALBUMIN SERPL BCP-MCNC: 3.6 G/DL (ref 3.5–5.2)
ALLENS TEST: ABNORMAL
ALP SERPL-CCNC: 102 U/L (ref 40–150)
ALT SERPL W/O P-5'-P-CCNC: 20 U/L (ref 10–44)
ANION GAP SERPL CALC-SCNC: 12 MMOL/L (ref 8–16)
AST SERPL-CCNC: 26 U/L (ref 10–40)
BASOPHILS # BLD AUTO: 0.06 K/UL (ref 0–0.2)
BASOPHILS NFR BLD: 0.6 % (ref 0–1.9)
BILIRUB SERPL-MCNC: 0.3 MG/DL (ref 0.1–1)
BNP SERPL-MCNC: 2141 PG/ML (ref 0–99)
BUN SERPL-MCNC: 36 MG/DL (ref 8–23)
CALCIUM SERPL-MCNC: 9.3 MG/DL (ref 8.7–10.5)
CHLORIDE SERPL-SCNC: 112 MMOL/L (ref 95–110)
CO2 SERPL-SCNC: 18 MMOL/L (ref 23–29)
CREAT SERPL-MCNC: 2.3 MG/DL (ref 0.5–1.4)
CTP QC/QA: YES
CTP QC/QA: YES
DELSYS: ABNORMAL
DIFFERENTIAL METHOD BLD: ABNORMAL
EOSINOPHIL # BLD AUTO: 0.2 K/UL (ref 0–0.5)
EOSINOPHIL NFR BLD: 1.9 % (ref 0–8)
EP: 6
ERYTHROCYTE [DISTWIDTH] IN BLOOD BY AUTOMATED COUNT: 16.3 % (ref 11.5–14.5)
ERYTHROCYTE [SEDIMENTATION RATE] IN BLOOD BY WESTERGREN METHOD: 12 MM/H
EST. GFR  (NO RACE VARIABLE): 22 ML/MIN/1.73 M^2
FIO2: 60
GLUCOSE SERPL-MCNC: 310 MG/DL (ref 70–110)
HCO3 UR-SCNC: 20.1 MMOL/L (ref 24–28)
HCT VFR BLD AUTO: 37.3 % (ref 37–48.5)
HGB BLD-MCNC: 11.2 G/DL (ref 12–16)
IMM GRANULOCYTES # BLD AUTO: 0.08 K/UL (ref 0–0.04)
IMM GRANULOCYTES NFR BLD AUTO: 0.8 % (ref 0–0.5)
IP: 14
LYMPHOCYTES # BLD AUTO: 3.6 K/UL (ref 1–4.8)
LYMPHOCYTES NFR BLD: 35.2 % (ref 18–48)
MAGNESIUM SERPL-MCNC: 2.3 MG/DL (ref 1.6–2.6)
MCH RBC QN AUTO: 26.5 PG (ref 27–31)
MCHC RBC AUTO-ENTMCNC: 30 G/DL (ref 32–36)
MCV RBC AUTO: 88 FL (ref 82–98)
MODE: ABNORMAL
MONOCYTES # BLD AUTO: 1 K/UL (ref 0.3–1)
MONOCYTES NFR BLD: 9.3 % (ref 4–15)
NEUTROPHILS # BLD AUTO: 5.4 K/UL (ref 1.8–7.7)
NEUTROPHILS NFR BLD: 52.2 % (ref 38–73)
NRBC BLD-RTO: 2 /100 WBC
PCO2 BLDA: 51.2 MMHG (ref 35–45)
PH SMN: 7.2 [PH] (ref 7.35–7.45)
PLATELET # BLD AUTO: 495 K/UL (ref 150–450)
PMV BLD AUTO: 9.8 FL (ref 9.2–12.9)
PO2 BLDA: 29 MMHG (ref 40–60)
POC BE: -8 MMOL/L
POC MOLECULAR INFLUENZA A AGN: NEGATIVE
POC MOLECULAR INFLUENZA B AGN: NEGATIVE
POC SATURATED O2: 42 % (ref 95–100)
POC TCO2: 22 MMOL/L (ref 24–29)
POTASSIUM SERPL-SCNC: 4.4 MMOL/L (ref 3.5–5.1)
PROT SERPL-MCNC: 8 G/DL (ref 6–8.4)
RBC # BLD AUTO: 4.22 M/UL (ref 4–5.4)
RSV AG SPEC QL IA: NEGATIVE
SAMPLE: ABNORMAL
SARS-COV-2 RDRP RESP QL NAA+PROBE: NEGATIVE
SITE: ABNORMAL
SODIUM SERPL-SCNC: 142 MMOL/L (ref 136–145)
SP02: 99
SPECIMEN SOURCE: NORMAL
SPONT RATE: 38
TROPONIN I SERPL DL<=0.01 NG/ML-MCNC: 0.03 NG/ML (ref 0–0.03)
WBC # BLD AUTO: 10.27 K/UL (ref 3.9–12.7)

## 2025-01-03 PROCEDURE — 87635 SARS-COV-2 COVID-19 AMP PRB: CPT | Mod: HCNC | Performed by: EMERGENCY MEDICINE

## 2025-01-03 PROCEDURE — 94660 CPAP INITIATION&MGMT: CPT | Mod: HCNC

## 2025-01-03 PROCEDURE — 20000000 HC ICU ROOM: Mod: HCNC

## 2025-01-03 PROCEDURE — 87502 INFLUENZA DNA AMP PROBE: CPT | Mod: HCNC

## 2025-01-03 PROCEDURE — 82803 BLOOD GASES ANY COMBINATION: CPT | Mod: HCNC

## 2025-01-03 PROCEDURE — 99900035 HC TECH TIME PER 15 MIN (STAT): Mod: HCNC

## 2025-01-03 PROCEDURE — 85025 COMPLETE CBC W/AUTO DIFF WBC: CPT | Mod: HCNC | Performed by: EMERGENCY MEDICINE

## 2025-01-03 PROCEDURE — 99291 CRITICAL CARE FIRST HOUR: CPT | Mod: HCNC

## 2025-01-03 PROCEDURE — 84484 ASSAY OF TROPONIN QUANT: CPT | Mod: HCNC | Performed by: EMERGENCY MEDICINE

## 2025-01-03 PROCEDURE — 63600175 PHARM REV CODE 636 W HCPCS: Mod: HCNC | Performed by: EMERGENCY MEDICINE

## 2025-01-03 PROCEDURE — 83880 ASSAY OF NATRIURETIC PEPTIDE: CPT | Mod: HCNC | Performed by: EMERGENCY MEDICINE

## 2025-01-03 PROCEDURE — 5A09357 ASSISTANCE WITH RESPIRATORY VENTILATION, LESS THAN 24 CONSECUTIVE HOURS, CONTINUOUS POSITIVE AIRWAY PRESSURE: ICD-10-PCS | Performed by: EMERGENCY MEDICINE

## 2025-01-03 PROCEDURE — 36600 WITHDRAWAL OF ARTERIAL BLOOD: CPT | Mod: HCNC

## 2025-01-03 PROCEDURE — 27000221 HC OXYGEN, UP TO 24 HOURS: Mod: HCNC

## 2025-01-03 PROCEDURE — 87634 RSV DNA/RNA AMP PROBE: CPT | Mod: HCNC | Performed by: PHYSICIAN ASSISTANT

## 2025-01-03 PROCEDURE — 25000003 PHARM REV CODE 250: Mod: HCNC | Performed by: EMERGENCY MEDICINE

## 2025-01-03 PROCEDURE — 94761 N-INVAS EAR/PLS OXIMETRY MLT: CPT | Mod: HCNC

## 2025-01-03 PROCEDURE — 83735 ASSAY OF MAGNESIUM: CPT | Mod: HCNC | Performed by: PHYSICIAN ASSISTANT

## 2025-01-03 PROCEDURE — 27000190 HC CPAP FULL FACE MASK W/VALVE: Mod: HCNC

## 2025-01-03 PROCEDURE — 80053 COMPREHEN METABOLIC PANEL: CPT | Mod: HCNC | Performed by: EMERGENCY MEDICINE

## 2025-01-03 PROCEDURE — 93010 ELECTROCARDIOGRAM REPORT: CPT | Mod: HCNC,,, | Performed by: INTERNAL MEDICINE

## 2025-01-03 PROCEDURE — 93005 ELECTROCARDIOGRAM TRACING: CPT | Mod: HCNC

## 2025-01-03 RX ORDER — LOSARTAN POTASSIUM 25 MG/1
100 TABLET ORAL DAILY
Status: DISCONTINUED | OUTPATIENT
Start: 2025-01-04 | End: 2025-01-04

## 2025-01-03 RX ORDER — SODIUM CHLORIDE 0.9 % (FLUSH) 0.9 %
10 SYRINGE (ML) INJECTION EVERY 12 HOURS PRN
Status: DISCONTINUED | OUTPATIENT
Start: 2025-01-03 | End: 2025-01-07 | Stop reason: HOSPADM

## 2025-01-03 RX ORDER — INSULIN ASPART 100 [IU]/ML
10 INJECTION, SUSPENSION SUBCUTANEOUS 2 TIMES DAILY WITH MEALS
Status: DISCONTINUED | OUTPATIENT
Start: 2025-01-04 | End: 2025-01-07 | Stop reason: HOSPADM

## 2025-01-03 RX ORDER — FUROSEMIDE 10 MG/ML
40 INJECTION INTRAMUSCULAR; INTRAVENOUS
Status: COMPLETED | OUTPATIENT
Start: 2025-01-03 | End: 2025-01-03

## 2025-01-03 RX ORDER — SODIUM,POTASSIUM PHOSPHATES 280-250MG
2 POWDER IN PACKET (EA) ORAL
Status: DISCONTINUED | OUTPATIENT
Start: 2025-01-03 | End: 2025-01-07 | Stop reason: HOSPADM

## 2025-01-03 RX ORDER — AMOXICILLIN 250 MG
1 CAPSULE ORAL DAILY PRN
Status: DISCONTINUED | OUTPATIENT
Start: 2025-01-03 | End: 2025-01-07 | Stop reason: HOSPADM

## 2025-01-03 RX ORDER — BISACODYL 10 MG/1
10 SUPPOSITORY RECTAL DAILY PRN
Status: DISCONTINUED | OUTPATIENT
Start: 2025-01-03 | End: 2025-01-07 | Stop reason: HOSPADM

## 2025-01-03 RX ORDER — ATORVASTATIN CALCIUM 40 MG/1
80 TABLET, FILM COATED ORAL DAILY
Status: DISCONTINUED | OUTPATIENT
Start: 2025-01-04 | End: 2025-01-07 | Stop reason: HOSPADM

## 2025-01-03 RX ORDER — ACETAMINOPHEN 325 MG/1
650 TABLET ORAL EVERY 4 HOURS PRN
Status: DISCONTINUED | OUTPATIENT
Start: 2025-01-03 | End: 2025-01-07 | Stop reason: HOSPADM

## 2025-01-03 RX ORDER — ALUMINUM HYDROXIDE, MAGNESIUM HYDROXIDE, AND SIMETHICONE 1200; 120; 1200 MG/30ML; MG/30ML; MG/30ML
30 SUSPENSION ORAL 4 TIMES DAILY PRN
Status: DISCONTINUED | OUTPATIENT
Start: 2025-01-03 | End: 2025-01-07 | Stop reason: HOSPADM

## 2025-01-03 RX ORDER — ASPIRIN 325 MG
325 TABLET ORAL
Status: COMPLETED | OUTPATIENT
Start: 2025-01-03 | End: 2025-01-03

## 2025-01-03 RX ORDER — LANOLIN ALCOHOL/MO/W.PET/CERES
800 CREAM (GRAM) TOPICAL
Status: DISCONTINUED | OUTPATIENT
Start: 2025-01-03 | End: 2025-01-07 | Stop reason: HOSPADM

## 2025-01-03 RX ORDER — ONDANSETRON HYDROCHLORIDE 2 MG/ML
4 INJECTION, SOLUTION INTRAVENOUS EVERY 8 HOURS PRN
Status: DISCONTINUED | OUTPATIENT
Start: 2025-01-03 | End: 2025-01-07 | Stop reason: HOSPADM

## 2025-01-03 RX ORDER — FUROSEMIDE 10 MG/ML
40 INJECTION INTRAMUSCULAR; INTRAVENOUS EVERY 12 HOURS
Status: DISCONTINUED | OUTPATIENT
Start: 2025-01-04 | End: 2025-01-06

## 2025-01-03 RX ORDER — AMLODIPINE BESYLATE 5 MG/1
10 TABLET ORAL DAILY
Status: DISCONTINUED | OUTPATIENT
Start: 2025-01-04 | End: 2025-01-04

## 2025-01-03 RX ORDER — SIMETHICONE 80 MG
1 TABLET,CHEWABLE ORAL 4 TIMES DAILY PRN
Status: DISCONTINUED | OUTPATIENT
Start: 2025-01-03 | End: 2025-01-07 | Stop reason: HOSPADM

## 2025-01-03 RX ORDER — TALC
6 POWDER (GRAM) TOPICAL NIGHTLY PRN
Status: DISCONTINUED | OUTPATIENT
Start: 2025-01-03 | End: 2025-01-07 | Stop reason: HOSPADM

## 2025-01-03 RX ORDER — ACETAMINOPHEN 325 MG/1
650 TABLET ORAL EVERY 8 HOURS PRN
Status: DISCONTINUED | OUTPATIENT
Start: 2025-01-03 | End: 2025-01-07 | Stop reason: HOSPADM

## 2025-01-03 RX ORDER — INSULIN ASPART 100 [IU]/ML
0-5 INJECTION, SOLUTION INTRAVENOUS; SUBCUTANEOUS
Status: DISCONTINUED | OUTPATIENT
Start: 2025-01-03 | End: 2025-01-07 | Stop reason: HOSPADM

## 2025-01-03 RX ORDER — ASPIRIN 81 MG/1
81 TABLET ORAL DAILY
Status: DISCONTINUED | OUTPATIENT
Start: 2025-01-04 | End: 2025-01-07 | Stop reason: HOSPADM

## 2025-01-03 RX ORDER — IBUPROFEN 200 MG
24 TABLET ORAL
Status: DISCONTINUED | OUTPATIENT
Start: 2025-01-03 | End: 2025-01-07 | Stop reason: HOSPADM

## 2025-01-03 RX ORDER — SODIUM CHLORIDE 0.9 % (FLUSH) 0.9 %
10 SYRINGE (ML) INJECTION
Status: DISCONTINUED | OUTPATIENT
Start: 2025-01-03 | End: 2025-01-07 | Stop reason: HOSPADM

## 2025-01-03 RX ORDER — NALOXONE HCL 0.4 MG/ML
0.02 VIAL (ML) INJECTION
Status: DISCONTINUED | OUTPATIENT
Start: 2025-01-03 | End: 2025-01-07 | Stop reason: HOSPADM

## 2025-01-03 RX ORDER — IBUPROFEN 200 MG
16 TABLET ORAL
Status: DISCONTINUED | OUTPATIENT
Start: 2025-01-03 | End: 2025-01-07 | Stop reason: HOSPADM

## 2025-01-03 RX ORDER — GLUCAGON 1 MG
1 KIT INJECTION
Status: DISCONTINUED | OUTPATIENT
Start: 2025-01-03 | End: 2025-01-07 | Stop reason: HOSPADM

## 2025-01-03 RX ORDER — METOPROLOL SUCCINATE 25 MG/1
25 TABLET, EXTENDED RELEASE ORAL DAILY
Status: DISCONTINUED | OUTPATIENT
Start: 2025-01-04 | End: 2025-01-04

## 2025-01-03 RX ADMIN — FUROSEMIDE 40 MG: 10 INJECTION, SOLUTION INTRAVENOUS at 10:01

## 2025-01-03 RX ADMIN — ASPIRIN 325 MG ORAL TABLET 325 MG: 325 PILL ORAL at 10:01

## 2025-01-03 NOTE — Clinical Note
Diagnosis: Acute hypoxic respiratory failure [9416800]   Reason for IP Medical Treatment  (Clinical interventions that can only be accomplished in the IP setting? ) :: Acute hypoxic respiratory failure, pulmonary edema

## 2025-01-04 PROBLEM — N17.9 ACUTE KIDNEY INJURY SUPERIMPOSED ON CHRONIC KIDNEY DISEASE: Status: ACTIVE | Noted: 2025-01-04

## 2025-01-04 PROBLEM — J96.02 ACUTE RESPIRATORY FAILURE WITH HYPOXIA AND HYPERCARBIA: Status: ACTIVE | Noted: 2025-01-03

## 2025-01-04 PROBLEM — N18.9 ACUTE KIDNEY INJURY SUPERIMPOSED ON CHRONIC KIDNEY DISEASE: Status: ACTIVE | Noted: 2025-01-04

## 2025-01-04 LAB
ALBUMIN SERPL BCP-MCNC: 3.3 G/DL (ref 3.5–5.2)
ALLENS TEST: ABNORMAL
ALP SERPL-CCNC: 83 U/L (ref 40–150)
ALT SERPL W/O P-5'-P-CCNC: 17 U/L (ref 10–44)
ANION GAP SERPL CALC-SCNC: 11 MMOL/L (ref 8–16)
ASCENDING AORTA: 3.27 CM
AST SERPL-CCNC: 15 U/L (ref 10–40)
AV INDEX (PROSTH): 0.53
AV MEAN GRADIENT: 5.2 MMHG
AV PEAK GRADIENT: 9 MMHG
AV VALVE AREA BY VELOCITY RATIO: 1.8 CM²
AV VALVE AREA: 1.8 CM²
AV VELOCITY RATIO: 0.53
BASOPHILS # BLD AUTO: 0.02 K/UL (ref 0–0.2)
BASOPHILS NFR BLD: 0.2 % (ref 0–1.9)
BILIRUB SERPL-MCNC: 0.3 MG/DL (ref 0.1–1)
BSA FOR ECHO PROCEDURE: 1.94 M2
BUN SERPL-MCNC: 40 MG/DL (ref 8–23)
CALCIUM SERPL-MCNC: 8.8 MG/DL (ref 8.7–10.5)
CHLORIDE SERPL-SCNC: 112 MMOL/L (ref 95–110)
CO2 SERPL-SCNC: 15 MMOL/L (ref 23–29)
CREAT SERPL-MCNC: 2.1 MG/DL (ref 0.5–1.4)
CV ECHO LV RWT: 0.28 CM
DELSYS: ABNORMAL
DIFFERENTIAL METHOD BLD: ABNORMAL
DOP CALC AO PEAK VEL: 1.5 M/S
DOP CALC AO VTI: 25.6 CM
DOP CALC LVOT AREA: 3.5 CM2
DOP CALC LVOT DIAMETER: 2.1 CM
DOP CALC LVOT PEAK VEL: 0.8 M/S
DOP CALC LVOT STROKE VOLUME: 47.1 CM3
DOP CALCLVOT PEAK VEL VTI: 13.6 CM
E WAVE DECELERATION TIME: 139.4 MSEC
E/A RATIO: 0.79
E/E' RATIO: 33 M/S
ECHO LV POSTERIOR WALL: 0.8 CM (ref 0.6–1.1)
EOSINOPHIL # BLD AUTO: 0 K/UL (ref 0–0.5)
EOSINOPHIL NFR BLD: 0.2 % (ref 0–8)
ERYTHROCYTE [DISTWIDTH] IN BLOOD BY AUTOMATED COUNT: 16.2 % (ref 11.5–14.5)
EST. GFR  (NO RACE VARIABLE): 25 ML/MIN/1.73 M^2
FIO2: 50
FRACTIONAL SHORTENING: 14 % (ref 28–44)
GLUCOSE SERPL-MCNC: 185 MG/DL (ref 70–110)
HCO3 UR-SCNC: 16.3 MMOL/L (ref 24–28)
HCT VFR BLD AUTO: 32.3 % (ref 37–48.5)
HGB BLD-MCNC: 9.4 G/DL (ref 12–16)
IMM GRANULOCYTES # BLD AUTO: 0.04 K/UL (ref 0–0.04)
IMM GRANULOCYTES NFR BLD AUTO: 0.4 % (ref 0–0.5)
INTERVENTRICULAR SEPTUM: 0.8 CM (ref 0.6–1.1)
IVC DIAMETER: 1.83 CM
IVRT: 100.86 MSEC
LA MAJOR: 6.24 CM
LA MINOR: 6.78 CM
LA WIDTH: 4.6 CM
LEFT ATRIUM SIZE: 4.39 CM
LEFT ATRIUM VOLUME INDEX: 58.4 ML/M2
LEFT ATRIUM VOLUME: 111.55 CM3
LEFT INTERNAL DIMENSION IN SYSTOLE: 4.9 CM (ref 2.1–4)
LEFT VENTRICLE DIASTOLIC VOLUME INDEX: 82.68 ML/M2
LEFT VENTRICLE DIASTOLIC VOLUME: 157.92 ML
LEFT VENTRICLE MASS INDEX: 89.1 G/M2
LEFT VENTRICLE SYSTOLIC VOLUME INDEX: 59 ML/M2
LEFT VENTRICLE SYSTOLIC VOLUME: 112.66 ML
LEFT VENTRICULAR INTERNAL DIMENSION IN DIASTOLE: 5.7 CM (ref 3.5–6)
LEFT VENTRICULAR MASS: 170.2 G
LV LATERAL E/E' RATIO: 33 M/S
LV SEPTAL E/E' RATIO: 33 M/S
LVED V (TEICH): 157.92 ML
LVES V (TEICH): 112.66 ML
LVOT MG: 1.39 MMHG
LVOT MV: 0.54 CM/S
LYMPHOCYTES # BLD AUTO: 1.2 K/UL (ref 1–4.8)
LYMPHOCYTES NFR BLD: 13.8 % (ref 18–48)
MAGNESIUM SERPL-MCNC: 2 MG/DL (ref 1.6–2.6)
MCH RBC QN AUTO: 25.3 PG (ref 27–31)
MCHC RBC AUTO-ENTMCNC: 29.1 G/DL (ref 32–36)
MCV RBC AUTO: 87 FL (ref 82–98)
MODE: ABNORMAL
MONOCYTES # BLD AUTO: 0.6 K/UL (ref 0.3–1)
MONOCYTES NFR BLD: 6.4 % (ref 4–15)
MV PEAK A VEL: 1.26 M/S
MV PEAK E VEL: 0.99 M/S
MV STENOSIS PRESSURE HALF TIME: 40.43 MS
MV VALVE AREA P 1/2 METHOD: 5.44 CM2
NEUTROPHILS # BLD AUTO: 7.1 K/UL (ref 1.8–7.7)
NEUTROPHILS NFR BLD: 79 % (ref 38–73)
NRBC BLD-RTO: 1 /100 WBC
OHS CV RV/LV RATIO: 0.7 CM
PCO2 BLDA: 32.9 MMHG (ref 35–45)
PH SMN: 7.3 [PH] (ref 7.35–7.45)
PHOSPHATE SERPL-MCNC: 4.7 MG/DL (ref 2.7–4.5)
PISA TR MAX VEL: 2.35 M/S
PLATELET # BLD AUTO: 378 K/UL (ref 150–450)
PMV BLD AUTO: 9.7 FL (ref 9.2–12.9)
PO2 BLDA: 57 MMHG (ref 80–100)
POC BE: -9 MMOL/L
POC SATURATED O2: 87 % (ref 95–100)
POC TCO2: 17 MMOL/L (ref 23–27)
POCT GLUCOSE: 144 MG/DL (ref 70–110)
POCT GLUCOSE: 145 MG/DL (ref 70–110)
POCT GLUCOSE: 194 MG/DL (ref 70–110)
POCT GLUCOSE: 293 MG/DL (ref 70–110)
POTASSIUM SERPL-SCNC: 5.3 MMOL/L (ref 3.5–5.1)
PROT SERPL-MCNC: 7 G/DL (ref 6–8.4)
PV PEAK GRADIENT: 3 MMHG
PV PEAK VELOCITY: 0.86 M/S
RA MAJOR: 4.98 CM
RA PRESSURE ESTIMATED: 3 MMHG
RA WIDTH: 3.7 CM
RBC # BLD AUTO: 3.72 M/UL (ref 4–5.4)
RIGHT VENTRICLE DIASTOLIC BASEL DIMENSION: 4 CM
RIGHT VENTRICULAR END-DIASTOLIC DIMENSION: 4.03 CM
RV TB RVSP: 5 MMHG
RV TISSUE DOPPLER FREE WALL SYSTOLIC VELOCITY 1 (APICAL 4 CHAMBER VIEW): 13.28 CM/S
SAMPLE: ABNORMAL
SINUS: 3.4 CM
SITE: ABNORMAL
SODIUM SERPL-SCNC: 138 MMOL/L (ref 136–145)
STJ: 2.83 CM
TDI LATERAL: 0.03 M/S
TDI SEPTAL: 0.03 M/S
TDI: 0.03 M/S
TR MAX PG: 22 MMHG
TRICUSPID ANNULAR PLANE SYSTOLIC EXCURSION: 2.16 CM
TV PEAK GRADIENT: 1 MMHG
TV REST PULMONARY ARTERY PRESSURE: 25 MMHG
WBC # BLD AUTO: 8.94 K/UL (ref 3.9–12.7)
Z-SCORE OF LEFT VENTRICULAR DIMENSION IN END DIASTOLE: 0.59
Z-SCORE OF LEFT VENTRICULAR DIMENSION IN END SYSTOLE: 3.1

## 2025-01-04 PROCEDURE — 94799 UNLISTED PULMONARY SVC/PX: CPT | Mod: HCNC

## 2025-01-04 PROCEDURE — 27100092 HC HIGH FLOW DELIVERY CANNULA: Mod: HCNC

## 2025-01-04 PROCEDURE — 99900035 HC TECH TIME PER 15 MIN (STAT): Mod: HCNC

## 2025-01-04 PROCEDURE — 25000003 PHARM REV CODE 250: Mod: HCNC | Performed by: STUDENT IN AN ORGANIZED HEALTH CARE EDUCATION/TRAINING PROGRAM

## 2025-01-04 PROCEDURE — 85025 COMPLETE CBC W/AUTO DIFF WBC: CPT | Mod: HCNC | Performed by: STUDENT IN AN ORGANIZED HEALTH CARE EDUCATION/TRAINING PROGRAM

## 2025-01-04 PROCEDURE — 36415 COLL VENOUS BLD VENIPUNCTURE: CPT | Mod: HCNC | Performed by: STUDENT IN AN ORGANIZED HEALTH CARE EDUCATION/TRAINING PROGRAM

## 2025-01-04 PROCEDURE — 93005 ELECTROCARDIOGRAM TRACING: CPT | Mod: HCNC

## 2025-01-04 PROCEDURE — 5A0935A ASSISTANCE WITH RESPIRATORY VENTILATION, LESS THAN 24 CONSECUTIVE HOURS, HIGH NASAL FLOW/VELOCITY: ICD-10-PCS | Performed by: STUDENT IN AN ORGANIZED HEALTH CARE EDUCATION/TRAINING PROGRAM

## 2025-01-04 PROCEDURE — 84100 ASSAY OF PHOSPHORUS: CPT | Mod: HCNC | Performed by: STUDENT IN AN ORGANIZED HEALTH CARE EDUCATION/TRAINING PROGRAM

## 2025-01-04 PROCEDURE — 99900031 HC PATIENT EDUCATION (STAT): Mod: HCNC

## 2025-01-04 PROCEDURE — 27000249 HC VAPOTHERM CIRCUIT: Mod: HCNC

## 2025-01-04 PROCEDURE — 83735 ASSAY OF MAGNESIUM: CPT | Mod: HCNC | Performed by: STUDENT IN AN ORGANIZED HEALTH CARE EDUCATION/TRAINING PROGRAM

## 2025-01-04 PROCEDURE — 63600175 PHARM REV CODE 636 W HCPCS: Mod: HCNC | Performed by: STUDENT IN AN ORGANIZED HEALTH CARE EDUCATION/TRAINING PROGRAM

## 2025-01-04 PROCEDURE — 94761 N-INVAS EAR/PLS OXIMETRY MLT: CPT | Mod: HCNC

## 2025-01-04 PROCEDURE — 80053 COMPREHEN METABOLIC PANEL: CPT | Mod: HCNC | Performed by: STUDENT IN AN ORGANIZED HEALTH CARE EDUCATION/TRAINING PROGRAM

## 2025-01-04 PROCEDURE — 27100171 HC OXYGEN HIGH FLOW UP TO 24 HOURS: Mod: HCNC

## 2025-01-04 PROCEDURE — 93010 ELECTROCARDIOGRAM REPORT: CPT | Mod: HCNC,,, | Performed by: INTERNAL MEDICINE

## 2025-01-04 PROCEDURE — 20000000 HC ICU ROOM: Mod: HCNC

## 2025-01-04 RX ORDER — FUROSEMIDE 10 MG/ML
60 INJECTION INTRAMUSCULAR; INTRAVENOUS ONCE
Status: COMPLETED | OUTPATIENT
Start: 2025-01-04 | End: 2025-01-04

## 2025-01-04 RX ADMIN — FUROSEMIDE 40 MG: 10 INJECTION, SOLUTION INTRAVENOUS at 08:01

## 2025-01-04 RX ADMIN — INSULIN ASPART 3 UNITS: 100 INJECTION, SOLUTION INTRAVENOUS; SUBCUTANEOUS at 04:01

## 2025-01-04 RX ADMIN — ATORVASTATIN CALCIUM 80 MG: 40 TABLET, FILM COATED ORAL at 08:01

## 2025-01-04 RX ADMIN — FUROSEMIDE 60 MG: 10 INJECTION, SOLUTION INTRAMUSCULAR; INTRAVENOUS at 02:01

## 2025-01-04 RX ADMIN — INSULIN ASPART 10 UNITS: 100 INJECTION, SUSPENSION SUBCUTANEOUS at 04:01

## 2025-01-04 RX ADMIN — SODIUM ZIRCONIUM CYCLOSILICATE 10 G: 10 POWDER, FOR SUSPENSION ORAL at 08:01

## 2025-01-04 RX ADMIN — INSULIN ASPART 10 UNITS: 100 INJECTION, SUSPENSION SUBCUTANEOUS at 07:01

## 2025-01-04 RX ADMIN — ASPIRIN 81 MG: 81 TABLET, COATED ORAL at 08:01

## 2025-01-04 NOTE — NURSING
[facility]  Nurses Note -- 4 Eyes      1/4/2025       Skin assessed on: Q Shift      [x] No Pressure Injuries Present    [x]Prevention Measures Documented    [] Yes LDA  for Pressure Injury Previously documented     [] Yes New Pressure Injury Discovered   [] LDA for New Pressure Injury Added      Attending RN:  Judith Sutherland RN     Second RN:  Juliet ALDRIDGE

## 2025-01-04 NOTE — CONSULTS
RD providing remote coverage.   Consulted for fluid and salt restriction diet education   - attached clinical reference materials handouts to discharge documents.    On site RD to provide in-person education as warranted,   education materials to be provided with discharge instructions.      Please re-consult as needed.  Thank you.   Laura Hernandez, RDN, LDN

## 2025-01-04 NOTE — PLAN OF CARE
Vapotherm continues to be weaned all shift.   Lasix given x1 but SBP is staying in the 90's.    Tolerating diet.     Insulin given per accu check readings.     Family visits.

## 2025-01-04 NOTE — ASSESSMENT & PLAN NOTE
Patient with Hypoxic Respiratory failure which is Acute.  she is not on home oxygen. Supplemental oxygen was provided and noted- Oxygen Concentration (%):  [] 60    .   Signs/symptoms of respiratory failure include- tachypnea and increased work of breathing. Contributing diagnoses includes - CHF Labs and images were reviewed. Patient Has recent ABG, which has been reviewed. Will treat underlying causes and adjust management of respiratory failure as follows- See plan for HF, wean BiPAP as tolerated

## 2025-01-04 NOTE — SUBJECTIVE & OBJECTIVE
Past Medical History:   Diagnosis Date    Diabetes mellitus     Hyperlipidemia     Hypertension     Stroke        Past Surgical History:   Procedure Laterality Date    COLONOSCOPY N/A 7/12/2023    Procedure: COLONOSCOPY;  Surgeon: Ray Sorensen MD;  Location: [facility] ENDO;  Service: Endoscopy;  Laterality: N/A;  instr via portal  - PC  4/10/23 Daniel, instr via mail & portal, unable to tolerate Golytely- request Miralax/Gatorade - PC  5/30-pt r/s-new instr portal-tb    INJECTION OF ANESTHETIC AGENT AROUND MEDIAL BRANCH NERVES INNERVATING LUMBAR FACET JOINT Bilateral 4/20/2023    Procedure: MBB #1 (B/L) L3,4,5;  Surgeon: Son Lara DO;  Location: [facility] OR;  Service: Pain Management;  Laterality: Bilateral;  ORAL XANAX    INJECTION OF ANESTHETIC AGENT AROUND MEDIAL BRANCH NERVES INNERVATING LUMBAR FACET JOINT Bilateral 5/5/2023    Procedure: MBB #2 (B/L) L3,4,5;  Surgeon: Son Lara DO;  Location: [facility] OR;  Service: Pain Management;  Laterality: Bilateral;  Oral Xanax    INJECTION OF JOINT Right 5/20/2022    Procedure: Right SI joint injection;  Surgeon: Son Lara DO;  Location: [facility] OR;  Service: Pain Management;  Laterality: Right;    INJECTION, SACROILIAC JOINT Right 12/19/2023    Procedure: RT SI joint Inj;  Surgeon: Son Lara DO;  [facility]: [facility] PAIN MANAGEMENT;  Service: Pain Management;  Laterality: Right;  oral    INSERTION OF IMPLANTABLE LOOP RECORDER Left 6/21/2024    Procedure: Insertion, Implantable Loop Recorder;  Surgeon: Hunter Rich MD;  Location: [facility] EP LAB;  Service: Cardiology;  Laterality: Left;  CVA, ILR, BSCI, Local, PA, 3 Prep    INSERTION OF IMPLANTABLE LOOP RECORDER Left 8/30/2024    Procedure: Insertion, Implantable Loop Recorder;  Surgeon: Hunter Rich MD;  Location: [facility] EP LAB;  Service: Cardiology;  Laterality: Left;  CVA, ILR,MDT, RN Sedate, PA, 3 Prep    RADIOFREQUENCY ABLATION OF LUMBAR MEDIAL BRANCH NERVE AT SINGLE LEVEL Bilateral  5/19/2023    Procedure: RFA (B/L) L3,4,5;  Surgeon: Son Lara DO;  Location: [facility] PAIN MANAGEMENT;  Service: Pain Management;  Laterality: Bilateral;    REMOVAL OF IMPLANTABLE LOOP RECORDER N/A 7/10/2024    Procedure: REMOVAL, IMPLANTABLE LOOP RECORDER;  Surgeon: Hunter Rich MD;  Location: [facility] EP LAB;  Service: Cardiology;  Laterality: N/A;       Review of patient's allergies indicates:   Allergen Reactions    Lisinopril-hydrochlorothiazide Other (See Comments)     Pt stated it makes her feel drained. She couldn't raise arms over head.    Ampicillin Rash    Darvocet a500 [propoxyphene n-acetaminophen] Other (See Comments)     kierstenky       No current facility-administered medications on file prior to encounter.     Current Outpatient Medications on File Prior to Encounter   Medication Sig    ACCU-CHEK GUIDE GLUCOSE METER Misc TO CHECK BLOOD GLUCOSE DAILY, TO USE WITH INSURANCE PREFERRED METER    amLODIPine (NORVASC) 10 MG tablet TAKE 1 TABLET BY MOUTH ONCE DAILY. HOLD IF SBP <120    ascorbic acid, vitamin C, (VITAMIN C) 500 MG tablet Take 500 mg by mouth once daily.    aspirin (ECOTRIN) 81 MG EC tablet Take 1 tablet (81 mg total) by mouth once daily.    atorvastatin (LIPITOR) 80 MG tablet TAKE 1 TABLET (80 MG TOTAL) BY MOUTH ONCE DAILY.    benzonatate (TESSALON) 200 MG capsule Take 1 capsule (200 mg total) by mouth 3 (three) times daily as needed for Cough.    BLOOD PRESSURE CUFF Misc 1 Units by Misc.(Non-Drug; Combo Route) route once daily.    blood sugar diagnostic Strp To check BG daily, to use with insurance preferred meter    blood sugar diagnostic Strp To check BG daily, to use with insurance preferred meter    blood-glucose meter kit To check BG daily, to use with insurance preferred meter    blood-glucose meter kit Use as instructed    cyclobenzaprine (FLEXERIL) 10 MG tablet TAKE 1 TABLET (10 MG TOTAL) BY MOUTH 2 (TWO) TIMES DAILY AS NEEDED FOR MUSCLE SPASMS (BACK PAIN).    empagliflozin  (JARDIANCE) 25 mg tablet Take 1 tablet (25 mg total) by mouth once daily.    hydrALAZINE (APRESOLINE) 50 MG tablet Take 1 tablet (50 mg total) by mouth every 8 (eight) hours. Hold is SBP <120    insulin lispro protamine-insulin lispro (HUMALOG MIX 75-25,U-100,INSULN) 100 unit/mL (75-25) Susp Inject 10 Units into the skin 2 (two) times daily before meals.    lancets Misc To check BG daily, to use with insurance preferred meter    lancets Misc To check BG daily, to use with insurance preferred meter    losartan (COZAAR) 100 MG tablet Take 1 tablet (100 mg total) by mouth once daily. Hold if SBP <120    meclizine (ANTIVERT) 25 mg tablet Take 1 tablet (25 mg total) by mouth 2 (two) times daily as needed for Dizziness.    metoprolol succinate (TOPROL-XL) 25 MG 24 hr tablet Take 1 tablet (25 mg total) by mouth once daily.    multivitamin (THERAGRAN) per tablet Take 1 tablet by mouth once daily.    ofloxacin (OCUFLOX) 0.3 % ophthalmic solution     prednisoLONE acetate (PRED FORTE) 1 % DrpS     promethazine-dextromethorphan (PROMETHAZINE-DM) 6.25-15 mg/5 mL Syrp Take 5 mLs by mouth nightly as needed (cough).     Family History       Problem Relation (Age of Onset)    Cancer Father, Maternal Aunt    Heart disease Mother    Hypertension Brother, Daughter    Kidney disease Mother          Tobacco Use    Smoking status: Former     Current packs/day: 0.00     Types: Cigarettes     Quit date: 2022     Years since quittin.9     Passive exposure: Past    Smokeless tobacco: Never    Tobacco comments:     Patient Quit Smoking on 2022.   Substance and Sexual Activity    Alcohol use: Not Currently    Drug use: No    Sexual activity: Not Currently     Partners: Male     Review of Systems   Constitutional:  Positive for chills.   Respiratory:  Positive for cough and shortness of breath.    Cardiovascular: Negative.  Negative for leg swelling.   Gastrointestinal: Negative.    Genitourinary: Negative.    Musculoskeletal:  Negative.    Neurological: Negative.      Objective:     Vital Signs (Most Recent):  Pulse: 90 (01/03/25 2152)  Resp: (!) 44 (01/03/25 2152)  BP: (!) 132/93 (01/03/25 2030)  SpO2: 100 % (01/03/25 2152) Vital Signs (24h Range):  Pulse:  [] 90  Resp:  [43-45] 44  SpO2:  [85 %-100 %] 100 %  BP: (132)/(93) 132/93        There is no height or weight on file to calculate BMI.     Physical Exam  Vitals and nursing note reviewed.   Constitutional:       General: She is not in acute distress.     Appearance: She is not ill-appearing.   HENT:      Mouth/Throat:      Mouth: Mucous membranes are moist.   Cardiovascular:      Rate and Rhythm: Normal rate.   Pulmonary:      Effort: Pulmonary effort is normal.      Breath sounds: Rales present.   Abdominal:      General: Abdomen is flat.   Skin:     General: Skin is warm.   Neurological:      General: No focal deficit present.      Mental Status: She is alert.                Significant Labs: All pertinent labs within the past 24 hours have been reviewed.    Significant Imaging: I have reviewed all pertinent imaging results/findings within the past 24 hours.

## 2025-01-04 NOTE — ASSESSMENT & PLAN NOTE
Creatine 2.3, today 2.1, baseline creatinine 1.3,  suspect cardiorenal syndrome;  Estimated Creatinine Clearance: 26.3 mL/min (A) (based on SCr of 2.1 mg/dL (H)).  Monitor UOP and serial BMP and adjust therapy as needed.   Renally dose meds.   Avoid nephrotoxic medications and procedures.

## 2025-01-04 NOTE — PROGRESS NOTES
[facility] Medicine  Progress Note    Patient Name: Joanne Peña  MRN: [id number]  Patient Class: IP- Inpatient   Admission Date: 1/3/2025  Length of Stay: 1 days  Attending Physician: Price Moreno MD  Primary Care Provider: Teri Soto DO        Subjective     Principal Problem:Acute hypoxic respiratory failure        HPI:  This is a 75-year-old female with a past medical COPD, HFrEF (EF: 40-45%), hypertension, hyperlipidemia, type 2 diabetes, CKD 3, history of CVA, who presents with shortness of breath.     Patient rpesents for evaluation of shortness of breath that worsened on the day of presentation. She reports having chronic dyspnea for about 2 years. Associated symptoms include a productive cough with clear/yellow sputum that started 4 days prior. She reports subjective fevers, chills but denied chest pain. She denied sick contacts. She initially presented to her PCP on 12/30 for these symptoms. She tested negative for Covid-19 and was diagnosed with a viral URI. She is not currently taking a diuretic at home.  Patient quit smoking 2 years ago.     In the ED, the patient was tachycardic (120s > 90s), tachypneic (40s) and was placed on BiPAP.  Labs were remarkable for an elevated BNP (2141), elevated troponin (0.031), elevated creatinine (2.2-baseline around 2.0), hyperglycemia (310), normocytic anemia (11.2).  Chest x-ray showed findings concerning for volume overload.  Patient was given Lasix 40 mg IV, aspirin 325 mg p.o..  She was admitted for further management.    Overview/Hospital Course:  Patient admitted with AHHRF 2/2 HF with BNP >2000, and MESSI with Cr 2.3 and K rising to 5.3. IV diuresis given. Hypotensive, GDMT stopped for now.     Interval History:  NAEON.  No new issues.   CC- SOB  All questions answered and updates on care given.       ROS:  General: Negative for fevers   Cardiac: Negative for chest pain   Pulmonary: Negative for wheezing  GI: Negative for  abdominal distention      Vitals:    01/04/25 0530 01/04/25 0545 01/04/25 0600 01/04/25 0701   BP: 105/69 101/68 103/70 (!) 98/56   Pulse: 77 76 84 77   Resp: 20 20 20 18   Temp:    97.9 °F (36.6 °C)   TempSrc:    Oral   SpO2: 96% 95% 97% 100%   Weight:    79.8 kg (175 lb 14.8 oz)   Height:              Body mass index is 27.55 kg/m².      PHYSICAL EXAM:  GENERAL APPEARANCE: alert and cooperative.     HEAD: NC/AT  CARDIAC: There is no cyanosis or pallor.   LUNGS: No apparent wheezing or stridor.  Crackles b/l in mid/lower  ABDOMEN: Non-distended. No guarding.  MSK: No joint erythema or tenderness.   EXTREMITIES: No significant new deformity or new joint abnormality.   NEUROLOGICAL: CN II-XII grossly intact.   SKIN: No lesions or eruptions.  PSYCHIATRIC: No tangential speech. No Hyperactive features.        Recent Results (from the past 24 hours)   RSV Antigen Detection Nasopharyngeal Swab    Collection Time: 01/03/25  8:34 PM   Result Value Ref Range    RSV Source Nasopharyngeal Swab     RSV Ag by Molecular Method Negative Negative   CBC Auto Differential    Collection Time: 01/03/25  8:42 PM   Result Value Ref Range    WBC 10.27 3.90 - 12.70 K/uL    RBC 4.22 4.00 - 5.40 M/uL    Hemoglobin 11.2 (L) 12.0 - 16.0 g/dL    Hematocrit 37.3 37.0 - 48.5 %    MCV 88 82 - 98 fL    MCH 26.5 (L) 27.0 - 31.0 pg    MCHC 30.0 (L) 32.0 - 36.0 g/dL    RDW 16.3 (H) 11.5 - 14.5 %    Platelets 495 (H) 150 - 450 K/uL    MPV 9.8 9.2 - 12.9 fL    Immature Granulocytes 0.8 (H) 0.0 - 0.5 %    Gran # (ANC) 5.4 1.8 - 7.7 K/uL    Immature Grans (Abs) 0.08 (H) 0.00 - 0.04 K/uL    Lymph # 3.6 1.0 - 4.8 K/uL    Mono # 1.0 0.3 - 1.0 K/uL    Eos # 0.2 0.0 - 0.5 K/uL    Baso # 0.06 0.00 - 0.20 K/uL    nRBC 2 (A) 0 /100 WBC    Gran % 52.2 38.0 - 73.0 %    Lymph % 35.2 18.0 - 48.0 %    Mono % 9.3 4.0 - 15.0 %    Eosinophil % 1.9 0.0 - 8.0 %    Basophil % 0.6 0.0 - 1.9 %    Differential Method Automated    Comprehensive Metabolic Panel    Collection  Time: 01/03/25  8:42 PM   Result Value Ref Range    Sodium 142 136 - 145 mmol/L    Potassium 4.4 3.5 - 5.1 mmol/L    Chloride 112 (H) 95 - 110 mmol/L    CO2 18 (L) 23 - 29 mmol/L    Glucose 310 (H) 70 - 110 mg/dL    BUN 36 (H) 8 - 23 mg/dL    Creatinine 2.3 (H) 0.5 - 1.4 mg/dL    Calcium 9.3 8.7 - 10.5 mg/dL    Total Protein 8.0 6.0 - 8.4 g/dL    Albumin 3.6 3.5 - 5.2 g/dL    Total Bilirubin 0.3 0.1 - 1.0 mg/dL    Alkaline Phosphatase 102 40 - 150 U/L    AST 26 10 - 40 U/L    ALT 20 10 - 44 U/L    eGFR 22 (A) >60 mL/min/1.73 m^2    Anion Gap 12 8 - 16 mmol/L   Troponin I    Collection Time: 01/03/25  8:42 PM   Result Value Ref Range    Troponin I 0.031 (H) 0.000 - 0.026 ng/mL   Brain Natriuretic Peptide    Collection Time: 01/03/25  8:42 PM   Result Value Ref Range    BNP 2,141 (H) 0 - 99 pg/mL   Magnesium    Collection Time: 01/03/25  8:42 PM   Result Value Ref Range    Magnesium 2.3 1.6 - 2.6 mg/dL   POCT COVID-19 Rapid Screening    Collection Time: 01/03/25  9:15 PM   Result Value Ref Range    POC Rapid COVID Negative Negative     Acceptable Yes    ISTAT PROCEDURE    Collection Time: 01/03/25 10:29 PM   Result Value Ref Range    POC PH 7.202 (LL) 7.35 - 7.45    POC PCO2 51.2 (H) 35 - 45 mmHg    POC PO2 29 (LL) 40 - 60 mmHg    POC HCO3 20.1 (L) 24 - 28 mmol/L    POC BE -8 (L) -2 to 2 mmol/L    POC SATURATED O2 42 95 - 100 %    POC TCO2 22 (L) 24 - 29 mmol/L    Rate 12     Sample VENOUS     Site Other     Allens Test N/A     DelSys CPAP/BiPAP     Mode BiPAP     FiO2 60     Spont Rate 38     Sp02 99     IP 14     EP 6    POCT Influenza A/B Molecular    Collection Time: 01/03/25 10:47 PM   Result Value Ref Range    POC Molecular Influenza A Ag Negative Negative    POC Molecular Influenza B Ag Negative Negative     Acceptable Yes    ISTAT PROCEDURE    Collection Time: 01/03/25 11:57 PM   Result Value Ref Range    POC PH 7.302 (L) 7.35 - 7.45    POC PCO2 32.9 (L) 35 - 45 mmHg    POC PO2 57  (LL) 80 - 100 mmHg    POC HCO3 16.3 (L) 24 - 28 mmol/L    POC BE -9 (L) -2 to 2 mmol/L    POC SATURATED O2 87 95 - 100 %    POC TCO2 17 (L) 23 - 27 mmol/L    Sample ARTERIAL     Site RB     Allens Test Pass     DelSys Venti Mask     Mode SPONT     FiO2 50    Phosphorus    Collection Time: 01/04/25  4:17 AM   Result Value Ref Range    Phosphorus 4.7 (H) 2.7 - 4.5 mg/dL   Magnesium    Collection Time: 01/04/25  4:17 AM   Result Value Ref Range    Magnesium 2.0 1.6 - 2.6 mg/dL   Comprehensive Metabolic Panel    Collection Time: 01/04/25  4:17 AM   Result Value Ref Range    Sodium 138 136 - 145 mmol/L    Potassium 5.3 (H) 3.5 - 5.1 mmol/L    Chloride 112 (H) 95 - 110 mmol/L    CO2 15 (L) 23 - 29 mmol/L    Glucose 185 (H) 70 - 110 mg/dL    BUN 40 (H) 8 - 23 mg/dL    Creatinine 2.1 (H) 0.5 - 1.4 mg/dL    Calcium 8.8 8.7 - 10.5 mg/dL    Total Protein 7.0 6.0 - 8.4 g/dL    Albumin 3.3 (L) 3.5 - 5.2 g/dL    Total Bilirubin 0.3 0.1 - 1.0 mg/dL    Alkaline Phosphatase 83 40 - 150 U/L    AST 15 10 - 40 U/L    ALT 17 10 - 44 U/L    eGFR 25 (A) >60 mL/min/1.73 m^2    Anion Gap 11 8 - 16 mmol/L   CBC Auto Differential    Collection Time: 01/04/25  4:17 AM   Result Value Ref Range    WBC 8.94 3.90 - 12.70 K/uL    RBC 3.72 (L) 4.00 - 5.40 M/uL    Hemoglobin 9.4 (L) 12.0 - 16.0 g/dL    Hematocrit 32.3 (L) 37.0 - 48.5 %    MCV 87 82 - 98 fL    MCH 25.3 (L) 27.0 - 31.0 pg    MCHC 29.1 (L) 32.0 - 36.0 g/dL    RDW 16.2 (H) 11.5 - 14.5 %    Platelets 378 150 - 450 K/uL    MPV 9.7 9.2 - 12.9 fL    Immature Granulocytes 0.4 0.0 - 0.5 %    Gran # (ANC) 7.1 1.8 - 7.7 K/uL    Immature Grans (Abs) 0.04 0.00 - 0.04 K/uL    Lymph # 1.2 1.0 - 4.8 K/uL    Mono # 0.6 0.3 - 1.0 K/uL    Eos # 0.0 0.0 - 0.5 K/uL    Baso # 0.02 0.00 - 0.20 K/uL    nRBC 1 (A) 0 /100 WBC    Gran % 79.0 (H) 38.0 - 73.0 %    Lymph % 13.8 (L) 18.0 - 48.0 %    Mono % 6.4 4.0 - 15.0 %    Eosinophil % 0.2 0.0 - 8.0 %    Basophil % 0.2 0.0 - 1.9 %    Differential Method  Automated    POCT glucose    Collection Time: 01/04/25  7:22 AM   Result Value Ref Range    POCT Glucose 145 (H) 70 - 110 mg/dL       Microbiology Results (last 7 days)       ** No results found for the last 168 hours. **             Imaging Results              X-Ray Chest 1 View (Final result)  Result time 01/03/25 21:42:01      Final result by Jewell Benedict MD (01/03/25 21:42:01)                   Impression:      Findings are concerning for CHF.  Possibility of a left basilar consolidation, atelectasis, and/or fluid cannot be entirely excluded.      Electronically signed by: Jewell Benedict  Date:    01/03/2025  Time:    21:42               Narrative:    EXAMINATION:  CHEST ONE VIEW    CLINICAL HISTORY:  shortness of breath;    TECHNIQUE:  One view of the chest.    COMPARISON:  08/15/2023 and 02/06/2022    FINDINGS:  A left cardiac loop recorder is present.  The cardiac silhouette is enlarged.  There is pulmonary edema or pulmonary vascular congestion.  There is obscuration of the left costophrenic angle in particular, which may suggest pleural effusion, infiltrate, and or atelectasis.  There is no pneumothorax.  The bones are diffusely osteopenic.                                             Assessment and Plan     * Acute respiratory failure with hypoxia and hypercarbia  Patient with Hypoxic Respiratory failure which is Acute.  she is not on home oxygen. Supplemental oxygen was provided and noted- Oxygen Concentration (%):  [] 60    .   Signs/symptoms of respiratory failure include- tachypnea and increased work of breathing. Contributing diagnoses includes - CHF Labs and images were reviewed. Patient Has recent ABG, which has been reviewed. Will treat underlying causes and adjust management of respiratory failure as follows- See plan for HF, wean BiPAP as tolerated     Acute kidney injury superimposed on chronic kidney disease  Creatine 2.3 on admit and K rising, suspect renal congestion from  HF  IV diuresis  Estimated Creatinine Clearance: 26.3 mL/min (A) (based on SCr of 2.1 mg/dL (H)).  Monitor UOP and serial BMP and adjust therapy as needed.   Renally dose meds.   Avoid nephrotoxic medications and procedures.  BL Cr 1.3 recently      Acute on chronic systolic heart failure  Results for orders placed during the hospital encounter of 04/18/24    Echo    Interpretation Summary    Left Ventricle: The left ventricle is normal in size. Moderately increased wall thickness. There is moderate concentric hypertrophy. Regional wall motion abnormalities present (basal-mid anteroseptal and inferoseptal HK). There is mildly reduced systolic function with a visually estimated ejection fraction of 40 - 45%. There is diastolic dysfunction but grade cannot be determined.    Right Ventricle: Normal right ventricular cavity size. Systolic function is normal.    BNP  Recent Labs   Lab 01/03/25 2042   BNP 2,141*       Continue diuresis  Monitor I/Os, daily weights   Wean BiPAP as tolerated       Stage 3b chronic kidney disease  See MESSI    Dyslipidemia associated with type 2 diabetes mellitus  Continue statin   Continue home insulin       History of stroke  Continue home medications         VTE Risk Mitigation (From admission, onward)           Ordered     Place sequential compression device  Until discontinued         01/03/25 2224     IP VTE HIGH RISK PATIENT  Once         01/03/25 2224     Place sequential compression device  Until discontinued         01/03/25 2224                    Discharge Planning   ARIANA:      Code Status: Full Code   Medical Readiness for Discharge Date:                Critical care time spent on the evaluation and treatment of severe organ dysfunction, review of pertinent labs and imaging studies, discussions with consulting providers and discussions with patient/family: 35 minutes.            Price Moreno MD  Department of [facility]   [facility] [place] Int[place] Care

## 2025-01-04 NOTE — ASSESSMENT & PLAN NOTE
Results for orders placed during the hospital encounter of 04/18/24    Echo    Interpretation Summary    Left Ventricle: The left ventricle is normal in size. Moderately increased wall thickness. There is moderate concentric hypertrophy. Regional wall motion abnormalities present (basal-mid anteroseptal and inferoseptal HK). There is mildly reduced systolic function with a visually estimated ejection fraction of 40 - 45%. There is diastolic dysfunction but grade cannot be determined.    Right Ventricle: Normal right ventricular cavity size. Systolic function is normal.    BNP  Recent Labs   Lab 01/03/25  2042   BNP 2,141*       Continue diuresis  Monitor I/Os, daily weights   Wean BiPAP as tolerated

## 2025-01-04 NOTE — PLAN OF CARE
Case Management Assessment     PCP: Teri Soto MD  Pharmacy: [facility] [address]    Patient Arrived From: Home  Existing Help at Home: Lives with spouse and adult son     Barriers to Discharge: None    Discharge Plan:    A. Home with family    B. TBD  Pt independent with ADL's, lives with spouse and adult son who assists as needed; No HME currently, but does express need for rolling walker now; pts family to provide transportation upon discharge.     14:31 pm CM answered consult, sent message to Ambika Mckee for pt to be screened for CHF program.    01/04/25 1030   Discharge Assessment   Assessment Type Discharge Planning Assessment   Confirmed/corrected address, phone number and insurance Yes   Confirmed Demographics Correct on Facesheet   Source of Information family   If unable to respond/provide information was family/caregiver contacted? Yes   Contact Name/Number Efrain Cleveland [phone]   Reason For Admission Shortness of Breath   People in Home child(miguel angel), adult;spouse   Facility Arrived From: home   Do you expect to return to your current living situation? Yes   Do you have help at home or someone to help you manage your care at home? Yes   Who are your caregiver(s) and their phone number(s)? spouse and pts son   Prior to hospitilization cognitive status: Alert/Oriented   Current cognitive status: Alert/Oriented   Walking or Climbing Stairs Difficulty no   Dressing/Bathing Difficulty no   Home Accessibility wheelchair accessible   Home Layout Able to live on 1st floor   Equipment Currently Used at Home none   Readmission within 30 days? No   Patient currently being followed by outpatient case management? No   Do you currently have service(s) that help you manage your care at home? No   Do you take prescription medications? Yes   Do you have prescription coverage? Yes   Do you have any problems affording any of your prescribed medications? No   Is the patient taking medications as prescribed? yes    Who is going to help you get home at discharge? family   How do you get to doctors appointments? family or friend will provide   Are you on dialysis? No   Do you take coumadin? No   Discharge Plan A Home with family   Discharge Plan B Other  (TBD)   DME Needed Upon Discharge  walker, rolling   Discharge Plan discussed with: Adult children   Transition of Care Barriers None

## 2025-01-04 NOTE — ED PROVIDER NOTES
Encounter Date: 1/3/2025    SCRIBE #1 NOTE: IAve, am scribing for, and in the presence of,  Michael Garcia MD.       History     Chief Complaint   Patient presents with    Shortness of Breath     BIB [facility] for SOB w/ wheezing. Pt given albuterol tx by EMS. Recently seen in the ED for flu-likes s/s.     74 year old female with PMHx of HTN, HLD, DM, CVA, CKD presents to the ED bib EMS with SOB. Pt given 5 mg albuterol with EMS PTA.  Initial history was limited due to the patient's dyspnea.  Per report EMS was called due to shortness of breath.  Per chart review, pt saw her PCP on 12/30/2024 with a cough, postnasal drip, chills and wheezing. Negative COVID swab. Dx with viral URI. Instructed to take antihistamines, flonase, mucinex and dextromethorphan.     The history is provided by medical records.     Review of patient's allergies indicates:   Allergen Reactions    Lisinopril-hydrochlorothiazide Other (See Comments)     Pt stated it makes her feel drained. She couldn't raise arms over head.    Ampicillin Rash    Darvocet a500 [propoxyphene n-acetaminophen] Other (See Comments)     shaky     Past Medical History:   Diagnosis Date    Diabetes mellitus     Hyperlipidemia     Hypertension     Stroke      Past Surgical History:   Procedure Laterality Date    COLONOSCOPY N/A 7/12/2023    Procedure: COLONOSCOPY;  Surgeon: Ray Sorensen MD;  Location: [facility] ENDO;  Service: Endoscopy;  Laterality: N/A;  instr via portal  - PC  4/10/23 Daniel, instr via mail & portal, unable to tolerate Golytely- request Miralax/Gatorade - PC  5/30-pt r/s-new instr portal-tb    INJECTION OF ANESTHETIC AGENT AROUND MEDIAL BRANCH NERVES INNERVATING LUMBAR FACET JOINT Bilateral 4/20/2023    Procedure: MBB #1 (B/L) L3,4,5;  Surgeon: Son Lara DO;  Location: [facility] OR;  Service: Pain Management;  Laterality: Bilateral;  ORAL XANAX    INJECTION OF ANESTHETIC AGENT AROUND MEDIAL BRANCH NERVES INNERVATING LUMBAR FACET JOINT  Bilateral 2023    Procedure: MBB #2 (B/L) L3,4,5;  Surgeon: Son Lara DO;  Location: [facility] OR;  Service: Pain Management;  Laterality: Bilateral;  Oral Xanax    INJECTION OF JOINT Right 2022    Procedure: Right SI joint injection;  Surgeon: Son Lara DO;  Location: [facility] OR;  Service: Pain Management;  Laterality: Right;    INJECTION, SACROILIAC JOINT Right 2023    Procedure: RT SI joint Inj;  Surgeon: Son Lara DO;  [facility];  Service: Pain Management;  Laterality: Right;  oral    INSERTION OF IMPLANTABLE LOOP RECORDER Left 2024    Procedure: Insertion, Implantable Loop Recorder;  Surgeon: Hunter Rich MD;  Location: [facility] EP LAB;  Service: Cardiology;  Laterality: Left;  CVA, ILR, [place]SC[place], [place], [place], 3 Prep    INSERTION OF IMPLANTABLE LOOP RECORDER Left 2024    Procedure: Insertion, Implantable Loop Recorder;  Surgeon: Hunter Rich MD;  Location: [facility] EP LAB;  Service: Cardiology;  Laterality: Left;  CVRODNEY MUELLER,MDT, RN [place], [place], 3 Prep    RADIOFREQUENCY ABLATION OF LUMBAR MEDIAL BRANCH NERVE AT SINGLE LEVEL Bilateral 2023    Procedure: RFA (B/L) L3,4,5;  Surgeon: Son Lara DO;  [facility];  Service: Pain Management;  Laterality: Bilateral;    REMOVAL OF IMPLANTABLE LOOP RECORDER N/A 7/10/2024    Procedure: REMOVAL, IMPLANTABLE LOOP RECORDER;  Surgeon: Hunter Rich MD;  Location: [facility] EP LAB;  Service: Cardiology;  Laterality: N/A;     Family History   Problem Relation Name Age of Onset    Kidney disease Mother      Heart disease Mother      Cancer Father      Hypertension Brother      Hypertension Daughter      Cancer Maternal Aunt       Social History     Tobacco Use    Smoking status: Former     Current packs/day: 0.00     Types: Cigarettes     Quit date: 2022     Years since quittin.9     Passive exposure: Past    Smokeless tobacco: Never    Tobacco comments:     Patient Quit  Smoking on 02/06/2022.   Substance Use Topics    Alcohol use: Not Currently    Drug use: No     Review of Systems   Unable to perform ROS: Acuity of condition   Respiratory:  Positive for cough and shortness of breath.    Cardiovascular:  Positive for chest pain.   Gastrointestinal:  Negative for abdominal pain and nausea.   Musculoskeletal:  Negative for back pain.   Skin:  Negative for rash.   Neurological:  Negative for dizziness and headaches.       Physical Exam     Initial Vitals   BP Pulse Resp Temp SpO2   01/03/25 2030 01/03/25 2030 01/03/25 2030 01/03/25 2345 01/03/25 2014   (!) 132/93 (!) 126 (!) 45 97.6 °F (36.4 °C) (!) 85 %      MAP       --                Physical Exam    Nursing note and vitals reviewed.  Constitutional: She appears well-developed and well-nourished.  Non-toxic appearance. She appears ill. No distress.   HENT:   Head: Normocephalic.   Eyes: Conjunctivae and EOM are normal.   Neck:   Normal range of motion.  Cardiovascular:  Regular rhythm and normal heart sounds.   Tachycardia present.         No murmur heard.  Pulmonary/Chest: Tachypnea noted. She is in respiratory distress. She has no wheezes. She has rhonchi (diffuse) in the right upper field and the left upper field.   Abdominal: Abdomen is soft. There is no abdominal tenderness.   Musculoskeletal:         General: No edema.      Cervical back: Normal range of motion.      Comments: No lower extremity edema bilaterally     Neurological: She is alert. GCS eye subscore is 4. GCS verbal subscore is 5. GCS motor subscore is 6.   Skin: Skin is warm.         ED Course   Critical Care    Date/Time: 1/5/2025 2:56 AM    Performed by: Michael Garcia MD  Authorized by: Michael Garcia MD  Direct patient critical care time: 15 minutes  Ordering / reviewing critical care time: 10 minutes  Documentation critical care time: 10 minutes  Total critical care time (exclusive of procedural time) : 35 minutes  Critical care time was exclusive of  separately billable procedures and treating other patients and teaching time.  Critical care was necessary to treat or prevent imminent or life-threatening deterioration of the following conditions: respiratory failure.  Critical care was time spent personally by me on the following activities: blood draw for specimens, development of treatment plan with patient or surrogate, obtaining history from patient or surrogate, ordering and performing treatments and interventions, ordering and review of laboratory studies, ordering and review of radiographic studies, pulse oximetry, re-evaluation of patient's condition, examination of patient, evaluation of patient's response to treatment and ventilator management.  Comments: Acute hypoxic respiratory failure requiring NIPPV        Labs Reviewed   CBC W/ AUTO DIFFERENTIAL - Abnormal       Result Value    WBC 10.27      RBC 4.22      Hemoglobin 11.2 (*)     Hematocrit 37.3      MCV 88      MCH 26.5 (*)     MCHC 30.0 (*)     RDW 16.3 (*)     Platelets 495 (*)     MPV 9.8      Immature Granulocytes 0.8 (*)     Gran # (ANC) 5.4      Immature Grans (Abs) 0.08 (*)     Lymph # 3.6      Mono # 1.0      Eos # 0.2      Baso # 0.06      nRBC 2 (*)     Gran % 52.2      Lymph % 35.2      Mono % 9.3      Eosinophil % 1.9      Basophil % 0.6      Differential Method Automated     COMPREHENSIVE METABOLIC PANEL - Abnormal    Sodium 142      Potassium 4.4      Chloride 112 (*)     CO2 18 (*)     Glucose 310 (*)     BUN 36 (*)     Creatinine 2.3 (*)     Calcium 9.3      Total Protein 8.0      Albumin 3.6      Total Bilirubin 0.3      Alkaline Phosphatase 102      AST 26      ALT 20      eGFR 22 (*)     Anion Gap 12     TROPONIN I - Abnormal    Troponin I 0.031 (*)    B-TYPE NATRIURETIC PEPTIDE - Abnormal    BNP 2,141 (*)    ISTAT PROCEDURE - Abnormal    POC PH 7.202 (*)     POC PCO2 51.2 (*)     POC PO2 29 (*)     POC HCO3 20.1 (*)     POC BE -8 (*)     POC SATURATED O2 42      POC TCO2 22  (*)     Rate 12      Sample VENOUS      Site Other      Allens Test N/A      DelSys CPAP/BiPAP      Mode BiPAP      FiO2 60      Spont Rate 38      Sp02 99      IP 14      EP 6     RSV ANTIGEN DETECTION    RSV Source Nasopharyngeal Swab      RSV Ag by Molecular Method Negative     MAGNESIUM    Magnesium 2.3     SARS-COV-2 RDRP GENE    POC Rapid COVID Negative       Acceptable Yes     POCT INFLUENZA A/B MOLECULAR    POC Molecular Influenza A Ag Negative      POC Molecular Influenza B Ag Negative       Acceptable Yes     POCT GLUCOSE MONITORING CONTINUOUS          Imaging Results              X-Ray Chest 1 View (Final result)  Result time 01/03/25 21:42:01      Final result by Jewell Benedict MD (01/03/25 21:42:01)                   Impression:      Findings are concerning for CHF.  Possibility of a left basilar consolidation, atelectasis, and/or fluid cannot be entirely excluded.      Electronically signed by: Jewell Benedict  Date:    01/03/2025  Time:    21:42               Narrative:    EXAMINATION:  CHEST ONE VIEW    CLINICAL HISTORY:  shortness of breath;    TECHNIQUE:  One view of the chest.    COMPARISON:  08/15/2023 and 02/06/2022    FINDINGS:  A left cardiac loop recorder is present.  The cardiac silhouette is enlarged.  There is pulmonary edema or pulmonary vascular congestion.  There is obscuration of the left costophrenic angle in particular, which may suggest pleural effusion, infiltrate, and or atelectasis.  There is no pneumothorax.  The bones are diffusely osteopenic.                                       Medications   aspirin EC tablet 81 mg (81 mg Oral Given 1/4/25 0802)   atorvastatin tablet 80 mg (80 mg Oral Given 1/4/25 0802)   insulin aspart protamine-insulin aspart (NovoLOG 70/30) injection (10 Units Subcutaneous Given 1/4/25 1620)   furosemide injection 40 mg (40 mg Intravenous Given 1/4/25 2041)   sodium chloride 0.9% flush 10 mL (has no administration in  time range)   melatonin tablet 6 mg (has no administration in time range)   ondansetron injection 4 mg (has no administration in time range)   senna-docusate 8.6-50 mg per tablet 1 tablet (has no administration in time range)   bisacodyL suppository 10 mg (has no administration in time range)   acetaminophen tablet 650 mg (has no administration in time range)   simethicone chewable tablet 80 mg (has no administration in time range)   aluminum-magnesium hydroxide-simethicone 200-200-20 mg/5 mL suspension 30 mL (has no administration in time range)   acetaminophen tablet 650 mg (has no administration in time range)   naloxone 0.4 mg/mL injection 0.02 mg (has no administration in time range)   potassium bicarbonate disintegrating tablet 50 mEq (has no administration in time range)   potassium bicarbonate disintegrating tablet 35 mEq (has no administration in time range)   potassium bicarbonate disintegrating tablet 60 mEq (has no administration in time range)   magnesium oxide tablet 800 mg (has no administration in time range)   magnesium oxide tablet 800 mg (has no administration in time range)   potassium, sodium phosphates 280-160-250 mg packet 2 packet (has no administration in time range)   potassium, sodium phosphates 280-160-250 mg packet 2 packet (has no administration in time range)   potassium, sodium phosphates 280-160-250 mg packet 2 packet (has no administration in time range)   glucose chewable tablet 16 g (has no administration in time range)   glucose chewable tablet 24 g (has no administration in time range)   glucagon (human recombinant) injection 1 mg (has no administration in time range)   sodium chloride 0.9% flush 10 mL (has no administration in time range)   insulin aspart U-100 pen 0-5 Units (3 Units Subcutaneous Given 1/4/25 1623)   furosemide injection 40 mg (40 mg Intravenous Given 1/3/25 2216)   aspirin tablet 325 mg (325 mg Oral Given 1/3/25 2217)   furosemide injection 60 mg (60 mg  Intravenous Given 1/4/25 0231)   sodium zirconium cyclosilicate packet 10 g (10 g Oral Given 1/4/25 9359)     Medical Decision Making    74-year-old female presenting with acute onset of dyspnea.  All history was initially obtained from EMS.  On initial exam the patient appeared to be in severe respiratory distress with diffuse rhonchi and hypoxia.  The patient was initially transitioned to non-rebreather and then transitioned to BiPAP.  The patient's respiratory status improved.  Chest x-ray shows multiple areas of interstitial edema.  Patient was provided diuretics.  Higher suspicion for pulmonary edema rather than infectious etiology at this time.    Medical Decision Making:     A. Problem List:  1. Acute pulmonary edema  2. Acute hypoxic respiratory failure     B. Differential diagnosis:    Cardiomyopathy, URI, CHF, ACS     C. Independent historians:   History obtained from EMS from transport    ECG:  Please check workup area for ECG interpretation.    Part of the note was done using electronic dictation services.           Amount and/or Complexity of Data Reviewed  External Data Reviewed: notes.     Details: See HPI  Labs: ordered. Decision-making details documented in ED Course.  Radiology: ordered. Decision-making details documented in ED Course.  ECG/medicine tests: ordered and independent interpretation performed. Decision-making details documented in ED Course.    Risk  OTC drugs.  Prescription drug management.  Decision regarding hospitalization.            Scribe Attestation:   Scribe #1: I performed the above scribed service and the documentation accurately describes the services I performed. I attest to the accuracy of the note.        ED Course as of 01/05/25 0300   Fri Jan 03, 2025 2148 Troponin I(!): 0.031 [JM]   2148 BNP(!): 2,141 [JM]   2148 Potassium: 4.4 [JM]   Sun Jan 05, 2025   0259 EKG 12-lead  Time 8:24 p.m.     Rate 126, sinus, regular rhythm, right axis deviation.    QTC  504.  ST-elevation V1, V2, V3.  ST depression lead 3, AVF, V6.      Sinus tachycardia with left bundle-branch block, right axis deviation [JM]      ED Course User Index  [JM] Michael Garcia MD                       I, Michael Garcia, personally performed the services described in this documentation. All medical record entries made by the scribe were at my direction and in my presence. I have reviewed the chart and agree that the record reflects my personal performance and is accurate and complete.      DISCLAIMER: This note was prepared with [employer] voice recognition transcription software. Garbled syntax, mangled pronouns, and other bizarre constructions may be attributed to that software system.        Clinical Impression:  Final diagnoses:  [R06.02] Shortness of breath  [J96.01] Acute hypoxic respiratory failure  [J81.0] Acute pulmonary edema (Primary)          ED Disposition Condition    Admit Stable                Michael Garcia MD  01/05/25 [id number]

## 2025-01-04 NOTE — HPI
This is a 75-year-old female with a past medical COPD, HFrEF (EF: 40-45%), hypertension, hyperlipidemia, type 2 diabetes, CKD 3, history of CVA, who presents with shortness of breath.     Patient rpesents for evaluation of shortness of breath that worsened on the day of presentation. She reports having chronic dyspnea for about 2 years. Associated symptoms include a productive cough with clear/yellow sputum that started 4 days prior. She reports subjective fevers, chills but denied chest pain. She denied sick contacts. She initially presented to her PCP on 12/30 for these symptoms. She tested negative for Covid-19 and was diagnosed with a viral URI. She is not currently taking a diuretic at home.  Patient quit smoking 2 years ago.     In the ED, the patient was tachycardic (120s > 90s), tachypneic (40s) and was placed on BiPAP.  Labs were remarkable for an elevated BNP (2141), elevated troponin (0.031), elevated creatinine (2.2-baseline around 2.0), hyperglycemia (310), normocytic anemia (11.2).  Chest x-ray showed findings concerning for volume overload.  Patient was given Lasix 40 mg IV, aspirin 325 mg p.o..  She was admitted for further management.

## 2025-01-04 NOTE — HOSPITAL COURSE
Patient admitted with AHHRF 2/2 HF with BNP >2000, and MESSI with Cr 2.3 and K rising to 5.3. IV diuresis given in the ICU.  . Hypotensive, GDMT stopped for now. Good UOP, initially on BiPAP; currently on room air.  Downgraded to telemetry status on 01/06.   Patient has continued to do well and remains on RA.  Lasix transitioned to oral.  BP improved, but remains borderline.  Discussed GDMT with Cardiology.  Will restart Toprol XL at lower dose.  Patient is currently on Losartan.  Patient's Creat has been increasing over past few months.  Will hold Losartan upon discharge and start low dose Hydralazine.  She has remained afebrile and hemodynamically stable.  Patient will be discharged home to closely follow up with Cardiology and PCP.

## 2025-01-04 NOTE — EICU
"EICU Note    74 y/o female with a PMH of CPD, HFrEF, HTN, DM type 2, CKD stage 3, HLD and history of stroke presents with complaints of shortness of breath on the day of admission with cough with clear to yellow sputum that started 4 days PTA. Placed on BIPAP in the ED.    Currently;    /68   Pulse 79   Temp 97.6 °F (36.4 °C) (Axillary)   Resp (!) 25   Ht 5' 7" (1.702 m)   Wt 79.4 kg (175 lb 0.7 oz)   SpO2 (!) 88%   BMI 27.42 kg/m²     Labs:    CBC: WBC 10.27/ Hgb 11.2/ Hct 37.3/ plts 495 K    BMP: Na 142/ K 4.4/ / CO2 18/ BUN 36/ Cr 2.3/ glucose 310    BNP: 2141  Troponin I : 0.031    Chest xray: Final reading  Impression:  Findings are concerning for CHF.  Possibility of a left basilar consolidation, atelectasis, and/or fluid cannot be entirely excluded.    ABG on venti mask 50% FIO2: pH 7.30/ pCO2 32.9/ pO2 57    Impression:    -Acute hypoxic respiratory failure  -CHF  -CKD    Plan: Continue present management of diuresis and glucose control  "

## 2025-01-04 NOTE — NURSING
[facility] [place]  Nurses Note -- 4 Eyes      1/3/2025       Skin assessed on: Q Shift      [x] No Pressure Injuries Present    [x]Prevention Measures Documented    [] Yes LDA  for Pressure Injury Previously documented     [] Yes New Pressure Injury Discovered   [] LDA for New Pressure Injury Added      Attending RN:  Juliet Pfeiffer RN     Second RN:  LUIS CARLOS Melo

## 2025-01-04 NOTE — H&P
[facility] Emergency [facility], [place]  [facility] Medicine  History & Physical    Patient Name: Joanne Peña  MRN: [id number]  Patient Class: IP- Inpatient  Admission Date: 1/3/2025  Attending Physician: Michael Garcia MD   Primary Care Provider: Teri Soto DO         Patient information was obtained from patient and ER records.     Subjective:     Principal Problem:Acute hypoxic respiratory failure    Chief Complaint:   Chief Complaint   Patient presents with    Shortness of Breath     BIB [facility] for SOB w/ wheezing. Pt given albuterol tx by EMS. Recently seen in the ED for flu-likes s/s.        HPI: This is a 75-year-old female with a past medical COPD, HFrEF (EF: 40-45%), hypertension, hyperlipidemia, type 2 diabetes, CKD 3, history of CVA, who presents with shortness of breath.     Patient rpesents for evaluation of shortness of breath that worsened on the day of presentation. She reports having chronic dyspnea for about 2 years. Associated symptoms include a productive cough with clear/yellow sputum that started 4 days prior. She reports subjective fevers, chills but denied chest pain. She denied sick contacts. She initially presented to her PCP on 12/30 for these symptoms. She tested negative for Covid-19 and was diagnosed with a viral URI. She is not currently taking a diuretic at home.  Patient quit smoking 2 years ago.     In the ED, the patient was tachycardic (120s > 90s), tachypneic (40s) and was placed on BiPAP.  Labs were remarkable for an elevated BNP (2141), elevated troponin (0.031), elevated creatinine (2.2-baseline around 2.0), hyperglycemia (310), normocytic anemia (11.2).  Chest x-ray showed findings concerning for volume overload.  Patient was given Lasix 40 mg IV, aspirin 325 mg p.o..  She was admitted for further management.    Past Medical History:   Diagnosis Date    Diabetes mellitus     Hyperlipidemia     Hypertension     Stroke        Past Surgical History:   Procedure Laterality Date     COLONOSCOPY N/A 7/12/2023    Procedure: COLONOSCOPY;  Surgeon: Ray Sorensen MD;  Location: [facility] ENDO;  Service: Endoscopy;  Laterality: N/A;  instr via portal  - PC  4/10/23 Daniel, instr via mail & portal, unable to tolerate Golytely- request Miralax/Gatorade - PC  5/30-pt r/s-new instr portal-tb    INJECTION OF ANESTHETIC AGENT AROUND MEDIAL BRANCH NERVES INNERVATING LUMBAR FACET JOINT Bilateral 4/20/2023    Procedure: MBB #1 (B/L) L3,4,5;  Surgeon: Son Lara DO;  Location: [facility] OR;  Service: Pain Management;  Laterality: Bilateral;  ORAL XANAX    INJECTION OF ANESTHETIC AGENT AROUND MEDIAL BRANCH NERVES INNERVATING LUMBAR FACET JOINT Bilateral 5/5/2023    Procedure: MBB #2 (B/L) L3,4,5;  Surgeon: Son Lara DO;  Location: [facility] OR;  Service: Pain Management;  Laterality: Bilateral;  Oral Xanax    INJECTION OF JOINT Right 5/20/2022    Procedure: Right SI joint injection;  Surgeon: Son Lara DO;  Location: [facility] OR;  Service: Pain Management;  Laterality: Right;    INJECTION, SACROILIAC JOINT Right 12/19/2023    Procedure: RT SI joint Inj;  Surgeon: Son Lara DO;  Location: [facility] PAIN MANAGEMENT;  Service: Pain Management;  Laterality: Right;  oral    INSERTION OF IMPLANTABLE LOOP RECORDER Left 6/21/2024    Procedure: Insertion, Implantable Loop Recorder;  Surgeon: Hunter Rich MD;  Location: [facility] EP LAB;  Service: Cardiology;  Laterality: Left;  CVA, ILR, BSCI, Local, AK, 3 Prep    INSERTION OF IMPLANTABLE LOOP RECORDER Left 8/30/2024    Procedure: Insertion, Implantable Loop Recorder;  Surgeon: Hunter Rich MD;  Location: [facility] EP LAB;  Service: Cardiology;  Laterality: Left;  CVA, ILBENJIE,MDT, RN [place], [place], 3 Prep    RADIOFREQUENCY ABLATION OF LUMBAR MEDIAL BRANCH NERVE AT SINGLE LEVEL Bilateral 5/19/2023    Procedure: RFA (B/L) L3,4,5;  Surgeon: Son Lara DO;  Location: [facility] PAIN MANAGEMENT;  Service: Pain Management;  Laterality: Bilateral;     REMOVAL OF IMPLANTABLE LOOP RECORDER N/A 7/10/2024    Procedure: REMOVAL, IMPLANTABLE LOOP RECORDER;  Surgeon: Hunter Rich MD;  Location: [facility] LAB;  Service: Cardiology;  Laterality: N/A;       Review of patient's allergies indicates:   Allergen Reactions    Lisinopril-hydrochlorothiazide Other (See Comments)     Pt stated it makes her feel drained. She couldn't raise arms over head.    Ampicillin Rash    Darvocet a500 [propoxyphene n-acetaminophen] Other (See Comments)     shaky       No current facility-administered medications on file prior to encounter.     Current Outpatient Medications on File Prior to Encounter   Medication Sig    ACCU-CHEK GUIDE GLUCOSE METER Misc TO CHECK BLOOD GLUCOSE DAILY, TO USE WITH INSURANCE PREFERRED METER    amLODIPine (NORVASC) 10 MG tablet TAKE 1 TABLET BY MOUTH ONCE DAILY. HOLD IF SBP <120    ascorbic acid, vitamin C, (VITAMIN C) 500 MG tablet Take 500 mg by mouth once daily.    aspirin (ECOTRIN) 81 MG EC tablet Take 1 tablet (81 mg total) by mouth once daily.    atorvastatin (LIPITOR) 80 MG tablet TAKE 1 TABLET (80 MG TOTAL) BY MOUTH ONCE DAILY.    benzonatate (TESSALON) 200 MG capsule Take 1 capsule (200 mg total) by mouth 3 (three) times daily as needed for Cough.    BLOOD PRESSURE CUFF Misc 1 Units by Misc.(Non-Drug; Combo Route) route once daily.    blood sugar diagnostic Strp To check BG daily, to use with insurance preferred meter    blood sugar diagnostic Strp To check BG daily, to use with insurance preferred meter    blood-glucose meter kit To check BG daily, to use with insurance preferred meter    blood-glucose meter kit Use as instructed    cyclobenzaprine (FLEXERIL) 10 MG tablet TAKE 1 TABLET (10 MG TOTAL) BY MOUTH 2 (TWO) TIMES DAILY AS NEEDED FOR MUSCLE SPASMS (BACK PAIN).    empagliflozin (JARDIANCE) 25 mg tablet Take 1 tablet (25 mg total) by mouth once daily.    hydrALAZINE (APRESOLINE) 50 MG tablet Take 1 tablet (50 mg total) by mouth every 8 (eight)  hours. Hold is SBP <120    insulin lispro protamine-insulin lispro (HUMALOG MIX 75-25,U-100,INSULN) 100 unit/mL (75-25) Susp Inject 10 Units into the skin 2 (two) times daily before meals.    lancets Misc To check BG daily, to use with insurance preferred meter    lancets Misc To check BG daily, to use with insurance preferred meter    losartan (COZAAR) 100 MG tablet Take 1 tablet (100 mg total) by mouth once daily. Hold if SBP <120    meclizine (ANTIVERT) 25 mg tablet Take 1 tablet (25 mg total) by mouth 2 (two) times daily as needed for Dizziness.    metoprolol succinate (TOPROL-XL) 25 MG 24 hr tablet Take 1 tablet (25 mg total) by mouth once daily.    multivitamin (THERAGRAN) per tablet Take 1 tablet by mouth once daily.    ofloxacin (OCUFLOX) 0.3 % ophthalmic solution     prednisoLONE acetate (PRED FORTE) 1 % DrpS     promethazine-dextromethorphan (PROMETHAZINE-DM) 6.25-15 mg/5 mL Syrp Take 5 mLs by mouth nightly as needed (cough).     Family History       Problem Relation (Age of Onset)    Cancer Father, Maternal Aunt    Heart disease Mother    Hypertension Brother, Daughter    Kidney disease Mother          Tobacco Use    Smoking status: Former     Current packs/day: 0.00     Types: Cigarettes     Quit date: 2022     Years since quittin.9     Passive exposure: Past    Smokeless tobacco: Never    Tobacco comments:     Patient Quit Smoking on 2022.   Substance and Sexual Activity    Alcohol use: Not Currently    Drug use: No    Sexual activity: Not Currently     Partners: Male     Review of Systems   Constitutional:  Positive for chills.   Respiratory:  Positive for cough and shortness of breath.    Cardiovascular: Negative.  Negative for leg swelling.   Gastrointestinal: Negative.    Genitourinary: Negative.    Musculoskeletal: Negative.    Neurological: Negative.      Objective:     Vital Signs (Most Recent):  Pulse: 90 (25)  Resp: (!) 44 (25)  BP: (!) 132/93 (25  2030)  SpO2: 100 % (01/03/25 2152) Vital Signs (24h Range):  Pulse:  [] 90  Resp:  [43-45] 44  SpO2:  [85 %-100 %] 100 %  BP: (132)/(93) 132/93        There is no height or weight on file to calculate BMI.     Physical Exam  Vitals and nursing note reviewed.   Constitutional:       General: She is not in acute distress.     Appearance: She is not ill-appearing.   HENT:      Mouth/Throat:      Mouth: Mucous membranes are moist.   Cardiovascular:      Rate and Rhythm: Normal rate.   Pulmonary:      Effort: Pulmonary effort is normal.      Breath sounds: Rales present.   Abdominal:      General: Abdomen is flat.   Skin:     General: Skin is warm.   Neurological:      General: No focal deficit present.      Mental Status: She is alert.                Significant Labs: All pertinent labs within the past 24 hours have been reviewed.    Significant Imaging: I have reviewed all pertinent imaging results/findings within the past 24 hours.  Assessment/Plan:     * Acute hypoxic respiratory failure  Patient with Hypoxic Respiratory failure which is Acute.  she is not on home oxygen. Supplemental oxygen was provided and noted- Oxygen Concentration (%):  [] 60    .   Signs/symptoms of respiratory failure include- tachypnea and increased work of breathing. Contributing diagnoses includes - CHF Labs and images were reviewed. Patient Has recent ABG, which has been reviewed. Will treat underlying causes and adjust management of respiratory failure as follows- See plan for HF, wean BiPAP as tolerated     Acute on chronic systolic heart failure  Results for orders placed during the hospital encounter of 04/18/24    Echo    Interpretation Summary    Left Ventricle: The left ventricle is normal in size. Moderately increased wall thickness. There is moderate concentric hypertrophy. Regional wall motion abnormalities present (basal-mid anteroseptal and inferoseptal HK). There is mildly reduced systolic function with a visually  estimated ejection fraction of 40 - 45%. There is diastolic dysfunction but grade cannot be determined.    Right Ventricle: Normal right ventricular cavity size. Systolic function is normal.    BNP  Recent Labs   Lab 01/03/25 2042   BNP 2,141*       Continue diuresis  Monitor I/Os, daily weights   Wean BiPAP as tolerated       Stage 3b chronic kidney disease  Creatine stable for now. BMP reviewed- noted CrCl cannot be calculated (Unknown ideal weight.). according to latest data. Based on current GFR, CKD stage is stage 3 - GFR 30-59.  Monitor UOP and serial BMP and adjust therapy as needed. Renally dose meds. Avoid nephrotoxic medications and procedures.    Dyslipidemia associated with type 2 diabetes mellitus  Continue statin   Continue home insulin       History of stroke  Continue home medications         VTE Risk Mitigation (From admission, onward)           Ordered     Place sequential compression device  Until discontinued         01/03/25 2224     IP VTE HIGH RISK PATIENT  Once         01/03/25 2224     Place sequential compression device  Until discontinued         01/03/25 2224                         Kalin Orr MD  Department of Hospital Medicine  [facility] - Emergency Dept

## 2025-01-05 LAB
ANION GAP SERPL CALC-SCNC: 12 MMOL/L (ref 8–16)
BUN SERPL-MCNC: 49 MG/DL (ref 8–23)
CALCIUM SERPL-MCNC: 9.4 MG/DL (ref 8.7–10.5)
CHLORIDE SERPL-SCNC: 109 MMOL/L (ref 95–110)
CO2 SERPL-SCNC: 18 MMOL/L (ref 23–29)
CREAT SERPL-MCNC: 2.1 MG/DL (ref 0.5–1.4)
EST. GFR  (NO RACE VARIABLE): 25 ML/MIN/1.73 M^2
GLUCOSE SERPL-MCNC: 231 MG/DL (ref 70–110)
MAGNESIUM SERPL-MCNC: 1.9 MG/DL (ref 1.6–2.6)
OHS QRS DURATION: 146 MS
OHS QTC CALCULATION: 504 MS
PHOSPHATE SERPL-MCNC: 4.1 MG/DL (ref 2.7–4.5)
POCT GLUCOSE: 160 MG/DL (ref 70–110)
POCT GLUCOSE: 173 MG/DL (ref 70–110)
POCT GLUCOSE: 243 MG/DL (ref 70–110)
POCT GLUCOSE: 252 MG/DL (ref 70–110)
POTASSIUM SERPL-SCNC: 4.4 MMOL/L (ref 3.5–5.1)
SODIUM SERPL-SCNC: 139 MMOL/L (ref 136–145)

## 2025-01-05 PROCEDURE — 27100171 HC OXYGEN HIGH FLOW UP TO 24 HOURS: Mod: HCNC

## 2025-01-05 PROCEDURE — 94761 N-INVAS EAR/PLS OXIMETRY MLT: CPT | Mod: HCNC

## 2025-01-05 PROCEDURE — 83735 ASSAY OF MAGNESIUM: CPT | Mod: HCNC | Performed by: STUDENT IN AN ORGANIZED HEALTH CARE EDUCATION/TRAINING PROGRAM

## 2025-01-05 PROCEDURE — 99900031 HC PATIENT EDUCATION (STAT): Mod: HCNC

## 2025-01-05 PROCEDURE — 11000001 HC ACUTE MED/SURG PRIVATE ROOM: Mod: HCNC

## 2025-01-05 PROCEDURE — 84100 ASSAY OF PHOSPHORUS: CPT | Mod: HCNC | Performed by: STUDENT IN AN ORGANIZED HEALTH CARE EDUCATION/TRAINING PROGRAM

## 2025-01-05 PROCEDURE — 80048 BASIC METABOLIC PNL TOTAL CA: CPT | Mod: HCNC | Performed by: STUDENT IN AN ORGANIZED HEALTH CARE EDUCATION/TRAINING PROGRAM

## 2025-01-05 PROCEDURE — 94799 UNLISTED PULMONARY SVC/PX: CPT | Mod: HCNC

## 2025-01-05 PROCEDURE — 25000003 PHARM REV CODE 250: Mod: HCNC | Performed by: STUDENT IN AN ORGANIZED HEALTH CARE EDUCATION/TRAINING PROGRAM

## 2025-01-05 PROCEDURE — 36415 COLL VENOUS BLD VENIPUNCTURE: CPT | Mod: HCNC | Performed by: STUDENT IN AN ORGANIZED HEALTH CARE EDUCATION/TRAINING PROGRAM

## 2025-01-05 PROCEDURE — 63600175 PHARM REV CODE 636 W HCPCS: Mod: HCNC | Performed by: STUDENT IN AN ORGANIZED HEALTH CARE EDUCATION/TRAINING PROGRAM

## 2025-01-05 RX ADMIN — INSULIN ASPART 10 UNITS: 100 INJECTION, SUSPENSION SUBCUTANEOUS at 04:01

## 2025-01-05 RX ADMIN — FUROSEMIDE 40 MG: 10 INJECTION, SOLUTION INTRAVENOUS at 08:01

## 2025-01-05 RX ADMIN — INSULIN ASPART 2 UNITS: 100 INJECTION, SOLUTION INTRAVENOUS; SUBCUTANEOUS at 11:01

## 2025-01-05 RX ADMIN — INSULIN ASPART 1 UNITS: 100 INJECTION, SOLUTION INTRAVENOUS; SUBCUTANEOUS at 09:01

## 2025-01-05 RX ADMIN — ATORVASTATIN CALCIUM 80 MG: 40 TABLET, FILM COATED ORAL at 08:01

## 2025-01-05 RX ADMIN — FUROSEMIDE 40 MG: 10 INJECTION, SOLUTION INTRAVENOUS at 09:01

## 2025-01-05 RX ADMIN — INSULIN ASPART 10 UNITS: 100 INJECTION, SUSPENSION SUBCUTANEOUS at 08:01

## 2025-01-05 RX ADMIN — ASPIRIN 81 MG: 81 TABLET, COATED ORAL at 08:01

## 2025-01-05 NOTE — CARE UPDATE
[facility] - [place]  Nurses Note -- 4 Eyes      1/4/2025       Skin assessed on: Q Shift      [x] No Pressure Injuries Present    [x]Prevention Measures Documented    [] Yes LDA  for Pressure Injury Previously documented     [] Yes New Pressure Injury Discovered   [] LDA for New Pressure Injury Added      Attending RN:  Salvador Jurado Jr., RN

## 2025-01-05 NOTE — NURSING
[facility], [facility] - [place]  Nurses Note -- 4 Eyes      1/5/2025       Skin assessed on: Q Shift      [x] No Pressure Injuries Present    [x]Prevention Measures Documented    [] Yes LDA  for Pressure Injury Previously documented     [] Yes New Pressure Injury Discovered   [] LDA for New Pressure Injury Added      Attending RN:  Judith Sutherland RN     Second RN:  Salvador ALDRIDGE

## 2025-01-05 NOTE — PROGRESS NOTES
[facility] Medicine  Progress Note    Patient Name: Joanne Peña  MRN: [id number]  Patient Class: IP- Inpatient   Admission Date: 1/3/2025  Length of Stay: 2 days  Attending Physician: Price Moreno MD  Primary Care Provider: Teri Soto DO        Subjective     Principal Problem:Acute respiratory failure with hypoxia and hypercarbia        HPI:  This is a 75-year-old female with a past medical COPD, HFrEF (EF: 40-45%), hypertension, hyperlipidemia, type 2 diabetes, CKD 3, history of CVA, who presents with shortness of breath.     Patient rpesents for evaluation of shortness of breath that worsened on the day of presentation. She reports having chronic dyspnea for about 2 years. Associated symptoms include a productive cough with clear/yellow sputum that started 4 days prior. She reports subjective fevers, chills but denied chest pain. She denied sick contacts. She initially presented to her PCP on 12/30 for these symptoms. She tested negative for Covid-19 and was diagnosed with a viral URI. She is not currently taking a diuretic at home.  Patient quit smoking 2 years ago.     In the ED, the patient was tachycardic (120s > 90s), tachypneic (40s) and was placed on BiPAP.  Labs were remarkable for an elevated BNP (2141), elevated troponin (0.031), elevated creatinine (2.2-baseline around 2.0), hyperglycemia (310), normocytic anemia (11.2).  Chest x-ray showed findings concerning for volume overload.  Patient was given Lasix 40 mg IV, aspirin 325 mg p.o..  She was admitted for further management.    Overview/Hospital Course:  Patient admitted with AHHRF 2/2 HF with BNP >2000, and MESSI with Cr 2.3 and K rising to 5.3. IV diuresis given. Hypotensive, GDMT stopped for now. Good UOP, improving O2 req to 6L. Can step down.     Interval History:  NAEON.  No new issues.   CC- SOB  All questions answered and updates on care given.       ROS:  General: Negative for fevers   Cardiac: Negative  for chest pain   Pulmonary: Negative for wheezing  GI: Negative for abdominal distention      Vitals:    01/05/25 0600 01/05/25 0701 01/05/25 0751 01/05/25 0800   BP: 129/72   111/72   Pulse: 84 82 85 88   Resp: (!) 21 (!) 23 (!) 24 17   Temp:    97.9 °F (36.6 °C)   TempSrc:    Oral   SpO2: (!) 91% (!) 91% (!) 92% (!) 93%   Weight:   78.8 kg (173 lb 11.6 oz)    Height:              Body mass index is 27.21 kg/m².      PHYSICAL EXAM:  GENERAL APPEARANCE: alert and cooperative.     HEAD: NC/AT  CARDIAC: There is no cyanosis or pallor.   LUNGS: No apparent wheezing or stridor.  Crackles b/l in mid/lower  ABDOMEN: Non-distended. No guarding.  MSK: No joint erythema or tenderness.   EXTREMITIES: No significant new deformity or new joint abnormality.   NEUROLOGICAL: CN II-XII grossly intact.   SKIN: No lesions or eruptions.  PSYCHIATRIC: No tangential speech. No Hyperactive features.        Recent Results (from the past 24 hours)   POCT glucose    Collection Time: 01/04/25 11:12 AM   Result Value Ref Range    POCT Glucose 194 (H) 70 - 110 mg/dL   Echo    Collection Time: 01/04/25 11:48 AM   Result Value Ref Range    BSA 1.94 m2    LVOT stroke volume 47.1 cm3    LVIDd 5.7 3.5 - 6.0 cm    LV Systolic Volume 112.66 mL    LV Systolic Volume Index 59.0 mL/m2    LVIDs 4.9 (A) 2.1 - 4.0 cm    LV Diastolic Volume 157.92 mL    LV Diastolic Volume Index 82.68 mL/m2    Left Ventricular End Systolic Volume by Teichholz Method 112.66 mL    Left Ventricular End Diastolic Volume by Teichholz Method 157.92 mL    IVS 0.8 0.6 - 1.1 cm    LVOT diameter 2.1 cm    LVOT area 3.5 cm2    FS 14.0 (A) 28 - 44 %    Left Ventricle Relative Wall Thickness 0.28 cm    PW 0.8 0.6 - 1.1 cm    LV mass 170.2 g    LV Mass Index 89.1 g/m2    MV Peak E Travis 0.99 m/s    TDI LATERAL 0.03 m/s    TDI SEPTAL 0.03 m/s    E/E' ratio 33.00 m/s    MV Peak A Travis 1.26 m/s    TR Max Travis 2.35 m/s    E/A ratio 0.79     IVRT 100.86 msec    E wave deceleration time 139.40  msec    LV SEPTAL E/E' RATIO 33.00 m/s    LV LATERAL E/E' RATIO 33.00 m/s    LVOT peak jason 0.8 m/s    Left Ventricular Outflow Tract Mean Velocity 0.54 cm/s    Left Ventricular Outflow Tract Mean Gradient 1.39 mmHg    RV- anderson basal diam 4.0 cm    RV S' 13.28 cm/s    TAPSE 2.16 cm    RV/LV Ratio 0.70 cm    LA size 4.39 cm    Left Atrium Minor Axis 6.78 cm    Left Atrium Major Axis 6.24 cm    RA Major Axis 4.98 cm    AV mean gradient 5.2 mmHg    AV peak gradient 9.0 mmHg    Ao peak jason 1.5 m/s    Ao VTI 25.6 cm    LVOT peak VTI 13.6 cm    AV valve area 1.8 cm²    AV Velocity Ratio 0.53     AV index (prosthetic) 0.53     GOLDY by Velocity Ratio 1.8 cm²    MV stenosis pressure 1/2 time 40.43 ms    MV valve area p 1/2 method 5.44 cm2    TV peak gradient 1 mmHg    Triscuspid Valve Regurgitation Peak Gradient 22 mmHg    PV PEAK VELOCITY 0.86 m/s    PV peak gradient 3 mmHg    Sinus 3.40 cm    STJ 2.83 cm    Ascending aorta 3.27 cm    IVC diameter 1.83 cm    Mean e' 0.03 m/s    ZLVIDS 3.10     ZLVIDD 0.59     RVDD 4.03 cm    IAN 58.4 mL/m2    LA Vol 111.55 cm3    LA WIDTH 4.6 cm    RA Width 3.7 cm    TV resting pulmonary artery pressure 25 mmHg    RV TB RVSP 5 mmHg    Est. RA pres 3 mmHg   POCT glucose    Collection Time: 01/04/25  4:19 PM   Result Value Ref Range    POCT Glucose 293 (H) 70 - 110 mg/dL   POCT glucose    Collection Time: 01/04/25  8:41 PM   Result Value Ref Range    POCT Glucose 144 (H) 70 - 110 mg/dL       Microbiology Results (last 7 days)       ** No results found for the last 168 hours. **             Imaging Results              X-Ray Chest 1 View (Final result)  Result time 01/03/25 21:42:01      Final result by Jewell Benedict MD (01/03/25 21:42:01)                   Impression:      Findings are concerning for CHF.  Possibility of a left basilar consolidation, atelectasis, and/or fluid cannot be entirely excluded.      Electronically signed by: Jewell Benedict  Date:    01/03/2025  Time:    21:42                Narrative:    EXAMINATION:  CHEST ONE VIEW    CLINICAL HISTORY:  shortness of breath;    TECHNIQUE:  One view of the chest.    COMPARISON:  08/15/2023 and 02/06/2022    FINDINGS:  A left cardiac loop recorder is present.  The cardiac silhouette is enlarged.  There is pulmonary edema or pulmonary vascular congestion.  There is obscuration of the left costophrenic angle in particular, which may suggest pleural effusion, infiltrate, and or atelectasis.  There is no pneumothorax.  The bones are diffusely osteopenic.                                             Assessment and Plan     * Acute respiratory failure with hypoxia and hypercarbia  Patient with Hypoxic Respiratory failure which is Acute.  she is not on home oxygen. Supplemental oxygen was provided and noted- Oxygen Concentration (%):  [] 60    .   Signs/symptoms of respiratory failure include- tachypnea and increased work of breathing. Contributing diagnoses includes - CHF Labs and images were reviewed. Patient Has recent ABG, which has been reviewed. Will treat underlying causes and adjust management of respiratory failure as follows- See plan for HF, wean BiPAP as tolerated     Acute kidney injury superimposed on chronic kidney disease  Creatine 2.3 on admit and K rising, suspect renal congestion from HF  IV diuresis  Estimated Creatinine Clearance: 26.3 mL/min (A) (based on SCr of 2.1 mg/dL (H)).  Monitor UOP and serial BMP and adjust therapy as needed.   Renally dose meds.   Avoid nephrotoxic medications and procedures.  BL Cr 1.3 recently      Acute on chronic systolic heart failure  Results for orders placed during the hospital encounter of 04/18/24    Echo    Interpretation Summary    Left Ventricle: The left ventricle is normal in size. Moderately increased wall thickness. There is moderate concentric hypertrophy. Regional wall motion abnormalities present (basal-mid anteroseptal and inferoseptal HK). There is mildly reduced systolic  function with a visually estimated ejection fraction of 40 - 45%. There is diastolic dysfunction but grade cannot be determined.    Right Ventricle: Normal right ventricular cavity size. Systolic function is normal.    BNP  Recent Labs   Lab 01/03/25 2042   BNP 2,141*       Continue diuresis  Monitor I/Os, daily weights   Wean BiPAP as tolerated       Stage 3b chronic kidney disease  See MESSI    Dyslipidemia associated with type 2 diabetes mellitus  Continue statin   Continue home insulin       History of stroke  Continue home medications         VTE Risk Mitigation (From admission, onward)           Ordered     Place sequential compression device  Until discontinued         01/03/25 2224     IP VTE HIGH RISK PATIENT  Once         01/03/25 2224     Place sequential compression device  Until discontinued         01/03/25 2224                    Discharge Planning   ARIANA:      Code Status: Full Code   Medical Readiness for Discharge Date:   Discharge Plan A: Home with family            Critical care time spent on the evaluation and treatment of severe organ dysfunction, review of pertinent labs and imaging studies, discussions with consulting providers and discussions with patient/family: 35 minutes.            Price Moreno MD  Department of Hospital Medicine   [facility] - Intensive Care

## 2025-01-05 NOTE — PLAN OF CARE
Weaned to nasal cannula.    Less SOB today.   Tolerating diet.    Continues to receive Lasix.    Transfer orders given, awaits a room.    Family visits

## 2025-01-05 NOTE — PLAN OF CARE
Problem: Adult Inpatient Plan of Care  Goal: Plan of Care Review  Outcome: Progressing    Patient is awake alert oriented. Able to move independently on bed. Vital signs stable and within limits. Clear breath sounds bilaterally. Denies any short of breath except on exertion. Able to maintain saturation greater than 92% at all times on Vapo Therm. Able to wean down Vapo Therm to 15 Liters 30 % this morning and tolerating we... External urinary collection device changed this morning and new linens were provided. Void approx 800 ml for the shift.

## 2025-01-06 LAB
ANION GAP SERPL CALC-SCNC: 11 MMOL/L (ref 8–16)
BUN SERPL-MCNC: 56 MG/DL (ref 8–23)
CALCIUM SERPL-MCNC: 9.1 MG/DL (ref 8.7–10.5)
CHLORIDE SERPL-SCNC: 107 MMOL/L (ref 95–110)
CO2 SERPL-SCNC: 18 MMOL/L (ref 23–29)
CREAT SERPL-MCNC: 2.2 MG/DL (ref 0.5–1.4)
EST. GFR  (NO RACE VARIABLE): 23 ML/MIN/1.73 M^2
GLUCOSE SERPL-MCNC: 249 MG/DL (ref 70–110)
MAGNESIUM SERPL-MCNC: 1.9 MG/DL (ref 1.6–2.6)
OHS QRS DURATION: 146 MS
OHS QTC CALCULATION: 527 MS
PHOSPHATE SERPL-MCNC: 4.3 MG/DL (ref 2.7–4.5)
POCT GLUCOSE: 173 MG/DL (ref 70–110)
POCT GLUCOSE: 183 MG/DL (ref 70–110)
POCT GLUCOSE: 205 MG/DL (ref 70–110)
POCT GLUCOSE: 213 MG/DL (ref 70–110)
POTASSIUM SERPL-SCNC: 4.2 MMOL/L (ref 3.5–5.1)
SODIUM SERPL-SCNC: 136 MMOL/L (ref 136–145)

## 2025-01-06 PROCEDURE — 25000003 PHARM REV CODE 250: Mod: HCNC | Performed by: STUDENT IN AN ORGANIZED HEALTH CARE EDUCATION/TRAINING PROGRAM

## 2025-01-06 PROCEDURE — 25000003 PHARM REV CODE 250: Mod: HCNC

## 2025-01-06 PROCEDURE — 63600175 PHARM REV CODE 636 W HCPCS: Mod: HCNC | Performed by: STUDENT IN AN ORGANIZED HEALTH CARE EDUCATION/TRAINING PROGRAM

## 2025-01-06 PROCEDURE — 80048 BASIC METABOLIC PNL TOTAL CA: CPT | Mod: HCNC | Performed by: STUDENT IN AN ORGANIZED HEALTH CARE EDUCATION/TRAINING PROGRAM

## 2025-01-06 PROCEDURE — 21400001 HC TELEMETRY ROOM: Mod: HCNC

## 2025-01-06 PROCEDURE — 36415 COLL VENOUS BLD VENIPUNCTURE: CPT | Mod: HCNC | Performed by: STUDENT IN AN ORGANIZED HEALTH CARE EDUCATION/TRAINING PROGRAM

## 2025-01-06 PROCEDURE — 84100 ASSAY OF PHOSPHORUS: CPT | Mod: HCNC | Performed by: STUDENT IN AN ORGANIZED HEALTH CARE EDUCATION/TRAINING PROGRAM

## 2025-01-06 PROCEDURE — 27000221 HC OXYGEN, UP TO 24 HOURS: Mod: HCNC

## 2025-01-06 PROCEDURE — 94761 N-INVAS EAR/PLS OXIMETRY MLT: CPT | Mod: HCNC

## 2025-01-06 PROCEDURE — 83735 ASSAY OF MAGNESIUM: CPT | Mod: HCNC | Performed by: STUDENT IN AN ORGANIZED HEALTH CARE EDUCATION/TRAINING PROGRAM

## 2025-01-06 RX ORDER — GUAIFENESIN 600 MG/1
600 TABLET, EXTENDED RELEASE ORAL 2 TIMES DAILY
Status: DISCONTINUED | OUTPATIENT
Start: 2025-01-06 | End: 2025-01-07 | Stop reason: HOSPADM

## 2025-01-06 RX ORDER — BENZONATATE 100 MG/1
100 CAPSULE ORAL 3 TIMES DAILY PRN
Status: DISCONTINUED | OUTPATIENT
Start: 2025-01-06 | End: 2025-01-07 | Stop reason: HOSPADM

## 2025-01-06 RX ORDER — FUROSEMIDE 40 MG/1
40 TABLET ORAL DAILY
Status: DISCONTINUED | OUTPATIENT
Start: 2025-01-06 | End: 2025-01-07 | Stop reason: HOSPADM

## 2025-01-06 RX ORDER — MUPIROCIN 20 MG/G
OINTMENT TOPICAL 2 TIMES DAILY
Status: DISCONTINUED | OUTPATIENT
Start: 2025-01-06 | End: 2025-01-07 | Stop reason: HOSPADM

## 2025-01-06 RX ADMIN — SENNOSIDES AND DOCUSATE SODIUM 1 TABLET: 50; 8.6 TABLET ORAL at 10:01

## 2025-01-06 RX ADMIN — INSULIN ASPART 2 UNITS: 100 INJECTION, SOLUTION INTRAVENOUS; SUBCUTANEOUS at 07:01

## 2025-01-06 RX ADMIN — ATORVASTATIN CALCIUM 80 MG: 40 TABLET, FILM COATED ORAL at 08:01

## 2025-01-06 RX ADMIN — INSULIN ASPART 10 UNITS: 100 INJECTION, SUSPENSION SUBCUTANEOUS at 07:01

## 2025-01-06 RX ADMIN — INSULIN ASPART 10 UNITS: 100 INJECTION, SUSPENSION SUBCUTANEOUS at 04:01

## 2025-01-06 RX ADMIN — BENZONATATE 100 MG: 100 CAPSULE ORAL at 02:01

## 2025-01-06 RX ADMIN — FUROSEMIDE 40 MG: 10 INJECTION, SOLUTION INTRAVENOUS at 08:01

## 2025-01-06 RX ADMIN — INSULIN ASPART 2 UNITS: 100 INJECTION, SOLUTION INTRAVENOUS; SUBCUTANEOUS at 04:01

## 2025-01-06 RX ADMIN — MUPIROCIN: 20 OINTMENT TOPICAL at 10:01

## 2025-01-06 RX ADMIN — GUAIFENESIN 600 MG: 600 TABLET, EXTENDED RELEASE ORAL at 10:01

## 2025-01-06 RX ADMIN — FUROSEMIDE 40 MG: 40 TABLET ORAL at 02:01

## 2025-01-06 RX ADMIN — ASPIRIN 81 MG: 81 TABLET, COATED ORAL at 08:01

## 2025-01-06 NOTE — PLAN OF CARE
Problem: Adult Inpatient Plan of Care  Goal: Plan of Care Review  Outcome: Progressing     Patient is awake alert oriented. Able to move independently. Denies shortness of breath Remain on oxygen at  2 l/min nasal cannula Maintaining saturation greater than 94% at all times. Vital signs stable and within limits. No fall or injury No pressure injury or skin breakdown noted during the shift. Able to urinate approx 900 ml of urine this shift. Still awaiting bed on the floor for transfer.

## 2025-01-06 NOTE — PROGRESS NOTES
[facility] Medicine  Progress Note    Patient Name: Joanne Peña  MRN: [id number]  Patient Class: IP- Inpatient   Admission Date: 1/3/2025  Length of Stay: 3 days  Attending Physician: Matti Chaparro MD  Primary Care Provider: Teri Soto DO        Subjective     Principal Problem:Acute respiratory failure with hypoxia and hypercarbia        HPI:  This is a 75-year-old female with a past medical COPD, HFrEF (EF: 40-45%), hypertension, hyperlipidemia, type 2 diabetes, CKD 3, history of CVA, who presents with shortness of breath.     Patient rpesents for evaluation of shortness of breath that worsened on the day of presentation. She reports having chronic dyspnea for about 2 years. Associated symptoms include a productive cough with clear/yellow sputum that started 4 days prior. She reports subjective fevers, chills but denied chest pain. She denied sick contacts. She initially presented to her PCP on 12/30 for these symptoms. She tested negative for Covid-19 and was diagnosed with a viral URI. She is not currently taking a diuretic at home.  Patient quit smoking 2 years ago.     In the ED, the patient was tachycardic (120s > 90s), tachypneic (40s) and was placed on BiPAP.  Labs were remarkable for an elevated BNP (2141), elevated troponin (0.031), elevated creatinine (2.2-baseline around 2.0), hyperglycemia (310), normocytic anemia (11.2).  Chest x-ray showed findings concerning for volume overload.  Patient was given Lasix 40 mg IV, aspirin 325 mg p.o..  She was admitted for further management.    Overview/Hospital Course:  Patient admitted with AHHRF 2/2 HF with BNP >2000, and MESSI with Cr 2.3 and K rising to 5.3. IV diuresis given in the ICU; now discontinued  . Hypotensive, GDMT stopped for now. Good UOP, initially on BiPAP; currently on room air.  Downgraded to telemetry status on 01/06.     Interval History:  No acute event overnight, patient seen in bed this morning.   "Weaned off to room air.  He denied chest pain, PND, orthopnea.    Review of Systems   Constitutional:  Negative for activity change, chills, diaphoresis and fatigue.   Respiratory:  Positive for cough. Negative for shortness of breath and stridor.    Cardiovascular:  Negative for chest pain and leg swelling.   Gastrointestinal:  Negative for abdominal distention, abdominal pain, diarrhea and nausea.     Objective:     Vital Signs (Most Recent):  Temp: 97.9 °F (36.6 °C) (01/06/25 1101)  Pulse: 94 (01/06/25 1200)  Resp: (!) 33 (01/06/25 1200)  BP: 104/73 (01/06/25 1114)  SpO2: 96 % (01/06/25 1200) Vital Signs (24h Range):  Temp:  [97.9 °F (36.6 °C)-98.8 °F (37.1 °C)] 97.9 °F (36.6 °C)  Pulse:  [] 94  Resp:  [16-76] 33  SpO2:  [92 %-100 %] 96 %  BP: ()/() 104/73     Weight: 76.8 kg (169 lb 5 oz)  Body mass index is 26.52 kg/m².    Intake/Output Summary (Last 24 hours) at 1/6/2025 1257  Last data filed at 1/6/2025 1217  Gross per 24 hour   Intake 780 ml   Output 2450 ml   Net -1670 ml         Physical Exam  Constitutional:       General: She is not in acute distress.     Appearance: Normal appearance. She is not ill-appearing or toxic-appearing.   Cardiovascular:      Rate and Rhythm: Normal rate and regular rhythm.      Pulses: Normal pulses.      Heart sounds: Normal heart sounds. No murmur heard.     No gallop.   Pulmonary:      Effort: Pulmonary effort is normal. No respiratory distress.      Breath sounds: Rhonchi present. No wheezing.   Abdominal:      General: There is no distension.      Palpations: Abdomen is soft. There is no mass.      Tenderness: There is no abdominal tenderness.      Hernia: No hernia is present.   Neurological:      General: No focal deficit present.      Mental Status: She is alert and oriented to person, place, and time.             Significant Labs: CBC: No results for input(s): "WBC", "HGB", "HCT", "PLT" in the last 48 hours.  CMP:   Recent Labs   Lab 01/05/25  0857 " 01/06/25  0410    136   K 4.4 4.2    107   CO2 18* 18*   * 249*   BUN 49* 56*   CREATININE 2.1* 2.2*   CALCIUM 9.4 9.1   ANIONGAP 12 11       Significant Imaging: I have reviewed all pertinent imaging results/findings within the past 24 hours.    Assessment and Plan     * Acute respiratory failure with hypoxia and hypercarbia  Patient with Hypoxic Respiratory failure which is Acute.  she is not on home oxygen. Supplemental oxygen was provided and noted- Oxygen Concentration (%):  [] 60    .   Signs/symptoms of respiratory failure include- tachypnea and increased work of breathing. Contributing diagnoses includes - CHF Labs and images were reviewed. Patient Has recent ABG, which has been reviewed. Will treat underlying causes and adjust management of respiratory failure as follows- See plan for HF, wean BiPAP as tolerated     Acute kidney injury superimposed on chronic kidney disease  Creatine 2.3, today 2.1, baseline creatinine 1.3,  suspect cardiorenal syndrome;  Estimated Creatinine Clearance: 26.3 mL/min (A) (based on SCr of 2.1 mg/dL (H)).  Monitor UOP and serial BMP and adjust therapy as needed.   Renally dose meds.   Avoid nephrotoxic medications and procedures.        Acute on chronic systolic heart failure  Results for orders placed during the hospital encounter of 04/18/24    Echo    Interpretation Summary    Left Ventricle: The left ventricle is normal in size. Moderately increased wall thickness. There is moderate concentric hypertrophy. Regional wall motion abnormalities present (basal-mid anteroseptal and inferoseptal HK). There is mildly reduced systolic function with a visually estimated ejection fraction of 40 - 45%. There is diastolic dysfunction but grade cannot be determined.    Right Ventricle: Normal right ventricular cavity size. Systolic function is normal.    BNP  Recent Labs   Lab 01/03/25  2042   BNP 2,141*         Reduce Lasix from IV40mg BID to 40mg PO  daily  Monitor I/Os, daily weights   Weaned of Bipap; currently on room air  Restart GDMT as blood pressure tolerates:  Losartan 100mg (may need dose reduction), metoprolol 25mg  Continue home Jardiance upon discharge  Cardiology following      Stage 3b chronic kidney disease  See MESSI    Dyslipidemia associated with type 2 diabetes mellitus  Continue statin   Continue home insulin       History of stroke  Continue home medications         VTE Risk Mitigation (From admission, onward)           Ordered     Place sequential compression device  Until discontinued         01/03/25 2224     IP VTE HIGH RISK PATIENT  Once         01/03/25 2224     Place sequential compression device  Until discontinued         01/03/25 2224                    Discharge Planning   ARIANA:      Code Status: Full Code   Medical Readiness for Discharge Date:   Discharge Plan A: Home with family            Critical care time spent on the evaluation and treatment of severe organ dysfunction, review of pertinent labs and imaging studies, discussions with consulting providers and discussions with patient/family: 35 minutes.            Matti Chaparro MD  Department of Hospital Medicine   [facility] - [place], [place] - Intensive Care

## 2025-01-06 NOTE — ASSESSMENT & PLAN NOTE
Results for orders placed during the hospital encounter of 04/18/24    Echo    Interpretation Summary    Left Ventricle: The left ventricle is normal in size. Moderately increased wall thickness. There is moderate concentric hypertrophy. Regional wall motion abnormalities present (basal-mid anteroseptal and inferoseptal HK). There is mildly reduced systolic function with a visually estimated ejection fraction of 40 - 45%. There is diastolic dysfunction but grade cannot be determined.    Right Ventricle: Normal right ventricular cavity size. Systolic function is normal.    BNP  Recent Labs   Lab 01/03/25  2042   BNP 2,141*         Reduce Lasix from IV40mg BID to 40mg PO daily  Monitor I/Os, daily weights   Weaned of Bipap; currently on room air  Restart GDMT as blood pressure tolerates:  Losartan 100mg (may need dose reduction), metoprolol 25mg  Continue home Jardiance upon discharge  Cardiology following

## 2025-01-06 NOTE — SUBJECTIVE & OBJECTIVE
"Interval History:  No acute event overnight, patient seen in bed this morning.  Weaned off to room air.  He denied chest pain, PND, orthopnea.    Review of Systems   Constitutional:  Negative for activity change, chills, diaphoresis and fatigue.   Respiratory:  Positive for cough. Negative for shortness of breath and stridor.    Cardiovascular:  Negative for chest pain and leg swelling.   Gastrointestinal:  Negative for abdominal distention, abdominal pain, diarrhea and nausea.     Objective:     Vital Signs (Most Recent):  Temp: 97.9 °F (36.6 °C) (01/06/25 1101)  Pulse: 94 (01/06/25 1200)  Resp: (!) 33 (01/06/25 1200)  BP: 104/73 (01/06/25 1114)  SpO2: 96 % (01/06/25 1200) Vital Signs (24h Range):  Temp:  [97.9 °F (36.6 °C)-98.8 °F (37.1 °C)] 97.9 °F (36.6 °C)  Pulse:  [] 94  Resp:  [16-76] 33  SpO2:  [92 %-100 %] 96 %  BP: ()/() 104/73     Weight: 76.8 kg (169 lb 5 oz)  Body mass index is 26.52 kg/m².    Intake/Output Summary (Last 24 hours) at 1/6/2025 1257  Last data filed at 1/6/2025 1217  Gross per 24 hour   Intake 780 ml   Output 2450 ml   Net -1670 ml         Physical Exam  Constitutional:       General: She is not in acute distress.     Appearance: Normal appearance. She is not ill-appearing or toxic-appearing.   Cardiovascular:      Rate and Rhythm: Normal rate and regular rhythm.      Pulses: Normal pulses.      Heart sounds: Normal heart sounds. No murmur heard.     No gallop.   Pulmonary:      Effort: Pulmonary effort is normal. No respiratory distress.      Breath sounds: Rhonchi present. No wheezing.   Abdominal:      General: There is no distension.      Palpations: Abdomen is soft. There is no mass.      Tenderness: There is no abdominal tenderness.      Hernia: No hernia is present.   Neurological:      General: No focal deficit present.      Mental Status: She is alert and oriented to person, place, and time.             Significant Labs: CBC: No results for input(s): "WBC", " ""HGB", "HCT", "PLT" in the last 48 hours.  CMP:   Recent Labs   Lab 01/05/25  0857 01/06/25  0410    136   K 4.4 4.2    107   CO2 18* 18*   * 249*   BUN 49* 56*   CREATININE 2.1* 2.2*   CALCIUM 9.4 9.1   ANIONGAP 12 11       Significant Imaging: I have reviewed all pertinent imaging results/findings within the past 24 hours.  "

## 2025-01-06 NOTE — PLAN OF CARE
Patient weaned off oxygen today, ambulates in room, no chest pain or shortness of breath reported.   Tolerates diet.   Family visits

## 2025-01-06 NOTE — NURSING
[facility], [facility] [place]  Nurses Note -- 4 Eyes      1/6/2025       Skin assessed on: Q Shift      [x] No Pressure Injuries Present    [x]Prevention Measures Documented    [] Yes LDA  for Pressure Injury Previously documented     [] Yes New Pressure Injury Discovered   [] LDA for New Pressure Injury Added      Attending RN:  Judith Sutherland RN     Second RN:  Salvador ALDRIDGE

## 2025-01-06 NOTE — NURSING TRANSFER
Nursing Transfer Note      1/6/2025   515    Nurse giving handoff:geraldo ivey   Nurse receiving handoff:samuel ivey    Reason patient is being transferred: stepdown    Transfer To: rm [id number]    Transfer via wheelchair    Transfer with cardiac monitoring    Transported by ICU RN    Transfer Vital Signs:  Blood Pressure:123/71  Heart Rate:103  O2:93  Temperature:98.1  Respirations:33    Telemetry: Box Number [id number]  Order for Tele Monitor? Yes    Additional Lines: none    Medicines sent: yes    Any special needs or follow-up needed: no    Patient belongings transferred with patient: Yes    Chart send with patient: Yes    Notified: daughter at bedside    Patient reassessed at: 01/06/25  1600     Upon arrival to floor: cardiac monitor applied, patient oriented to room, call bell in reach, and bed in lowest position/nurse at bedside

## 2025-01-06 NOTE — CARE UPDATE
[facility], [facility] [place]  Nurses Note -- 4 Eyes        Skin assessed on: Q Shift      [x] No Pressure Injuries Present    [x]Prevention Measures Documented    [] Yes LDA  for Pressure Injury Previously documented     [] Yes New Pressure Injury Discovered   [] LDA for New Pressure Injury Added      Attending RN:  Salvador Jurado Jr., RN     Second RN:  JUAN FRANCISCO Sutherland RN

## 2025-01-07 ENCOUNTER — DOCUMENTATION ONLY (OUTPATIENT)
Facility: CLINIC | Age: 75
End: 2025-01-07
Payer: MEDICARE

## 2025-01-07 VITALS
DIASTOLIC BLOOD PRESSURE: 75 MMHG | OXYGEN SATURATION: 94 % | HEIGHT: 67 IN | SYSTOLIC BLOOD PRESSURE: 102 MMHG | HEART RATE: 88 BPM | TEMPERATURE: 98 F | BODY MASS INDEX: 26.57 KG/M2 | RESPIRATION RATE: 16 BRPM | WEIGHT: 169.31 LBS

## 2025-01-07 DIAGNOSIS — R06.02 SOB (SHORTNESS OF BREATH): Primary | ICD-10-CM

## 2025-01-07 PROBLEM — J96.02 ACUTE RESPIRATORY FAILURE WITH HYPOXIA AND HYPERCARBIA: Status: RESOLVED | Noted: 2025-01-03 | Resolved: 2025-01-07

## 2025-01-07 PROBLEM — J96.01 ACUTE RESPIRATORY FAILURE WITH HYPOXIA AND HYPERCARBIA: Status: RESOLVED | Noted: 2025-01-03 | Resolved: 2025-01-07

## 2025-01-07 LAB
ANION GAP SERPL CALC-SCNC: 12 MMOL/L (ref 8–16)
BUN SERPL-MCNC: 66 MG/DL (ref 8–23)
CALCIUM SERPL-MCNC: 9.4 MG/DL (ref 8.7–10.5)
CHLORIDE SERPL-SCNC: 106 MMOL/L (ref 95–110)
CO2 SERPL-SCNC: 18 MMOL/L (ref 23–29)
CREAT SERPL-MCNC: 2.3 MG/DL (ref 0.5–1.4)
EST. GFR  (NO RACE VARIABLE): 22 ML/MIN/1.73 M^2
GLUCOSE SERPL-MCNC: 218 MG/DL (ref 70–110)
MAGNESIUM SERPL-MCNC: 2.1 MG/DL (ref 1.6–2.6)
PHOSPHATE SERPL-MCNC: 4.4 MG/DL (ref 2.7–4.5)
POCT GLUCOSE: 269 MG/DL (ref 70–110)
POCT GLUCOSE: 271 MG/DL (ref 70–110)
POTASSIUM SERPL-SCNC: 4.6 MMOL/L (ref 3.5–5.1)
SODIUM SERPL-SCNC: 136 MMOL/L (ref 136–145)

## 2025-01-07 PROCEDURE — 83735 ASSAY OF MAGNESIUM: CPT | Mod: HCNC | Performed by: STUDENT IN AN ORGANIZED HEALTH CARE EDUCATION/TRAINING PROGRAM

## 2025-01-07 PROCEDURE — 25000003 PHARM REV CODE 250: Mod: HCNC | Performed by: HOSPITALIST

## 2025-01-07 PROCEDURE — 36415 COLL VENOUS BLD VENIPUNCTURE: CPT | Mod: HCNC | Performed by: STUDENT IN AN ORGANIZED HEALTH CARE EDUCATION/TRAINING PROGRAM

## 2025-01-07 PROCEDURE — 25000003 PHARM REV CODE 250: Mod: HCNC

## 2025-01-07 PROCEDURE — 25000003 PHARM REV CODE 250: Mod: HCNC | Performed by: STUDENT IN AN ORGANIZED HEALTH CARE EDUCATION/TRAINING PROGRAM

## 2025-01-07 PROCEDURE — 80048 BASIC METABOLIC PNL TOTAL CA: CPT | Mod: HCNC | Performed by: STUDENT IN AN ORGANIZED HEALTH CARE EDUCATION/TRAINING PROGRAM

## 2025-01-07 PROCEDURE — 94761 N-INVAS EAR/PLS OXIMETRY MLT: CPT | Mod: HCNC

## 2025-01-07 PROCEDURE — 63600175 PHARM REV CODE 636 W HCPCS: Mod: HCNC | Performed by: STUDENT IN AN ORGANIZED HEALTH CARE EDUCATION/TRAINING PROGRAM

## 2025-01-07 PROCEDURE — 99900035 HC TECH TIME PER 15 MIN (STAT): Mod: HCNC

## 2025-01-07 PROCEDURE — 84100 ASSAY OF PHOSPHORUS: CPT | Mod: HCNC | Performed by: STUDENT IN AN ORGANIZED HEALTH CARE EDUCATION/TRAINING PROGRAM

## 2025-01-07 RX ORDER — FUROSEMIDE 40 MG/1
40 TABLET ORAL DAILY
Qty: 30 TABLET | Refills: 11 | Status: SHIPPED | OUTPATIENT
Start: 2025-01-08 | End: 2026-01-08

## 2025-01-07 RX ORDER — METOPROLOL SUCCINATE 25 MG/1
12.5 TABLET, EXTENDED RELEASE ORAL DAILY
Qty: 45 TABLET | Refills: 3 | Status: SHIPPED | OUTPATIENT
Start: 2025-01-07 | End: 2026-01-07

## 2025-01-07 RX ORDER — HYDRALAZINE HYDROCHLORIDE 25 MG/1
25 TABLET, FILM COATED ORAL 3 TIMES DAILY
Qty: 90 TABLET | Refills: 11 | Status: SHIPPED | OUTPATIENT
Start: 2025-01-07 | End: 2026-01-07

## 2025-01-07 RX ADMIN — ATORVASTATIN CALCIUM 80 MG: 40 TABLET, FILM COATED ORAL at 10:01

## 2025-01-07 RX ADMIN — GUAIFENESIN 600 MG: 600 TABLET, EXTENDED RELEASE ORAL at 10:01

## 2025-01-07 RX ADMIN — ASPIRIN 81 MG: 81 TABLET, COATED ORAL at 10:01

## 2025-01-07 RX ADMIN — FUROSEMIDE 40 MG: 40 TABLET ORAL at 10:01

## 2025-01-07 RX ADMIN — METOPROLOL SUCCINATE 12.5 MG: 25 TABLET, EXTENDED RELEASE ORAL at 10:01

## 2025-01-07 RX ADMIN — INSULIN ASPART 10 UNITS: 100 INJECTION, SUSPENSION SUBCUTANEOUS at 10:01

## 2025-01-07 NOTE — DISCHARGE INSTRUCTIONS
Our goal at [facility] is to always give you outstanding care and exceptional service. You may receive a survey by mail, text or e-mail in the next 7-10 days from Edyta Chan and our leadership team asking about the care you received with us. The survey should only take 5-10 minutes to complete and is very important to us.     Your feedback provides us with a way to recognize our staff who work tirelessly to provide the best care! Also, your responses help us learn how to improve when your experience was below our aspiration of excellence. We WILL use your feedback to continue making improvements to help us provide the highest quality care. We keep your personal information and feedback confidential. We appreciate your time completing this survey and can't wait to hear from you!!!     We want you to leave us today feeling like you can DEFINITELY recommend us to others! We look forward to your continued care with us! Thanks so much for choosing [facility] for your healthcare needs!

## 2025-01-07 NOTE — PLAN OF CARE
Problem: Adult Inpatient Plan of Care  Goal: Plan of Care Review  Outcome: Adequate for Care Transition  Goal: Patient-Specific Goal (Individualized)  Outcome: Adequate for Care Transition  Goal: Absence of Hospital-Acquired Illness or Injury  Outcome: Adequate for Care Transition  Goal: Optimal Comfort and Wellbeing  Outcome: Adequate for Care Transition  Goal: Readiness for Transition of Care  Outcome: Adequate for Care Transition     Problem: Infection  Goal: Absence of Infection Signs and Symptoms  Outcome: Adequate for Care Transition     Problem: Diabetes Comorbidity  Goal: Blood Glucose Level Within Targeted Range  Outcome: Adequate for Care Transition     Problem: Skin Injury Risk Increased  Goal: Skin Health and Integrity  Outcome: Adequate for Care Transition     Problem: Acute Kidney Injury/Impairment  Goal: Fluid and Electrolyte Balance  Outcome: Adequate for Care Transition  Goal: Improved Oral Intake  Outcome: Adequate for Care Transition  Goal: Effective Renal Function  Outcome: Adequate for Care Transition

## 2025-01-07 NOTE — PROGRESS NOTES
"[facility]) nurse navigator notified of [facility] candidate in need of education and introduction to 4-6 week program.      PT aao x 3 while lying in bed  with daughter at bedside. Introduced self to pt as [facility] nurse navigator.     Patient given "[facility] Home Care Guide" which includes "Daily weight and symptom tracker".  Encouraged pt and caregiver to review information.      Reviewed the following key points of [facility] program with pt and family:   1.) Take your medications as directed.    2.) Weight yourself daily   3.) Follow low salt and limited fluid diet.    4.) Stop smoking and start exercising   5.) Go to your appointments and call your team.      Pt reminded to follow Symptom tracker and to call at the onset of symptoms according to tracker.     Reviewed plan for follow up once discharged to include phone calls, in person and virtual visits to assist pt optimizing their heart failure medication regimen and encouraging healthy lifestyle modifications.  Reminded pt that program will assist them over the next 4-6 weeks and then patient will be transferred to long term care provider .  Reminded pt how to contact H[facility] navigator via phone and or via [employer].     Pt instructed appointment will be printed on hospital discharge paperwork.     Pt also reminded RN will call 48-72 hours after discharge to check on them.     PT and family verbalize read back of information given.  Encouraged pt and family to read over information often and contact team with any questions or concerns.     "

## 2025-01-07 NOTE — DISCHARGE SUMMARY
[facility]  Discharge Summary      Patient Name: Joanne Peña  MRN: [id number]  [facility]: [id number]  Patient Class: IP- Inpatient  Admission Date: 1/3/2025  Hospital Length of Stay: 4 days  Discharge Date and Time:  01/07/2025 11:00 AM  Attending Physician: Cj Batista MD   Discharging Provider: Cj Batista MD  Primary Care Provider: Teri Soto DO    Primary Care Team: Networked reference to record PCT     HPI:   This is a 75-year-old female with a past medical COPD, HFrEF (EF: 40-45%), hypertension, hyperlipidemia, type 2 diabetes, CKD 3, history of CVA, who presents with shortness of breath.     Patient rpesents for evaluation of shortness of breath that worsened on the day of presentation. She reports having chronic dyspnea for about 2 years. Associated symptoms include a productive cough with clear/yellow sputum that started 4 days prior. She reports subjective fevers, chills but denied chest pain. She denied sick contacts. She initially presented to her PCP on 12/30 for these symptoms. She tested negative for Covid-19 and was diagnosed with a viral URI. She is not currently taking a diuretic at home.  Patient quit smoking 2 years ago.     In the ED, the patient was tachycardic (120s > 90s), tachypneic (40s) and was placed on BiPAP.  Labs were remarkable for an elevated BNP (2141), elevated troponin (0.031), elevated creatinine (2.2-baseline around 2.0), hyperglycemia (310), normocytic anemia (11.2).  Chest x-ray showed findings concerning for volume overload.  Patient was given Lasix 40 mg IV, aspirin 325 mg p.o..  She was admitted for further management.    * No surgery found *      Hospital Course:   Patient admitted with AHHRF 2/2 HF with BNP >2000, and MESSI with Cr 2.3 and K rising to 5.3. IV diuresis given in the ICU.  . Hypotensive, GDMT stopped for now. Good UOP, initially on BiPAP; currently on room air.  Downgraded to telemetry status on 01/06.   Patient  Kyrie Kirby is a 71 has continued to do well and remains on RA.  Lasix transitioned to oral.  BP improved, but remains borderline.  Discussed GDMT with Cardiology.  Will restart Toprol XL at lower dose.  Patient is currently on Losartan.  Patient's Creat has been increasing over past few months.  Will hold Losartan upon discharge and start low dose Hydralazine.  She has remained afebrile and hemodynamically stable.  Patient will be discharged home to closely follow up with Cardiology and PCP.     Goals of Care Treatment Preferences:  Code Status: Full Code      SDOH Screening:  The patient was screened for utility difficulties, food insecurity, transport difficulties, housing insecurity, and interpersonal safety and there were no concerns identified this admission.     Consults:   Consults (From admission, onward)          Status Ordering Provider     Inpatient consult to Social Work/Case Management  Once        Provider:  (Not yet assigned)    Completed ROBERTO VARGAS     Inpatient consult to Registered Dietitian/Nutritionist  Once        Provider:  (Not yet assigned)    Completed ROBERTO VARGAS            Acute kidney injury superimposed on chronic kidney disease  Creatine 2.3, today 2.1, baseline creatinine 1.3,  suspect cardiorenal syndrome;  Estimated Creatinine Clearance: 26.3 mL/min (A) (based on SCr of 2.1 mg/dL (H)).  Monitor UOP and serial BMP and adjust therapy as needed.   Renally dose meds.   Avoid nephrotoxic medications and procedures.        Acute on chronic systolic heart failure  Results for orders placed during the hospital encounter of 04/18/24    Echo    Interpretation Summary    Left Ventricle: The left ventricle is normal in size. Moderately increased wall thickness. There is moderate concentric hypertrophy. Regional wall motion abnormalities present (basal-mid anteroseptal and inferoseptal HK). There is mildly reduced systolic function with a visually estimated ejection fraction of 40 - 45%. There is diastolic  dysfunction but grade cannot be determined.    Right Ventricle: Normal right ventricular cavity size. Systolic function is normal.    BNP  Recent Labs   Lab 01/03/25 2042   BNP 2,141*         Reduce Lasix from IV40mg BID to 40mg PO daily  Monitor I/Os, daily weights   Weaned of Bipap; currently on room air  Restart GDMT as blood pressure tolerates:  Losartan 100mg (may need dose reduction), metoprolol 25mg  Continue home Jardiance upon discharge  Cardiology following      Stage 3b chronic kidney disease  See MESSI    Dyslipidemia associated with type 2 diabetes mellitus  Continue statin   Continue home insulin       History of stroke  Continue home medications         Final Active Diagnoses:    Diagnosis Date Noted POA    Acute kidney injury superimposed on chronic kidney disease [N17.9, N18.9] 01/04/2025 Yes    Acute on chronic systolic heart failure [I50.23] 01/03/2025 Yes    Dyslipidemia associated with type 2 diabetes mellitus [E11.69, E78.5] 02/15/2024 Yes    Stage 3b chronic kidney disease [N18.32] 02/15/2024 Yes    History of stroke [Z86.73] 06/14/2022 Not Applicable      Problems Resolved During this Admission:    Diagnosis Date Noted Date Resolved POA    PRINCIPAL PROBLEM:  Acute respiratory failure with hypoxia and hypercarbia [J96.01, J96.02] 01/03/2025 01/07/2025 Yes       Discharged Condition: stable    Disposition: Home or Self Care    Follow Up:   Follow-up Information       Álvaro Rudd MD Follow up in 1 week(s).    Specialty: Cardiology  Contact information:  [address]  [address]  [facility] [address] [phone]               Teri Soto,  Follow up in 3 week(s).    Specialty: Family Medicine  Contact information:  [address] [place] [address] [facility] [place] [address] [phone]                           Patient Instructions:      Diet diabetic     Diet Cardiac     Call MD for:  temperature >100.4     Call MD for:  persistent nausea and vomiting or diarrhea      Call MD for:  severe uncontrolled pain     Call MD for:  redness, tenderness, or signs of infection (pain, swelling, redness, odor or green/yellow discharge around incision site)     Call MD for:  difficulty breathing or increased cough     Call MD for:  severe persistent headache     Call MD for:  worsening rash     Call MD for:  persistent dizziness, light-headedness, or visual disturbances     Call MD for:  increased confusion or weakness     Activity as tolerated       Significant Diagnostic Studies: N/A    Pending Diagnostic Studies:       None           Medications:  Reconciled Home Medications:      Medication List        START taking these medications      furosemide 40 MG tablet  Commonly known as: LASIX  Take 1 tablet (40 mg total) by mouth once daily.  Start taking on: January 8, 2025     hydrALAZINE 25 MG tablet  Commonly known as: APRESOLINE  Take 1 tablet (25 mg total) by mouth 3 (three) times daily.            CHANGE how you take these medications      metoprolol succinate 25 MG 24 hr tablet  Commonly known as: TOPROL-XL  Take 0.5 tablets (12.5 mg total) by mouth once daily.  What changed: how much to take            CONTINUE taking these medications      ACCU-CHEK GUIDE GLUCOSE METER Misc  Generic drug: blood-glucose meter  TO CHECK BLOOD GLUCOSE DAILY, TO USE WITH INSURANCE PREFERRED METER     ascorbic acid (vitamin C) 500 MG tablet  Commonly known as: VITAMIN C  Take 500 mg by mouth once daily.     aspirin 81 MG EC tablet  Commonly known as: ECOTRIN  Take 1 tablet (81 mg total) by mouth once daily.     atorvastatin 80 MG tablet  Commonly known as: LIPITOR  TAKE 1 TABLET (80 MG TOTAL) BY MOUTH ONCE DAILY.     benzonatate 200 MG capsule  Commonly known as: TESSALON  Take 1 capsule (200 mg total) by mouth 3 (three) times daily as needed for Cough.     * blood sugar diagnostic Strp  To check BG daily, to use with insurance preferred meter     * blood sugar diagnostic Strp  To check BG daily, to use  with insurance preferred meter     cyclobenzaprine 10 MG tablet  Commonly known as: FLEXERIL  TAKE 1 TABLET (10 MG TOTAL) BY MOUTH 2 (TWO) TIMES DAILY AS NEEDED FOR MUSCLE SPASMS (BACK PAIN).     empagliflozin 25 mg tablet  Commonly known as: JARDIANCE  Take 1 tablet (25 mg total) by mouth once daily.     HumaLOG Mix 75-25(U-100)Insuln 100 unit/mL (75-25) Susp  Generic drug: insulin lispro protamine-insulin lispro  Inject 10 Units into the skin 2 (two) times daily before meals.     * lancets Misc  To check BG daily, to use with insurance preferred meter     * lancets Misc  To check BG daily, to use with insurance preferred meter     meclizine 25 mg tablet  Commonly known as: ANTIVERT  Take 1 tablet (25 mg total) by mouth 2 (two) times daily as needed for Dizziness.     multivitamin per tablet  Commonly known as: THERAGRAN  Take 1 tablet by mouth once daily.     promethazine-dextromethorphan 6.25-15 mg/5 mL Syrp  Commonly known as: PROMETHAZINE-DM  Take 5 mLs by mouth nightly as needed (cough).           * This list has 4 medication(s) that are the same as other medications prescribed for you. Read the directions carefully, and ask your doctor or other care provider to review them with you.                STOP taking these medications      amLODIPine 10 MG tablet  Commonly known as: NORVASC     losartan 100 MG tablet  Commonly known as: COZAAR              Indwelling Lines/Drains at time of discharge:   Lines/Drains/Airways       Drain  Duration             3 days                    Time spent on the discharge of patient: >31 minutes         Cj Batista MD  Department of Hospital Medicine  [facility]

## 2025-01-07 NOTE — PLAN OF CARE
Case Management Final Discharge Note      Discharge Disposition: Home with family    New DME ordered / company name: none    Relevant SDOH / Transition of Care Barriers:  none    Person available to provide assistance at home when needed and their contact information: Emeli Garza (Son)  [phone]      Scheduled followup appointment: PCP appointment scheduled 1/13/25, listed on AVS. Cardiology scheduled 3/14/25, in basket message sent for clinic to contact patient for sooner visit.     Referrals placed: none    Transportation: Private vehicle    Patient and family educated on discharge services and updated on DC plan. Bedside RN notified, patient clear to discharge from Case Management Perspective.      01/07/25 1154   Final Note   Assessment Type Final Discharge Note   Anticipated Discharge Disposition Home   Hospital Resources/Appts/Education Provided Provided patient/caregiver with written discharge plan information;Appointments scheduled and added to AVS   Post-Acute Status   Discharge Delays None known at this time

## 2025-01-07 NOTE — PLAN OF CARE
01/07/25 1150   Medicare Message   Important Message from Medicare regarding Discharge Appeal Rights Given to patient/caregiver;Explained to patient/caregiver;Signed/date by patient/caregiver   Date IMM was signed 01/07/25   Time IMM was signed 0910

## 2025-01-07 NOTE — NURSING
Pt discharged per MD order. IV removed. Catheter tip intact. Telemetry box removed. No distress noted. AVS given to patient, discharge instructions explained per discharge nurse. Pt verbalized understanding. VSS. Afebrile. No complaints of pain, N/V, diarrhea, or SOB. Pt left with belongings to Main Entrance via wheelchair per transport. Family with patient.

## 2025-01-07 NOTE — PROGRESS NOTES
Food & Nutrition  Education    Diet Education: Salt and Fluid Restricted Diet Education  Time Spent: 15 min  Learners: Pt, family at bedside      Nutrition Education provided with handouts: Low Sodium Nutrition Therapy, Fluid Restricted Nutrition Therapy      Comments:Educated pt on salt and fluid restricted diet. Discussed not seasoning food with salt and using salt free seasoning alternatives such as Mrs.Dash and salt-free Kody. Discussed choosing heart healthy preparation methods such as baking, steaming, and airfrying vs deep frying. Discussed looking for low-sodium, no salt added, reduced sodium food items at the store and emphasized the importance of reading the nutrition label. Discussed current fluid restriction, how many cups/ounces to have per day, foods that count towards fluid intake, and tips to managing thirst. Pt voiced understanding.       All questions and concerns answered. Dietitian's contact information provided.       Follow-Up: 1/14    Please Re-consult as needed        Thanks!

## 2025-01-08 ENCOUNTER — PATIENT OUTREACH (OUTPATIENT)
Dept: ADMINISTRATIVE | Facility: CLINIC | Age: 75
End: 2025-01-08
Payer: MEDICARE

## 2025-01-08 LAB
OHS CV AF BURDEN PERCENT: < 1
OHS CV DC REMOTE DEVICE TYPE: NORMAL

## 2025-01-08 NOTE — PROGRESS NOTES
C3 nurse attempted to contact Joanne Peña  for a TCC post hospital discharge follow up call. No answer. Left voicemail with callback information. The patient has a scheduled HOS appointment with Glenna Cross NP on 1/13/2025 @ 1:30 pm.

## 2025-01-09 RX ORDER — FLASH GLUCOSE SCANNING READER
EACH MISCELLANEOUS
Qty: 1 EACH | Refills: 0 | OUTPATIENT
Start: 2025-01-09

## 2025-01-09 NOTE — PROGRESS NOTES
[facility] Provider Note (Initial Clinic) Consult Note    Age: 74 y.o.  Gender: female  Ethnicity:    Number of admissions for CHF within the preceding year: 1   Type of Congestive Heart Failure: Systolic   Etiology: Valvular     Diagnostic Labs:   EKG - 01/06/2025  CXR - 01/03/2025  ECHO - 01/04/2025  Stress test -   Stress echo - 06/04/2024  Pharmacologic stress -   Cardiac catheterization -    Cardiac MRI -     Lab Results   Component Value Date     01/07/2025     01/06/2025    K 4.6 01/07/2025    K 4.2 01/06/2025     01/07/2025     01/06/2025    CO2 18 (L) 01/07/2025    CO2 18 (L) 01/06/2025     (H) 01/07/2025     (H) 01/06/2025    BUN 66 (H) 01/07/2025    BUN 56 (H) 01/06/2025    CREATININE 2.3 (H) 01/07/2025    CREATININE 2.2 (H) 01/06/2025    CALCIUM 9.4 01/07/2025    CALCIUM 9.1 01/06/2025    PROT 7.0 01/04/2025    PROT 8.0 01/03/2025    ALBUMIN 3.3 (L) 01/04/2025    ALBUMIN 3.6 01/03/2025    BILITOT 0.3 01/04/2025    BILITOT 0.3 01/03/2025    ALKPHOS 83 01/04/2025    ALKPHOS 102 01/03/2025    AST 15 01/04/2025    AST 26 01/03/2025    ALT 17 01/04/2025    ALT 20 01/03/2025    ANIONGAP 12 01/07/2025    ANIONGAP 11 01/06/2025    ESTGFRAFRICA 59.2 (A) 06/01/2022    ESTGFRAFRICA 59.2 (A) 05/06/2022    EGFRNONAA 51.3 (A) 06/01/2022    EGFRNONAA 51.3 (A) 05/06/2022       Lab Results   Component Value Date    WBC 8.94 01/04/2025    WBC 10.27 01/03/2025    RBC 3.72 (L) 01/04/2025    RBC 4.22 01/03/2025    HGB 9.4 (L) 01/04/2025    HGB 11.2 (L) 01/03/2025    HCT 32.3 (L) 01/04/2025    HCT 37.3 01/03/2025    MCV 87 01/04/2025    MCV 88 01/03/2025    MCH 25.3 (L) 01/04/2025    MCH 26.5 (L) 01/03/2025    MCHC 29.1 (L) 01/04/2025    MCHC 30.0 (L) 01/03/2025    RDW 16.2 (H) 01/04/2025    RDW 16.3 (H) 01/03/2025     01/04/2025     (H) 01/03/2025    MPV 9.7 01/04/2025    MPV 9.8 01/03/2025    IMMGR 0.4 01/04/2025    IMMGR 0.8 (H) 01/03/2025    IGABS 0.04  01/04/2025    IGABS 0.08 (H) 01/03/2025    LYMPH 1.2 01/04/2025    LYMPH 13.8 (L) 01/04/2025    MONO 0.6 01/04/2025    MONO 6.4 01/04/2025    EOS 0.0 01/04/2025    EOS 0.2 01/03/2025    BASO 0.02 01/04/2025    BASO 0.06 01/03/2025    NRBC 1 (A) 01/04/2025    NRBC 2 (A) 01/03/2025    GRAN 7.1 01/04/2025    GRAN 79.0 (H) 01/04/2025    EOSINOPHIL 0.2 01/04/2025    EOSINOPHIL 1.9 01/03/2025    BASOPHIL 0.2 01/04/2025    BASOPHIL 0.6 01/03/2025       Lab Results   Component Value Date    BNP 2,141 (H) 01/03/2025    MG 2.1 01/07/2025    MG 1.9 01/06/2025    PHOS 4.4 01/07/2025    PHOS 4.3 01/06/2025    TROPONINI 0.031 (H) 01/03/2025    TROPONINI 0.022 02/22/2022    HGBA1C 9.8 (H) 11/14/2024    HGBA1C 6.9 (H) 08/16/2024    TSH 1.490 03/15/2024    TSH 1.915 02/21/2022       Lab Results   Component Value Date    IRON 62 07/19/2023    TIBC 268 07/19/2023    FERRITIN 67 07/19/2023    CHOL 241 (H) 11/14/2024    TRIG 219 (H) 11/14/2024    HDL 43 11/14/2024    LDLCALC 154.2 11/14/2024    CHOLHDL 17.8 (L) 11/14/2024    TOTALCHOLEST 5.6 (H) 11/14/2024    NONHDLCHOL 198 11/14/2024    COLORU Yellow 02/07/2022    COLORU Yellow 02/07/2022    APPEARANCEUA Clear 02/07/2022    APPEARANCEUA Clear 02/07/2022    PHUR 6.0 02/07/2022    PHUR 6.0 02/07/2022    SPECGRAV >1.030 (A) 02/07/2022    SPECGRAV >1.030 (A) 02/07/2022    PROTEINUA 2+ (A) 02/07/2022    PROTEINUA 2+ (A) 02/07/2022    GLUCUA Trace (A) 02/07/2022    GLUCUA Trace (A) 02/07/2022    KETONESU Negative 02/07/2022    KETONESU Negative 02/07/2022    BILIRUBINUA Negative 02/07/2022    BILIRUBINUA Negative 02/07/2022    OCCULTUA Trace (A) 02/07/2022    OCCULTUA Trace (A) 02/07/2022    NITRITE Negative 02/07/2022    NITRITE Negative 02/07/2022    LEUKOCYTESUR Negative 02/07/2022    LEUKOCYTESUR Negative 02/07/2022       List all implanted cardiac devices:   Loop recorder    Current Outpatient Medications on File Prior to Visit   Medication Sig Dispense Refill    ACCU-CHEK GUIDE GLUCOSE  METER Misc TO CHECK BLOOD GLUCOSE DAILY, TO USE WITH INSURANCE PREFERRED METER      ascorbic acid, vitamin C, (VITAMIN C) 500 MG tablet Take 500 mg by mouth once daily.      aspirin (ECOTRIN) 81 MG EC tablet Take 1 tablet (81 mg total) by mouth once daily. 30 tablet 3    atorvastatin (LIPITOR) 80 MG tablet TAKE 1 TABLET (80 MG TOTAL) BY MOUTH ONCE DAILY. 90 tablet 2    benzonatate (TESSALON) 200 MG capsule Take 1 capsule (200 mg total) by mouth 3 (three) times daily as needed for Cough. 15 capsule 0    blood sugar diagnostic Strp To check BG daily, to use with insurance preferred meter 200 each 11    blood sugar diagnostic Strp To check BG daily, to use with insurance preferred meter 200 each 11    cyclobenzaprine (FLEXERIL) 10 MG tablet TAKE 1 TABLET (10 MG TOTAL) BY MOUTH 2 (TWO) TIMES DAILY AS NEEDED FOR MUSCLE SPASMS (BACK PAIN). 180 tablet 1    empagliflozin (JARDIANCE) 25 mg tablet Take 1 tablet (25 mg total) by mouth once daily. 30 tablet 11    furosemide (LASIX) 40 MG tablet Take 1 tablet (40 mg total) by mouth once daily. 30 tablet 11    hydrALAZINE (APRESOLINE) 25 MG tablet Take 1 tablet (25 mg total) by mouth 3 (three) times daily. 90 tablet 11    insulin lispro protamine-insulin lispro (HUMALOG MIX 75-25,U-100,INSULN) 100 unit/mL (75-25) Susp Inject 10 Units into the skin 2 (two) times daily before meals. 6 mL 11    lancets Misc To check BG daily, to use with insurance preferred meter 200 each 11    lancets Misc To check BG daily, to use with insurance preferred meter 200 each 11    meclizine (ANTIVERT) 25 mg tablet Take 1 tablet (25 mg total) by mouth 2 (two) times daily as needed for Dizziness. 30 tablet 0    metoprolol succinate (TOPROL-XL) 25 MG 24 hr tablet Take 0.5 tablets (12.5 mg total) by mouth once daily. 45 tablet 3    multivitamin (THERAGRAN) per tablet Take 1 tablet by mouth once daily.      promethazine-dextromethorphan (PROMETHAZINE-DM) 6.25-15 mg/5 mL Syrp Take 5 mLs by mouth nightly as  needed (cough). 118 mL 0     No current facility-administered medications on file prior to visit.         HPI:  Patient can walk without limitation   Patient sleeps on 2 number of pillows   Patient wakes up SOB, has to get out of bed, associated cough, sputum and color-denies   Palpitations -  denies   Dizzy, light-headed, pre-syncope or syncope-denies   Since discharge frequency of performing weights, home weight and weight change-Patient states just started weighing at home and is 169lbs today   Other information felt pertinent to HPI: 75-year-old female with COPD, HFrEF (EF: 40-45%), hypertension, hyperlipidemia, type 2 diabetes, CKD 3, history of CVA, who presents with shortness of breath.      Patient rpesents for evaluation of shortness of breath that worsened on the day of presentation. She reports having chronic dyspnea for about 2 years. Associated symptoms include a productive cough with clear/yellow sputum that started 4 days prior. She reports subjective fevers, chills but denied chest pain. She denied sick contacts. She initially presented to her PCP on 12/30 for these symptoms. She tested negative for Covid-19 and was diagnosed with a viral URI. She is not currently taking a diuretic at home.  Patient quit smoking 2 years ago.      In the ED, the patient was tachycardic (120s > 90s), tachypneic (40s) and was placed on BiPAP.  Labs were remarkable for an elevated BNP (2141), elevated troponin (0.031), elevated creatinine (2.2-baseline around 2.0), hyperglycemia (310), normocytic anemia (11.2).  Chest x-ray showed findings concerning for volume overload.  Patient was given Lasix 40 mg IV, aspirin 325 mg p.o..  She was admitted for further management.     * No surgery found *       Hospital Course:   Patient admitted with AHHRF 2/2 HF with BNP >2000, and MESSI with Cr 2.3 and K rising to 5.3. IV diuresis given in the ICU.  . Hypotensive, GDMT stopped for now. Good UOP, initially on BiPAP; currently on room  air.  Downgraded to telemetry status on 01/06.   Patient has continued to do well and remains on RA.  Lasix transitioned to oral.  BP improved, but remains borderline.  Discussed GDMT with Cardiology.  Will restart Toprol XL at lower dose.  Patient is currently on Losartan.  Patient's Creat has been increasing over past few months.  Will hold Losartan upon discharge and start low dose Hydralazine.  She has remained afebrile and hemodynamically stable.  Patient will be discharged home to closely follow up with Cardiology and PCP.      PHYSICAL:   Vitals:    01/15/25 1023   BP: 110/70   Pulse: 82   Resp: 18        JVD: no   Heart rhythm: regular  Cardiac murmur: No    S3: no  S4: no  Lungs: clear  Hepatojugular reflux: no  Edema: no      1/3/25 Echo:      Left Ventricle: Septal motion is consistent with bundle branch block. There is moderately reduced systolic function with a visually estimated ejection fraction of 30 - 35%. Grade II diastolic dysfunction. Elevated left ventricular filling pressure.    Right Ventricle: Systolic function is normal.    Left Atrium: Left atrium is severely dilated.    Right Atrium: Right atrium is mildly dilated.    Aortic Valve: The aortic valve is a trileaflet valve. There is mild aortic valve sclerosis.    Mitral Valve: Mildly calcified leaflets. There is moderate regurgitation with an eccentric jet.    Tricuspid Valve: There is mild regurgitation.    Pulmonary Artery: The estimated pulmonary artery systolic pressure is 25 mmHg.    IVC/SVC: Normal venous pressure at 3 mmHg.    ASSESSMENT: HFrEF    PLAN:      Patient Instructions:   Instruct the patient to notify this clinic if HH, a physician or an advanced care provider wants to change medication one of their HF medications   Activity and Diet restrictions:   Recommend 2-3 gram sodium restriction and 1500cc- 2000cc fluid restriction.  Encourage physical activity with graded exercise program.  Requested patient to weigh themselves  daily, and to notify us if their weight increases by more than 3 lbs in 1 day or 5 lbs in 3 days.    Assigned dry weight on home scale: 169lbs, 172lbs on clinic scale, up 3 lbs from last weight recorded. Instructed to weigh every day for PRN lasix dosing.  Medication changes (include current dose and changed dose): Patient currently takes Jardiance 25mg, lasix 40mg daily, Hydralazine 25mg TID, metoprolol 12.5mg daily for GDMT. Patient up 3 lbs from last recorded weight. Instructed to take 40mg lasix BID today, tomorrow and Friday then resume once daily on Saturday. Patient instructed of lasix PRN instructions. She may take an additional 40 mg when back to the once daily dose for weight gain of 2-3lbs in a day or 5lbs in 3 days, increased SOB or increased swelling in legs. Patient stated she just started weighing herself at home. Aldactone and ACE discontinued in hospital d/t blood pressure and kidney function. Hydralzine is controlling patient BP. Holding addition of those medications d/t kidney function and BP at this time.  Upcoming labs and date anticipated: RTC as needed for labs and follow up.  Other diagnostic tests ordered: Follow up with cardiology in March scheduled. Follow up with nephrology in April.

## 2025-01-09 NOTE — TELEPHONE ENCOUNTER
No care due was identified.  [facility] Embedded Care Due Messages. Reference number: [id number].   1/09/2025 11:08:53 AM CST

## 2025-01-09 NOTE — PROGRESS NOTES
3rd attempt made to reach patient for TCC call. Left voicemail please call [phone] leave first name, last name, and  and I will return your call.

## 2025-01-09 NOTE — PROGRESS NOTES
2nd attempt made to reach patient for TCC call.  Left voicemail please call [phone] leave first name, last name and  and I will return your call.

## 2025-01-09 NOTE — TELEPHONE ENCOUNTER
Refill Decision Note   Joanne Peña  is requesting a refill authorization.  Brief Assessment and Rationale for Refill:        Medication Therapy Plan:  Pharmacy is requesting new scripts for the following medications without required information, (sig/ frequency/qty/etc)       Medication Reconciliation Completed: No   Comments: Pharmacies have been requesting medications for patients without required information, (sig, frequency, qty, etc.). In addition, requests are sent for medication(s) pt. are currently not taking, and medications patients have never taken.    We have spoken to the pharmacies about these request types and advised their teams previously that we are unable to assess these New Script requests and require all details for these requests. This is a known issue and has been reported.      No Care Gaps recommended.     Note composed:12:03 PM 01/09/2025

## 2025-01-10 ENCOUNTER — PATIENT MESSAGE (OUTPATIENT)
Dept: FAMILY MEDICINE | Facility: CLINIC | Age: 75
End: 2025-01-10
Payer: MEDICARE

## 2025-01-10 ENCOUNTER — TELEPHONE (OUTPATIENT)
Dept: FAMILY MEDICINE | Facility: CLINIC | Age: 75
End: 2025-01-10
Payer: MEDICARE

## 2025-01-10 ENCOUNTER — OFFICE VISIT (OUTPATIENT)
Dept: URGENT CARE | Facility: CLINIC | Age: 75
End: 2025-01-10
Payer: MEDICARE

## 2025-01-10 VITALS
OXYGEN SATURATION: 96 % | RESPIRATION RATE: 15 BRPM | HEART RATE: 98 BPM | WEIGHT: 167.56 LBS | TEMPERATURE: 98 F | SYSTOLIC BLOOD PRESSURE: 121 MMHG | BODY MASS INDEX: 26.3 KG/M2 | HEIGHT: 67 IN | DIASTOLIC BLOOD PRESSURE: 78 MMHG

## 2025-01-10 DIAGNOSIS — S90.121A CONTUSION OF LESSER TOE OF RIGHT FOOT WITHOUT DAMAGE TO NAIL, INITIAL ENCOUNTER: ICD-10-CM

## 2025-01-10 DIAGNOSIS — M79.674 PAIN OF TOE OF RIGHT FOOT: Primary | ICD-10-CM

## 2025-01-10 PROCEDURE — 73630 X-RAY EXAM OF FOOT: CPT | Mod: RT,S$GLB,, | Performed by: RADIOLOGY

## 2025-01-10 NOTE — PROGRESS NOTES
"Subjective:      Patient ID: Joanne Peña is a 74 y.o. female.    Vitals:  height is 5' 7" (1.702 m) and weight is 76 kg (167 lb 8.8 oz). Her oral temperature is 97.5 °F (36.4 °C). Her blood pressure is 121/78 and her pulse is 98. Her respiration is 15 and oxygen saturation is 96%.     Chief Complaint: Toe Pain    Pt here for right third toe pain onset 12/29 after hitting it on her scale at home. Pt sts she was seen and had xrays done on 12/30 . Pt sts she has been edilson taping it and the pain was getting better but then last night got worse all the sudden. Pt has not taken anything for the pain. Denies any recent new injuries. Denies new shoes.     Toe Pain   The incident occurred more than 1 week ago. The incident occurred at home. The injury mechanism was a direct blow. The pain is present in the right toes. The quality of the pain is described as stabbing. The pain is at a severity of 10/10. The pain is severe. The pain has been Constant since onset. Pertinent negatives include no inability to bear weight, loss of motion, loss of sensation, muscle weakness, numbness or tingling. She reports no foreign bodies present. The symptoms are aggravated by movement and palpation. She has tried nothing for the symptoms. The treatment provided no relief.       Skin:  Negative for erythema.   Neurological:  Negative for numbness.      Objective:     Physical Exam   Constitutional: She is oriented to person, place, and time.  Non-toxic appearance. She does not appear ill. No distress.      Comments:Patient sits comfortably in exam chair. Answers questions in complete sentences. Does not show any signs of distress or discoloration.        HENT:   Head: Normocephalic and atraumatic.   Ears:   Right Ear: External ear normal.   Left Ear: External ear normal.   Nose: Nose normal.   Eyes: Pupils are equal, round, and reactive to light. Extraocular movement intact   Pulmonary/Chest: Effort normal. No respiratory distress. "   Abdominal: Normal appearance.   Musculoskeletal:         General: Tenderness and signs of injury present. No swelling or deformity.      Right lower leg: No edema.      Left lower leg: No edema.        Feet:    Neurological: no focal deficit. She is alert and oriented to person, place, and time.   Skin: Skin is warm, dry, not diaphoretic, not pale and no rash. Capillary refill takes less than 2 seconds. No bruising, No erythema and No lesion jaundice  Psychiatric: Her behavior is normal. Mood, judgment and thought content normal.     XR FOOT COMPLETE 3 VIEW RIGHT    Result Date: 1/10/2025  EXAMINATION: XR FOOT COMPLETE 3 VIEW RIGHT CLINICAL HISTORY: . Pain in right toe(s) TECHNIQUE: AP, lateral, and oblique views of the right foot were performed. COMPARISON: 12/30/2024. FINDINGS: No acute fracture or dislocation.  Redemonstrated is a 1.5 cm osseous protuberance arising from the medial aspect of the 2nd metatarsal shaft, unchanged from prior.  There is a remote fracture of the 4th metatarsal distal shaft.  Alignment is normal. The Lisfranc articulation is congruent. Joint spaces are preserved.     No acute fracture or dislocation of the foot. Electronically signed by: Keagan Sutherland Date:    01/10/2025 Time:    17:17    Echo    Result Date: 1/4/2025    Left Ventricle: Septal motion is consistent with bundle branch block. There is moderately reduced systolic function with a visually estimated ejection fraction of 30 - 35%. Grade II diastolic dysfunction. Elevated left ventricular filling pressure.   Right Ventricle: Systolic function is normal.   Left Atrium: Left atrium is severely dilated.   Right Atrium: Right atrium is mildly dilated.   Aortic Valve: The aortic valve is a trileaflet valve. There is mild aortic valve sclerosis.   Mitral Valve: Mildly calcified leaflets. There is moderate regurgitation with an eccentric jet.   Tricuspid Valve: There is mild regurgitation.   Pulmonary Artery: The estimated pulmonary  artery systolic pressure is 25 mmHg.   IVC/SVC: Normal venous pressure at 3 mmHg.     X-Ray Chest 1 View    Result Date: 1/3/2025  EXAMINATION: CHEST ONE VIEW CLINICAL HISTORY: shortness of breath; TECHNIQUE: One view of the chest. COMPARISON: 08/15/2023 and 02/06/2022 FINDINGS: A left cardiac loop recorder is present.  The cardiac silhouette is enlarged.  There is pulmonary edema or pulmonary vascular congestion.  There is obscuration of the left costophrenic angle in particular, which may suggest pleural effusion, infiltrate, and or atelectasis.  There is no pneumothorax.  The bones are diffusely osteopenic.     Findings are concerning for CHF.  Possibility of a left basilar consolidation, atelectasis, and/or fluid cannot be entirely excluded. Electronically signed by: Jewell Benedict Date:    01/03/2025 Time:    21:42    X-Ray Toe 2 or More Views Right    Result Date: 12/30/2024  EXAMINATION: XR TOE 2 OR MORE VIEWS RIGHT CLINICAL HISTORY: Acute upper respiratory infection, unspecified FINDINGS: Toe right three views. There is a healing fracture of the 4th metatarsal.  No acute fracture dislocation bone destruction seen.  Again seen is a BP0P.  Bizarre para ostial osteochondromatous proliferation of the 2nd metatarsal. Electronically signed by: Priyank Mcleod MD Date:    12/30/2024 Time:    15:05     Assessment:     1. Pain of toe of right foot    2. Contusion of lesser toe of right foot without damage to nail, initial encounter        Plan:   Ortho referral already placed by PCP.     Pain of toe of right foot  -     XR FOOT COMPLETE 3 VIEW RIGHT; Future; Expected date: 01/10/2025    Contusion of lesser toe of right foot without damage to nail, initial encounter                Patient Instructions   A referral for Orthopedics has been placed. Call [phone] to schedule an appointment. Make sure to follow up in 1-2 weeks or sooner if symptoms continue or worsen.     - Rest.    - Drink plenty of fluids.    -  Acetaminophen (tylenol) or Ibuprofen (advil,motrin) as directed as needed for fever/pain. Avoid tylenol if you have a history of liver disease. Do not take ibuprofen if you have a history of GI bleeding, kidney disease, or if you take blood thinners.   - Ibuprofen dosing for adults: 400 mg by mouth every 4-6 hours as needed. Max: 2400 mg/day; Info: use lowest effective dose, shortest effective treatment duration; give w/ food if GI upset occurs.  - Tylenol dosing for adults: [By mouth route, immediate-release form] Dose: 325-1000 mg by mouth every 4-6h as needed; Max: 1 g/4h and 4 g/day from all sources. [By mouth route, extended-release form] Dose: 650-1300 mg Extended Release by mouth every 8h as needed; Max: 4 g/day from all sources.     - You must understand that you have received an Urgent Care treatment only and that you may be released before all of your medical problems are known or treated.   - You, the patient, will arrange for follow up care as instructed.   - If your condition worsens or fails to improve we recommend that you receive another evaluation at the ER immediately or contact your PCP to discuss your concerns or return here.   - Follow up with your PCP or specialty clinic as directed in the next 1-2 weeks if not improved or as needed.  You can call [phone] to schedule an appointment with the appropriate provider.    If your symptoms do not improve or worsen, go to the emergency room immediately.

## 2025-01-10 NOTE — PATIENT INSTRUCTIONS
A referral for Orthopedics has been placed. Call [phone] to schedule an appointment. Make sure to follow up in 1-2 weeks or sooner if symptoms continue or worsen.     - Rest.    - Drink plenty of fluids.    - Acetaminophen (tylenol) as directed as needed for fever/pain. Avoid tylenol if you have a history of liver disease.   - Tylenol dosing for adults: [By mouth route, immediate-release form] Dose: 325-1000 mg by mouth every 4-6h as needed; Max: 1 g/4h and 4 g/day from all sources. [By mouth route, extended-release form] Dose: 650-1300 mg Extended Release by mouth every 8h as needed; Max: 4 g/day from all sources.     - You must understand that you have received an Urgent Care treatment only and that you may be released before all of your medical problems are known or treated.   - You, the patient, will arrange for follow up care as instructed.   - If your condition worsens or fails to improve we recommend that you receive another evaluation at the ER immediately or contact your PCP to discuss your concerns or return here.   - Follow up with your PCP or specialty clinic as directed in the next 1-2 weeks if not improved or as needed.  You can call [phone] to schedule an appointment with the appropriate provider.    If your symptoms do not improve or worsen, go to the emergency room immediately.

## 2025-01-10 NOTE — TELEPHONE ENCOUNTER
----- Message from Glenna Cross NP sent at 1/10/2025  2:45 PM CST -----  Please contact patient as it sounds like she will need to do an e-visit for this. Thanks  ----- Message -----  From: Macrina Car  Sent: 1/10/2025   2:17 PM CST  To: [facility] Staff    Type:  Needs Medical Advice/Symptom-based Call    Who Called: self     Symptoms (please be specific): Toe painful, swollen      How long has patient had these symptoms:  1 day     Would the patient rather a call back or a response via [facility]? call    Best Call Back Number: [phone] (home)

## 2025-01-13 ENCOUNTER — OFFICE VISIT (OUTPATIENT)
Dept: FAMILY MEDICINE | Facility: CLINIC | Age: 75
End: 2025-01-13
Payer: MEDICARE

## 2025-01-13 ENCOUNTER — PATIENT OUTREACH (OUTPATIENT)
Dept: ADMINISTRATIVE | Facility: HOSPITAL | Age: 75
End: 2025-01-13
Payer: MEDICARE

## 2025-01-13 VITALS
DIASTOLIC BLOOD PRESSURE: 76 MMHG | HEIGHT: 67 IN | WEIGHT: 171.5 LBS | BODY MASS INDEX: 26.92 KG/M2 | HEART RATE: 91 BPM | OXYGEN SATURATION: 98 % | TEMPERATURE: 98 F | SYSTOLIC BLOOD PRESSURE: 110 MMHG

## 2025-01-13 DIAGNOSIS — E11.9 DIABETES MELLITUS WITHOUT COMPLICATION: Primary | ICD-10-CM

## 2025-01-13 DIAGNOSIS — R06.02 SOB (SHORTNESS OF BREATH): ICD-10-CM

## 2025-01-13 DIAGNOSIS — I10 ESSENTIAL HYPERTENSION: ICD-10-CM

## 2025-01-13 DIAGNOSIS — Z09 HOSPITAL DISCHARGE FOLLOW-UP: Primary | ICD-10-CM

## 2025-01-13 PROCEDURE — 3078F DIAST BP <80 MM HG: CPT | Mod: HCNC,CPTII,S$GLB,

## 2025-01-13 PROCEDURE — 99999 PR PBB SHADOW E&M-EST. PATIENT-LVL V: CPT | Mod: PBBFAC,HCNC,,

## 2025-01-13 PROCEDURE — 3074F SYST BP LT 130 MM HG: CPT | Mod: HCNC,CPTII,S$GLB,

## 2025-01-13 PROCEDURE — 99495 TRANSJ CARE MGMT MOD F2F 14D: CPT | Mod: HCNC,S$GLB,,

## 2025-01-13 PROCEDURE — 1160F RVW MEDS BY RX/DR IN RCRD: CPT | Mod: HCNC,CPTII,S$GLB,

## 2025-01-13 PROCEDURE — 1111F DSCHRG MED/CURRENT MED MERGE: CPT | Mod: HCNC,CPTII,S$GLB,

## 2025-01-13 PROCEDURE — 3288F FALL RISK ASSESSMENT DOCD: CPT | Mod: HCNC,CPTII,S$GLB,

## 2025-01-13 PROCEDURE — 1159F MED LIST DOCD IN RCRD: CPT | Mod: HCNC,CPTII,S$GLB,

## 2025-01-13 PROCEDURE — 1101F PT FALLS ASSESS-DOCD LE1/YR: CPT | Mod: HCNC,CPTII,S$GLB,

## 2025-01-13 PROCEDURE — 1126F AMNT PAIN NOTED NONE PRSNT: CPT | Mod: HCNC,CPTII,S$GLB,

## 2025-01-13 RX ORDER — EMPAGLIFLOZIN 10 MG/1
10 TABLET, FILM COATED ORAL
COMMUNITY
Start: 2025-01-08

## 2025-01-13 NOTE — PROGRESS NOTES
SDOH, Medication Adherence, Diabetes Education, Digital Medicine ( eligible ), eAWV ( 2025 ), -    Care Coordination Encounter Details:       MyChart Portal Status:         [x]  Reviewed MyChart Portal Status offered / enrolled if applicable        Additional Notes:     MyChart Outcomes: Pt is enrolled & active          Updates Requested / Reviewed:        Updated Care Coordination Note, Care Everywhere, , Care Team Updated, Removed  or Duplicate Orders, and Immunizations Reconciliation Completed or Queried: [place]         Health Maintenance Screening(s) Due:      Health Maintenance Topics Overdue:      VB Score: 0     Patient is not due for any topics at this time.    Tetanus Vaccine  Shingles/Zoster Vaccine  RSV Vaccine                  Health Maintenance Topic(s) Outreach Outcomes & Actions Taken:    Medication Adherence / Statins - Outreach Outcomes & Actions Taken  : The patient is compliant with all her DM, HTN, & Statin Therapy medications.               Additional Notes:  LUIS MANUEL sent for Eye Exam Dr. Sariah Pfeiffer           Chronic Disease Management:     Diabetes Measures        Lab Results   Component Value Date    HGBA1C 9.8 (H) 2024           [x]  Reviewed chart for active Diabetes diagnosis     [x]  Scheduled necessary follow up appointments if needed         Additional Notes:  Lab appointment already scheduled on 2025           Hypertension Measures        BP Readings from Last 1 Encounters:   01/10/25 121/78           [x]  Reviewed chart for active Hypertension diagnosis     []  Reviewed & documented Home BP Cuff     []  Documented a Remote BP if needed & applicable     []  Scheduled necessary follow up appointments with Primary Care if needed         Additional Notes:  Topic not discussed / BP at goal           Provider Team Continuity:     Last PCP Visit Date: 2024          [x]  Reviewed Primary Care Provider Visits, Annual Wellness Visit, and  Future          Appointments to ensure appointments have been scheduled and/or           completed        Additional Notes:  PCP visit already scheduled on 02/20/2025  eAWV already scheduled on 02/03/2025           Social Determinants of Health          [x]  Reviewed, completed, and/or updated the following sections:                  Food Insecurity, Transportation Needs, Financial Resource Strain,                 Tobacco Use        Additional Notes:  SDOH updated on 01/05/2025           Care Management, Digital Medicine, and/or Education Referrals    OPCM Risk Score: 41.6         Next Steps - Referral Actions: Referral placed for Diabetes Education        Additional Notes:  Diabetes Education - the patient is interested in signing up / topic to be revisited at a later date.  Digital Medicine - the patient is interested, she will have her daughter help with the sign up via the portal.

## 2025-01-13 NOTE — PROGRESS NOTES
HPI     Chief Complaint:  Chief Complaint   Patient presents with    Follow-up       Joanne Peña is a 74 y.o. female with multiple medical diagnoses as listed in the medical history and problem list that presents for   Chief Complaint   Patient presents with    Follow-up    .     Patient is know to me with her last appointment with me on 2/28/2024.     HPI  Pt presents for hospital follow up for CHF.  SOB and swelling has improved.  Weight at home 168.6 lb yesterday AM.  Restarted on Metoprolol and on Lasix 40mg daily. Follow up with CHF clinic on 1/15/25.      Hospital Course:   Patient admitted with AHHRF 2/2 HF with BNP >2000, and MESSI with Cr 2.3 and K rising to 5.3. IV diuresis given in the ICU.  Hypotensive, GDMT stopped for now. Good UOP, initially on BiPAP; currently on room air.  Downgraded to telemetry status on 01/06.   Patient has continued to do well and remains on RA.  Lasix transitioned to oral.  BP improved, but remains borderline.  Discussed GDMT with Cardiology.  Will restart Toprol XL at lower dose.  Patient is currently on Losartan.  Patient's Creat has been increasing over past few months.  Will hold Losartan upon discharge and start low dose Hydralazine.  She has remained afebrile and hemodynamically stable.  Patient will be discharged home to closely follow up with Cardiology and PCP.      Assessment & Plan     Problem List Items Addressed This Visit    None  Visit Diagnoses       Hospital discharge follow-up    -  Primary  SOB and swelling has improved.  Weight at home 168.6 lb yesterday AM.  Restarted on Metoprolol and on Lasix 40mg daily. Follow up with CHF clinic on 1/15/25.  Discussed the importance of daily weights and limiting sodium intake.    Essential hypertension      BP in clinic today 110/76.  The current medical regimen is effective;  continue present plan and medications.    SOB (shortness of breath)      Improving.  Taking Lasix 40mg daily.  Follow up with CHF clinic on  "1/15/25.              --------------------------------------------      Health Maintenance:  Health Maintenance         Date Due Completion Date    TETANUS VACCINE Never done ---    Shingles Vaccine (1 of 2) Never done ---    RSV Vaccine (Age 60+ and Pregnant patients) (1 - Risk 60-74 years 1-dose series) Never done ---    COVID-19 Vaccine (1 - 2024-25 season) Never done ---    Hemoglobin A1c 02/14/2025 11/14/2024    Diabetes Urine Screening 05/13/2025 5/13/2024    Foot Exam 08/19/2025 8/19/2024    Override on 8/19/2024: Done    Mammogram 09/16/2025 9/16/2024    Diabetic Eye Exam 11/04/2025 11/4/2024    Lipid Panel 11/14/2025 11/14/2024    High Dose Statin 01/13/2026 1/13/2025    Colorectal Cancer Screening 07/12/2026 7/12/2023    DEXA Scan 09/25/2026 9/25/2023            Health maintenance reviewed    Follow Up:  No follow-ups on file.    Exam     Review of Systems:  (as noted above)  Review of Systems    Physical Exam:   Physical Exam  Constitutional:       General: She is not in acute distress.     Appearance: Normal appearance. She is not ill-appearing or toxic-appearing.   Cardiovascular:      Rate and Rhythm: Normal rate and regular rhythm.   Pulmonary:      Effort: Pulmonary effort is normal. No respiratory distress.      Breath sounds: Normal breath sounds. No stridor. No wheezing, rhonchi or rales.   Chest:      Chest wall: No tenderness.   Neurological:      Mental Status: She is alert.       Vitals:    01/13/25 1334   BP: 110/76   BP Location: Right arm   Patient Position: Sitting   Pulse: 91   Temp: 97.8 °F (36.6 °C)   TempSrc: Oral   SpO2: 98%   Weight: 77.8 kg (171 lb 8.3 oz)   Height: 5' 7" (1.702 m)      Body mass index is 26.86 kg/m².        History     Past Medical History:  Past Medical History:   Diagnosis Date    Diabetes mellitus     Hyperlipidemia     Hypertension     Stroke        Past Surgical History:  Past Surgical History:   Procedure Laterality Date    COLONOSCOPY N/A 7/12/2023    " Procedure: COLONOSCOPY;  Surgeon: Ray Soernsen MD;  Location: [facility] ENDO;  Service: Endoscopy;  Laterality: N/A;  instr via portal  - PC  4/10/23 Daniel, instr via mail & portal, unable to tolerate Golytely- request Miralax/Gatorade - PC  5/30-pt r/s-new instr portal-tb    INJECTION OF ANESTHETIC AGENT AROUND MEDIAL BRANCH NERVES INNERVATING LUMBAR FACET JOINT Bilateral 4/20/2023    Procedure: MBB #1 (B/L) L3,4,5;  Surgeon: Son Lara DO;  Location: [facility] OR;  Service: Pain Management;  Laterality: Bilateral;  ORAL XANAX    INJECTION OF ANESTHETIC AGENT AROUND MEDIAL BRANCH NERVES INNERVATING LUMBAR FACET JOINT Bilateral 5/5/2023    Procedure: MBB #2 (B/L) L3,4,5;  Surgeon: Son Lara DO;  Location: [facility] OR;  Service: Pain Management;  Laterality: Bilateral;  Oral Xanax    INJECTION OF JOINT Right 5/20/2022    Procedure: Right SI joint injection;  Surgeon: Son Lara DO;  Location: [facility] OR;  Service: Pain Management;  Laterality: Right;    INJECTION, SACROILIAC JOINT Right 12/19/2023    Procedure: RT SI joint Inj;  Surgeon: Son Lara DO;  Location: [facility];  Service: Pain Management;  Laterality: Right;  oral    INSERTION OF IMPLANTABLE LOOP RECORDER Left 6/21/2024    Procedure: Insertion, Implantable Loop Recorder;  Surgeon: Hunter Rich MD;  Location: [facility] EP LAB;  Service: Cardiology;  Laterality: Left;  CVA, ILR, BSCI, Local, ID, 3 Prep    INSERTION OF IMPLANTABLE LOOP RECORDER Left 8/30/2024    Procedure: Insertion, Implantable Loop Recorder;  Surgeon: Hunter Rich MD;  Location: [facility] EP LAB;  Service: Cardiology;  Laterality: Left;  RODNEY GONZÁLES,MDT, RN Sedate, ID, 3 Prep    RADIOFREQUENCY ABLATION OF LUMBAR MEDIAL BRANCH NERVE AT SINGLE LEVEL Bilateral 5/19/2023    Procedure: RFA (B/L) L3,4,5;  Surgeon: Son Lara DO;  Location: [facility] PAIN [facility];  Service: Pain Management;  Laterality: Bilateral;    REMOVAL OF IMPLANTABLE LOOP  RECORDER N/A 7/10/2024    Procedure: REMOVAL, IMPLANTABLE LOOP RECORDER;  Surgeon: Hunter Rich MD;  Location: [facility] LAB;  Service: Cardiology;  Laterality: N/A;       Social History:  Social History     Socioeconomic History    Marital status:    Tobacco Use    Smoking status: Former     Current packs/day: 0.00     Types: Cigarettes     Quit date: 2022     Years since quittin.9     Passive exposure: Past    Smokeless tobacco: Never    Tobacco comments:     Patient Quit Smoking on 2022.   Substance and Sexual Activity    Alcohol use: Not Currently    Drug use: No    Sexual activity: Not Currently     Partners: Male     Social Drivers of Health     Financial Resource Strain: Low Risk  (2025)    Overall Financial Resource Strain (CARDIA)     Difficulty of Paying Living Expenses: Not hard at all   Recent Concern: Financial Resource Strain - Medium Risk (2025)    Overall Financial Resource Strain (CARDIA)     Difficulty of Paying Living Expenses: Somewhat hard   Food Insecurity: No Food Insecurity (2025)    Hunger Vital Sign     Worried About Running Out of Food in the Last Year: Never true     Ran Out of Food in the Last Year: Never true   Recent Concern: Food Insecurity - Food Insecurity Present (2025)    Hunger Vital Sign     Worried About Running Out of Food in the Last Year: Sometimes true     Ran Out of Food in the Last Year: Never true   Transportation Needs: No Transportation Needs (2025)    TRANSPORTATION NEEDS     Transportation : No   Physical Activity: Unknown (2024)    Exercise Vital Sign     Days of Exercise per Week: 0 days   Stress: Stress Concern Present (2025)    [employer] - Occupational Stress Questionnaire     Feeling of Stress : To some extent   Housing Stability: Low Risk  (2025)    Housing Stability Vital Sign     Unable to Pay for Housing in the Last Year: No     Homeless in the Last Year: No       Family  History:  Family History   Problem Relation Name Age of Onset    Kidney disease Mother      Heart disease Mother      Cancer Father      Hypertension Brother      Hypertension Daughter      Cancer Maternal Aunt         Allergies and Medications: (updated and reviewed)  Review of patient's allergies indicates:   Allergen Reactions    Lisinopril-hydrochlorothiazide Other (See Comments)     Pt stated it makes her feel drained. She couldn't raise arms over head.    Ampicillin Rash    Darvocet a500 [propoxyphene n-acetaminophen] Other (See Comments)     shaky     Current Outpatient Medications   Medication Sig Dispense Refill    ACCU-CHEK GUIDE GLUCOSE METER Misc TO CHECK BLOOD GLUCOSE DAILY, TO USE WITH INSURANCE PREFERRED METER      ascorbic acid, vitamin C, (VITAMIN C) 500 MG tablet Take 500 mg by mouth once daily.      atorvastatin (LIPITOR) 80 MG tablet TAKE 1 TABLET (80 MG TOTAL) BY MOUTH ONCE DAILY. 90 tablet 2    benzonatate (TESSALON) 200 MG capsule Take 1 capsule (200 mg total) by mouth 3 (three) times daily as needed for Cough. 15 capsule 0    blood sugar diagnostic Strp To check BG daily, to use with insurance preferred meter 200 each 11    blood sugar diagnostic Strp To check BG daily, to use with insurance preferred meter 200 each 11    cyclobenzaprine (FLEXERIL) 10 MG tablet TAKE 1 TABLET (10 MG TOTAL) BY MOUTH 2 (TWO) TIMES DAILY AS NEEDED FOR MUSCLE SPASMS (BACK PAIN). 180 tablet 1    empagliflozin (JARDIANCE) 25 mg tablet Take 1 tablet (25 mg total) by mouth once daily. 30 tablet 11    furosemide (LASIX) 40 MG tablet Take 1 tablet (40 mg total) by mouth once daily. 30 tablet 11    hydrALAZINE (APRESOLINE) 25 MG tablet Take 1 tablet (25 mg total) by mouth 3 (three) times daily. 90 tablet 11    insulin lispro protamine-insulin lispro (HUMALOG MIX 75-25,U-100,INSULN) 100 unit/mL (75-25) Susp Inject 10 Units into the skin 2 (two) times daily before meals. 6 mL 11    JARDIANCE 10 mg tablet Take 10 mg by  mouth.      lancets Misc To check BG daily, to use with insurance preferred meter 200 each 11    lancets Misc To check BG daily, to use with insurance preferred meter 200 each 11    meclizine (ANTIVERT) 25 mg tablet Take 1 tablet (25 mg total) by mouth 2 (two) times daily as needed for Dizziness. 30 tablet 0    metoprolol succinate (TOPROL-XL) 25 MG 24 hr tablet Take 0.5 tablets (12.5 mg total) by mouth once daily. 45 tablet 3    multivitamin (THERAGRAN) per tablet Take 1 tablet by mouth once daily.      promethazine-dextromethorphan (PROMETHAZINE-DM) 6.25-15 mg/5 mL Syrp Take 5 mLs by mouth nightly as needed (cough). 118 mL 0    aspirin (ECOTRIN) 81 MG EC tablet Take 1 tablet (81 mg total) by mouth once daily. 30 tablet 3     No current facility-administered medications for this visit.       Patient Care Team:  Teri Soto DO as PCP - General (Family Medicine)  Tracie Kessler LPN as Care Coordinator  Lb Tracy OD (Optometry)  Ambika Rutherford RN as Transition Navigator  Sariah Pfeiffer OD as Consulting Physician (Optometry)         - The patient is given an After Visit Summary that lists all medications with directions, allergies, education, orders placed during this encounter and follow-up instructions.      - I have reviewed the patient's medical information including past medical, family, and social history sections including the medications and allergies.      - We discussed the patient's current medications.     This note was created by combination of typed  and MModal dictation.  Transcription errors may be present.  If there are any questions, please contact me.       Glenna Cross NP

## 2025-01-13 NOTE — LETTER
AUTHORIZATION FOR RELEASE OF   CONFIDENTIAL INFORMATION    Dear Dr. Sariah Pfeiffer,    We are seeing Joanne Peña, date of birth 1950, in the clinic at [facility] FAMILY MED/ INTERNAL MED/ PEDS. Teri Soto DO is the patient's PCP. Joanne Peña has an outstanding lab/procedure at the time we reviewed her chart. In order to help keep her health information updated, she has authorized us to request the following medical record(s):        (  )  MAMMOGRAM                                      (  )  COLONOSCOPY      (  )  PAP SMEAR                                          (  )  OUTSIDE LAB RESULTS     (  )  DEXA SCAN                                          ( x ) EYE EXAM(diabetic)             (  )  FOOT EXAM                                          (  )  ENTIRE RECORD     (  )  OUTSIDE IMMUNIZATIONS                 (  )  _______________         Please fax records to [facility], Adams, Lyndsey R., DO, Efax# [phone]      If you have any questions, please contact Tracie Kessler LPN [facility] at  [phone].       Patient Name: Joanne Peña  : 1950  Patient Phone #: [phone]

## 2025-01-14 ENCOUNTER — DOCUMENTATION ONLY (OUTPATIENT)
Facility: CLINIC | Age: 75
End: 2025-01-14
Payer: MEDICARE

## 2025-01-15 ENCOUNTER — DOCUMENTATION ONLY (OUTPATIENT)
Facility: CLINIC | Age: 75
End: 2025-01-15

## 2025-01-15 ENCOUNTER — LAB VISIT (OUTPATIENT)
Dept: LAB | Facility: HOSPITAL | Age: 75
End: 2025-01-15
Payer: MEDICARE

## 2025-01-15 ENCOUNTER — OFFICE VISIT (OUTPATIENT)
Facility: CLINIC | Age: 75
End: 2025-01-15
Payer: MEDICARE

## 2025-01-15 VITALS
RESPIRATION RATE: 18 BRPM | DIASTOLIC BLOOD PRESSURE: 70 MMHG | HEIGHT: 67 IN | WEIGHT: 172.19 LBS | HEART RATE: 82 BPM | OXYGEN SATURATION: 93 % | BODY MASS INDEX: 27.02 KG/M2 | SYSTOLIC BLOOD PRESSURE: 110 MMHG

## 2025-01-15 DIAGNOSIS — I50.20 HFREF (HEART FAILURE WITH REDUCED EJECTION FRACTION): Primary | ICD-10-CM

## 2025-01-15 DIAGNOSIS — I10 MALIGNANT ESSENTIAL HYPERTENSION: ICD-10-CM

## 2025-01-15 DIAGNOSIS — N18.32 STAGE 3B CHRONIC KIDNEY DISEASE: ICD-10-CM

## 2025-01-15 DIAGNOSIS — R06.02 SOB (SHORTNESS OF BREATH): ICD-10-CM

## 2025-01-15 DIAGNOSIS — I51.7 LEFT ATRIAL ENLARGEMENT: ICD-10-CM

## 2025-01-15 DIAGNOSIS — I44.7 LBBB (LEFT BUNDLE BRANCH BLOCK): ICD-10-CM

## 2025-01-15 DIAGNOSIS — Z86.73 HISTORY OF STROKE: ICD-10-CM

## 2025-01-15 LAB
ALBUMIN SERPL BCP-MCNC: 3.7 G/DL (ref 3.5–5.2)
ALP SERPL-CCNC: 74 U/L (ref 40–150)
ALT SERPL W/O P-5'-P-CCNC: 10 U/L (ref 10–44)
ANION GAP SERPL CALC-SCNC: 9 MMOL/L (ref 8–16)
AST SERPL-CCNC: 10 U/L (ref 10–40)
BASOPHILS # BLD AUTO: 0.03 K/UL (ref 0–0.2)
BASOPHILS NFR BLD: 0.7 % (ref 0–1.9)
BILIRUB SERPL-MCNC: 0.3 MG/DL (ref 0.1–1)
BNP SERPL-MCNC: 979 PG/ML (ref 0–99)
BUN SERPL-MCNC: 52 MG/DL (ref 8–23)
CALCIUM SERPL-MCNC: 9.2 MG/DL (ref 8.7–10.5)
CHLORIDE SERPL-SCNC: 109 MMOL/L (ref 95–110)
CO2 SERPL-SCNC: 22 MMOL/L (ref 23–29)
CREAT SERPL-MCNC: 2.1 MG/DL (ref 0.5–1.4)
DIFFERENTIAL METHOD BLD: ABNORMAL
EOSINOPHIL # BLD AUTO: 0.2 K/UL (ref 0–0.5)
EOSINOPHIL NFR BLD: 4.2 % (ref 0–8)
ERYTHROCYTE [DISTWIDTH] IN BLOOD BY AUTOMATED COUNT: 16.1 % (ref 11.5–14.5)
EST. GFR  (NO RACE VARIABLE): 25 ML/MIN/1.73 M^2
GLUCOSE SERPL-MCNC: 152 MG/DL (ref 70–110)
HCT VFR BLD AUTO: 37.4 % (ref 37–48.5)
HGB BLD-MCNC: 10.8 G/DL (ref 12–16)
IMM GRANULOCYTES # BLD AUTO: 0.01 K/UL (ref 0–0.04)
IMM GRANULOCYTES NFR BLD AUTO: 0.2 % (ref 0–0.5)
LYMPHOCYTES # BLD AUTO: 1.1 K/UL (ref 1–4.8)
LYMPHOCYTES NFR BLD: 24.3 % (ref 18–48)
MAGNESIUM SERPL-MCNC: 2.5 MG/DL (ref 1.6–2.6)
MCH RBC QN AUTO: 25.1 PG (ref 27–31)
MCHC RBC AUTO-ENTMCNC: 28.9 G/DL (ref 32–36)
MCV RBC AUTO: 87 FL (ref 82–98)
MONOCYTES # BLD AUTO: 0.4 K/UL (ref 0.3–1)
MONOCYTES NFR BLD: 8.5 % (ref 4–15)
NEUTROPHILS # BLD AUTO: 2.8 K/UL (ref 1.8–7.7)
NEUTROPHILS NFR BLD: 62.1 % (ref 38–73)
NRBC BLD-RTO: 0 /100 WBC
PHOSPHATE SERPL-MCNC: 3.7 MG/DL (ref 2.7–4.5)
PLATELET # BLD AUTO: 305 K/UL (ref 150–450)
PMV BLD AUTO: 10.9 FL (ref 9.2–12.9)
POTASSIUM SERPL-SCNC: 5 MMOL/L (ref 3.5–5.1)
PROT SERPL-MCNC: 7.6 G/DL (ref 6–8.4)
RBC # BLD AUTO: 4.3 M/UL (ref 4–5.4)
SODIUM SERPL-SCNC: 140 MMOL/L (ref 136–145)
WBC # BLD AUTO: 4.57 K/UL (ref 3.9–12.7)

## 2025-01-15 PROCEDURE — 85025 COMPLETE CBC W/AUTO DIFF WBC: CPT | Mod: HCNC

## 2025-01-15 PROCEDURE — 3008F BODY MASS INDEX DOCD: CPT | Mod: HCNC,CPTII,S$GLB,

## 2025-01-15 PROCEDURE — 36415 COLL VENOUS BLD VENIPUNCTURE: CPT | Mod: HCNC

## 2025-01-15 PROCEDURE — 83880 ASSAY OF NATRIURETIC PEPTIDE: CPT | Mod: HCNC

## 2025-01-15 PROCEDURE — 99214 OFFICE O/P EST MOD 30 MIN: CPT | Mod: HCNC,S$GLB,,

## 2025-01-15 PROCEDURE — 99999 PR PBB SHADOW E&M-EST. PATIENT-LVL V: CPT | Mod: PBBFAC,HCNC,,

## 2025-01-15 PROCEDURE — 1160F RVW MEDS BY RX/DR IN RCRD: CPT | Mod: HCNC,CPTII,S$GLB,

## 2025-01-15 PROCEDURE — 84100 ASSAY OF PHOSPHORUS: CPT | Mod: HCNC

## 2025-01-15 PROCEDURE — 80053 COMPREHEN METABOLIC PANEL: CPT | Mod: HCNC

## 2025-01-15 PROCEDURE — 83735 ASSAY OF MAGNESIUM: CPT | Mod: HCNC

## 2025-01-15 PROCEDURE — 1159F MED LIST DOCD IN RCRD: CPT | Mod: HCNC,CPTII,S$GLB,

## 2025-01-15 PROCEDURE — 3074F SYST BP LT 130 MM HG: CPT | Mod: HCNC,CPTII,S$GLB,

## 2025-01-15 PROCEDURE — 1111F DSCHRG MED/CURRENT MED MERGE: CPT | Mod: HCNC,CPTII,S$GLB,

## 2025-01-15 PROCEDURE — 1126F AMNT PAIN NOTED NONE PRSNT: CPT | Mod: HCNC,CPTII,S$GLB,

## 2025-01-15 PROCEDURE — 3078F DIAST BP <80 MM HG: CPT | Mod: HCNC,CPTII,S$GLB,

## 2025-01-15 NOTE — PROGRESS NOTES
"Heart Failure Transitional Care Clinic([facility]) First Week Visit     Pt presents to clinic 01/15/2025 and accompanied by daughter Megha      Most Recent Hospital Discharge Date: 01/07/2025  Last admission Diagnosis/chief complaint:SOB          Visit Vitals:     Wt Readings from Last 3 Encounters:   01/15/25 78.1 kg (172 lb 2.9 oz)   01/13/25 77.8 kg (171 lb 8.3 oz)   01/10/25 76 kg (167 lb 8.8 oz)     Temp Readings from Last 3 Encounters:   01/13/25 97.8 °F (36.6 °C) (Oral)   01/10/25 97.5 °F (36.4 °C) (Oral)   01/07/25 97.9 °F (36.6 °C) (Oral)     BP Readings from Last 3 Encounters:   01/15/25 110/70   01/13/25 110/76   01/10/25 121/78     Pulse Readings from Last 3 Encounters:   01/15/25 82   01/13/25 91   01/10/25 98            Pt reports the following:  []  Shortness of Breath with activity  []  Shortness of Breath at rest/ sleeping on 2-3 pillows "some days"  []  Fatigue  []  Edema   [] Chest pain or tightness  [] Weight Increase since discharge  [] None of the above    Pt reports being in the green  Zone. If in yellow/red, reminded that they should be calling [facility] today or now.      Medications:     Pt reports having all medications available and understands how to take them appropriately. Reminded pt to call prior to making any changes to medications.      Education:    [x] Gave pt new  / Confirmed pt has  "Heart Failure Transitional Care Clinic Home Care Guide" .   Reviewed key points as listed below.      Recommend 2 gram sodium restriction and 1500cc fluid restriction.  Encourage physical activity with graded exercise program.  Requested patient to weigh themselves daily, and to notify us if their weight increases by more than 3 lbs in 1 day or 5 lbs in 3 days.      [x] Gave / Reviewed "Daily Weight and Symptom Tracker".  Reviewed with patient when and how to call  [facility] according to "Yellow Zone" and "Red Zone".                  [x] Pt given list of low/high sodium food list                   Watch for " "these Signs and Symptoms: If any of these occur, contact [facility] immediately:   Increase in shortness of breath with movement   Increase in swelling in your legs and ankles   Weight gain of more than 3 pounds in a night or 5 pounds in 3 days.   Difficulty breathing when you are lying down   Worsening fatigue or tiredness   Stomach bloating, a full feeling or a loss of appetite   Increased coughing--especially when you are lying down     MyChart and Care Companion:              Patient active on myChart? Yes, patient uses regularly.    Contacting our Team:              Reviewed with pt how to contact [facility] RN via phone or [employer] messaging.      HF TCC Program Plan:  Pt educated on follow-up plan while in HFT[facility] program.   [x]  PT given /reviewed upcoming appointment/check in dates. These will include weekly contact with RN or visits with providers over the next 4-6 weeks.                   [x]  Pt educated that they will transitioned to long term care provider team at week 4-6.  This team will be either Cardiology, PCP or Advanced Heart Failure depending on needs.          Pt was able to verbalize back to RN in their own words correct diet/fluid restrictions, necessity for exercise, warning signs and symptoms, when and how to contact their TCC team .       Plan: weekly follow up calls          [x]  Pt given AVS with follow up calls weekly and medication detail list for ease of use.     [x]  Explained to pt they will have a phone "check in" by RN in/on           Will follow up with pt at next clinic visit and RN navigator available for pt questions, issues or concerns.     Please refer to provider visit for additional details and assessment.    "

## 2025-01-15 NOTE — PATIENT INSTRUCTIONS
Activity and Diet restrictions:   Recommend 2-3 gram sodium restriction and 1500cc- 2000cc fluid restriction.  Call clinic for increased shortness of breath, or increased swelling.  Weigh yourself every day and call the office if your weight increases by more than 3 lbs in 1 day or 5 lbs in 3 days.     Return to clinic as needed for labs and follow up    Today, tomorrow and Friday take 40mg lasix twice daily. Then resume 40mg once a day. You may take an extra 40mg in the afternoon or evening for weight gain of 2-3lbs in a day, 5 lbs in 3 days, increased shortness of breath, or increased swelling in your legs.      Follow up with general cardiology at your next appt.

## 2025-01-18 ENCOUNTER — PATIENT OUTREACH (OUTPATIENT)
Dept: ADMINISTRATIVE | Facility: OTHER | Age: 75
End: 2025-01-18
Payer: MEDICARE

## 2025-01-18 ENCOUNTER — TELEPHONE (OUTPATIENT)
Dept: ADMINISTRATIVE | Facility: CLINIC | Age: 75
End: 2025-01-18
Payer: MEDICARE

## 2025-01-18 NOTE — PROGRESS NOTES
This Community Health Worker (CHW) completed Social Determinant of Health (SDOH)  Questionnaire with patient/caregiver via telephone today.  Ms. Rubio contacted the tracker for financial assistance with her medical bills. I have informed her of the financial,assistance department to complete and application for assistance with her bill payments. She has denied any other needs, due to getting assistance from her grandson. Will follow or outcome of application.

## 2025-01-21 ENCOUNTER — PATIENT OUTREACH (OUTPATIENT)
Dept: ADMINISTRATIVE | Facility: OTHER | Age: 75
End: 2025-01-21
Payer: MEDICARE

## 2025-01-22 ENCOUNTER — PATIENT OUTREACH (OUTPATIENT)
Dept: ADMINISTRATIVE | Facility: OTHER | Age: 75
End: 2025-01-22
Payer: MEDICARE

## 2025-01-22 NOTE — PROGRESS NOTES
I have reached out to Juana Hayden who is an financial counselor for assistance with Ms. Sherwood completing an financial assistance application. Izabella has contacted Ms. Sherwood and left a voice mail to assistance. I have also left a voice mail with Ms. Rubio to informed her of Juana mcdaniels. No additional assistance tat his time.

## 2025-01-28 ENCOUNTER — OFFICE VISIT (OUTPATIENT)
Dept: PODIATRY | Facility: CLINIC | Age: 75
End: 2025-01-28
Payer: MEDICARE

## 2025-01-28 VITALS — WEIGHT: 172.19 LBS | HEIGHT: 67 IN | BODY MASS INDEX: 27.02 KG/M2

## 2025-01-28 DIAGNOSIS — M79.671 RIGHT FOOT PAIN: Primary | ICD-10-CM

## 2025-01-28 DIAGNOSIS — S99.921A INJURY OF TOE ON RIGHT FOOT, INITIAL ENCOUNTER: ICD-10-CM

## 2025-01-28 PROCEDURE — 99999 PR PBB SHADOW E&M-EST. PATIENT-LVL III: CPT | Mod: PBBFAC,,, | Performed by: PODIATRIST

## 2025-01-28 NOTE — PROGRESS NOTES
Subjective:     Patient ID: Joanne Peña is a 74 y.o. female.    Chief Complaint: Diabetes Mellitus (1/13/25 NP [place]) and Foot Injury (3rd toe on right foot)    Joanne is a 74 y.o. female who presents to the clinic upon referral from Dr. Pruett  for evaluation and treatment of diabetic feet. Joanne has a past medical history of Diabetes mellitus, Hyperlipidemia, Hypertension, and Stroke. Presents for diabetic foot risk assessment. Reports right 3rd toe pain x several weeks reports stubbing right 3rd toe.        PCP: Teri Soto, DO    Date Last Seen by PCP: per above    Current shoe gear: Tennis shoes    Hemoglobin A1C   Date Value Ref Range Status   11/14/2024 9.8 (H) 4.0 - 5.6 % Final     Comment:     ADA Screening Guidelines:  5.7-6.4%  Consistent with prediabetes  >or=6.5%  Consistent with diabetes    High levels of fetal hemoglobin interfere with the HbA1C  assay. Heterozygous hemoglobin variants (HbS, HgC, etc)do  not significantly interfere with this assay.   However, presence of multiple variants may affect accuracy.     08/16/2024 6.9 (H) 4.0 - 5.6 % Final     Comment:     ADA Screening Guidelines:  5.7-6.4%  Consistent with prediabetes  >or=6.5%  Consistent with diabetes    High levels of fetal hemoglobin interfere with the HbA1C  assay. Heterozygous hemoglobin variants (HbS, HgC, etc)do  not significantly interfere with this assay.   However, presence of multiple variants may affect accuracy.     05/13/2024 7.5 (H) 4.0 - 5.6 % Final     Comment:     ADA Screening Guidelines:  5.7-6.4%  Consistent with prediabetes  >or=6.5%  Consistent with diabetes    High levels of fetal hemoglobin interfere with the HbA1C  assay. Heterozygous hemoglobin variants (HbS, HgC, etc)do  not significantly interfere with this assay.   However, presence of multiple variants may affect accuracy.           Review of Systems   Constitutional: Negative for chills.   Cardiovascular:  Negative for chest pain and  claudication.   Respiratory:  Negative for cough.    Skin:  Positive for color change, dry skin and nail changes.   Musculoskeletal:  Positive for joint pain.   Gastrointestinal:  Negative for nausea.   Neurological:  Positive for paresthesias. Negative for numbness.   Psychiatric/Behavioral:  The patient is not nervous/anxious.         Objective:     Physical Exam  Constitutional:       Appearance: She is well-developed.      Comments: Oriented to time, place, and person.   Cardiovascular:      Comments: DP and PT pulses are palpable bilaterally. 3 sec capillary refill time and toes and feet are warm to touch proximally .  There is  hair growth on the feet and toes b/l. There is no edema b/l. No spider veins or varicosities present b/l.     Musculoskeletal:      Comments: Equinus noted b/l ankles with < 10 deg DF noted. MMT 5/5 in DF/PF/Inv/Ev resistance with no reproduction of pain in any direction. Passive range of motion of ankle and pedal joints is painless b/l.     Feet:      Right foot:      Skin integrity: No callus or dry skin.      Left foot:      Skin integrity: No callus or dry skin.   Lymphadenopathy:      Comments: Negative lymphadenopathy bilateral popliteal fossa and tarsal tunnel.   Skin:     Comments: No open lesions, lacerations or wounds noted.Interdigital spaces clean, dry and intact b/l. No erythema noted to b/l foot.  Nails normal color and trophic qualities.     Neurological:      Mental Status: She is alert.      Comments: Light touch, proprioception, and sharp/dull sensation are all intact bilaterally. Protective threshold with the Verner-Wienstein monofilament is intact bilaterally.      Subjective paresthesias with no clearly identifiable source or trigger.        Psychiatric:         Behavior: Behavior is cooperative.           Assessment:      Encounter Diagnoses   Name Primary?    Right foot pain Yes    Injury of toe on right foot, initial encounter      Plan:     Joanne was seen today  for diabetes mellitus and foot injury.    Diagnoses and all orders for this visit:    Right foot pain  -     Ankle Brachial Indices (JOSE EDUARDO); Future    Injury of toe on right foot, initial encounter      I counseled the patient on her conditions, their implications and medical management.        JOSE EDUARDO ordered    DARCO shoe dispensed    Discussed conservative treatment with shoes of adequate dimensions, material, and style to alleviate symptoms and delay or prevent surgical intervention.    RTC PRN

## 2025-01-29 ENCOUNTER — PATIENT MESSAGE (OUTPATIENT)
Dept: FAMILY MEDICINE | Facility: CLINIC | Age: 75
End: 2025-01-29
Payer: MEDICARE

## 2025-01-30 ENCOUNTER — PATIENT OUTREACH (OUTPATIENT)
Dept: ADMINISTRATIVE | Facility: OTHER | Age: 75
End: 2025-01-30
Payer: MEDICARE

## 2025-01-30 NOTE — PROGRESS NOTES
CHW - Case Closure    This Community Health Worker spoke to patient via telephone today.   Pt/Caregiver reported: Ms. Rubio stated she has not reach anyone from the number with patient assistance,. I have confirmed the number she call. She stated she has not spoke with [facility] either. I will have an application mail to her.   Pt denied any additional needs at this time and agrees with episode closure at this time.  Provided patient with Community Health Worker's contact information and encouraged him/her to contact this Community Health Worker if additional needs arise.

## 2025-01-31 ENCOUNTER — OFFICE VISIT (OUTPATIENT)
Dept: FAMILY MEDICINE | Facility: CLINIC | Age: 75
End: 2025-01-31
Payer: MEDICARE

## 2025-01-31 VITALS
DIASTOLIC BLOOD PRESSURE: 72 MMHG | HEIGHT: 67 IN | HEART RATE: 93 BPM | WEIGHT: 170.31 LBS | SYSTOLIC BLOOD PRESSURE: 104 MMHG | OXYGEN SATURATION: 97 % | TEMPERATURE: 98 F | BODY MASS INDEX: 26.73 KG/M2

## 2025-01-31 DIAGNOSIS — N64.52 NIPPLE DISCHARGE: Primary | ICD-10-CM

## 2025-01-31 DIAGNOSIS — E11.65 UNCONTROLLED TYPE 2 DIABETES MELLITUS WITH HYPERGLYCEMIA: ICD-10-CM

## 2025-01-31 DIAGNOSIS — E78.2 MIXED HYPERLIPIDEMIA: ICD-10-CM

## 2025-01-31 PROCEDURE — 99213 OFFICE O/P EST LOW 20 MIN: CPT | Mod: S$GLB,,,

## 2025-01-31 PROCEDURE — 3074F SYST BP LT 130 MM HG: CPT | Mod: CPTII,S$GLB,,

## 2025-01-31 PROCEDURE — 1160F RVW MEDS BY RX/DR IN RCRD: CPT | Mod: CPTII,S$GLB,,

## 2025-01-31 PROCEDURE — 3078F DIAST BP <80 MM HG: CPT | Mod: CPTII,S$GLB,,

## 2025-01-31 PROCEDURE — 99999 PR PBB SHADOW E&M-EST. PATIENT-LVL IV: CPT | Mod: PBBFAC,,,

## 2025-01-31 PROCEDURE — 1111F DSCHRG MED/CURRENT MED MERGE: CPT | Mod: CPTII,S$GLB,,

## 2025-01-31 PROCEDURE — 3008F BODY MASS INDEX DOCD: CPT | Mod: CPTII,S$GLB,,

## 2025-01-31 PROCEDURE — 1101F PT FALLS ASSESS-DOCD LE1/YR: CPT | Mod: CPTII,S$GLB,,

## 2025-01-31 PROCEDURE — 1125F AMNT PAIN NOTED PAIN PRSNT: CPT | Mod: CPTII,S$GLB,,

## 2025-01-31 PROCEDURE — 3288F FALL RISK ASSESSMENT DOCD: CPT | Mod: CPTII,S$GLB,,

## 2025-01-31 PROCEDURE — 1159F MED LIST DOCD IN RCRD: CPT | Mod: CPTII,S$GLB,,

## 2025-01-31 RX ORDER — CEPHALEXIN 500 MG/1
1000 TABLET, FILM COATED ORAL 2 TIMES DAILY
Qty: 40 TABLET | Refills: 0 | Status: SHIPPED | OUTPATIENT
Start: 2025-01-31 | End: 2025-02-10

## 2025-01-31 NOTE — PROGRESS NOTES
HPI     Chief Complaint:  Chief Complaint   Patient presents with    Breast Discharge       Joanne Peña is a 74 y.o. female with multiple medical diagnoses as listed in the medical history and problem list that presents for   Chief Complaint   Patient presents with    Breast Discharge   .   Patient is not known to me with her last appointment in this department on 1/13/2025.      HPI    Pt reports to clinic for left nipple discharge x 2 days. Pain only localized to nipple. Pt states was able to express nipple discharge. No blood in discharge. No breast change or breast tenderness. Similar symptoms in 2022 that resolved after Keflex. Last mmg with US was in 9/2024. Due for annual screening in March.     DM - last A1c uncontrolled. Pt states was unable to afford Mounjaro so was switched to Jardiance. Checking glucose at home. Fasting is sometimes 120. Has upcoming labs in 2 weeks with f/u appt with PCP. Pt states insurance will now cover Mounjaro so interested in switch. Will see what repeat A1C is. Discussed glucose control importance especially r/t preventing infection.     HTN - controlled in clinic    Other concerns below  Assessment & Plan       1. Nipple discharge  No palpable mass  Tenderness noted to nipple, dried yellow discharge noted but unable to express.   No dimpling or skin changes    Keflex x 10 days  Complete MMG in March  -     cephalexin (KEFTAB) 500 mg tablet; Take 2 tablets (1,000 mg total) by mouth 2 (two) times daily. for 10 days  Dispense: 40 tablet; Refill: 0    2. Mixed hyperlipidemia  Lab Results   Component Value Date    CHOL 241 (H) 11/14/2024    CHOL 188 05/13/2024    CHOL 190 11/06/2023     Lab Results   Component Value Date    HDL 43 11/14/2024    HDL 45 05/13/2024    HDL 44 11/06/2023     Lab Results   Component Value Date    LDLCALC 154.2 11/14/2024    LDLCALC 114.4 05/13/2024    LDLCALC 119.2 11/06/2023     Lab Results   Component Value Date    TRIG 219 (H) 11/14/2024     TRIG 143 05/13/2024    TRIG 134 11/06/2023       Lab Results   Component Value Date    CHOLHDL 17.8 (L) 11/14/2024    CHOLHDL 23.9 05/13/2024    CHOLHDL 23.2 11/06/2023     Continue statin. Stable.     3. Uncontrolled type 2 diabetes mellitus with hyperglycemia  Lab Results   Component Value Date    HGBA1C 9.8 (H) 11/14/2024     Uncontrolled. Repeat A1C scheduled in 3 months. Based on fasting labs, hopefully improved. Discuss further with PCP about restarting Mounjaro.         --------------------------------------------      Health Maintenance:  Health Maintenance         Date Due Completion Date    TETANUS VACCINE Never done ---    Shingles Vaccine (1 of 2) Never done ---    RSV Vaccine (Age 60+ and Pregnant patients) (1 - Risk 60-74 years 1-dose series) Never done ---    COVID-19 Vaccine (1 - 2024-25 season) Never done ---    Hemoglobin A1c 02/14/2025 11/14/2024    Diabetes Urine Screening 05/13/2025 5/13/2024    Mammogram 09/16/2025 9/16/2024    Diabetic Eye Exam 11/04/2025 11/4/2024    Lipid Panel 11/14/2025 11/14/2024    Foot Exam 01/28/2026 1/28/2025 (Done)    Override on 1/28/2025: Done    Override on 8/19/2024: Done    High Dose Statin 01/31/2026 1/31/2025    Colorectal Cancer Screening 07/12/2026 7/12/2023    DEXA Scan 09/25/2026 9/25/2023            Health maintenance reviewed    Follow Up:  No follow-ups on file.      Discussed DDx, condition, and treatment.   Education sent to patient portal/included in after visit summary.  ED precautions given.   Notify provider if symptoms do not resolve or increase in severity.   Patient verbalizes understanding and agrees with plan of care.    Exam     Review of Systems:  (as noted above)  Review of Systems    Physical Exam:   Physical Exam  Chaperone present: declines chaperone.   Constitutional:       General: She is not in acute distress.     Appearance: Normal appearance. She is not toxic-appearing.   Pulmonary:      Effort: Pulmonary effort is normal. No  "respiratory distress.   Chest:   Breasts:     Left: Nipple discharge present. No swelling, bleeding, inverted nipple, skin change or tenderness.       Lymphadenopathy:      Cervical: No cervical adenopathy.      Left cervical: No superficial cervical adenopathy.      Upper Body:      Left upper body: No supraclavicular, axillary, pectoral or epitrochlear adenopathy.   Skin:     General: Skin is warm.      Capillary Refill: Capillary refill takes less than 2 seconds.      Findings: No erythema or rash.   Neurological:      General: No focal deficit present.      Mental Status: She is alert and oriented to person, place, and time.   Psychiatric:         Mood and Affect: Mood normal.       Vitals:    01/31/25 1328   BP: 104/72   Pulse: 93   Temp: 97.7 °F (36.5 °C)   TempSrc: Oral   SpO2: 97%   Weight: 77.3 kg (170 lb 4.9 oz)   Height: 5' 7" (1.702 m)      Body mass index is 26.67 kg/m².        History     Past Medical History:  Past Medical History:   Diagnosis Date    Diabetes mellitus     Hyperlipidemia     Hypertension     Stroke        Past Surgical History:  Past Surgical History:   Procedure Laterality Date    COLONOSCOPY N/A 7/12/2023    Procedure: COLONOSCOPY;  Surgeon: Ray Sorensen MD;  Location: [facility] ENDO;  Service: Endoscopy;  Laterality: N/A;  instr via portal  - PC  4/10/23 Daniel, instr via mail & portal, unable to tolerate Golytely- request Miralax/Gatorade - PC  5/30-pt r/s-new instr portal-tb    INJECTION OF ANESTHETIC AGENT AROUND MEDIAL BRANCH NERVES INNERVATING LUMBAR FACET JOINT Bilateral 4/20/2023    Procedure: MBB #1 (B/L) L3,4,5;  Surgeon: Son Lara DO;  Location: [facility] OR;  Service: Pain Management;  Laterality: Bilateral;  ORAL XANAX    INJECTION OF ANESTHETIC AGENT AROUND MEDIAL BRANCH NERVES INNERVATING LUMBAR FACET JOINT Bilateral 5/5/2023    Procedure: MBB #2 (B/L) L3,4,5;  Surgeon: Son Lara DO;  Location: [facility] OR;  Service: Pain Management;  Laterality: " Bilateral;  Oral Xanax    INJECTION OF JOINT Right 2022    Procedure: Right SI joint injection;  Surgeon: Son Lara DO;  Location: [facility] OR;  Service: Pain Management;  Laterality: Right;    INJECTION, SACROILIAC JOINT Right 2023    Procedure: RT SI joint Inj;  Surgeon: Son Lara DO;  Location: [facility] PAIN MANAGEMENT;  Service: Pain Management;  Laterality: Right;  oral    INSERTION OF IMPLANTABLE LOOP RECORDER Left 2024    Procedure: Insertion, Implantable Loop Recorder;  Surgeon: Hunter Rich MD;  Location: [facility] EP LAB;  Service: Cardiology;  Laterality: Left;  CVA, ILR, BSCI, Local, [place], 3 Prep    INSERTION OF IMPLANTABLE LOOP RECORDER Left 2024    Procedure: Insertion, Implantable Loop Recorder;  Surgeon: Hunter Rich MD;  Location: [facility] EP LAB;  Service: Cardiology;  Laterality: Left;  CVA, ILR,MDT, RN [place], [place], 3 Prep    RADIOFREQUENCY ABLATION OF LUMBAR MEDIAL BRANCH NERVE AT SINGLE LEVEL Bilateral 2023    Procedure: RFA (B/L) L3,4,5;  Surgeon: Son Lara DO;  Location: [facility] PAIN MANAGEMENT;  Service: Pain Management;  Laterality: Bilateral;    REMOVAL OF IMPLANTABLE LOOP RECORDER N/A 7/10/2024    Procedure: REMOVAL, IMPLANTABLE LOOP RECORDER;  Surgeon: Hunter Rich MD;  Location: [facility] EP LAB;  Service: Cardiology;  Laterality: N/A;       Social History:  Social History     Socioeconomic History    Marital status:    Tobacco Use    Smoking status: Former     Current packs/day: 0.00     Types: Cigarettes     Quit date: 2022     Years since quittin.9     Passive exposure: Past    Smokeless tobacco: Never    Tobacco comments:     Patient Quit Smoking on 2022.   Substance and Sexual Activity    Alcohol use: Not Currently    Drug use: No    Sexual activity: Not Currently     Partners: Male     Social Drivers of Health     Financial Resource Strain: High Risk (2025)    Overall Financial Resource Strain (CARDIA)      Difficulty of Paying Living Expenses: Hard   Food Insecurity: Food Insecurity Present (1/18/2025)    Hunger Vital Sign     Worried About Running Out of Food in the Last Year: Sometimes true     Ran Out of Food in the Last Year: Sometimes true   Transportation Needs: No Transportation Needs (1/18/2025)    PRAPARE - Transportation     Lack of Transportation (Medical): No     Lack of Transportation (Non-Medical): No   Physical Activity: Inactive (1/18/2025)    Exercise Vital Sign     Days of Exercise per Week: 0 days     Minutes of Exercise per Session: 0 min   Stress: No Stress Concern Present (1/18/2025)    [employer] - Occupational Stress Questionnaire     Feeling of Stress : Only a little   Recent Concern: Stress - Stress Concern Present (1/5/2025)    [employer] - Occupational Stress Questionnaire     Feeling of Stress : To some extent   Housing Stability: High Risk (1/18/2025)    Housing Stability Vital Sign     Unable to Pay for Housing in the Last Year: Yes     Homeless in the Last Year: No       Family History:  Family History   Problem Relation Name Age of Onset    Kidney disease Mother      Heart disease Mother      Cancer Father      Hypertension Brother      Hypertension Daughter      Cancer Maternal Aunt         Allergies and Medications: (updated and reviewed)  Review of patient's allergies indicates:   Allergen Reactions    Lisinopril-hydrochlorothiazide Other (See Comments)     Pt stated it makes her feel drained. She couldn't raise arms over head.    Ampicillin Rash    Darvocet a500 [propoxyphene n-acetaminophen] Other (See Comments)     shaky     Current Outpatient Medications   Medication Sig Dispense Refill    ACCU-CHEK GUIDE GLUCOSE METER Misc TO CHECK BLOOD GLUCOSE DAILY, TO USE WITH INSURANCE PREFERRED METER      ascorbic acid, vitamin C, (VITAMIN C) 500 MG tablet Take 500 mg by mouth once daily.      atorvastatin (LIPITOR) 80 MG tablet  TAKE 1 TABLET (80 MG TOTAL) BY MOUTH ONCE DAILY. 90 tablet 2    blood sugar diagnostic Strp To check BG daily, to use with insurance preferred meter 200 each 11    blood sugar diagnostic Strp To check BG daily, to use with insurance preferred meter 200 each 11    cyclobenzaprine (FLEXERIL) 10 MG tablet TAKE 1 TABLET (10 MG TOTAL) BY MOUTH 2 (TWO) TIMES DAILY AS NEEDED FOR MUSCLE SPASMS (BACK PAIN). 180 tablet 1    empagliflozin (JARDIANCE) 25 mg tablet Take 1 tablet (25 mg total) by mouth once daily. 30 tablet 11    furosemide (LASIX) 40 MG tablet Take 1 tablet (40 mg total) by mouth once daily. 30 tablet 11    hydrALAZINE (APRESOLINE) 25 MG tablet Take 1 tablet (25 mg total) by mouth 3 (three) times daily. 90 tablet 11    insulin lispro protamine-insulin lispro (HUMALOG MIX 75-25,U-100,INSULN) 100 unit/mL (75-25) Susp Inject 10 Units into the skin 2 (two) times daily before meals. 6 mL 11    JARDIANCE 10 mg tablet Take 10 mg by mouth.      lancets Misc To check BG daily, to use with insurance preferred meter 200 each 11    lancets Misc To check BG daily, to use with insurance preferred meter 200 each 11    metoprolol succinate (TOPROL-XL) 25 MG 24 hr tablet Take 0.5 tablets (12.5 mg total) by mouth once daily. 45 tablet 3    multivitamin (THERAGRAN) per tablet Take 1 tablet by mouth once daily.      aspirin (ECOTRIN) 81 MG EC tablet Take 1 tablet (81 mg total) by mouth once daily. 30 tablet 3    benzonatate (TESSALON) 200 MG capsule Take 1 capsule (200 mg total) by mouth 3 (three) times daily as needed for Cough. (Patient not taking: Reported on 1/31/2025) 15 capsule 0    cephalexin (KEFTAB) 500 mg tablet Take 2 tablets (1,000 mg total) by mouth 2 (two) times daily. for 10 days 40 tablet 0    meclizine (ANTIVERT) 25 mg tablet Take 1 tablet (25 mg total) by mouth 2 (two) times daily as needed for Dizziness. 30 tablet 0    promethazine-dextromethorphan (PROMETHAZINE-DM) 6.25-15 mg/5 mL Syrp Take 5 mLs by mouth  nightly as needed (cough). (Patient not taking: Reported on 1/31/2025) 118 mL 0     No current facility-administered medications for this visit.       Patient Care Team:  Teri Soto DO as PCP - General (Family Medicine)  Tracie Kessler LPN as Care Coordinator  Lb Tracy OD (Optometry)  Ambika Rutherford RN as Transition Navigator  Sariah Pfeiffer OD as Consulting Physician (Optometry)         - The patient is given an After Visit Summary that lists all medications with directions, allergies, education, orders placed during this encounter and follow-up instructions.      - I have reviewed the patient's medical information including past medical, family, and social history sections including the medications and allergies.      - We discussed the patient's current medications.     This note was created by combination of typed  and MModal dictation.  Transcription errors may be present.  If there are any questions, please contact me.

## 2025-02-01 ENCOUNTER — CLINICAL SUPPORT (OUTPATIENT)
Dept: CARDIOLOGY | Facility: HOSPITAL | Age: 75
End: 2025-02-01
Attending: INTERNAL MEDICINE
Payer: MEDICARE

## 2025-02-01 DIAGNOSIS — I49.8 OTHER SPECIFIED CARDIAC ARRHYTHMIAS: ICD-10-CM

## 2025-02-01 PROCEDURE — 93298 REM INTERROG DEV EVAL SCRMS: CPT | Mod: HCNC | Performed by: INTERNAL MEDICINE

## 2025-02-04 ENCOUNTER — HOSPITAL ENCOUNTER (OUTPATIENT)
Dept: CARDIOLOGY | Facility: HOSPITAL | Age: 75
Discharge: HOME | End: 2025-02-04
Attending: PODIATRIST
Payer: MEDICARE

## 2025-02-04 DIAGNOSIS — M79.671 RIGHT FOOT PAIN: ICD-10-CM

## 2025-02-04 LAB
LEFT ABI: 0.61
LEFT ARM BP: 119 MMHG
LEFT DORSALIS PEDIS: 66 MMHG
LEFT POSTERIOR TIBIAL: 73 MMHG
LEFT TBI: 0.35
LEFT TOE PRESSURE: 42 MMHG
RIGHT ABI: 0.82
RIGHT ARM BP: 117 MMHG
RIGHT DORSALIS PEDIS: 97 MMHG
RIGHT POSTERIOR TIBIAL: 96 MMHG
RIGHT TBI: 0.39
RIGHT TOE PRESSURE: 47 MMHG

## 2025-02-04 PROCEDURE — 93922 UPR/L XTREMITY ART 2 LEVELS: CPT | Mod: 26,HCNC,, | Performed by: INTERNAL MEDICINE

## 2025-02-04 PROCEDURE — 93922 UPR/L XTREMITY ART 2 LEVELS: CPT | Mod: HCNC

## 2025-02-12 ENCOUNTER — PATIENT MESSAGE (OUTPATIENT)
Dept: PODIATRY | Facility: CLINIC | Age: 75
End: 2025-02-12
Payer: MEDICARE

## 2025-02-13 ENCOUNTER — LAB VISIT (OUTPATIENT)
Dept: LAB | Facility: HOSPITAL | Age: 75
End: 2025-02-13
Attending: FAMILY MEDICINE
Payer: MEDICARE

## 2025-02-13 DIAGNOSIS — D63.1 ANEMIA OF CHRONIC RENAL FAILURE, STAGE 3B: ICD-10-CM

## 2025-02-13 DIAGNOSIS — E11.69 HYPERLIPIDEMIA ASSOCIATED WITH TYPE 2 DIABETES MELLITUS: ICD-10-CM

## 2025-02-13 DIAGNOSIS — I10 ESSENTIAL HYPERTENSION: ICD-10-CM

## 2025-02-13 DIAGNOSIS — E78.5 HYPERLIPIDEMIA ASSOCIATED WITH TYPE 2 DIABETES MELLITUS: ICD-10-CM

## 2025-02-13 DIAGNOSIS — Z86.73 HISTORY OF CVA (CEREBROVASCULAR ACCIDENT): ICD-10-CM

## 2025-02-13 DIAGNOSIS — E11.65 UNCONTROLLED TYPE 2 DIABETES MELLITUS WITH HYPERGLYCEMIA: ICD-10-CM

## 2025-02-13 DIAGNOSIS — N18.32 ANEMIA OF CHRONIC RENAL FAILURE, STAGE 3B: ICD-10-CM

## 2025-02-13 LAB
ALBUMIN SERPL BCP-MCNC: 3.6 G/DL (ref 3.5–5.2)
ALP SERPL-CCNC: 70 U/L (ref 40–150)
ALT SERPL W/O P-5'-P-CCNC: 9 U/L (ref 10–44)
ANION GAP SERPL CALC-SCNC: 9 MMOL/L (ref 8–16)
AST SERPL-CCNC: 25 U/L (ref 10–40)
BASOPHILS # BLD AUTO: 0.03 K/UL (ref 0–0.2)
BASOPHILS NFR BLD: 0.6 % (ref 0–1.9)
BILIRUB SERPL-MCNC: 0.3 MG/DL (ref 0.1–1)
BUN SERPL-MCNC: 60 MG/DL (ref 8–23)
CALCIUM SERPL-MCNC: 9.5 MG/DL (ref 8.7–10.5)
CHLORIDE SERPL-SCNC: 107 MMOL/L (ref 95–110)
CHOLEST SERPL-MCNC: 205 MG/DL (ref 120–199)
CHOLEST/HDLC SERPL: 4.8 {RATIO} (ref 2–5)
CO2 SERPL-SCNC: 21 MMOL/L (ref 23–29)
CREAT SERPL-MCNC: 2.1 MG/DL (ref 0.5–1.4)
DIFFERENTIAL METHOD BLD: ABNORMAL
EOSINOPHIL # BLD AUTO: 0.2 K/UL (ref 0–0.5)
EOSINOPHIL NFR BLD: 4.5 % (ref 0–8)
ERYTHROCYTE [DISTWIDTH] IN BLOOD BY AUTOMATED COUNT: 16.3 % (ref 11.5–14.5)
EST. GFR  (NO RACE VARIABLE): 24.6 ML/MIN/1.73 M^2
ESTIMATED AVG GLUCOSE: 197 MG/DL (ref 68–131)
GLUCOSE SERPL-MCNC: 131 MG/DL (ref 70–110)
HBA1C MFR BLD: 8.5 % (ref 4–5.6)
HCT VFR BLD AUTO: 36.8 % (ref 37–48.5)
HDLC SERPL-MCNC: 43 MG/DL (ref 40–75)
HDLC SERPL: 21 % (ref 20–50)
HGB BLD-MCNC: 10.7 G/DL (ref 12–16)
IMM GRANULOCYTES # BLD AUTO: 0.01 K/UL (ref 0–0.04)
IMM GRANULOCYTES NFR BLD AUTO: 0.2 % (ref 0–0.5)
LDLC SERPL CALC-MCNC: 133 MG/DL (ref 63–159)
LYMPHOCYTES # BLD AUTO: 1.3 K/UL (ref 1–4.8)
LYMPHOCYTES NFR BLD: 25.2 % (ref 18–48)
MCH RBC QN AUTO: 25.6 PG (ref 27–31)
MCHC RBC AUTO-ENTMCNC: 29.1 G/DL (ref 32–36)
MCV RBC AUTO: 88 FL (ref 82–98)
MONOCYTES # BLD AUTO: 0.6 K/UL (ref 0.3–1)
MONOCYTES NFR BLD: 11.1 % (ref 4–15)
NEUTROPHILS # BLD AUTO: 3.1 K/UL (ref 1.8–7.7)
NEUTROPHILS NFR BLD: 58.4 % (ref 38–73)
NONHDLC SERPL-MCNC: 162 MG/DL
NRBC BLD-RTO: 0 /100 WBC
PLATELET # BLD AUTO: 291 K/UL (ref 150–450)
PMV BLD AUTO: 11.5 FL (ref 9.2–12.9)
POTASSIUM SERPL-SCNC: 4.8 MMOL/L (ref 3.5–5.1)
PROT SERPL-MCNC: 7.3 G/DL (ref 6–8.4)
RBC # BLD AUTO: 4.18 M/UL (ref 4–5.4)
SODIUM SERPL-SCNC: 137 MMOL/L (ref 136–145)
TRIGL SERPL-MCNC: 145 MG/DL (ref 30–150)
WBC # BLD AUTO: 5.31 K/UL (ref 3.9–12.7)

## 2025-02-13 PROCEDURE — 80061 LIPID PANEL: CPT | Mod: HCNC | Performed by: FAMILY MEDICINE

## 2025-02-13 PROCEDURE — 85025 COMPLETE CBC W/AUTO DIFF WBC: CPT | Mod: HCNC | Performed by: FAMILY MEDICINE

## 2025-02-13 PROCEDURE — 83036 HEMOGLOBIN GLYCOSYLATED A1C: CPT | Mod: HCNC | Performed by: FAMILY MEDICINE

## 2025-02-13 PROCEDURE — 80053 COMPREHEN METABOLIC PANEL: CPT | Mod: HCNC | Performed by: FAMILY MEDICINE

## 2025-02-14 ENCOUNTER — TELEPHONE (OUTPATIENT)
Dept: NEPHROLOGY | Facility: CLINIC | Age: 75
End: 2025-02-14
Payer: MEDICARE

## 2025-02-14 DIAGNOSIS — N18.30 STAGE 3 CHRONIC KIDNEY DISEASE, UNSPECIFIED WHETHER STAGE 3A OR 3B CKD: Primary | ICD-10-CM

## 2025-02-14 NOTE — TELEPHONE ENCOUNTER
I left a message for this patient in reference scheduled appointment in nephrology. Provider will not be in clinic on the day of.

## 2025-02-17 LAB
OHS CV AF BURDEN PERCENT: < 1
OHS CV DC REMOTE DEVICE TYPE: NORMAL

## 2025-02-20 ENCOUNTER — OFFICE VISIT (OUTPATIENT)
Dept: FAMILY MEDICINE | Facility: CLINIC | Age: 75
End: 2025-02-20
Payer: MEDICARE

## 2025-02-20 VITALS
HEIGHT: 67 IN | OXYGEN SATURATION: 99 % | DIASTOLIC BLOOD PRESSURE: 82 MMHG | BODY MASS INDEX: 27.51 KG/M2 | WEIGHT: 175.25 LBS | TEMPERATURE: 98 F | SYSTOLIC BLOOD PRESSURE: 124 MMHG | HEART RATE: 88 BPM

## 2025-02-20 DIAGNOSIS — I50.42 CHRONIC COMBINED SYSTOLIC AND DIASTOLIC CONGESTIVE HEART FAILURE: ICD-10-CM

## 2025-02-20 DIAGNOSIS — I10 ESSENTIAL HYPERTENSION: ICD-10-CM

## 2025-02-20 DIAGNOSIS — I73.9 PVD (PERIPHERAL VASCULAR DISEASE): ICD-10-CM

## 2025-02-20 DIAGNOSIS — N63.42 SUBAREOLAR MASS OF LEFT BREAST: ICD-10-CM

## 2025-02-20 DIAGNOSIS — Z12.31 SCREENING MAMMOGRAM FOR BREAST CANCER: ICD-10-CM

## 2025-02-20 DIAGNOSIS — E11.65 UNCONTROLLED TYPE 2 DIABETES MELLITUS WITH HYPERGLYCEMIA: Primary | ICD-10-CM

## 2025-02-20 DIAGNOSIS — N18.4 ANEMIA OF CHRONIC RENAL FAILURE, STAGE 4 (SEVERE): ICD-10-CM

## 2025-02-20 DIAGNOSIS — I25.10 ATHEROSCLEROSIS OF NATIVE CORONARY ARTERY OF NATIVE HEART WITHOUT ANGINA PECTORIS: ICD-10-CM

## 2025-02-20 DIAGNOSIS — E78.5 HYPERLIPIDEMIA ASSOCIATED WITH TYPE 2 DIABETES MELLITUS: ICD-10-CM

## 2025-02-20 DIAGNOSIS — D63.1 ANEMIA OF CHRONIC RENAL FAILURE, STAGE 4 (SEVERE): ICD-10-CM

## 2025-02-20 DIAGNOSIS — N18.4 CHRONIC KIDNEY DISEASE (CKD), STAGE IV (SEVERE): ICD-10-CM

## 2025-02-20 DIAGNOSIS — E11.69 HYPERLIPIDEMIA ASSOCIATED WITH TYPE 2 DIABETES MELLITUS: ICD-10-CM

## 2025-02-20 PROCEDURE — 3288F FALL RISK ASSESSMENT DOCD: CPT | Mod: HCNC,CPTII,S$GLB, | Performed by: FAMILY MEDICINE

## 2025-02-20 PROCEDURE — 1160F RVW MEDS BY RX/DR IN RCRD: CPT | Mod: HCNC,CPTII,S$GLB, | Performed by: FAMILY MEDICINE

## 2025-02-20 PROCEDURE — 3052F HG A1C>EQUAL 8.0%<EQUAL 9.0%: CPT | Mod: HCNC,CPTII,S$GLB, | Performed by: FAMILY MEDICINE

## 2025-02-20 PROCEDURE — 1101F PT FALLS ASSESS-DOCD LE1/YR: CPT | Mod: HCNC,CPTII,S$GLB, | Performed by: FAMILY MEDICINE

## 2025-02-20 PROCEDURE — 3008F BODY MASS INDEX DOCD: CPT | Mod: HCNC,CPTII,S$GLB, | Performed by: FAMILY MEDICINE

## 2025-02-20 PROCEDURE — 1159F MED LIST DOCD IN RCRD: CPT | Mod: HCNC,CPTII,S$GLB, | Performed by: FAMILY MEDICINE

## 2025-02-20 PROCEDURE — 1126F AMNT PAIN NOTED NONE PRSNT: CPT | Mod: HCNC,CPTII,S$GLB, | Performed by: FAMILY MEDICINE

## 2025-02-20 RX ORDER — TIRZEPATIDE 2.5 MG/.5ML
2.5 INJECTION, SOLUTION SUBCUTANEOUS
Qty: 2 ML | Refills: 11 | Status: SHIPPED | OUTPATIENT
Start: 2025-02-20 | End: 2026-02-20

## 2025-02-20 NOTE — PROGRESS NOTES
Assessment & Plan:    Uncontrolled type 2 diabetes mellitus with hyperglycemia  -     tirzepatide (MOUNJARO) 2.5 mg/0.5 mL PnIj; Inject 2.5 mg into the skin every 7 days.  Dispense: 2 mL; Refill: 11  -     Ambulatory referral/consult to Diabetes Education; Future; Expected date: 02/27/2025  -     Hemoglobin A1C; Future; Expected date: 02/20/2025  -     Lipid Panel; Future; Expected date: 02/20/2025  -     Comprehensive Metabolic Panel; Future; Expected date: 02/20/2025    A1c has improved but diabetes remains uncontrolled with an A1c of 8.5.  Restart Mounjaro.  Discussed limitation of processed sugar and appropriate portioning of carbohydrates.  Dietary resources provided.  Once again, advised follow-up with the diabetic educator.  Repeat labs in 3 months.    PVD (peripheral vascular disease)  -     Ambulatory referral/consult to Vascular Surgery; Future; Expected date: 02/27/2025    JOSE EDUARDO (2/4/25):  Right leg:   Mildly decreased JOSE EDUARDO, 0.82.    Mild to moderately dampened PVR waveforms.  Decreased TBI.       Left leg:   Moderately decreased JOSE EDUARDO, 0.61.    Mild to moderately dampened PVR waveforms.    Decreased TBI    Advised to follow-up with Vascular for further evaluation.    Hyperlipidemia associated with type 2 diabetes mellitus  -     evolocumab (REPATHA SURECLICK) 140 mg/mL PnIj; Inject 1 mL (140 mg total) into the skin every 14 (fourteen) days.  Dispense: 2 mL; Refill: 11  -     Lipid Panel; Future; Expected date: 02/20/2025    Atherosclerosis of native coronary artery of native heart without angina pectoris  -     evolocumab (REPATHA SURECLICK) 140 mg/mL PnIj; Inject 1 mL (140 mg total) into the skin every 14 (fourteen) days.  Dispense: 2 mL; Refill: 11  -     Lipid Panel; Future; Expected date: 02/20/2025    Lipid panel has improved but is not at goal.  See above regarding dietary counseling.  Start Repatha to take with statin.    Chronic combined systolic and diastolic congestive heart failure  Controlled.  Continue current therapy.     Chronic kidney disease (CKD), stage IV (severe)  Anemia of chronic renal failure, stage 4 (severe)   Chronic, stable.    Essential hypertension  Controlled. Continue current therapy.     Subareolar mass of left breast  -     Mammo Digital Diagnostic Left; Future; Expected date: 02/20/2025  -     US Breast Left Limited; Future; Expected date: 02/20/2025    Screening mammogram for breast cancer  -     Mammo Digital Screening Right; Future; Expected date: 05/20/2025    Advised further evaluation with a diagnostic mammogram and ultrasound of the left breast and a screening mammogram of the right breast.      Health maintenance reviewed & addressed above.    Follow-up: Follow up in about 3 months (around 5/20/2025).  ______________________________________________________________________    Chief Complaint  Chief Complaint   Patient presents with    Diabetes       HPI  Joanne Peña is a 74 y.o. female with medical diagnoses as listed in the medical history and problem list that presents to the office to follow up on her chronic conditions. Patient states that her insurance company informed her that they will cover Mounjaro now so she is requesting refill.  Reports blood sugar ranging 120 to 180s.  Patient had JOSE EDUARDO ordered by Podiatry and states she has not heard back about the result.  Review of her record shows that Podiatrist attempt to contact her regarding results twice without answer.  Continues to feel a sub areolar mass in the left breast after finishing Keflex for mastitis recently.  Denies any discharge or skin changes.    Most recent pertinent workup:     Last CBC Results:   Lab Results   Component Value Date    WBC 5.31 02/13/2025    HGB 10.7 (L) 02/13/2025    HCT 36.8 (L) 02/13/2025     02/13/2025       Last CMP Results  Lab Results   Component Value Date     02/13/2025    K 4.8 02/13/2025     02/13/2025    CO2 21 (L) 02/13/2025    BUN 60 (H) 02/13/2025     CREATININE 2.1 (H) 02/13/2025    CALCIUM 9.5 02/13/2025    ALBUMIN 3.6 02/13/2025    AST 25 02/13/2025    ALT 9 (L) 02/13/2025       Last Lipids  Lab Results   Component Value Date    CHOL 205 (H) 02/13/2025    TRIG 145 02/13/2025    HDL 43 02/13/2025    LDLCALC 133.0 02/13/2025       Last A1C  Lab Results   Component Value Date    HGBA1C 8.5 (H) 02/13/2025         Health Maintenance         Date Due Completion Date    TETANUS VACCINE Never done ---    Shingles Vaccine (1 of 2) Never done ---    RSV Vaccine (Age 60+ and Pregnant patients) (1 - Risk 60-74 years 1-dose series) Never done ---    COVID-19 Vaccine (1 - 2024-25 season) Never done ---    Diabetes Urine Screening 05/13/2025 5/13/2024    Hemoglobin A1c 05/13/2025 2/13/2025    Mammogram 09/16/2025 9/16/2024    Diabetic Eye Exam 11/04/2025 11/4/2024    Foot Exam 01/28/2026 1/28/2025 (Done)    Override on 1/28/2025: Done    Override on 8/19/2024: Done    Lipid Panel 02/13/2026 2/13/2025    High Dose Statin 02/20/2026 2/20/2025    Colorectal Cancer Screening 07/12/2026 7/12/2023    DEXA Scan 09/25/2026 9/25/2023              PAST MEDICAL HISTORY:  Past Medical History:   Diagnosis Date    Diabetes mellitus     Hyperlipidemia     Hypertension     Stroke        PAST SURGICAL HISTORY:  Past Surgical History:   Procedure Laterality Date    COLONOSCOPY N/A 7/12/2023    Procedure: COLONOSCOPY;  Surgeon: Ray Sorensen MD;  Location: [facility];  Service: Endoscopy;  Laterality: N/A;  instr via portal  - PC  4/10/23 Daniel, instr via mail & portal, unable to tolerate Golytely- request Miralax/Gatorade - PC  5/30-pt r/s-new instr portal-tb    INJECTION OF ANESTHETIC AGENT AROUND MEDIAL BRANCH NERVES INNERVATING LUMBAR FACET JOINT Bilateral 4/20/2023    Procedure: MBB #1 (B/L) L3,4,5;  Surgeon: Son Lara DO;  Location: [facility] OR;  Service: Pain Management;  Laterality: Bilateral;  ORAL XANAX    INJECTION OF ANESTHETIC AGENT AROUND MEDIAL BRANCH NERVES  INNERVATING LUMBAR FACET JOINT Bilateral 5/5/2023    Procedure: MBB #2 (B/L) L3,4,5;  Surgeon: Son Lara DO;  Location: [facility] OR;  Service: Pain Management;  Laterality: Bilateral;  Oral Xanax    INJECTION OF JOINT Right 5/20/2022    Procedure: Right SI joint injection;  Surgeon: Son Lara DO;  Location: [facility] OR;  Service: Pain Management;  Laterality: Right;    INJECTION, SACROILIAC JOINT Right 12/19/2023    Procedure: RT SI joint Inj;  Surgeon: Son Lara DO;  Location: [facility] PAIN MANAGEMENT;  Service: Pain Management;  Laterality: Right;  oral    INSERTION OF IMPLANTABLE LOOP RECORDER Left 6/21/2024    Procedure: Insertion, Implantable Loop Recorder;  Surgeon: Hunter Rich MD;  Location: [facility] EP LAB;  Service: Cardiology;  Laterality: Left;  CVA, ILR, BSCI, Local, SD, 3 Prep    INSERTION OF IMPLANTABLE LOOP RECORDER Left 8/30/2024    Procedure: Insertion, Implantable Loop Recorder;  Surgeon: Hunter Rich MD;  Location: [facility] EP LAB;  Service: Cardiology;  Laterality: Left;  CVRODNEY MUELLER MDT, RN Sedate, SD, 3 Prep    RADIOFREQUENCY ABLATION OF LUMBAR MEDIAL BRANCH NERVE AT SINGLE LEVEL Bilateral 5/19/2023    Procedure: RFA (B/L) L3,4,5;  Surgeon: Son Lara DO;  Location: [facility] PAIN MANAGEMENT;  Service: Pain Management;  Laterality: Bilateral;    REMOVAL OF IMPLANTABLE LOOP RECORDER N/A 7/10/2024    Procedure: REMOVAL, IMPLANTABLE LOOP RECORDER;  Surgeon: Hunter Rich MD;  Location: [facility] EP LAB;  Service: Cardiology;  Laterality: N/A;       SOCIAL HISTORY:  Social History[1]    FAMILY HISTORY:  Family History   Problem Relation Name Age of Onset    Kidney disease Mother      Heart disease Mother      Cancer Father      Hypertension Brother      Hypertension Daughter      Cancer Maternal Aunt         ALLERGIES AND MEDICATIONS: updated and reviewed.  Review of patient's allergies indicates:   Allergen Reactions    Lisinopril-hydrochlorothiazide Other (See  "Comments)     Pt stated it makes her feel drained. She couldn't raise arms over head.    Ampicillin Rash    Darvocet a500 [propoxyphene n-acetaminophen] Other (See Comments)     shaky     Current Medications[2]      ROS  Review of Systems   Constitutional:  Negative for activity change.   Skin:  Negative for color change.           Physical Exam  Vitals:    02/20/25 1302   BP: 124/82   Pulse: 88   Temp: 97.6 °F (36.4 °C)   TempSrc: Oral   SpO2: 99%   Weight: 79.5 kg (175 lb 4.3 oz)   Height: 5' 7" (1.702 m)    Body mass index is 27.45 kg/m².  Weight: 79.5 kg (175 lb 4.3 oz)   Height: 5' 7" (170.2 cm)   Physical Exam  Constitutional:       General: She is not in acute distress.  HENT:      Head: Normocephalic and atraumatic.   Chest:   Breasts:     Left: Mass (left-sided subareolar mass palpated in the left breast) present.       Skin:     General: Skin is warm and dry.   Neurological:      Mental Status: She is alert. Mental status is at baseline.   Psychiatric:         Mood and Affect: Mood normal.         Behavior: Behavior normal.                  [1]   Social History  Socioeconomic History    Marital status:    Tobacco Use    Smoking status: Former     Current packs/day: 0.00     Types: Cigarettes     Quit date: 2/6/2022     Years since quitting: 3.0     Passive exposure: Past    Smokeless tobacco: Never    Tobacco comments:     Patient Quit Smoking on 02/06/2022.   Substance and Sexual Activity    Alcohol use: Not Currently    Drug use: No    Sexual activity: Not Currently     Partners: Male     Social Drivers of Health     Financial Resource Strain: High Risk (1/18/2025)    Overall Financial Resource Strain (CARDIA)     Difficulty of Paying Living Expenses: Hard   Food Insecurity: Food Insecurity Present (1/18/2025)    Hunger Vital Sign     Worried About Running Out of Food in the Last Year: Sometimes true     Ran Out of Food in the Last Year: Sometimes true   Transportation Needs: No Transportation " Needs (1/18/2025)    PRAPARE - Transportation     Lack of Transportation (Medical): No     Lack of Transportation (Non-Medical): No   Physical Activity: Inactive (1/18/2025)    Exercise Vital Sign     Days of Exercise per Week: 0 days     Minutes of Exercise per Session: 0 min   Stress: No Stress Concern Present (1/18/2025)    [employer] [place] [employer] - Occupational Stress Questionnaire     Feeling of Stress : Only a little   Recent Concern: Stress - Stress Concern Present (1/5/2025)    [employer] [place] [employer] - Occupational Stress Questionnaire     Feeling of Stress : To some extent   Housing Stability: High Risk (1/18/2025)    Housing Stability Vital Sign     Unable to Pay for Housing in the Last Year: Yes     Homeless in the Last Year: No   [2]   Current Outpatient Medications   Medication Sig Dispense Refill    ACCU-CHEK GUIDE GLUCOSE METER Misc TO CHECK BLOOD GLUCOSE DAILY, TO USE WITH INSURANCE PREFERRED METER      ascorbic acid, vitamin C, (VITAMIN C) 500 MG tablet Take 500 mg by mouth once daily.      atorvastatin (LIPITOR) 80 MG tablet TAKE 1 TABLET (80 MG TOTAL) BY MOUTH ONCE DAILY. 90 tablet 2    blood sugar diagnostic Strp To check BG daily, to use with insurance preferred meter 200 each 11    cyclobenzaprine (FLEXERIL) 10 MG tablet TAKE 1 TABLET (10 MG TOTAL) BY MOUTH 2 (TWO) TIMES DAILY AS NEEDED FOR MUSCLE SPASMS (BACK PAIN). 180 tablet 1    empagliflozin (JARDIANCE) 25 mg tablet Take 1 tablet (25 mg total) by mouth once daily. 30 tablet 11    furosemide (LASIX) 40 MG tablet Take 1 tablet (40 mg total) by mouth once daily. 30 tablet 11    hydrALAZINE (APRESOLINE) 25 MG tablet Take 1 tablet (25 mg total) by mouth 3 (three) times daily. 90 tablet 11    insulin lispro protamine-insulin lispro (HUMALOG MIX 75-25,U-100,INSULN) 100 unit/mL (75-25) Susp Inject 10 Units into the skin 2 (two) times daily before meals. 6 mL 11    lancets Misc To check BG daily, to use with  insurance preferred meter 200 each 11    meclizine (ANTIVERT) 25 mg tablet Take 1 tablet (25 mg total) by mouth 2 (two) times daily as needed for Dizziness. 30 tablet 0    metoprolol succinate (TOPROL-XL) 25 MG 24 hr tablet Take 0.5 tablets (12.5 mg total) by mouth once daily. 45 tablet 3    multivitamin (THERAGRAN) per tablet Take 1 tablet by mouth once daily.      aspirin (ECOTRIN) 81 MG EC tablet Take 1 tablet (81 mg total) by mouth once daily. 30 tablet 3    blood sugar diagnostic Strp To check BG daily, to use with insurance preferred meter (Patient not taking: Reported on 2/20/2025) 200 each 11    evolocumab (REPATHA SURECLICK) 140 mg/mL PnIj Inject 1 mL (140 mg total) into the skin every 14 (fourteen) days. 2 mL 11    lancets Misc To check BG daily, to use with insurance preferred meter (Patient not taking: Reported on 2/20/2025) 200 each 11    promethazine-dextromethorphan (PROMETHAZINE-DM) 6.25-15 mg/5 mL Syrp Take 5 mLs by mouth nightly as needed (cough). (Patient not taking: Reported on 2/20/2025) 118 mL 0    tirzepatide (MOUNJARO) 2.5 mg/0.5 mL PnIj Inject 2.5 mg into the skin every 7 days. 2 mL 11     No current facility-administered medications for this visit.

## 2025-02-21 ENCOUNTER — INITIAL CONSULT (OUTPATIENT)
Dept: VASCULAR SURGERY | Facility: CLINIC | Age: 75
End: 2025-02-21
Payer: MEDICARE

## 2025-02-21 ENCOUNTER — TELEPHONE (OUTPATIENT)
Dept: PODIATRY | Facility: CLINIC | Age: 75
End: 2025-02-21
Payer: MEDICARE

## 2025-02-21 VITALS
DIASTOLIC BLOOD PRESSURE: 72 MMHG | BODY MASS INDEX: 27.11 KG/M2 | WEIGHT: 172.75 LBS | HEART RATE: 82 BPM | SYSTOLIC BLOOD PRESSURE: 104 MMHG | HEIGHT: 67 IN

## 2025-02-21 DIAGNOSIS — I73.9 PVD (PERIPHERAL VASCULAR DISEASE): Primary | ICD-10-CM

## 2025-02-21 PROCEDURE — 99204 OFFICE O/P NEW MOD 45 MIN: CPT | Mod: HCNC,S$GLB,, | Performed by: SURGERY

## 2025-02-21 PROCEDURE — 99999 PR PBB SHADOW E&M-EST. PATIENT-LVL III: CPT | Mod: PBBFAC,HCNC,, | Performed by: SURGERY

## 2025-02-21 NOTE — PROGRESS NOTES
[facility]   CLINIC NOTE    02/21/2025    PATIENT ID: Joanne Peña is a 74 y.o. female.    I. HISTORY     HPI:  Joanne Peña is a 74 y.o. female with PMHx significant for T2DM who is here today for evaluation of toe injury.  Patient states she bumped R third toe about 1 mo ago and since then toe continues to be discolored, Associated pain isolated to 3rd toe. No open wound/ulcer at toe. Denies claudication, rest pain. Also c/o neuropathy sx at bilateral feet.  +ASA, statin.       no MI  yes Stroke  Tobacco use: former      ROS  As above.     II. PHYSICAL EXAM      Pulse: 82     Body mass index is 27.05 kg/m².      Physical Exam  Vitals reviewed.   Constitutional:       Appearance: She is well-developed.   HENT:      Head: Normocephalic and atraumatic.   Eyes:      General: No scleral icterus.  Neck:      Trachea: No tracheal deviation.   Pulmonary:      Effort: Pulmonary effort is normal. No respiratory distress.      Breath sounds: No wheezing.   Abdominal:      General: There is no distension.      Palpations: Abdomen is soft.      Tenderness: There is no abdominal tenderness. There is no rebound.   Musculoskeletal:      Cervical back: Normal range of motion and neck supple.   Skin:     General: Skin is warm and dry.      Capillary Refill: Capillary refill takes less than 2 seconds.   Neurological:      Mental Status: She is alert and oriented to person, place, and time.       RLE: normal skin integrity, normal cap refill, 3rd toe slight discoloration, no open wounds/ulcers, palpable DP and PT pulse      III. LABS & IMAGING     Imaging Results: (I have personally reviewed the images/studies)      III. ASSESSMENT & PLAN (MEDICAL DECISION MAKING)     1. PVD (peripheral vascular disease)        74 y.o. female with T2DM presents with toe pain without open ulceration wounds. Patient without rest pain or claudication and ABIs show mild-mod disease.     Cont diabetes control; rec  PCP f/u for neuropathy   Cont diabetic foot care   Cont Tobacco cessation   ASA 81mg daily recommended for all patients with PAD  High intensity statin (atorvastatin 40mg or rosuvastatin 20mg) recommended for all patients with symptomatic PAD   Control of atherosclerotic risk factors: Goal LDL <100, Goal HbA1C <7, Goal BP <140/90  Follow up 6 months with JOSE EDUARDO/TBI      Jamaica Francis PA-C  Vascular & Endovascular Surgery  [facility]    I spent 30 minutes evaluating this patient and greater than 50% of the time was spent counseling, coordinator care and discussing the plan of care.

## 2025-02-21 NOTE — TELEPHONE ENCOUNTER
----- Message from Ondina sent at 12/30/2024  8:56 AM CST -----  Regarding: Joanne  Who called: Joanne    What is the request in detail: patient requesting a referral to the kidney doctor following lab work that was done on 12/23/2024    Can the clinic reply by [facility]? no    Would the patient rather a call back or a response via [facility]?call back    Best call back number:.[phone]      Additional Information:  
00:00

## 2025-02-22 ENCOUNTER — LAB VISIT (OUTPATIENT)
Dept: LAB | Facility: HOSPITAL | Age: 75
End: 2025-02-22
Attending: FAMILY MEDICINE
Payer: MEDICARE

## 2025-02-22 DIAGNOSIS — R06.02 SOB (SHORTNESS OF BREATH): ICD-10-CM

## 2025-02-22 DIAGNOSIS — N18.30 STAGE 3 CHRONIC KIDNEY DISEASE, UNSPECIFIED WHETHER STAGE 3A OR 3B CKD: ICD-10-CM

## 2025-02-22 LAB
25(OH)D3+25(OH)D2 SERPL-MCNC: 17 NG/ML (ref 30–96)
ALBUMIN SERPL BCP-MCNC: 3.6 G/DL (ref 3.5–5.2)
ALBUMIN SERPL BCP-MCNC: 3.6 G/DL (ref 3.5–5.2)
ALP SERPL-CCNC: 66 U/L (ref 40–150)
ALT SERPL W/O P-5'-P-CCNC: 10 U/L (ref 10–44)
ANION GAP SERPL CALC-SCNC: 14 MMOL/L (ref 8–16)
ANION GAP SERPL CALC-SCNC: 14 MMOL/L (ref 8–16)
AST SERPL-CCNC: 14 U/L (ref 10–40)
BASOPHILS # BLD AUTO: 0.03 K/UL (ref 0–0.2)
BASOPHILS NFR BLD: 0.6 % (ref 0–1.9)
BILIRUB SERPL-MCNC: 0.2 MG/DL (ref 0.1–1)
BNP SERPL-MCNC: 874 PG/ML (ref 0–99)
BUN SERPL-MCNC: 47 MG/DL (ref 8–23)
BUN SERPL-MCNC: 47 MG/DL (ref 8–23)
CALCIUM SERPL-MCNC: 9 MG/DL (ref 8.7–10.5)
CALCIUM SERPL-MCNC: 9 MG/DL (ref 8.7–10.5)
CHLORIDE SERPL-SCNC: 108 MMOL/L (ref 95–110)
CHLORIDE SERPL-SCNC: 108 MMOL/L (ref 95–110)
CO2 SERPL-SCNC: 20 MMOL/L (ref 23–29)
CO2 SERPL-SCNC: 20 MMOL/L (ref 23–29)
CREAT SERPL-MCNC: 1.9 MG/DL (ref 0.5–1.4)
CREAT SERPL-MCNC: 1.9 MG/DL (ref 0.5–1.4)
DIFFERENTIAL METHOD BLD: ABNORMAL
EOSINOPHIL # BLD AUTO: 0.2 K/UL (ref 0–0.5)
EOSINOPHIL NFR BLD: 3.5 % (ref 0–8)
ERYTHROCYTE [DISTWIDTH] IN BLOOD BY AUTOMATED COUNT: 16.4 % (ref 11.5–14.5)
EST. GFR  (NO RACE VARIABLE): 28 ML/MIN/1.73 M^2
EST. GFR  (NO RACE VARIABLE): 28 ML/MIN/1.73 M^2
FERRITIN SERPL-MCNC: 24 NG/ML (ref 20–300)
GLUCOSE SERPL-MCNC: 137 MG/DL (ref 70–110)
GLUCOSE SERPL-MCNC: 137 MG/DL (ref 70–110)
HCT VFR BLD AUTO: 38.2 % (ref 37–48.5)
HGB BLD-MCNC: 11.3 G/DL (ref 12–16)
IMM GRANULOCYTES # BLD AUTO: 0.01 K/UL (ref 0–0.04)
IMM GRANULOCYTES NFR BLD AUTO: 0.2 % (ref 0–0.5)
IRON SERPL-MCNC: 62 UG/DL (ref 30–160)
LYMPHOCYTES # BLD AUTO: 1.3 K/UL (ref 1–4.8)
LYMPHOCYTES NFR BLD: 25.1 % (ref 18–48)
MAGNESIUM SERPL-MCNC: 2 MG/DL (ref 1.6–2.6)
MCH RBC QN AUTO: 25.7 PG (ref 27–31)
MCHC RBC AUTO-ENTMCNC: 29.6 G/DL (ref 32–36)
MCV RBC AUTO: 87 FL (ref 82–98)
MONOCYTES # BLD AUTO: 0.5 K/UL (ref 0.3–1)
MONOCYTES NFR BLD: 8.8 % (ref 4–15)
NEUTROPHILS # BLD AUTO: 3.2 K/UL (ref 1.8–7.7)
NEUTROPHILS NFR BLD: 61.8 % (ref 38–73)
NRBC BLD-RTO: 0 /100 WBC
PHOSPHATE SERPL-MCNC: 3.8 MG/DL (ref 2.7–4.5)
PHOSPHATE SERPL-MCNC: 4 MG/DL (ref 2.7–4.5)
PLATELET # BLD AUTO: 269 K/UL (ref 150–450)
PMV BLD AUTO: 11.4 FL (ref 9.2–12.9)
POTASSIUM SERPL-SCNC: 4.3 MMOL/L (ref 3.5–5.1)
POTASSIUM SERPL-SCNC: 4.3 MMOL/L (ref 3.5–5.1)
PROT SERPL-MCNC: 7.8 G/DL (ref 6–8.4)
RBC # BLD AUTO: 4.4 M/UL (ref 4–5.4)
SATURATED IRON: 19 % (ref 20–50)
SODIUM SERPL-SCNC: 142 MMOL/L (ref 136–145)
SODIUM SERPL-SCNC: 142 MMOL/L (ref 136–145)
TOTAL IRON BINDING CAPACITY: 329 UG/DL (ref 250–450)
TRANSFERRIN SERPL-MCNC: 222 MG/DL (ref 200–375)
URATE SERPL-MCNC: 9.9 MG/DL (ref 2.4–5.7)
WBC # BLD AUTO: 5.21 K/UL (ref 3.9–12.7)

## 2025-02-22 PROCEDURE — 83735 ASSAY OF MAGNESIUM: CPT | Mod: HCNC

## 2025-02-22 PROCEDURE — 83970 ASSAY OF PARATHORMONE: CPT | Mod: HCNC | Performed by: STUDENT IN AN ORGANIZED HEALTH CARE EDUCATION/TRAINING PROGRAM

## 2025-02-22 PROCEDURE — 84100 ASSAY OF PHOSPHORUS: CPT | Mod: 91,HCNC | Performed by: STUDENT IN AN ORGANIZED HEALTH CARE EDUCATION/TRAINING PROGRAM

## 2025-02-22 PROCEDURE — 84100 ASSAY OF PHOSPHORUS: CPT | Mod: HCNC

## 2025-02-22 PROCEDURE — 82728 ASSAY OF FERRITIN: CPT | Mod: HCNC | Performed by: STUDENT IN AN ORGANIZED HEALTH CARE EDUCATION/TRAINING PROGRAM

## 2025-02-22 PROCEDURE — 83880 ASSAY OF NATRIURETIC PEPTIDE: CPT | Mod: HCNC

## 2025-02-22 PROCEDURE — 80053 COMPREHEN METABOLIC PANEL: CPT | Mod: HCNC

## 2025-02-22 PROCEDURE — 85025 COMPLETE CBC W/AUTO DIFF WBC: CPT | Mod: HCNC

## 2025-02-22 PROCEDURE — 84466 ASSAY OF TRANSFERRIN: CPT | Mod: HCNC | Performed by: STUDENT IN AN ORGANIZED HEALTH CARE EDUCATION/TRAINING PROGRAM

## 2025-02-22 PROCEDURE — 84550 ASSAY OF BLOOD/URIC ACID: CPT | Mod: HCNC | Performed by: STUDENT IN AN ORGANIZED HEALTH CARE EDUCATION/TRAINING PROGRAM

## 2025-02-22 PROCEDURE — 82306 VITAMIN D 25 HYDROXY: CPT | Mod: HCNC | Performed by: STUDENT IN AN ORGANIZED HEALTH CARE EDUCATION/TRAINING PROGRAM

## 2025-02-22 PROCEDURE — 36415 COLL VENOUS BLD VENIPUNCTURE: CPT | Mod: HCNC,PO

## 2025-02-24 LAB — PTH-INTACT SERPL-MCNC: 480.8 PG/ML (ref 9–77)

## 2025-02-24 RX ORDER — NAPROXEN SODIUM 220 MG
60 TABLET ORAL 2 TIMES DAILY
Qty: 60 EACH | Refills: 11 | Status: SHIPPED | OUTPATIENT
Start: 2025-02-24

## 2025-02-24 NOTE — TELEPHONE ENCOUNTER
----- Message from Med Assistant Emmy sent at 2/20/2025  3:03 PM CST -----  Type: Patient Call BackWho called:SelfWhat is the request in detail:31 gauge 0.5 ml /cc capacity 5 16.. states these are the size needles she needs .. Can the clinic reply by [facility]?NOWould the patient rather a call back or a response via [facility]? Yes, call Best call back number: [phone] (home)

## 2025-02-24 NOTE — TELEPHONE ENCOUNTER
No care due was identified.  [facility] Embedded Care Due Messages. Reference number: [id number].   2/24/2025 1:21:43 PM CST

## 2025-03-04 ENCOUNTER — CLINICAL SUPPORT (OUTPATIENT)
Dept: CARDIOLOGY | Facility: HOSPITAL | Age: 75
End: 2025-03-04
Payer: MEDICARE

## 2025-03-04 DIAGNOSIS — I49.8 OTHER SPECIFIED CARDIAC ARRHYTHMIAS: ICD-10-CM

## 2025-03-05 ENCOUNTER — DOCUMENTATION ONLY (OUTPATIENT)
Facility: CLINIC | Age: 75
End: 2025-03-05
Payer: MEDICARE

## 2025-03-05 ENCOUNTER — CLINICAL SUPPORT (OUTPATIENT)
Dept: CARDIOLOGY | Facility: HOSPITAL | Age: 75
End: 2025-03-05
Attending: INTERNAL MEDICINE
Payer: MEDICARE

## 2025-03-05 DIAGNOSIS — I49.8 OTHER SPECIFIED CARDIAC ARRHYTHMIAS: ICD-10-CM

## 2025-03-05 PROCEDURE — 93298 REM INTERROG DEV EVAL SCRMS: CPT | Mod: HCNC | Performed by: INTERNAL MEDICINE

## 2025-03-05 NOTE — PROGRESS NOTES
"[facility]) DISCHARGE VISIT - PHONE     Called and spoke Efren Rubio    Most Recent Hospital Discharge Date: 01/07/2025  Last admission Diagnosis/chief complaint:SOB    Pt discharge completed by phone related to patient's preferance  Weight 70.2 kg   Stable on GDMT no HF symptoms.  Pt reports the following:  []  Shortness of Breath with activity  []  Shortness of Breath at rest   []  Fatigue  []  Edema   [] Chest pain or tightness  [] Weight Increase since discharge  [x] None of the above    Medications:   Medication reconciliation completed today per RN.  Pt reports having all medications available and understands how to take them appropriately. Reminded pt to call prior to making any changes to medications.     Education:   [x] Confirmed pt still has  "[facility] Home Care Guide" .   Reviewed key points as listed below.     Recommend 2 gram sodium restriction and 1500cc fluid restriction.  Encourage physical activity with graded exercise program.  Requested patient to weigh themselves daily, and to notify us if their weight increases by more than 3 lbs in 1 day or 5 lbs in 3 days.     [x] Reviewed completed "Daily Weight and Symptom Tracker".  Reviewed with patient when and how to call HFTCC according to "Yellow Zone" and "Red Zone".       Watch for these Signs and Symptoms: If any of these occur, contact HFTCC immediately:   Increase in shortness of breath with movement   Increase in swelling in your legs and ankles   Weight gain of more than 3 pounds in a night or 5 pounds in 3 days.   Difficulty breathing when you are lying down   Worsening fatigue or tiredness   Stomach bloating, a full feeling or a loss of appetite   Increased coughing--especially when you are lying down    MyChart and Care Companion:   Patient active on myChart? Yes, patient uses regularly.    HF TCC Program Plan:  Pt has successfully completed HFTCC program.  Pt care to be " transferred to  for long term care.     Pt educated on how to call their offices and how to call [facility] call in the event of an after hour issue.    PT reminded to continue to follow recommendations made during the HFT[facility] program to include monitoring daily weights, taking medications according to list, following up to appointments per provider recommendations, stop smoking/ start exercising and following a heart friendly low salt, low fluid diet.      Pt was able to verbalize back to RN in their own words correct diet/fluid restrictions, necessity for exercise, warning signs and symptoms, when and how to contact their  Long term care team .      Plan: Follow up with Dr. Rudd 3/14/2025        [x]  Discussed upcoming appointments and/or plan for follow-up care with his/her PCP/Cardiology     Electronic hand off completed : To Dr. Rudd by routing epic note per    Consuelo Cosby, FNP-BC       Please refer to provider note for additional details and assessment.

## 2025-03-07 ENCOUNTER — NURSE TRIAGE (OUTPATIENT)
Dept: ADMINISTRATIVE | Facility: CLINIC | Age: 75
End: 2025-03-07
Payer: MEDICARE

## 2025-03-07 RX ORDER — FUROSEMIDE 40 MG/1
40 TABLET ORAL DAILY
Qty: 60 TABLET | Refills: 0 | Status: SHIPPED | OUTPATIENT
Start: 2025-03-07 | End: 2025-04-06

## 2025-03-07 NOTE — TELEPHONE ENCOUNTER
----- Message from Subha sent at 3/7/2025  1:38 PM CST -----  Regarding: Refill Request  Type: RX Refill RequestWho Called: Self Refill or New Rx: refill RX Name and Strength: Disp Refills Start End DAWfurosemide (LASIX) 40 MG tablet     Preferred Pharmacy with phone number:.[facility]/pharmacy #[id number] - [place], [place] - [address] [facility] [id number] [facility] [address] [address]: [phone] Fax: [phone] the patient rather a call back or a response via [facility]? Call Best Call Back Number: .[phone] Information: went up on dosage and ran out

## 2025-03-08 NOTE — TELEPHONE ENCOUNTER
Pt calling for lasix refill that she is out and she said that she needs a new rx and not refill to the one at the pharmacy. Pt was told at last appt with provider that she was to take lasix 40 mg once daily and if needed and felt SOB  she can take an extra 40 at night. Pt said that she called for refill and the pharmacy said that she could get refill it was too early. I call MD Rudd and he said that I can call in to pharmacy 40 mg take 1 daily and disp 60. I called to give telephone order and pharmacist said you need to leave a message on my voicemail. Didn't allow for questions and I left Vm as such to dispense 60 and if he couldn't do that then fill for the 30 and pt has an upcoming appt on Friday with cardiology. I will route message to provider as well.                 Reason for Disposition   [1] Prescription refill request for ESSENTIAL medicine (i.e., likelihood of harm to patient if not taken) AND [2] triager unable to refill per department policy    Protocols used: Medication Refill and Renewal Call-A-

## 2025-03-10 LAB
OHS CV AF BURDEN PERCENT: < 1
OHS CV DC REMOTE DEVICE TYPE: NORMAL

## 2025-03-10 RX ORDER — FUROSEMIDE 40 MG/1
40 TABLET ORAL DAILY
Qty: 90 TABLET | Refills: 3 | Status: SHIPPED | OUTPATIENT
Start: 2025-03-10 | End: 2025-03-14 | Stop reason: SDUPTHER

## 2025-03-12 ENCOUNTER — HOSPITAL ENCOUNTER (OUTPATIENT)
Dept: RADIOLOGY | Facility: HOSPITAL | Age: 75
Discharge: HOME | End: 2025-03-12
Attending: FAMILY MEDICINE
Payer: MEDICARE

## 2025-03-12 ENCOUNTER — PATIENT MESSAGE (OUTPATIENT)
Dept: FAMILY MEDICINE | Facility: CLINIC | Age: 75
End: 2025-03-12
Payer: MEDICARE

## 2025-03-12 DIAGNOSIS — N63.42 SUBAREOLAR MASS OF LEFT BREAST: ICD-10-CM

## 2025-03-12 DIAGNOSIS — N63.42 SUBAREOLAR MASS OF LEFT BREAST: Primary | ICD-10-CM

## 2025-03-12 DIAGNOSIS — F41.9 ANXIETY: ICD-10-CM

## 2025-03-12 PROCEDURE — 77062 BREAST TOMOSYNTHESIS BI: CPT | Mod: TC,HCNC

## 2025-03-12 PROCEDURE — 77062 BREAST TOMOSYNTHESIS BI: CPT | Mod: 26,HCNC,, | Performed by: RADIOLOGY

## 2025-03-12 PROCEDURE — 77066 DX MAMMO INCL CAD BI: CPT | Mod: 26,HCNC,, | Performed by: RADIOLOGY

## 2025-03-12 PROCEDURE — 76642 ULTRASOUND BREAST LIMITED: CPT | Mod: TC,HCNC,LT

## 2025-03-12 PROCEDURE — 76642 ULTRASOUND BREAST LIMITED: CPT | Mod: 26,HCNC,LT, | Performed by: RADIOLOGY

## 2025-03-13 RX ORDER — LORAZEPAM 0.5 MG/1
0.5 TABLET ORAL ONCE
Qty: 1 TABLET | Refills: 0 | Status: SHIPPED | OUTPATIENT
Start: 2025-03-13 | End: 2025-03-13

## 2025-03-14 ENCOUNTER — OFFICE VISIT (OUTPATIENT)
Dept: CARDIOLOGY | Facility: CLINIC | Age: 75
End: 2025-03-14
Payer: MEDICARE

## 2025-03-14 VITALS
BODY MASS INDEX: 26.62 KG/M2 | HEIGHT: 67 IN | HEART RATE: 70 BPM | OXYGEN SATURATION: 99 % | SYSTOLIC BLOOD PRESSURE: 116 MMHG | WEIGHT: 169.63 LBS | DIASTOLIC BLOOD PRESSURE: 74 MMHG | RESPIRATION RATE: 15 BRPM

## 2025-03-14 DIAGNOSIS — I42.8 NICM (NONISCHEMIC CARDIOMYOPATHY): Primary | ICD-10-CM

## 2025-03-14 PROCEDURE — 99999 PR PBB SHADOW E&M-EST. PATIENT-LVL V: CPT | Mod: PBBFAC,HCNC,, | Performed by: INTERNAL MEDICINE

## 2025-03-14 RX ORDER — FUROSEMIDE 40 MG/1
20 TABLET ORAL DAILY
Qty: 60 TABLET | Refills: 0 | Status: SHIPPED | OUTPATIENT
Start: 2025-03-14 | End: 2025-04-13

## 2025-03-14 RX ORDER — FUROSEMIDE 40 MG/1
40 TABLET ORAL DAILY
Qty: 90 TABLET | Refills: 3 | Status: SHIPPED | OUTPATIENT
Start: 2025-03-14 | End: 2026-03-14

## 2025-03-20 ENCOUNTER — PATIENT OUTREACH (OUTPATIENT)
Dept: ADMINISTRATIVE | Facility: HOSPITAL | Age: 75
End: 2025-03-20
Payer: MEDICARE

## 2025-03-20 RX ORDER — SYRING-NEEDL,DISP,INSUL,0.3 ML 31GX15/64"
SYRINGE, EMPTY DISPOSABLE MISCELLANEOUS
COMMUNITY
Start: 2025-02-24

## 2025-03-20 RX ORDER — CEPHALEXIN 500 MG/1
1000 CAPSULE ORAL 2 TIMES DAILY
COMMUNITY
Start: 2025-01-31

## 2025-03-20 RX ORDER — EMPAGLIFLOZIN 10 MG/1
10 TABLET, FILM COATED ORAL
COMMUNITY
Start: 2025-03-19

## 2025-03-20 NOTE — PROGRESS NOTES
F/u to schedule Mammogram & DM education     Overdue Mammogram - exam complted on 03/12/2025. The patient reports that her mammogram was abnormal and requires a breast biopsy.     Diabetes Education - the patient declined to schedule at this time. She would like to care of the issue with her breast first. Topic to be addressed at our next follow up call.

## 2025-03-26 ENCOUNTER — HOSPITAL ENCOUNTER (OUTPATIENT)
Dept: RADIOLOGY | Facility: HOSPITAL | Age: 75
Discharge: HOME | End: 2025-03-26
Attending: FAMILY MEDICINE
Payer: MEDICARE

## 2025-03-26 DIAGNOSIS — R92.8 ABNORMAL MAMMOGRAM OF LEFT BREAST: ICD-10-CM

## 2025-03-26 PROCEDURE — A4648 IMPLANTABLE TISSUE MARKER: HCPCS | Mod: HCNC

## 2025-03-26 PROCEDURE — 77065 DX MAMMO INCL CAD UNI: CPT | Mod: 26,HCNC,LT, | Performed by: RADIOLOGY

## 2025-03-26 PROCEDURE — 19083 BX BREAST 1ST LESION US IMAG: CPT | Mod: HCNC,LT,, | Performed by: RADIOLOGY

## 2025-03-26 PROCEDURE — 63600175 PHARM REV CODE 636 W HCPCS: Mod: HCNC | Performed by: FAMILY MEDICINE

## 2025-03-26 PROCEDURE — 77065 DX MAMMO INCL CAD UNI: CPT | Mod: TC,HCNC,LT

## 2025-03-26 RX ORDER — LIDOCAINE HYDROCHLORIDE AND EPINEPHRINE 10; 20 UG/ML; MG/ML
1 INJECTION, SOLUTION INFILTRATION; PERINEURAL ONCE
Status: COMPLETED | OUTPATIENT
Start: 2025-03-26 | End: 2025-03-26

## 2025-03-26 RX ORDER — LIDOCAINE HYDROCHLORIDE 20 MG/ML
10 INJECTION, SOLUTION INFILTRATION; PERINEURAL ONCE
Status: COMPLETED | OUTPATIENT
Start: 2025-03-26 | End: 2025-03-26

## 2025-03-26 RX ADMIN — LIDOCAINE HYDROCHLORIDE 2 ML: 20 INJECTION, SOLUTION INFILTRATION; PERINEURAL at 02:03

## 2025-03-26 RX ADMIN — LIDOCAINE HYDROCHLORIDE,EPINEPHRINE BITARTRATE 5 ML: 20; .01 INJECTION, SOLUTION INFILTRATION; PERINEURAL at 02:03

## 2025-03-28 ENCOUNTER — TELEPHONE (OUTPATIENT)
Dept: FAMILY MEDICINE | Facility: CLINIC | Age: 75
End: 2025-03-28
Payer: MEDICARE

## 2025-03-28 ENCOUNTER — OFFICE VISIT (OUTPATIENT)
Dept: FAMILY MEDICINE | Facility: CLINIC | Age: 75
End: 2025-03-28
Payer: MEDICARE

## 2025-03-28 VITALS
TEMPERATURE: 98 F | DIASTOLIC BLOOD PRESSURE: 78 MMHG | SYSTOLIC BLOOD PRESSURE: 108 MMHG | HEIGHT: 67 IN | BODY MASS INDEX: 26.68 KG/M2 | WEIGHT: 170 LBS

## 2025-03-28 DIAGNOSIS — N61.0 MASTITIS: Primary | ICD-10-CM

## 2025-03-28 PROCEDURE — 99999 PR PBB SHADOW E&M-EST. PATIENT-LVL V: CPT | Mod: PBBFAC,HCNC,, | Performed by: FAMILY MEDICINE

## 2025-03-28 RX ORDER — SULFAMETHOXAZOLE AND TRIMETHOPRIM 800; 160 MG/1; MG/1
1 TABLET ORAL 2 TIMES DAILY
Qty: 20 TABLET | Refills: 0 | Status: SHIPPED | OUTPATIENT
Start: 2025-03-28 | End: 2025-04-07

## 2025-03-28 NOTE — PROGRESS NOTES
Assessment & Plan:    Mastitis  -     sulfamethoxazole-trimethoprim 800-160mg (BACTRIM DS) 800-160 mg Tab; Take 1 tablet by mouth 2 (two) times daily. for 10 days  Dispense: 20 tablet; Refill: 0      Failed pre-treatment with empiric Keflex. Start Bactrim.     Follow-up: Follow up if symptoms worsen or fail to improve.  ______________________________________________________________________    Chief Complaint  Chief Complaint   Patient presents with    Cellulitis       HPI  Joanne Peña is a 74 y.o. female with medical diagnoses as listed in the medical history and problem list that presents to the office for redness and warmth of the left breast that began the evening after her biopsy on Wednesday. Patient has a history of mastitis. She completed 10 days of Keflex just prior to the biopsy. Denies fever or discharge.     PAST MEDICAL HISTORY:  Past Medical History:   Diagnosis Date    Diabetes mellitus     Hyperlipidemia     Hypertension     Stroke        PAST SURGICAL HISTORY:  Past Surgical History:   Procedure Laterality Date    COLONOSCOPY N/A 7/12/2023    Procedure: COLONOSCOPY;  Surgeon: Ray Sorensen MD;  Location: [facility];  Service: Endoscopy;  Laterality: N/A;  instr via portal  - PC  4/10/23 Daniel, instr via mail & portal, unable to tolerate Golytely- request Miralax/Gatorade - PC  5/30-pt r/s-new instr portal-tb    INJECTION OF ANESTHETIC AGENT AROUND MEDIAL BRANCH NERVES INNERVATING LUMBAR FACET JOINT Bilateral 4/20/2023    Procedure: MBB #1 (B/L) L3,4,5;  Surgeon: Son Lara DO;  Location: [facility] OR;  Service: Pain Management;  Laterality: Bilateral;  ORAL XANAX    INJECTION OF ANESTHETIC AGENT AROUND MEDIAL BRANCH NERVES INNERVATING LUMBAR FACET JOINT Bilateral 5/5/2023    Procedure: MBB #2 (B/L) L3,4,5;  Surgeon: Son Lara DO;  Location: [facility] OR;  Service: Pain Management;  Laterality: Bilateral;  Oral Xanax    INJECTION OF JOINT Right 5/20/2022    Procedure: Right SI  joint injection;  Surgeon: Son Lara DO;  Location: [facility] OR;  Service: Pain Management;  Laterality: Right;    INJECTION, SACROILIAC JOINT Right 12/19/2023    Procedure: RT SI joint Inj;  Surgeon: Son Lara DO;  [facility];  Service: Pain Management;  Laterality: Right;  oral    INSERTION OF IMPLANTABLE LOOP RECORDER Left 6/21/2024    Procedure: Insertion, Implantable Loop Recorder;  Surgeon: Hunter Rich MD;  Location: [facility] EP LAB;  Service: Cardiology;  Laterality: Left;  CVA, ILR, BSCI, Local, PA, 3 Prep    INSERTION OF IMPLANTABLE LOOP RECORDER Left 8/30/2024    Procedure: Insertion, Implantable Loop Recorder;  Surgeon: Hunter Rich MD;  Location: [facility] EP LAB;  Service: Cardiology;  Laterality: Left;  CVA, ILR,MDT, RN Sedate, PA, 3 Prep    RADIOFREQUENCY ABLATION OF LUMBAR MEDIAL BRANCH NERVE AT SINGLE LEVEL Bilateral 5/19/2023    Procedure: RFA (B/L) L3,4,5;  Surgeon: Son Lara DO;  [facility];  Service: Pain Management;  Laterality: Bilateral;    REMOVAL OF IMPLANTABLE LOOP RECORDER N/A 7/10/2024    Procedure: REMOVAL, IMPLANTABLE LOOP RECORDER;  Surgeon: Hunter Rich MD;  Location: [facility] EP LAB;  Service: Cardiology;  Laterality: N/A;       SOCIAL HISTORY:  Social History[1]    FAMILY HISTORY:  Family History   Problem Relation Name Age of Onset    Kidney disease Mother      Heart disease Mother      Cancer Father      Hypertension Brother      Hypertension Daughter      Cancer Maternal Aunt         ALLERGIES AND MEDICATIONS: updated and reviewed.  Review of patient's allergies indicates:   Allergen Reactions    Lisinopril-hydrochlorothiazide Other (See Comments)     Pt stated it makes her feel drained. She couldn't raise arms over head.    Ampicillin Rash    Darvocet a500 [propoxyphene n-acetaminophen] Other (See Comments)     shaky     Current Medications[2]      ROS  Review of Systems   Skin:  Positive for color change.  "          Physical Exam  Vitals:    03/28/25 1635   BP: 108/78   Temp: 98.1 °F (36.7 °C)   TempSrc: Oral   Weight: 77.1 kg (169 lb 15.6 oz)   Height: 5' 7" (1.702 m)    Body mass index is 26.62 kg/m².  Weight: 77.1 kg (169 lb 15.6 oz)   Height: 5' 7" (170.2 cm)   Physical Exam  Constitutional:       General: She is not in acute distress.  HENT:      Head: Normocephalic and atraumatic.   Chest:      Comments: Erythema and warmth of the left breast with faint demarcation above the nipple. Subareolar region covered in a bandage, clean and dry.   Skin:     General: Skin is warm and dry.   Neurological:      Mental Status: She is alert. Mental status is at baseline.   Psychiatric:         Mood and Affect: Mood normal.         Behavior: Behavior normal.                  [1]   Social History  Socioeconomic History    Marital status:    Tobacco Use    Smoking status: Former     Current packs/day: 0.00     Types: Cigarettes     Quit date: 2/6/2022     Years since quitting: 3.1     Passive exposure: Past    Smokeless tobacco: Never    Tobacco comments:     Patient Quit Smoking on 02/06/2022.   Substance and Sexual Activity    Alcohol use: Not Currently    Drug use: No    Sexual activity: Not Currently     Partners: Male     Social Drivers of Health     Financial Resource Strain: Low Risk  (3/28/2025)    Overall Financial Resource Strain (CARDIA)     Difficulty of Paying Living Expenses: Not very hard   Recent Concern: Financial Resource Strain - High Risk (1/18/2025)    Overall Financial Resource Strain (CARDIA)     Difficulty of Paying Living Expenses: Hard   Food Insecurity: No Food Insecurity (3/28/2025)    Hunger Vital Sign     Worried About Running Out of Food in the Last Year: Never true     Ran Out of Food in the Last Year: Never true   Recent Concern: Food Insecurity - Food Insecurity Present (1/18/2025)    Hunger Vital Sign     Worried About Running Out of Food in the Last Year: Sometimes true     Ran Out of " "Food in the Last Year: Sometimes true   Transportation Needs: No Transportation Needs (3/28/2025)    PRAPARE - Transportation     Lack of Transportation (Medical): No     Lack of Transportation (Non-Medical): No   Physical Activity: Inactive (3/28/2025)    Exercise Vital Sign     Days of Exercise per Week: 0 days     Minutes of Exercise per Session: 0 min   Stress: No Stress Concern Present (3/28/2025)    [employer] - Occupational Stress Questionnaire     Feeling of Stress : Not at all   Recent Concern: Stress - Stress Concern Present (1/5/2025)    [employer] - Occupational Stress Questionnaire     Feeling of Stress : To some extent   Housing Stability: Low Risk  (3/28/2025)    Housing Stability Vital Sign     Unable to Pay for Housing in the Last Year: No     Homeless in the Last Year: No   Recent Concern: Housing Stability - High Risk (1/18/2025)    Housing Stability Vital Sign     Unable to Pay for Housing in the Last Year: Yes     Homeless in the Last Year: No   [2]   Current Outpatient Medications   Medication Sig Dispense Refill    ACCU-CHEK GUIDE GLUCOSE METER [facility] TO CHECK BLOOD GLUCOSE DAILY, TO USE WITH INSURANCE PREFERRED METER      ascorbic acid, vitamin C, (VITAMIN C) 500 MG tablet Take 500 mg by mouth once daily.      atorvastatin (LIPITOR) 80 MG tablet TAKE 1 TABLET (80 MG TOTAL) BY MOUTH ONCE DAILY. 90 tablet 2    BD VEO INSULIN SYRINGE UF 1/2 mL 31 gauge x 15/64" Syrg USE 1 [place] [facility]NON-DRUG COMBO ROUTE) ROUTE 2 (TWO) TIMES A DAY.      blood sugar diagnostic Strp To check BG daily, to use with insurance preferred meter 200 each 11    blood sugar diagnostic Strp To check BG daily, to use with insurance preferred meter 200 each 11    empagliflozin (JARDIANCE) 25 mg tablet Take 1 tablet (25 mg total) by mouth once daily. 30 tablet 11    evolocumab (REPATHA SURECLICK) 140 mg/mL PnIj Inject 1 mL (140 mg total) into the skin every 14 " "(fourteen) days. 2 mL 11    furosemide (LASIX) 40 MG tablet Take 1 tablet (40 mg total) by mouth once daily. 90 tablet 3    furosemide (LASIX) 40 MG tablet Take 0.5 tablets (20 mg total) by mouth once daily. 60 tablet 0    hydrALAZINE (APRESOLINE) 25 MG tablet Take 1 tablet (25 mg total) by mouth 3 (three) times daily. 90 tablet 11    insulin lispro protamine-insulin lispro (HUMALOG MIX 75-25,U-100,INSULN) 100 unit/mL (75-25) Susp Inject 10 Units into the skin 2 (two) times daily before meals. 6 mL 11    insulin syringe-needle U-100 0.5 mL 31 gauge x 5/16" Syrg 60 each by Misc.(Non-Drug; Combo Route) route 2 (two) times a day. 60 each 11    JARDIANCE 10 mg tablet Take 10 mg by mouth.      lancets Misc To check BG daily, to use with insurance preferred meter 200 each 11    lancets Misc To check BG daily, to use with insurance preferred meter 200 each 11    metoprolol succinate (TOPROL-XL) 25 MG 24 hr tablet Take 0.5 tablets (12.5 mg total) by mouth once daily. 45 tablet 3    multivitamin (THERAGRAN) per tablet Take 1 tablet by mouth once daily.      promethazine-dextromethorphan (PROMETHAZINE-DM) 6.25-15 mg/5 mL Syrp Take 5 mLs by mouth nightly as needed (cough). 118 mL 0    tirzepatide (MOUNJARO) 2.5 mg/0.5 mL PnIj Inject 2.5 mg into the skin every 7 days. 2 mL 11    aspirin (ECOTRIN) 81 MG EC tablet Take 1 tablet (81 mg total) by mouth once daily. 30 tablet 3    LORazepam (ATIVAN) 0.5 MG tablet Take 1 tablet (0.5 mg total) by mouth once. for 1 dose 1 tablet 0    meclizine (ANTIVERT) 25 mg tablet Take 1 tablet (25 mg total) by mouth 2 (two) times daily as needed for Dizziness. 30 tablet 0    sulfamethoxazole-trimethoprim 800-160mg (BACTRIM DS) 800-160 mg Tab Take 1 tablet by mouth 2 (two) times daily. for 10 days 20 tablet 0     No current facility-administered medications for this visit.     "

## 2025-03-28 NOTE — TELEPHONE ENCOUNTER
----- Message from Jacqueline sent at 3/28/2025 10:59 AM CDT -----  Regarding: Same Day Access  Contact: Patient  Type:  Same Day Appointment RequestCaller is requesting a same day appointment.  Caller declined first available appointment listed below.  Name of Caller:Patient When is the first available appointment? No appts generated Symptoms: soreness from biopsy completed 03/26/2025 Best Call Back Number: [phone] Additional Information: Patient stated she is sore from biopsy and would like to be seen as soon as possible Please Assist

## 2025-03-28 NOTE — TELEPHONE ENCOUNTER
Patient having pain , tenderness and warmth at breast biopsy site. Patient requesting to be seen today. Appointment scheduled with provider.

## 2025-03-31 ENCOUNTER — PATIENT OUTREACH (OUTPATIENT)
Dept: ADMINISTRATIVE | Facility: OTHER | Age: 75
End: 2025-03-31
Payer: MEDICARE

## 2025-03-31 ENCOUNTER — OFFICE VISIT (OUTPATIENT)
Dept: FAMILY MEDICINE | Facility: CLINIC | Age: 75
End: 2025-03-31
Payer: MEDICARE

## 2025-03-31 VITALS
BODY MASS INDEX: 26.49 KG/M2 | OXYGEN SATURATION: 97 % | HEART RATE: 78 BPM | TEMPERATURE: 98 F | HEIGHT: 67 IN | DIASTOLIC BLOOD PRESSURE: 72 MMHG | WEIGHT: 168.81 LBS | SYSTOLIC BLOOD PRESSURE: 102 MMHG

## 2025-03-31 DIAGNOSIS — Z71.89 ADVANCED CARE PLANNING/COUNSELING DISCUSSION: ICD-10-CM

## 2025-03-31 DIAGNOSIS — I73.9 PVD (PERIPHERAL VASCULAR DISEASE): ICD-10-CM

## 2025-03-31 DIAGNOSIS — N61.0 MASTITIS: ICD-10-CM

## 2025-03-31 DIAGNOSIS — I10 ESSENTIAL HYPERTENSION: ICD-10-CM

## 2025-03-31 DIAGNOSIS — E11.65 UNCONTROLLED TYPE 2 DIABETES MELLITUS WITH HYPERGLYCEMIA: ICD-10-CM

## 2025-03-31 DIAGNOSIS — Z59.41 FOOD INSECURITY: ICD-10-CM

## 2025-03-31 DIAGNOSIS — N18.32 STAGE 3B CHRONIC KIDNEY DISEASE: ICD-10-CM

## 2025-03-31 DIAGNOSIS — Z00.00 ENCOUNTER FOR MEDICARE ANNUAL WELLNESS EXAM: Primary | ICD-10-CM

## 2025-03-31 PROCEDURE — 99999 PR PBB SHADOW E&M-EST. PATIENT-LVL V: CPT | Mod: PBBFAC,HCNC,,

## 2025-03-31 SDOH — SOCIAL DETERMINANTS OF HEALTH (SDOH): FOOD INSECURITY: Z59.41

## 2025-03-31 NOTE — PATIENT INSTRUCTIONS
Counseling and Referral of Other Preventative  (Italic type indicates deductible and co-insurance are waived)    Patient Name: Joanne Peña  Today's Date: 3/31/2025    Health Maintenance       Date Due Completion Date    TETANUS VACCINE Never done ---    Shingles Vaccine (1 of 2) Never done ---    RSV Vaccine (Age 60+ and Pregnant patients) (1 - Risk 60-74 years 1-dose series) Never done ---    COVID-19 Vaccine (1 - 2024-25 season) Never done ---    Hemoglobin A1c 05/13/2025 2/13/2025    Diabetic Eye Exam 11/04/2025 11/4/2024    Foot Exam 01/28/2026 1/28/2025 (Done)    Override on 1/28/2025: Done    Override on 8/19/2024: Done    Lipid Panel 02/13/2026 2/13/2025    Diabetes Urine Screening 02/22/2026 2/22/2025    Mammogram 03/26/2026 3/26/2025    High Dose Statin 03/28/2026 3/28/2025    Colorectal Cancer Screening 07/12/2026 7/12/2023    DEXA Scan 09/25/2026 9/25/2023        Orders Placed This Encounter   Procedures    [facility] ENROLLMENT     The following information is provided to all patients.  This information is to help you find resources for any of the problems found today that may be affecting your health:                  Living healthy guide: www.[facility].[place].gov      Understanding Diabetes: www.diabetes.org      Eating healthy: www.cdc.gov/healthyweight      CDC home safety checklist: www.cdc.gov/steadi/patient.html      Agency on Aging: www.goea.[place].gov      Alcoholics anonymous (AA): www.aa.org      Physical Activity: www.mark.nih.gov/ni8clfu      Tobacco use: www.quitwithusla.org

## 2025-03-31 NOTE — Clinical Note
Estela Soto,  Just an fyi that I saw her for annual wellness visit today. She is still having some tenderness and swelling to left breast, I just told her to continue the Bactrim you prescribed and use antibiotic ointment topically. I told her to let us know if things don't improve once she finishes the Bactrim. Just wanted to give you an update! Thanks, Patricia

## 2025-03-31 NOTE — PROGRESS NOTES
CHW - Outreach Attempt    Community Health Worker left a voicemail message for 1st attempt to contact patient regarding: SDOH  Community Health Worker to attempt to contact patient on:

## 2025-03-31 NOTE — PROGRESS NOTES
CHW - Initial Contact    This Community Health Worker completed OR updated the Social Determinant of Health questionnaire with patient via telephone today.    Pt identified barriers of most importance are: Food insecurities   Referrals to community agencies completed with patient/caregiver consent outside of [facility] include:  findhelp.org  Referrals were put through [facility] - no: [email or web address]  Support and Services: No support at this time  Other information discussed the patient needs / wants help with: Verified SDOH with patient today. Patient needs assistance with food. CHW will follow up in two weeks to check referral and patient's future needs.    Follow up required: Yes  Follow up Outreach: 4/15/2025

## 2025-03-31 NOTE — PROGRESS NOTES
"  oJanne Peña presented for a follow-up Medicare AWV today. The following components were reviewed and updated:    Medical history  Family History  Social history  Allergies and Current Medications  Health Risk Assessment  Health Maintenance  Care Team    **See Completed Assessments for Annual Wellness visit with in the encounter summary    The following assessments were completed:  Depression Screening  Cognitive function Screening  Timed Get Up Test  Whisper Test          Opioid documentation:      Patient does not have a current opioid prescription.          Vitals:    03/31/25 1338   BP: 102/72   Pulse: 78   Temp: 97.8 °F (36.6 °C)   TempSrc: Oral   SpO2: 97%   Weight: 76.6 kg (168 lb 12.6 oz)   Height: 5' 7" (1.702 m)     Body mass index is 26.44 kg/m².       Physical Exam  Vitals reviewed.   Constitutional:       Appearance: Normal appearance.   HENT:      Head: Normocephalic and atraumatic.   Cardiovascular:      Rate and Rhythm: Normal rate and regular rhythm.      Pulses: Normal pulses.      Heart sounds: Normal heart sounds. No murmur heard.  Pulmonary:      Effort: Pulmonary effort is normal. No respiratory distress.      Breath sounds: Normal breath sounds. No wheezing.   Chest:   Breasts:     Left: Swelling and tenderness (on Bactrim for mastitis) present.   Musculoskeletal:         General: Normal range of motion.      Cervical back: Normal range of motion and neck supple.   Skin:     General: Skin is warm and dry.      Capillary Refill: Capillary refill takes less than 2 seconds.   Neurological:      General: No focal deficit present.      Mental Status: She is alert and oriented to person, place, and time.   Psychiatric:         Mood and Affect: Mood normal.         Behavior: Behavior normal.       Diagnoses and health risks identified today and associated recommendations/orders:    1. Encounter for Medicare annual wellness exam    Counseled on age appropriate medical preventative services " including age appropriate cancer screenings, age appropriate eye and dental exams, over all nutritional health, need for a consistent exercise regimen, and an over all push towards maintaining a vigorous and active lifestyle.  Counseled on age appropriate vaccines and discussed upcoming health care needs based on age/gender. Discussed good sleep hygiene and stress management.    2. Advanced care planning/counseling discussion    I offered to discuss advanced care planning, including how to pick a person who would make decisions for you if you were unable to make them for yourself, called a health care power of , and what kind of decisions you might make such as use of life sustaining treatments such as ventilators and tube feeding when faced with a life limiting illness recorded on a living will that they will need to know. (How you want to be cared for as you near the end of your natural life)     X Patient is interested in learning more about how to make advanced directives.  I provided them paperwork and offered to discuss this with them.    3. Uncontrolled type 2 diabetes mellitus with hyperglycemia    Uncontrolled; started recently on Mounjaro and also on Jardiance and Humalog mix 75-25. Managed by PCP.    4. Essential hypertension    Stable on hydralazine and metoprolol, managed by PCP.     5. Stage 3b chronic kidney disease    Stable, managed by Nephrology and PCP. Continue to avoid nephrotoxins and stay hydrated.     6. PVD (peripheral vascular disease)    Stable, managed by Vascular and Cardiology.    7. Mastitis    Stable; started on Bactrim on 3/28 by PCP. Noted with continued tenderness and swelling. Encouraged to continue current medication regimen. Follow up with clinic for any worsening symptoms, or if symptoms fail to improve.       8. Food insecurity    Prapare assessment completed and discussed for 5 minutes, will refer to Community health program.    SDoH Intervention:     As of today, the  patient has reported risk in the following areas:    Food Insecurity: Food Insecurity Present (3/31/2025)    Hunger Vital Sign     Worried About Running Out of Food in the Last Year: Sometimes true     Ran Out of Food in the Last Year: Sometimes true     Enrollment order placed to Community Health Worker.    - COMMUNITY HEALTH PROGRAM ENROLLMENT      Provided Joanne with a 5-10 year written screening schedule and personal prevention plan. Recommendations were developed using the USPSTF age appropriate recommendations. Education, counseling, and referrals were provided as needed.  After Visit Summary printed and given to patient which includes a list of additional screenings\tests needed.    Follow up if symptoms worsen or fail to improve.      MARCOS Girard

## 2025-04-01 RX ORDER — FUROSEMIDE 40 MG/1
40 TABLET ORAL DAILY
Qty: 90 TABLET | Refills: 3 | Status: SHIPPED | OUTPATIENT
Start: 2025-04-01 | End: 2026-04-01

## 2025-04-03 ENCOUNTER — RESULTS FOLLOW-UP (OUTPATIENT)
Dept: RADIOLOGY | Facility: HOSPITAL | Age: 75
End: 2025-04-03

## 2025-04-03 DIAGNOSIS — M46.1 SACROILIITIS: ICD-10-CM

## 2025-04-03 DIAGNOSIS — M54.16 LUMBAR RADICULOPATHY: ICD-10-CM

## 2025-04-03 RX ORDER — CYCLOBENZAPRINE HCL 10 MG
10 TABLET ORAL 2 TIMES DAILY PRN
Qty: 180 TABLET | Refills: 1 | Status: SHIPPED | OUTPATIENT
Start: 2025-04-03 | End: 2025-09-30

## 2025-04-04 ENCOUNTER — CLINICAL SUPPORT (OUTPATIENT)
Dept: CARDIOLOGY | Facility: HOSPITAL | Age: 75
End: 2025-04-04
Attending: INTERNAL MEDICINE
Payer: MEDICARE

## 2025-04-04 ENCOUNTER — CLINICAL SUPPORT (OUTPATIENT)
Dept: CARDIOLOGY | Facility: HOSPITAL | Age: 75
End: 2025-04-04
Payer: MEDICARE

## 2025-04-04 DIAGNOSIS — I49.8 OTHER SPECIFIED CARDIAC ARRHYTHMIAS: ICD-10-CM

## 2025-04-04 PROCEDURE — 93298 REM INTERROG DEV EVAL SCRMS: CPT | Mod: 26,HCNC,, | Performed by: INTERNAL MEDICINE

## 2025-04-04 PROCEDURE — 93298 REM INTERROG DEV EVAL SCRMS: CPT | Mod: HCNC | Performed by: INTERNAL MEDICINE

## 2025-04-07 ENCOUNTER — PATIENT MESSAGE (OUTPATIENT)
Dept: FAMILY MEDICINE | Facility: CLINIC | Age: 75
End: 2025-04-07
Payer: MEDICARE

## 2025-04-07 ENCOUNTER — TELEPHONE (OUTPATIENT)
Dept: RADIOLOGY | Facility: HOSPITAL | Age: 75
End: 2025-04-07
Payer: MEDICARE

## 2025-04-09 ENCOUNTER — TELEPHONE (OUTPATIENT)
Dept: FAMILY MEDICINE | Facility: CLINIC | Age: 75
End: 2025-04-09
Payer: MEDICARE

## 2025-04-09 NOTE — TELEPHONE ENCOUNTER
----- Message from Ynes sent at 4/9/2025  9:08 AM CDT -----  Type:  Appointment Request Name of Caller:Harini is the first available appointment?No accessSymptoms:weakness in legs Would the patient rather a call back or a response via [employer]? [facility] Call Back Number:[phone] Additional Information: Pt called requesting a appt. Due to weakness in legs, pt also stated she fell twice please contact pt with further information.

## 2025-04-10 ENCOUNTER — TELEPHONE (OUTPATIENT)
Dept: FAMILY MEDICINE | Facility: CLINIC | Age: 75
End: 2025-04-10

## 2025-04-10 ENCOUNTER — PATIENT OUTREACH (OUTPATIENT)
Facility: OTHER | Age: 75
End: 2025-04-10
Payer: MEDICARE

## 2025-04-10 ENCOUNTER — HOSPITAL ENCOUNTER (OUTPATIENT)
Dept: RADIOLOGY | Facility: HOSPITAL | Age: 75
Discharge: HOME | End: 2025-04-10
Payer: MEDICARE

## 2025-04-10 ENCOUNTER — RESULTS FOLLOW-UP (OUTPATIENT)
Dept: FAMILY MEDICINE | Facility: CLINIC | Age: 75
End: 2025-04-10

## 2025-04-10 ENCOUNTER — HOSPITAL ENCOUNTER (INPATIENT)
Facility: HOSPITAL | Age: 75
LOS: 2 days | Discharge: HOME | DRG: 683 | End: 2025-04-13
Attending: EMERGENCY MEDICINE | Admitting: STUDENT IN AN ORGANIZED HEALTH CARE EDUCATION/TRAINING PROGRAM
Payer: MEDICARE

## 2025-04-10 ENCOUNTER — OFFICE VISIT (OUTPATIENT)
Dept: FAMILY MEDICINE | Facility: CLINIC | Age: 75
End: 2025-04-10
Payer: MEDICARE

## 2025-04-10 ENCOUNTER — OCHSNER VIRTUAL EMERGENCY DEPARTMENT (OUTPATIENT)
Facility: CLINIC | Age: 75
End: 2025-04-10
Payer: MEDICARE

## 2025-04-10 VITALS
BODY MASS INDEX: 25.88 KG/M2 | HEART RATE: 71 BPM | TEMPERATURE: 98 F | WEIGHT: 164.88 LBS | OXYGEN SATURATION: 99 % | DIASTOLIC BLOOD PRESSURE: 78 MMHG | HEIGHT: 67 IN | SYSTOLIC BLOOD PRESSURE: 102 MMHG

## 2025-04-10 DIAGNOSIS — N17.9 ACUTE KIDNEY INJURY SUPERIMPOSED ON CHRONIC KIDNEY DISEASE: Primary | ICD-10-CM

## 2025-04-10 DIAGNOSIS — I10 PRIMARY HYPERTENSION: ICD-10-CM

## 2025-04-10 DIAGNOSIS — N18.32 STAGE 3B CHRONIC KIDNEY DISEASE: ICD-10-CM

## 2025-04-10 DIAGNOSIS — N18.9 ACUTE KIDNEY INJURY SUPERIMPOSED ON CHRONIC KIDNEY DISEASE: Primary | ICD-10-CM

## 2025-04-10 DIAGNOSIS — E11.9 TYPE 2 DIABETES MELLITUS WITHOUT COMPLICATION, WITHOUT LONG-TERM CURRENT USE OF INSULIN: ICD-10-CM

## 2025-04-10 DIAGNOSIS — N17.9 ACUTE KIDNEY INJURY: Primary | ICD-10-CM

## 2025-04-10 DIAGNOSIS — R42 LIGHTHEADEDNESS: ICD-10-CM

## 2025-04-10 DIAGNOSIS — R68.89 OTHER GENERAL SYMPTOMS AND SIGNS: ICD-10-CM

## 2025-04-10 DIAGNOSIS — N17.9 AKI (ACUTE KIDNEY INJURY): ICD-10-CM

## 2025-04-10 DIAGNOSIS — I10 ESSENTIAL HYPERTENSION: ICD-10-CM

## 2025-04-10 DIAGNOSIS — E78.2 MIXED HYPERLIPIDEMIA: ICD-10-CM

## 2025-04-10 DIAGNOSIS — R29.6 FREQUENT FALLS: Primary | ICD-10-CM

## 2025-04-10 DIAGNOSIS — R07.9 CHEST PAIN: ICD-10-CM

## 2025-04-10 DIAGNOSIS — E11.22 TYPE 2 DIABETES MELLITUS WITH STAGE 3B CHRONIC KIDNEY DISEASE, WITHOUT LONG-TERM CURRENT USE OF INSULIN: ICD-10-CM

## 2025-04-10 DIAGNOSIS — R29.898 WEAKNESS OF BOTH LOWER EXTREMITIES: ICD-10-CM

## 2025-04-10 DIAGNOSIS — R29.6 FREQUENT FALLS: ICD-10-CM

## 2025-04-10 DIAGNOSIS — I50.22 CHRONIC SYSTOLIC (CONGESTIVE) HEART FAILURE: ICD-10-CM

## 2025-04-10 DIAGNOSIS — N18.32 TYPE 2 DIABETES MELLITUS WITH STAGE 3B CHRONIC KIDNEY DISEASE, WITHOUT LONG-TERM CURRENT USE OF INSULIN: ICD-10-CM

## 2025-04-10 DIAGNOSIS — R42 DIZZINESS AND GIDDINESS: ICD-10-CM

## 2025-04-10 DIAGNOSIS — E11.65 UNCONTROLLED TYPE 2 DIABETES MELLITUS WITH HYPERGLYCEMIA: ICD-10-CM

## 2025-04-10 DIAGNOSIS — R26.89 IMPAIRED GAIT AND MOBILITY: ICD-10-CM

## 2025-04-10 LAB
ABSOLUTE EOSINOPHIL (OHS): 0.18 K/UL
ABSOLUTE MONOCYTE (OHS): 0.49 K/UL (ref 0.3–1)
ABSOLUTE NEUTROPHIL COUNT (OHS): 2.92 K/UL (ref 1.8–7.7)
ALBUMIN SERPL BCP-MCNC: 4.1 G/DL (ref 3.5–5.2)
ALP SERPL-CCNC: 86 UNIT/L (ref 40–150)
ALT SERPL W/O P-5'-P-CCNC: 26 UNIT/L (ref 10–44)
ANION GAP (OHS): 11 MMOL/L (ref 8–16)
AST SERPL-CCNC: 22 UNIT/L (ref 11–45)
BASOPHILS # BLD AUTO: 0.04 K/UL
BASOPHILS NFR BLD AUTO: 0.8 %
BILIRUB SERPL-MCNC: 0.2 MG/DL (ref 0.1–1)
BUN SERPL-MCNC: 73 MG/DL (ref 8–23)
CALCIUM SERPL-MCNC: 9.4 MG/DL (ref 8.7–10.5)
CHLORIDE SERPL-SCNC: 106 MMOL/L (ref 95–110)
CHLORIDE UR-SCNC: 57 MMOL/L (ref 25–200)
CO2 SERPL-SCNC: 20 MMOL/L (ref 23–29)
CREAT SERPL-MCNC: 4.1 MG/DL (ref 0.5–1.4)
CREAT UR-MCNC: 110 MG/DL (ref 15–325)
ERYTHROCYTE [DISTWIDTH] IN BLOOD BY AUTOMATED COUNT: 18.2 % (ref 11.5–14.5)
GFR SERPLBLD CREATININE-BSD FMLA CKD-EPI: 11 ML/MIN/1.73/M2
GLUCOSE SERPL-MCNC: 110 MG/DL (ref 70–110)
HCT VFR BLD AUTO: 40.7 % (ref 37–48.5)
HGB BLD-MCNC: 12.5 GM/DL (ref 12–16)
IMM GRANULOCYTES # BLD AUTO: 0.02 K/UL (ref 0–0.04)
IMM GRANULOCYTES NFR BLD AUTO: 0.4 % (ref 0–0.5)
LYMPHOCYTES # BLD AUTO: 1.3 K/UL (ref 1–4.8)
MCH RBC QN AUTO: 25 PG (ref 27–31)
MCHC RBC AUTO-ENTMCNC: 30.7 G/DL (ref 32–36)
MCV RBC AUTO: 81 FL (ref 82–98)
NUCLEATED RBC (/100WBC) (OHS): 0 /100 WBC
PHOSPHATE SERPL-MCNC: 4.6 MG/DL (ref 2.7–4.5)
PLATELET # BLD AUTO: 361 K/UL (ref 150–450)
PMV BLD AUTO: 9.8 FL (ref 9.2–12.9)
POTASSIUM SERPL-SCNC: 4.9 MMOL/L (ref 3.5–5.1)
PROT SERPL-MCNC: 8.9 GM/DL (ref 6–8.4)
RBC # BLD AUTO: 5.01 M/UL (ref 4–5.4)
RELATIVE EOSINOPHIL (OHS): 3.6 %
RELATIVE LYMPHOCYTE (OHS): 26.3 % (ref 18–48)
RELATIVE MONOCYTE (OHS): 9.9 % (ref 4–15)
RELATIVE NEUTROPHIL (OHS): 59 % (ref 38–73)
SODIUM SERPL-SCNC: 137 MMOL/L (ref 136–145)
SODIUM UR-SCNC: 71 MMOL/L (ref 20–250)
WBC # BLD AUTO: 4.95 K/UL (ref 3.9–12.7)

## 2025-04-10 PROCEDURE — 83935 ASSAY OF URINE OSMOLALITY: CPT | Mod: HCNC | Performed by: EMERGENCY MEDICINE

## 2025-04-10 PROCEDURE — 3074F SYST BP LT 130 MM HG: CPT | Mod: HCNC,CPTII,S$GLB,

## 2025-04-10 PROCEDURE — 82436 ASSAY OF URINE CHLORIDE: CPT | Mod: HCNC | Performed by: EMERGENCY MEDICINE

## 2025-04-10 PROCEDURE — 93005 ELECTROCARDIOGRAM TRACING: CPT | Mod: HCNC

## 2025-04-10 PROCEDURE — 1160F RVW MEDS BY RX/DR IN RCRD: CPT | Mod: HCNC,CPTII,S$GLB,

## 2025-04-10 PROCEDURE — 1159F MED LIST DOCD IN RCRD: CPT | Mod: HCNC,CPTII,S$GLB,

## 2025-04-10 PROCEDURE — 99999 PR PBB SHADOW E&M-EST. PATIENT-LVL V: CPT | Mod: PBBFAC,HCNC,,

## 2025-04-10 PROCEDURE — 3066F NEPHROPATHY DOC TX: CPT | Mod: HCNC,CPTII,S$GLB,

## 2025-04-10 PROCEDURE — G0378 HOSPITAL OBSERVATION PER HR: HCPCS | Mod: HCNC

## 2025-04-10 PROCEDURE — 93010 ELECTROCARDIOGRAM REPORT: CPT | Mod: HCNC,,, | Performed by: INTERNAL MEDICINE

## 2025-04-10 PROCEDURE — 82570 ASSAY OF URINE CREATININE: CPT | Mod: HCNC | Performed by: EMERGENCY MEDICINE

## 2025-04-10 PROCEDURE — 96361 HYDRATE IV INFUSION ADD-ON: CPT | Mod: HCNC

## 2025-04-10 PROCEDURE — G2211 COMPLEX E/M VISIT ADD ON: HCPCS | Mod: HCNC,S$GLB,,

## 2025-04-10 PROCEDURE — 84300 ASSAY OF URINE SODIUM: CPT | Mod: HCNC | Performed by: EMERGENCY MEDICINE

## 2025-04-10 PROCEDURE — 63600175 PHARM REV CODE 636 W HCPCS: Mod: HCNC | Performed by: EMERGENCY MEDICINE

## 2025-04-10 PROCEDURE — 3052F HG A1C>EQUAL 8.0%<EQUAL 9.0%: CPT | Mod: HCNC,CPTII,S$GLB,

## 2025-04-10 PROCEDURE — 80053 COMPREHEN METABOLIC PANEL: CPT | Mod: HCNC | Performed by: EMERGENCY MEDICINE

## 2025-04-10 PROCEDURE — 1100F PTFALLS ASSESS-DOCD GE2>/YR: CPT | Mod: HCNC,CPTII,S$GLB,

## 2025-04-10 PROCEDURE — 4010F ACE/ARB THERAPY RXD/TAKEN: CPT | Mod: HCNC,CPTII,S$GLB,

## 2025-04-10 PROCEDURE — 1126F AMNT PAIN NOTED NONE PRSNT: CPT | Mod: HCNC,CPTII,S$GLB,

## 2025-04-10 PROCEDURE — 83735 ASSAY OF MAGNESIUM: CPT | Mod: HCNC | Performed by: EMERGENCY MEDICINE

## 2025-04-10 PROCEDURE — 3078F DIAST BP <80 MM HG: CPT | Mod: HCNC,CPTII,S$GLB,

## 2025-04-10 PROCEDURE — 3061F NEG MICROALBUMINURIA REV: CPT | Mod: HCNC,CPTII,S$GLB,

## 2025-04-10 PROCEDURE — 83930 ASSAY OF BLOOD OSMOLALITY: CPT | Mod: HCNC | Performed by: EMERGENCY MEDICINE

## 2025-04-10 PROCEDURE — 25000003 PHARM REV CODE 250: Mod: HCNC | Performed by: INTERNAL MEDICINE

## 2025-04-10 PROCEDURE — 3288F FALL RISK ASSESSMENT DOCD: CPT | Mod: HCNC,CPTII,S$GLB,

## 2025-04-10 PROCEDURE — 85025 COMPLETE CBC W/AUTO DIFF WBC: CPT | Mod: HCNC | Performed by: EMERGENCY MEDICINE

## 2025-04-10 PROCEDURE — 73560 X-RAY EXAM OF KNEE 1 OR 2: CPT | Mod: TC,HCNC,FY,PO,RT

## 2025-04-10 PROCEDURE — 84100 ASSAY OF PHOSPHORUS: CPT | Mod: HCNC | Performed by: EMERGENCY MEDICINE

## 2025-04-10 PROCEDURE — 99214 OFFICE O/P EST MOD 30 MIN: CPT | Mod: HCNC,S$GLB,,

## 2025-04-10 PROCEDURE — 3008F BODY MASS INDEX DOCD: CPT | Mod: HCNC,CPTII,S$GLB,

## 2025-04-10 PROCEDURE — 99285 EMERGENCY DEPT VISIT HI MDM: CPT | Mod: 25,HCNC

## 2025-04-10 RX ORDER — NALOXONE HCL 0.4 MG/ML
0.02 VIAL (ML) INJECTION
Status: DISCONTINUED | OUTPATIENT
Start: 2025-04-10 | End: 2025-04-13 | Stop reason: HOSPADM

## 2025-04-10 RX ORDER — ALUMINUM HYDROXIDE, MAGNESIUM HYDROXIDE, AND SIMETHICONE 1200; 120; 1200 MG/30ML; MG/30ML; MG/30ML
30 SUSPENSION ORAL 4 TIMES DAILY PRN
Status: DISCONTINUED | OUTPATIENT
Start: 2025-04-10 | End: 2025-04-13 | Stop reason: HOSPADM

## 2025-04-10 RX ORDER — SODIUM,POTASSIUM PHOSPHATES 280-250MG
2 POWDER IN PACKET (EA) ORAL
Status: DISCONTINUED | OUTPATIENT
Start: 2025-04-10 | End: 2025-04-13 | Stop reason: HOSPADM

## 2025-04-10 RX ORDER — ACETAMINOPHEN 325 MG/1
650 TABLET ORAL EVERY 4 HOURS PRN
Status: DISCONTINUED | OUTPATIENT
Start: 2025-04-10 | End: 2025-04-13 | Stop reason: HOSPADM

## 2025-04-10 RX ORDER — TALC
6 POWDER (GRAM) TOPICAL NIGHTLY PRN
Status: DISCONTINUED | OUTPATIENT
Start: 2025-04-10 | End: 2025-04-13 | Stop reason: HOSPADM

## 2025-04-10 RX ORDER — ONDANSETRON HYDROCHLORIDE 2 MG/ML
4 INJECTION, SOLUTION INTRAVENOUS EVERY 6 HOURS PRN
Status: DISCONTINUED | OUTPATIENT
Start: 2025-04-10 | End: 2025-04-13 | Stop reason: HOSPADM

## 2025-04-10 RX ORDER — ASPIRIN 81 MG/1
81 TABLET ORAL DAILY
Status: DISCONTINUED | OUTPATIENT
Start: 2025-04-11 | End: 2025-04-13 | Stop reason: HOSPADM

## 2025-04-10 RX ORDER — ATORVASTATIN CALCIUM 40 MG/1
80 TABLET, FILM COATED ORAL DAILY
Status: DISCONTINUED | OUTPATIENT
Start: 2025-04-11 | End: 2025-04-13 | Stop reason: HOSPADM

## 2025-04-10 RX ORDER — SODIUM CHLORIDE, SODIUM LACTATE, POTASSIUM CHLORIDE, CALCIUM CHLORIDE 600; 310; 30; 20 MG/100ML; MG/100ML; MG/100ML; MG/100ML
1000 INJECTION, SOLUTION INTRAVENOUS
Status: COMPLETED | OUTPATIENT
Start: 2025-04-10 | End: 2025-04-10

## 2025-04-10 RX ORDER — ACETAMINOPHEN 325 MG/1
650 TABLET ORAL EVERY 8 HOURS PRN
Status: DISCONTINUED | OUTPATIENT
Start: 2025-04-10 | End: 2025-04-13 | Stop reason: HOSPADM

## 2025-04-10 RX ORDER — BLOOD-GLUCOSE SENSOR
1 EACH MISCELLANEOUS 2 TIMES DAILY
Qty: 3 EACH | Refills: 3 | Status: ON HOLD | OUTPATIENT
Start: 2025-04-10 | End: 2026-04-10

## 2025-04-10 RX ORDER — SODIUM CHLORIDE 9 MG/ML
INJECTION, SOLUTION INTRAVENOUS CONTINUOUS
Status: DISCONTINUED | OUTPATIENT
Start: 2025-04-10 | End: 2025-04-11

## 2025-04-10 RX ORDER — GLUCAGON 1 MG
1 KIT INJECTION
Status: DISCONTINUED | OUTPATIENT
Start: 2025-04-10 | End: 2025-04-13 | Stop reason: HOSPADM

## 2025-04-10 RX ORDER — LANOLIN ALCOHOL/MO/W.PET/CERES
800 CREAM (GRAM) TOPICAL
Status: DISCONTINUED | OUTPATIENT
Start: 2025-04-10 | End: 2025-04-13 | Stop reason: HOSPADM

## 2025-04-10 RX ORDER — IBUPROFEN 200 MG
24 TABLET ORAL
Status: DISCONTINUED | OUTPATIENT
Start: 2025-04-10 | End: 2025-04-13 | Stop reason: HOSPADM

## 2025-04-10 RX ORDER — INSULIN ASPART 100 [IU]/ML
0-5 INJECTION, SOLUTION INTRAVENOUS; SUBCUTANEOUS
Status: DISCONTINUED | OUTPATIENT
Start: 2025-04-10 | End: 2025-04-13 | Stop reason: HOSPADM

## 2025-04-10 RX ORDER — CYCLOBENZAPRINE HCL 5 MG
10 TABLET ORAL 2 TIMES DAILY PRN
Status: DISCONTINUED | OUTPATIENT
Start: 2025-04-10 | End: 2025-04-13 | Stop reason: HOSPADM

## 2025-04-10 RX ORDER — HEPARIN SODIUM 5000 [USP'U]/ML
5000 INJECTION, SOLUTION INTRAVENOUS; SUBCUTANEOUS EVERY 8 HOURS
Status: DISCONTINUED | OUTPATIENT
Start: 2025-04-11 | End: 2025-04-13 | Stop reason: HOSPADM

## 2025-04-10 RX ORDER — LOSARTAN POTASSIUM 100 MG/1
100 TABLET ORAL
COMMUNITY
Start: 2025-03-26 | End: 2025-04-10

## 2025-04-10 RX ORDER — BLOOD-GLUCOSE,RECEIVER,CONT
1 EACH MISCELLANEOUS 2 TIMES DAILY
Qty: 1 EACH | Refills: 2 | Status: ON HOLD | OUTPATIENT
Start: 2025-04-10 | End: 2026-04-10

## 2025-04-10 RX ORDER — IBUPROFEN 200 MG
16 TABLET ORAL
Status: DISCONTINUED | OUTPATIENT
Start: 2025-04-10 | End: 2025-04-13 | Stop reason: HOSPADM

## 2025-04-10 RX ORDER — CHLORTHALIDONE 25 MG/1
25 TABLET ORAL
COMMUNITY
Start: 2025-03-26 | End: 2025-04-10

## 2025-04-10 RX ORDER — AMOXICILLIN 250 MG
1 CAPSULE ORAL 2 TIMES DAILY PRN
Status: DISCONTINUED | OUTPATIENT
Start: 2025-04-10 | End: 2025-04-13 | Stop reason: HOSPADM

## 2025-04-10 RX ADMIN — CYCLOBENZAPRINE HYDROCHLORIDE 10 MG: 5 TABLET, FILM COATED ORAL at 10:04

## 2025-04-10 RX ADMIN — SODIUM CHLORIDE: 9 INJECTION, SOLUTION INTRAVENOUS at 10:04

## 2025-04-10 RX ADMIN — SODIUM CHLORIDE, POTASSIUM CHLORIDE, SODIUM LACTATE AND CALCIUM CHLORIDE 1000 ML: 600; 310; 30; 20 INJECTION, SOLUTION INTRAVENOUS at 09:04

## 2025-04-10 NOTE — PLAN OF CARE-OVED
[facility] Emergency Department Plan of Care Note  Referral Source: Primary Care Provider                               Chief Complaint   Patient presents with    Fall       Recommendation: Emergency Department            [place] Department[place] [facility] [place]               Encounter Diagnosis   Name Primary?    Acute kidney injury Yes

## 2025-04-10 NOTE — PROGRESS NOTES
HPI     Joanne Peña is a 74 y.o. female with multiple medical diagnoses as listed in the medical history and problem list that presents for multiple falls. PCP Dr. Soto with last visit in this clinic on 3/31/25.     Chief Complaint   Patient presents with    Fall     3 times     HPI    CHIEF COMPLAINT:  Patient presents with a history of recent falls and associated symptoms of leg weakness and lightheadedness.    HPI:  Patient reports multiple falls in the past few days, preceded by leg weakness, blurred vision, and dizziness. She has fallen twice, landing on her knees both times, denying head impact. The right knee began hurting the night before, with localized pain. She has been using a cane for support when moving around the house since the falls occurred.    Prior to falling, she has blurred vision and lightheadedness, comparing this sensation to a previous stroke. She denies slurred speech associated with these episodes. She and her daughter deny any personality changes, confusion, or mental status changes.     She has a history of stroke, for which a neurologist referral was intended but not completed. She has an implanted loop recorder, last checked on March 5th, coinciding with one of her fall dates. Her blood pressure has been running in the low-normal range; today it is 102/78, which she says is similar to what it is at home. She no longer takes chlorthalidone or losartan for BP.    Regarding diabetes management, she reports not taking insulin for 1 week due to blood sugar ranging from 84 to 110. She currently checks her blood sugar by finger sticks twice daily.    She denies chest pain, shortness of breath, burning with urination, blood in urine, and blood in stool.    MEDICATIONS:  Patient is on Atorvastatin (Lipitor) for cholesterol management and baby aspirin. She is taking Jardiance 25 mg for diabetes management and Furosemide (Lasix) 20 mg at night for fluid retention. Patient is on  Hydralazine 3 times daily and Metoprolol for blood pressure management. She is also taking Ativan, only used for procedures. Patient has discontinued chlorthalidone and Losartan for blood pressure management. She has also stopped insulin for one week due to blood sugar ranging from 84 to 110.    MEDICAL HISTORY:  Patient has a history of Transient Ischemic Attack (TIA) and stroke.    TEST RESULTS:  Patient's recent A1C result was 8.5. Patient underwent a loop recorder check on March 5th, with no abnormal findings reported.        Assessment & Plan     1. Frequent falls  2. Weakness of both lower extremities  3. Dizziness and giddiness     Monitor the patient's recent falls, noting that legs give out and the patient feels woozy. Abrasion to right knee with tenderness to medial joint line.    Evaluate the patient's reports of light-headedness and blurred vision before falling, similar to TIA symptoms.  Considered falls may be related to low BP or glucose given medication regimen and recent changes.  Evaluated for potential neurological causes of falls, given similarity to previous TIA symptoms.    Muscle strength bilateral upper and lower extremities equal, 4/5. PERRLA. No focal deficits or current symptoms noted. Discussed with Dr. Montoya. Will obtain labs, UA, CT, refer to Neuro given history. Strict ED precautions given.    - Urinalysis, Reflex to Urine Culture; Future  - X-Ray Knee 1 or 2 View Right; Future  - Ambulatory referral/consult to Neurology; Future  - Urinalysis, Reflex to Urine Culture  - WALKER FOR HOME USE  - CBC Auto Differential; Future  - TSH; Future  - Comprehensive Metabolic Panel; Future  - CT Head Without Contrast; Future    4. Impaired gait and mobility     Joanne's mobility limitation cannot be sufficiently resolved by the use of a cane. Her functional mobility deficit can be sufficiently resolved with the use of a Rollator. Patient's mobility limitation significantly impairs their ability to  participate in one of more activities of daily living.  The use of a Rollator will significantly improve the patient's ability to participate in MRADLS and the patient will use it on regular basis in the home.       - WALKER FOR HOME USE    5. Other general symptoms and signs    - TSH; Future    6. Uncontrolled type 2 diabetes mellitus with hyperglycemia    Discussed that symptoms could be related to episodes of hypo or hyperglycemia; will order Dexcom for closer monitoring.    Monitor the patient's blood sugar, which range between 84 to 110 mg/dL, checked twice daily.   Evaluate the patient's last HbA1c result of 8.5%.   Assess that the HbA1c is high enough to require blood sugar management.   Observe that the patient has stopped taking insulin for 1 week due to low blood sugar.    - blood-glucose sensor (DEXCOM G7 SENSOR) Denisse; 1 Device by Misc.(Non-Drug; Combo Route) route 2 (two) times daily.  Dispense: 3 each; Refill: 3  - blood-glucose meter,continuous (DEXCOM G6 ) Misc; 1 each by Misc.(Non-Drug; Combo Route) route 2 (two) times daily.  Dispense: 1 each; Refill: 2    7. Essential Hypertension    Stable on metoprolol and hydralazine. Recently seen by Cardiology. Had stopped chlorthalidone and losartan. Taking Lasix BID for CHF. BP remains stable in clinic. Encouraged to continue to monitor at home, notify clinic for high or low readings.           Discussed DDx, condition, and treatment.   Education sent to patient portal/included in after visit summary.  ED precautions given.   Notify provider if symptoms do not resolve or increase in severity.   Patient verbalizes understanding and agrees with plan of care.  --------------------------------------------      Health Maintenance:  Health Maintenance         Date Due Completion Date    TETANUS VACCINE Never done ---    Shingles Vaccine (1 of 2) Never done ---    RSV Vaccine (Age 60+ and Pregnant patients) (1 - Risk 60-74 years 1-dose series) Never done  ---    COVID-19 Vaccine (1 - 2024-25 season) Never done ---    Hemoglobin A1c 05/13/2025 2/13/2025    Diabetic Eye Exam 11/04/2025 11/4/2024    Foot Exam 01/28/2026 1/28/2025 (Done)    Override on 1/28/2025: Done    Override on 8/19/2024: Done    Lipid Panel 02/13/2026 2/13/2025    Diabetes Urine Screening 02/22/2026 2/22/2025    Mammogram 03/26/2026 3/26/2025    High Dose Statin 04/10/2026 4/10/2025    Colorectal Cancer Screening 07/12/2026 7/12/2023    DEXA Scan 09/25/2026 9/25/2023            Discussed the importance of overdue vaccines which were offered during this encounter. Patient declined overdue vaccines at this time and Advised patient on the importance of completing overdue health maintenance items    Follow Up:  Follow up if symptoms worsen or fail to improve.    Exam     Review of Systems:  (as noted above)  Review of Systems   Constitutional:  Negative for fever.   HENT:  Negative for trouble swallowing.    Eyes:  Positive for visual disturbance.   Respiratory:  Negative for shortness of breath.    Cardiovascular:  Negative for chest pain.   Gastrointestinal:  Negative for blood in stool and vomiting.   Genitourinary:  Negative for hematuria.   Musculoskeletal:  Positive for arthralgias.   Skin:  Negative for rash.   Neurological:  Positive for dizziness and weakness. Negative for facial asymmetry, speech difficulty and headaches.       Physical Exam:   Physical Exam  Constitutional:       General: She is not in acute distress.     Appearance: Normal appearance. She is not ill-appearing.   HENT:      Head: Normocephalic and atraumatic.      Right Ear: Tympanic membrane normal.      Left Ear: Tympanic membrane normal.      Nose: Nose normal.      Mouth/Throat:      Mouth: Mucous membranes are moist.   Eyes:      Conjunctiva/sclera: Conjunctivae normal.      Pupils: Pupils are equal, round, and reactive to light.   Cardiovascular:      Rate and Rhythm: Normal rate and regular rhythm.      Pulses: Normal  "pulses.      Heart sounds: Normal heart sounds. No murmur heard.  Pulmonary:      Effort: Pulmonary effort is normal. No respiratory distress.      Breath sounds: Normal breath sounds. No wheezing.   Musculoskeletal:      Right knee: Laceration present. No swelling. Tenderness present over the medial joint line. Normal pulse.      Left knee: Normal pulse.   Skin:     General: Skin is warm and dry.      Capillary Refill: Capillary refill takes less than 2 seconds.   Neurological:      General: No focal deficit present.      Mental Status: She is alert and oriented to person, place, and time. Mental status is at baseline.      GCS: GCS eye subscore is 4. GCS verbal subscore is 5. GCS motor subscore is 6.      Cranial Nerves: No cranial nerve deficit or facial asymmetry.      Motor: No weakness or pronator drift.      Coordination: Coordination normal. Finger-Nose-Finger Test normal.      Gait: Gait abnormal (unsteady, using cane).   Psychiatric:         Mood and Affect: Mood normal.         Behavior: Behavior normal.       Vitals:    04/10/25 1055   BP: 102/78   Pulse: 71   Temp: 98.4 °F (36.9 °C)   TempSrc: Oral   SpO2: 99%   Weight: 74.8 kg (164 lb 14.5 oz)   Height: 5' 7" (1.702 m)      Body mass index is 25.83 kg/m².        History     Past Medical History:  Past Medical History:   Diagnosis Date    Diabetes mellitus     Hyperlipidemia     Hypertension     Stroke        Past Surgical History:  Past Surgical History:   Procedure Laterality Date    COLONOSCOPY N/A 7/12/2023    Procedure: COLONOSCOPY;  Surgeon: Ray Sorensen MD;  Location: [facility];  Service: Endoscopy;  Laterality: N/A;  instr via portal  - PC  4/10/23 Daniel, instr via mail & portal, unable to tolerate Golytely- request Miralax/Gatorade - PC  5/30-pt r/s-new instr portal-tb    INJECTION OF ANESTHETIC AGENT AROUND MEDIAL BRANCH NERVES INNERVATING LUMBAR FACET JOINT Bilateral 4/20/2023    Procedure: MBB #1 (B/L) L3,4,5;  Surgeon: Son" DO Marco;  Location: [facility] OR;  Service: Pain Management;  Laterality: Bilateral;  ORAL XANAX    INJECTION OF ANESTHETIC AGENT AROUND MEDIAL BRANCH NERVES INNERVATING LUMBAR FACET JOINT Bilateral 5/5/2023    Procedure: MBB #2 (B/L) L3,4,5;  Surgeon: Son Lara DO;  Location: [facility] OR;  Service: Pain Management;  Laterality: Bilateral;  Oral Xanax    INJECTION OF JOINT Right 5/20/2022    Procedure: Right SI joint injection;  Surgeon: Son Lara DO;  Location: [facility] OR;  Service: Pain Management;  Laterality: Right;    INJECTION, SACROILIAC JOINT Right 12/19/2023    Procedure: RT SI joint Inj;  Surgeon: Son Lara DO;  Location: [facility];  Service: Pain Management;  Laterality: Right;  oral    INSERTION OF IMPLANTABLE LOOP RECORDER Left 6/21/2024    Procedure: Insertion, Implantable Loop Recorder;  Surgeon: Hunter Rich MD;  Location: [facility] EP LAB;  Service: Cardiology;  Laterality: Left;  CVA, ILR, BSCI, Local, MD, 3 Prep    INSERTION OF IMPLANTABLE LOOP RECORDER Left 8/30/2024    Procedure: Insertion, Implantable Loop Recorder;  Surgeon: Hunter Rich MD;  Location: [facility] EP LAB;  Service: Cardiology;  Laterality: Left;  CVRODNEY MUELLER MDT, RN Sed[place], MD, 3 Prep    RADIOFREQUENCY ABLATION OF LUMBAR MEDIAL BRANCH NERVE AT SINGLE LEVEL Bilateral 5/19/2023    Procedure: RFA (B/L) L3,4,5;  Surgeon: Son Lara DO;  Location: [facility];  Service: Pain Management;  Laterality: Bilateral;    REMOVAL OF IMPLANTABLE LOOP RECORDER N/A 7/10/2024    Procedure: REMOVAL, IMPLANTABLE LOOP RECORDER;  Surgeon: Hunter Rich MD;  Location: [facility] EP LAB;  Service: Cardiology;  Laterality: N/A;       Social History:  Social History[1]    Family History:  Family History   Problem Relation Name Age of Onset    Kidney disease Mother      Heart disease Mother      Cancer Father      Hypertension Brother      Hypertension Daughter      Cancer Maternal Aunt          Allergies and Medications: (updated and reviewed)  Review of patient's allergies indicates:   Allergen Reactions    Lisinopril-hydrochlorothiazide Other (See Comments)     Pt stated it makes her feel drained. She couldn't raise arms over head.    Ampicillin Rash    Darvocet a500 [propoxyphene n-acetaminophen] Other (See Comments)     shaky     Current Medications[2]    Patient Care Team:  Teri Soto DO as PCP - General (Family Medicine)  Tracie Kessler LPN as Care Coordinator  Lb Tracy OD (Optometry)  Sariah Pfeiffer OD as Consulting Physician (Optometry)  Kamar Nye, PharmD as Pharmacist  Rodrick Mclean MA as Community Health Worker         - The patient is given an After Visit Summary that lists all medications with directions, allergies, education, orders placed during this encounter and follow-up instructions.      - I have reviewed the patient's medical information including past medical, family, and social history sections including the medications and allergies.      - We discussed the patient's current medications.     This note was created by combination of typed  and MModal dictation.  Transcription errors may be present.  If there are any questions, please contact me.    This note was generated with the assistance of ambient listening technology. Verbal consent was obtained by the patient and accompanying visitor(s) for the recording of patient appointment to facilitate this note. I attest to having reviewed and edited the generated note for accuracy, though some syntax or spelling errors may persist. Please contact the author of this note for any clarification.                MARCOS Rivera         [1]   Social History  Socioeconomic History    Marital status:    Tobacco Use    Smoking status: Former     Current packs/day: 0.00     Types: Cigarettes     Quit date: 2/6/2022     Years since quitting: 3.1     Passive exposure: Past    Smokeless  tobacco: Never    Tobacco comments:     Patient Quit Smoking on 02/06/2022.   Substance and Sexual Activity    Alcohol use: Not Currently    Drug use: No    Sexual activity: Not Currently     Partners: Male     Social Drivers of Health     Financial Resource Strain: Low Risk  (3/31/2025)    Overall Financial Resource Strain (CARDIA)     Difficulty of Paying Living Expenses: Not hard at all   Recent Concern: Financial Resource Strain - High Risk (1/18/2025)    Overall Financial Resource Strain (CARDIA)     Difficulty of Paying Living Expenses: Hard   Food Insecurity: Food Insecurity Present (3/31/2025)    Hunger Vital Sign     Worried About Running Out of Food in the Last Year: Sometimes true     Ran Out of Food in the Last Year: Sometimes true   Transportation Needs: No Transportation Needs (3/31/2025)    PRAPARE - Transportation     Lack of Transportation (Medical): No     Lack of Transportation (Non-Medical): No   Physical Activity: Inactive (3/31/2025)    Exercise Vital Sign     Days of Exercise per Week: 0 days     Minutes of Exercise per Session: 0 min   Stress: No Stress Concern Present (3/31/2025)    [employer] - Occupational Stress Questionnaire     Feeling of Stress : Not at all   Recent Concern: Stress - Stress Concern Present (1/5/2025)    [employer] - Occupational Stress Questionnaire     Feeling of Stress : To some extent   Housing Stability: Low Risk  (3/31/2025)    Housing Stability Vital Sign     Unable to Pay for Housing in the Last Year: No     Homeless in the Last Year: No   Recent Concern: Housing Stability - High Risk (1/18/2025)    Housing Stability Vital Sign     Unable to Pay for Housing in the Last Year: Yes     Homeless in the Last Year: No   [2]   Current Outpatient Medications   Medication Sig Dispense Refill    ACCU-CHEK GUIDE GLUCOSE METER Mis TO CHECK BLOOD GLUCOSE DAILY, TO USE WITH INSURANCE PREFERRED METER      ascorbic  "acid, vitamin C, (VITAMIN C) 500 MG tablet Take 500 mg by mouth once daily.      atorvastatin (LIPITOR) 80 MG tablet TAKE 1 TABLET (80 MG TOTAL) BY MOUTH ONCE DAILY. 90 tablet 2    BD VEO INSULIN SYRINGE UF 1/2 mL 31 gauge x 15/64" Syrg USE 1 SYRINGE BY [facility]NON-DRUG COMBO ROUTE) ROUTE 2 (TWO) TIMES A DAY.      blood sugar diagnostic Strp To check BG daily, to use with insurance preferred meter 200 each 11    blood sugar diagnostic Strp To check BG daily, to use with insurance preferred meter 200 each 11    cyclobenzaprine (FLEXERIL) 10 MG tablet TAKE 1 TABLET (10 MG TOTAL) BY MOUTH 2 (TWO) TIMES DAILY AS NEEDED FOR MUSCLE SPASMS (BACK PAIN). 180 tablet 1    empagliflozin (JARDIANCE) 25 mg tablet Take 1 tablet (25 mg total) by mouth once daily. 30 tablet 11    evolocumab (REPATHA SURECLICK) 140 mg/mL PnIj Inject 1 mL (140 mg total) into the skin every 14 (fourteen) days. 2 mL 11    furosemide (LASIX) 40 MG tablet Take 0.5 tablets (20 mg total) by mouth once daily. 60 tablet 0    furosemide (LASIX) 40 MG tablet Take 1 tablet (40 mg total) by mouth once daily. 90 tablet 3    hydrALAZINE (APRESOLINE) 25 MG tablet Take 1 tablet (25 mg total) by mouth 3 (three) times daily. 90 tablet 11    JARDIANCE 10 mg tablet Take 10 mg by mouth.      lancets Misc To check BG daily, to use with insurance preferred meter 200 each 11    lancets Misc To check BG daily, to use with insurance preferred meter 200 each 11    metoprolol succinate (TOPROL-XL) 25 MG 24 hr tablet Take 0.5 tablets (12.5 mg total) by mouth once daily. 45 tablet 3    multivitamin (THERAGRAN) per tablet Take 1 tablet by mouth once daily.      promethazine-dextromethorphan (PROMETHAZINE-DM) 6.25-15 mg/5 mL Syrp Take 5 mLs by mouth nightly as needed (cough). 118 mL 0    tirzepatide (MOUNJARO) 2.5 mg/0.5 mL PnIj Inject 2.5 mg into the skin every 7 days. 2 mL 11    aspirin (ECOTRIN) 81 MG EC tablet Take 1 tablet (81 mg total) by mouth once daily. 30 tablet 3    " "blood-glucose meter,continuous (DEXCOM G6 ) Misc 1 each by Misc.(Non-Drug; Combo Route) route 2 (two) times daily. 1 each 2    blood-glucose sensor (DEXCOM G7 SENSOR) Denisse 1 Device by Misc.(Non-Drug; Combo Route) route 2 (two) times daily. 3 each 3    insulin lispro protamine-insulin lispro (HUMALOG MIX 75-25,U-100,INSULN) 100 unit/mL (75-25) Susp Inject 10 Units into the skin 2 (two) times daily before meals. (Patient not taking: Reported on 4/10/2025) 6 mL 11    insulin syringe-needle U-100 0.5 mL 31 gauge x 5/16" Syrg 60 each by Misc.(Non-Drug; Combo Route) route 2 (two) times a day. (Patient not taking: Reported on 4/10/2025) 60 each 11    LORazepam (ATIVAN) 0.5 MG tablet Take 1 tablet (0.5 mg total) by mouth once. for 1 dose 1 tablet 0    meclizine (ANTIVERT) 25 mg tablet Take 1 tablet (25 mg total) by mouth 2 (two) times daily as needed for Dizziness. 30 tablet 0     No current facility-administered medications for this visit.     "

## 2025-04-10 NOTE — PATIENT INSTRUCTIONS
Imaging, Xray, CT, MRI, Ultrasound---[phone]      Please call the Referral Desk to schedule Neurology referral at your earliest convenience:  [phone]

## 2025-04-10 NOTE — TELEPHONE ENCOUNTER
Spoke with patient regarding labs with elevated Cr at 4.0 up from baseline of 1.9. Recommended to go to ED for further evaluation and tx. Patient and daughter verbalized understanding. Daughter will take patient to [facility].

## 2025-04-10 NOTE — PROGRESS NOTES
Patient seen in clinic today by MARCOS Rivera and Kashmir LUCIO on call, Dr. Yosi Anna, consulted due to patient having multiple recent falls and elevated Creatinine from baseline. Disposition is Emergency Department. Pt going to [facility]. Follow up scheduled on 04/11/2025 to outreach the patient to assess for any additional needs/concerns following ED visit.

## 2025-04-11 ENCOUNTER — PATIENT OUTREACH (OUTPATIENT)
Facility: OTHER | Age: 75
End: 2025-04-11
Payer: MEDICARE

## 2025-04-11 LAB
ABSOLUTE EOSINOPHIL (OHS): 0.19 K/UL
ABSOLUTE MONOCYTE (OHS): 0.68 K/UL (ref 0.3–1)
ABSOLUTE NEUTROPHIL COUNT (OHS): 2.43 K/UL (ref 1.8–7.7)
ALBUMIN SERPL BCP-MCNC: 3.4 G/DL (ref 3.5–5.2)
ALBUMIN SERPL BCP-MCNC: 3.4 G/DL (ref 3.5–5.2)
ALP SERPL-CCNC: 80 UNIT/L (ref 40–150)
ALT SERPL W/O P-5'-P-CCNC: 24 UNIT/L (ref 10–44)
ANION GAP (OHS): 11 MMOL/L (ref 8–16)
ANION GAP (OHS): 14 MMOL/L (ref 8–16)
AST SERPL-CCNC: 19 UNIT/L (ref 11–45)
B-OH-BUTYR BLD STRIP-SCNC: 0.1 MMOL/L
BASOPHILS # BLD AUTO: 0.04 K/UL
BASOPHILS NFR BLD AUTO: 0.8 %
BILIRUB SERPL-MCNC: 0.2 MG/DL (ref 0.1–1)
BNP SERPL-MCNC: 606 PG/ML (ref 0–99)
BUN SERPL-MCNC: 71 MG/DL (ref 8–23)
BUN SERPL-MCNC: 73 MG/DL (ref 8–23)
CALCIUM SERPL-MCNC: 8.7 MG/DL (ref 8.7–10.5)
CALCIUM SERPL-MCNC: 8.8 MG/DL (ref 8.7–10.5)
CHLORIDE SERPL-SCNC: 111 MMOL/L (ref 95–110)
CHLORIDE SERPL-SCNC: 111 MMOL/L (ref 95–110)
CO2 SERPL-SCNC: 12 MMOL/L (ref 23–29)
CO2 SERPL-SCNC: 14 MMOL/L (ref 23–29)
CREAT SERPL-MCNC: 3.7 MG/DL (ref 0.5–1.4)
CREAT SERPL-MCNC: 3.7 MG/DL (ref 0.5–1.4)
ERYTHROCYTE [DISTWIDTH] IN BLOOD BY AUTOMATED COUNT: 18.2 % (ref 11.5–14.5)
GFR SERPLBLD CREATININE-BSD FMLA CKD-EPI: 12 ML/MIN/1.73/M2
GFR SERPLBLD CREATININE-BSD FMLA CKD-EPI: 12 ML/MIN/1.73/M2
GLUCOSE SERPL-MCNC: 131 MG/DL (ref 70–110)
GLUCOSE SERPL-MCNC: 135 MG/DL (ref 70–110)
HCT VFR BLD AUTO: 39 % (ref 37–48.5)
HGB BLD-MCNC: 11.2 GM/DL (ref 12–16)
IMM GRANULOCYTES # BLD AUTO: 0.04 K/UL (ref 0–0.04)
IMM GRANULOCYTES NFR BLD AUTO: 0.8 % (ref 0–0.5)
LACTATE SERPL-SCNC: 1.4 MMOL/L (ref 0.5–2.2)
LYMPHOCYTES # BLD AUTO: 1.43 K/UL (ref 1–4.8)
MAGNESIUM SERPL-MCNC: 2.1 MG/DL (ref 1.6–2.6)
MAGNESIUM SERPL-MCNC: 2.4 MG/DL (ref 1.6–2.6)
MCH RBC QN AUTO: 24.8 PG (ref 27–31)
MCHC RBC AUTO-ENTMCNC: 28.7 G/DL (ref 32–36)
MCV RBC AUTO: 87 FL (ref 82–98)
NUCLEATED RBC (/100WBC) (OHS): 0 /100 WBC
OHS CV AF BURDEN PERCENT: < 1
OHS CV DC REMOTE DEVICE TYPE: NORMAL
OSMOLALITY SERPL: 314 MOSM/KG (ref 275–295)
OSMOLALITY UR: 474 MOSM/KG (ref 50–1200)
PHOSPHATE SERPL-MCNC: 3.6 MG/DL (ref 2.7–4.5)
PHOSPHATE SERPL-MCNC: 3.8 MG/DL (ref 2.7–4.5)
PLATELET # BLD AUTO: 256 K/UL (ref 150–450)
PMV BLD AUTO: 10.5 FL (ref 9.2–12.9)
POCT GLUCOSE: 127 MG/DL (ref 70–110)
POCT GLUCOSE: 131 MG/DL (ref 70–110)
POCT GLUCOSE: 162 MG/DL (ref 70–110)
POCT GLUCOSE: 171 MG/DL (ref 70–110)
POTASSIUM SERPL-SCNC: 4.3 MMOL/L (ref 3.5–5.1)
POTASSIUM SERPL-SCNC: 4.4 MMOL/L (ref 3.5–5.1)
PROT SERPL-MCNC: 7.4 GM/DL (ref 6–8.4)
RBC # BLD AUTO: 4.51 M/UL (ref 4–5.4)
RELATIVE EOSINOPHIL (OHS): 4 %
RELATIVE LYMPHOCYTE (OHS): 29.7 % (ref 18–48)
RELATIVE MONOCYTE (OHS): 14.1 % (ref 4–15)
RELATIVE NEUTROPHIL (OHS): 50.6 % (ref 38–73)
SODIUM SERPL-SCNC: 136 MMOL/L (ref 136–145)
SODIUM SERPL-SCNC: 137 MMOL/L (ref 136–145)
WBC # BLD AUTO: 4.81 K/UL (ref 3.9–12.7)

## 2025-04-11 PROCEDURE — 84100 ASSAY OF PHOSPHORUS: CPT | Mod: HCNC | Performed by: INTERNAL MEDICINE

## 2025-04-11 PROCEDURE — 25000003 PHARM REV CODE 250: Mod: HCNC | Performed by: INTERNAL MEDICINE

## 2025-04-11 PROCEDURE — 82010 KETONE BODYS QUAN: CPT | Mod: HCNC | Performed by: INTERNAL MEDICINE

## 2025-04-11 PROCEDURE — 96366 THER/PROPH/DIAG IV INF ADDON: CPT

## 2025-04-11 PROCEDURE — 36415 COLL VENOUS BLD VENIPUNCTURE: CPT | Mod: HCNC | Performed by: INTERNAL MEDICINE

## 2025-04-11 PROCEDURE — 96372 THER/PROPH/DIAG INJ SC/IM: CPT | Performed by: INTERNAL MEDICINE

## 2025-04-11 PROCEDURE — 85025 COMPLETE CBC W/AUTO DIFF WBC: CPT | Mod: HCNC | Performed by: INTERNAL MEDICINE

## 2025-04-11 PROCEDURE — 96361 HYDRATE IV INFUSION ADD-ON: CPT | Mod: HCNC

## 2025-04-11 PROCEDURE — 83735 ASSAY OF MAGNESIUM: CPT | Mod: HCNC | Performed by: INTERNAL MEDICINE

## 2025-04-11 PROCEDURE — 63600175 PHARM REV CODE 636 W HCPCS: Mod: HCNC | Performed by: INTERNAL MEDICINE

## 2025-04-11 PROCEDURE — 80053 COMPREHEN METABOLIC PANEL: CPT | Mod: HCNC | Performed by: INTERNAL MEDICINE

## 2025-04-11 PROCEDURE — 96361 HYDRATE IV INFUSION ADD-ON: CPT

## 2025-04-11 PROCEDURE — 94761 N-INVAS EAR/PLS OXIMETRY MLT: CPT | Mod: HCNC

## 2025-04-11 PROCEDURE — 99232 SBSQ HOSP IP/OBS MODERATE 35: CPT | Mod: HCNC,,, | Performed by: INTERNAL MEDICINE

## 2025-04-11 PROCEDURE — 96365 THER/PROPH/DIAG IV INF INIT: CPT

## 2025-04-11 PROCEDURE — 21400001 HC TELEMETRY ROOM: Mod: HCNC

## 2025-04-11 PROCEDURE — 83605 ASSAY OF LACTIC ACID: CPT | Mod: HCNC | Performed by: INTERNAL MEDICINE

## 2025-04-11 PROCEDURE — 83880 ASSAY OF NATRIURETIC PEPTIDE: CPT | Mod: HCNC | Performed by: INTERNAL MEDICINE

## 2025-04-11 RX ADMIN — ATORVASTATIN CALCIUM 80 MG: 40 TABLET, FILM COATED ORAL at 09:04

## 2025-04-11 RX ADMIN — SODIUM BICARBONATE: 84 INJECTION, SOLUTION INTRAVENOUS at 09:04

## 2025-04-11 RX ADMIN — HEPARIN SODIUM 5000 UNITS: 5000 INJECTION INTRAVENOUS; SUBCUTANEOUS at 01:04

## 2025-04-11 RX ADMIN — HEPARIN SODIUM 5000 UNITS: 5000 INJECTION INTRAVENOUS; SUBCUTANEOUS at 09:04

## 2025-04-11 RX ADMIN — HEPARIN SODIUM 5000 UNITS: 5000 INJECTION INTRAVENOUS; SUBCUTANEOUS at 05:04

## 2025-04-11 RX ADMIN — ASPIRIN 81 MG: 81 TABLET, COATED ORAL at 09:04

## 2025-04-11 NOTE — SUBJECTIVE & OBJECTIVE
Past Medical History:   Diagnosis Date    Diabetes mellitus     Hyperlipidemia     Hypertension     Stroke        Past Surgical History:   Procedure Laterality Date    COLONOSCOPY N/A 7/12/2023    Procedure: COLONOSCOPY;  Surgeon: Ray Sorensen MD;  Location: [facility] ENDO;  Service: Endoscopy;  Laterality: N/A;  instr via portal  - PC  4/10/23 Daniel, instr via mail & portal, unable to tolerate Golytely- request Miralax/Gatorade - PC  5/30-pt r/s-new instr portal-tb    INJECTION OF ANESTHETIC AGENT AROUND MEDIAL BRANCH NERVES INNERVATING LUMBAR FACET JOINT Bilateral 4/20/2023    Procedure: MBB #1 (B/L) L3,4,5;  Surgeon: Son Lara DO;  Location: [facility] OR;  Service: Pain Management;  Laterality: Bilateral;  ORAL XANAX    INJECTION OF ANESTHETIC AGENT AROUND MEDIAL BRANCH NERVES INNERVATING LUMBAR FACET JOINT Bilateral 5/5/2023    Procedure: MBB #2 (B/L) L3,4,5;  Surgeon: Son Lara DO;  Location: [facility] OR;  Service: Pain Management;  Laterality: Bilateral;  Oral Xanax    INJECTION OF JOINT Right 5/20/2022    Procedure: Right SI joint injection;  Surgeon: Son Lara DO;  Location: [facility] OR;  Service: Pain Management;  Laterality: Right;    INJECTION, SACROILIAC JOINT Right 12/19/2023    Procedure: RT SI joint Inj;  Surgeon: Son Lara DO;  Location: [facility] PAIN MANAGEMENT;  Service: Pain Management;  Laterality: Right;  oral    INSERTION OF IMPLANTABLE LOOP RECORDER Left 6/21/2024    Procedure: Insertion, Implantable Loop Recorder;  Surgeon: Hunter Rich MD;  Location: [facility] EP LAB;  Service: Cardiology;  Laterality: Left;  CVA, ILR, BSCI, Local, [place], 3 Prep    INSERTION OF IMPLANTABLE LOOP RECORDER Left 8/30/2024    Procedure: Insertion, Implantable Loop Recorder;  Surgeon: Hunter Rich MD;  Location: [facility] EP LAB;  Service: Cardiology;  Laterality: Left;  CVA, ILR,MDT, RN Sedate, [place], 3 Prep    RADIOFREQUENCY ABLATION OF LUMBAR MEDIAL BRANCH NERVE AT SINGLE LEVEL Bilateral  5/19/2023    Procedure: RFA (B/L) L3,4,5;  Surgeon: Son Lara DO;  Location: [facility] PAIN MANAGEMENT;  Service: Pain Management;  Laterality: Bilateral;    REMOVAL OF IMPLANTABLE LOOP RECORDER N/A 7/10/2024    Procedure: REMOVAL, IMPLANTABLE LOOP RECORDER;  Surgeon: Hunter Rich MD;  Location: [facility] EP LAB;  Service: Cardiology;  Laterality: N/A;       Review of patient's allergies indicates:   Allergen Reactions    Lisinopril-hydrochlorothiazide Other (See Comments)     Pt stated it makes her feel drained. She couldn't raise arms over head.    Ampicillin Rash    Darvocet a500 [propoxyphene n-acetaminophen] Other (See Comments)     kierstenky       No current facility-administered medications on file prior to encounter.     Current Outpatient Medications on File Prior to Encounter   Medication Sig    aspirin (ECOTRIN) 81 MG EC tablet Take 1 tablet (81 mg total) by mouth once daily.    atorvastatin (LIPITOR) 80 MG tablet TAKE 1 TABLET (80 MG TOTAL) BY MOUTH ONCE DAILY.    cyclobenzaprine (FLEXERIL) 10 MG tablet TAKE 1 TABLET (10 MG TOTAL) BY MOUTH 2 (TWO) TIMES DAILY AS NEEDED FOR MUSCLE SPASMS (BACK PAIN).    empagliflozin (JARDIANCE) 25 mg tablet Take 1 tablet (25 mg total) by mouth once daily.    evolocumab (REPATHA SURECLICK) 140 mg/mL PnIj Inject 1 mL (140 mg total) into the skin every 14 (fourteen) days.    furosemide (LASIX) 40 MG tablet Take 0.5 tablets (20 mg total) by mouth once daily. (Patient taking differently: Take 20 mg by mouth every evening.)    furosemide (LASIX) 40 MG tablet Take 1 tablet (40 mg total) by mouth once daily.    hydrALAZINE (APRESOLINE) 25 MG tablet Take 1 tablet (25 mg total) by mouth 3 (three) times daily.    metoprolol succinate (TOPROL-XL) 25 MG 24 hr tablet Take 0.5 tablets (12.5 mg total) by mouth once daily.    tirzepatide (MOUNJARO) 2.5 mg/0.5 mL PnIj Inject 2.5 mg into the skin every 7 days.    ACCU-CHEK GUIDE GLUCOSE METER [facility] [place] BLOOD GLUCOSE DAILY, TO USE  "WITH INSURANCE PREFERRED METER    ascorbic acid, vitamin C, (VITAMIN C) 500 MG tablet Take 500 mg by mouth once daily.    BD VEO INSULIN SYRINGE UF 1/2 mL 31 gauge x 15/64" Syrg USE 1 SYRINGE BY MISC.(NON-DRUG COMBO ROUTE) ROUTE 2 (TWO) TIMES A DAY.    blood sugar diagnostic Strp To check BG daily, to use with insurance preferred meter    blood sugar diagnostic Strp To check BG daily, to use with insurance preferred meter    blood-glucose meter,continuous (DEXCOM G6 ) Misc 1 each by Misc.(Non-Drug; Combo Route) route 2 (two) times daily.    blood-glucose sensor (DEXCOM G7 SENSOR) Denisse 1 Device by Misc.(Non-Drug; Combo Route) route 2 (two) times daily.    insulin syringe-needle U-100 0.5 mL 31 gauge x 5/16" Syrg 60 each by Misc.(Non-Drug; Combo Route) route 2 (two) times a day. (Patient not taking: Reported on 4/10/2025)    JARDIANCE 10 mg tablet Take 10 mg by mouth.    lancets Misc To check BG daily, to use with insurance preferred meter    lancets Misc To check BG daily, to use with insurance preferred meter    LORazepam (ATIVAN) 0.5 MG tablet Take 1 tablet (0.5 mg total) by mouth once. for 1 dose    meclizine (ANTIVERT) 25 mg tablet Take 1 tablet (25 mg total) by mouth 2 (two) times daily as needed for Dizziness.    multivitamin (THERAGRAN) per tablet Take 1 tablet by mouth once daily.    promethazine-dextromethorphan (PROMETHAZINE-DM) 6.25-15 mg/5 mL Syrp Take 5 mLs by mouth nightly as needed (cough).    [DISCONTINUED] chlorthalidone (HYGROTEN) 25 MG Tab Take 25 mg by mouth. (Patient not taking: Reported on 4/10/2025)    [DISCONTINUED] insulin lispro protamine-insulin lispro (HUMALOG MIX 75-25,U-100,INSULN) 100 unit/mL (75-25) Susp Inject 10 Units into the skin 2 (two) times daily before meals. (Patient not taking: Reported on 4/10/2025)    [DISCONTINUED] losartan (COZAAR) 100 MG tablet Take 100 mg by mouth. (Patient not taking: Reported on 4/10/2025)     Family History       Problem Relation (Age of " Onset)    Cancer Father, Maternal Aunt    Heart disease Mother    Hypertension Brother, Daughter    Kidney disease Mother          Tobacco Use    Smoking status: Former     Current packs/day: 0.00     Types: Cigarettes     Quit date: 2/6/2022     Years since quitting: 3.1     Passive exposure: Past    Smokeless tobacco: Never    Tobacco comments:     Patient Quit Smoking on 02/06/2022.   Substance and Sexual Activity    Alcohol use: Not Currently    Drug use: No    Sexual activity: Not Currently     Partners: Male     Review of Systems   Constitutional:  Negative for activity change.        Fever related to the cellulitis but this has resolved.   Respiratory:  Negative for cough, chest tightness, shortness of breath and wheezing.    Cardiovascular:  Negative for chest pain and palpitations.   Gastrointestinal:  Negative for abdominal pain, nausea and vomiting.   Genitourinary:  Negative for flank pain and hematuria.   Neurological:  Positive for light-headedness. Negative for syncope and speech difficulty.     Objective:     Vital Signs (Most Recent):  Temp: 98 °F (36.7 °C) (04/10/25 1904)  Pulse: 79 (04/10/25 2200)  Resp: 16 (04/10/25 1904)  BP: 133/81 (04/10/25 2200)  SpO2: 97 % (04/10/25 2200) Vital Signs (24h Range):  Temp:  [98 °F (36.7 °C)-98.4 °F (36.9 °C)] 98 °F (36.7 °C)  Pulse:  [71-85] 79  Resp:  [16] 16  SpO2:  [96 %-100 %] 97 %  BP: (102-140)/(66-81) 133/81     Weight: 74.4 kg (164 lb)  Body mass index is 25.69 kg/m².     Physical Exam         NAD, A/O 3   RRR   CTAB   Soft nontender nondistended, bowel sounds present   No edema  No flank pain  Significant Labs: All pertinent labs within the past 24 hours have been reviewed.  CBC:   Recent Labs   Lab 04/10/25  1209 04/10/25  1938   WBC 5.44 4.95   HGB 12.2 12.5   HCT 41.4 40.7    361     CMP:   Recent Labs   Lab 04/10/25  1209 04/10/25  1938    137   K 5.3* 4.9    106   CO2 21* 20*   BUN 75* 73*   CREATININE 4.0* 4.1*   CALCIUM 9.6  9.4   ALBUMIN 3.8 4.1   BILITOT 0.3 0.2   ALKPHOS 88 86   AST 20 22   ALT 24 26   ANIONGAP 10 11     Magnesium:   Recent Labs   Lab 04/10/25  1938   MG 3.3*       Significant Imaging: I have reviewed all pertinent imaging results/findings within the past 24 hours.

## 2025-04-11 NOTE — ED TRIAGE NOTES
"Patient states she was referred to the ED from her primary care provider for "being too dry". She states she is on fluid restrictions and she was told that her kidney function is abnormal. She denies any chest pain, shortness of breath, nausea or vomiting.  "

## 2025-04-11 NOTE — ASSESSMENT & PLAN NOTE
Patient's blood pressure range in the last 24 hours was: BP  Min: 102/78  Max: 140/79.The patient's inpatient anti-hypertensive regimen is listed below:  Current Antihypertensives       Plan  - BP is controlled, no changes needed to their regimen  - holding home regimen to avoid hypotension in the setting of MESSI.  Resume as required.

## 2025-04-11 NOTE — PLAN OF CARE
04/11/25 1159   Discharge Planning   Assessment Type Discharge Planning Brief Assessment   Resource/Environmental Concerns none   Support Systems Children   Equipment Currently Used at Home walker, rolling   Current Living Arrangements home   Patient/Family Anticipates Transition to home   Patient/Family Anticipated Services at Transition none   DME Needed Upon Discharge    (TBD)   Discharge Plan A Home with family  (with instructions to follow up)       [id number] [place] - [address]  [address]  [place] [address]  Phone: [phone] Fax: [phone]    [facility] - [place], [place] - [address]  [address] [address]  Phone: [phone] Fax: [phone]    [facility]  [address]  [facility] [address]  Phone: [phone] Fax: [phone]

## 2025-04-11 NOTE — ASSESSMENT & PLAN NOTE
Likely secondary to hypovolemia.  Recent echo without significant valvular disease.  Orthostatics requested.  Cardiac device check order placed.  -IV fluids ordered for 1 L.

## 2025-04-11 NOTE — HPI
Ms. Garza is a 73 yo female with HTN, T2DM, and DLD who presented to the ED with light-headedness, muscle weakness, and recurrent falls. /80 on arrival. Cr found to be elevated at 4.0. Her BP medications were held and she was started on IVF. Nephrology consulted for MESSI. Prior records obtained and reviewed. Her baseline Cr appears to be 1.9-2.3 with GFR 22-28%; last at baseline 2/22/25. She has not yet been seen by outpatient nephrology but has an appt to see Dr. Lemus in May. She denies h/o kidney disease. She denies SOB or leg swelling. She notes light-headedness and weakness have improved; she feels back to baseline. She reports recently weight loss of 8 lbs since starting Mounjaro; she notes BP medications have not been adjusted during this time. Her Cr has improved to 3.7 this morning.

## 2025-04-11 NOTE — PLAN OF CARE
Problem: Adult Inpatient Plan of Care  Goal: Plan of Care Review  Outcome: Progressing  Flowsheets (Taken 4/11/2025 1311)  Plan of Care Reviewed With: patient  Goal: Patient-Specific Goal (Individualized)  Outcome: Progressing  Goal: Absence of Hospital-Acquired Illness or Injury  Outcome: Progressing  Intervention: Identify and Manage Fall Risk  Flowsheets (Taken 4/11/2025 1311)  Safety Promotion/Fall Prevention:   assistive device/personal item within reach   nonskid shoes/socks when out of bed   medications reviewed   high risk medications identified   side rails raised x 2  Intervention: Prevent Skin Injury  Flowsheets (Taken 4/11/2025 1311)  Body Position: position changed independently  Skin Protection: incontinence pads utilized  Device Skin Pressure Protection: absorbent pad utilized/changed  Intervention: Prevent and Manage VTE (Venous Thromboembolism) Risk  Flowsheets (Taken 4/11/2025 1311)  VTE Prevention/Management:   bleeding risk assessed   bleeding precautions maintained   ambulation promoted  Intervention: Prevent Infection  Flowsheets (Taken 4/11/2025 1311)  Infection Prevention:   hand hygiene promoted   single patient room provided  Goal: Optimal Comfort and Wellbeing  Outcome: Progressing  Intervention: Monitor Pain and Promote Comfort  Flowsheets (Taken 4/11/2025 1311)  Pain Management Interventions:   pillow support provided   quiet environment facilitated   relaxation techniques promoted  Intervention: Provide Person-Centered Care  Flowsheets (Taken 4/11/2025 1311)  Trust Relationship/Rapport:   care explained   choices provided   emotional support provided   empathic listening provided   questions answered   questions encouraged   reassurance provided   thoughts/feelings acknowledged  Goal: Readiness for Transition of Care  Outcome: Progressing     Problem: Diabetes Comorbidity  Goal: Blood Glucose Level Within Targeted Range  Outcome: Progressing  Intervention: Monitor and Manage  Glycemia  Flowsheets (Taken 4/11/2025 1311)  Glycemic Management: blood glucose monitored     Problem: Acute Kidney Injury/Impairment  Goal: Fluid and Electrolyte Balance  Outcome: Progressing  Goal: Improved Oral Intake  Outcome: Progressing  Goal: Effective Renal Function  Outcome: Progressing  Intervention: Monitor and Support Renal Function  Flowsheets (Taken 4/11/2025 1311)  Medication Review/Management: medications reviewed     Problem: Fall Injury Risk  Goal: Absence of Fall and Fall-Related Injury  Outcome: Progressing  Intervention: Identify and Manage Contributors  Flowsheets (Taken 4/11/2025 1311)  Self-Care Promotion:   independence encouraged   BADL personal objects within reach   BADL personal routines maintained   adaptive equipment use encouraged  Medication Review/Management: medications reviewed  Intervention: Promote Injury-Free Environment  Flowsheets (Taken 4/11/2025 1311)  Safety Promotion/Fall Prevention:   assistive device/personal item within reach   nonskid shoes/socks when out of bed   medications reviewed   high risk medications identified   side rails raised x 2     Problem: Wound  Goal: Optimal Coping  Outcome: Progressing  Intervention: Support Patient and Family Response  Flowsheets (Taken 4/11/2025 1311)  Supportive Measures:   active listening utilized   positive reinforcement provided   problem-solving facilitated   relaxation techniques promoted   self-care encouraged   self-reflection promoted   self-responsibility promoted   verbalization of feelings encouraged  Family/Support System Care:   self-care encouraged   support provided  Goal: Optimal Functional Ability  Outcome: Progressing  Intervention: Optimize Functional Ability  Flowsheets (Taken 4/11/2025 1311)  Activity Assistance Provided: independent  Goal: Absence of Infection Signs and Symptoms  Outcome: Progressing  Intervention: Prevent or Manage Infection  Flowsheets (Taken 4/11/2025 1311)  Infection Management: aseptic  technique maintained  Goal: Improved Oral Intake  Outcome: Progressing  Goal: Optimal Pain Control and Function  Outcome: Progressing  Intervention: Prevent or Manage Pain  Flowsheets (Taken 4/11/2025 1311)  Pain Management Interventions:   pillow support provided   quiet environment facilitated   relaxation techniques promoted  Goal: Skin Health and Integrity  Outcome: Progressing  Intervention: Optimize Skin Protection  Flowsheets (Taken 4/11/2025 1311)  Skin Protection: incontinence pads utilized  Goal: Optimal Wound Healing  Outcome: Progressing      Pt alert and able to make needs known. Oral meds tolerated well, ivf remain in progress. NADN. Bed in lowest position, call light within reach, and instructed to use call light for any assistance. Continue with plan of care.

## 2025-04-11 NOTE — SUBJECTIVE & OBJECTIVE
Past Medical History:   Diagnosis Date    Diabetes mellitus     Hyperlipidemia     Hypertension     Stroke        Past Surgical History:   Procedure Laterality Date    COLONOSCOPY N/A 7/12/2023    Procedure: COLONOSCOPY;  Surgeon: Ray Sorensen MD;  Location: [facility] ENDO;  Service: Endoscopy;  Laterality: N/A;  instr via portal  - PC  4/10/23 Daniel, instr via mail & portal, unable to tolerate Golytely- request Miralax/Gatorade - PC  5/30-pt r/s-new instr portal-tb    INJECTION OF ANESTHETIC AGENT AROUND MEDIAL BRANCH NERVES INNERVATING LUMBAR FACET JOINT Bilateral 4/20/2023    Procedure: MBB #1 (B/L) L3,4,5;  Surgeon: Son Lara DO;  Location: [facility] OR;  Service: Pain Management;  Laterality: Bilateral;  ORAL XANAX    INJECTION OF ANESTHETIC AGENT AROUND MEDIAL BRANCH NERVES INNERVATING LUMBAR FACET JOINT Bilateral 5/5/2023    Procedure: MBB #2 (B/L) L3,4,5;  Surgeon: Son Lara DO;  Location: [facility] OR;  Service: Pain Management;  Laterality: Bilateral;  Oral Xanax    INJECTION OF JOINT Right 5/20/2022    Procedure: Right SI joint injection;  Surgeon: Son Lara DO;  Location: [facility] OR;  Service: Pain Management;  Laterality: Right;    INJECTION, SACROILIAC JOINT Right 12/19/2023    Procedure: RT SI joint Inj;  Surgeon: Son Lara DO;  [facility]: [facility] PAIN MANAGEMENT;  Service: Pain Management;  Laterality: Right;  oral    INSERTION OF IMPLANTABLE LOOP RECORDER Left 6/21/2024    Procedure: Insertion, Implantable Loop Recorder;  Surgeon: Hunter Rich MD;  Location: [facility] EP LAB;  Service: Cardiology;  Laterality: Left;  CVA, ILR, BSCI, Local, [place], 3 Prep    INSERTION OF IMPLANTABLE LOOP RECORDER Left 8/30/2024    Procedure: Insertion, Implantable Loop Recorder;  Surgeon: Hunter Rich MD;  Location: [facility] EP LAB;  Service: Cardiology;  Laterality: Left;  CVA, ILR,MDT, RN Sedate, [place], 3 Prep    RADIOFREQUENCY ABLATION OF LUMBAR MEDIAL BRANCH NERVE AT SINGLE LEVEL Bilateral  5/19/2023    Procedure: RFA (B/L) L3,4,5;  Surgeon: Son Lara DO;  Location: [facility] PAIN MANAGEMENT;  Service: Pain Management;  Laterality: Bilateral;    REMOVAL OF IMPLANTABLE LOOP RECORDER N/A 7/10/2024    Procedure: REMOVAL, IMPLANTABLE LOOP RECORDER;  Surgeon: Hunter Rich MD;  Location: [facility] EP LAB;  Service: Cardiology;  Laterality: N/A;       Review of patient's allergies indicates:   Allergen Reactions    Lisinopril-hydrochlorothiazide Other (See Comments)     Pt stated it makes her feel drained. She couldn't raise arms over head.    Ampicillin Rash    Darvocet a500 [propoxyphene n-acetaminophen] Other (See Comments)     shaky     Current Facility-Administered Medications   Medication Frequency    acetaminophen tablet 650 mg Q8H PRN    acetaminophen tablet 650 mg Q4H PRN    aluminum-magnesium hydroxide-simethicone 200-200-20 mg/5 mL suspension 30 mL QID PRN    aspirin EC tablet 81 mg Daily    atorvastatin tablet 80 mg Daily    cyclobenzaprine tablet 10 mg BID PRN    dextrose 50% injection 12.5 g PRN    dextrose 50% injection 25 g PRN    glucagon (human recombinant) injection 1 mg PRN    glucose chewable tablet 16 g PRN    glucose chewable tablet 24 g PRN    heparin (porcine) injection 5,000 Units Q8H    insulin aspart U-100 pen 0-5 Units QID (AC + HS) PRN    magnesium oxide tablet 800 mg PRN    magnesium oxide tablet 800 mg PRN    melatonin tablet 6 mg Nightly PRN    naloxone 0.4 mg/mL injection 0.02 mg PRN    ondansetron injection 4 mg Q6H PRN    potassium bicarbonate disintegrating tablet 35 mEq PRN    potassium bicarbonate disintegrating tablet 50 mEq PRN    potassium bicarbonate disintegrating tablet 60 mEq PRN    potassium, sodium phosphates 280-160-250 mg packet 2 packet PRN    potassium, sodium phosphates 280-160-250 mg packet 2 packet PRN    potassium, sodium phosphates 280-160-250 mg packet 2 packet PRN    senna-docusate 8.6-50 mg per tablet 1 tablet BID PRN    sodium bicarbonate 150  mEq in D5W 1,000 mL infusion Continuous     Family History       Problem Relation (Age of Onset)    Cancer Father, Maternal Aunt    Heart disease Mother    Hypertension Brother, Daughter    Kidney disease Mother          Tobacco Use    Smoking status: Former     Current packs/day: 0.00     Types: Cigarettes     Quit date: 2/6/2022     Years since quitting: 3.1     Passive exposure: Past    Smokeless tobacco: Never    Tobacco comments:     Patient Quit Smoking on 02/06/2022.   Substance and Sexual Activity    Alcohol use: Not Currently    Drug use: No    Sexual activity: Not Currently     Partners: Male     Review of Systems   All other systems reviewed and are negative.    Objective:     Vital Signs (Most Recent):  Temp: 98.2 °F (36.8 °C) (04/11/25 1051)  Pulse: 86 (04/11/25 1510)  Resp: 18 (04/11/25 1051)  BP: 104/69 (04/11/25 1051)  SpO2: 97 % (04/11/25 1051) Vital Signs (24h Range):  Temp:  [97.5 °F (36.4 °C)-98.2 °F (36.8 °C)] 98.2 °F (36.8 °C)  Pulse:  [72-93] 86  Resp:  [16-22] 18  SpO2:  [96 %-100 %] 97 %  BP: (100-140)/(55-85) 104/69     Weight: 73.5 kg (162 lb 0.6 oz) (04/11/25 0315)  Body mass index is 25.38 kg/m².  Body surface area is 1.86 meters squared.    I/O last 3 completed shifts:  In: 89.8 [I.V.:89.8]  Out: -      Physical Exam  Vitals and nursing note reviewed.   Constitutional:       General: She is awake. She is not in acute distress.     Appearance: Normal appearance. She is well-developed.   HENT:      Head: Normocephalic and atraumatic.      Nose: Nose normal.      Mouth/Throat:      Mouth: Mucous membranes are moist.   Eyes:      Extraocular Movements: Extraocular movements intact.      Conjunctiva/sclera: Conjunctivae normal.   Cardiovascular:      Rate and Rhythm: Normal rate and regular rhythm.   Pulmonary:      Effort: Pulmonary effort is normal.      Breath sounds: Normal breath sounds.   Abdominal:      General: There is no distension.      Palpations: Abdomen is soft.    Musculoskeletal:         General: No tenderness or signs of injury.      Right lower leg: No edema.      Left lower leg: No edema.   Skin:     General: Skin is warm and dry.      Findings: No erythema or rash.   Neurological:      General: No focal deficit present.      Mental Status: She is alert. Mental status is at baseline.   Psychiatric:         Mood and Affect: Mood normal.         Behavior: Behavior normal.          Significant Labs:  CBC:   Recent Labs   Lab 04/11/25  0540   WBC 4.81   RBC 4.51   HGB 11.2*   HCT 39.0      MCV 87   MCH 24.8*   MCHC 28.7*     CMP:   Recent Labs   Lab 04/11/25  0539   CALCIUM 8.7   ALBUMIN 3.4*      K 4.3   CO2 14*   *   BUN 71*   CREATININE 3.7*   ALKPHOS 80   ALT 24   AST 19   BILITOT 0.2     All labs within the past 24 hours have been reviewed.    Significant Imaging:  Labs: Reviewed  US: Reviewed

## 2025-04-11 NOTE — H&P
[facility] Medicine  History & Physical    Patient Name: Joanne Peña  MRN: [id number]  Patient Class: OP- Observation  Admission Date: 4/10/2025  Attending Physician: Blake Guadalupe, *   Primary Care Provider: Teri Soto DO         Patient information was obtained from patient and ER records.     Subjective:     Principal Problem:Acute kidney injury superimposed on chronic kidney disease    Chief Complaint:   Chief Complaint   Patient presents with    Abnormal Lab     Pt arrived in ED, c/o being referred to ED by PCP due to abnormal labwork. Pt reports being told about abnormal kidney function. Pt denies any urinary symptoms. Pt denies any CP, SOB, abd pain or n/v/d        HPI: 74-year-old female with systolic CHF, recurrent syncope with loop recorder in place, diabetes who presents with multiple episodes of lightheadedness over the past week sent by her outpatient provider to the ER due to an MESSI being identified likely due to hypovolemia.      The patient reports she frequently has issues with lightheadedness as on diuretics and fluid restriction.  The past week she reports seems to be worse.  She had 3 episodes of lightheadedness with falls without loss of consciousness.  No recent change to her diuretic regimen or oral intake.  She did have a febrile illness related to cellulitis at the site of her breast biopsy; the cellulitis has resolved and she has completed her antibiotic therapy.  No palpitations, chest pain, shortness of breath.  Legs are smaller than usual.    No flank pain or hematuria.    Past Medical History:   Diagnosis Date    Diabetes mellitus     Hyperlipidemia     Hypertension     Stroke        Past Surgical History:   Procedure Laterality Date    COLONOSCOPY N/A 7/12/2023    Procedure: COLONOSCOPY;  Surgeon: Ray Sorensen MD;  Location: [facility];  Service: Endoscopy;  Laterality: N/A;  instr via portal  - PC  4/10/23 Daniel, instr via mail &  portal, unable to tolerate Golytely- request Miralax/Gatorade - PC  5/30-pt r/s-new instr portal-tb    INJECTION OF ANESTHETIC AGENT AROUND MEDIAL BRANCH NERVES INNERVATING LUMBAR FACET JOINT Bilateral 4/20/2023    Procedure: MBB #1 (B/L) L3,4,5;  Surgeon: Son Lara DO;  Location: [facility] OR;  Service: Pain Management;  Laterality: Bilateral;  ORAL XANAX    INJECTION OF ANESTHETIC AGENT AROUND MEDIAL BRANCH NERVES INNERVATING LUMBAR FACET JOINT Bilateral 5/5/2023    Procedure: MBB #2 (B/L) L3,4,5;  Surgeon: Son Lara DO;  Location: [facility] OR;  Service: Pain Management;  Laterality: Bilateral;  Oral Xanax    INJECTION OF JOINT Right 5/20/2022    Procedure: Right SI joint injection;  Surgeon: Son Lara DO;  Location: [facility] OR;  Service: Pain Management;  Laterality: Right;    INJECTION, SACROILIAC JOINT Right 12/19/2023    Procedure: RT SI joint Inj;  Surgeon: Son Lara DO;  Location: [facility];  Service: Pain Management;  Laterality: Right;  oral    INSERTION OF IMPLANTABLE LOOP RECORDER Left 6/21/2024    Procedure: Insertion, Implantable Loop Recorder;  Surgeon: Hunter Rich MD;  Location: [facility];  Service: Cardiology;  Laterality: Left;  CVA, ILR, BSCI, [place], [place], 3 Prep    INSERTION OF IMPLANTABLE LOOP RECORDER Left 8/30/2024    Procedure: Insertion, Implantable Loop Recorder;  Surgeon: Hunter Rich MD;  Location: [facility];  Service: Cardiology;  Laterality: Left;  CVA, ILR,MDT, [place], [place], 3 Prep    RADIOFREQUENCY ABLATION OF LUMBAR MEDIAL BRANCH NERVE AT SINGLE LEVEL Bilateral 5/19/2023    Procedure: RFA (B/L) L3,4,5;  Surgeon: Son Lara DO;  Location: [facility];  Service: Pain Management;  Laterality: Bilateral;    REMOVAL OF IMPLANTABLE LOOP RECORDER N/A 7/10/2024    Procedure: REMOVAL, IMPLANTABLE LOOP RECORDER;  Surgeon: Hunter Rich MD;  Location: [facility];  Service: Cardiology;  Laterality: N/A;  "      Review of patient's allergies indicates:   Allergen Reactions    Lisinopril-hydrochlorothiazide Other (See Comments)     Pt stated it makes her feel drained. She couldn't raise arms over head.    Ampicillin Rash    Darvocet a500 [propoxyphene n-acetaminophen] Other (See Comments)     karl       No current facility-administered medications on file prior to encounter.     Current Outpatient Medications on File Prior to Encounter   Medication Sig    aspirin (ECOTRIN) 81 MG EC tablet Take 1 tablet (81 mg total) by mouth once daily.    atorvastatin (LIPITOR) 80 MG tablet TAKE 1 TABLET (80 MG TOTAL) BY MOUTH ONCE DAILY.    cyclobenzaprine (FLEXERIL) 10 MG tablet TAKE 1 TABLET (10 MG TOTAL) BY MOUTH 2 (TWO) TIMES DAILY AS NEEDED FOR MUSCLE SPASMS (BACK PAIN).    empagliflozin (JARDIANCE) 25 mg tablet Take 1 tablet (25 mg total) by mouth once daily.    evolocumab (REPATHA SURECLICK) 140 mg/mL PnIj Inject 1 mL (140 mg total) into the skin every 14 (fourteen) days.    furosemide (LASIX) 40 MG tablet Take 0.5 tablets (20 mg total) by mouth once daily. (Patient taking differently: Take 20 mg by mouth every evening.)    furosemide (LASIX) 40 MG tablet Take 1 tablet (40 mg total) by mouth once daily.    hydrALAZINE (APRESOLINE) 25 MG tablet Take 1 tablet (25 mg total) by mouth 3 (three) times daily.    metoprolol succinate (TOPROL-XL) 25 MG 24 hr tablet Take 0.5 tablets (12.5 mg total) by mouth once daily.    tirzepatide (MOUNJARO) 2.5 mg/0.5 mL PnIj Inject 2.5 mg into the skin every 7 days.    ACCU-CHEK GUIDE GLUCOSE METER Misc TO CHECK BLOOD GLUCOSE DAILY, TO USE WITH INSURANCE PREFERRED METER    ascorbic acid, vitamin C, (VITAMIN C) 500 MG tablet Take 500 mg by mouth once daily.    BD VEO INSULIN SYRINGE UF 1/2 mL 31 gauge x 15/64" Syrg USE 1 SYRINGE BY [facility]NON-DRUG COMBO ROUTE) ROUTE 2 (TWO) TIMES A DAY.    blood sugar diagnostic Strp To check BG daily, to use with insurance preferred meter    blood sugar diagnostic " "Strp To check BG daily, to use with insurance preferred meter    blood-glucose meter,continuous (DEXCOM G6 ) Misc 1 each by Misc.(Non-Drug; Combo Route) route 2 (two) times daily.    blood-glucose sensor (DEXCOM G7 SENSOR) Denisse 1 Device by Misc.(Non-Drug; Combo Route) route 2 (two) times daily.    insulin syringe-needle U-100 0.5 mL 31 gauge x 5/16" Syrg 60 each by Misc.(Non-Drug; Combo Route) route 2 (two) times a day. (Patient not taking: Reported on 4/10/2025)    JARDIANCE 10 mg tablet Take 10 mg by mouth.    lancets Misc To check BG daily, to use with insurance preferred meter    lancets Misc To check BG daily, to use with insurance preferred meter    LORazepam (ATIVAN) 0.5 MG tablet Take 1 tablet (0.5 mg total) by mouth once. for 1 dose    meclizine (ANTIVERT) 25 mg tablet Take 1 tablet (25 mg total) by mouth 2 (two) times daily as needed for Dizziness.    multivitamin (THERAGRAN) per tablet Take 1 tablet by mouth once daily.    promethazine-dextromethorphan (PROMETHAZINE-DM) 6.25-15 mg/5 mL Syrp Take 5 mLs by mouth nightly as needed (cough).    [DISCONTINUED] chlorthalidone (HYGROTEN) 25 MG Tab Take 25 mg by mouth. (Patient not taking: Reported on 4/10/2025)    [DISCONTINUED] insulin lispro protamine-insulin lispro (HUMALOG MIX 75-25,U-100,INSULN) 100 unit/mL (75-25) Susp Inject 10 Units into the skin 2 (two) times daily before meals. (Patient not taking: Reported on 4/10/2025)    [DISCONTINUED] losartan (COZAAR) 100 MG tablet Take 100 mg by mouth. (Patient not taking: Reported on 4/10/2025)     Family History       Problem Relation (Age of Onset)    Cancer Father, Maternal Aunt    Heart disease Mother    Hypertension Brother, Daughter    Kidney disease Mother          Tobacco Use    Smoking status: Former     Current packs/day: 0.00     Types: Cigarettes     Quit date: 2/6/2022     Years since quitting: 3.1     Passive exposure: Past    Smokeless tobacco: Never    Tobacco comments:     Patient Quit " Smoking on 02/06/2022.   Substance and Sexual Activity    Alcohol use: Not Currently    Drug use: No    Sexual activity: Not Currently     Partners: Male     Review of Systems   Constitutional:  Negative for activity change.        Fever related to the cellulitis but this has resolved.   Respiratory:  Negative for cough, chest tightness, shortness of breath and wheezing.    Cardiovascular:  Negative for chest pain and palpitations.   Gastrointestinal:  Negative for abdominal pain, nausea and vomiting.   Genitourinary:  Negative for flank pain and hematuria.   Neurological:  Positive for light-headedness. Negative for syncope and speech difficulty.     Objective:     Vital Signs (Most Recent):  Temp: 98 °F (36.7 °C) (04/10/25 1904)  Pulse: 79 (04/10/25 2200)  Resp: 16 (04/10/25 1904)  BP: 133/81 (04/10/25 2200)  SpO2: 97 % (04/10/25 2200) Vital Signs (24h Range):  Temp:  [98 °F (36.7 °C)-98.4 °F (36.9 °C)] 98 °F (36.7 °C)  Pulse:  [71-85] 79  Resp:  [16] 16  SpO2:  [96 %-100 %] 97 %  BP: (102-140)/(66-81) 133/81     Weight: 74.4 kg (164 lb)  Body mass index is 25.69 kg/m².     Physical Exam         NAD, A/O 3   RRR   CTAB   Soft nontender nondistended, bowel sounds present   No edema  No flank pain  Significant Labs: All pertinent labs within the past 24 hours have been reviewed.  CBC:   Recent Labs   Lab 04/10/25  1209 04/10/25  1938   WBC 5.44 4.95   HGB 12.2 12.5   HCT 41.4 40.7    361     CMP:   Recent Labs   Lab 04/10/25  1209 04/10/25  1938    137   K 5.3* 4.9    106   CO2 21* 20*   BUN 75* 73*   CREATININE 4.0* 4.1*   CALCIUM 9.6 9.4   ALBUMIN 3.8 4.1   BILITOT 0.3 0.2   ALKPHOS 88 86   AST 20 22   ALT 24 26   ANIONGAP 10 11     Magnesium:   Recent Labs   Lab 04/10/25  1938   MG 3.3*       Significant Imaging: I have reviewed all pertinent imaging results/findings within the past 24 hours.  Assessment/Plan:     Assessment & Plan  Acute kidney injury superimposed on chronic kidney  "disease  MESSI is likely due to pre-renal azotemia due to dehydration. Baseline creatinine is  1.9 . Most recent creatinine and eGFR are listed below.  Recent Labs     04/10/25  1209 04/10/25  1938   CREATININE 4.0* 4.1*   EGFRNORACEVR 11* 11*      Plan  - MESSI is stable  - Avoid nephrotoxins and renally dose meds for GFR listed above  - Monitor urine output, serial BMP, and adjust therapy as needed  - normal saline ordered at 75 cc/hour for 1 L.  -renal ultrasound requested.  -diuretics held.  Primary hypertension  Patient's blood pressure range in the last 24 hours was: BP  Min: 102/78  Max: 140/79.The patient's inpatient anti-hypertensive regimen is listed below:  Current Antihypertensives       Plan  - BP is controlled, no changes needed to their regimen  - holding home regimen to avoid hypotension in the setting of MESSI.  Resume as required.  Mixed hyperlipidemia    Continue home regimen  Lightheadedness    Likely secondary to hypovolemia.  Recent echo without significant valvular disease.  Orthostatics requested.  Cardiac device check order placed.  -IV fluids ordered for 1 L.  History of stroke    Continue aspirin and statin  Type 2 diabetes mellitus, without long-term current use of insulin      Chronic systolic (congestive) heart failure  Patient has Systolic (HFrEF) heart failure that is Chronic. On presentation their CHF was well compensated. Most recent BNP and echo results are listed below.  No results for input(s): "BNP" in the last 72 hours.  Latest ECHO  Results for orders placed during the hospital encounter of 01/03/25    Echo    Interpretation Summary    Left Ventricle: Septal motion is consistent with bundle branch block. There is moderately reduced systolic function with a visually estimated ejection fraction of 30 - 35%. Grade II diastolic dysfunction. Elevated left ventricular filling pressure.    Right Ventricle: Systolic function is normal.    Left Atrium: Left atrium is severely dilated.    " Right Atrium: Right atrium is mildly dilated.    Aortic Valve: The aortic valve is a trileaflet valve. There is mild aortic valve sclerosis.    Mitral Valve: Mildly calcified leaflets. There is moderate regurgitation with an eccentric jet.    Tricuspid Valve: There is mild regurgitation.    Pulmonary Artery: The estimated pulmonary artery systolic pressure is 25 mmHg.    IVC/SVC: Normal venous pressure at 3 mmHg.    Current Heart Failure Medications       Plan  - Monitor strict I&Os and daily weights.    - Place on telemetry  - Low sodium diet     - Cardiology has not been consulted  - The patient's volume status is at their baseline  -       VTE Risk Mitigation (From admission, onward)           Ordered     heparin (porcine) injection 5,000 Units  Every 8 hours         04/10/25 2216     IP VTE HIGH RISK PATIENT  Once         04/10/25 2216     Place sequential compression device  Until discontinued         04/10/25 2216                       On 04/10/2025, patient should be placed in hospital observation services under my care.             Moshe Lainez MD  Department of Hospital Medicine  [facility] - Emergency Dept

## 2025-04-11 NOTE — ASSESSMENT & PLAN NOTE
"Patient has Systolic (HFrEF) heart failure that is Chronic. On presentation their CHF was well compensated. Most recent BNP and echo results are listed below.  No results for input(s): "BNP" in the last 72 hours.  Latest ECHO  Results for orders placed during the hospital encounter of 01/03/25    Echo    Interpretation Summary    Left Ventricle: Septal motion is consistent with bundle branch block. There is moderately reduced systolic function with a visually estimated ejection fraction of 30 - 35%. Grade II diastolic dysfunction. Elevated left ventricular filling pressure.    Right Ventricle: Systolic function is normal.    Left Atrium: Left atrium is severely dilated.    Right Atrium: Right atrium is mildly dilated.    Aortic Valve: The aortic valve is a trileaflet valve. There is mild aortic valve sclerosis.    Mitral Valve: Mildly calcified leaflets. There is moderate regurgitation with an eccentric jet.    Tricuspid Valve: There is mild regurgitation.    Pulmonary Artery: The estimated pulmonary artery systolic pressure is 25 mmHg.    IVC/SVC: Normal venous pressure at 3 mmHg.    Current Heart Failure Medications       Plan  - Monitor strict I&Os and daily weights.    - Place on telemetry  - Low sodium diet     - Cardiology has not been consulted  - The patient's volume status is at their baseline  -       "

## 2025-04-11 NOTE — ASSESSMENT & PLAN NOTE
MESSI is likely due to pre-renal azotemia due to dehydration. Baseline creatinine is 1.9. Most recent creatinine and eGFR are listed below.  Recent Labs     04/10/25  1209 04/10/25  1938   CREATININE 4.0* 4.1*   EGFRNORACEVR 11* 11*      Plan  - MESSI is stable  - Avoid nephrotoxins and renally dose meds for GFR listed above  - Monitor urine output, serial BMP, and adjust therapy as needed  - normal saline ordered at 75 cc/hour for 1 L.  -renal ultrasound requested.  -diuretics held.

## 2025-04-11 NOTE — HOSPITAL COURSE
Mrs. Peña was hospitalized with MESSI suspected etiology hypovolemia.  IV fluids overnight with only slight improvement, still far from baseline.  Nephrology consulted, appreciate recs.   4/12: renal function continues to improve, but still not at baseline.  1 more liter IV fluids and repeat labs in am  4/13:  With continued improvement.  Appears euvolemic and hemodynamically stable.  States she feels well and in her usual state of health.  Requests discharge.  Instructed to stop mounjaro and hold antihypertensive medications and drink proper amount of oral fluids, follow up with clinic providers.  All findings and plan discussed with patient, all questions answered, she verbalizes understanding and agreement.  Discharged home in stable condition.

## 2025-04-11 NOTE — HPI
74-year-old female with systolic CHF, recurrent syncope with loop recorder in place, diabetes who presents with multiple episodes of lightheadedness over the past week sent by her outpatient provider to the ER due to an MESSI being identified likely due to hypovolemia.      The patient reports she frequently has issues with lightheadedness as on diuretics and fluid restriction.  The past week she reports seems to be worse.  She had 3 episodes of lightheadedness with falls without loss of consciousness.  No recent change to her diuretic regimen or oral intake.  She did have a febrile illness related to cellulitis at the site of her breast biopsy; the cellulitis has resolved and she has completed her antibiotic therapy.  No palpitations, chest pain, shortness of breath.  Legs are smaller than usual.    No flank pain or hematuria.

## 2025-04-11 NOTE — NURSING
[facility]  Nurses Note -- 4 Eyes      4/11/2025       Skin assessed on: Q Shift      [x] No Pressure Injuries Present    [x]Prevention Measures Documented    [] Yes LDA  for Pressure Injury Previously documented     [] Yes New Pressure Injury Discovered   [] LDA for New Pressure Injury Added      Attending RN:  China Diaz RN     Second RN:  LUIS CARLOS Bartholomew

## 2025-04-11 NOTE — ED PROVIDER NOTES
Encounter Date: 4/10/2025    SCRIBE #1 NOTE: I, Shireen Armendariz, am scribing for, and in the presence of,  Tristin Iniguez MD. I have scribed the following portions of the note - Other sections scribed: HPI, ROS, Physical Exam.       History     Chief Complaint   Patient presents with    Abnormal Lab     Pt arrived in ED, c/o being referred to ED by PCP due to abnormal labwork. Pt reports being told about abnormal kidney function. Pt denies any urinary symptoms. Pt denies any CP, SOB, abd pain or n/v/d     Joanne Peña is a 74 y.o. female, with a PMHx of DM, HLD, HTN, TIA, who presents to the ED after being referred by PCP after lab work revealing abnormal kidney function today. Patient reports she has fallen 3x over the last week following episodes of lightheadedness and bilateral weakness in both arms and legs. She states these symptoms are consistent with past TIA. Per chart review, her creatinine level is up is to 4.0mg/dL from 1.9mg/dL 1 month ago. Patient has an implanted loop recorder, last checked on March 5th, coinciding with one of her fall dates. She is on a 1500mL fluid restriction. No other exacerbating or alleviating factors. Denies pain, SOB, loss of appetite, or other associated symptoms. Patient denies EtOH, tobacco, or illicit drug use.     The history is provided by the patient and medical records. No  was used.     Review of patient's allergies indicates:   Allergen Reactions    Lisinopril-hydrochlorothiazide Other (See Comments)     Pt stated it makes her feel drained. She couldn't raise arms over head.    Ampicillin Rash    Darvocet a500 [propoxyphene n-acetaminophen] Other (See Comments)     shaky     Past Medical History:   Diagnosis Date    Diabetes mellitus     Hyperlipidemia     Hypertension     Stroke      Past Surgical History:   Procedure Laterality Date    COLONOSCOPY N/A 7/12/2023    Procedure: COLONOSCOPY;  Surgeon: Ray Sorensen MD;  Location: [facility];   Service: Endoscopy;  Laterality: N/A;  instr via portal  - PC  4/10/23 Daniel, instr via mail & portal, unable to tolerate Golytely- request Miralax/Gatorade - PC  5/30-pt r/s-new instr portal-tb    INJECTION OF ANESTHETIC AGENT AROUND MEDIAL BRANCH NERVES INNERVATING LUMBAR FACET JOINT Bilateral 4/20/2023    Procedure: MBB #1 (B/L) L3,4,5;  Surgeon: Son Lara DO;  Location: [facility] OR;  Service: Pain Management;  Laterality: Bilateral;  ORAL XANAX    INJECTION OF ANESTHETIC AGENT AROUND MEDIAL BRANCH NERVES INNERVATING LUMBAR FACET JOINT Bilateral 5/5/2023    Procedure: MBB #2 (B/L) L3,4,5;  Surgeon: Son Lara DO;  Location: [facility] OR;  Service: Pain Management;  Laterality: Bilateral;  Oral Xanax    INJECTION OF JOINT Right 5/20/2022    Procedure: Right SI joint injection;  Surgeon: Son Lara DO;  Location: [facility] OR;  Service: Pain Management;  Laterality: Right;    INJECTION, SACROILIAC JOINT Right 12/19/2023    Procedure: RT SI joint Inj;  Surgeon: Son Lara DO;  Location: [facility]IN [facility];  Service: Pain Management;  Laterality: Right;  oral    INSERTION OF IMPLANTABLE LOOP RECORDER Left 6/21/2024    Procedure: Insertion, Implantable Loop Recorder;  Surgeon: Hunter Rich MD;  Location: [facility] EP LAB;  Service: Cardiology;  Laterality: Left;  CVA, ILR, BSCI, Local, [place], 3 Prep    INSERTION OF IMPLANTABLE LOOP RECORDER Left 8/30/2024    Procedure: Insertion, Implantable Loop Recorder;  Surgeon: Hunter Rich MD;  Location: [facility] EP LAB;  Service: Cardiology;  Laterality: Left;  CVERVIN, RODNEY,MDT, RN [place], [place], 3 Prep    RADIOFREQUENCY ABLATION OF LUMBAR MEDIAL BRANCH NERVE AT SINGLE LEVEL Bilateral 5/19/2023    Procedure: RFA (B/L) L3,4,5;  Surgeon: Son Lara DO;  Location: [facility]IN [facility]EMENT;  Service: Pain Management;  Laterality: Bilateral;    REMOVAL OF IMPLANTABLE LOOP RECORDER N/A 7/10/2024    Procedure: REMOVAL, IMPLANTABLE LOOP RECORDER;   Surgeon: Hunter Rich MD;  Location: [facility] EP LAB;  Service: Cardiology;  Laterality: N/A;     Family History   Problem Relation Name Age of Onset    Kidney disease Mother      Heart disease Mother      Cancer Father      Hypertension Brother      Hypertension Daughter      Cancer Maternal Aunt       Social History[1]  Review of Systems   Constitutional:  Negative for appetite change, chills, diaphoresis, fatigue and fever.   Respiratory:  Negative for cough, chest tightness, shortness of breath and wheezing.    Cardiovascular:  Negative for chest pain, palpitations and leg swelling.   Gastrointestinal:  Negative for abdominal pain, blood in stool, diarrhea, nausea and vomiting.   Genitourinary:  Negative for decreased urine volume, difficulty urinating, flank pain and hematuria.   Musculoskeletal:  Negative for arthralgias and myalgias.   Neurological:  Positive for weakness (Bilateral legs and arms) and light-headedness (resolved). Negative for dizziness, syncope and headaches.   All other systems reviewed and are negative.      Physical Exam     Initial Vitals [04/10/25 1904]   BP Pulse Resp Temp SpO2   115/80 85 16 98 °F (36.7 °C) 100 %      MAP       --         Physical Exam    Nursing note and vitals reviewed.  Constitutional: She appears well-developed and well-nourished.   HENT:   Head: Normocephalic and atraumatic. Mouth/Throat: Oropharynx is clear and moist and mucous membranes are normal.   Eyes: Conjunctivae and EOM are normal. Pupils are equal, round, and reactive to light. Right conjunctiva is not injected. Left conjunctiva is not injected. No scleral icterus.   Neck: Neck supple.   Normal range of motion.   Full passive range of motion without pain.     Cardiovascular:  Normal rate, regular rhythm, S1 normal, S2 normal, normal heart sounds and normal pulses.     Exam reveals no gallop and no friction rub.       No murmur heard.  Pulses:       Radial pulses are 2+ on the right side and 2+ on the left  side.   Pulmonary/Chest: Effort normal and breath sounds normal. No respiratory distress.   Abdominal: Abdomen is soft. She exhibits no distension. There is no abdominal tenderness.   Musculoskeletal:         General: No edema. Normal range of motion.      Cervical back: Full passive range of motion without pain, normal range of motion and neck supple.      Comments: Good active ROM of all extremities. No lower extremity edema or cyanosis.      Neurological: No cranial nerve deficit. Gait normal.   A&Ox4, normal speech.   Skin: Skin is warm. No ecchymosis and no rash noted.   Psychiatric: She has a normal mood and affect. Thought content normal.         ED Course   Procedures  Labs Reviewed   COMPREHENSIVE METABOLIC PANEL - Abnormal       Result Value    Sodium 137      Potassium 4.9      Chloride 106      CO2 20 (*)     Glucose 110      BUN 73 (*)     Creatinine 4.1 (*)     Calcium 9.4      Protein Total 8.9 (*)     Albumin 4.1      Bilirubin Total 0.2      ALP 86      AST 22      ALT 26      Anion Gap 11      eGFR 11 (*)     Narrative:     Specimen slightly hemolyzed.   MAGNESIUM - Abnormal    Magnesium  3.3 (*)    PHOSPHORUS - Abnormal    Phosphorus Level 4.6 (*)    CBC WITH DIFFERENTIAL - Abnormal    WBC 4.95      RBC 5.01      HGB 12.5      HCT 40.7      MCV 81 (*)     MCH 25.0 (*)     MCHC 30.7 (*)     RDW 18.2 (*)     Platelet Count 361      MPV 9.8      Nucleated RBC 0      Neut % 59.0      Lymph % 26.3      Mono % 9.9      Eos % 3.6      Basophil % 0.8      Imm Grans % 0.4      Neut # 2.92      Lymph # 1.30      Mono # 0.49      Eos # 0.18      Baso # 0.04      Imm Grans # 0.02     CHLORIDE, URINE, RANDOM - Normal    Urine Chloride 57      Narrative:     The random urine reference ranges provided were established   for 24 hour urine collections.  No reference ranges exist for  random urine specimens.  Correlate clinically.   SODIUM, URINE, RANDOM - Normal    Urine Sodium 71      Narrative:     The random  urine reference ranges provided were established   for 24 hour urine collections.  No reference ranges exist for  random urine specimens.  Correlate clinically.   CREATININE, URINE, RANDOM - Normal    Urine Creatinine 110.0     CBC W/ AUTO DIFFERENTIAL    Narrative:     The following orders were created for panel order CBC auto differential.  Procedure                               Abnormality         Status                     ---------                               -----------         ------                     CBC with Differential[[id number]]       Abnormal            Final result                 Please view results for these tests on the individual orders.   OSMOLALITY, URINE RANDOM   OSMOLALITY, SERUM          Imaging Results              US Kidney (In process)                      Medications   0.9% NaCl infusion (has no administration in time range)   lactated ringers infusion (1,000 mLs Intravenous New Bag 4/10/25 5012)     Medical Decision Making  Differential diagnosis include but is not limited to: dehydration, carmen, infection,     74-year-old female presenting secondary to abnormal labs.  Acute kidney injury with GFR drop greater than 50%.  Patient started on IV fluids.  Urine electrolytes ordered on the patient.  Patient was admitted further workup management.    Please put in 35 minutes of critical care due to patient having a high risk of renal failure.   Separate from teaching and exclusive of procedure and ekg time  Includes:  Time at bedside  Time reviewing test results  Time discussing case with staff  Time documenting the medical record  Time spent with family members  Time spent with consults  Management      Amount and/or Complexity of Data Reviewed  External Data Reviewed: notes.     Details: See HPI.  Labs: ordered. Decision-making details documented in ED Course.  Radiology: ordered.    Risk  Prescription drug management.  Decision regarding hospitalization.            Scribe Attestation:    Scribe #1: I performed the above scribed service and the documentation accurately describes the services I performed. I attest to the accuracy of the note.                             I, tristin iniguez, personally performed the services described in this documentation. All medical record entries made by the scribe were at my direction and in my presence. I have reviewed the chart and agree that the record reflects my personal performance and is accurate and complete.      DISCLAIMER: This note was prepared with [employer] voice recognition transcription software. Garbled syntax, mangled pronouns, and other bizarre constructions may be attributed to that software system.     Clinical Impression:  Final diagnoses:  [N17.9] MESSI (acute kidney injury)          ED Disposition Condition    Observation                     [1]   Social History  Tobacco Use    Smoking status: Former     Current packs/day: 0.00     Types: Cigarettes     Quit date: 2/6/2022     Years since quitting: 3.1     Passive exposure: Past    Smokeless tobacco: Never    Tobacco comments:     Patient Quit Smoking on 02/06/2022.   Substance Use Topics    Alcohol use: Not Currently    Drug use: No        Tristin Iniguez MD  04/10/25 6910

## 2025-04-11 NOTE — ASSESSMENT & PLAN NOTE
"Very cautious rehydration due to EF 30-35%    Patient has Systolic (HFrEF) heart failure that is Chronic. On presentation their CHF was well compensated. Most recent BNP and echo results are listed below.  No results for input(s): "BNP" in the last 72 hours.  Latest ECHO  Results for orders placed during the hospital encounter of 01/03/25    Echo    Interpretation Summary    Left Ventricle: Septal motion is consistent with bundle branch block. There is moderately reduced systolic function with a visually estimated ejection fraction of 30 - 35%. Grade II diastolic dysfunction. Elevated left ventricular filling pressure.    Right Ventricle: Systolic function is normal.    Left Atrium: Left atrium is severely dilated.    Right Atrium: Right atrium is mildly dilated.    Aortic Valve: The aortic valve is a trileaflet valve. There is mild aortic valve sclerosis.    Mitral Valve: Mildly calcified leaflets. There is moderate regurgitation with an eccentric jet.    Tricuspid Valve: There is mild regurgitation.    Pulmonary Artery: The estimated pulmonary artery systolic pressure is 25 mmHg.    IVC/SVC: Normal venous pressure at 3 mmHg.    Current Heart Failure Medications       Plan  - Monitor strict I&Os and daily weights.    - Place on telemetry  - Low sodium diet     - Cardiology has not been consulted  - The patient's volume status is at their baseline  -       "

## 2025-04-11 NOTE — ASSESSMENT & PLAN NOTE
Patient's blood pressure range in the last 24 hours was: BP  Min: 100/55  Max: 140/79.The patient's inpatient anti-hypertensive regimen is listed below:  Current Antihypertensives       Plan  - BP is controlled, no changes needed to their regimen  - holding home regimen to avoid hypotension in the setting of MESSI.  Resume as required.

## 2025-04-11 NOTE — ASSESSMENT & PLAN NOTE
Last HgbA1c   Lab Results   Component Value Date    HGBA1C 8.5 (H) 02/13/2025     Hold oral antihyperglycemics while inpatient  PRN sliding scale insulin  ACHS glucose monitoring   ADA diet

## 2025-04-11 NOTE — CONSULTS
[facility]  Nephrology  Consult Note    Patient Name: Joanne Peña  MRN: [id number]  Admission Date: 4/10/2025  Hospital Length of Stay: 0 days  Attending Provider: Madiha Kowalski DO   Primary Care Physician: Teri Soto DO  Principal Problem:Acute kidney injury superimposed on chronic kidney disease    Inpatient consult to Nephrology  Consult performed by: Sully Peña MD  Consult ordered by: Hunter Keenan Jr., NP  Reason for consult: MESSI        Subjective:     HPI: Ms. Peña is a 75 yo female with HTN, T2DM, and DLD who presented to the ED with light-headedness, muscle weakness, and recurrent falls. /80 on arrival. Cr found to be elevated at 4.0. Her BP medications were held and she was started on IVF. Nephrology consulted for MESSI. Prior records obtained and reviewed. Her baseline Cr appears to be 1.9-2.3 with GFR 22-28%; last at baseline 2/22/25. She has not yet been seen by outpatient nephrology but has an appt to see Dr. Lemus in May. She denies h/o kidney disease. She denies SOB or leg swelling. She notes light-headedness and weakness have improved; she feels back to baseline. She reports recently weight loss of 8 lbs since starting Mounjaro; she notes BP medications have not been adjusted during this time. Her Cr has improved to 3.7 this morning.     Past Medical History:   Diagnosis Date    Diabetes mellitus     Hyperlipidemia     Hypertension     Stroke        Past Surgical History:   Procedure Laterality Date    COLONOSCOPY N/A 7/12/2023    Procedure: COLONOSCOPY;  Surgeon: Ray Sorensen MD;  Location: [facility];  Service: Endoscopy;  Laterality: N/A;  instr via portal  - PC  4/10/23 Daniel, instr via mail & portal, unable to tolerate Golytely- request Miralax/Gatorade - PC  5/30-pt r/s-new instr portal-tb    INJECTION OF ANESTHETIC AGENT AROUND MEDIAL BRANCH NERVES INNERVATING LUMBAR FACET JOINT Bilateral 4/20/2023    Procedure: MBB #1 (B/L) L3,4,5;  Surgeon: Son  DO Marco;  Location: [facility] OR;  Service: Pain Management;  Laterality: Bilateral;  ORAL XANAX    INJECTION OF ANESTHETIC AGENT AROUND MEDIAL BRANCH NERVES INNERVATING LUMBAR FACET JOINT Bilateral 5/5/2023    Procedure: MBB #2 (B/L) L3,4,5;  Surgeon: Son Lara DO;  Location: [facility] OR;  Service: Pain Management;  Laterality: Bilateral;  Oral Xanax    INJECTION OF JOINT Right 5/20/2022    Procedure: Right SI joint injection;  Surgeon: Son Lara DO;  Location: [facility] OR;  Service: Pain Management;  Laterality: Right;    INJECTION, SACROILIAC JOINT Right 12/19/2023    Procedure: RT SI joint Inj;  Surgeon: Son Lara DO;  Location: [facility] PAIN MANAGEMENT;  Service: Pain Management;  Laterality: Right;  oral    INSERTION OF IMPLANTABLE LOOP RECORDER Left 6/21/2024    Procedure: Insertion, Implantable Loop Recorder;  Surgeon: Hunter Rich MD;  Location: [facility] EP LAB;  Service: Cardiology;  Laterality: Left;  CVA, ILR, BSCI, Local, [place], 3 Prep    INSERTION OF IMPLANTABLE LOOP RECORDER Left 8/30/2024    Procedure: Insertion, Implantable Loop Recorder;  Surgeon: Hunter Rich MD;  Location: [facility] EP LAB;  Service: Cardiology;  Laterality: Left;  RODNEY GONZÁLES MDT, RN [place], [place], 3 Prep    RADIOFREQUENCY ABLATION OF LUMBAR MEDIAL BRANCH NERVE AT SINGLE LEVEL Bilateral 5/19/2023    Procedure: RFA (B/L) L3,4,5;  Surgeon: Son Lara DO;  Location: [facility] PAIN MANAGEMENT;  Service: Pain Management;  Laterality: Bilateral;    REMOVAL OF IMPLANTABLE LOOP RECORDER N/A 7/10/2024    Procedure: REMOVAL, IMPLANTABLE LOOP RECORDER;  Surgeon: Hunter Rich MD;  Location: [facility] EP LAB;  Service: Cardiology;  Laterality: N/A;       Review of patient's allergies indicates:   Allergen Reactions    Lisinopril-hydrochlorothiazide Other (See Comments)     Pt stated it makes her feel drained. She couldn't raise arms over head.    Ampicillin Rash    Darvocet a500 [propoxyphene n-acetaminophen]  Other (See Comments)     karl     Current Facility-Administered Medications   Medication Frequency    acetaminophen tablet 650 mg Q8H PRN    acetaminophen tablet 650 mg Q4H PRN    aluminum-magnesium hydroxide-simethicone 200-200-20 mg/5 mL suspension 30 mL QID PRN    aspirin EC tablet 81 mg Daily    atorvastatin tablet 80 mg Daily    cyclobenzaprine tablet 10 mg BID PRN    dextrose 50% injection 12.5 g PRN    dextrose 50% injection 25 g PRN    glucagon (human recombinant) injection 1 mg PRN    glucose chewable tablet 16 g PRN    glucose chewable tablet 24 g PRN    heparin (porcine) injection 5,000 Units Q8H    insulin aspart U-100 pen 0-5 Units QID (AC + HS) PRN    magnesium oxide tablet 800 mg PRN    magnesium oxide tablet 800 mg PRN    melatonin tablet 6 mg Nightly PRN    naloxone 0.4 mg/mL injection 0.02 mg PRN    ondansetron injection 4 mg Q6H PRN    potassium bicarbonate disintegrating tablet 35 mEq PRN    potassium bicarbonate disintegrating tablet 50 mEq PRN    potassium bicarbonate disintegrating tablet 60 mEq PRN    potassium, sodium phosphates 280-160-250 mg packet 2 packet PRN    potassium, sodium phosphates 280-160-250 mg packet 2 packet PRN    potassium, sodium phosphates 280-160-250 mg packet 2 packet PRN    senna-docusate 8.6-50 mg per tablet 1 tablet BID PRN    sodium bicarbonate 150 mEq in D5W 1,000 mL infusion Continuous     Family History       Problem Relation (Age of Onset)    Cancer Father, Maternal Aunt    Heart disease Mother    Hypertension Brother, Daughter    Kidney disease Mother          Tobacco Use    Smoking status: Former     Current packs/day: 0.00     Types: Cigarettes     Quit date: 2/6/2022     Years since quitting: 3.1     Passive exposure: Past    Smokeless tobacco: Never    Tobacco comments:     Patient Quit Smoking on 02/06/2022.   Substance and Sexual Activity    Alcohol use: Not Currently    Drug use: No    Sexual activity: Not Currently     Partners: Male     Review of  Systems   All other systems reviewed and are negative.    Objective:     Vital Signs (Most Recent):  Temp: 98.2 °F (36.8 °C) (04/11/25 1051)  Pulse: 86 (04/11/25 1510)  Resp: 18 (04/11/25 1051)  BP: 104/69 (04/11/25 1051)  SpO2: 97 % (04/11/25 1051) Vital Signs (24h Range):  Temp:  [97.5 °F (36.4 °C)-98.2 °F (36.8 °C)] 98.2 °F (36.8 °C)  Pulse:  [72-93] 86  Resp:  [16-22] 18  SpO2:  [96 %-100 %] 97 %  BP: (100-140)/(55-85) 104/69     Weight: 73.5 kg (162 lb 0.6 oz) (04/11/25 0315)  Body mass index is 25.38 kg/m².  Body surface area is 1.86 meters squared.    I/O last 3 completed shifts:  In: 89.8 [I.V.:89.8]  Out: -      Physical Exam  Vitals and nursing note reviewed.   Constitutional:       General: She is awake. She is not in acute distress.     Appearance: Normal appearance. She is well-developed.   HENT:      Head: Normocephalic and atraumatic.      Nose: Nose normal.      Mouth/Throat:      Mouth: Mucous membranes are moist.   Eyes:      Extraocular Movements: Extraocular movements intact.      Conjunctiva/sclera: Conjunctivae normal.   Cardiovascular:      Rate and Rhythm: Normal rate and regular rhythm.   Pulmonary:      Effort: Pulmonary effort is normal.      Breath sounds: Normal breath sounds.   Abdominal:      General: There is no distension.      Palpations: Abdomen is soft.   Musculoskeletal:         General: No tenderness or signs of injury.      Right lower leg: No edema.      Left lower leg: No edema.   Skin:     General: Skin is warm and dry.      Findings: No erythema or rash.   Neurological:      General: No focal deficit present.      Mental Status: She is alert. Mental status is at baseline.   Psychiatric:         Mood and Affect: Mood normal.         Behavior: Behavior normal.          Significant Labs:  CBC:   Recent Labs   Lab 04/11/25  0540   WBC 4.81   RBC 4.51   HGB 11.2*   HCT 39.0      MCV 87   MCH 24.8*   MCHC 28.7*     CMP:   Recent Labs   Lab 04/11/25  0539   CALCIUM 8.7    ALBUMIN 3.4*      K 4.3   CO2 14*   *   BUN 71*   CREATININE 3.7*   ALKPHOS 80   ALT 24   AST 19   BILITOT 0.2     All labs within the past 24 hours have been reviewed.    Significant Imaging:  Labs: Reviewed  US: Reviewed    Assessment/Plan:     MESSI on CKD stage 4  - baseline Cr 1.9-2.3; last at baseline 2/22/25  - Cr 4.0 on arrival; likely ischemic ATN from hypotension. Suspecting that BP has been running lower than baseline at home since she has lost weight  - Cr 3.7 today; improving  - euvolemic on exam. Continue IVF.   - gradually restart BP medications as needed.   - daily labs. Strict I/Os    Metabolic acidosis  - bicarb 21 --> 14 today; worsening  - changed NS to bicarb gtt  - repeating labs this PM  - monitor for si/sx hypervolemia    Hyperkalemia  - K 5.3 --> 4.3; improving      Thank you for your consult. I will follow-up with patient. Please contact us if you have any additional questions.    Sully Garza MD  Nephrology  [facility]      PM labs reviewed. Metabolic acidosis worsening despite bicarb gtt. Ordered lactic acid, beta-OH, and BNP.     BL

## 2025-04-11 NOTE — PROGRESS NOTES
[facility] Medicine  Progress Note    Patient Name: Joanne Peña  MRN: [id number]  Patient Class: OP- Observation   Admission Date: 4/10/2025  Length of Stay: 0 days  Attending Physician: Madiha Kowalski DO  Primary Care Provider: Teri Soto DO        Subjective     Principal Problem:Acute kidney injury superimposed on chronic kidney disease        HPI:  74-year-old female with systolic CHF, recurrent syncope with loop recorder in place, diabetes who presents with multiple episodes of lightheadedness over the past week sent by her outpatient provider to the ER due to an MESSI being identified likely due to hypovolemia.      The patient reports she frequently has issues with lightheadedness as on diuretics and fluid restriction.  The past week she reports seems to be worse.  She had 3 episodes of lightheadedness with falls without loss of consciousness.  No recent change to her diuretic regimen or oral intake.  She did have a febrile illness related to cellulitis at the site of her breast biopsy; the cellulitis has resolved and she has completed her antibiotic therapy.  No palpitations, chest pain, shortness of breath.  Legs are smaller than usual.    No flank pain or hematuria.    Overview/Hospital Course:  Mrs. Peña was hospitalized with MESSI suspected etiology hypovolemia.  IV fluids overnight with only slight improvement, still far from baseline.  Nephrology consulted, appreciate recs.     Interval History: no new complaints    Review of Systems   Respiratory:  Negative for shortness of breath.    Cardiovascular:  Negative for chest pain.   All other systems reviewed and are negative.    Objective:     Vital Signs (Most Recent):  Temp: 98.2 °F (36.8 °C) (04/11/25 1051)  Pulse: 87 (04/11/25 1055)  Resp: 18 (04/11/25 1051)  BP: 104/69 (04/11/25 1051)  SpO2: 97 % (04/11/25 1051) Vital Signs (24h Range):  Temp:  [97.5 °F (36.4 °C)-98.2 °F (36.8 °C)] 98.2 °F (36.8 °C)  Pulse:  [72-93]  87  Resp:  [16-22] 18  SpO2:  [96 %-100 %] 97 %  BP: (100-140)/(55-85) 104/69     Weight: 73.5 kg (162 lb 0.6 oz)  Body mass index is 25.38 kg/m².    Intake/Output Summary (Last 24 hours) at 4/11/2025 1111  Last data filed at 4/11/2025 0954  Gross per 24 hour   Intake 1149.41 ml   Output --   Net 1149.41 ml         Physical Exam  Vitals and nursing note reviewed.   Constitutional:       General: She is not in acute distress.     Appearance: She is well-developed.   HENT:      Head: Normocephalic and atraumatic.      Right Ear: External ear normal.      Left Ear: External ear normal.   Cardiovascular:      Rate and Rhythm: Normal rate and regular rhythm.   Pulmonary:      Effort: Pulmonary effort is normal. No respiratory distress.   Skin:     General: Skin is warm and dry.   Neurological:      Mental Status: She is alert and oriented to person, place, and time.   Psychiatric:         Thought Content: Thought content normal.               Significant Labs: All pertinent labs within the past 24 hours have been reviewed.    Significant Imaging: I have reviewed all pertinent imaging results/findings within the past 24 hours.      Assessment & Plan  Acute kidney injury superimposed on chronic kidney disease  MESSI is likely due to pre-renal azotemia due to dehydration. Baseline creatinine is  1.9 . Most recent creatinine and eGFR are listed below.  Recent Labs     04/10/25  1209 04/10/25  1938 04/11/25  0539   CREATININE 4.0* 4.1* 3.7*   EGFRNORACEVR 11* 11* 12*      Plan  - MESSI is improving, but still far from baseline  - Avoid nephrotoxins and renally dose meds for GFR listed above  - Monitor urine output, serial BMP, and adjust therapy as needed  - normal saline ordered at 75 cc/hour for 1 L.  -renal ultrasound requested.  -diuretics held.  - Nephrology consult, appreciate recs  Primary hypertension  Patient's blood pressure range in the last 24 hours was: BP  Min: 100/55  Max: 140/79.The patient's inpatient  "anti-hypertensive regimen is listed below:  Current Antihypertensives       Plan  - BP is controlled, no changes needed to their regimen  - holding home regimen to avoid hypotension in the setting of MESSI.  Resume as required.  Mixed hyperlipidemia    Continue home regimen  Lightheadedness    Likely secondary to hypovolemia.  Recent echo without significant valvular disease.  Orthostatics requested.  Cardiac device check order placed.  -IV fluids ordered for 1 L.  History of stroke    Continue aspirin and statin  Type 2 diabetes mellitus, without long-term current use of insulin  Last HgbA1c   Lab Results   Component Value Date    HGBA1C 8.5 (H) 02/13/2025     Hold oral antihyperglycemics while inpatient  PRN sliding scale insulin  ACHS glucose monitoring   ADA diet   Chronic systolic (congestive) heart failure  Very cautious rehydration due to EF 30-35%    Patient has Systolic (HFrEF) heart failure that is Chronic. On presentation their CHF was well compensated. Most recent BNP and echo results are listed below.  No results for input(s): "BNP" in the last 72 hours.  Latest ECHO  Results for orders placed during the hospital encounter of 01/03/25    Echo    Interpretation Summary    Left Ventricle: Septal motion is consistent with bundle branch block. There is moderately reduced systolic function with a visually estimated ejection fraction of 30 - 35%. Grade II diastolic dysfunction. Elevated left ventricular filling pressure.    Right Ventricle: Systolic function is normal.    Left Atrium: Left atrium is severely dilated.    Right Atrium: Right atrium is mildly dilated.    Aortic Valve: The aortic valve is a trileaflet valve. There is mild aortic valve sclerosis.    Mitral Valve: Mildly calcified leaflets. There is moderate regurgitation with an eccentric jet.    Tricuspid Valve: There is mild regurgitation.    Pulmonary Artery: The estimated pulmonary artery systolic pressure is 25 mmHg.    IVC/SVC: Normal venous " pressure at 3 mmHg.    Current Heart Failure Medications       Plan  - Monitor strict I&Os and daily weights.    - Place on telemetry  - Low sodium diet     - Cardiology has not been consulted  - The patient's volume status is at their baseline  -       VTE Risk Mitigation (From admission, onward)           Ordered     heparin (porcine) injection 5,000 Units  Every 8 hours         04/10/25 2216     IP VTE HIGH RISK PATIENT  Once         04/10/25 2216     Place sequential compression device  Until discontinued         04/10/25 2216                    Discharge Planning   ARIANA:      Code Status: Full Code   Medical Readiness for Discharge Date:       Hunter Keenan Jr., APRN, AGACNP-BC  Hospitalist - Department of Hospital Medicine  [facility] [address] [place] [address]  Office #: [phone]; Pager #: [phone]

## 2025-04-11 NOTE — ASSESSMENT & PLAN NOTE
MESSI is likely due to pre-renal azotemia due to dehydration. Baseline creatinine is 1.9. Most recent creatinine and eGFR are listed below.  Recent Labs     04/10/25  1209 04/10/25  1938 04/11/25  0539   CREATININE 4.0* 4.1* 3.7*   EGFRNORACEVR 11* 11* 12*      Plan  - MESSI is improving, but still far from baseline  - Avoid nephrotoxins and renally dose meds for GFR listed above  - Monitor urine output, serial BMP, and adjust therapy as needed  - normal saline ordered at 75 cc/hour for 1 L.  -renal ultrasound requested.  -diuretics held.  - Nephrology consult, appreciate recs

## 2025-04-11 NOTE — PROGRESS NOTES
Patient currently admitted to [facility], follow up scheduled for 4/14/25.

## 2025-04-11 NOTE — SUBJECTIVE & OBJECTIVE
Interval History: no new complaints    Review of Systems   Respiratory:  Negative for shortness of breath.    Cardiovascular:  Negative for chest pain.   All other systems reviewed and are negative.    Objective:     Vital Signs (Most Recent):  Temp: 98.2 °F (36.8 °C) (04/11/25 1051)  Pulse: 87 (04/11/25 1055)  Resp: 18 (04/11/25 1051)  BP: 104/69 (04/11/25 1051)  SpO2: 97 % (04/11/25 1051) Vital Signs (24h Range):  Temp:  [97.5 °F (36.4 °C)-98.2 °F (36.8 °C)] 98.2 °F (36.8 °C)  Pulse:  [72-93] 87  Resp:  [16-22] 18  SpO2:  [96 %-100 %] 97 %  BP: (100-140)/(55-85) 104/69     Weight: 73.5 kg (162 lb 0.6 oz)  Body mass index is 25.38 kg/m².    Intake/Output Summary (Last 24 hours) at 4/11/2025 1111  Last data filed at 4/11/2025 0954  Gross per 24 hour   Intake 1149.41 ml   Output --   Net 1149.41 ml         Physical Exam  Vitals and nursing note reviewed.   Constitutional:       General: She is not in acute distress.     Appearance: She is well-developed.   HENT:      Head: Normocephalic and atraumatic.      Right Ear: External ear normal.      Left Ear: External ear normal.   Cardiovascular:      Rate and Rhythm: Normal rate and regular rhythm.   Pulmonary:      Effort: Pulmonary effort is normal. No respiratory distress.   Skin:     General: Skin is warm and dry.   Neurological:      Mental Status: She is alert and oriented to person, place, and time.   Psychiatric:         Thought Content: Thought content normal.               Significant Labs: All pertinent labs within the past 24 hours have been reviewed.    Significant Imaging: I have reviewed all pertinent imaging results/findings within the past 24 hours.

## 2025-04-12 PROBLEM — R42 LIGHTHEADEDNESS: Status: RESOLVED | Noted: 2022-02-21 | Resolved: 2025-04-12

## 2025-04-12 LAB
ABSOLUTE EOSINOPHIL (OHS): 0.15 K/UL
ABSOLUTE MONOCYTE (OHS): 0.45 K/UL (ref 0.3–1)
ABSOLUTE NEUTROPHIL COUNT (OHS): 1.95 K/UL (ref 1.8–7.7)
ALBUMIN SERPL BCP-MCNC: 3.1 G/DL (ref 3.5–5.2)
ALP SERPL-CCNC: 67 UNIT/L (ref 40–150)
ALT SERPL W/O P-5'-P-CCNC: 22 UNIT/L (ref 10–44)
ANION GAP (OHS): 9 MMOL/L (ref 8–16)
AST SERPL-CCNC: 18 UNIT/L (ref 11–45)
BASOPHILS # BLD AUTO: 0.02 K/UL
BASOPHILS NFR BLD AUTO: 0.5 %
BILIRUB SERPL-MCNC: 0.2 MG/DL (ref 0.1–1)
BUN SERPL-MCNC: 67 MG/DL (ref 8–23)
CALCIUM SERPL-MCNC: 8.3 MG/DL (ref 8.7–10.5)
CHLORIDE SERPL-SCNC: 108 MMOL/L (ref 95–110)
CO2 SERPL-SCNC: 22 MMOL/L (ref 23–29)
CREAT SERPL-MCNC: 3 MG/DL (ref 0.5–1.4)
ERYTHROCYTE [DISTWIDTH] IN BLOOD BY AUTOMATED COUNT: 17.8 % (ref 11.5–14.5)
GFR SERPLBLD CREATININE-BSD FMLA CKD-EPI: 16 ML/MIN/1.73/M2
GLUCOSE SERPL-MCNC: 128 MG/DL (ref 70–110)
HCT VFR BLD AUTO: 33.6 % (ref 37–48.5)
HGB BLD-MCNC: 10.2 GM/DL (ref 12–16)
IMM GRANULOCYTES # BLD AUTO: 0.03 K/UL (ref 0–0.04)
IMM GRANULOCYTES NFR BLD AUTO: 0.8 % (ref 0–0.5)
LYMPHOCYTES # BLD AUTO: 1.37 K/UL (ref 1–4.8)
MAGNESIUM SERPL-MCNC: 2 MG/DL (ref 1.6–2.6)
MCH RBC QN AUTO: 24.9 PG (ref 27–31)
MCHC RBC AUTO-ENTMCNC: 30.4 G/DL (ref 32–36)
MCV RBC AUTO: 82 FL (ref 82–98)
NUCLEATED RBC (/100WBC) (OHS): 0 /100 WBC
PHOSPHATE SERPL-MCNC: 2.8 MG/DL (ref 2.7–4.5)
PLATELET # BLD AUTO: 287 K/UL (ref 150–450)
PMV BLD AUTO: 9.9 FL (ref 9.2–12.9)
POCT GLUCOSE: 104 MG/DL (ref 70–110)
POCT GLUCOSE: 120 MG/DL (ref 70–110)
POCT GLUCOSE: 144 MG/DL (ref 70–110)
POTASSIUM SERPL-SCNC: 4.3 MMOL/L (ref 3.5–5.1)
PROT SERPL-MCNC: 6.7 GM/DL (ref 6–8.4)
RBC # BLD AUTO: 4.1 M/UL (ref 4–5.4)
RELATIVE EOSINOPHIL (OHS): 3.8 %
RELATIVE LYMPHOCYTE (OHS): 34.5 % (ref 18–48)
RELATIVE MONOCYTE (OHS): 11.3 % (ref 4–15)
RELATIVE NEUTROPHIL (OHS): 49.1 % (ref 38–73)
SODIUM SERPL-SCNC: 139 MMOL/L (ref 136–145)
WBC # BLD AUTO: 3.97 K/UL (ref 3.9–12.7)

## 2025-04-12 PROCEDURE — 80053 COMPREHEN METABOLIC PANEL: CPT | Mod: HCNC | Performed by: INTERNAL MEDICINE

## 2025-04-12 PROCEDURE — 63600175 PHARM REV CODE 636 W HCPCS: Mod: HCNC | Performed by: INTERNAL MEDICINE

## 2025-04-12 PROCEDURE — 99232 SBSQ HOSP IP/OBS MODERATE 35: CPT | Mod: HCNC,,, | Performed by: STUDENT IN AN ORGANIZED HEALTH CARE EDUCATION/TRAINING PROGRAM

## 2025-04-12 PROCEDURE — 21400001 HC TELEMETRY ROOM: Mod: HCNC

## 2025-04-12 PROCEDURE — 84100 ASSAY OF PHOSPHORUS: CPT | Mod: HCNC | Performed by: INTERNAL MEDICINE

## 2025-04-12 PROCEDURE — 85025 COMPLETE CBC W/AUTO DIFF WBC: CPT | Mod: HCNC | Performed by: INTERNAL MEDICINE

## 2025-04-12 PROCEDURE — 25000003 PHARM REV CODE 250: Mod: HCNC | Performed by: INTERNAL MEDICINE

## 2025-04-12 PROCEDURE — 63600175 PHARM REV CODE 636 W HCPCS: Mod: HCNC | Performed by: HOSPITALIST

## 2025-04-12 PROCEDURE — 83735 ASSAY OF MAGNESIUM: CPT | Mod: HCNC | Performed by: INTERNAL MEDICINE

## 2025-04-12 PROCEDURE — 36415 COLL VENOUS BLD VENIPUNCTURE: CPT | Mod: HCNC | Performed by: INTERNAL MEDICINE

## 2025-04-12 RX ORDER — SODIUM CHLORIDE, SODIUM LACTATE, POTASSIUM CHLORIDE, CALCIUM CHLORIDE 600; 310; 30; 20 MG/100ML; MG/100ML; MG/100ML; MG/100ML
INJECTION, SOLUTION INTRAVENOUS CONTINUOUS
Status: ACTIVE | OUTPATIENT
Start: 2025-04-12 | End: 2025-04-12

## 2025-04-12 RX ADMIN — HEPARIN SODIUM 5000 UNITS: 5000 INJECTION INTRAVENOUS; SUBCUTANEOUS at 02:04

## 2025-04-12 RX ADMIN — HEPARIN SODIUM 5000 UNITS: 5000 INJECTION INTRAVENOUS; SUBCUTANEOUS at 09:04

## 2025-04-12 RX ADMIN — ATORVASTATIN CALCIUM 80 MG: 40 TABLET, FILM COATED ORAL at 07:04

## 2025-04-12 RX ADMIN — ASPIRIN 81 MG: 81 TABLET, COATED ORAL at 07:04

## 2025-04-12 RX ADMIN — HEPARIN SODIUM 5000 UNITS: 5000 INJECTION INTRAVENOUS; SUBCUTANEOUS at 06:04

## 2025-04-12 RX ADMIN — SODIUM CHLORIDE, POTASSIUM CHLORIDE, SODIUM LACTATE AND CALCIUM CHLORIDE: 600; 310; 30; 20 INJECTION, SOLUTION INTRAVENOUS at 12:04

## 2025-04-12 NOTE — ASSESSMENT & PLAN NOTE
MESSI is likely due to pre-renal azotemia due to dehydration. Baseline creatinine is 1.9. Most recent creatinine and eGFR are listed below.  Recent Labs     04/10/25  1938 04/11/25  0539 04/12/25  0521   CREATININE 4.1* 3.7*  3.7* 3.0*   EGFRNORACEVR 11* 12*  12* 16*      Plan  - MESSI is improving, but still far from baseline  - Avoid nephrotoxins and renally dose meds for GFR listed above  - Monitor urine output, serial BMP, and adjust therapy as needed  - normal saline ordered at 75 cc/hour for 1 L.  -renal ultrasound requested.  -diuretics held.  - Nephrology consult, appreciate recs

## 2025-04-12 NOTE — PROGRESS NOTES
[facility]  Nephrology  Progress Note    Patient Name: Joanne Peña  MRN: [id number]  Admission Date: 4/10/2025  Hospital Length of Stay: 1 days  Attending Provider: Madiha Kowalski DO   Primary Care Physician: Teri Soto DO  Principal Problem:Acute kidney injury superimposed on chronic kidney disease    Subjective:     HPI: Ms. Peña is a 73 yo female with HTN, T2DM, and DLD who presented to the ED with light-headedness, muscle weakness, and recurrent falls. /80 on arrival. Cr found to be elevated at 4.0. Her BP medications were held and she was started on IVF. Nephrology consulted for MESSI. Prior records obtained and reviewed. Her baseline Cr appears to be 1.9-2.3 with GFR 22-28%; last at baseline 2/22/25. She has not yet been seen by outpatient nephrology but has an appt to see Dr. Lemus in May. She denies h/o kidney disease. She denies SOB or leg swelling. She notes light-headedness and weakness have improved; she feels back to baseline. She reports recently weight loss of 8 lbs since starting Mounjaro; she notes BP medications have not been adjusted during this time. Her Cr has improved to 3.7 this morning.     Interval History: no acute events overnight    Review of patient's allergies indicates:   Allergen Reactions    Ampicillin Rash    Darvocet a500 [propoxyphene n-acetaminophen] Other (See Comments)     karl     Current Facility-Administered Medications   Medication Frequency    acetaminophen tablet 650 mg Q8H PRN    acetaminophen tablet 650 mg Q4H PRN    aluminum-magnesium hydroxide-simethicone 200-200-20 mg/5 mL suspension 30 mL QID PRN    aspirin EC tablet 81 mg Daily    atorvastatin tablet 80 mg Daily    cyclobenzaprine tablet 10 mg BID PRN    dextrose 50% injection 12.5 g PRN    dextrose 50% injection 25 g PRN    glucagon (human recombinant) injection 1 mg PRN    glucose chewable tablet 16 g PRN    glucose chewable tablet 24 g PRN    heparin (porcine) injection 5,000  Units Q8H    insulin aspart U-100 pen 0-5 Units QID (AC + HS) PRN    lactated ringers infusion Continuous    magnesium oxide tablet 800 mg PRN    magnesium oxide tablet 800 mg PRN    melatonin tablet 6 mg Nightly PRN    naloxone 0.4 mg/mL injection 0.02 mg PRN    ondansetron injection 4 mg Q6H PRN    potassium bicarbonate disintegrating tablet 35 mEq PRN    potassium bicarbonate disintegrating tablet 50 mEq PRN    potassium bicarbonate disintegrating tablet 60 mEq PRN    potassium, sodium phosphates 280-160-250 mg packet 2 packet PRN    potassium, sodium phosphates 280-160-250 mg packet 2 packet PRN    potassium, sodium phosphates 280-160-250 mg packet 2 packet PRN    senna-docusate 8.6-50 mg per tablet 1 tablet BID PRN       Objective:     Vital Signs (Most Recent):  Temp: 98.5 °F (36.9 °C) (04/12/25 1101)  Pulse: 88 (04/12/25 1134)  Resp: 18 (04/12/25 1101)  BP: 122/72 (04/12/25 1101)  SpO2: 98 % (04/12/25 1101) Vital Signs (24h Range):  Temp:  [97.9 °F (36.6 °C)-98.5 °F (36.9 °C)] 98.5 °F (36.9 °C)  Pulse:  [81-93] 88  Resp:  [18-20] 18  SpO2:  [95 %-98 %] 98 %  BP: (103-122)/(57-72) 122/72     Weight: 73.5 kg (162 lb 0.6 oz) (04/11/25 0315)  Body mass index is 25.38 kg/m².  Body surface area is 1.86 meters squared.    I/O last 3 completed shifts:  In: 2039.9 [P.O.:480; I.V.:1559.9]  Out: -      Physical Exam  Constitutional:       General: She is not in acute distress.     Appearance: Normal appearance. She is well-developed. She is not ill-appearing or toxic-appearing.   HENT:      Head: Normocephalic and atraumatic.      Mouth/Throat:      Mouth: Mucous membranes are dry.   Eyes:      Pupils: Pupils are equal, round, and reactive to light.   Cardiovascular:      Rate and Rhythm: Normal rate and regular rhythm.      Heart sounds: Normal heart sounds.   Pulmonary:      Effort: Pulmonary effort is normal.      Breath sounds: Normal breath sounds.   Abdominal:      General: Bowel sounds are normal.       Palpations: Abdomen is soft.      Tenderness: There is no abdominal tenderness.   Musculoskeletal:         General: Normal range of motion.      Cervical back: Normal range of motion and neck supple.      Right lower leg: No edema.      Left lower leg: No edema.   Skin:     General: Skin is warm and dry.      Capillary Refill: Capillary refill takes less than 2 seconds.   Neurological:      Mental Status: She is alert and oriented to person, place, and time.          Significant Labs:  All labs within the past 24 hours have been reviewed.     Significant Imaging:  Labs: Reviewed  Assessment/Plan:     Renal/  * Acute kidney injury superimposed on chronic kidney disease  Baseline creatinine 2.0  On presentation 4.1    MESSI ischemic in the setting of prolonged prerenal. Decreased PO intake due to GLP-1 and was continued on lasix. Has been drinking about 250 cc daily.     Plan/recommendation  DC GLP-1  Recommend 1 liter LR and follow up response  Is due to establish with nephrology (me) in clinic, follow up appt  -Keep MAP > 65  -Keep hemoglobin > 7  -Strict ins and outs  -Avoid nephrotoxic agents if possible and renally dose medications  -Avoid drastic hemodynamic changes if possible      Hypertension  Currently only on metoprolol 12.5 daily, continue to monitor BP    Diabetes  Resume jardiance on discharge.     Thank you for your consult. I will follow-up with patient. Please contact us if you have any additional questions.    Manuel Lemus MD  Nephrology  [facility]

## 2025-04-12 NOTE — NURSING
[facility]  Nurses Note -- 4 Eyes      4/11/2025       Skin assessed on: Q Shift      [x] No Pressure Injuries Present    []Prevention Measures Documented    [] Yes LDA  for Pressure Injury Previously documented     [] Yes New Pressure Injury Discovered   [] LDA for New Pressure Injury Added      Attending RN:  Crow Santos RN     Second RN:  [place]

## 2025-04-12 NOTE — SUBJECTIVE & OBJECTIVE
Interval History: no new complaints    Review of Systems   Respiratory:  Negative for shortness of breath.    Cardiovascular:  Negative for chest pain.   All other systems reviewed and are negative.    Objective:     Vital Signs (Most Recent):  Temp: 98.5 °F (36.9 °C) (04/12/25 1101)  Pulse: 87 (04/12/25 1101)  Resp: 18 (04/12/25 1101)  BP: 122/72 (04/12/25 1101)  SpO2: 98 % (04/12/25 1101) Vital Signs (24h Range):  Temp:  [97.9 °F (36.6 °C)-98.5 °F (36.9 °C)] 98.5 °F (36.9 °C)  Pulse:  [81-93] 87  Resp:  [18-20] 18  SpO2:  [95 %-98 %] 98 %  BP: (103-122)/(57-72) 122/72     Weight: 73.5 kg (162 lb 0.6 oz)  Body mass index is 25.38 kg/m².    Intake/Output Summary (Last 24 hours) at 4/12/2025 1128  Last data filed at 4/12/2025 0956  Gross per 24 hour   Intake 1010.45 ml   Output --   Net 1010.45 ml         Physical Exam  Vitals and nursing note reviewed.   Constitutional:       General: She is not in acute distress.     Appearance: She is well-developed.   HENT:      Head: Normocephalic and atraumatic.      Right Ear: External ear normal.      Left Ear: External ear normal.   Cardiovascular:      Rate and Rhythm: Normal rate and regular rhythm.   Pulmonary:      Effort: Pulmonary effort is normal. No respiratory distress.   Skin:     General: Skin is warm and dry.   Neurological:      Mental Status: She is alert and oriented to person, place, and time.   Psychiatric:         Thought Content: Thought content normal.               Significant Labs: All pertinent labs within the past 24 hours have been reviewed.    Significant Imaging: I have reviewed all pertinent imaging results/findings within the past 24 hours.

## 2025-04-12 NOTE — ASSESSMENT & PLAN NOTE
Patient's blood pressure range in the last 24 hours was: BP  Min: 103/67  Max: 122/72.The patient's inpatient anti-hypertensive regimen is listed below:  Current Antihypertensives       Plan  - BP is controlled, no changes needed to their regimen  - holding home regimen to avoid hypotension in the setting of MESSI.  Resume as required.

## 2025-04-12 NOTE — ASSESSMENT & PLAN NOTE
Baseline creatinine 2.0  On presentation 4.1    MESSI ischemic in the setting of prolonged prerenal. Decreased PO intake due to GLP-1 and was continued on lasix. Has been drinking about 250 cc daily.     Plan/recommendation  DC GLP-1  Recommend 1 liter LR and follow up response  Is due to establish with nephrology (me) in clinic, follow up appt  -Keep MAP > 65  -Keep hemoglobin > 7  -Strict ins and outs  -Avoid nephrotoxic agents if possible and renally dose medications  -Avoid drastic hemodynamic changes if possible

## 2025-04-12 NOTE — SUBJECTIVE & OBJECTIVE
Interval History: no acute events overnight    Review of patient's allergies indicates:   Allergen Reactions    Ampicillin Rash    Darvocet a500 [propoxyphene n-acetaminophen] Other (See Comments)     shaky     Current Facility-Administered Medications   Medication Frequency    acetaminophen tablet 650 mg Q8H PRN    acetaminophen tablet 650 mg Q4H PRN    aluminum-magnesium hydroxide-simethicone 200-200-20 mg/5 mL suspension 30 mL QID PRN    aspirin EC tablet 81 mg Daily    atorvastatin tablet 80 mg Daily    cyclobenzaprine tablet 10 mg BID PRN    dextrose 50% injection 12.5 g PRN    dextrose 50% injection 25 g PRN    glucagon (human recombinant) injection 1 mg PRN    glucose chewable tablet 16 g PRN    glucose chewable tablet 24 g PRN    heparin (porcine) injection 5,000 Units Q8H    insulin aspart U-100 pen 0-5 Units QID (AC + HS) PRN    lactated ringers infusion Continuous    magnesium oxide tablet 800 mg PRN    magnesium oxide tablet 800 mg PRN    melatonin tablet 6 mg Nightly PRN    naloxone 0.4 mg/mL injection 0.02 mg PRN    ondansetron injection 4 mg Q6H PRN    potassium bicarbonate disintegrating tablet 35 mEq PRN    potassium bicarbonate disintegrating tablet 50 mEq PRN    potassium bicarbonate disintegrating tablet 60 mEq PRN    potassium, sodium phosphates 280-160-250 mg packet 2 packet PRN    potassium, sodium phosphates 280-160-250 mg packet 2 packet PRN    potassium, sodium phosphates 280-160-250 mg packet 2 packet PRN    senna-docusate 8.6-50 mg per tablet 1 tablet BID PRN       Objective:     Vital Signs (Most Recent):  Temp: 98.5 °F (36.9 °C) (04/12/25 1101)  Pulse: 88 (04/12/25 1134)  Resp: 18 (04/12/25 1101)  BP: 122/72 (04/12/25 1101)  SpO2: 98 % (04/12/25 1101) Vital Signs (24h Range):  Temp:  [97.9 °F (36.6 °C)-98.5 °F (36.9 °C)] 98.5 °F (36.9 °C)  Pulse:  [81-93] 88  Resp:  [18-20] 18  SpO2:  [95 %-98 %] 98 %  BP: (103-122)/(57-72) 122/72     Weight: 73.5 kg (162 lb 0.6 oz) (04/11/25  0315)  Body mass index is 25.38 kg/m².  Body surface area is 1.86 meters squared.    I/O last 3 completed shifts:  In: 2039.9 [P.O.:480; I.V.:1559.9]  Out: -      Physical Exam  Constitutional:       General: She is not in acute distress.     Appearance: Normal appearance. She is well-developed. She is not ill-appearing or toxic-appearing.   HENT:      Head: Normocephalic and atraumatic.      Mouth/Throat:      Mouth: Mucous membranes are dry.   Eyes:      Pupils: Pupils are equal, round, and reactive to light.   Cardiovascular:      Rate and Rhythm: Normal rate and regular rhythm.      Heart sounds: Normal heart sounds.   Pulmonary:      Effort: Pulmonary effort is normal.      Breath sounds: Normal breath sounds.   Abdominal:      General: Bowel sounds are normal.      Palpations: Abdomen is soft.      Tenderness: There is no abdominal tenderness.   Musculoskeletal:         General: Normal range of motion.      Cervical back: Normal range of motion and neck supple.      Right lower leg: No edema.      Left lower leg: No edema.   Skin:     General: Skin is warm and dry.      Capillary Refill: Capillary refill takes less than 2 seconds.   Neurological:      Mental Status: She is alert and oriented to person, place, and time.          Significant Labs:  All labs within the past 24 hours have been reviewed.     Significant Imaging:  Labs: Reviewed

## 2025-04-12 NOTE — ASSESSMENT & PLAN NOTE
Very cautious rehydration due to EF 30-35%    Patient has Systolic (HFrEF) heart failure that is Chronic. On presentation their CHF was well compensated. Most recent BNP and echo results are listed below.  Recent Labs     04/11/25  1738   *     Latest ECHO  Results for orders placed during the hospital encounter of 01/03/25    Echo    Interpretation Summary    Left Ventricle: Septal motion is consistent with bundle branch block. There is moderately reduced systolic function with a visually estimated ejection fraction of 30 - 35%. Grade II diastolic dysfunction. Elevated left ventricular filling pressure.    Right Ventricle: Systolic function is normal.    Left Atrium: Left atrium is severely dilated.    Right Atrium: Right atrium is mildly dilated.    Aortic Valve: The aortic valve is a trileaflet valve. There is mild aortic valve sclerosis.    Mitral Valve: Mildly calcified leaflets. There is moderate regurgitation with an eccentric jet.    Tricuspid Valve: There is mild regurgitation.    Pulmonary Artery: The estimated pulmonary artery systolic pressure is 25 mmHg.    IVC/SVC: Normal venous pressure at 3 mmHg.    Current Heart Failure Medications       Plan  - Monitor strict I&Os and daily weights.    - Place on telemetry  - Low sodium diet     - Cardiology has not been consulted  - The patient's volume status is at their baseline  -

## 2025-04-12 NOTE — NURSING
[facility] [place]  Nurses Note -- 4 Eyes      4/12/2025       Skin assessed on: Q Shift      [x] No Pressure Injuries Present    []Prevention Measures Documented    [] Yes LDA  for Pressure Injury Previously documented     [] Yes New Pressure Injury Discovered   [] LDA for New Pressure Injury Added      Attending RN:  Waleska Bond LPN     Second RN:  LUIS CARLOS Maria

## 2025-04-12 NOTE — PROGRESS NOTES
[facility] Medicine  Progress Note    Patient Name: Joanne Peña  MRN: [id number]  Patient Class: IP- Inpatient   Admission Date: 4/10/2025  Length of Stay: 1 days  Attending Physician: Madiha Kowalski DO  Primary Care Provider: Teri Soto DO        Subjective     Principal Problem:Acute kidney injury superimposed on chronic kidney disease        HPI:  74-year-old female with systolic CHF, recurrent syncope with loop recorder in place, diabetes who presents with multiple episodes of lightheadedness over the past week sent by her outpatient provider to the ER due to an MESSI being identified likely due to hypovolemia.      The patient reports she frequently has issues with lightheadedness as on diuretics and fluid restriction.  The past week she reports seems to be worse.  She had 3 episodes of lightheadedness with falls without loss of consciousness.  No recent change to her diuretic regimen or oral intake.  She did have a febrile illness related to cellulitis at the site of her breast biopsy; the cellulitis has resolved and she has completed her antibiotic therapy.  No palpitations, chest pain, shortness of breath.  Legs are smaller than usual.    No flank pain or hematuria.    Overview/Hospital Course:  Mrs. Peña was hospitalized with MESSI suspected etiology hypovolemia.  IV fluids overnight with only slight improvement, still far from baseline.  Nephrology consulted, appreciate recs.   4/12: renal function continues to improve, but still not at baseline.  1 more liter IV fluids and repeat labs in am    Interval History: no new complaints    Review of Systems   Respiratory:  Negative for shortness of breath.    Cardiovascular:  Negative for chest pain.   All other systems reviewed and are negative.    Objective:     Vital Signs (Most Recent):  Temp: 98.5 °F (36.9 °C) (04/12/25 1101)  Pulse: 87 (04/12/25 1101)  Resp: 18 (04/12/25 1101)  BP: 122/72 (04/12/25 1101)  SpO2: 98 %  (04/12/25 1101) Vital Signs (24h Range):  Temp:  [97.9 °F (36.6 °C)-98.5 °F (36.9 °C)] 98.5 °F (36.9 °C)  Pulse:  [81-93] 87  Resp:  [18-20] 18  SpO2:  [95 %-98 %] 98 %  BP: (103-122)/(57-72) 122/72     Weight: 73.5 kg (162 lb 0.6 oz)  Body mass index is 25.38 kg/m².    Intake/Output Summary (Last 24 hours) at 4/12/2025 1128  Last data filed at 4/12/2025 0956  Gross per 24 hour   Intake 1010.45 ml   Output --   Net 1010.45 ml         Physical Exam  Vitals and nursing note reviewed.   Constitutional:       General: She is not in acute distress.     Appearance: She is well-developed.   HENT:      Head: Normocephalic and atraumatic.      Right Ear: External ear normal.      Left Ear: External ear normal.   Cardiovascular:      Rate and Rhythm: Normal rate and regular rhythm.   Pulmonary:      Effort: Pulmonary effort is normal. No respiratory distress.   Skin:     General: Skin is warm and dry.   Neurological:      Mental Status: She is alert and oriented to person, place, and time.   Psychiatric:         Thought Content: Thought content normal.               Significant Labs: All pertinent labs within the past 24 hours have been reviewed.    Significant Imaging: I have reviewed all pertinent imaging results/findings within the past 24 hours.      Assessment & Plan  Acute kidney injury superimposed on chronic kidney disease  MESSI is likely due to pre-renal azotemia due to dehydration. Baseline creatinine is  1.9 . Most recent creatinine and eGFR are listed below.  Recent Labs     04/10/25  1938 04/11/25  0539 04/12/25  0521   CREATININE 4.1* 3.7*  3.7* 3.0*   EGFRNORACEVR 11* 12*  12* 16*      Plan  - MESSI is improving, but still far from baseline  - Avoid nephrotoxins and renally dose meds for GFR listed above  - Monitor urine output, serial BMP, and adjust therapy as needed  - normal saline ordered at 75 cc/hour for 1 L.  -renal ultrasound requested.  -diuretics held.  - Nephrology consult, appreciate recs  Primary  hypertension  Patient's blood pressure range in the last 24 hours was: BP  Min: 103/67  Max: 122/72.The patient's inpatient anti-hypertensive regimen is listed below:  Current Antihypertensives       Plan  - BP is controlled, no changes needed to their regimen  - holding home regimen to avoid hypotension in the setting of MESSI.  Resume as required.  Mixed hyperlipidemia    Continue home regimen  History of stroke    Continue aspirin and statin  Type 2 diabetes mellitus, without long-term current use of insulin  Last HgbA1c   Lab Results   Component Value Date    HGBA1C 8.5 (H) 02/13/2025     Hold oral antihyperglycemics while inpatient  PRN sliding scale insulin  ACHS glucose monitoring   ADA diet   Chronic systolic (congestive) heart failure  Very cautious rehydration due to EF 30-35%    Patient has Systolic (HFrEF) heart failure that is Chronic. On presentation their CHF was well compensated. Most recent BNP and echo results are listed below.  Recent Labs     04/11/25  1738   *     Latest ECHO  Results for orders placed during the hospital encounter of 01/03/25    Echo    Interpretation Summary    Left Ventricle: Septal motion is consistent with bundle branch block. There is moderately reduced systolic function with a visually estimated ejection fraction of 30 - 35%. Grade II diastolic dysfunction. Elevated left ventricular filling pressure.    Right Ventricle: Systolic function is normal.    Left Atrium: Left atrium is severely dilated.    Right Atrium: Right atrium is mildly dilated.    Aortic Valve: The aortic valve is a trileaflet valve. There is mild aortic valve sclerosis.    Mitral Valve: Mildly calcified leaflets. There is moderate regurgitation with an eccentric jet.    Tricuspid Valve: There is mild regurgitation.    Pulmonary Artery: The estimated pulmonary artery systolic pressure is 25 mmHg.    IVC/SVC: Normal venous pressure at 3 mmHg.    Current Heart Failure Medications       Plan  - Monitor  strict I&Os and daily weights.    - Place on telemetry  - Low sodium diet     - Cardiology has not been consulted  - The patient's volume status is at their baseline  -       VTE Risk Mitigation (From admission, onward)           Ordered     heparin (porcine) injection 5,000 Units  Every 8 hours         04/10/25 2216     IP VTE HIGH RISK PATIENT  Once         04/10/25 2216     Place sequential compression device  Until discontinued         04/10/25 2216                    Discharge Planning   ARIANA:      Code Status: Full Code   Medical Readiness for Discharge Date:   Discharge Plan A: Home with family (with instructions to follow up)      Hunter Keenan Jr., APRN, AGACN-BC  Hospitalist - Department of Hospital Medicine  [facility] [address] [place] [address]  Office #: [phone]; Pager #: [phone]

## 2025-04-12 NOTE — PLAN OF CARE
Problem: Adult Inpatient Plan of Care  Goal: Plan of Care Review  Outcome: Progressing  Goal: Patient-Specific Goal (Individualized)  Outcome: Progressing     Problem: Diabetes Comorbidity  Goal: Blood Glucose Level Within Targeted Range  Outcome: Progressing     Problem: Acute Kidney Injury/Impairment  Goal: Fluid and Electrolyte Balance  Outcome: Progressing     Problem: Fall Injury Risk  Goal: Absence of Fall and Fall-Related Injury  Outcome: Progressing     Problem: Wound  Goal: Optimal Coping  Outcome: Progressing

## 2025-04-13 VITALS
HEIGHT: 67 IN | OXYGEN SATURATION: 96 % | BODY MASS INDEX: 25.44 KG/M2 | DIASTOLIC BLOOD PRESSURE: 73 MMHG | SYSTOLIC BLOOD PRESSURE: 113 MMHG | RESPIRATION RATE: 18 BRPM | TEMPERATURE: 99 F | HEART RATE: 89 BPM | WEIGHT: 162.06 LBS

## 2025-04-13 LAB
ABSOLUTE EOSINOPHIL (OHS): 0.18 K/UL
ABSOLUTE MONOCYTE (OHS): 0.58 K/UL (ref 0.3–1)
ABSOLUTE NEUTROPHIL COUNT (OHS): 3.12 K/UL (ref 1.8–7.7)
ALBUMIN SERPL BCP-MCNC: 3.1 G/DL (ref 3.5–5.2)
ALP SERPL-CCNC: 63 UNIT/L (ref 40–150)
ALT SERPL W/O P-5'-P-CCNC: 23 UNIT/L (ref 10–44)
ANION GAP (OHS): 8 MMOL/L (ref 8–16)
AST SERPL-CCNC: 21 UNIT/L (ref 11–45)
BASOPHILS # BLD AUTO: 0.03 K/UL
BASOPHILS NFR BLD AUTO: 0.5 %
BILIRUB SERPL-MCNC: 0.2 MG/DL (ref 0.1–1)
BUN SERPL-MCNC: 59 MG/DL (ref 8–23)
CALCIUM SERPL-MCNC: 8.5 MG/DL (ref 8.7–10.5)
CHLORIDE SERPL-SCNC: 109 MMOL/L (ref 95–110)
CO2 SERPL-SCNC: 22 MMOL/L (ref 23–29)
CREAT SERPL-MCNC: 2.6 MG/DL (ref 0.5–1.4)
ERYTHROCYTE [DISTWIDTH] IN BLOOD BY AUTOMATED COUNT: 18.1 % (ref 11.5–14.5)
GFR SERPLBLD CREATININE-BSD FMLA CKD-EPI: 19 ML/MIN/1.73/M2
GLUCOSE SERPL-MCNC: 115 MG/DL (ref 70–110)
HCT VFR BLD AUTO: 32.3 % (ref 37–48.5)
HGB BLD-MCNC: 9.7 GM/DL (ref 12–16)
IMM GRANULOCYTES # BLD AUTO: 0.05 K/UL (ref 0–0.04)
IMM GRANULOCYTES NFR BLD AUTO: 0.9 % (ref 0–0.5)
LYMPHOCYTES # BLD AUTO: 1.58 K/UL (ref 1–4.8)
MAGNESIUM SERPL-MCNC: 1.8 MG/DL (ref 1.6–2.6)
MCH RBC QN AUTO: 24.9 PG (ref 27–31)
MCHC RBC AUTO-ENTMCNC: 30 G/DL (ref 32–36)
MCV RBC AUTO: 83 FL (ref 82–98)
NUCLEATED RBC (/100WBC) (OHS): 0 /100 WBC
OHS QRS DURATION: 144 MS
OHS QTC CALCULATION: 519 MS
PHOSPHATE SERPL-MCNC: 2.8 MG/DL (ref 2.7–4.5)
PLATELET # BLD AUTO: 268 K/UL (ref 150–450)
PMV BLD AUTO: 10 FL (ref 9.2–12.9)
POCT GLUCOSE: 110 MG/DL (ref 70–110)
POTASSIUM SERPL-SCNC: 5 MMOL/L (ref 3.5–5.1)
PROT SERPL-MCNC: 6.6 GM/DL (ref 6–8.4)
RBC # BLD AUTO: 3.89 M/UL (ref 4–5.4)
RELATIVE EOSINOPHIL (OHS): 3.2 %
RELATIVE LYMPHOCYTE (OHS): 28.5 % (ref 18–48)
RELATIVE MONOCYTE (OHS): 10.5 % (ref 4–15)
RELATIVE NEUTROPHIL (OHS): 56.4 % (ref 38–73)
SODIUM SERPL-SCNC: 139 MMOL/L (ref 136–145)
WBC # BLD AUTO: 5.54 K/UL (ref 3.9–12.7)

## 2025-04-13 PROCEDURE — 85025 COMPLETE CBC W/AUTO DIFF WBC: CPT | Mod: HCNC | Performed by: INTERNAL MEDICINE

## 2025-04-13 PROCEDURE — 83735 ASSAY OF MAGNESIUM: CPT | Mod: HCNC | Performed by: INTERNAL MEDICINE

## 2025-04-13 PROCEDURE — 63600175 PHARM REV CODE 636 W HCPCS: Mod: HCNC | Performed by: INTERNAL MEDICINE

## 2025-04-13 PROCEDURE — 25000003 PHARM REV CODE 250: Mod: HCNC | Performed by: INTERNAL MEDICINE

## 2025-04-13 PROCEDURE — 99232 SBSQ HOSP IP/OBS MODERATE 35: CPT | Mod: HCNC,,, | Performed by: STUDENT IN AN ORGANIZED HEALTH CARE EDUCATION/TRAINING PROGRAM

## 2025-04-13 PROCEDURE — 36415 COLL VENOUS BLD VENIPUNCTURE: CPT | Mod: HCNC | Performed by: INTERNAL MEDICINE

## 2025-04-13 PROCEDURE — 84100 ASSAY OF PHOSPHORUS: CPT | Mod: HCNC | Performed by: INTERNAL MEDICINE

## 2025-04-13 PROCEDURE — 80053 COMPREHEN METABOLIC PANEL: CPT | Mod: HCNC | Performed by: INTERNAL MEDICINE

## 2025-04-13 RX ADMIN — ATORVASTATIN CALCIUM 80 MG: 40 TABLET, FILM COATED ORAL at 07:04

## 2025-04-13 RX ADMIN — ASPIRIN 81 MG: 81 TABLET, COATED ORAL at 07:04

## 2025-04-13 RX ADMIN — HEPARIN SODIUM 5000 UNITS: 5000 INJECTION INTRAVENOUS; SUBCUTANEOUS at 06:04

## 2025-04-13 NOTE — DISCHARGE SUMMARY
[facility] Medicine  Discharge Summary      Patient Name: Joanne Peña  MRN: [id number]  [facility]: [id number]  Patient Class: IP- Inpatient  Admission Date: 4/10/2025  Hospital Length of Stay: 2 days  Discharge Date and Time: 04/13/2025 10:24 AM  Attending Physician: Madiha Kowalski DO   Discharging Provider: Hunter Montoya Jr, NP  Primary Care Provider: Teri Soto DO    Primary Care Team: HUNTER MONTOYA    HPI:   74-year-old female with systolic CHF, recurrent syncope with loop recorder in place, diabetes who presents with multiple episodes of lightheadedness over the past week sent by her outpatient provider to the ER due to an MESSI being identified likely due to hypovolemia.      The patient reports she frequently has issues with lightheadedness as on diuretics and fluid restriction.  The past week she reports seems to be worse.  She had 3 episodes of lightheadedness with falls without loss of consciousness.  No recent change to her diuretic regimen or oral intake.  She did have a febrile illness related to cellulitis at the site of her breast biopsy; the cellulitis has resolved and she has completed her antibiotic therapy.  No palpitations, chest pain, shortness of breath.  Legs are smaller than usual.    No flank pain or hematuria.    * No surgery found *      Hospital Course:   Mrs. Peña was hospitalized with MESSI suspected etiology hypovolemia.  IV fluids overnight with only slight improvement, still far from baseline.  Nephrology consulted, appreciate recs.   4/12: renal function continues to improve, but still not at baseline.  1 more liter IV fluids and repeat labs in am  4/13:  With continued improvement.  Appears euvolemic and hemodynamically stable.  States she feels well and in her usual state of health.  Requests discharge.  Instructed to stop mounjaro and hold antihypertensive medications and drink proper amount of oral fluids, follow up with clinic providers.  All findings  and plan discussed with patient, all questions answered, she verbalizes understanding and agreement.  Discharged home in stable condition.      Goals of Care Treatment Preferences:  Code Status: Full Code      SDOH Screening:  The patient was screened for food insecurity, housing instability, transportation needs, utility difficulties, and interpersonal safety. The social determinant(s) of health identified as a concern this admission are:  Food insecurity    Defer case management and/or community health workers.    Social Drivers of Health with Concerns     Food Insecurity: Food Insecurity Present (4/11/2025)   Housing Stability: Low Risk  (4/11/2025)   Recent Concern: Housing Stability - High Risk (1/18/2025)        Consults:   Consults (From admission, onward)          Status Ordering Provider     Inpatient consult to Nephrology  Once        Provider:  Sully Garza MD    Completed SOLEDAD MONTOYA JR            Assessment & Plan  Acute kidney injury superimposed on chronic kidney disease  MESSI is likely due to pre-renal azotemia due to dehydration. Baseline creatinine is  1.9 . Most recent creatinine and eGFR are listed below.  Recent Labs     04/11/25  0539 04/12/25  0521 04/13/25  0219   CREATININE 3.7*  3.7* 3.0* 2.6*   EGFRNORACEVR 12*  12* 16* 19*      Plan  - MESSI is improving, but still far from baseline  - Avoid nephrotoxins and renally dose meds for GFR listed above  - Monitor urine output, serial BMP, and adjust therapy as needed  - normal saline ordered at 75 cc/hour for 1 L.  -renal ultrasound requested.  -diuretics held.  - Nephrology consult, appreciate recs  Primary hypertension  Patient's blood pressure range in the last 24 hours was: BP  Min: 104/55  Max: 138/87.The patient's inpatient anti-hypertensive regimen is listed below:  Current Antihypertensives       Plan  - BP is controlled, no changes needed to their regimen  - holding home regimen to avoid hypotension in the setting of MESSI.   Resume as required.  Mixed hyperlipidemia    Continue home regimen  History of stroke    Continue aspirin and statin  Type 2 diabetes mellitus, without long-term current use of insulin  Last HgbA1c   Lab Results   Component Value Date    HGBA1C 8.5 (H) 02/13/2025     Hold oral antihyperglycemics while inpatient  PRN sliding scale insulin  ACHS glucose monitoring   ADA diet   Chronic systolic (congestive) heart failure  Very cautious rehydration due to EF 30-35%    Patient has Systolic (HFrEF) heart failure that is Chronic. On presentation their CHF was well compensated. Most recent BNP and echo results are listed below.  Recent Labs     04/11/25  1738   *     Latest ECHO  Results for orders placed during the hospital encounter of 01/03/25    Echo    Interpretation Summary    Left Ventricle: Septal motion is consistent with bundle branch block. There is moderately reduced systolic function with a visually estimated ejection fraction of 30 - 35%. Grade II diastolic dysfunction. Elevated left ventricular filling pressure.    Right Ventricle: Systolic function is normal.    Left Atrium: Left atrium is severely dilated.    Right Atrium: Right atrium is mildly dilated.    Aortic Valve: The aortic valve is a trileaflet valve. There is mild aortic valve sclerosis.    Mitral Valve: Mildly calcified leaflets. There is moderate regurgitation with an eccentric jet.    Tricuspid Valve: There is mild regurgitation.    Pulmonary Artery: The estimated pulmonary artery systolic pressure is 25 mmHg.    IVC/SVC: Normal venous pressure at 3 mmHg.    Current Heart Failure Medications       Plan  - Monitor strict I&Os and daily weights.    - Place on telemetry  - Low sodium diet     - Cardiology has not been consulted  - The patient's volume status is at their baseline  -       Final Active Diagnoses:    Diagnosis Date Noted POA    PRINCIPAL PROBLEM:  Acute kidney injury superimposed on chronic kidney disease [N17.9, N18.9]  01/04/2025 Yes    Chronic systolic (congestive) heart failure [I50.22] 01/03/2025 Yes    Type 2 diabetes mellitus, without long-term current use of insulin [E11.9] 02/15/2024 Yes    History of stroke [Z86.73] 06/14/2022 Not Applicable    Mixed hyperlipidemia [E78.2] 02/04/2016 Yes    Primary hypertension [I10] 01/29/2016 Yes      Problems Resolved During this Admission:    Diagnosis Date Noted Date Resolved POA    Lightheadedness [R42] 02/21/2022 04/12/2025 Yes       Discharged Condition: stable    Disposition: Home or Self Care    Follow Up:   Follow-up Information       Teri Soto, DO Follow up.    Specialty: Family Medicine  Contact information:  [address] [facility] [place]  [place] [address]  [phone]                           Patient Instructions:      Diet Cardiac     Diet diabetic     Activity as tolerated       Significant Diagnostic Studies: Labs: CMP   Recent Labs   Lab 04/12/25  0521 04/13/25  0219    139   K 4.3 5.0    109   CO2 22* 22*   BUN 67* 59*   CREATININE 3.0* 2.6*   CALCIUM 8.3* 8.5*   ALBUMIN 3.1* 3.1*   BILITOT 0.2 0.2   ALKPHOS 67 63   AST 18 21   ALT 22 23   ANIONGAP 9 8    and CBC   Recent Labs   Lab 04/12/25  0521 04/13/25  0220   WBC 3.97 5.54   HGB 10.2* 9.7*   HCT 33.6* 32.3*    268       Pending Diagnostic Studies:       None           Medications:  Reconciled Home Medications:      Medication List        CONTINUE taking these medications      ACCU-CHEK GUIDE GLUCOSE METER Misc  Generic drug: blood-glucose meter  TO CHECK BLOOD GLUCOSE DAILY, TO USE WITH INSURANCE PREFERRED METER     ascorbic acid (vitamin C) 500 MG tablet  Commonly known as: VITAMIN C  Take 500 mg by mouth once daily.     aspirin 81 MG EC tablet  Commonly known as: ECOTRIN  Take 1 tablet (81 mg total) by mouth once daily.     atorvastatin 80 MG tablet  Commonly known as: LIPITOR  TAKE 1 TABLET (80 MG TOTAL) BY MOUTH ONCE DAILY.     * blood sugar diagnostic Strp  To  "check BG daily, to use with insurance preferred meter     * blood sugar diagnostic Strp  To check BG daily, to use with insurance preferred meter     cyclobenzaprine 10 MG tablet  Commonly known as: FLEXERIL  TAKE 1 TABLET (10 MG TOTAL) BY MOUTH 2 (TWO) TIMES DAILY AS NEEDED FOR MUSCLE SPASMS (BACK PAIN).     DEXCOM G6  Misc  Generic drug: blood-glucose meter,continuous  1 each by Misc.(Non-Drug; Combo Route) route 2 (two) times daily.     DEXCOM G7 SENSOR Denisse  Generic drug: blood-glucose sensor  1 Device by Misc.(Non-Drug; Combo Route) route 2 (two) times daily.     * empagliflozin 25 mg tablet  Commonly known as: JARDIANCE  Take 1 tablet (25 mg total) by mouth once daily.     * JARDIANCE 10 mg tablet  Generic drug: empagliflozin  Take 10 mg by mouth.     * BD VEO INSULIN SYRINGE UF 1/2 mL 31 gauge x 15/64" Syrg  Generic drug: insulin syringe-needle U-100  USE 1 SYRINGE BY [facility]NON-DRUG COMBO ROUTE) ROUTE 2 (TWO) TIMES A DAY.     * lancets Misc  To check BG daily, to use with insurance preferred meter     * lancets Misc  To check BG daily, to use with insurance preferred meter     LORazepam 0.5 MG tablet  Commonly known as: ATIVAN  Take 1 tablet (0.5 mg total) by mouth once. for 1 dose     meclizine 25 mg tablet  Commonly known as: ANTIVERT  Take 1 tablet (25 mg total) by mouth 2 (two) times daily as needed for Dizziness.     metoprolol succinate 25 MG 24 hr tablet  Commonly known as: TOPROL-XL  Take 0.5 tablets (12.5 mg total) by mouth once daily.     multivitamin per tablet  Commonly known as: THERAGRAN  Take 1 tablet by mouth once daily.     promethazine-dextromethorphan 6.25-15 mg/5 mL Syrp  Commonly known as: PROMETHAZINE-DM  Take 5 mLs by mouth nightly as needed (cough).     REPATHA SURECLICK 140 mg/mL Pnij  Generic drug: evolocumab  Inject 1 mL (140 mg total) into the skin every 14 (fourteen) days.           * This list has 7 medication(s) that are the same as other medications prescribed for you. " "Read the directions carefully, and ask your doctor or other care provider to review them with you.                STOP taking these medications      furosemide 40 MG tablet  Commonly known as: LASIX     hydrALAZINE 25 MG tablet  Commonly known as: APRESOLINE     MOUNJARO 2.5 mg/0.5 mL Pnij  Generic drug: tirzepatide            ASK your doctor about these medications      * insulin syringe-needle U-100 0.5 mL 31 gauge x 5/16" Syrg  60 each by Misc.(Non-Drug; Combo Route) route 2 (two) times a day.           * This list has 1 medication(s) that are the same as other medications prescribed for you. Read the directions carefully, and ask your doctor or other care provider to review them with you.                  Indwelling Lines/Drains at time of discharge:   Lines/Drains/Airways       None                   Time spent on the discharge of patient: 30 minutes         Hunter Keenan Jr, NP  Department of Hospital Medicine  [facility] - Med Surg  "

## 2025-04-13 NOTE — PLAN OF CARE
04/13/25 1110   AVS Confirmation   Discharge instructions and AVS provided to and reviewed with patient and/or significant other. Yes     Discharge orders noted. Additional clinical references attached.    Patient's discharge instructions given by bedside RN and reviewed via this VN with patient.    Education provided on new medication, diagnosis, and follow-up appointments.    All questions answered. Teach back method used. Patient verbalized understanding.     Floor nurse notified of VN completion.

## 2025-04-13 NOTE — ASSESSMENT & PLAN NOTE
Baseline creatinine 2.0  On presentation 4.1    MESSI ischemic in the setting of prolonged prerenal. Decreased PO intake due to GLP-1 and was continued on lasix. Has been drinking about 250 cc daily.     Plan/recommendation  DC GLP-1  Okay to discharge from nephrology standpoint.  Is due to establish with nephrology (me) in clinic, follow up appt  -Keep MAP > 65  -Keep hemoglobin > 7  -Strict ins and outs  -Avoid nephrotoxic agents if possible and renally dose medications  -Avoid drastic hemodynamic changes if possible

## 2025-04-13 NOTE — SUBJECTIVE & OBJECTIVE
Interval History: no acute events overnight, sCr continues to improve    Review of patient's allergies indicates:   Allergen Reactions    Ampicillin Rash    Darvocet a500 [propoxyphene n-acetaminophen] Other (See Comments)     shaky     Current Facility-Administered Medications   Medication Frequency    acetaminophen tablet 650 mg Q8H PRN    acetaminophen tablet 650 mg Q4H PRN    aluminum-magnesium hydroxide-simethicone 200-200-20 mg/5 mL suspension 30 mL QID PRN    aspirin EC tablet 81 mg Daily    atorvastatin tablet 80 mg Daily    cyclobenzaprine tablet 10 mg BID PRN    dextrose 50% injection 12.5 g PRN    dextrose 50% injection 25 g PRN    glucagon (human recombinant) injection 1 mg PRN    glucose chewable tablet 16 g PRN    glucose chewable tablet 24 g PRN    heparin (porcine) injection 5,000 Units Q8H    insulin aspart U-100 pen 0-5 Units QID (AC + HS) PRN    magnesium oxide tablet 800 mg PRN    magnesium oxide tablet 800 mg PRN    melatonin tablet 6 mg Nightly PRN    naloxone 0.4 mg/mL injection 0.02 mg PRN    ondansetron injection 4 mg Q6H PRN    potassium bicarbonate disintegrating tablet 35 mEq PRN    potassium bicarbonate disintegrating tablet 50 mEq PRN    potassium bicarbonate disintegrating tablet 60 mEq PRN    potassium, sodium phosphates 280-160-250 mg packet 2 packet PRN    potassium, sodium phosphates 280-160-250 mg packet 2 packet PRN    potassium, sodium phosphates 280-160-250 mg packet 2 packet PRN    senna-docusate 8.6-50 mg per tablet 1 tablet BID PRN       Objective:     Vital Signs (Most Recent):  Temp: 98.9 °F (37.2 °C) (04/13/25 0822)  Pulse: 89 (04/13/25 0822)  Resp: 18 (04/13/25 0822)  BP: 113/73 (04/13/25 0822)  SpO2: 96 % (04/13/25 0822) Vital Signs (24h Range):  Temp:  [98.1 °F (36.7 °C)-98.9 °F (37.2 °C)] 98.9 °F (37.2 °C)  Pulse:  [] 89  Resp:  [18] 18  SpO2:  [95 %-96 %] 96 %  BP: (104-138)/(55-87) 113/73     Weight: 73.5 kg (162 lb 0.6 oz) (04/11/25 0315)  Body mass index is  25.38 kg/m².  Body surface area is 1.86 meters squared.    I/O last 3 completed shifts:  In: 360 [P.O.:360]  Out: -      Physical Exam  Constitutional:       General: She is not in acute distress.     Appearance: Normal appearance. She is well-developed. She is not ill-appearing or toxic-appearing.   HENT:      Head: Normocephalic and atraumatic.      Mouth/Throat:      Mouth: Mucous membranes are dry.   Eyes:      Pupils: Pupils are equal, round, and reactive to light.   Cardiovascular:      Rate and Rhythm: Normal rate and regular rhythm.      Heart sounds: Normal heart sounds.   Pulmonary:      Effort: Pulmonary effort is normal.      Breath sounds: Normal breath sounds.   Abdominal:      General: Bowel sounds are normal.      Palpations: Abdomen is soft.      Tenderness: There is no abdominal tenderness.   Musculoskeletal:         General: Normal range of motion.      Cervical back: Normal range of motion and neck supple.      Right lower leg: No edema.      Left lower leg: No edema.   Skin:     General: Skin is warm and dry.      Capillary Refill: Capillary refill takes less than 2 seconds.   Neurological:      Mental Status: She is alert and oriented to person, place, and time.          Significant Labs:  All labs within the past 24 hours have been reviewed.     Significant Imaging:  Labs: Reviewed

## 2025-04-13 NOTE — ASSESSMENT & PLAN NOTE
MESSI is likely due to pre-renal azotemia due to dehydration. Baseline creatinine is 1.9. Most recent creatinine and eGFR are listed below.  Recent Labs     04/11/25  0539 04/12/25  0521 04/13/25  0219   CREATININE 3.7*  3.7* 3.0* 2.6*   EGFRNORACEVR 12*  12* 16* 19*      Plan  - MESSI is improving, but still far from baseline  - Avoid nephrotoxins and renally dose meds for GFR listed above  - Monitor urine output, serial BMP, and adjust therapy as needed  - normal saline ordered at 75 cc/hour for 1 L.  -renal ultrasound requested.  -diuretics held.  - Nephrology consult, appreciate recs

## 2025-04-13 NOTE — PLAN OF CARE
Case Management Final Discharge Note    Discharge Disposition: Home    New DME ordered / company name: None    Relevant SDOH / Transition of Care Barriers:  None    Person available to provide assistance at home when needed and their contact information: Independent; Son assists as needed Emeli [phone]    Scheduled followup appointment: PCP Office- RUTH Bryant NP 04/16/25 @ 2pm; Follow up with Nephrology Dr. Lemus 05/07/25    Referrals placed: None    Transportation: Private Vehicle, Family     Patient and family educated on discharge services and updated on DC plan. Bedside RN notified, patient clear to discharge from Case Management Perspective.     04/13/25 1004   Final Note   Assessment Type Final Discharge Note   Anticipated Discharge Disposition Home   What phone number can be called within the next 1-3 days to see how you are doing after discharge? [phone]   Hospital Resources/Appts/Education Provided Post-Acute resouces added to AVS;Appointments scheduled and added to AVS   Post-Acute Status   Discharge Delays None known at this time

## 2025-04-13 NOTE — NURSING
Patient discharged per MD order.  IV removed.  Catheter tip intact.  No distress noted.  Discharge instructions explained.  AVS given to patient.   Patient verbalized understanding.  VSS.  Afebrile.  No complaints of pain, N/V, diarrhea or SOB.   Patient left with belongings to Main Entrance via wheelchair per charge nurse.  Family with patient.

## 2025-04-13 NOTE — ASSESSMENT & PLAN NOTE
Patient's blood pressure range in the last 24 hours was: BP  Min: 104/55  Max: 138/87.The patient's inpatient anti-hypertensive regimen is listed below:  Current Antihypertensives       Plan  - BP is controlled, no changes needed to their regimen  - holding home regimen to avoid hypotension in the setting of MESSI.  Resume as required.

## 2025-04-13 NOTE — PROGRESS NOTES
[facility]  Nephrology  Progress Note    Patient Name: Joanne Peña  MRN: [id number]  Admission Date: 4/10/2025  Hospital Length of Stay: 2 days  Attending Provider: Madiha Kowalski DO   Primary Care Physician: Teri Soto DO  Principal Problem:Acute kidney injury superimposed on chronic kidney disease    Subjective:     HPI: Ms. Peña is a 73 yo female with HTN, T2DM, and DLD who presented to the ED with light-headedness, muscle weakness, and recurrent falls. /80 on arrival. Cr found to be elevated at 4.0. Her BP medications were held and she was started on IVF. Nephrology consulted for MESSI. Prior records obtained and reviewed. Her baseline Cr appears to be 1.9-2.3 with GFR 22-28%; last at baseline 2/22/25. She has not yet been seen by outpatient nephrology but has an appt to see Dr. Lemus in May. She denies h/o kidney disease. She denies SOB or leg swelling. She notes light-headedness and weakness have improved; she feels back to baseline. She reports recently weight loss of 8 lbs since starting Mounjaro; she notes BP medications have not been adjusted during this time. Her Cr has improved to 3.7 this morning.     Interval History: no acute events overnight, sCr continues to improve    Review of patient's allergies indicates:   Allergen Reactions    Ampicillin Rash    Darvocet a500 [propoxyphene n-acetaminophen] Other (See Comments)     karl     Current Facility-Administered Medications   Medication Frequency    acetaminophen tablet 650 mg Q8H PRN    acetaminophen tablet 650 mg Q4H PRN    aluminum-magnesium hydroxide-simethicone 200-200-20 mg/5 mL suspension 30 mL QID PRN    aspirin EC tablet 81 mg Daily    atorvastatin tablet 80 mg Daily    cyclobenzaprine tablet 10 mg BID PRN    dextrose 50% injection 12.5 g PRN    dextrose 50% injection 25 g PRN    glucagon (human recombinant) injection 1 mg PRN    glucose chewable tablet 16 g PRN    glucose chewable tablet 24 g PRN    heparin  (porcine) injection 5,000 Units Q8H    insulin aspart U-100 pen 0-5 Units QID (AC + HS) PRN    magnesium oxide tablet 800 mg PRN    magnesium oxide tablet 800 mg PRN    melatonin tablet 6 mg Nightly PRN    naloxone 0.4 mg/mL injection 0.02 mg PRN    ondansetron injection 4 mg Q6H PRN    potassium bicarbonate disintegrating tablet 35 mEq PRN    potassium bicarbonate disintegrating tablet 50 mEq PRN    potassium bicarbonate disintegrating tablet 60 mEq PRN    potassium, sodium phosphates 280-160-250 mg packet 2 packet PRN    potassium, sodium phosphates 280-160-250 mg packet 2 packet PRN    potassium, sodium phosphates 280-160-250 mg packet 2 packet PRN    senna-docusate 8.6-50 mg per tablet 1 tablet BID PRN       Objective:     Vital Signs (Most Recent):  Temp: 98.9 °F (37.2 °C) (04/13/25 0822)  Pulse: 89 (04/13/25 0822)  Resp: 18 (04/13/25 0822)  BP: 113/73 (04/13/25 0822)  SpO2: 96 % (04/13/25 0822) Vital Signs (24h Range):  Temp:  [98.1 °F (36.7 °C)-98.9 °F (37.2 °C)] 98.9 °F (37.2 °C)  Pulse:  [] 89  Resp:  [18] 18  SpO2:  [95 %-96 %] 96 %  BP: (104-138)/(55-87) 113/73     Weight: 73.5 kg (162 lb 0.6 oz) (04/11/25 0315)  Body mass index is 25.38 kg/m².  Body surface area is 1.86 meters squared.    I/O last 3 completed shifts:  In: 360 [P.O.:360]  Out: -      Physical Exam  Constitutional:       General: She is not in acute distress.     Appearance: Normal appearance. She is well-developed. She is not ill-appearing or toxic-appearing.   HENT:      Head: Normocephalic and atraumatic.      Mouth/Throat:      Mouth: Mucous membranes are dry.   Eyes:      Pupils: Pupils are equal, round, and reactive to light.   Cardiovascular:      Rate and Rhythm: Normal rate and regular rhythm.      Heart sounds: Normal heart sounds.   Pulmonary:      Effort: Pulmonary effort is normal.      Breath sounds: Normal breath sounds.   Abdominal:      General: Bowel sounds are normal.      Palpations: Abdomen is soft.       Tenderness: There is no abdominal tenderness.   Musculoskeletal:         General: Normal range of motion.      Cervical back: Normal range of motion and neck supple.      Right lower leg: No edema.      Left lower leg: No edema.   Skin:     General: Skin is warm and dry.      Capillary Refill: Capillary refill takes less than 2 seconds.   Neurological:      Mental Status: She is alert and oriented to person, place, and time.          Significant Labs:  All labs within the past 24 hours have been reviewed.     Significant Imaging:  Labs: Reviewed  Assessment/Plan:     Renal/  * Acute kidney injury superimposed on chronic kidney disease  Baseline creatinine 2.0  On presentation 4.1    MESSI ischemic in the setting of prolonged prerenal. Decreased PO intake due to GLP-1 and was continued on lasix. Has been drinking about 250 cc daily.     Plan/recommendation  DC GLP-1  Okay to discharge from nephrology standpoint.  Is due to establish with nephrology (me) in clinic, follow up appt  -Keep MAP > 65  -Keep hemoglobin > 7  -Strict ins and outs  -Avoid nephrotoxic agents if possible and renally dose medications  -Avoid drastic hemodynamic changes if possible          Thank you for your consult. I will follow-up with patient. Please contact us if you have any additional questions.    Manuel Lemus MD  Nephrology  [facility] - Med Surg

## 2025-04-13 NOTE — NURSING
[facility]  Nurses Note -- 4 Eyes      4/12/2025       Skin assessed on: Q Shift      [x] No Pressure Injuries Present    []Prevention Measures Documented    [] Yes LDA  for Pressure Injury Previously documented     [] Yes New Pressure Injury Discovered   [] LDA for New Pressure Injury Added      Attending RN:  Crow Santos RN     Second LPN:  Waleska

## 2025-04-14 ENCOUNTER — PATIENT OUTREACH (OUTPATIENT)
Facility: OTHER | Age: 75
End: 2025-04-14
Payer: MEDICARE

## 2025-04-14 NOTE — PROGRESS NOTES
Patient was discharged from [facility] on 4/13/25. The discharge summary instructs the patient to follow up with PCP, with an appointment scheduled for 4/16/25. A call was made to follow up, assess for additional needs, and remind the patient of the appointment. There was no answer, and a voicemail was left requesting a return call and providing appointment details. A portal message with the appointment reminder was also sent. A follow-up call to assess for additional needs has been scheduled for 4/17/25.

## 2025-04-15 ENCOUNTER — HOSPITAL ENCOUNTER (OUTPATIENT)
Dept: RADIOLOGY | Facility: HOSPITAL | Age: 75
Discharge: HOME | End: 2025-04-15
Payer: MEDICARE

## 2025-04-15 ENCOUNTER — PATIENT OUTREACH (OUTPATIENT)
Dept: ADMINISTRATIVE | Facility: CLINIC | Age: 75
End: 2025-04-15
Payer: MEDICARE

## 2025-04-15 DIAGNOSIS — R42 DIZZINESS AND GIDDINESS: ICD-10-CM

## 2025-04-15 PROCEDURE — 70450 CT HEAD/BRAIN W/O DYE: CPT | Mod: TC,HCNC

## 2025-04-15 PROCEDURE — 70450 CT HEAD/BRAIN W/O DYE: CPT | Mod: 26,HCNC,, | Performed by: RADIOLOGY

## 2025-04-15 NOTE — TELEPHONE ENCOUNTER
Spoke with patient's daughter Megha. Hospital F/U appointment was rescheduled to virtual appointment per MARCOS Rivera. On 04/16/25 @4:30pm. Patient verbalized understanding.

## 2025-04-16 ENCOUNTER — OFFICE VISIT (OUTPATIENT)
Dept: FAMILY MEDICINE | Facility: CLINIC | Age: 75
End: 2025-04-16
Payer: MEDICARE

## 2025-04-16 ENCOUNTER — TELEPHONE (OUTPATIENT)
Dept: FAMILY MEDICINE | Facility: CLINIC | Age: 75
End: 2025-04-16
Payer: MEDICARE

## 2025-04-16 DIAGNOSIS — Z09 HOSPITAL DISCHARGE FOLLOW-UP: Primary | ICD-10-CM

## 2025-04-16 DIAGNOSIS — R29.6 FREQUENT FALLS: ICD-10-CM

## 2025-04-16 DIAGNOSIS — R79.9 ABNORMAL FINDING OF BLOOD CHEMISTRY, UNSPECIFIED: ICD-10-CM

## 2025-04-16 NOTE — PROGRESS NOTES
The patient location is: Patient Home  The chief complaint leading to consultation is: as below  Visit type:   Virtual visit with synchronous audio and video      Total time spent with patient: 15 minutes  Each patient to whom he or she provides medical services by telemedicine is:  (1) informed of the relationship between the physician and patient and the respective role of any other health care provider with respect to management of the patient; and (2) notified that she may decline to receive medical services by telemedicine and may withdraw from such care at any time.    HPI     Joanne Peña is a 74 y.o. female with multiple medical diagnoses as listed in the medical history and problem list that presents for hospital follow up. PCP Dr. Soto with last visit in this clinic on 4/10/25.       HPI    Pleasant patient here for hospital follow up. She's feeling better overall, although had another fall last night. Was standing up out of chair and stood up too fast, and fell straight down to her knees. She reports feeling lightheaded when she stood up, no blacking out or losing consciousness. Was witnessed by  and patient did not hit head. CT scan from yesterday was reviewed; no acute changes noted, chronic microvascular changes present. She has a follow up with Neurology tomorrow for further evaluation.    See note below from recent hospital visit on 4/13/25:    HPI:   74-year-old female with systolic CHF, recurrent syncope with loop recorder in place, diabetes who presents with multiple episodes of lightheadedness over the past week sent by her outpatient provider to the ER due to an MESSI being identified likely due to hypovolemia.       The patient reports she frequently has issues with lightheadedness as on diuretics and fluid restriction.  The past week she reports seems to be worse.  She had 3 episodes of lightheadedness with falls without loss of consciousness.  No recent change to her diuretic  regimen or oral intake.  She did have a febrile illness related to cellulitis at the site of her breast biopsy; the cellulitis has resolved and she has completed her antibiotic therapy.  No palpitations, chest pain, shortness of breath.  Legs are smaller than usual.     No flank pain or hematuria.     Hospital Course:   Mrs. Peña was hospitalized with MESSI suspected etiology hypovolemia.  IV fluids overnight with only slight improvement, still far from baseline.  Nephrology consulted, appreciate recs.   4/12: renal function continues to improve, but still not at baseline.  1 more liter IV fluids and repeat labs in am  4/13:  With continued improvement.  Appears euvolemic and hemodynamically stable.  States she feels well and in her usual state of health.  Requests discharge.  Instructed to stop mounjaro and hold antihypertensive medications and drink proper amount of oral fluids, follow up with clinic providers.  All findings and plan discussed with patient, all questions answered, she verbalizes understanding and agreement.  Discharged home in stable condition.       Assessment & Plan     1. Hospital discharge follow-up    Still holding all BP meds and Lasix since hospital discharge. BP WNL at home. Will check lab work to reassess kidney function. Patient asking about restarting Lasix, as she is feeling some mild SOB with exertion. Will draw BNP tomorrow. Will follow up with results.     Transitional Care Note  Family and/or Caretaker present at visit?  Yes.  Diagnostic tests reviewed/disposition: I have reviewed all completed as well as pending diagnostic tests at the time of discharge.  Disease/illness education: Yes  Home health/community services discussion/referrals: Patient does not have home health established from hospital visit.  They do not need home health.  If needed, we will set up home health for the patient.   Establishment or re-establishment of referral orders for community resources: No other  necessary community resources.   Discussion with other health care providers: No discussion with other health care providers necessary.     - Comprehensive Metabolic Panel; Future  - CBC Auto Differential; Future  - BNP; Future    2. Frequent falls    Discussed importance of changing positions slowly and adequately hydrating. Discussed keeping small snacks with her to consume before changing positions. Fall precautions and use of cane/walker encouraged. Continue to monitor BP. Follow up with Neuro tomorrow.     3. Abnormal finding of blood chemistry, unspecified    - CBC Auto Differential; Future            Discussed DDx, condition, and treatment.   Education sent to patient portal/included in after visit summary.  ED precautions given.   Notify provider if symptoms do not resolve or increase in severity.   Patient verbalizes understanding and agrees with plan of care.  --------------------------------------------      Health Maintenance:  Health Maintenance         Date Due Completion Date    TETANUS VACCINE Never done ---    Shingles Vaccine (1 of 2) Never done ---    RSV Vaccine (Age 60+ and Pregnant patients) (1 - Risk 60-74 years 1-dose series) Never done ---    COVID-19 Vaccine (1 - 2024-25 season) Never done ---    Hemoglobin A1c 05/13/2025 2/13/2025    Diabetic Eye Exam 11/04/2025 11/4/2024    Foot Exam 01/28/2026 1/28/2025 (Done)    Override on 1/28/2025: Done    Override on 8/19/2024: Done    Lipid Panel 02/13/2026 2/13/2025    Diabetes Urine Screening 02/22/2026 2/22/2025    Mammogram 03/26/2026 3/26/2025    High Dose Statin 04/10/2026 4/10/2025    Colorectal Cancer Screening 07/12/2026 7/12/2023    DEXA Scan 09/25/2026 9/25/2023            Discussed the importance of overdue vaccines which were offered during this encounter. Patient declined overdue vaccines at this time and Advised patient on the importance of completing overdue health maintenance items    Follow Up:  Follow up if symptoms worsen or  fail to improve.    Exam     Review of Systems:  (as noted above)  Review of Systems   Constitutional:  Positive for activity change.   HENT:  Negative for hearing loss and trouble swallowing.    Eyes:  Negative for discharge.   Respiratory:  Negative for chest tightness and wheezing.    Cardiovascular:  Negative for chest pain and palpitations.   Gastrointestinal:  Negative for constipation, diarrhea and vomiting.   Genitourinary:  Negative for difficulty urinating, hematuria and menstrual problem.   Neurological:  Negative for headaches.   Psychiatric/Behavioral:  Negative for dysphoric mood.        Physical Exam:   Physical Exam  Constitutional:       General: She is not in acute distress.     Appearance: Normal appearance. She is not ill-appearing.   HENT:      Head: Normocephalic and atraumatic.   Pulmonary:      Effort: Pulmonary effort is normal.   Musculoskeletal:         General: Normal range of motion.   Skin:     General: Skin is warm and dry.      Capillary Refill: Capillary refill takes less than 2 seconds.   Neurological:      General: No focal deficit present.      Mental Status: She is alert and oriented to person, place, and time. Mental status is at baseline.      Gait: Gait abnormal (uses cane).   Psychiatric:         Mood and Affect: Mood normal.         Behavior: Behavior normal.       There were no vitals filed for this visit.   There is no height or weight on file to calculate BMI.        History     Past Medical History:  Past Medical History:   Diagnosis Date    Diabetes mellitus     Hyperlipidemia     Hypertension     Stroke        Past Surgical History:  Past Surgical History:   Procedure Laterality Date    COLONOSCOPY N/A 7/12/2023    Procedure: COLONOSCOPY;  Surgeon: Ray Sorensen MD;  Location: [facility];  Service: Endoscopy;  Laterality: N/A;  instr via portal  - PC  4/10/23 Daniel, instr via mail & portal, unable to tolerate Golytely- request Miralax/Gatorade - PC  5/30-pt r/s-new  instr portal-tb    INJECTION OF ANESTHETIC AGENT AROUND MEDIAL BRANCH NERVES INNERVATING LUMBAR FACET JOINT Bilateral 4/20/2023    Procedure: MBB #1 (B/L) L3,4,5;  Surgeon: Son Lara DO;  Location: [facility] OR;  Service: Pain Management;  Laterality: Bilateral;  ORAL XANAX    INJECTION OF ANESTHETIC AGENT AROUND MEDIAL BRANCH NERVES INNERVATING LUMBAR FACET JOINT Bilateral 5/5/2023    Procedure: MBB #2 (B/L) L3,4,5;  Surgeon: Son Lara DO;  Location: [facility] OR;  Service: Pain Management;  Laterality: Bilateral;  Oral Xanax    INJECTION OF JOINT Right 5/20/2022    Procedure: Right SI joint injection;  Surgeon: Son Lara DO;  Location: [facility] OR;  Service: Pain Management;  Laterality: Right;    INJECTION, SACROILIAC JOINT Right 12/19/2023    Procedure: RT SI joint Inj;  Surgeon: Son Lara DO;  Location: [facility];  Service: Pain Management;  Laterality: Right;  oral    INSERTION OF IMPLANTABLE LOOP RECORDER Left 6/21/2024    Procedure: Insertion, Implantable Loop Recorder;  Surgeon: Hunter Rich MD;  Location: [facility];  Service: Cardiology;  Laterality: Left;  CVA, [place]R, [place], [place], [place], 3 Prep    INSERTION OF IMPLANTABLE LOOP RECORDER Left 8/30/2024    Procedure: Insertion, Implantable Loop Recorder;  Surgeon: Hunter Rich MD;  Location: [facility];  Service: Cardiology;  Laterality: Left;  RODNEY GONZÁLES MDT, RN [place], [place], 3 Prep    RADIOFREQUENCY ABLATION OF LUMBAR MEDIAL BRANCH NERVE AT SINGLE LEVEL Bilateral 5/19/2023    Procedure: RFA (B/L) L3,4,5;  Surgeon: Son Lara DO;  Location: [facility];  Service: Pain Management;  Laterality: Bilateral;    REMOVAL OF IMPLANTABLE LOOP RECORDER N/A 7/10/2024    Procedure: REMOVAL, IMPLANTABLE LOOP RECORDER;  Surgeon: Hunter Rich MD;  Location: [facility];  Service: Cardiology;  Laterality: N/A;       Social History:  Social History[1]    Family History:  Family History   Problem  Relation Name Age of Onset    Kidney disease Mother      Heart disease Mother      Cancer Father      Hypertension Brother      Hypertension Daughter      Cancer Maternal Aunt         Allergies and Medications: (updated and reviewed)  Review of patient's allergies indicates:   Allergen Reactions    Ampicillin Rash    Darvocet a500 [propoxyphene n-acetaminophen] Other (See Comments)     shaky     Current Medications[2]    Patient Care Team:  Teri Soto DO as PCP - General (Family Medicine)  Tracie Kessler LPN as Care Coordinator  Lb Tracy OD (Optometry)  Sariah Pfeiffer OD as Consulting Physician (Optometry)  Kamar Nye PharmD as Pharmacist  Rodrick Mclean MA as Community Health Worker  Kamini Alonso LPN as Licensed Practical Nurse       - The patient was sent an After Visit Summary virtually that lists all medications with directions, allergies, education, orders placed during this encounter and follow-up instructions.      - I have reviewed the patient's medical information including past medical, family, and social history sections including the medications and allergies.      - We discussed the patient's current medications.     This note was created by combination of typed  and MModal dictation.  Transcription errors may be present.  If there are any questions, please contact me.                     MARCOS Rivera         [1]   Social History  Socioeconomic History    Marital status:    Tobacco Use    Smoking status: Former     Current packs/day: 0.00     Types: Cigarettes     Quit date: 2/6/2022     Years since quitting: 3.1     Passive exposure: Past    Smokeless tobacco: Never    Tobacco comments:     Patient Quit Smoking on 02/06/2022.   Substance and Sexual Activity    Alcohol use: Not Currently    Drug use: No    Sexual activity: Not Currently     Partners: Male     Social Drivers of Health     Financial Resource Strain: Low Risk  (4/11/2025)  "   Overall Financial Resource Strain (CARDIA)     Difficulty of Paying Living Expenses: Not very hard   Recent Concern: Financial Resource Strain - High Risk (1/18/2025)    Overall Financial Resource Strain (CARDIA)     Difficulty of Paying Living Expenses: Hard   Food Insecurity: Food Insecurity Present (4/11/2025)    Hunger Vital Sign     Worried About Running Out of Food in the Last Year: Sometimes true     Ran Out of Food in the Last Year: Sometimes true   Transportation Needs: No Transportation Needs (4/11/2025)    PRAPARE - Transportation     Lack of Transportation (Medical): No     Lack of Transportation (Non-Medical): No   Physical Activity: Inactive (3/31/2025)    Exercise Vital Sign     Days of Exercise per Week: 0 days     Minutes of Exercise per Session: 0 min   Stress: No Stress Concern Present (4/11/2025)    [employer] [place] [employer] - Occupational Stress Questionnaire     Feeling of Stress : Not at all   Housing Stability: Low Risk  (4/11/2025)    Housing Stability Vital Sign     Unable to Pay for Housing in the Last Year: No     Homeless in the Last Year: No   Recent Concern: Housing Stability - High Risk (1/18/2025)    Housing Stability Vital Sign     Unable to Pay for Housing in the Last Year: Yes     Homeless in the Last Year: No   [2]   Current Outpatient Medications   Medication Sig Dispense Refill    ACCU-CHEK GUIDE GLUCOSE METER [facility] TO CHECK BLOOD GLUCOSE DAILY, TO USE WITH INSURANCE PREFERRED METER      ascorbic acid, vitamin C, (VITAMIN C) 500 MG tablet Take 500 mg by mouth once daily.      aspirin (ECOTRIN) 81 MG EC tablet Take 1 tablet (81 mg total) by mouth once daily. 30 tablet 3    atorvastatin (LIPITOR) 80 MG tablet TAKE 1 TABLET (80 MG TOTAL) BY MOUTH ONCE DAILY. 90 tablet 2    BD VEO INSULIN SYRINGE UF 1/2 mL 31 gauge x 15/64" Syrg USE 1 SYRINGE BY [facility].(NON-DRUG COMBO ROUTE) ROUTE 2 (TWO) TIMES A DAY.      blood sugar diagnostic Strp To check BG daily, to use with " "insurance preferred meter 200 each 11    blood sugar diagnostic Strp To check BG daily, to use with insurance preferred meter 200 each 11    blood-glucose meter,continuous (DEXCOM G6 ) Misc 1 each by Misc.(Non-Drug; Combo Route) route 2 (two) times daily. 1 each 2    blood-glucose sensor (DEXCOM G7 SENSOR) Denisse 1 Device by Misc.(Non-Drug; Combo Route) route 2 (two) times daily. 3 each 3    cyclobenzaprine (FLEXERIL) 10 MG tablet TAKE 1 TABLET (10 MG TOTAL) BY MOUTH 2 (TWO) TIMES DAILY AS NEEDED FOR MUSCLE SPASMS (BACK PAIN). 180 tablet 1    empagliflozin (JARDIANCE) 25 mg tablet Take 1 tablet (25 mg total) by mouth once daily. 30 tablet 11    evolocumab (REPATHA SURECLICK) 140 mg/mL PnIj Inject 1 mL (140 mg total) into the skin every 14 (fourteen) days. 2 mL 11    insulin syringe-needle U-100 0.5 mL 31 gauge x 5/16" Syrg 60 each by Misc.(Non-Drug; Combo Route) route 2 (two) times a day. (Patient not taking: Reported on 4/10/2025) 60 each 11    JARDIANCE 10 mg tablet Take 10 mg by mouth.      lancets Misc To check BG daily, to use with insurance preferred meter 200 each 11    lancets Misc To check BG daily, to use with insurance preferred meter 200 each 11    LORazepam (ATIVAN) 0.5 MG tablet Take 1 tablet (0.5 mg total) by mouth once. for 1 dose 1 tablet 0    meclizine (ANTIVERT) 25 mg tablet Take 1 tablet (25 mg total) by mouth 2 (two) times daily as needed for Dizziness. 30 tablet 0    metoprolol succinate (TOPROL-XL) 25 MG 24 hr tablet Take 0.5 tablets (12.5 mg total) by mouth once daily. 45 tablet 3    multivitamin (THERAGRAN) per tablet Take 1 tablet by mouth once daily.      promethazine-dextromethorphan (PROMETHAZINE-DM) 6.25-15 mg/5 mL Syrp Take 5 mLs by mouth nightly as needed (cough). 118 mL 0     No current facility-administered medications for this visit.     "

## 2025-04-16 NOTE — Clinical Note
Hi Ms. Perrin,  I know she's coming to see you tomorrow, and I told her I'd message you. I reviewed her CT with her today on our virtual visit, and told her I'd defer to you on whether you thought she needed an MRI. I think her falls may be more related to BP and dehydration, but I'm glad she's seeing you for follow up to reassess her. Thanks in advance for seeing her!  All the best, Patricia

## 2025-04-16 NOTE — TELEPHONE ENCOUNTER
----- Message from [employer] sent at 4/15/2025 10:02 AM CDT -----  Regarding: Klarissa for [employer]  Type: Patient Call BackWho called:Klarissa for [employer]What is the request in detail:[employer] states that they need to speak with you in regards to the equipment that the patient needs. Can the clinic reply by [facility]? NoWould the patient rather a call back or a response via [facility]? Call [facility] call back number:[phone] Information:Thank you.

## 2025-04-16 NOTE — TELEPHONE ENCOUNTER
Returned call to [employer] , the insurance company asked the provider's office to call patient and have patient call [employer] to update her address.Called patient and patient stated she updated her address with [employer] on yesterday (4/15/25).

## 2025-04-17 ENCOUNTER — OFFICE VISIT (OUTPATIENT)
Dept: NEUROLOGY | Facility: CLINIC | Age: 75
End: 2025-04-17
Payer: MEDICARE

## 2025-04-17 ENCOUNTER — LAB VISIT (OUTPATIENT)
Dept: LAB | Facility: HOSPITAL | Age: 75
End: 2025-04-17
Payer: MEDICARE

## 2025-04-17 VITALS
BODY MASS INDEX: 25.44 KG/M2 | HEIGHT: 67 IN | HEART RATE: 80 BPM | SYSTOLIC BLOOD PRESSURE: 119 MMHG | WEIGHT: 162.06 LBS | OXYGEN SATURATION: 99 % | DIASTOLIC BLOOD PRESSURE: 62 MMHG

## 2025-04-17 DIAGNOSIS — Z09 HOSPITAL DISCHARGE FOLLOW-UP: ICD-10-CM

## 2025-04-17 DIAGNOSIS — R74.8 ABNORMAL LEVELS OF OTHER SERUM ENZYMES: ICD-10-CM

## 2025-04-17 DIAGNOSIS — R29.6 FREQUENT FALLS: ICD-10-CM

## 2025-04-17 DIAGNOSIS — M54.16 LUMBAR RADICULOPATHY, CHRONIC: Primary | ICD-10-CM

## 2025-04-17 DIAGNOSIS — R20.2 PARESTHESIAS: ICD-10-CM

## 2025-04-17 DIAGNOSIS — R79.9 ABNORMAL FINDING OF BLOOD CHEMISTRY, UNSPECIFIED: ICD-10-CM

## 2025-04-17 DIAGNOSIS — R26.89 IMPAIRED GAIT AND MOBILITY: ICD-10-CM

## 2025-04-17 LAB
ABSOLUTE EOSINOPHIL (OHS): 0.15 K/UL
ABSOLUTE MONOCYTE (OHS): 0.48 K/UL (ref 0.3–1)
ABSOLUTE NEUTROPHIL COUNT (OHS): 3.23 K/UL (ref 1.8–7.7)
ALBUMIN SERPL BCP-MCNC: 3.3 G/DL (ref 3.5–5.2)
ALP SERPL-CCNC: 73 UNIT/L (ref 40–150)
ALT SERPL W/O P-5'-P-CCNC: 28 UNIT/L (ref 10–44)
ANION GAP (OHS): 11 MMOL/L (ref 8–16)
AST SERPL-CCNC: 14 UNIT/L (ref 11–45)
BASOPHILS # BLD AUTO: 0.03 K/UL
BASOPHILS NFR BLD AUTO: 0.6 %
BILIRUB SERPL-MCNC: 0.3 MG/DL (ref 0.1–1)
BNP SERPL-MCNC: 1551 PG/ML (ref 0–99)
BUN SERPL-MCNC: 43 MG/DL (ref 8–23)
CALCIUM SERPL-MCNC: 8.6 MG/DL (ref 8.7–10.5)
CHLORIDE SERPL-SCNC: 110 MMOL/L (ref 95–110)
CO2 SERPL-SCNC: 22 MMOL/L (ref 23–29)
CREAT SERPL-MCNC: 1.8 MG/DL (ref 0.5–1.4)
ERYTHROCYTE [DISTWIDTH] IN BLOOD BY AUTOMATED COUNT: 18.6 % (ref 11.5–14.5)
GFR SERPLBLD CREATININE-BSD FMLA CKD-EPI: 30 ML/MIN/1.73/M2
GLUCOSE SERPL-MCNC: 131 MG/DL (ref 70–110)
HCT VFR BLD AUTO: 37 % (ref 37–48.5)
HGB BLD-MCNC: 10.7 GM/DL (ref 12–16)
IMM GRANULOCYTES # BLD AUTO: 0.06 K/UL (ref 0–0.04)
IMM GRANULOCYTES NFR BLD AUTO: 1.2 % (ref 0–0.5)
LYMPHOCYTES # BLD AUTO: 1.21 K/UL (ref 1–4.8)
MCH RBC QN AUTO: 24.5 PG (ref 27–31)
MCHC RBC AUTO-ENTMCNC: 28.9 G/DL (ref 32–36)
MCV RBC AUTO: 85 FL (ref 82–98)
NUCLEATED RBC (/100WBC) (OHS): 1 /100 WBC
PLATELET # BLD AUTO: 296 K/UL (ref 150–450)
PMV BLD AUTO: 10.6 FL (ref 9.2–12.9)
POTASSIUM SERPL-SCNC: 5 MMOL/L (ref 3.5–5.1)
PROT SERPL-MCNC: 7.2 GM/DL (ref 6–8.4)
RBC # BLD AUTO: 4.36 M/UL (ref 4–5.4)
RELATIVE EOSINOPHIL (OHS): 2.9 %
RELATIVE LYMPHOCYTE (OHS): 23.4 % (ref 18–48)
RELATIVE MONOCYTE (OHS): 9.3 % (ref 4–15)
RELATIVE NEUTROPHIL (OHS): 62.6 % (ref 38–73)
SODIUM SERPL-SCNC: 143 MMOL/L (ref 136–145)
VIT B12 SERPL-MCNC: 378 PG/ML (ref 210–950)
WBC # BLD AUTO: 5.16 K/UL (ref 3.9–12.7)

## 2025-04-17 PROCEDURE — 4010F ACE/ARB THERAPY RXD/TAKEN: CPT | Mod: HCNC,CPTII,S$GLB,

## 2025-04-17 PROCEDURE — 3078F DIAST BP <80 MM HG: CPT | Mod: HCNC,CPTII,S$GLB,

## 2025-04-17 PROCEDURE — 1126F AMNT PAIN NOTED NONE PRSNT: CPT | Mod: HCNC,CPTII,S$GLB,

## 2025-04-17 PROCEDURE — 82607 VITAMIN B-12: CPT | Mod: HCNC

## 2025-04-17 PROCEDURE — 3061F NEG MICROALBUMINURIA REV: CPT | Mod: HCNC,CPTII,S$GLB,

## 2025-04-17 PROCEDURE — G2211 COMPLEX E/M VISIT ADD ON: HCPCS | Mod: HCNC,S$GLB,,

## 2025-04-17 PROCEDURE — 1159F MED LIST DOCD IN RCRD: CPT | Mod: HCNC,CPTII,S$GLB,

## 2025-04-17 PROCEDURE — 85025 COMPLETE CBC W/AUTO DIFF WBC: CPT | Mod: HCNC

## 2025-04-17 PROCEDURE — 84207 ASSAY OF VITAMIN B-6: CPT | Mod: HCNC

## 2025-04-17 PROCEDURE — 3074F SYST BP LT 130 MM HG: CPT | Mod: HCNC,CPTII,S$GLB,

## 2025-04-17 PROCEDURE — 3288F FALL RISK ASSESSMENT DOCD: CPT | Mod: HCNC,CPTII,S$GLB,

## 2025-04-17 PROCEDURE — 1111F DSCHRG MED/CURRENT MED MERGE: CPT | Mod: HCNC,CPTII,S$GLB,

## 2025-04-17 PROCEDURE — 3066F NEPHROPATHY DOC TX: CPT | Mod: HCNC,CPTII,S$GLB,

## 2025-04-17 PROCEDURE — 3008F BODY MASS INDEX DOCD: CPT | Mod: HCNC,CPTII,S$GLB,

## 2025-04-17 PROCEDURE — 1100F PTFALLS ASSESS-DOCD GE2>/YR: CPT | Mod: HCNC,CPTII,S$GLB,

## 2025-04-17 PROCEDURE — 80053 COMPREHEN METABOLIC PANEL: CPT | Mod: HCNC

## 2025-04-17 PROCEDURE — 99215 OFFICE O/P EST HI 40 MIN: CPT | Mod: HCNC,S$GLB,,

## 2025-04-17 PROCEDURE — 84425 ASSAY OF VITAMIN B-1: CPT | Mod: HCNC

## 2025-04-17 PROCEDURE — 3052F HG A1C>EQUAL 8.0%<EQUAL 9.0%: CPT | Mod: HCNC,CPTII,S$GLB,

## 2025-04-17 PROCEDURE — 83921 ORGANIC ACID SINGLE QUANT: CPT | Mod: HCNC

## 2025-04-17 PROCEDURE — 83880 ASSAY OF NATRIURETIC PEPTIDE: CPT | Mod: HCNC

## 2025-04-17 PROCEDURE — 36415 COLL VENOUS BLD VENIPUNCTURE: CPT | Mod: HCNC,PO

## 2025-04-17 PROCEDURE — 99999 PR PBB SHADOW E&M-EST. PATIENT-LVL IV: CPT | Mod: PBBFAC,HCNC,,

## 2025-04-17 RX ORDER — LORAZEPAM 1 MG/1
1 TABLET ORAL
Qty: 1 TABLET | Refills: 0 | Status: SHIPPED | OUTPATIENT
Start: 2025-04-18 | End: 2025-04-23

## 2025-04-17 NOTE — PROGRESS NOTES
[facility] VISIT    Chief Complaint and Duration     Chief Complaint   Patient presents with    Fall    Dizziness    Extremity Weakness    for 3+ years.    History of Present Illness     Joanne Peña is a 74 y.o. right handed female with a history of multiple medical diagnoses as listed below that presents for history of falls.     Referral received from primary care    Significant past medical history of diabetes, hyperlipidemia, hypertension, stroke, impaired gait and mobility, overweight, CKD stage 4    Presents to clinic visit with family    Patient reports history of falls recent ED visit 4 days ago for presyncopal symptoms.  At the time patient reported she has been lightheaded for the past week.  Patient monitored for greater than 24 hours.  Patient had loop recorder placed 9/24 for recurrent syncopal episodes and associated symptoms.  Ear exam with MESSI hypovolemia patient was on diuretic with food restrictions and antihypertensives with recent febrile illness related to cellulitis.  She received fluid replacement antihypertensives and Lasix held patient discharged to follow up with primary care provider for further management and monitoring.  Patient followed up with primary care provider antihypertensives and Lasix continues to be on hold.  Brain imaging performed CT scan with no acute findings chronic microvascular changes present.    On today patient reports overall feeling better.  In the last 30 days she has had 4 falls.  Majority of episodes are with position changes going from sit to stand.  She states with position change she can get lightheaded does not lose consciousness/blackout.  Patient also reports associated dizziness with lightheadedness and states that her legs give out.  She has no other focal neurologic deficits present with these episodes.  Episodes last for sec to minutes.  Patient reports 1 fall that was not associated with immediate position  change.  She states she was getting out of the car walked 4-5 feet and her legs get weak and gave out and she fell to her knees.  She denies any hearing, vision, involuntary muscle movements, loss of bowel or bladder, noxious stimuli.  She does endorse muscle cramping due to being on Lasix.  She does have a longstanding history of diabetes questionable bilateral feet neuropathy.  Patient reports numbness and tingling that occurs in his most prominent at night only in her feet.  Dizziness episodes last sec abates on its own.  Dizziness does not occur while patient is sitting only when patient goes from position change or walking greater than 4-5 feet.  Patient also reports at times when she is walking she can feel off-balance therefore she is using a single leg cane.  She denies any alcohol, tobacco or recreational drug use.  She is a former smoker.  Patient also reports she has chronic low back pain is being managed by pain management outside facility she has received oral, topical medications and also KRISTA treatments in the past.  MRI lumbar imaging reviewed from 2022 patient has multilevel degenerative changes moderate to severe.    Review of patient's allergies indicates:   Allergen Reactions    Ampicillin Rash    Darvocet a500 [propoxyphene n-acetaminophen] Other (See Comments)     shaky     Current Medications[1]    Medical History     Past Medical History:   Diagnosis Date    Diabetes mellitus     Hyperlipidemia     Hypertension     Stroke      Past Surgical History:   Procedure Laterality Date    COLONOSCOPY N/A 7/12/2023    Procedure: COLONOSCOPY;  Surgeon: Ray Sorensen MD;  Location: [facility];  Service: Endoscopy;  Laterality: N/A;  instr via portal  - PC  4/10/23 Daniel, instr via mail & portal, unable to tolerate Golytely- request Miralax/Gatorade - PC  5/30-pt r/s-new instr portal-tb    INJECTION OF ANESTHETIC AGENT AROUND MEDIAL BRANCH NERVES INNERVATING LUMBAR FACET JOINT Bilateral 4/20/2023     Procedure: MBB #1 (B/L) L3,4,5;  Surgeon: Son Lara DO;  Location: [facility] OR;  Service: Pain Management;  Laterality: Bilateral;  ORAL XANAX    INJECTION OF ANESTHETIC AGENT AROUND MEDIAL BRANCH NERVES INNERVATING LUMBAR FACET JOINT Bilateral 5/5/2023    Procedure: MBB #2 (B/L) L3,4,5;  Surgeon: Son Lara DO;  Location: [facility] OR;  Service: Pain Management;  Laterality: Bilateral;  Oral Xanax    INJECTION OF JOINT Right 5/20/2022    Procedure: Right SI joint injection;  Surgeon: Son Lara DO;  Location: [facility] OR;  Service: Pain Management;  Laterality: Right;    INJECTION, SACROILIAC JOINT Right 12/19/2023    Procedure: RT SI joint Inj;  Surgeon: Son Lara DO;  Location: [facility];  Service: Pain Management;  Laterality: Right;  oral    INSERTION OF IMPLANTABLE LOOP RECORDER Left 6/21/2024    Procedure: Insertion, Implantable Loop Recorder;  Surgeon: Hunter Rich MD;  Location: [facility] EP LAB;  Service: Cardiology;  Laterality: Left;  CVA, ILR, BSCI, [place], [place], 3 Prep    INSERTION OF IMPLANTABLE LOOP RECORDER Left 8/30/2024    Procedure: Insertion, Implantable Loop Recorder;  Surgeon: Hunter Rich MD;  Location: [facility] EP LAB;  Service: Cardiology;  Laterality: Left;  CVA, ILBENJIE,MDT, RN [place], [place], 3 Prep    RADIOFREQUENCY ABLATION OF LUMBAR MEDIAL BRANCH NERVE AT SINGLE LEVEL Bilateral 5/19/2023    Procedure: RFA (B/L) L3,4,5;  Surgeon: Son Lara DO;  Location: [facility];  Service: Pain Management;  Laterality: Bilateral;    REMOVAL OF IMPLANTABLE LOOP RECORDER N/A 7/10/2024    Procedure: REMOVAL, IMPLANTABLE LOOP RECORDER;  Surgeon: Hunter Rich MD;  Location: [facility] EP LAB;  Service: Cardiology;  Laterality: N/A;     Family History   Problem Relation Name Age of Onset    Kidney disease Mother      Heart disease Mother      Cancer Father      Hypertension Brother      Hypertension Daughter      Cancer Maternal Aunt       Social  History[2]    Exam     Vitals:    04/17/25 0957   BP: 119/62   Pulse: 80      Physical Exam:  General: Not in acute distress. Not ill-appearing.   HENT: Normocephalic and atraumatic. Moist mucous membranes.  Eyes: Conjunctivae normal.   Pulmonary: Pulmonary effort is normal.   Skin: Skin is warm and dry. No rashes.   Psychiatric: Mood normal.        Neurologic Exam   Mental status: oriented to person, place, and time  Attention: Normal. Concentration: normal.  Speech: speech is normal.  Cranial Nerves: EOMI intact, V1-V3 Facial sensation intact. Symmetric facies. Hearing grossly intact. Palate and uvula midline, symmetric. No tongue deviation. Trapezius strength intact.     Motor exam: bulk and tone normal. Strength 5/5 in bilateral upper extremities: deltoids, biceps, triceps, wrist flexion/extension, finger abduction/adduction. Strength 5/5 in bilateral lower extremities: hip flexion/extension, thigh adduction/abduction, knee flexion/extension, dorsiflexion/plantarflexion, foot eversion/inversion.    Reflexes: 2+ in bilateral upper extremities: biceps and brachiaradialis, 2+ in bilateral lower extremities: patellar and achilles  Plantar reflex: normal  Matos's/Clonus: negative    Sensory exam: light touch intact    Gait exam: normal  Romberg: negative  Coordination: normal    Tremor: none  Cogwheel rigidity: none    Labs and Imaging     Labs: reviewed  No results found for this or any previous visit (from the past 24 hours).    Thyroid normal  HgA1C%:  8.5  LDL:  133    Imaging:   I have personally reviewed the images performed.     HEAD CT W/O contrast:  04/15/2025  No acute large vascular territory infarct or intracranial hemorrhage identified.  If persistent neurologic deficit, MRI brain can be obtained.     Sequela of chronic microvascular ischemic change with cerebral volume loss.     BRAIN MRI lumbar spine without contrast 06/23/2022  Multilevel degenerative changes moderate spinal canal stenosis L4-L5  L5-S1 disc material descending right S1 nerve root    Other procedures: reviewed    Assessment and Plan     Problem List Items Addressed This Visit          Other    Impaired gait and mobility    Relevant Orders    MRI Lumbar Spine Without Contrast     Other Visit Diagnoses         Lumbar radiculopathy, chronic    -  Primary    Relevant Orders    MRI Lumbar Spine Without Contrast      Frequent falls        Relevant Orders    MRI Lumbar Spine Without Contrast      Paresthesias        Relevant Orders    Vitamin B1    Vitamin B12    Vitamin B6    Methylmalonic Acid, Serum      Abnormal levels of other serum enzymes        Relevant Orders    Vitamin B6          Mr. Burch is a 74-year-old female new to me who presents for recommendations and evaluation of frequent falls.  Patient has a history of syncope/presyncopal symptoms recently seen in ED findings unremarkable for neurologic concern patient was found to have MESSI and hypovolemia IV fluid replacement given antihypertensives and Lasix held patient followed up with primary care provider these medications are still on hold at this time.  Imaging from ED visit unremarkable for neurologic findings to contribute to patient's concerns. On today patient reports overall feeling better.  In the last 30 days she has had 4 falls.  Majority of episodes are with position changes going from sit to stand.  She states with position change she can get lightheaded does not lose consciousness/blackout.  Patient also reports associated dizziness and states that her legs give out.  She has no other focal neurologic deficits present with these episodes.  Episodes last for sec to minutes.      Patient reports only on 1 occasion where her legs gave out as she was in the middle of walking after 4-5 feet. She denies any hearing, vision, involuntary muscle movements, loss of bowel or bladder, noxious stimuli.  She does endorse muscle cramping due to being on Lasix.  She does have a  longstanding history of diabetes questionable bilateral feet neuropathy.  Patient reports numbness and tingling that occurs in his most prominent at night only in her feet.  Dizziness episodes last sec abates on its own.  Dizziness does not occur while patient is sitting. Patient also reports at times when she is walking she can feel off-balance therefore she is using a single leg cane.  She denies any alcohol, tobacco or recreational drug use.  She is a former smoker.  Patient also reports she has chronic low back pain is being managed by pain management outside facility she has received oral, topical medications and also KRISTA treatments in the past.  MRI lumbar imaging reviewed from 2022 patient has multilevel degenerative changes moderate to severe.  Additionally patient has history of cerebellar stroke.    Discussed multifactorial nature of patient's current complaint patient history of gait disturbances due to residual effects of stroke, no exercise, history of diabetes, vascular risk factors, chronic low back pain, kidney disturbances, and cardiac contributing factors. We will order Labs to evaluate if there is an underlying reversible cause of the patients complaint, MRI lumbar spine without contrast.  Spoke with the patient in great detail safety concerns requiring to be addressed/adjusted in regards to frequent falls.  Lengthy discussion in regards to radiculopathy/neuropathy as it contributes to patient's balance disturbance.  Discussed other causes of a peripheral form of dizziness that can come from a vestibular disorder such as an imbalance of the inner ear.  Also discussed non vestibular causes of dizziness which can be linked to a wide array of problems such as blood flow irregularities and or other cardiac issues.  High suspicion patient's positional dizziness is due to fluctuations in blood pressure kidney function and lacking hydration.  This too can contribute to patient's falls.  Educated on signs  and symptoms requiring patient to report to the nearest emergency room or activation of the EMS system.  As future plans after MRI imaging we will send patient to physical therapy for gait training and strengthening.  Discussed with the patient further workup of neuropathy with EMG.  Patient will continue discussion of these plans at follow up. Otherwise lifestyle modifications including diet, sleep hygiene, and exercise were discussed. Explained possible causes of patients paresthesias including but not limited to systemic illness vs idiopathic vs edema vs low back pathology vs shoe gear.     #Health Maintenance/Lifestyle Advice/vascular risk factor management  We have discussed the value in aggressively controlling vascular risk factors like hypertension, hyperlipidemia, obesity and diabetes SBP<130, LDL<100, A1C<7.0.  We discussed the need to optimize lifestyle choices including a heart-healthy diet (e.g., Mediterranean or DASH), increased cardiovascular exercise (goal 150 minutes of moderate-intensity per week), and stay cognitively and socially active.  We recommend the MIND diet, a combination of two healthy diets: the Mediterranean diet and the DASH (Dietary Approaches to Stop Hypertension) diet and includes a variety of brain-friendly foods to optimize cognitive health and longevity.    Questions and concerns were sought and answered to the patient's stated verbal satisfaction. The patient verbalizes understanding and agreement with the above stated treatment plan.      Visit today included increased complexity associated with the care of the episodic problem frequent falls, lumbar radiculopathy, paresthesias addressed and managing the longitudinal care of the patient due to the serious and/or complex managed problem(s) neurological signs vascular risk factors.    Thank you for allowing me to assist in Mrs. Joanne Peña 's care. If you have any questions, please contact clinic @ [phone] or use of  "portal messaging services via electronic medical record.    RON MadrigalC  [facility]  Department of Neurology- [facility]     [phone]    Follow-up: Follow up in about 4 weeks (around 5/15/2025).  MRI and blood work     Time spent on this encounter: 50 minutes. This includes face to face time and non-face to face time preparing to see the patient (eg, review of tests), obtaining and/or reviewing separately obtained history, documenting clinical information in the electronic or other health record, independently interpreting results and communicating results to the patient/family/caregiver, or care coordinator.     This note was created by combination of typed  and M-Modal dictation. Transcription and phonetic errors may be present.  If there are any questions, please contact me.         [1]   Current Outpatient Medications   Medication Sig Dispense Refill    ACCU-CHEK GUIDE GLUCOSE METER [facility] TO CHECK BLOOD GLUCOSE DAILY, TO USE WITH INSURANCE PREFERRED METER      ascorbic acid, vitamin C, (VITAMIN C) 500 MG tablet Take 500 mg by mouth once daily.      atorvastatin (LIPITOR) 80 MG tablet TAKE 1 TABLET (80 MG TOTAL) BY MOUTH ONCE DAILY. 90 tablet 2    BD VEO INSULIN SYRINGE UF 1/2 mL 31 gauge x 15/64" Syrg USE 1 SYRINGE BY [facility]NON-DRUG COMBO ROUTE) ROUTE 2 (TWO) TIMES A DAY.      blood sugar diagnostic Strp To check BG daily, to use with insurance preferred meter 200 each 11    blood sugar diagnostic Strp To check BG daily, to use with insurance preferred meter 200 each 11    blood-glucose meter,continuous (DEXCOM G6 ) Misc 1 each by Misc.(Non-Drug; Combo Route) route 2 (two) times daily. 1 each 2    blood-glucose sensor (DEXCOM G7 SENSOR) Denisse 1 Device by Misc.(Non-Drug; Combo Route) route 2 (two) times daily. 3 each 3    cyclobenzaprine (FLEXERIL) 10 MG tablet TAKE 1 TABLET (10 MG TOTAL) BY MOUTH 2 (TWO) TIMES DAILY AS NEEDED FOR MUSCLE SPASMS (BACK PAIN). 180 " "tablet 1    empagliflozin (JARDIANCE) 25 mg tablet Take 1 tablet (25 mg total) by mouth once daily. 30 tablet 11    evolocumab (REPATHA SURECLICK) 140 mg/mL PnIj Inject 1 mL (140 mg total) into the skin every 14 (fourteen) days. 2 mL 11    insulin syringe-needle U-100 0.5 mL 31 gauge x 5/16" Syrg 60 each by Misc.(Non-Drug; Combo Route) route 2 (two) times a day. 60 each 11    JARDIANCE 10 mg tablet Take 10 mg by mouth.      lancets Misc To check BG daily, to use with insurance preferred meter 200 each 11    lancets Misc To check BG daily, to use with insurance preferred meter 200 each 11    metoprolol succinate (TOPROL-XL) 25 MG 24 hr tablet Take 0.5 tablets (12.5 mg total) by mouth once daily. 45 tablet 3    multivitamin (THERAGRAN) per tablet Take 1 tablet by mouth once daily.      promethazine-dextromethorphan (PROMETHAZINE-DM) 6.25-15 mg/5 mL Syrp Take 5 mLs by mouth nightly as needed (cough). 118 mL 0    aspirin (ECOTRIN) 81 MG EC tablet Take 1 tablet (81 mg total) by mouth once daily. 30 tablet 3    LORazepam (ATIVAN) 0.5 MG tablet Take 1 tablet (0.5 mg total) by mouth once. for 1 dose 1 tablet 0    meclizine (ANTIVERT) 25 mg tablet Take 1 tablet (25 mg total) by mouth 2 (two) times daily as needed for Dizziness. 30 tablet 0     No current facility-administered medications for this visit.   [2]   Social History  Socioeconomic History    Marital status:    Tobacco Use    Smoking status: Former     Current packs/day: 0.00     Types: Cigarettes     Quit date: 2/6/2022     Years since quitting: 3.1     Passive exposure: Past    Smokeless tobacco: Never    Tobacco comments:     Patient Quit Smoking on 02/06/2022.   Substance and Sexual Activity    Alcohol use: Not Currently    Drug use: No    Sexual activity: Not Currently     Partners: Male     Social Drivers of Health     Financial Resource Strain: Low Risk  (4/11/2025)    Overall Financial Resource Strain (CARDIA)     Difficulty of Paying Living Expenses: " Not very hard   Recent Concern: Financial Resource Strain - High Risk (1/18/2025)    Overall Financial Resource Strain (CARDIA)     Difficulty of Paying Living Expenses: Hard   Food Insecurity: Food Insecurity Present (4/11/2025)    Hunger Vital Sign     Worried About Running Out of Food in the Last Year: Sometimes true     Ran Out of Food in the Last Year: Sometimes true   Transportation Needs: No Transportation Needs (4/11/2025)    PRAPARE - Transportation     Lack of Transportation (Medical): No     Lack of Transportation (Non-Medical): No   Physical Activity: Inactive (3/31/2025)    Exercise Vital Sign     Days of Exercise per Week: 0 days     Minutes of Exercise per Session: 0 min   Stress: No Stress Concern Present (4/11/2025)    [employer] [place] [employer] - Occupational Stress Questionnaire     Feeling of Stress : Not at all   Housing Stability: Low Risk  (4/11/2025)    Housing Stability Vital Sign     Unable to Pay for Housing in the Last Year: No     Homeless in the Last Year: No   Recent Concern: Housing Stability - High Risk (1/18/2025)    Housing Stability Vital Sign     Unable to Pay for Housing in the Last Year: Yes     Homeless in the Last Year: No

## 2025-04-18 ENCOUNTER — RESULTS FOLLOW-UP (OUTPATIENT)
Dept: FAMILY MEDICINE | Facility: CLINIC | Age: 75
End: 2025-04-18

## 2025-04-18 ENCOUNTER — TELEPHONE (OUTPATIENT)
Dept: FAMILY MEDICINE | Facility: CLINIC | Age: 75
End: 2025-04-18
Payer: MEDICARE

## 2025-04-18 DIAGNOSIS — I50.22 CHRONIC SYSTOLIC (CONGESTIVE) HEART FAILURE: Primary | ICD-10-CM

## 2025-04-18 RX ORDER — FUROSEMIDE 20 MG/1
20 TABLET ORAL 2 TIMES DAILY
Qty: 60 TABLET | Refills: 3 | Status: SHIPPED | OUTPATIENT
Start: 2025-04-18 | End: 2025-08-16

## 2025-04-18 NOTE — TELEPHONE ENCOUNTER
Left voicemail for patient regarding labs; will restart lasix 20mg BID given elevated BNP and kidney function back to baseline. Will recheck lab work next week for close monitoring.

## 2025-04-21 ENCOUNTER — HOSPITAL ENCOUNTER (OUTPATIENT)
Dept: RADIOLOGY | Facility: HOSPITAL | Age: 75
Discharge: HOME | End: 2025-04-21
Payer: MEDICARE

## 2025-04-21 DIAGNOSIS — R26.89 IMPAIRED GAIT AND MOBILITY: ICD-10-CM

## 2025-04-21 DIAGNOSIS — M54.16 LUMBAR RADICULOPATHY, CHRONIC: ICD-10-CM

## 2025-04-21 DIAGNOSIS — R29.6 FREQUENT FALLS: ICD-10-CM

## 2025-04-21 PROCEDURE — 72148 MRI LUMBAR SPINE W/O DYE: CPT | Mod: TC,HCNC

## 2025-04-21 PROCEDURE — 72148 MRI LUMBAR SPINE W/O DYE: CPT | Mod: 26,HCNC,, | Performed by: INTERNAL MEDICINE

## 2025-04-22 ENCOUNTER — PATIENT OUTREACH (OUTPATIENT)
Facility: OTHER | Age: 75
End: 2025-04-22
Payer: MEDICARE

## 2025-04-22 LAB
W METHYLMALONIC ACID: 1.05 UMOL/L
W VITAMIN B6: 3 UG/L

## 2025-04-22 NOTE — PROGRESS NOTES
Patient completed a primary care appointment on 4/16/25 and a neurology appointment on 4/17/25. A follow-up call was placed to assess additional needs, but there was no answer. A voicemail was left requesting a return call. Assistance will be provided as needed if the patient returns the call.

## 2025-04-23 ENCOUNTER — PATIENT MESSAGE (OUTPATIENT)
Dept: NEUROLOGY | Facility: CLINIC | Age: 75
End: 2025-04-23
Payer: MEDICARE

## 2025-04-29 ENCOUNTER — PATIENT MESSAGE (OUTPATIENT)
Dept: FAMILY MEDICINE | Facility: CLINIC | Age: 75
End: 2025-04-29
Payer: MEDICARE

## 2025-04-29 DIAGNOSIS — N18.9 ACUTE KIDNEY INJURY SUPERIMPOSED ON CHRONIC KIDNEY DISEASE: Primary | ICD-10-CM

## 2025-04-29 DIAGNOSIS — N17.9 ACUTE KIDNEY INJURY SUPERIMPOSED ON CHRONIC KIDNEY DISEASE: Primary | ICD-10-CM

## 2025-04-30 ENCOUNTER — PATIENT MESSAGE (OUTPATIENT)
Dept: FAMILY MEDICINE | Facility: CLINIC | Age: 75
End: 2025-04-30
Payer: MEDICARE

## 2025-04-30 ENCOUNTER — LAB VISIT (OUTPATIENT)
Dept: LAB | Facility: HOSPITAL | Age: 75
End: 2025-04-30
Payer: MEDICARE

## 2025-04-30 DIAGNOSIS — N17.9 ACUTE KIDNEY INJURY SUPERIMPOSED ON CHRONIC KIDNEY DISEASE: ICD-10-CM

## 2025-04-30 DIAGNOSIS — N18.9 ACUTE KIDNEY INJURY SUPERIMPOSED ON CHRONIC KIDNEY DISEASE: ICD-10-CM

## 2025-04-30 LAB
ALBUMIN SERPL BCP-MCNC: 3.6 G/DL (ref 3.5–5.2)
ANION GAP (OHS): 12 MMOL/L (ref 8–16)
BUN SERPL-MCNC: 39 MG/DL (ref 8–23)
CALCIUM SERPL-MCNC: 9.2 MG/DL (ref 8.7–10.5)
CHLORIDE SERPL-SCNC: 109 MMOL/L (ref 95–110)
CO2 SERPL-SCNC: 21 MMOL/L (ref 23–29)
CREAT SERPL-MCNC: 2.1 MG/DL (ref 0.5–1.4)
FERRITIN SERPL-MCNC: 24 NG/ML (ref 20–300)
GFR SERPLBLD CREATININE-BSD FMLA CKD-EPI: 25 ML/MIN/1.73/M2
GLUCOSE SERPL-MCNC: 120 MG/DL (ref 70–110)
IRON SATN MFR SERPL: 15 % (ref 20–50)
IRON SERPL-MCNC: 47 UG/DL (ref 30–160)
PHOSPHATE SERPL-MCNC: 3.6 MG/DL (ref 2.7–4.5)
POTASSIUM SERPL-SCNC: 5.2 MMOL/L (ref 3.5–5.1)
PTH-INTACT SERPL-MCNC: 537.6 PG/ML (ref 9–77)
SODIUM SERPL-SCNC: 142 MMOL/L (ref 136–145)
TIBC SERPL-MCNC: 324 UG/DL (ref 250–450)
TRANSFERRIN SERPL-MCNC: 219 MG/DL (ref 200–375)
URATE SERPL-MCNC: 9.7 MG/DL (ref 2.4–5.7)

## 2025-04-30 PROCEDURE — 80069 RENAL FUNCTION PANEL: CPT | Mod: HCNC

## 2025-04-30 PROCEDURE — 84550 ASSAY OF BLOOD/URIC ACID: CPT | Mod: HCNC

## 2025-04-30 PROCEDURE — 83970 ASSAY OF PARATHORMONE: CPT | Mod: HCNC

## 2025-04-30 PROCEDURE — 82728 ASSAY OF FERRITIN: CPT | Mod: HCNC

## 2025-04-30 PROCEDURE — 36415 COLL VENOUS BLD VENIPUNCTURE: CPT | Mod: HCNC,PO

## 2025-04-30 PROCEDURE — 84466 ASSAY OF TRANSFERRIN: CPT | Mod: HCNC

## 2025-05-01 ENCOUNTER — TELEPHONE (OUTPATIENT)
Dept: CARDIOLOGY | Facility: CLINIC | Age: 75
End: 2025-05-01
Payer: MEDICARE

## 2025-05-01 ENCOUNTER — OFFICE VISIT (OUTPATIENT)
Dept: CARDIOLOGY | Facility: CLINIC | Age: 75
End: 2025-05-01
Payer: MEDICARE

## 2025-05-01 VITALS
DIASTOLIC BLOOD PRESSURE: 82 MMHG | BODY MASS INDEX: 26.73 KG/M2 | OXYGEN SATURATION: 96 % | RESPIRATION RATE: 15 BRPM | SYSTOLIC BLOOD PRESSURE: 114 MMHG | WEIGHT: 170.31 LBS | HEART RATE: 87 BPM | HEIGHT: 67 IN

## 2025-05-01 DIAGNOSIS — I50.20 HFREF (HEART FAILURE WITH REDUCED EJECTION FRACTION): ICD-10-CM

## 2025-05-01 DIAGNOSIS — I42.8 NICM (NONISCHEMIC CARDIOMYOPATHY): Primary | ICD-10-CM

## 2025-05-01 DIAGNOSIS — R06.02 SOB (SHORTNESS OF BREATH): ICD-10-CM

## 2025-05-01 DIAGNOSIS — N18.30 STAGE 3 CHRONIC KIDNEY DISEASE, UNSPECIFIED WHETHER STAGE 3A OR 3B CKD: ICD-10-CM

## 2025-05-01 DIAGNOSIS — I44.7 LBBB (LEFT BUNDLE BRANCH BLOCK): ICD-10-CM

## 2025-05-01 PROCEDURE — 99999 PR PBB SHADOW E&M-EST. PATIENT-LVL V: CPT | Mod: PBBFAC,HCNC,, | Performed by: INTERNAL MEDICINE

## 2025-05-01 RX ORDER — METOPROLOL SUCCINATE 25 MG/1
25 TABLET, EXTENDED RELEASE ORAL DAILY
Qty: 90 TABLET | Refills: 3 | Status: SHIPPED | OUTPATIENT
Start: 2025-05-01 | End: 2026-05-01

## 2025-05-01 NOTE — PROGRESS NOTES
CARDIOVASCULAR PROGRESS NOTE    REASON FOR CONSULT:   Joanne Peña is a 74 y.o. female who presents for follow visit.    She was last time seen in clinic on March 2025.    She used to follow up at [facility] for her cardiology issues and then switched her cardiology care to [facility] cardiology.      She still follows up with EP at [facility].    HISTORY OF PRESENT ILLNESS:     She has past medical history of hypertension, hyperlipidemia, cardioembolic stroke (no documented atrial fibrillation on monitoring so far - has ILR in place), nonischemic cardiomyopathy with most recent EF around 30-35%, CKD stage 3 with baseline creatinine around 1.9-2.3 and type 2 diabetes mellitus.    She is coming to hospital after recent hospitalization for acute on chronic kidney disease.  MESSI was found to be due to hypovolemia.  Was given IV fluid resuscitation and creatinine improved.  He was discharged home and was allowed to drink adequate amount of fluids.  Hydralazine, losartan, Lasix and Aldactone were stopped at discharge.    She is a retired nurse by profession.  She quit smoking in 2023.  She does not drink EtOH.    PAST MEDICAL HISTORY:     Past Medical History:   Diagnosis Date    Diabetes mellitus     Hyperlipidemia     Hypertension     Stroke        PAST SURGICAL HISTORY:     Past Surgical History:   Procedure Laterality Date    COLONOSCOPY N/A 7/12/2023    Procedure: COLONOSCOPY;  Surgeon: Ray Sorensen MD;  Location: [facility] ENDO;  Service: Endoscopy;  Laterality: N/A;  instr via portal  - PC  4/10/23 Daniel, instr via mail & portal, unable to tolerate Golytely- request Miralax/Gatorade - PC  5/30-pt r/s-new instr portal-tb    INJECTION OF ANESTHETIC AGENT AROUND MEDIAL BRANCH NERVES INNERVATING LUMBAR FACET JOINT Bilateral 4/20/2023    Procedure: MBB #1 (B/L) L3,4,5;  Surgeon: Son Lara DO;  Location: [facility] OR;  Service: Pain Management;  Laterality: Bilateral;  ORAL XANAX     INJECTION OF ANESTHETIC AGENT AROUND MEDIAL BRANCH NERVES INNERVATING LUMBAR FACET JOINT Bilateral 5/5/2023    Procedure: MBB #2 (B/L) L3,4,5;  Surgeon: Son Lara DO;  Location: [facility] OR;  Service: Pain Management;  Laterality: Bilateral;  Oral Xanax    INJECTION OF JOINT Right 5/20/2022    Procedure: Right SI joint injection;  Surgeon: Son Lara DO;  Location: [facility] OR;  Service: Pain Management;  Laterality: Right;    INJECTION, SACROILIAC JOINT Right 12/19/2023    Procedure: RT SI joint Inj;  Surgeon: Son Lara DO;  Location: [facility] PAIN MANAGEMENT;  Service: Pain Management;  Laterality: Right;  oral    INSERTION OF IMPLANTABLE LOOP RECORDER Left 6/21/2024    Procedure: Insertion, Implantable Loop Recorder;  Surgeon: Hunter Rich MD;  Location: [facility] EP LAB;  Service: Cardiology;  Laterality: Left;  CVA, ILR, BSCI, Local, NE, 3 Prep    INSERTION OF IMPLANTABLE LOOP RECORDER Left 8/30/2024    Procedure: Insertion, Implantable Loop Recorder;  Surgeon: Hunter Rich MD;  Location: [facility] EP LAB;  Service: Cardiology;  Laterality: Left;  RODNEY GONZÁLES MDT, RN Sedate, NE, 3 Prep    RADIOFREQUENCY ABLATION OF LUMBAR MEDIAL BRANCH NERVE AT SINGLE LEVEL Bilateral 5/19/2023    Procedure: RFA (B/L) L3,4,5;  Surgeon: Son Lara DO;  Location: [facility] PAIN MANAGEMENT;  Service: Pain Management;  Laterality: Bilateral;    REMOVAL OF IMPLANTABLE LOOP RECORDER N/A 7/10/2024    Procedure: REMOVAL, IMPLANTABLE LOOP RECORDER;  Surgeon: Hunter Rich MD;  Location: [facility] EP LAB;  Service: Cardiology;  Laterality: N/A;       ALLERGIES AND MEDICATION:     Review of patient's allergies indicates:   Allergen Reactions    Ampicillin Rash    Darvocet a500 [propoxyphene n-acetaminophen] Other (See Comments)     shaky        Medication List            Accurate as of May 1, 2025  2:57 PM. If you have any questions, ask your nurse or doctor.                CHANGE how you take these medications   "    empagliflozin 25 mg tablet  Commonly known as: JARDIANCE  Take 1 tablet (25 mg total) by mouth once daily.  What changed: Another medication with the same name was removed. Continue taking this medication, and follow the directions you see here.  Changed by: Álvaro Rudd MD     metoprolol succinate 25 MG 24 hr tablet  Commonly known as: TOPROL-XL  Take 1 tablet (25 mg total) by mouth once daily.  What changed: how much to take  Changed by: Álvaro Rudd MD            CONTINUE taking these medications      ACCU-CHEK GUIDE GLUCOSE METER Misc  Generic drug: blood-glucose meter     ascorbic acid (vitamin C) 500 MG tablet  Commonly known as: VITAMIN C     aspirin 81 MG EC tablet  Commonly known as: ECOTRIN  Take 1 tablet (81 mg total) by mouth once daily.     atorvastatin 80 MG tablet  Commonly known as: LIPITOR  TAKE 1 TABLET (80 MG TOTAL) BY MOUTH ONCE DAILY.     * blood sugar diagnostic Strp  To check BG daily, to use with insurance preferred meter     * blood sugar diagnostic Strp  To check BG daily, to use with insurance preferred meter     cyclobenzaprine 10 MG tablet  Commonly known as: FLEXERIL  TAKE 1 TABLET (10 MG TOTAL) BY MOUTH 2 (TWO) TIMES DAILY AS NEEDED FOR MUSCLE SPASMS (BACK PAIN).     DEXCOM G6  Misc  Generic drug: blood-glucose,,cont  1 each by Misc.(Non-Drug; Combo Route) route 2 (two) times daily.     DEXCOM G7 SENSOR Denisse  Generic drug: blood-glucose sensor  1 Device by Misc.(Non-Drug; Combo Route) route 2 (two) times daily.     furosemide 20 MG tablet  Commonly known as: LASIX  Take 1 tablet (20 mg total) by mouth 2 (two) times daily.     * insulin syringe-needle U-100 0.5 mL 31 gauge x 5/16" Syrg  60 each by Misc.(Non-Drug; Combo Route) route 2 (two) times a day.     * BD VEO INSULIN SYRINGE UF 1/2 mL 31 gauge x 15/64" Syrg  Generic drug: insulin syringe-needle U-100     * lancets [facility]  To check BG daily, to use with insurance preferred meter     * lancets " Misc  To check BG daily, to use with insurance preferred meter     LORazepam 1 MG tablet  Commonly known as: ATIVAN  Take 1 tablet (1 mg total) by mouth On call Procedure (for MRI).     meclizine 25 mg tablet  Commonly known as: ANTIVERT  Take 1 tablet (25 mg total) by mouth 2 (two) times daily as needed for Dizziness.     multivitamin per tablet  Commonly known as: THERAGRAN     promethazine-dextromethorphan 6.25-15 mg/5 mL Syrp  Commonly known as: PROMETHAZINE-DM  Take 5 mLs by mouth nightly as needed (cough).     REPATHA SURECLICK 140 mg/mL Pnij  Generic drug: evolocumab  Inject 1 mL (140 mg total) into the skin every 14 (fourteen) days.           * This list has 6 medication(s) that are the same as other medications prescribed for you. Read the directions carefully, and ask your doctor or other care provider to review them with you.                   Where to Get Your Medications        These medications were sent to [facility]/pharmacy #[id number] - [place], [place] - [address]  [address], [place] [address]      Phone: [phone]   metoprolol succinate 25 MG 24 hr tablet         SOCIAL HISTORY:     Social History     Socioeconomic History    Marital status:    Tobacco Use    Smoking status: Former     Current packs/day: 0.00     Types: Cigarettes     Quit date: 2/6/2022     Years since quitting: 3.2     Passive exposure: Past    Smokeless tobacco: Never    Tobacco comments:     Patient Quit Smoking on 02/06/2022.   Substance and Sexual Activity    Alcohol use: Not Currently    Drug use: No    Sexual activity: Not Currently     Partners: Male     Social Drivers of Health     Financial Resource Strain: Low Risk  (4/11/2025)    Overall Financial Resource Strain (CARDIA)     Difficulty of Paying Living Expenses: Not very hard   Recent Concern: Financial Resource Strain - High Risk (1/18/2025)    Overall Financial Resource Strain (CARDIA)     Difficulty of Paying Living Expenses: Hard   Food Insecurity: Food Insecurity  Present (4/11/2025)    Hunger Vital Sign     Worried About Running Out of Food in the Last Year: Sometimes true     Ran Out of Food in the Last Year: Sometimes true   Transportation Needs: No Transportation Needs (4/11/2025)    PRAPARE - Transportation     Lack of Transportation (Medical): No     Lack of Transportation (Non-Medical): No   Physical Activity: Inactive (3/31/2025)    Exercise Vital Sign     Days of Exercise per Week: 0 days     Minutes of Exercise per Session: 0 min   Stress: No Stress Concern Present (4/11/2025)    [facility] [facility] [employer] - Occupational Stress Questionnaire     Feeling of Stress : Not at all   Housing Stability: Low Risk  (4/11/2025)    Housing Stability Vital Sign     Unable to Pay for Housing in the Last Year: No     Homeless in the Last Year: No   Recent Concern: Housing Stability - High Risk (1/18/2025)    Housing Stability Vital Sign     Unable to Pay for Housing in the Last Year: Yes     Homeless in the Last Year: No       FAMILY HISTORY:     Family History   Problem Relation Name Age of Onset    Kidney disease Mother      Heart disease Mother      Cancer Father      Hypertension Brother      Hypertension Daughter      Cancer Maternal Aunt         REVIEW OF SYSTEMS:   ROS    Constitution: Negative for chills, fever, weight gain and weight loss.   Eyes: Negative for blurry vision, visual changes    Cardiovascular: Negative for chest pain. Negative for claudication, dyspnea on exertion, leg swelling, orthopnea, palpitations, paroxysmal nocturnal dyspnea.   Respiratory:  Has some exertional shortness of breath, Negative for cough.    Endocrine: Negative for heat or cold intolerance    Hematologic/Lymphatic: Negative for easy bruising or bleeding    Skin: Negative for color change and rash.   Musculoskeletal: Negative for neck pain, arthralgias, myalgias    Gastrointestinal: Negative for abdominal pain, nausea, vomiting, diarrhea  Neurological: Negative for  "dizziness, light-headedness and loss of balance.   Psychiatric/Behavioral: Negative for altered mental status.    PHYSICAL EXAM:     Vitals:    05/01/25 1406   BP: 114/82   Pulse: 87   Resp: 15      Body mass index is 26.67 kg/m².  Weight: 77.3 kg (170 lb 4.9 oz)   Height: 5' 7" (170.2 cm)     Gen: NAD  Head/Eyes/Ears/Nose: MMM, good dentition   Neck: No carotid bruits, no JVD  Lung: Clear to auscultation bilaterally, no wheezes/rales/ronchi, symmetrical lung expansion with inspiration  Heart: Normal S1/S2, regular rate and rhythm, no murmurs/rubs/gallops  Abdomen: Soft, NT/ND, no masses  Extremities: No lower extremity edema.  No wounds or other skin lesions  Skin: Normal color and turgor. No wounds rashes, no petechia, no ecchymoses.   Neuro: AAOx3    DATA:     Laboratory:  CBC:  Recent Labs   Lab 04/12/25  0521 04/13/25  0220 04/17/25  1145   WBC 3.97 5.54 5.16   HGB 10.2 L 9.7 L 10.7 L   HCT 33.6 L 32.3 L 37.0   Platelet Count 287 268 296       CHEMISTRIES:  Recent Labs   Lab 05/06/22  1501 06/01/22  0858 09/13/22  0916 02/22/25  0858 04/10/25  1209 04/10/25  1938 04/11/25  0539 04/12/25  0521 04/13/25  0219 04/17/25  1145 04/30/25  1024   Glucose 116 H 168 H   < > 137 H  137 H   < > 110 131 H  135 H 128 H 115 H 131 H 120 H   Sodium 137 137   < > 142  142   < > 137 137  136 139 139 143 142   Potassium 3.6 4.2   < > 4.3  4.3   < > 4.9 4.4  4.3 4.3 5.0 5.0 5.2 H   BUN 21 20   < > 47 H  47 H   < > 73 H 73 H  71 H 67 H 59 H 43 H 39 H   Creatinine 1.1 1.1   < > 1.9 H  1.9 H   < > 4.1 H 3.7 H  3.7 H 3.0 H 2.6 H 1.8 H 2.1 H   eGFR if non  51.3 A 51.3 A  --   --   --   --   --   --   --   --   --    eGFR  --   --    < > 28 A  28 A   < > 11 L 12 L  12 L 16 L 19 L 30 L 25 L   Calcium 9.2 9.4   < > 9.0  9.0   < > 9.4 8.8  8.7 8.3 L 8.5 L 8.6 L 9.2   Magnesium   --   --   --   --   --  2.4 2.1 2.0 1.8  --   --    Magnesium  --   --    < > 2.0  --   --   --   --   --   --   --     < > = values " in this interval not displayed.       CARDIAC BIOMARKERS:  Recent Labs   Lab 01/03/25 2042   Troponin I 0.031 H       HBA1C:  Hemoglobin A1C   Date Value Ref Range Status   02/13/2025 8.5 (H) 4.0 - 5.6 % Final     Comment:     ADA Screening Guidelines:  5.7-6.4%  Consistent with prediabetes  >or=6.5%  Consistent with diabetes    High levels of fetal hemoglobin interfere with the HbA1C  assay. Heterozygous hemoglobin variants (HbS, HgC, etc)do  not significantly interfere with this assay.   However, presence of multiple variants may affect accuracy.     11/14/2024 9.8 (H) 4.0 - 5.6 % Final     Comment:     ADA Screening Guidelines:  5.7-6.4%  Consistent with prediabetes  >or=6.5%  Consistent with diabetes    High levels of fetal hemoglobin interfere with the HbA1C  assay. Heterozygous hemoglobin variants (HbS, HgC, etc)do  not significantly interfere with this assay.   However, presence of multiple variants may affect accuracy.     08/16/2024 6.9 (H) 4.0 - 5.6 % Final     Comment:     ADA Screening Guidelines:  5.7-6.4%  Consistent with prediabetes  >or=6.5%  Consistent with diabetes    High levels of fetal hemoglobin interfere with the HbA1C  assay. Heterozygous hemoglobin variants (HbS, HgC, etc)do  not significantly interfere with this assay.   However, presence of multiple variants may affect accuracy.          COAGS:  Recent Labs   Lab 08/16/24  0931   INR 0.9       LIPIDS/LFTS:  Recent Labs   Lab 05/13/24  0805 11/14/24  0910 01/03/25  2042 02/13/25  1017 02/22/25  0858 04/12/25  0521 04/13/25  0219 04/17/25  1145   Cholesterol 188 241 H  --  205 H  --   --   --   --    Triglycerides 143 219 H  --  145  --   --   --   --    HDL 45 43  --  43  --   --   --   --    LDL Cholesterol 114.4 154.2  --  133.0  --   --   --   --    Non-HDL Cholesterol 143 198  --  162  --   --   --   --    AST 14  --    < > 25   < > 18 21 14   ALT 18  --    < > 9 L   < > 22 23 28    < > = values in this interval not displayed.          CARDIAC DIAGNOSTICS:  :     EK/10/2025  Sinus rhythm, left atrial enlargement and left bundle-branch block    2025  Sinus rhythm, nonspecific IVCD    EKG done on 2024 showed sinus rhythm and left bundle-branch block    ECHO  2025    Left Ventricle: Septal motion is consistent with bundle branch block. There is moderately reduced systolic function with a visually estimated ejection fraction of 30 - 35%. Grade II diastolic dysfunction. Elevated left ventricular filling pressure.    Right Ventricle: Systolic function is normal.    Left Atrium: Left atrium is severely dilated.    Right Atrium: Right atrium is mildly dilated.    Aortic Valve: The aortic valve is a trileaflet valve. There is mild aortic valve sclerosis.    Mitral Valve: Mildly calcified leaflets. There is moderate regurgitation with an eccentric jet.    Tricuspid Valve: There is mild regurgitation.    Pulmonary Artery: The estimated pulmonary artery systolic pressure is 25 mmHg.    IVC/SVC: Normal venous pressure at 3 mmHg.    (2024)    Left Ventricle: The left ventricle is normal in size. Moderately increased wall thickness. There is moderate concentric hypertrophy. Regional wall motion abnormalities present (basal-mid anteroseptal and inferoseptal HK). There is mildly reduced systolic function with a visually estimated ejection fraction of 40 - 45%. There is diastolic dysfunction but grade cannot be determined.    Right Ventricle: Normal right ventricular cavity size. Systolic function is normal.    3.  STRESS TEST (PET scan 2024)    There are no clinically significant perfusion abnormalities. There is a small (<10%) amount of mild resting heterogeneity in the anteroseptal wall(s) that improves with stress, indicative of non-obstructive disease.    The whole heart absolute myocardial perfusion values averaged 0.90 cc/min/g at rest, which is normal; 1.52 cc/min/g at stress, which is mildly reduced; and CFR is 1.72,  which is mildly reduced.    CT attenuation images demonstrate mild diffuse coronary calcifications in the LAD territory and no aortic calcifications.    The gated perfusion images showed an ejection fraction of 32% at rest and 36% during stress. A normal ejection fraction is greater than 47%.    There is moderate global hypokinesis at rest and during stress.    The LV cavity size is normal at rest and stress.    The ECG portion of the study is negative for ischemia.    There were no arrhythmias during stress.    The patient reported no chest pain during the stress test.    There are no prior studies for comparison.    4.  CARDIAC CATHETERIZATION  NA    5. OTHERS  JOSE EDUARDO 2/2025  Right leg:   Mildly decreased JOSE EDUARDO, 0.82.    Mild to moderately dampened PVR waveforms.  Decreased TBI.       Left leg:   Moderately decreased JOSE EDUARDO, 0.61.    Mild to moderately dampened PVR waveforms.    Decreased TBI.       (2/2025)  A1C8.5 (2/2025)    ASSESSMENT:   Nonischemic cardiomyopathy with EF around 30-35%  Hypertension  Hyperlipidemia  Chronic Left bundle-branch block  Cardioembolic stroke (no documented Afib - his ILR in place)  Peripheral arterial disease (mildly decreased ABIs 2/2025 -> F/U with vascular surgery)  Type 2 diabetes mellitus (A1c 8.5 in February, 2025)  Chronic kidney disease with baseline creatinine around 1.8-2.2    Appears to be euvolemic and in well perfused state. NYHA class 2 dyspnea.  Has left bundle-branch block.  Most recent EF is 30-35% (echo January, 2025).  Not on ACE inhibitor/ARBs due to chronic kidney disease.    PLAN:   Increase metoprolol succinate to 25 mg daily  Continue with Jardiance 25 mg daily  Asked her to keep a log of blood pressure.  If blood pressure allows, we will re-initiate low-dose hydralazine  We will avoid ACE I/ARB/ARNI and spironolactone due to recent acute on chronic kidney disease  Monitor BMP.  Follows up with Nephrology  Continue with baby aspirin/atorvastatin  Blood  pressure stable today  Daily weight  For now, okay to hold off on Lasix.  We will re-initiate if weight goes up  Repeat echo in 3 months after maximally tolerated guideline directed medical therapy  If her EF remains less than 35% despite maximally tolerated guideline directed medical therapy and she remains in NYHA class II dyspnea, would refer to EP for cardiac resynchronization therapy  No documented AFib on recent ILR interrogation.  Follows up with EP at [facility]  Needs better glycemic control.  Management per PCP  Mediterranean diet and daily exercise    Follow up in 3 months    Álvaro Rudd MD  [facility]    This note was created by combination of typed  and M-Modal dictation.   Transcription errors may be present.

## 2025-05-01 NOTE — TELEPHONE ENCOUNTER
I left a message for patient to call back in regards to their appointment.  is wondering if they would be able to come in anytime between 1-3:20.

## 2025-05-05 ENCOUNTER — CLINICAL SUPPORT (OUTPATIENT)
Dept: CARDIOLOGY | Facility: HOSPITAL | Age: 75
End: 2025-05-05
Attending: INTERNAL MEDICINE
Payer: MEDICARE

## 2025-05-05 ENCOUNTER — CLINICAL SUPPORT (OUTPATIENT)
Dept: CARDIOLOGY | Facility: HOSPITAL | Age: 75
End: 2025-05-05
Payer: MEDICARE

## 2025-05-05 ENCOUNTER — HOSPITAL ENCOUNTER (EMERGENCY)
Facility: HOSPITAL | Age: 75
Discharge: HOME | End: 2025-05-05
Attending: EMERGENCY MEDICINE
Payer: MEDICARE

## 2025-05-05 ENCOUNTER — OFFICE VISIT (OUTPATIENT)
Dept: URGENT CARE | Facility: CLINIC | Age: 75
End: 2025-05-05
Payer: MEDICARE

## 2025-05-05 VITALS
SYSTOLIC BLOOD PRESSURE: 120 MMHG | BODY MASS INDEX: 26.75 KG/M2 | DIASTOLIC BLOOD PRESSURE: 83 MMHG | RESPIRATION RATE: 16 BRPM | HEART RATE: 80 BPM | OXYGEN SATURATION: 98 % | WEIGHT: 170.44 LBS | HEIGHT: 67 IN | TEMPERATURE: 99 F

## 2025-05-05 VITALS
HEART RATE: 77 BPM | OXYGEN SATURATION: 95 % | WEIGHT: 170.63 LBS | HEIGHT: 67 IN | SYSTOLIC BLOOD PRESSURE: 111 MMHG | TEMPERATURE: 98 F | DIASTOLIC BLOOD PRESSURE: 67 MMHG | BODY MASS INDEX: 26.78 KG/M2 | RESPIRATION RATE: 19 BRPM

## 2025-05-05 DIAGNOSIS — R06.02 SHORTNESS OF BREATH: ICD-10-CM

## 2025-05-05 DIAGNOSIS — I50.9 ACUTE HEART FAILURE, UNSPECIFIED HEART FAILURE TYPE: ICD-10-CM

## 2025-05-05 DIAGNOSIS — R09.02 HYPOXIA: Primary | ICD-10-CM

## 2025-05-05 DIAGNOSIS — I49.8 OTHER SPECIFIED CARDIAC ARRHYTHMIAS: ICD-10-CM

## 2025-05-05 DIAGNOSIS — I50.22 CHRONIC SYSTOLIC (CONGESTIVE) HEART FAILURE: ICD-10-CM

## 2025-05-05 DIAGNOSIS — I50.9 ACUTE ON CHRONIC CONGESTIVE HEART FAILURE, UNSPECIFIED HEART FAILURE TYPE: Primary | ICD-10-CM

## 2025-05-05 LAB
ABSOLUTE EOSINOPHIL (OHS): 0.15 K/UL
ABSOLUTE MONOCYTE (OHS): 0.57 K/UL (ref 0.3–1)
ABSOLUTE NEUTROPHIL COUNT (OHS): 3.61 K/UL (ref 1.8–7.7)
ALBUMIN SERPL BCP-MCNC: 3.8 G/DL (ref 3.5–5.2)
ALP SERPL-CCNC: 75 UNIT/L (ref 40–150)
ALT SERPL W/O P-5'-P-CCNC: 9 UNIT/L (ref 10–44)
ANION GAP (OHS): 13 MMOL/L (ref 8–16)
AST SERPL-CCNC: 11 UNIT/L (ref 11–45)
BASOPHILS # BLD AUTO: 0.02 K/UL
BASOPHILS NFR BLD AUTO: 0.4 %
BILIRUB SERPL-MCNC: 0.4 MG/DL (ref 0.1–1)
BNP SERPL-MCNC: 2469 PG/ML (ref 0–99)
BUN SERPL-MCNC: 56 MG/DL (ref 8–23)
CALCIUM SERPL-MCNC: 9 MG/DL (ref 8.7–10.5)
CHLORIDE SERPL-SCNC: 109 MMOL/L (ref 95–110)
CO2 SERPL-SCNC: 19 MMOL/L (ref 23–29)
CREAT SERPL-MCNC: 2.1 MG/DL (ref 0.5–1.4)
CTP QC/QA: YES
ERYTHROCYTE [DISTWIDTH] IN BLOOD BY AUTOMATED COUNT: 18.9 % (ref 11.5–14.5)
GFR SERPLBLD CREATININE-BSD FMLA CKD-EPI: 25 ML/MIN/1.73/M2
GLUCOSE SERPL-MCNC: 167 MG/DL (ref 70–110)
HCT VFR BLD AUTO: 37 % (ref 37–48.5)
HGB BLD-MCNC: 11.4 GM/DL (ref 12–16)
IMM GRANULOCYTES # BLD AUTO: 0.01 K/UL (ref 0–0.04)
IMM GRANULOCYTES NFR BLD AUTO: 0.2 % (ref 0–0.5)
LYMPHOCYTES # BLD AUTO: 1.13 K/UL (ref 1–4.8)
MCH RBC QN AUTO: 25.7 PG (ref 27–31)
MCHC RBC AUTO-ENTMCNC: 30.8 G/DL (ref 32–36)
MCV RBC AUTO: 83 FL (ref 82–98)
NUCLEATED RBC (/100WBC) (OHS): 1 /100 WBC
PLATELET # BLD AUTO: 299 K/UL (ref 150–450)
PMV BLD AUTO: 10.5 FL (ref 9.2–12.9)
POCT GLUCOSE: 177 MG/DL (ref 70–110)
POTASSIUM SERPL-SCNC: 5 MMOL/L (ref 3.5–5.1)
PROT SERPL-MCNC: 8.1 GM/DL (ref 6–8.4)
RBC # BLD AUTO: 4.44 M/UL (ref 4–5.4)
RELATIVE EOSINOPHIL (OHS): 2.7 %
RELATIVE LYMPHOCYTE (OHS): 20.6 % (ref 18–48)
RELATIVE MONOCYTE (OHS): 10.4 % (ref 4–15)
RELATIVE NEUTROPHIL (OHS): 65.7 % (ref 38–73)
SARS-COV-2 RDRP RESP QL NAA+PROBE: NEGATIVE
SODIUM SERPL-SCNC: 141 MMOL/L (ref 136–145)
TROPONIN I SERPL DL<=0.01 NG/ML-MCNC: 0.02 NG/ML
WBC # BLD AUTO: 5.49 K/UL (ref 3.9–12.7)

## 2025-05-05 PROCEDURE — 85025 COMPLETE CBC W/AUTO DIFF WBC: CPT | Mod: HCNC | Performed by: EMERGENCY MEDICINE

## 2025-05-05 PROCEDURE — 82962 GLUCOSE BLOOD TEST: CPT | Mod: HCNC

## 2025-05-05 PROCEDURE — 93298 REM INTERROG DEV EVAL SCRMS: CPT | Mod: HCNC | Performed by: INTERNAL MEDICINE

## 2025-05-05 PROCEDURE — 99499 UNLISTED E&M SERVICE: CPT | Mod: S$GLB,,, | Performed by: FAMILY MEDICINE

## 2025-05-05 PROCEDURE — 82310 ASSAY OF CALCIUM: CPT | Mod: HCNC | Performed by: EMERGENCY MEDICINE

## 2025-05-05 PROCEDURE — 83880 ASSAY OF NATRIURETIC PEPTIDE: CPT | Mod: HCNC | Performed by: EMERGENCY MEDICINE

## 2025-05-05 PROCEDURE — 96374 THER/PROPH/DIAG INJ IV PUSH: CPT | Mod: HCNC

## 2025-05-05 PROCEDURE — 84484 ASSAY OF TROPONIN QUANT: CPT | Mod: HCNC | Performed by: EMERGENCY MEDICINE

## 2025-05-05 PROCEDURE — 99285 EMERGENCY DEPT VISIT HI MDM: CPT | Mod: 25,HCNC

## 2025-05-05 PROCEDURE — 93005 ELECTROCARDIOGRAM TRACING: CPT | Mod: HCNC

## 2025-05-05 PROCEDURE — 93010 ELECTROCARDIOGRAM REPORT: CPT | Mod: HCNC,,, | Performed by: INTERNAL MEDICINE

## 2025-05-05 PROCEDURE — 63600175 PHARM REV CODE 636 W HCPCS: Mod: HCNC | Performed by: EMERGENCY MEDICINE

## 2025-05-05 PROCEDURE — 93298 REM INTERROG DEV EVAL SCRMS: CPT | Mod: 26,HCNC,, | Performed by: INTERNAL MEDICINE

## 2025-05-05 PROCEDURE — 87635 SARS-COV-2 COVID-19 AMP PRB: CPT | Mod: HCNC | Performed by: EMERGENCY MEDICINE

## 2025-05-05 RX ORDER — FUROSEMIDE 10 MG/ML
40 INJECTION INTRAMUSCULAR; INTRAVENOUS
Status: COMPLETED | OUTPATIENT
Start: 2025-05-05 | End: 2025-05-05

## 2025-05-05 RX ORDER — FUROSEMIDE 20 MG/1
20 TABLET ORAL 2 TIMES DAILY
Qty: 60 TABLET | Refills: 3 | Status: SHIPPED | OUTPATIENT
Start: 2025-05-05 | End: 2025-09-02

## 2025-05-05 RX ADMIN — FUROSEMIDE 40 MG: 10 INJECTION, SOLUTION INTRAVENOUS at 10:05

## 2025-05-05 NOTE — DISCHARGE INSTRUCTIONS
Thank you for coming to our Emergency Department today. It is important to remember that some problems are difficult to diagnose and may not be found during your Emergency Department visit. Be sure to follow up with your primary care doctor and review all labs/imaging/tests that were performed during this visit with them. Some labs/tests may be outside of the normal range and require non-emergent follow-up and further investigation to help diagnose/exclude/prevent complications or other medical conditions.    If you do not have a primary care doctor, you may contact the one listed on your discharge paperwork or you may also call the [facility] Appointment Desk at [phone] to schedule an appointment and establish care with one. It is important to your health that you have a primary care doctor.    Medicaid Escalation Line:   [phone] - Please contact this number if you are having difficulty getting follow up with a Primary Care Provider or Speciality Provider.     Please take all medications as directed. All medications may potentially have side-effects and it is impossible to predict which medications may give you side-effects or what side-effects (if any) they will give you.. If you feel that you are having a negative effect or side-effect of any medication you should immediately stop taking them and seek medical attention. If you feel that you are having a life-threatening reaction call [phone].    Return to the ER with any questions/concerns, new/concerning symptoms, worsening or failure to improve.     Do not drive, swim, climb to height, take a bath or make any important decisions for 24 hours if you have received any pain medications, sedatives or mood altering drugs during your ER visit.

## 2025-05-05 NOTE — ED TRIAGE NOTES
"Pt presented to the ED with complaints of shortness of breath that began last night. Pt reports being seen at urgent care and was referred to the ED. Pt reports "I am not as short of breath as I was at urgent care". Pt reports taking her at home 40mg of Lasix this morning. Pt reports "I came to the ED to make sure everything is okay and I am not as far gone as last time". Pt denies chest pain, fever, or abdominal pain.  "

## 2025-05-05 NOTE — ED PROVIDER NOTES
Encounter Date: 5/5/2025       History     Chief Complaint   Patient presents with    Shortness of Breath     Pt presents to ER with complaints of SOB, and was sent to ER by . Pt says she does not feel as SOB as she did when she was at . Pt denies chest pain and says she takes ASA daily. Pt denies any other issues at this time.      74-year-old female with history of diabetes, hypertension, hyperlipidemia, HFrEF (last EF 30-35%), CKD presents to the ED with shortness of breath.  Patient presented to an urgent care this morning for evaluation and was sent here due to ambulatory pulse ox of 80%.  Patient reports shortness of breath started last night, she does admit she ate out from a restaurant at home over the weekend, shrimp and broccoli.  She reports compliance with her Lasix 20 mg b.i.d. but took 40 mg this morning due to her symptoms.  She is actually feeling improved from what she did in the urgent care.  She has no chest pain, fever, cough or leg swelling.    The history is provided by the patient.     Review of patient's allergies indicates:   Allergen Reactions    Ampicillin Rash    Darvocet a500 [propoxyphene n-acetaminophen] Other (See Comments)     shaky     Past Medical History:   Diagnosis Date    Diabetes mellitus     Hyperlipidemia     Hypertension     Stroke      Past Surgical History:   Procedure Laterality Date    COLONOSCOPY N/A 7/12/2023    Procedure: COLONOSCOPY;  Surgeon: Ray Sorensen MD;  Location: [facility];  Service: Endoscopy;  Laterality: N/A;  instr via portal  - PC  4/10/23 Daniel, instr via mail & portal, unable to tolerate Golytely- request Miralax/Gatorade - PC  5/30-pt r/s-new instr portal-tb    INJECTION OF ANESTHETIC AGENT AROUND MEDIAL BRANCH NERVES INNERVATING LUMBAR FACET JOINT Bilateral 4/20/2023    Procedure: MBB #1 (B/L) L3,4,5;  Surgeon: Son Lara DO;  Location: [facility] OR;  Service: Pain Management;  Laterality: Bilateral;  ORAL XANAX    INJECTION OF  ANESTHETIC AGENT AROUND MEDIAL BRANCH NERVES INNERVATING LUMBAR FACET JOINT Bilateral 5/5/2023    Procedure: MBB #2 (B/L) L3,4,5;  Surgeon: Son Lara DO;  Location: [facility] OR;  Service: Pain Management;  Laterality: Bilateral;  Oral Xanax    INJECTION OF JOINT Right 5/20/2022    Procedure: Right SI joint injection;  Surgeon: Son Lara DO;  Location: [facility] OR;  Service: Pain Management;  Laterality: Right;    INJECTION, SACROILIAC JOINT Right 12/19/2023    Procedure: RT SI joint Inj;  Surgeon: Son Lara DO;  Location: [facility] PAIN MANAGEMENT;  Service: Pain Management;  Laterality: Right;  oral    INSERTION OF IMPLANTABLE LOOP RECORDER Left 6/21/2024    Procedure: Insertion, Implantable Loop Recorder;  Surgeon: Hunter Rich MD;  Location: [facility] EP LAB;  Service: Cardiology;  Laterality: Left;  CVA, ILR, BSCI, [place], [place], 3 Prep    INSERTION OF IMPLANTABLE LOOP RECORDER Left 8/30/2024    Procedure: Insertion, Implantable Loop Recorder;  Surgeon: Hunter Rich MD;  Location: [facility] EP LAB;  Service: Cardiology;  Laterality: Left;  RODNEY GONZÁLES,MDT, RN [place], [place], 3 Prep    RADIOFREQUENCY ABLATION OF LUMBAR MEDIAL BRANCH NERVE AT SINGLE LEVEL Bilateral 5/19/2023    Procedure: RFA (B/L) L3,4,5;  Surgeon: Son Lara DO;  Location: [facility] PAIN MANAGEMENT;  Service: Pain Management;  Laterality: Bilateral;    REMOVAL OF IMPLANTABLE LOOP RECORDER N/A 7/10/2024    Procedure: REMOVAL, IMPLANTABLE LOOP RECORDER;  Surgeon: Hunter Rich MD;  Location: [facility] EP LAB;  Service: Cardiology;  Laterality: N/A;     Family History   Problem Relation Name Age of Onset    Kidney disease Mother      Heart disease Mother      Cancer Father      Hypertension Brother      Hypertension Daughter      Cancer Maternal Aunt       Social History[1]  Review of Systems   Constitutional:  Negative for chills and fever.   HENT:  Negative for congestion and sore throat.    Eyes:  Negative for visual disturbance.    Respiratory:  Positive for shortness of breath. Negative for cough.    Cardiovascular:  Negative for chest pain and leg swelling.   Gastrointestinal:  Negative for abdominal pain, nausea and vomiting.   Genitourinary:  Negative for dysuria.   Skin:  Negative for rash.   Neurological:  Negative for headaches.   Psychiatric/Behavioral:  Negative for decreased concentration.        Physical Exam     Initial Vitals [05/05/25 0852]   BP Pulse Resp Temp SpO2   110/68 89 20 97.6 °F (36.4 °C) 99 %      MAP       --         Physical Exam    ED Course   Procedures  Labs Reviewed   COMPREHENSIVE METABOLIC PANEL - Abnormal       Result Value    Sodium 141      Potassium 5.0      Chloride 109      CO2 19 (*)     Glucose 167 (*)     BUN 56 (*)     Creatinine 2.1 (*)     Calcium 9.0      Protein Total 8.1      Albumin 3.8      Bilirubin Total 0.4      ALP 75      AST 11      ALT 9 (*)     Anion Gap 13      eGFR 25 (*)    B-TYPE NATRIURETIC PEPTIDE - Abnormal    BNP 2,469 (*)    CBC WITH DIFFERENTIAL - Abnormal    WBC 5.49      RBC 4.44      HGB 11.4 (*)     HCT 37.0      MCV 83      MCH 25.7 (*)     MCHC 30.8 (*)     RDW 18.9 (*)     Platelet Count 299      MPV 10.5      Nucleated RBC 1 (*)     Neut % 65.7      Lymph % 20.6      Mono % 10.4      Eos % 2.7      Basophil % 0.4      Imm Grans % 0.2      Neut # 3.61      Lymph # 1.13      Mono # 0.57      Eos # 0.15      Baso # 0.02      Imm Grans # 0.01     TROPONIN I - Normal    Troponin-I 0.022     CBC W/ AUTO DIFFERENTIAL    Narrative:     The following orders were created for panel order CBC auto differential.  Procedure                               Abnormality         Status                     ---------                               -----------         ------                     CBC with Differential[[id number]]       Abnormal            Final result                 Please view results for these tests on the individual orders.   SARS-COV-2 RDRP GENE    POC Rapid COVID  Negative       Acceptable Yes       EKG Readings: (Independently Interpreted)   Normal sinus rhythm, rate 85 beats per minute, normal NC interval,  milliseconds.  Intraventricular block present.  No STEMI.       Imaging Results              X-Ray Chest AP Portable (Final result)  Result time 05/05/25 09:38:21      Final result by Priyank Mcleod III, MD (05/05/25 09:38:21)                   Impression:      Cardiomegaly.      Electronically signed by: Priyank Mcleod MD  Date:    05/05/2025  Time:    09:38               Narrative:    EXAMINATION:  XR CHEST AP PORTABLE    CLINICAL HISTORY:  CHF;    FINDINGS:  Heart size is upper normal.  There is DJD.  There is aortic plaque.  Lungs are clear.                                       Medications   furosemide injection 40 mg (40 mg Intravenous Given 5/5/25 1024)     Medical Decision Making  74-year-old female with heart failure, CKD, diabetes, hypertension presents to the ED with shortness of breath that started yesterday, she went to an urgent care for evaluation and was apparently hypoxic in the 80s with ambulation.  She did take 40 mg of Lasix this morning instead of her usual 20 and reports feeling improved at this time.  No chest pain or fever or leg swelling.  On exam, the patient is in no apparent distress, SpO2 100% on room air, no leg swelling.  Heart with regular rate and rhythm.  Workup was initiated with labs concerning for an elevated BNP, however, chest x-ray without acute finding.  Kidney function appears to be at baseline.  Will give additional IV Lasix, ambulate to assess for any hypoxic episodes.  Suspect symptoms related to fluid overload.    Amount and/or Complexity of Data Reviewed  External Data Reviewed: radiology.     Details: Echo from January shows moderately reduced systolic function with EF of 30-35%, grade 2 diastolic dysfunction.  Severely dilated left atrium.  Labs: ordered.     Details: COVID negative.  Troponin  negative.  BNP 2469.  CBC within acceptable limits.  CMP with a creatinine of 2.1, BUN of 56, glucose of 167, bicarb of 19, no elevation in the anion gap.  Radiology: ordered.     Details: Chest x-ray shows cardiomegaly.    Risk  Prescription drug management.               ED Course as of 05/05/25 1043   Mon May 05, 2025   1039 Patient ambulated in the ED, SpO2 96%.  She feels improved, she did not feel short of breath with ambulation.  She was given additional IV Lasix in the ED. advised to continue Lasix 20 mg b.i.d., if she noticed increased fluid retention or shortness of breath may double dose as she did this morning.  I reviewed return precautions with the patient.  She has outpatient follow-up with her nephrologist later this week. [LH]      ED Course User Index  [LH] Uzma Estevez MD                           Clinical Impression:  Final diagnoses:  [R06.02] Shortness of breath  [I50.9] Acute on chronic congestive heart failure, unspecified heart failure type (Primary)       This dictation has been generated using M-Modal Fluency Direct dictation; some phonetic errors may occur.      ED Disposition Condition    Discharge Stable          ED Prescriptions       Medication Sig Dispense Start Date End Date Auth. Provider    furosemide (LASIX) 20 MG tablet Take 1 tablet (20 mg total) by mouth 2 (two) times daily. 60 tablet 5/5/2025 9/2/2025 Uzma Estevez MD          Follow-up Information       Follow up With Specialties Details Why Contact Info    Manuel Lemus MD Nephrology On 5/7/2025  [address]  [address]  [facility] [address]  [phone]      Latesha Perrin NP Neurology On 5/9/2025  [address]  [address]  [facility] [address]  [phone]      [facility] Emergency Dept Emergency Medicine  As needed, If symptoms worsen [address]  [facility] [place] [address] [phone]                 [1]   Social History  Tobacco Use    Smoking  status: Former     Current packs/day: 0.00     Types: Cigarettes     Quit date: 2/6/2022     Years since quitting: 3.2     Passive exposure: Past    Smokeless tobacco: Never    Tobacco comments:     Patient Quit Smoking on 02/06/2022.   Substance Use Topics    Alcohol use: Not Currently    Drug use: No        Uzma Estevez MD  05/05/25 1048

## 2025-05-05 NOTE — PROGRESS NOTES
"Subjective:      Patient ID: Joanne Peña is a 74 y.o. female.    Vitals:  height is 5' 7" (1.702 m) and weight is 77.3 kg (170 lb 6.7 oz). Her oral temperature is 98.6 °F (37 °C). Her blood pressure is 120/83 and her pulse is 80. Her respiration is 16 and oxygen saturation is 98%.     Chief Complaint: Shortness of Breath    74 year old female complains of SOB that started yesterday.      Shortness of Breath  This is a new problem. The current episode started yesterday. The problem occurs constantly. The problem has been unchanged. Nothing aggravates the symptoms. She has tried nothing for the symptoms. The treatment provided no relief. Her past medical history is significant for a heart failure.       Respiratory:  Positive for shortness of breath.       Objective:     Physical Exam   Constitutional: She is oriented to person, place, and time.   HENT:   Head: Normocephalic.   Ears:   Right Ear: External ear normal.   Left Ear: External ear normal.   Nose: Nose normal.   Mouth/Throat: Mucous membranes are moist.   Eyes: Conjunctivae are normal.   Cardiovascular: Normal rate.   Pulmonary/Chest: Effort normal.   Musculoskeletal: Normal range of motion.         General: Normal range of motion.   Neurological: She is alert and oriented to person, place, and time.   Skin: Skin is dry.   Psychiatric: Her behavior is normal.       Assessment:     1. Hypoxia    2. Acute heart failure, unspecified heart failure type        Plan:       Hypoxia    Acute heart failure, unspecified heart failure type        We do not have IV lasix or stat lab capabilities to check bnp and r/o MESSI.   Pt has elected to go to ED. Mild distress and hypoxia to the 80s w ambulation but her  is taking care of her.            "

## 2025-05-06 ENCOUNTER — PATIENT OUTREACH (OUTPATIENT)
Dept: ADMINISTRATIVE | Facility: OTHER | Age: 75
End: 2025-05-06
Payer: MEDICARE

## 2025-05-06 NOTE — PROGRESS NOTES
CHW - Follow Up    This Community Health Worker completed a follow up visit with patient via telephone today.  Pt/Caregiver reported: Patient is not sure if her son received the package while she was in the hospital. She will check with her son and asked CHW to call her Monday.  Community Health Worker provided: CHW will call patient on Monday to check if son received her package and also check on patient's future needs.  Follow up required:   Follow-up Outreach - Due: 5/12/2025

## 2025-05-07 LAB
OHS QRS DURATION: 140 MS
OHS QTC CALCULATION: 518 MS

## 2025-05-08 LAB
OHS CV AF BURDEN PERCENT: < 1
OHS CV DC REMOTE DEVICE TYPE: NORMAL

## 2025-05-09 ENCOUNTER — OFFICE VISIT (OUTPATIENT)
Dept: NEUROLOGY | Facility: CLINIC | Age: 75
End: 2025-05-09
Payer: MEDICARE

## 2025-05-09 VITALS
WEIGHT: 168.88 LBS | BODY MASS INDEX: 26.45 KG/M2 | DIASTOLIC BLOOD PRESSURE: 74 MMHG | HEART RATE: 77 BPM | SYSTOLIC BLOOD PRESSURE: 111 MMHG

## 2025-05-09 DIAGNOSIS — E78.2 MIXED HYPERLIPIDEMIA: ICD-10-CM

## 2025-05-09 DIAGNOSIS — R29.6 FREQUENT FALLS: ICD-10-CM

## 2025-05-09 DIAGNOSIS — E11.69 TYPE 2 DIABETES MELLITUS WITH OTHER SPECIFIED COMPLICATION, WITHOUT LONG-TERM CURRENT USE OF INSULIN: ICD-10-CM

## 2025-05-09 DIAGNOSIS — E66.3 OVERWEIGHT WITH BODY MASS INDEX (BMI) OF 25 TO 25.9 IN ADULT: ICD-10-CM

## 2025-05-09 DIAGNOSIS — I10 PRIMARY HYPERTENSION: ICD-10-CM

## 2025-05-09 DIAGNOSIS — M54.16 LUMBAR RADICULOPATHY, CHRONIC: Primary | ICD-10-CM

## 2025-05-09 DIAGNOSIS — R26.89 IMPAIRED GAIT AND MOBILITY: ICD-10-CM

## 2025-05-09 DIAGNOSIS — Z86.73 HISTORY OF STROKE: ICD-10-CM

## 2025-05-09 PROCEDURE — 99999 PR PBB SHADOW E&M-EST. PATIENT-LVL IV: CPT | Mod: PBBFAC,HCNC,,

## 2025-05-09 NOTE — PROGRESS NOTES
[facility] VISIT    Chief Complaint and Duration     Chief Complaint   Patient presents with    Follow-up     Mri,lab results     for 3+ years.    History of Present Illness     Joanne Peña is a 74 y.o. right handed female with a history of multiple medical diagnoses as listed below that presents for history of falls.     Referral received from primary care    Significant past medical history of diabetes, hyperlipidemia, hypertension, stroke, impaired gait and mobility, overweight, CKD stage 4    Presents to clinic visit with family    Initial encounter 04/17/2025  Patient reports history of falls recent ED visit 4 days ago for presyncopal symptoms.  At the time patient reported she has been lightheaded for the past week.  Patient monitored for greater than 24 hours.  Patient had loop recorder placed 9/24 for recurrent syncopal episodes and associated symptoms.  ER exam with MESSI hypovolemia patient was on diuretic with food restrictions and antihypertensives with recent febrile illness related to cellulitis.  She received fluid replacement antihypertensives and Lasix held patient discharged to follow up with primary care provider for further management and monitoring.  Patient followed up with primary care provider antihypertensives and Lasix continues to be on hold.  Brain imaging performed CT scan with no acute findings chronic microvascular changes present.    On today patient reports overall feeling better.  In the last 30 days she has had 4 falls.  Majority of episodes are with position changes going from sit to stand.  She states with position change she can get lightheaded does not lose consciousness/blackout.  Patient also reports associated dizziness with lightheadedness and states that her legs give out.  She has no other focal neurologic deficits present with these episodes.  Episodes last for sec to minutes.  Patient reports 1 fall that was not associated with  immediate position change.  She states she was getting out of the car walked 4-5 feet and her legs get weak and gave out and she fell to her knees.  She denies any hearing, vision, involuntary muscle movements, loss of bowel or bladder, noxious stimuli.  She does endorse muscle cramping due to being on Lasix.  She does have a longstanding history of diabetes questionable bilateral feet neuropathy.  Patient reports numbness and tingling that occurs in his most prominent at night only in her feet.  Dizziness episodes last sec abates on its own.  Dizziness does not occur while patient is sitting only when patient goes from position change or walking greater than 4-5 feet.  Patient also reports at times when she is walking she can feel off-balance therefore she is using a single leg cane.  She denies any alcohol, tobacco or recreational drug use.  She is a former smoker.  Patient also reports she has chronic low back pain is being managed by pain management outside facility she has received oral, topical medications and also KRISTA treatments in the past.  MRI lumbar imaging reviewed from 2022 patient has multilevel degenerative changes moderate to severe.    Interval history 5/9/2025:    Patient presents to clinic for follow-up of fall episodes after blood work and MRI imaging. At the time of today's visit, the patient denies new or worsening focal neurologic symptoms or any symptoms concerning for a new undocumented episodes/events. Patient is doing well overall with gradual improvements.  No reported falls since last clinic visit.  Patient previously reported falls provoked by position change feeling dizzy and lightheaded.  Patient does have longstanding history of diabetes with possible bilateral foot neuropathy as she reports numbness and tingling that occurs predominantly at night.  Dizziness lasts sec self-limiting only occurs with position change and walking greater than 4-5 feet.  With a recent ED visit resulting  in acute kidney injury, hypovolemia, dehydration with no neurologic findings on imaging. She denies any alcohol, tobacco or recreational drug use.  She is a former smoker.  Patient also reports she has chronic low back pain is being managed by pain management outside facility she has received oral, topical medications and also KRISTA treatments in the past.  MRI lumbar imaging reviewed from 2022 patient has multilevel degenerative changes moderate to severe.    Review of patient's allergies indicates:   Allergen Reactions    Ampicillin Rash    Darvocet a500 [propoxyphene n-acetaminophen] Other (See Comments)     shaky     Current Medications[1]    Medical History     Past Medical History:   Diagnosis Date    Diabetes mellitus     Hyperlipidemia     Hypertension     Stroke      Past Surgical History:   Procedure Laterality Date    COLONOSCOPY N/A 7/12/2023    Procedure: COLONOSCOPY;  Surgeon: Ray Sorensen MD;  Location: [facility] ENDO;  Service: Endoscopy;  Laterality: N/A;  instr via portal  - PC  4/10/23 Daniel, instr via mail & portal, unable to tolerate Golytely- request Miralax/Gatorade - PC  5/30-pt r/s-new instr portal-tb    INJECTION OF ANESTHETIC AGENT AROUND MEDIAL BRANCH NERVES INNERVATING LUMBAR FACET JOINT Bilateral 4/20/2023    Procedure: MBB #1 (B/L) L3,4,5;  Surgeon: Son Lara DO;  Location: [facility] OR;  Service: Pain Management;  Laterality: Bilateral;  ORAL XANAX    INJECTION OF ANESTHETIC AGENT AROUND MEDIAL BRANCH NERVES INNERVATING LUMBAR FACET JOINT Bilateral 5/5/2023    Procedure: MBB #2 (B/L) L3,4,5;  Surgeon: Son Lara DO;  Location: [facility] OR;  Service: Pain Management;  Laterality: Bilateral;  Oral Xanax    INJECTION OF JOINT Right 5/20/2022    Procedure: Right SI joint injection;  Surgeon: Son Lara DO;  Location: [facility] OR;  Service: Pain Management;  Laterality: Right;    INJECTION, SACROILIAC JOINT Right 12/19/2023    Procedure: RT SI joint Inj;  Surgeon:  Son Lara, ;  Location: [facility]IN [facility]EMENT;  Service: Pain Management;  Laterality: Right;  oral    INSERTION OF IMPLANTABLE LOOP RECORDER Left 6/21/2024    Procedure: Insertion, Implantable Loop Recorder;  Surgeon: Hunter Rich MD;  Location: [facility] EP LAB;  Service: Cardiology;  Laterality: Left;  CVA, ILR, BSCI, [place], [place], 3 Prep    INSERTION OF IMPLANTABLE LOOP RECORDER Left 8/30/2024    Procedure: Insertion, Implantable Loop Recorder;  Surgeon: Hunter Rich MD;  Location: [facility] EP LAB;  Service: Cardiology;  Laterality: Left;  CVA, RODNEY,MDT, RN [place], [place], 3 Prep    RADIOFREQUENCY ABLATION OF LUMBAR MEDIAL BRANCH NERVE AT SINGLE LEVEL Bilateral 5/19/2023    Procedure: RFA (B/L) L3,4,5;  Surgeon: Son Lara DO;  Location: [facility] PAIN [facility]NA[facility]EMENT;  Service: Pain Management;  Laterality: Bilateral;    REMOVAL OF IMPLANTABLE LOOP RECORDER N/A 7/10/2024    Procedure: REMOVAL, IMPLANTABLE LOOP RECORDER;  Surgeon: Hunter Rich MD;  Location: [facility] EP LAB;  Service: Cardiology;  Laterality: N/A;     Family History   Problem Relation Name Age of Onset    Kidney disease Mother      Heart disease Mother      Cancer Father      Hypertension Brother      Hypertension Daughter      Cancer Maternal Aunt       Social History[2]    Exam     Vitals:    05/09/25 1050   BP: 111/74   Pulse: 77      Physical Exam:  General: Not in acute distress. Not ill-appearing.   HENT: Normocephalic and atraumatic. Moist mucous membranes.  Eyes: Conjunctivae normal.   Pulmonary: Pulmonary effort is normal.   Skin: Skin is warm and dry. No rashes.   Psychiatric: Mood normal.        Neurologic Exam   Mental status: oriented to person, place, and time  Attention: Normal. Concentration: normal.  Speech: speech is normal.  Cranial Nerves: EOMI intact, V1-V3 Facial sensation intact. Symmetric facies. Hearing grossly intact. Palate and uvula midline, symmetric. No tongue deviation. Trapezius strength intact.     Motor  exam: bulk and tone normal. Strength 5/5 in bilateral upper extremities: deltoids, biceps, triceps, wrist flexion/extension, finger abduction/adduction. Strength 5/5 in bilateral lower extremities: hip flexion/extension, thigh adduction/abduction, knee flexion/extension, dorsiflexion/plantarflexion, foot eversion/inversion.    Reflexes: 2+ in bilateral upper extremities: biceps and brachiaradialis, 2+ in bilateral lower extremities: patellar and achilles  Plantar reflex: normal  Matos's/Clonus: negative    Sensory exam: light touch intact    Gait exam: normal  Romberg: negative  Coordination: normal    Tremor: none  Cogwheel rigidity: none    Labs and Imaging     Labs: reviewed  No results found for this or any previous visit (from the past 24 hours).    Thyroid normal  HgA1C%:  8.5  LDL:  133  Vit B1:  106  Vit B12: 378*  Vit B6:  3*  MMA:  Abnormal     Imaging:   I have personally reviewed the images performed.   MRI lumbar spine without contrast 04/21/2025  L3-4: Disc bulge.  Ligamentum flavum thickening.  Mild canal stenosis.  Mild bilateral foraminal stenosis.     L4-5: Disc bulge.  Advanced facet arthropathy a and ligamentum flavum thickening.  Moderate canal stenosis.  Encroachment on the left descending L5 nerve root in the lateral recess.  Moderate bilateral foraminal stenosis.     L5-S1: Disc bulge.  Superimposed right paracentral protrusion, similar to 06/23/2022.  Mild canal stenosis.  Herniated disc abuts the right descending S1 nerve root in the lateral recess (13:46).  Mild right foraminal stenosis.     Impression:     Multilevel degenerative changes, most pronounced at L4-5 and L5-S1 as detailed above.  No significant changes from 06/23/2022.    HEAD CT W/O contrast: 04/15/2025  No acute large vascular territory infarct or intracranial hemorrhage identified.  If persistent neurologic deficit, MRI brain can be obtained.     Sequela of chronic microvascular ischemic change with cerebral volume  loss.     BRAIN MRI lumbar spine without contrast 06/23/2022  Multilevel degenerative changes moderate spinal canal stenosis L4-L5 L5-S1 disc material descending right S1 nerve root    Other procedures: reviewed    Assessment and Plan     Problem List Items Addressed This Visit          Neuro    History of stroke       Cardiac/Vascular    Primary hypertension    Mixed hyperlipidemia       Endocrine    Overweight with body mass index (BMI) of 25 to 25.9 in adult    Type 2 diabetes mellitus, without long-term current use of insulin       Other    Impaired gait and mobility    Relevant Orders    Ambulatory Referral/Consult to Physical Therapy     Other Visit Diagnoses         Lumbar radiculopathy, chronic    -  Primary    Relevant Orders    Ambulatory Referral/Consult to Physical Therapy      Frequent falls        Relevant Orders    Ambulatory Referral/Consult to Physical Therapy            Mr. Burch is a 74-year-old female known to me who presents to clinic for follow-up of fall episodes after blood work and MRI imaging. At the time of today's visit, the patient denies new or worsening focal neurologic symptoms or any symptoms concerning for a new undocumented episodes/events. Patient is doing well overall with gradual improvements.  No reported falls since last clinic visit.  Patient previously reported falls provoked by position change feeling dizzy and lightheaded.  Patient does have longstanding history of diabetes with possible bilateral foot neuropathy as she reports numbness and tingling that occurs predominantly at night.  Dizziness lasts sec self-limiting only occurs with position change and walking greater than 4-5 feet.  With a recent ED visit resulting in acute kidney injury, hypovolemia, dehydration with no neurologic findings on imaging. She denies any alcohol, tobacco or recreational drug use.  She is a former smoker.  Patient also reports she has chronic low back pain is being managed by pain  management outside facility she has received oral, topical medications and also KRISTA treatments in the past.  MRI lumbar imaging reviewed from 2022 patient has multilevel degenerative changes moderate to severe. Additionally patient has history of cerebellar stroke.  Physical examination unremarkable. Strength 5/5 range of motion intact, negative Romberg    Discussed multifactorial nature of patient's current complaint patient history of gait disturbances due to residual effects of stroke, no exercise, history of diabetes, vascular risk factors, chronic low back pain, kidney disturbances, and cardiac contributing factors.  Reviewed labs patient has vitamin-B deficiency recommended B complex repletion over-the-counter.  MRI lumbar spine without contrast revealed multilevel degenerative changes more pronounced and severe L5-S1, these findings with no significant changes from previous imaging 2022. Discussed this with the patient and caregiver present recommended outpatient physical therapy for gait mobility strength training and endurance. Continued discussion in regards to radiculopathy/neuropathy as it contributes to patient's balance disturbance. In regards to patient's dizziness she reports this symptom has gradually resolved with proper intake hydrating and safety precautions when going from sit to stand and walking. High suspicion patient's positional dizziness is due to fluctuations in blood pressure kidney function and lacking hydration.  This too can contribute to patient's falls.  Recommended patient continues conservative management of these symptoms.  Educated on signs and symptoms requiring patient to report to the nearest emergency room or activation of the EMS system. Discussed with the patient further workup of neuropathy with EMG, patient would like to forego at this time incomplete physical therapy first. Otherwise lifestyle modifications including diet, sleep hygiene, and exercise were  discussed.    #Health Maintenance/Lifestyle Advice/vascular risk factor management/history of stroke  We have discussed the value in aggressively controlling vascular risk factors like hypertension, hyperlipidemia, obesity and diabetes SBP<130, LDL<100, A1C<7.0.  We discussed the need to optimize lifestyle choices including a heart-healthy diet (e.g., Mediterranean or DASH), increased cardiovascular exercise (goal 150 minutes of moderate-intensity per week), and stay cognitively and socially active.  We recommend the MIND diet, a combination of two healthy diets: the Mediterranean diet and the DASH (Dietary Approaches to Stop Hypertension) diet and includes a variety of brain-friendly foods to optimize cognitive health and longevity.    Questions and concerns were sought and answered to the patient's stated verbal satisfaction. The patient verbalizes understanding and agreement with the above stated treatment plan.      Visit today included increased complexity associated with the care of the episodic problem frequent falls, lumbar radiculopathy, paresthesias addressed and managing the longitudinal care of the patient due to the serious and/or complex managed problem(s) neurological signs vascular risk factors.    Thank you for allowing me to assist in Mrs. Joanne Peña 's care. If you have any questions, please contact clinic @ [phone] or use of portal messaging services via electronic medical record.    SHAHEED Madrigal  [facility]  Department of Neurology- [facility]     [phone]    Follow-up: Follow up in about 6 months (around 11/9/2025).  After outpatient physical therapy, recheck B vitamins    Time spent on this encounter: 40 minutes. This includes face to face time and non-face to face time preparing to see the patient (eg, review of tests), obtaining and/or reviewing separately obtained history, documenting clinical information in the electronic or other health  "record, independently interpreting results and communicating results to the patient/family/caregiver, or care coordinator.     This note was created by combination of typed  and M-Modal dictation. Transcription and phonetic errors may be present.  If there are any questions, please contact me.         [1]   Current Outpatient Medications   Medication Sig Dispense Refill    ACCU-CHEK GUIDE GLUCOSE METER Misc TO CHECK BLOOD GLUCOSE DAILY, TO USE WITH INSURANCE PREFERRED METER      ascorbic acid, vitamin C, (VITAMIN C) 500 MG tablet Take 500 mg by mouth once daily.      atorvastatin (LIPITOR) 80 MG tablet TAKE 1 TABLET (80 MG TOTAL) BY MOUTH ONCE DAILY. 90 tablet 2    BD VEO INSULIN SYRINGE UF 1/2 mL 31 gauge x 15/64" Syrg       blood sugar diagnostic Strp To check BG daily, to use with insurance preferred meter 200 each 11    blood sugar diagnostic Strp To check BG daily, to use with insurance preferred meter 200 each 11    blood-glucose meter,continuous (DEXCOM G6 ) Misc 1 each by Misc.(Non-Drug; Combo Route) route 2 (two) times daily. 1 each 2    blood-glucose sensor (DEXCOM G7 SENSOR) Denisse 1 Device by Misc.(Non-Drug; Combo Route) route 2 (two) times daily. 3 each 3    cyclobenzaprine (FLEXERIL) 10 MG tablet TAKE 1 TABLET (10 MG TOTAL) BY MOUTH 2 (TWO) TIMES DAILY AS NEEDED FOR MUSCLE SPASMS (BACK PAIN). 180 tablet 1    empagliflozin (JARDIANCE) 25 mg tablet Take 1 tablet (25 mg total) by mouth once daily. 30 tablet 11    evolocumab (REPATHA SURECLICK) 140 mg/mL PnIj Inject 1 mL (140 mg total) into the skin every 14 (fourteen) days. 2 mL 11    furosemide (LASIX) 20 MG tablet Take 1 tablet (20 mg total) by mouth 2 (two) times daily. 60 tablet 3    insulin syringe-needle U-100 0.5 mL 31 gauge x 5/16" Syrg 60 each by Misc.(Non-Drug; Combo Route) route 2 (two) times a day. 60 each 11    lancets Misc To check BG daily, to use with insurance preferred meter 200 each 11    lancets Misc To check BG daily, " to use with insurance preferred meter 200 each 11    metoprolol succinate (TOPROL-XL) 25 MG 24 hr tablet Take 1 tablet (25 mg total) by mouth once daily. 90 tablet 3    multivitamin (THERAGRAN) per tablet Take 1 tablet by mouth once daily.      promethazine-dextromethorphan (PROMETHAZINE-DM) 6.25-15 mg/5 mL Syrp Take 5 mLs by mouth nightly as needed (cough). 118 mL 0    aspirin (ECOTRIN) 81 MG EC tablet Take 1 tablet (81 mg total) by mouth once daily. 30 tablet 3    LORazepam (ATIVAN) 1 MG tablet Take 1 tablet (1 mg total) by mouth On call Procedure (for MRI). 1 tablet 0    meclizine (ANTIVERT) 25 mg tablet Take 1 tablet (25 mg total) by mouth 2 (two) times daily as needed for Dizziness. 30 tablet 0     No current facility-administered medications for this visit.   [2]   Social History  Socioeconomic History    Marital status:    Tobacco Use    Smoking status: Former     Current packs/day: 0.00     Types: Cigarettes     Quit date: 2/6/2022     Years since quitting: 3.2     Passive exposure: Past    Smokeless tobacco: Never    Tobacco comments:     Patient Quit Smoking on 02/06/2022.   Substance and Sexual Activity    Alcohol use: Not Currently    Drug use: No    Sexual activity: Not Currently     Partners: Male     Social Drivers of Health     Financial Resource Strain: Low Risk  (4/11/2025)    Overall Financial Resource Strain (CARDIA)     Difficulty of Paying Living Expenses: Not very hard   Recent Concern: Financial Resource Strain - High Risk (1/18/2025)    Overall Financial Resource Strain (CARDIA)     Difficulty of Paying Living Expenses: Hard   Food Insecurity: Food Insecurity Present (4/11/2025)    Hunger Vital Sign     Worried About Running Out of Food in the Last Year: Sometimes true     Ran Out of Food in the Last Year: Sometimes true   Transportation Needs: No Transportation Needs (4/11/2025)    PRAPARE - Transportation     Lack of Transportation (Medical): No     Lack of Transportation  (Non-Medical): No   Physical Activity: Inactive (3/31/2025)    Exercise Vital Sign     Days of Exercise per Week: 0 days     Minutes of Exercise per Session: 0 min   Stress: No Stress Concern Present (4/11/2025)    [employer] - Occupational Stress Questionnaire     Feeling of Stress : Not at all   Housing Stability: Low Risk  (4/11/2025)    Housing Stability Vital Sign     Unable to Pay for Housing in the Last Year: No     Homeless in the Last Year: No   Recent Concern: Housing Stability - High Risk (1/18/2025)    Housing Stability Vital Sign     Unable to Pay for Housing in the Last Year: Yes     Homeless in the Last Year: No

## 2025-05-15 ENCOUNTER — LAB VISIT (OUTPATIENT)
Dept: LAB | Facility: HOSPITAL | Age: 75
End: 2025-05-15
Attending: FAMILY MEDICINE
Payer: MEDICARE

## 2025-05-15 DIAGNOSIS — I25.10 ATHEROSCLEROSIS OF NATIVE CORONARY ARTERY OF NATIVE HEART WITHOUT ANGINA PECTORIS: ICD-10-CM

## 2025-05-15 DIAGNOSIS — E11.65 UNCONTROLLED TYPE 2 DIABETES MELLITUS WITH HYPERGLYCEMIA: ICD-10-CM

## 2025-05-15 DIAGNOSIS — E78.5 HYPERLIPIDEMIA ASSOCIATED WITH TYPE 2 DIABETES MELLITUS: ICD-10-CM

## 2025-05-15 DIAGNOSIS — E11.69 HYPERLIPIDEMIA ASSOCIATED WITH TYPE 2 DIABETES MELLITUS: ICD-10-CM

## 2025-05-15 LAB
ALBUMIN SERPL BCP-MCNC: 3.6 G/DL (ref 3.5–5.2)
ALP SERPL-CCNC: 80 UNIT/L (ref 40–150)
ALT SERPL W/O P-5'-P-CCNC: 11 UNIT/L (ref 10–44)
ANION GAP (OHS): 10 MMOL/L (ref 8–16)
AST SERPL-CCNC: 13 UNIT/L (ref 11–45)
BILIRUB SERPL-MCNC: 0.5 MG/DL (ref 0.1–1)
BUN SERPL-MCNC: 57 MG/DL (ref 8–23)
CALCIUM SERPL-MCNC: 9.5 MG/DL (ref 8.7–10.5)
CHLORIDE SERPL-SCNC: 109 MMOL/L (ref 95–110)
CHOLEST SERPL-MCNC: 135 MG/DL (ref 120–199)
CHOLEST/HDLC SERPL: 3 {RATIO} (ref 2–5)
CO2 SERPL-SCNC: 21 MMOL/L (ref 23–29)
CREAT SERPL-MCNC: 2.1 MG/DL (ref 0.5–1.4)
EAG (OHS): 146 MG/DL (ref 68–131)
GFR SERPLBLD CREATININE-BSD FMLA CKD-EPI: 25 ML/MIN/1.73/M2
GLUCOSE SERPL-MCNC: 145 MG/DL (ref 70–110)
HBA1C MFR BLD: 6.7 % (ref 4–5.6)
HDLC SERPL-MCNC: 45 MG/DL (ref 40–75)
HDLC SERPL: 33.3 % (ref 20–50)
LDLC SERPL CALC-MCNC: 61.4 MG/DL (ref 63–159)
NONHDLC SERPL-MCNC: 90 MG/DL
POTASSIUM SERPL-SCNC: 5.4 MMOL/L (ref 3.5–5.1)
PROT SERPL-MCNC: 7.7 GM/DL (ref 6–8.4)
SODIUM SERPL-SCNC: 140 MMOL/L (ref 136–145)
TRIGL SERPL-MCNC: 143 MG/DL (ref 30–150)

## 2025-05-15 PROCEDURE — 83036 HEMOGLOBIN GLYCOSYLATED A1C: CPT | Mod: HCNC

## 2025-05-15 PROCEDURE — 80061 LIPID PANEL: CPT | Mod: HCNC

## 2025-05-15 PROCEDURE — 80053 COMPREHEN METABOLIC PANEL: CPT | Mod: HCNC

## 2025-05-15 PROCEDURE — 36415 COLL VENOUS BLD VENIPUNCTURE: CPT | Mod: HCNC,PO

## 2025-05-16 ENCOUNTER — PATIENT OUTREACH (OUTPATIENT)
Dept: ADMINISTRATIVE | Facility: OTHER | Age: 75
End: 2025-05-16

## 2025-05-16 ENCOUNTER — RESULTS FOLLOW-UP (OUTPATIENT)
Dept: FAMILY MEDICINE | Facility: CLINIC | Age: 75
End: 2025-05-16

## 2025-05-22 ENCOUNTER — OFFICE VISIT (OUTPATIENT)
Dept: FAMILY MEDICINE | Facility: CLINIC | Age: 75
End: 2025-05-22
Payer: MEDICARE

## 2025-05-22 VITALS
RESPIRATION RATE: 18 BRPM | DIASTOLIC BLOOD PRESSURE: 78 MMHG | BODY MASS INDEX: 26.55 KG/M2 | HEART RATE: 94 BPM | SYSTOLIC BLOOD PRESSURE: 110 MMHG | WEIGHT: 169.19 LBS | OXYGEN SATURATION: 97 % | TEMPERATURE: 98 F | HEIGHT: 67 IN

## 2025-05-22 DIAGNOSIS — E11.69 HYPERLIPIDEMIA ASSOCIATED WITH TYPE 2 DIABETES MELLITUS: ICD-10-CM

## 2025-05-22 DIAGNOSIS — I50.22 CHRONIC SYSTOLIC (CONGESTIVE) HEART FAILURE: ICD-10-CM

## 2025-05-22 DIAGNOSIS — H25.9 AGE-RELATED CATARACT OF BOTH EYES, UNSPECIFIED AGE-RELATED CATARACT TYPE: ICD-10-CM

## 2025-05-22 DIAGNOSIS — N18.4 TYPE 2 DIABETES MELLITUS WITH STAGE 4 CHRONIC KIDNEY DISEASE, WITH LONG-TERM CURRENT USE OF INSULIN: Primary | ICD-10-CM

## 2025-05-22 DIAGNOSIS — I25.10 ATHEROSCLEROSIS OF NATIVE CORONARY ARTERY OF NATIVE HEART WITHOUT ANGINA PECTORIS: ICD-10-CM

## 2025-05-22 DIAGNOSIS — I10 ESSENTIAL HYPERTENSION: ICD-10-CM

## 2025-05-22 DIAGNOSIS — E78.5 HYPERLIPIDEMIA ASSOCIATED WITH TYPE 2 DIABETES MELLITUS: ICD-10-CM

## 2025-05-22 DIAGNOSIS — E11.22 TYPE 2 DIABETES MELLITUS WITH STAGE 4 CHRONIC KIDNEY DISEASE, WITH LONG-TERM CURRENT USE OF INSULIN: Primary | ICD-10-CM

## 2025-05-22 DIAGNOSIS — Z79.4 TYPE 2 DIABETES MELLITUS WITH STAGE 4 CHRONIC KIDNEY DISEASE, WITH LONG-TERM CURRENT USE OF INSULIN: Primary | ICD-10-CM

## 2025-05-22 PROCEDURE — 1160F RVW MEDS BY RX/DR IN RCRD: CPT | Mod: CPTII,HCNC,S$GLB, | Performed by: FAMILY MEDICINE

## 2025-05-22 PROCEDURE — 3074F SYST BP LT 130 MM HG: CPT | Mod: CPTII,HCNC,S$GLB, | Performed by: FAMILY MEDICINE

## 2025-05-22 PROCEDURE — 3078F DIAST BP <80 MM HG: CPT | Mod: CPTII,HCNC,S$GLB, | Performed by: FAMILY MEDICINE

## 2025-05-22 PROCEDURE — 3288F FALL RISK ASSESSMENT DOCD: CPT | Mod: CPTII,HCNC,S$GLB, | Performed by: FAMILY MEDICINE

## 2025-05-22 PROCEDURE — 1159F MED LIST DOCD IN RCRD: CPT | Mod: CPTII,HCNC,S$GLB, | Performed by: FAMILY MEDICINE

## 2025-05-22 PROCEDURE — 3061F NEG MICROALBUMINURIA REV: CPT | Mod: CPTII,HCNC,S$GLB, | Performed by: FAMILY MEDICINE

## 2025-05-22 PROCEDURE — 99999 PR PBB SHADOW E&M-EST. PATIENT-LVL III: CPT | Mod: PBBFAC,HCNC,, | Performed by: FAMILY MEDICINE

## 2025-05-22 PROCEDURE — 3044F HG A1C LEVEL LT 7.0%: CPT | Mod: CPTII,HCNC,S$GLB, | Performed by: FAMILY MEDICINE

## 2025-05-22 PROCEDURE — 3066F NEPHROPATHY DOC TX: CPT | Mod: CPTII,HCNC,S$GLB, | Performed by: FAMILY MEDICINE

## 2025-05-22 PROCEDURE — 4010F ACE/ARB THERAPY RXD/TAKEN: CPT | Mod: CPTII,HCNC,S$GLB, | Performed by: FAMILY MEDICINE

## 2025-05-22 PROCEDURE — 1101F PT FALLS ASSESS-DOCD LE1/YR: CPT | Mod: CPTII,HCNC,S$GLB, | Performed by: FAMILY MEDICINE

## 2025-05-22 PROCEDURE — 1126F AMNT PAIN NOTED NONE PRSNT: CPT | Mod: CPTII,HCNC,S$GLB, | Performed by: FAMILY MEDICINE

## 2025-05-22 PROCEDURE — 99214 OFFICE O/P EST MOD 30 MIN: CPT | Mod: HCNC,S$GLB,, | Performed by: FAMILY MEDICINE

## 2025-05-22 PROCEDURE — 3008F BODY MASS INDEX DOCD: CPT | Mod: CPTII,HCNC,S$GLB, | Performed by: FAMILY MEDICINE

## 2025-05-22 RX ORDER — TIRZEPATIDE 5 MG/.5ML
5 INJECTION, SOLUTION SUBCUTANEOUS
Qty: 2 ML | Refills: 11 | Status: SHIPPED | OUTPATIENT
Start: 2025-05-22 | End: 2026-05-22

## 2025-05-22 RX ORDER — INSULIN LISPRO 100 [IU]/ML
INJECTION, SUSPENSION SUBCUTANEOUS
COMMUNITY
Start: 2025-05-14 | End: 2025-05-22

## 2025-05-22 NOTE — PROGRESS NOTES
Assessment & Plan:    Type 2 diabetes mellitus with stage 4 chronic kidney disease, with long-term current use of insulin  -     Hemoglobin A1C; Future; Expected date: 05/22/2025  -     tirzepatide (MOUNJARO) 5 mg/0.5 mL Lillian; Inject 5 mg into the skin every 7 days.  Dispense: 2 mL; Refill: 11    Improvement in the A1c to 6.7. Discussed with patient that the benefit of continuing her Mounjaro outweighs the risk. Resume Mounjaro 5 mg. Encouraged gentle oral hydration.  Repeat A1c in 3 months.    Chronic systolic (congestive) heart failure  Chronic, stable.    Atherosclerosis of native coronary artery of native heart without angina pectoris  Hyperlipidemia associated with type 2 diabetes mellitus  Significant improvement in the lipid panel with Repatha. Continue therapy plan.    Essential hypertension  Controlled. Continue current therapy.     Age-related cataract of both eyes, unspecified age-related cataract type  Patient is medically cleared to have cataract surgery.        Follow-up: Follow up in about 3 months (around 8/22/2025). Graduate to 6 month follow ups if A1c is <7.0 after next lab draw.  ______________________________________________________________________    Chief Complaint  Chief Complaint   Patient presents with    Health Maintenance       HPI  Joanne Peña is a 74 y.o. female with medical diagnoses as listed in the medical history and problem list that presents to the office to follow up on type 2 diabetes. She had a recent ER visit for acute on chronic CHF on 5/6. Please see ER encounter for further details. She was referred to the ER for hypoxia. She was treated with IV Lasix and discharged home.     Today, patient is feeling much better and does not report any new concerns. Requesting medical clearance for cataract surgeries of both eyes, dates TBD. She states that her Mounjaro was discontinued during a hospitalization last month. She reports average BG ranging 80-90s while she was on it.  Review of her hospital discharge summary suggests that it was D/Deo out of concern for appetite reduction affecting her oral hydration.     PAST MEDICAL HISTORY:  Past Medical History:   Diagnosis Date    Diabetes mellitus     Hyperlipidemia     Hypertension     Stroke        PAST SURGICAL HISTORY:  Past Surgical History:   Procedure Laterality Date    COLONOSCOPY N/A 7/12/2023    Procedure: COLONOSCOPY;  Surgeon: Ray Sorensen MD;  Location: [facility] ENDO;  Service: Endoscopy;  Laterality: N/A;  instr via portal  - PC  4/10/23 Daniel, instr via mail & portal, unable to tolerate Golytely- request Miralax/Gatorade - PC  5/30-pt r/s-new instr portal-tb    INJECTION OF ANESTHETIC AGENT AROUND MEDIAL BRANCH NERVES INNERVATING LUMBAR FACET JOINT Bilateral 4/20/2023    Procedure: MBB #1 (B/L) L3,4,5;  Surgeon: Son Lara DO;  Location: [facility] OR;  Service: Pain Management;  Laterality: Bilateral;  ORAL XANAX    INJECTION OF ANESTHETIC AGENT AROUND MEDIAL BRANCH NERVES INNERVATING LUMBAR FACET JOINT Bilateral 5/5/2023    Procedure: MBB #2 (B/L) L3,4,5;  Surgeon: Son Lara DO;  Location: [facility] OR;  Service: Pain Management;  Laterality: Bilateral;  Oral Xanax    INJECTION OF JOINT Right 5/20/2022    Procedure: Right SI joint injection;  Surgeon: Son Lara DO;  Location: [facility] OR;  Service: Pain Management;  Laterality: Right;    INJECTION, SACROILIAC JOINT Right 12/19/2023    Procedure: RT SI joint Inj;  Surgeon: Son Lara DO;  Location: [facility] PAIN MANAGEMENT;  Service: Pain Management;  Laterality: Right;  oral    INSERTION OF IMPLANTABLE LOOP RECORDER Left 6/21/2024    Procedure: Insertion, Implantable Loop Recorder;  Surgeon: Hunter Rich MD;  Location: [facility] EP LAB;  Service: Cardiology;  Laterality: Left;  CVA, ILR, BSCI, Local, [place], 3 Prep    INSERTION OF IMPLANTABLE LOOP RECORDER Left 8/30/2024    Procedure: Insertion, Implantable Loop Recorder;  Surgeon: Hunter Rich  "MD;  Location: [facility] EP LAB;  Service: Cardiology;  Laterality: Left;  RODNEY GONZÁLES MDT, RN [place], [place], 3 Prep    RADIOFREQUENCY ABLATION OF LUMBAR MEDIAL BRANCH NERVE AT SINGLE LEVEL Bilateral 5/19/2023    Procedure: RFA (B/L) L3,4,5;  Surgeon: Son Lara DO;  Location: [facility] PAIN MANAGEMENT;  Service: Pain Management;  Laterality: Bilateral;    REMOVAL OF IMPLANTABLE LOOP RECORDER N/A 7/10/2024    Procedure: REMOVAL, IMPLANTABLE LOOP RECORDER;  Surgeon: Hunter Rich MD;  Location: [facility] EP LAB;  Service: Cardiology;  Laterality: N/A;       SOCIAL HISTORY:  Social History[1]    FAMILY HISTORY:  Family History   Problem Relation Name Age of Onset    Kidney disease Mother      Heart disease Mother      Cancer Father      Hypertension Brother      Hypertension Daughter      Cancer Maternal Aunt         ALLERGIES AND MEDICATIONS: updated and reviewed.  Review of patient's allergies indicates:   Allergen Reactions    Ampicillin Rash    Darvocet a500 [propoxyphene n-acetaminophen] Other (See Comments)     shaky     Current Medications[2]      ROS  Review of Systems   Constitutional:  Negative for activity change and unexpected weight change.   Eyes:  Positive for visual disturbance.   Respiratory:  Negative for shortness of breath.            Physical Exam  Vitals:    05/22/25 1308   BP: 110/78   Patient Position: Sitting   Pulse: 94   Resp: 18   Temp: 97.6 °F (36.4 °C)   TempSrc: Oral   SpO2: 97%   Weight: 76.8 kg (169 lb 3.3 oz)   Height: 5' 7" (1.702 m)    Body mass index is 26.5 kg/m².  Weight: 76.8 kg (169 lb 3.3 oz)   Height: 5' 7" (170.2 cm)   Physical Exam  Constitutional:       General: She is not in acute distress.     Appearance: Normal appearance.   HENT:      Head: Normocephalic and atraumatic.   Cardiovascular:      Rate and Rhythm: Normal rate and regular rhythm.      Pulses: Normal pulses.      Heart sounds: Normal heart sounds.   Pulmonary:      Effort: Pulmonary effort is normal. No respiratory " distress.      Breath sounds: Normal breath sounds.   Musculoskeletal:      Cervical back: Neck supple.      Right lower leg: No edema.      Left lower leg: No edema.   Skin:     General: Skin is warm and dry.      Findings: No rash.   Neurological:      General: No focal deficit present.      Mental Status: She is alert and oriented to person, place, and time.   Psychiatric:         Mood and Affect: Mood normal.         Behavior: Behavior normal.         Thought Content: Thought content normal.                  [1]   Social History  Socioeconomic History    Marital status:    Tobacco Use    Smoking status: Former     Current packs/day: 0.00     Types: Cigarettes     Quit date: 2/6/2022     Years since quitting: 3.2     Passive exposure: Past    Smokeless tobacco: Never    Tobacco comments:     Patient Quit Smoking on 02/06/2022.   Substance and Sexual Activity    Alcohol use: Not Currently    Drug use: No    Sexual activity: Not Currently     Partners: Male     Social Drivers of Health     Financial Resource Strain: Low Risk  (4/11/2025)    Overall Financial Resource Strain (CARDIA)     Difficulty of Paying Living Expenses: Not very hard   Recent Concern: Financial Resource Strain - High Risk (1/18/2025)    Overall Financial Resource Strain (CARDIA)     Difficulty of Paying Living Expenses: Hard   Food Insecurity: Food Insecurity Present (4/11/2025)    Hunger Vital Sign     Worried About Running Out of Food in the Last Year: Sometimes true     Ran Out of Food in the Last Year: Sometimes true   Transportation Needs: No Transportation Needs (4/11/2025)    PRAPARE - Transportation     Lack of Transportation (Medical): No     Lack of Transportation (Non-Medical): No   Physical Activity: Inactive (3/31/2025)    Exercise Vital Sign     Days of Exercise per Week: 0 days     Minutes of Exercise per Session: 0 min   Stress: No Stress Concern Present (4/11/2025)    [employer] [place] [employer] -  "Occupational Stress Questionnaire     Feeling of Stress : Not at all   Housing Stability: Low Risk  (4/11/2025)    Housing Stability Vital Sign     Unable to Pay for Housing in the Last Year: No     Homeless in the Last Year: No   Recent Concern: Housing Stability - High Risk (1/18/2025)    Housing Stability Vital Sign     Unable to Pay for Housing in the Last Year: Yes     Homeless in the Last Year: No   [2]   Current Outpatient Medications   Medication Sig Dispense Refill    ACCU-CHEK GUIDE GLUCOSE METER Misc TO CHECK BLOOD GLUCOSE DAILY, TO USE WITH INSURANCE PREFERRED METER      ascorbic acid, vitamin C, (VITAMIN C) 500 MG tablet Take 500 mg by mouth once daily.      atorvastatin (LIPITOR) 80 MG tablet TAKE 1 TABLET (80 MG TOTAL) BY MOUTH ONCE DAILY. 90 tablet 2    BD VEO INSULIN SYRINGE UF 1/2 mL 31 gauge x 15/64" Syrg       blood sugar diagnostic Strp To check BG daily, to use with insurance preferred meter 200 each 11    blood sugar diagnostic Strp To check BG daily, to use with insurance preferred meter 200 each 11    blood-glucose meter,continuous (DEXCOM G6 ) Misc 1 each by Misc.(Non-Drug; Combo Route) route 2 (two) times daily. 1 each 2    blood-glucose sensor (DEXCOM G7 SENSOR) Denisse 1 Device by Misc.(Non-Drug; Combo Route) route 2 (two) times daily. 3 each 3    cyclobenzaprine (FLEXERIL) 10 MG tablet TAKE 1 TABLET (10 MG TOTAL) BY MOUTH 2 (TWO) TIMES DAILY AS NEEDED FOR MUSCLE SPASMS (BACK PAIN). 180 tablet 1    empagliflozin (JARDIANCE) 25 mg tablet Take 1 tablet (25 mg total) by mouth once daily. 30 tablet 11    evolocumab (REPATHA SURECLICK) 140 mg/mL PnIj Inject 1 mL (140 mg total) into the skin every 14 (fourteen) days. 2 mL 11    furosemide (LASIX) 20 MG tablet Take 1 tablet (20 mg total) by mouth 2 (two) times daily. 60 tablet 3    insulin syringe-needle U-100 0.5 mL 31 gauge x 5/16" Syrg 60 each by Misc.(Non-Drug; Combo Route) route 2 (two) times a day. 60 each 11    lancets Misc To check " BG daily, to use with insurance preferred meter 200 each 11    lancets Misc To check BG daily, to use with insurance preferred meter 200 each 11    metoprolol succinate (TOPROL-XL) 25 MG 24 hr tablet Take 1 tablet (25 mg total) by mouth once daily. 90 tablet 3    multivitamin (THERAGRAN) per tablet Take 1 tablet by mouth once daily.      promethazine-dextromethorphan (PROMETHAZINE-DM) 6.25-15 mg/5 mL Syrp Take 5 mLs by mouth nightly as needed (cough). 118 mL 0    aspirin (ECOTRIN) 81 MG EC tablet Take 1 tablet (81 mg total) by mouth once daily. 30 tablet 3    LORazepam (ATIVAN) 1 MG tablet Take 1 tablet (1 mg total) by mouth On call Procedure (for MRI). 1 tablet 0    meclizine (ANTIVERT) 25 mg tablet Take 1 tablet (25 mg total) by mouth 2 (two) times daily as needed for Dizziness. 30 tablet 0    tirzepatide (MOUNJARO) 5 mg/0.5 mL PnIj Inject 5 mg into the skin every 7 days. 2 mL 11     No current facility-administered medications for this visit.

## 2025-05-22 NOTE — PATIENT INSTRUCTIONS
Make sure you get the RSV vaccine, shingles vaccines (2 shots 2 months apart), and your tetanus booster.

## 2025-06-04 ENCOUNTER — CLINICAL SUPPORT (OUTPATIENT)
Dept: REHABILITATION | Facility: HOSPITAL | Age: 75
End: 2025-06-04
Payer: MEDICARE

## 2025-06-04 DIAGNOSIS — M54.41 CHRONIC BILATERAL LOW BACK PAIN WITH RIGHT-SIDED SCIATICA: Primary | ICD-10-CM

## 2025-06-04 DIAGNOSIS — M54.16 LUMBAR RADICULOPATHY, CHRONIC: ICD-10-CM

## 2025-06-04 DIAGNOSIS — R29.6 FREQUENT FALLS: ICD-10-CM

## 2025-06-04 DIAGNOSIS — G89.29 CHRONIC BILATERAL LOW BACK PAIN WITH RIGHT-SIDED SCIATICA: Primary | ICD-10-CM

## 2025-06-04 DIAGNOSIS — R26.89 IMPAIRED GAIT AND MOBILITY: ICD-10-CM

## 2025-06-04 PROCEDURE — 97110 THERAPEUTIC EXERCISES: CPT | Mod: HCNC,PN

## 2025-06-04 PROCEDURE — 97161 PT EVAL LOW COMPLEX 20 MIN: CPT | Mod: HCNC,PN

## 2025-06-05 ENCOUNTER — CLINICAL SUPPORT (OUTPATIENT)
Dept: CARDIOLOGY | Facility: HOSPITAL | Age: 75
End: 2025-06-05

## 2025-06-05 ENCOUNTER — CLINICAL SUPPORT (OUTPATIENT)
Dept: CARDIOLOGY | Facility: HOSPITAL | Age: 75
End: 2025-06-05
Attending: INTERNAL MEDICINE
Payer: MEDICARE

## 2025-06-05 DIAGNOSIS — I49.8 OTHER SPECIFIED CARDIAC ARRHYTHMIAS: ICD-10-CM

## 2025-06-05 PROCEDURE — 93298 REM INTERROG DEV EVAL SCRMS: CPT | Mod: HCNC | Performed by: INTERNAL MEDICINE

## 2025-06-05 PROCEDURE — 93298 REM INTERROG DEV EVAL SCRMS: CPT | Mod: 26,HCNC,, | Performed by: INTERNAL MEDICINE

## 2025-06-11 ENCOUNTER — TELEPHONE (OUTPATIENT)
Dept: FAMILY MEDICINE | Facility: CLINIC | Age: 75
End: 2025-06-11
Payer: MEDICARE

## 2025-06-11 NOTE — TELEPHONE ENCOUNTER
----- Message from Cainblake sent at 6/11/2025 11:31 AM CDT -----  Type: General Call Back Name of Caller:ptSymptoms:surgery clearanceWould the patient rather a call back or a response via [employer]? [employer] Call Back Number:[phone] Additional Information: Pt needs surgery clearance form filled out by Dr. Soto and wants to know if she can just drop off the forms or does she need an appointment.

## 2025-06-13 ENCOUNTER — OFFICE VISIT (OUTPATIENT)
Dept: FAMILY MEDICINE | Facility: CLINIC | Age: 75
End: 2025-06-13
Payer: MEDICARE

## 2025-06-13 VITALS
TEMPERATURE: 98 F | SYSTOLIC BLOOD PRESSURE: 122 MMHG | OXYGEN SATURATION: 97 % | BODY MASS INDEX: 25.64 KG/M2 | WEIGHT: 163.38 LBS | DIASTOLIC BLOOD PRESSURE: 66 MMHG | HEART RATE: 85 BPM | HEIGHT: 67 IN

## 2025-06-13 DIAGNOSIS — Z01.818 PRE-OP EXAM: Primary | ICD-10-CM

## 2025-06-13 DIAGNOSIS — Z87.891 FORMER SMOKER: ICD-10-CM

## 2025-06-13 PROBLEM — N18.32 STAGE 3B CHRONIC KIDNEY DISEASE: Status: RESOLVED | Noted: 2024-02-15 | Resolved: 2025-06-13

## 2025-06-13 PROCEDURE — 99999 PR PBB SHADOW E&M-EST. PATIENT-LVL V: CPT | Mod: PBBFAC,HCNC,,

## 2025-06-13 RX ORDER — CHOLECALCIFEROL (VITAMIN D3) 25 MCG
1000 TABLET ORAL
COMMUNITY

## 2025-06-13 NOTE — PROGRESS NOTES
Primary Care Pre-Operative Assessment  For cataract surgery scheduled to be performed on 7/1/2025 and 7/29/2025 by Dr. Alaniz    Impression:   Patient is at average risk for this low risk procedure  - see RQRI Surgical Risk Calculator results below.  -- Optimized from a primary care standpoint - diabetes control greatly improved with most recent A1C = 6.7 5/15/2025.   Asked to hold Jardiance 3 days prior to procedure to minimize risk of eDKA.  -- Pt maintains having had prior surgery without any perioperative complication.   -- I recommend use of standard pre-op and post-op precautions for this patient. We discussed the benefits of early mobilization and deep breathing after surgery.    -- Cleared to proceed with surgery as scheduled.  See note below for complete assessment    -- Please note that the RCRI score is not procedure-specific, and that risk is likely significantly lower if patient will not be undergoing general anesthesia.       Wt Readings from Last 3 Encounters:   06/13/25 74.1 kg (163 lb 5.8 oz)   05/22/25 76.8 kg (169 lb 3.3 oz)   05/09/25 76.6 kg (168 lb 14 oz)     Temp Readings from Last 3 Encounters:   06/13/25 97.8 °F (36.6 °C) (Oral)   05/22/25 97.6 °F (36.4 °C) (Oral)   05/05/25 97.6 °F (36.4 °C) (Oral)     BP Readings from Last 3 Encounters:   06/13/25 122/66   05/22/25 110/78   05/09/25 111/74     Pulse Readings from Last 3 Encounters:   06/13/25 85   05/22/25 94   05/09/25 77     Resp Readings from Last 3 Encounters:   05/22/25 18   05/05/25 19   05/05/25 16     PF Readings from Last 3 Encounters:   No data found for PF     SpO2 Readings from Last 3 Encounters:   06/13/25 97%   05/22/25 97%   05/05/25 95%        Lab Results   Component Value Date    HGBA1C 6.7 (H) 05/15/2025    HGBA1C 8.5 (H) 02/13/2025    HGBA1C 9.8 (H) 11/14/2024     Lab Results   Component Value Date    LDLCALC 61.4 (L) 05/15/2025    CREATININE 2.1 (H) 05/15/2025       Patient denies any symptoms (as per HPI)  concerning for undiagnosed lung disease including ALAINA    Screened patient for alcohol misuse, use of illicit drugs, and personal or family history of anesthetic complications or bleeding diathesis and no substantial concerns were identified.     Labs reviewed:  Creatinine is relatively stable at approximately 2.1 mg/dl per most recent labs 5/2025    No pre-op labs required by surgeon, but surgeon may order any tests at his/her discretion    All current medications were reviewed and at this time no changes to medications are recommended prior to surgery.     Advised healthy diet/exercise/weight loss in anticipation of surgery    DIABETICS:  Pt is diabetic but is not on insulin.   Decrease dose of basal insulin by 20% on day of surgery.  Prandial insulin is stopped when the fasting state begins.  Hold metformin and sulfonylureas the morning of surgery.  Hold SGLT2 inhibitor 3 days prior to surgery.         Assessment & Plan     Pre-op exam    Former smoker  Assessment & Plan:  Quit in 2022, commended and encouraged continue abstinence.  Does not vape.               Physical Exam   Vital signs reviewed.   Body mass index is 25.59 kg/m².  General:  Well-developed, well-nourished. NAD.   Skin:  Warm, dry.  No rashes or lesions noted.  No palmar erythema.  Cap refill <2s bilaterally  Head:  NC/AT   Eyes:  Conjunctivae w/o exudates or hemorrhage.  Non-icteric sclerae.  Ears:  External ears w/o swelling or erythema.  Nose:  Nares are patent bilaterally w/o rhinorrhea or epistaxis  Mouth:  Mucosa is pink and moist.  No nodules or lesions noted.  No tonsillar swelling or exudates.   Neck:  Trachea is midline.  No carotid bruit appreciated.  No cervical adenopathy appreciated.    Lungs:  CTAB without rales, rhonchi or wheezing.   Breathing comfortably on RA.  Heart:  Normal S1 & S2.  No extra heart sounds.   No pulsus alternans.  Abdomen:  Symmetric, non-distended, non-tender  Extremities:  Radial pulses 2+ and symmetric.      Neuro:  A&O4.  No obvious focal deficits. Negative Matos's sign.   strength 5/5 bilaterally.  Psychiatric:  Appropriate affect.          History     Past Medical History:  Past Medical History:   Diagnosis Date    Diabetes mellitus     Hyperlipidemia     Hypertension     Stroke        Past Surgical History:  Past Surgical History:   Procedure Laterality Date    COLONOSCOPY N/A 7/12/2023    Procedure: COLONOSCOPY;  Surgeon: Ray Sorensen MD;  Location: [facility] ENDO;  Service: Endoscopy;  Laterality: N/A;  instr via portal  - PC  4/10/23 Daniel, instr via mail & portal, unable to tolerate Golytely- request Miralax/Gatorade - PC  5/30-pt r/s-new instr portal-tb    INJECTION OF ANESTHETIC AGENT AROUND MEDIAL BRANCH NERVES INNERVATING LUMBAR FACET JOINT Bilateral 4/20/2023    Procedure: MBB #1 (B/L) L3,4,5;  Surgeon: Son Lara DO;  Location: [facility] OR;  Service: Pain Management;  Laterality: Bilateral;  ORAL XANAX    INJECTION OF ANESTHETIC AGENT AROUND MEDIAL BRANCH NERVES INNERVATING LUMBAR FACET JOINT Bilateral 5/5/2023    Procedure: MBB #2 (B/L) L3,4,5;  Surgeon: Son Lara DO;  Location: [facility] OR;  Service: Pain Management;  Laterality: Bilateral;  Oral Xanax    INJECTION OF JOINT Right 5/20/2022    Procedure: Right SI joint injection;  Surgeon: Son Lara DO;  Location: [facility] OR;  Service: Pain Management;  Laterality: Right;    INJECTION, SACROILIAC JOINT Right 12/19/2023    Procedure: RT SI joint Inj;  Surgeon: Son Lara DO;  Location: [facility] PAIN MANAGEMENT;  Service: Pain Management;  Laterality: Right;  oral    INSERTION OF IMPLANTABLE LOOP RECORDER Left 6/21/2024    Procedure: Insertion, Implantable Loop Recorder;  Surgeon: Hunter Rich MD;  Location: [facility] EP LAB;  Service: Cardiology;  Laterality: Left;  CVA, ILR, BSCI, [place], [place], 3 Prep    INSERTION OF IMPLANTABLE LOOP RECORDER Left 8/30/2024    Procedure: Insertion, Implantable Loop Recorder;  Surgeon:  Hunter Rich MD;  Location: [facility] EP LAB;  Service: Cardiology;  Laterality: Left;  RODNEY GONZÁLES MDT, RN [place], [place], 3 Prep    RADIOFREQUENCY ABLATION OF LUMBAR MEDIAL BRANCH NERVE AT SINGLE LEVEL Bilateral 5/19/2023    Procedure: RFA (B/L) L3,4,5;  Surgeon: Son Lara DO;  Location: [facility] PAIN MANAGEMENT;  Service: Pain Management;  Laterality: Bilateral;    REMOVAL OF IMPLANTABLE LOOP RECORDER N/A 7/10/2024    Procedure: REMOVAL, IMPLANTABLE LOOP RECORDER;  Surgeon: Hunter Rich MD;  Location: [facility] EP LAB;  Service: Cardiology;  Laterality: N/A;       Social History:  Social History[1]    Family History:  Family History   Problem Relation Name Age of Onset    Kidney disease Mother      Heart disease Mother      Cancer Father      Hypertension Brother      Hypertension Daughter      Cancer Maternal Aunt         Allergies and Medications: (updated and reviewed)  Review of patient's allergies indicates:   Allergen Reactions    Ampicillin Rash    Darvocet a500 [propoxyphene n-acetaminophen] Other (See Comments)     shaky     Current Medications[2]    Patient Care Team:  Teri Soto DO as PCP - General (Family Medicine)  Tracie Kessler LPN as Care Coordinator  Lb Tracy OD (Optometry)  Sariah Pfeiffer OD as Consulting Physician (Optometry)  Kamar Nye, PharmD as Pharmacist  Rodrick Mclean as Community Health Worker  Kamini Alonso LPN as Licensed Practical Nurse        Yung Boggs PA-C  Family Medicine  [facility]         - The patient is given an After Visit Summary that lists all medications with directions, allergies, education, orders placed during this encounter and follow-up instructions.      - I have reviewed the patient's medical information including past medical, family, and social history sections including the medications and allergies.      - We discussed the patient's current medications.     This note was created by combination of  typed  and MModal dictation.  Transcription errors may be present.  If there are any questions, please contact me.                     [1]   Social History  Socioeconomic History    Marital status:    Tobacco Use    Smoking status: Former     Current packs/day: 0.00     Types: Cigarettes     Quit date: 2/6/2022     Years since quitting: 3.3     Passive exposure: Past    Smokeless tobacco: Never    Tobacco comments:     Patient Quit Smoking on 02/06/2022.   Substance and Sexual Activity    Alcohol use: Not Currently    Drug use: No    Sexual activity: Not Currently     Partners: Male     Social Drivers of Health     Financial Resource Strain: Low Risk  (4/11/2025)    Overall Financial Resource Strain (CARDIA)     Difficulty of Paying Living Expenses: Not very hard   Recent Concern: Financial Resource Strain - High Risk (1/18/2025)    Overall Financial Resource Strain (CARDIA)     Difficulty of Paying Living Expenses: Hard   Food Insecurity: Food Insecurity Present (4/11/2025)    Hunger Vital Sign     Worried About Running Out of Food in the Last Year: Sometimes true     Ran Out of Food in the Last Year: Sometimes true   Transportation Needs: No Transportation Needs (4/11/2025)    PRAPARE - Transportation     Lack of Transportation (Medical): No     Lack of Transportation (Non-Medical): No   Physical Activity: Inactive (3/31/2025)    Exercise Vital Sign     Days of Exercise per Week: 0 days     Minutes of Exercise per Session: 0 min   Stress: No Stress Concern Present (4/11/2025)    [employer] [place] [employer] - Occupational Stress Questionnaire     Feeling of Stress : Not at all   Housing Stability: Low Risk  (4/11/2025)    Housing Stability Vital Sign     Unable to Pay for Housing in the Last Year: No     Homeless in the Last Year: No   Recent Concern: Housing Stability - High Risk (1/18/2025)    Housing Stability Vital Sign     Unable to Pay for Housing in the Last Year: Yes      Homeless in the Last Year: No   [2]   Current Outpatient Medications   Medication Sig Dispense Refill    ACCU-CHEK GUIDE GLUCOSE METER Misc TO CHECK BLOOD GLUCOSE DAILY, TO USE WITH INSURANCE PREFERRED METER      ascorbic acid, vitamin C, (VITAMIN C) 500 MG tablet Take 500 mg by mouth once daily.      atorvastatin (LIPITOR) 80 MG tablet TAKE 1 TABLET (80 MG TOTAL) BY MOUTH ONCE DAILY. 90 tablet 2    blood sugar diagnostic Strp To check BG daily, to use with insurance preferred meter 200 each 11    blood sugar diagnostic Strp To check BG daily, to use with insurance preferred meter 200 each 11    blood-glucose meter,continuous (DEXCOM G6 ) Misc 1 each by Misc.(Non-Drug; Combo Route) route 2 (two) times daily. 1 each 2    blood-glucose sensor (DEXCOM G7 SENSOR) Denisse 1 Device by Misc.(Non-Drug; Combo Route) route 2 (two) times daily. 3 each 3    cyclobenzaprine (FLEXERIL) 10 MG tablet TAKE 1 TABLET (10 MG TOTAL) BY MOUTH 2 (TWO) TIMES DAILY AS NEEDED FOR MUSCLE SPASMS (BACK PAIN). 180 tablet 1    empagliflozin (JARDIANCE) 25 mg tablet Take 1 tablet (25 mg total) by mouth once daily. 30 tablet 11    evolocumab (REPATHA SURECLICK) 140 mg/mL PnIj Inject 1 mL (140 mg total) into the skin every 14 (fourteen) days. 2 mL 11    furosemide (LASIX) 20 MG tablet Take 1 tablet (20 mg total) by mouth 2 (two) times daily. 60 tablet 3    lancets Misc To check BG daily, to use with insurance preferred meter 200 each 11    meclizine (ANTIVERT) 25 mg tablet Take 1 tablet (25 mg total) by mouth 2 (two) times daily as needed for Dizziness. 30 tablet 0    metoprolol succinate (TOPROL-XL) 25 MG 24 hr tablet Take 1 tablet (25 mg total) by mouth once daily. 90 tablet 3    multivitamin (THERAGRAN) per tablet Take 1 tablet by mouth once daily.      tirzepatide (MOUNJARO) 5 mg/0.5 mL PnIj Inject 5 mg into the skin every 7 days. 2 mL 11    aspirin (ECOTRIN) 81 MG EC tablet Take 1 tablet (81 mg total) by mouth once daily. 30 tablet 3     LORazepam (ATIVAN) 1 MG tablet Take 1 tablet (1 mg total) by mouth On call Procedure (for MRI). 1 tablet 0    vitamin D (VITAMIN D3) 1000 units Tab Take 1,000 Units by mouth.       No current facility-administered medications for this visit.

## 2025-06-13 NOTE — PATIENT INSTRUCTIONS
Thank you for seeing me today.    Stop Jardiance 3 days prior to surgery.    Otherwise, please follow the Ophthalmologist's recommendation to hold all medications on the morning of surgery except for metoprolol.     Vertigo - can look up YouTube videos of the Epley maneuver or the Semont maneuver for symptomatic relief    Please don't hesitate to seek emergency care if you develop any new or worsening symptoms.

## 2025-06-18 LAB
OHS CV AF BURDEN PERCENT: < 1
OHS CV DC REMOTE DEVICE TYPE: NORMAL

## 2025-06-25 ENCOUNTER — CLINICAL SUPPORT (OUTPATIENT)
Dept: REHABILITATION | Facility: HOSPITAL | Age: 75
End: 2025-06-25
Payer: MEDICARE

## 2025-06-25 DIAGNOSIS — G89.29 CHRONIC BILATERAL LOW BACK PAIN WITH RIGHT-SIDED SCIATICA: Primary | ICD-10-CM

## 2025-06-25 DIAGNOSIS — M54.41 CHRONIC BILATERAL LOW BACK PAIN WITH RIGHT-SIDED SCIATICA: Primary | ICD-10-CM

## 2025-06-25 PROCEDURE — 97112 NEUROMUSCULAR REEDUCATION: CPT | Mod: HCNC,PN

## 2025-06-25 PROCEDURE — 97530 THERAPEUTIC ACTIVITIES: CPT | Mod: HCNC,PN

## 2025-06-25 NOTE — PROGRESS NOTES
Outpatient Rehab    Physical Therapy Visit    Patient Name: Joanne Peña  MRN: [id number]  YOB: 1950  Encounter Date: 6/25/2025    Therapy Diagnosis:   Encounter Diagnosis   Name Primary?    Chronic bilateral low back pain with right-sided sciatica Yes     Physician: Latesha Perrin NP    Physician Orders: Eval and Treat  Medical Diagnosis: Lumbar radiculopathy, chronic  Impaired gait and mobility  Frequent falls  Surgical Diagnosis: Not applicable for this Episode   Surgical Date: Not applicable for this Episode  Days Since Last Surgery: Not applicable for this Episode    Visit # / Visits Authorized:  1 / 10  Insurance Authorization Period: 6/3/2025 to 8/10/2025  Date of Evaluation: 6/4/2025  Plan of Care Certification: 6/4/2025 to 8/27/2025      PT/PTA:     Number of PTA visits since last PT visit:   Time In: 1000   Time Out: 1046  Total Time (in minutes): 46   Total Billable Time (in minutes): 23    FOTO:  Intake Score:  %  Survey Score 2:  %  Survey Score 3:  %    Precautions:       Subjective   Pt states she still have lower back pain.  Pain reported as 4/10.      Objective            Treatment:  Therapeutic Exercise  TE 1: supine R sciatic nerve glides 3x10  TE 2: supine R piriformis stretch 5x30 sec  TE 3: LTR + yoga ball 2 min  Balance/Neuromuscular Re-Education  NMR 1: TrA activaiton 3x10 hold 5 sec  NMR 2: PPT 3x10 hold 5 sec  NMR 3: supine marching 3x10 + TrA activaiton  NMR 4: Bridges 2x10  Therapeutic Activity  TA 1: R-bike 10 min    Time Entry(in minutes):  Neuromuscular Re-Education Time Entry: 13  Therapeutic Activity Time Entry: 10    Assessment & Plan   Assessment: Pt presented to first f/u. We went over HEP. Pt still have lower back pain. Pt demonstrated decrease lumbar stability and mobility. Pt required mod v/c's to perform exercises with proper form and proper muscles activation. R-bike performed to improve functional mobility. Pt was fatigue in the end of PT session.    Evaluation/Treatment Tolerance: Patient limited by fatigue    The patient will continue to benefit from skilled outpatient physical therapy in order to address the deficits listed in the problem list on the initial evaluation, provide patient and family education, and maximize the patients level of independence in the home and community environments.     The patient's spiritual, cultural, and educational needs were considered, and the patient is agreeable to the plan of care and goals.           Plan: Cont POC    Goals:   Active       Physical Therapy       Physical Therapy Goal       Start:  06/04/25    Expected End:  08/27/25       GOALS: Short Term Goals:  4 weeks  1. Report decreased in pain at worse less than  <   / =  5 /10  to increase tolerance for functional activities.On going  2. Increased R LE MMT 1/2  to increase tolerance for ADL and work activities.On going  3. Pt to tolerate HEP to improve ROM and independence with ADL's.On going  4. Pt to improve lumbar range of motion by 25% to allow for improved functional mobility to allow for improvement in IADL's. On going    Long Term Goals: 8 weeks  1. Report decreased in pain at worse less than  <   / =  2  /10  to increase tolerance for functional mobility.  On going  2. Increased R LE  MMT 1 grade to increase tolerance for ADL and work activities.On going  3. Pt will report at 80% or less limitation on FOTO Lumbar spine survey  to demonstrate decrease in disability and improvement in back pain.On going  4. Pt to be Independent with HEP to improve ROM and independence with ADL's. On going  5. Pt to demonstrate negative Bridge Test in order to show improved core strength for lumbar stabilization. On going  6. Pt to improve lumbar  range of motion by 75% to allow for improved functional mobility to allow for improvement in IADL's. On going  7. Pt will report no fall since initial eval.   8. Pt will improve 5 times sit to stand scores  (10 sec or less) and  TUG scores  (10 sec or less) to demonstrated decrease fall risk.              Carlos A Alvarez, PT

## 2025-06-27 ENCOUNTER — TELEPHONE (OUTPATIENT)
Dept: PAIN MEDICINE | Facility: CLINIC | Age: 75
End: 2025-06-27
Payer: MEDICARE

## 2025-06-27 DIAGNOSIS — M46.1 SACROILIITIS: ICD-10-CM

## 2025-06-27 DIAGNOSIS — M54.16 LUMBAR RADICULOPATHY: ICD-10-CM

## 2025-06-27 RX ORDER — CYCLOBENZAPRINE HCL 10 MG
10 TABLET ORAL 2 TIMES DAILY PRN
Qty: 180 TABLET | Refills: 1 | Status: SHIPPED | OUTPATIENT
Start: 2025-06-27 | End: 2025-12-24

## 2025-06-28 NOTE — TELEPHONE ENCOUNTER
----- Message from Med Assistant Pinky sent at 6/27/2025  1:49 PM CDT -----  Good afternoon Dr. Taylor,    I spoke with Ms. Rubio and was able to schedule a f/u appt for 07/18/25. Pt is requesting a Rx refill for FLEXERIL 10 MG tablet. Please see pt request attached below. Thank you.          Primary Information    Source  Joanne Peña (Patient)     Subject  Joanne Peña (Patient)     Topic  Medications - Medication Refill    Summary  Medication refill request  Communication  Is this a Refill or New Rx:Refill            Rx Name and Strength:cyclobenzaprine (FLEXERIL) 10 MG tablet            Preferred Pharmacy with phone number:    [facility]/pharmacy #[id number] - [place] - [phone]    [place] [id number]    Phone: [phone] Fax: [phone]                     Communication Preference:[phone] (home)            Additional Information:   Pt is out of rx and would like to reschedule a missed appt please call

## 2025-07-06 ENCOUNTER — CLINICAL SUPPORT (OUTPATIENT)
Dept: CARDIOLOGY | Facility: HOSPITAL | Age: 75
End: 2025-07-06
Payer: MEDICARE

## 2025-07-06 ENCOUNTER — CLINICAL SUPPORT (OUTPATIENT)
Dept: CARDIOLOGY | Facility: HOSPITAL | Age: 75
End: 2025-07-06
Attending: INTERNAL MEDICINE
Payer: MEDICARE

## 2025-07-06 DIAGNOSIS — I49.8 OTHER SPECIFIED CARDIAC ARRHYTHMIAS: ICD-10-CM

## 2025-07-06 PROCEDURE — 93298 REM INTERROG DEV EVAL SCRMS: CPT | Mod: 26,HCNC,, | Performed by: INTERNAL MEDICINE

## 2025-07-06 PROCEDURE — 93298 REM INTERROG DEV EVAL SCRMS: CPT | Mod: HCNC | Performed by: INTERNAL MEDICINE

## 2025-07-09 ENCOUNTER — DOCUMENTATION ONLY (OUTPATIENT)
Dept: REHABILITATION | Facility: HOSPITAL | Age: 75
End: 2025-07-09
Payer: MEDICARE

## 2025-07-09 NOTE — PROGRESS NOTES
[facility]                                 No Shows Therapy Appointment     Joanne Peña  MRN: [id number]    Patient no shows to today's therapy appointment on 7/9/2025.    Brenton To, PTA  7/9/2025

## 2025-07-14 ENCOUNTER — TELEPHONE (OUTPATIENT)
Dept: NEPHROLOGY | Facility: CLINIC | Age: 75
End: 2025-07-14
Payer: MEDICARE

## 2025-07-14 NOTE — TELEPHONE ENCOUNTER
This patient was contacted in reference of setting up labs prior to appointment. Unfortunately, this patient is scheduled a procedure the day of the appointment and wishes to reschedule. Appointment applied to waitlist.

## 2025-07-15 LAB
OHS CV AF BURDEN PERCENT: < 1
OHS CV DC REMOTE DEVICE TYPE: NORMAL

## 2025-07-16 ENCOUNTER — PATIENT MESSAGE (OUTPATIENT)
Dept: REHABILITATION | Facility: HOSPITAL | Age: 75
End: 2025-07-16
Payer: MEDICARE

## 2025-07-21 DIAGNOSIS — E78.5 HYPERLIPIDEMIA, UNSPECIFIED HYPERLIPIDEMIA TYPE: ICD-10-CM

## 2025-07-21 RX ORDER — ATORVASTATIN CALCIUM 80 MG/1
80 TABLET, FILM COATED ORAL DAILY
Qty: 90 TABLET | Refills: 3 | Status: SHIPPED | OUTPATIENT
Start: 2025-07-21 | End: 2026-04-17

## 2025-07-21 NOTE — TELEPHONE ENCOUNTER
No care due was identified.  [facility] Embedded Care Due Messages. Reference number: [id number].   7/21/2025 8:22:32 AM CDT

## 2025-07-21 NOTE — TELEPHONE ENCOUNTER
Refill Routing Note   Medication(s) are not appropriate for processing by [facility] for the following reason(s):        No active prescription written by provider    [facility] action(s):  Defer             Appointments  past 12m or future 3m with PCP    Date Provider   Last Visit   5/22/2025 Teri Soto, DO   Next Visit   9/29/2025 Teri Soto, DO   ED visits in past 90 days: 1        Note composed:3:33 PM 07/21/2025

## 2025-07-24 ENCOUNTER — PATIENT OUTREACH (OUTPATIENT)
Dept: ADMINISTRATIVE | Facility: OTHER | Age: 75
End: 2025-07-24
Payer: MEDICARE

## 2025-07-24 NOTE — PROGRESS NOTES
CHW - Case Closure    This Community Health Worker spoke to patient via telephone today.   Pt/Caregiver reported: Spoke to patient, she is enrolled and receiving meals from [facility]. pt advised to contact CHW if any additional needs arise and has agreed for a case closure.   Pt/Caregiver denied any additional needs at this time and agrees with episode closure at this time.  Provided patient with Community Health Worker's contact information and encouraged him/her to contact this Community Health Worker if additional needs arise.

## 2025-07-28 ENCOUNTER — OFFICE VISIT (OUTPATIENT)
Dept: FAMILY MEDICINE | Facility: CLINIC | Age: 75
End: 2025-07-28
Payer: MEDICARE

## 2025-07-28 VITALS
OXYGEN SATURATION: 97 % | TEMPERATURE: 99 F | WEIGHT: 165.81 LBS | BODY MASS INDEX: 26.02 KG/M2 | HEIGHT: 67 IN | DIASTOLIC BLOOD PRESSURE: 78 MMHG | HEART RATE: 93 BPM | SYSTOLIC BLOOD PRESSURE: 126 MMHG

## 2025-07-28 DIAGNOSIS — N61.0 MASTITIS: Primary | ICD-10-CM

## 2025-07-28 DIAGNOSIS — R21 RASH OF BACK: ICD-10-CM

## 2025-07-28 PROCEDURE — 99214 OFFICE O/P EST MOD 30 MIN: CPT | Mod: HCNC,S$GLB,,

## 2025-07-28 PROCEDURE — 4010F ACE/ARB THERAPY RXD/TAKEN: CPT | Mod: CPTII,HCNC,S$GLB,

## 2025-07-28 PROCEDURE — 1159F MED LIST DOCD IN RCRD: CPT | Mod: CPTII,HCNC,S$GLB,

## 2025-07-28 PROCEDURE — 3078F DIAST BP <80 MM HG: CPT | Mod: CPTII,HCNC,S$GLB,

## 2025-07-28 PROCEDURE — 99999 PR PBB SHADOW E&M-EST. PATIENT-LVL IV: CPT | Mod: PBBFAC,HCNC,,

## 2025-07-28 PROCEDURE — 3066F NEPHROPATHY DOC TX: CPT | Mod: CPTII,HCNC,S$GLB,

## 2025-07-28 PROCEDURE — 1101F PT FALLS ASSESS-DOCD LE1/YR: CPT | Mod: CPTII,HCNC,S$GLB,

## 2025-07-28 PROCEDURE — 3061F NEG MICROALBUMINURIA REV: CPT | Mod: CPTII,HCNC,S$GLB,

## 2025-07-28 PROCEDURE — 3074F SYST BP LT 130 MM HG: CPT | Mod: CPTII,HCNC,S$GLB,

## 2025-07-28 PROCEDURE — G2211 COMPLEX E/M VISIT ADD ON: HCPCS | Mod: HCNC,S$GLB,,

## 2025-07-28 PROCEDURE — 3008F BODY MASS INDEX DOCD: CPT | Mod: CPTII,HCNC,S$GLB,

## 2025-07-28 PROCEDURE — 3044F HG A1C LEVEL LT 7.0%: CPT | Mod: CPTII,HCNC,S$GLB,

## 2025-07-28 PROCEDURE — 1125F AMNT PAIN NOTED PAIN PRSNT: CPT | Mod: CPTII,HCNC,S$GLB,

## 2025-07-28 PROCEDURE — 3288F FALL RISK ASSESSMENT DOCD: CPT | Mod: CPTII,HCNC,S$GLB,

## 2025-07-28 RX ORDER — SULFAMETHOXAZOLE AND TRIMETHOPRIM 800; 160 MG/1; MG/1
1 TABLET ORAL 2 TIMES DAILY
Qty: 20 TABLET | Refills: 0 | Status: SHIPPED | OUTPATIENT
Start: 2025-07-28 | End: 2025-08-07

## 2025-07-28 RX ORDER — TRIAMCINOLONE ACETONIDE 1 MG/G
CREAM TOPICAL 2 TIMES DAILY
Qty: 30 G | Refills: 0 | Status: SHIPPED | OUTPATIENT
Start: 2025-07-28 | End: 2025-08-04

## 2025-07-28 NOTE — PROGRESS NOTES
Family Medicine      Patient Name: Joanne Peña  MRN: [id number]  : 1950    PCP: Teri Soto, DO       HPI      Joanne Peña is a 75 y.o. female with multiple medical diagnoses as listed in the medical history and problem list that presents for   Chief Complaint   Patient presents with    Breast Pain    Rash         History of Present Illness    Patient presents today for breast pain. She reports breast pain with tenderness localized around the nipple and adjacent areas. She notes a small palpable lump. She denies current skin irritation, drainage, or fever. She has a history of recurrent mastitis with previous episodes, characterized by drainage that could be manually expressed. She had a mammogram with biopsy performed in March, with no ongoing complications. The breast pain is a new onset and did not exist immediately following her March biopsy with marker placement. She has received multiple courses of antibiotics for mastitis: Bactrim in April which was noted to be effective, cefadroxil in August, and Keflex in 2024. She addressed recent mastitis symptoms early to prevent progression of previous symptoms. She reports a new, itchy rash present on her back for approximately one week. She denies associated pain, drainage, or skin irritation.      ROS:  General: -fever, -chills, -fatigue, -weight gain, -weight loss  Eyes: -vision changes, -redness, -discharge  ENT: -ear pain, -nasal congestion, -sore throat  Cardiovascular: -chest pain, -palpitations, -lower extremity edema  Respiratory: -cough, -shortness of breath  Gastrointestinal: -abdominal pain, -nausea, -vomiting, -diarrhea, -constipation, -blood in stool  Genitourinary: -dysuria, -hematuria, -frequency  Musculoskeletal: -joint pain, -muscle pain  Skin: +rash, -lesion, +itching  Neurological: -headache, -dizziness, -numbness, -tingling  Psychiatric: -anxiety, -depression, -sleep difficulty  Breasts: +breast pain,  +swelling, +breast lumps              History      Past Medical History:  Past Medical History:   Diagnosis Date    Diabetes mellitus     Hyperlipidemia     Hypertension     Stroke        Past Surgical History:  Past Surgical History:   Procedure Laterality Date    COLONOSCOPY N/A 7/12/2023    Procedure: COLONOSCOPY;  Surgeon: Ray Sorensen MD;  Location: [facility] ENDO;  Service: Endoscopy;  Laterality: N/A;  instr via portal  - PC  4/10/23 Daniel, instr via mail & portal, unable to tolerate Golytely- request Miralax/Gatorade - PC  5/30-pt r/s-new instr portal-tb    INJECTION OF ANESTHETIC AGENT AROUND MEDIAL BRANCH NERVES INNERVATING LUMBAR FACET JOINT Bilateral 4/20/2023    Procedure: MBB #1 (B/L) L3,4,5;  Surgeon: Son Lara DO;  Location: [facility] OR;  Service: Pain Management;  Laterality: Bilateral;  ORAL XANAX    INJECTION OF ANESTHETIC AGENT AROUND MEDIAL BRANCH NERVES INNERVATING LUMBAR FACET JOINT Bilateral 5/5/2023    Procedure: MBB #2 (B/L) L3,4,5;  Surgeon: Son Lara DO;  Location: [facility] OR;  Service: Pain Management;  Laterality: Bilateral;  Oral Xanax    INJECTION OF JOINT Right 5/20/2022    Procedure: Right SI joint injection;  Surgeon: Son Lara DO;  Location: [facility] OR;  Service: Pain Management;  Laterality: Right;    INJECTION, SACROILIAC JOINT Right 12/19/2023    Procedure: RT SI joint Inj;  Surgeon: Son Lara DO;  Location: [facility] PAIN MANAGEMENT;  Service: Pain Management;  Laterality: Right;  oral    INSERTION OF IMPLANTABLE LOOP RECORDER Left 6/21/2024    Procedure: Insertion, Implantable Loop Recorder;  Surgeon: Hunter Rich MD;  Location: [facility] EP LAB;  Service: Cardiology;  Laterality: Left;  CVA, ILR, BSCI, Local, [place], 3 Prep    INSERTION OF IMPLANTABLE LOOP RECORDER Left 8/30/2024    Procedure: Insertion, Implantable Loop Recorder;  Surgeon: Hunter Rich MD;  Location: [facility] EP LAB;  Service: Cardiology;  Laterality: Left;  CVA, ILR,MDT, RN  [place], [place], 3 Prep    RADIOFREQUENCY ABLATION OF LUMBAR MEDIAL BRANCH NERVE AT SINGLE LEVEL Bilateral 5/19/2023    Procedure: RFA (B/L) L3,4,5;  Surgeon: Son Lara DO;  Location: [facility] PAIN MANAGEMENT;  Service: Pain Management;  Laterality: Bilateral;    REMOVAL OF IMPLANTABLE LOOP RECORDER N/A 7/10/2024    Procedure: REMOVAL, IMPLANTABLE LOOP RECORDER;  Surgeon: Hunter Rich MD;  Location: [facility] EP LAB;  Service: Cardiology;  Laterality: N/A;       Social History:  Social History[1]    Family History:  Family History   Problem Relation Name Age of Onset    Kidney disease Mother      Heart disease Mother      Cancer Father      Hypertension Brother      Hypertension Daughter      Cancer Maternal Aunt         Allergies and Medications: (updated and reviewed)  Review of patient's allergies indicates:   Allergen Reactions    Ampicillin Rash    Darvocet a500 [propoxyphene n-acetaminophen] Other (See Comments)     shaky     Current Medications[2]    Patient Care Team:  Teri Soto DO as PCP - General (Family Medicine)  Tracie Kessler LPN as Care Coordinator  Lb Tracy OD (Optometry)  Sariah Pfeiffer OD as Consulting Physician (Optometry)  Kamar Nye PharmD as Pharmacist  Kamini Alonso LPN as Licensed Practical Nurse         Exam      Review of Systems:  (as noted above)      Physical Exam:  Physical Exam  Vitals reviewed.   Constitutional:       General: She is not in acute distress.     Appearance: Normal appearance. She is normal weight. She is not ill-appearing.   HENT:      Head: Normocephalic and atraumatic.   Eyes:      Extraocular Movements: Extraocular movements intact.      Pupils: Pupils are equal, round, and reactive to light.   Cardiovascular:      Rate and Rhythm: Normal rate.      Pulses: Normal pulses.   Pulmonary:      Effort: Pulmonary effort is normal.   Chest:   Breasts:     Left: Mass (under nipple) and tenderness present. No inverted nipple, nipple  "discharge or skin change.          Comments: X = tenderness  Abdominal:      General: Abdomen is flat.   Musculoskeletal:         General: Normal range of motion.      Cervical back: Normal range of motion.   Skin:     Findings: Rash (low, midline back) present.   Neurological:      Mental Status: She is alert and oriented to person, place, and time. Mental status is at baseline.   Psychiatric:         Mood and Affect: Mood normal.         Behavior: Behavior normal.       Vitals:    07/28/25 1306   BP: 126/78   BP Location: Right arm   Patient Position: Sitting   Pulse: 93   Temp: 98.7 °F (37.1 °C)   TempSrc: Oral   SpO2: 97%   Weight: 75.2 kg (165 lb 12.6 oz)   Height: 5' 7" (1.702 m)      Body mass index is 25.97 kg/m².             Assessment & Plan      1. Mastitis  - sulfamethoxazole-trimethoprim 800-160mg (BACTRIM DS) 800-160 mg Tab; Take 1 tablet by mouth 2 (two) times daily. for 10 days  Dispense: 20 tablet; Refill: 0    2. Rash of back  - triamcinolone acetonide 0.1% (KENALOG) 0.1 % cream; Apply topically 2 (two) times daily. for 7 days  Dispense: 30 g; Refill: 0       Assessment & Plan    IMPRESSION:  - Evaluated history of mastitis and previous antibiotic treatments, particularly noting Bactrim's success.  - Observed rash on back, assessed as possible eczema.    MASTITIS:  - Patient denies skin irritation, drainage, and fever.  - No redness or rash observed on exam.  - Discussed past mastitis episodes and antibiotics used.  - Prescribed Bactrim twice daily for 10 days for treatment.  - Patient reports a small lump in the breast with pain around the nipple.  - Physical exam confirms tenderness and palpable breast lump.  - These symptoms are similar to her previous early mastitis symptoms.    BREAST PAIN (MASTODYNIA):  - Patient reports breast pain and soreness.  - Examination reveals breast tenderness.  - Advised to take Tylenol for pain management.  - Instructed to contact the office if pain does not " improve with current treatment plan.    DERMATITIS:  - Patient reports new rash with itching for approximately one week.  - Physical exam suggests possible eczema.  - Prescribed steroid cream to be applied 2-3 times daily for about a week to the back rash.      --------------------------------------------      Health Maintenance:  Health Maintenance         Date Due Completion Date    TETANUS VACCINE Never done ---    Shingles Vaccine (1 of 2) Never done ---    RSV Vaccine (Age 60+ and Pregnant patients) (1 - Risk 60-74 years 1-dose series) Never done ---    COVID-19 Vaccine (1 - 2024-25 season) Never done ---    Influenza Vaccine (1) 09/01/2025 11/19/2024    Diabetic Eye Exam 11/04/2025 11/4/2024    Hemoglobin A1c 11/15/2025 5/15/2025    Foot Exam 01/28/2026 1/28/2025 (Done)    Override on 1/28/2025: Done    Override on 8/19/2024: Done    Diabetes Urine Screening 02/22/2026 2/22/2025    Mammogram 03/26/2026 3/26/2025    Lipid Panel 05/15/2026 5/15/2025    Colorectal Cancer Screening 07/12/2026 7/12/2023    DEXA Scan 09/25/2028 9/25/2023            Health maintenance reviewed    Follow Up:  No follow-ups on file.       - The patient is given an After Visit Summary that lists all medications with directions, allergies, education, orders placed during this encounter and follow-up instructions.      - I have reviewed the patient's medical information including past medical, family, and social history sections including the medications and allergies.      - We discussed the patient's current medications.     This note was generated with the assistance of ambient listening technology. Verbal consent was obtained by the patient and accompanying visitor(s) for the recording of patient appointment to facilitate this note. I attest to having reviewed and edited the generated note for accuracy, though some syntax or spelling errors may persist. Please contact the author of this note for any clarification.      This note was  created by combination of typed  and MModal dictation.  Transcription errors may be present.  If there are any questions, please contact me.     Ariadne Morales PA-C  [facility]               [1]   Social History  Socioeconomic History    Marital status:    Tobacco Use    Smoking status: Former     Current packs/day: 0.00     Types: Cigarettes     Quit date: 2/6/2022     Years since quitting: 3.4     Passive exposure: Past    Smokeless tobacco: Never    Tobacco comments:     Patient Quit Smoking on 02/06/2022.   Substance and Sexual Activity    Alcohol use: Not Currently    Drug use: No    Sexual activity: Not Currently     Partners: Male     Social Drivers of Health     Financial Resource Strain: Low Risk  (4/11/2025)    Overall Financial Resource Strain (CARDIA)     Difficulty of Paying Living Expenses: Not very hard   Recent Concern: Financial Resource Strain - High Risk (1/18/2025)    Overall Financial Resource Strain (CARDIA)     Difficulty of Paying Living Expenses: Hard   Food Insecurity: Food Insecurity Present (4/11/2025)    Hunger Vital Sign     Worried About Running Out of Food in the Last Year: Sometimes true     Ran Out of Food in the Last Year: Sometimes true   Transportation Needs: No Transportation Needs (4/11/2025)    PRAPARE - Transportation     Lack of Transportation (Medical): No     Lack of Transportation (Non-Medical): No   Physical Activity: Inactive (3/31/2025)    Exercise Vital Sign     Days of Exercise per Week: 0 days     Minutes of Exercise per Session: 0 min   Stress: No Stress Concern Present (4/11/2025)    [employer] - Occupational Stress Questionnaire     Feeling of Stress : Not at all   Housing Stability: Low Risk  (4/11/2025)    Housing Stability Vital Sign     Unable to Pay for Housing in the Last Year: No     Homeless in the Last Year: No   Recent Concern: Housing Stability - High Risk (1/18/2025)     Housing Stability Vital Sign     Unable to Pay for Housing in the Last Year: Yes     Homeless in the Last Year: No   [2]   Current Outpatient Medications   Medication Sig Dispense Refill    ACCU-CHEK GUIDE GLUCOSE METER Misc TO CHECK BLOOD GLUCOSE DAILY, TO USE WITH INSURANCE PREFERRED METER      ascorbic acid, vitamin C, (VITAMIN C) 500 MG tablet Take 500 mg by mouth once daily.      atorvastatin (LIPITOR) 80 MG tablet TAKE 1 TABLET (80 MG TOTAL) BY MOUTH ONCE DAILY. 90 tablet 3    blood sugar diagnostic Strp To check BG daily, to use with insurance preferred meter 200 each 11    blood sugar diagnostic Strp To check BG daily, to use with insurance preferred meter 200 each 11    blood-glucose meter,continuous (DEXCOM G6 ) Misc 1 each by Misc.(Non-Drug; Combo Route) route 2 (two) times daily. 1 each 2    blood-glucose sensor (DEXCOM G7 SENSOR) Denisse 1 Device by Misc.(Non-Drug; Combo Route) route 2 (two) times daily. 3 each 3    cyclobenzaprine (FLEXERIL) 10 MG tablet Take 1 tablet (10 mg total) by mouth 2 (two) times daily as needed for Muscle spasms (back pain). 180 tablet 1    empagliflozin (JARDIANCE) 25 mg tablet Take 1 tablet (25 mg total) by mouth once daily. 30 tablet 11    evolocumab (REPATHA SURECLICK) 140 mg/mL PnIj Inject 1 mL (140 mg total) into the skin every 14 (fourteen) days. 2 mL 11    furosemide (LASIX) 20 MG tablet Take 1 tablet (20 mg total) by mouth 2 (two) times daily. 60 tablet 3    lancets Misc To check BG daily, to use with insurance preferred meter 200 each 11    metoprolol succinate (TOPROL-XL) 25 MG 24 hr tablet Take 1 tablet (25 mg total) by mouth once daily. 90 tablet 3    multivitamin (THERAGRAN) per tablet Take 1 tablet by mouth once daily.      tirzepatide (MOUNJARO) 5 mg/0.5 mL PnIj Inject 5 mg into the skin every 7 days. 2 mL 11    vitamin D (VITAMIN D3) 1000 units Tab Take 1,000 Units by mouth.      aspirin (ECOTRIN) 81 MG EC tablet Take 1 tablet (81 mg total) by mouth once  daily. 30 tablet 3    LORazepam (ATIVAN) 1 MG tablet Take 1 tablet (1 mg total) by mouth On call Procedure (for MRI). 1 tablet 0    meclizine (ANTIVERT) 25 mg tablet Take 1 tablet (25 mg total) by mouth 2 (two) times daily as needed for Dizziness. 30 tablet 0    sulfamethoxazole-trimethoprim 800-160mg (BACTRIM DS) 800-160 mg Tab Take 1 tablet by mouth 2 (two) times daily. for 10 days 20 tablet 0    triamcinolone acetonide 0.1% (KENALOG) 0.1 % cream Apply topically 2 (two) times daily. for 7 days 30 g 0     No current facility-administered medications for this visit.

## 2025-07-30 ENCOUNTER — TELEPHONE (OUTPATIENT)
Dept: CARDIOLOGY | Facility: CLINIC | Age: 75
End: 2025-07-30
Payer: MEDICARE

## 2025-07-30 ENCOUNTER — PATIENT MESSAGE (OUTPATIENT)
Dept: CARDIOLOGY | Facility: CLINIC | Age: 75
End: 2025-07-30
Payer: MEDICARE

## 2025-07-30 NOTE — TELEPHONE ENCOUNTER
Patient's daughter will get back to me as soon as she gets in contact with her mother about coming in earlier for her appointment tomorrow. She can come anytime between 8-11:40.

## 2025-07-31 ENCOUNTER — OFFICE VISIT (OUTPATIENT)
Dept: CARDIOLOGY | Facility: CLINIC | Age: 75
End: 2025-07-31
Payer: MEDICARE

## 2025-07-31 VITALS
HEART RATE: 79 BPM | OXYGEN SATURATION: 96 % | HEIGHT: 67 IN | DIASTOLIC BLOOD PRESSURE: 76 MMHG | WEIGHT: 164.88 LBS | RESPIRATION RATE: 15 BRPM | BODY MASS INDEX: 25.88 KG/M2 | SYSTOLIC BLOOD PRESSURE: 116 MMHG

## 2025-07-31 DIAGNOSIS — I10 PRIMARY HYPERTENSION: Primary | ICD-10-CM

## 2025-07-31 DIAGNOSIS — I44.7 LBBB (LEFT BUNDLE BRANCH BLOCK): ICD-10-CM

## 2025-07-31 DIAGNOSIS — I50.22 CHRONIC SYSTOLIC (CONGESTIVE) HEART FAILURE: ICD-10-CM

## 2025-07-31 DIAGNOSIS — E78.2 MIXED HYPERLIPIDEMIA: ICD-10-CM

## 2025-07-31 PROCEDURE — 99999 PR PBB SHADOW E&M-EST. PATIENT-LVL IV: CPT | Mod: PBBFAC,HCNC,, | Performed by: INTERNAL MEDICINE

## 2025-07-31 RX ORDER — FUROSEMIDE 20 MG/1
20 TABLET ORAL 2 TIMES DAILY
Qty: 60 TABLET | Refills: 3 | Status: SHIPPED | OUTPATIENT
Start: 2025-07-31 | End: 2025-11-28

## 2025-07-31 NOTE — PROGRESS NOTES
CARDIOVASCULAR PROGRESS NOTE    REASON FOR CONSULT:   Joanne Peña is a 75 y.o. female who presents for follow visit.    She was last time seen in clinic in May, 2025.    She used to follow up at [facility] for her cardiology issues and then switched her cardiology care to [facility].      She still follows up with EP at [facility].    HISTORY OF PRESENT ILLNESS:     She has past medical history of hypertension, hyperlipidemia, cardioembolic stroke (no documented atrial fibrillation on monitoring so far - has ILR in place), nonischemic cardiomyopathy with most recent EF around 30-35%, CKD stage 3 with baseline creatinine around 1.9-2.3 and type 2 diabetes mellitus.    She recently underwent left eye cataract surgery which was uneventful.    Overall she feels fine.  She denies getting any chest pain, shortness of breath or palpitations.  No orthopnea or PND.  No lower extremity swelling.    She is a retired nurse by profession.  She quit smoking in 2023.  She does not drink EtOH.    PAST MEDICAL HISTORY:     Past Medical History:   Diagnosis Date    Diabetes mellitus     Hyperlipidemia     Hypertension     Stroke        PAST SURGICAL HISTORY:     Past Surgical History:   Procedure Laterality Date    COLONOSCOPY N/A 7/12/2023    Procedure: COLONOSCOPY;  Surgeon: Ray Sorensen MD;  Location: [facility] ENDO;  Service: Endoscopy;  Laterality: N/A;  instr via portal  - PC  4/10/23 Daniel, instr via mail & portal, unable to tolerate Golytely- request Miralax/Gatorade - PC  5/30-pt r/s-new instr portal-tb    INJECTION OF ANESTHETIC AGENT AROUND MEDIAL BRANCH NERVES INNERVATING LUMBAR FACET JOINT Bilateral 4/20/2023    Procedure: MBB #1 (B/L) L3,4,5;  Surgeon: Son Lara DO;  Location: [facility] OR;  Service: Pain Management;  Laterality: Bilateral;  ORAL XANAX    INJECTION OF ANESTHETIC AGENT AROUND MEDIAL BRANCH NERVES INNERVATING LUMBAR FACET JOINT Bilateral 5/5/2023     Procedure: MBB #2 (B/L) L3,4,5;  Surgeon: Son Lara DO;  Location: [facility] OR;  Service: Pain Management;  Laterality: Bilateral;  Oral Xanax    INJECTION OF JOINT Right 5/20/2022    Procedure: Right SI joint injection;  Surgeon: Son Lara DO;  Location: [facility] OR;  Service: Pain Management;  Laterality: Right;    INJECTION, SACROILIAC JOINT Right 12/19/2023    Procedure: RT SI joint Inj;  Surgeon: Son Lara DO;  Location: [facility] PAIN MANAGEMENT;  Service: Pain Management;  Laterality: Right;  oral    INSERTION OF IMPLANTABLE LOOP RECORDER Left 6/21/2024    Procedure: Insertion, Implantable Loop Recorder;  Surgeon: Hunter Rich MD;  Location: [facility] EP LAB;  Service: Cardiology;  Laterality: Left;  CVA, ILR, BSCI, Local, UT, 3 Prep    INSERTION OF IMPLANTABLE LOOP RECORDER Left 8/30/2024    Procedure: Insertion, Implantable Loop Recorder;  Surgeon: Hunter Rich MD;  Location: [facility] EP LAB;  Service: Cardiology;  Laterality: Left;  CVA, ILR,MDT, RN Sedate, UT, 3 Prep    RADIOFREQUENCY ABLATION OF LUMBAR MEDIAL BRANCH NERVE AT SINGLE LEVEL Bilateral 5/19/2023    Procedure: RFA (B/L) L3,4,5;  Surgeon: Son Lara DO;  Location: [facility] PAIN MANAGEMENT;  Service: Pain Management;  Laterality: Bilateral;    REMOVAL OF IMPLANTABLE LOOP RECORDER N/A 7/10/2024    Procedure: REMOVAL, IMPLANTABLE LOOP RECORDER;  Surgeon: Hunter Rich MD;  Location: [facility] EP LAB;  Service: Cardiology;  Laterality: N/A;       ALLERGIES AND MEDICATION:     Review of patient's allergies indicates:   Allergen Reactions    Ampicillin Rash    Darvocet a500 [propoxyphene n-acetaminophen] Other (See Comments)     shaky        Medication List            Accurate as of July 31, 2025  9:32 AM. If you have any questions, ask your nurse or doctor.                CONTINUE taking these medications      ACCU-CHEK GUIDE GLUCOSE METER Misc  Generic drug: blood-glucose meter     ascorbic acid (vitamin C) 500 MG  tablet  Commonly known as: VITAMIN C     aspirin 81 MG EC tablet  Commonly known as: ECOTRIN  Take 1 tablet (81 mg total) by mouth once daily.     atorvastatin 80 MG tablet  Commonly known as: LIPITOR  TAKE 1 TABLET (80 MG TOTAL) BY MOUTH ONCE DAILY.     * blood sugar diagnostic Strp  To check BG daily, to use with insurance preferred meter     * blood sugar diagnostic Strp  To check BG daily, to use with insurance preferred meter     cyclobenzaprine 10 MG tablet  Commonly known as: FLEXERIL  Take 1 tablet (10 mg total) by mouth 2 (two) times daily as needed for Muscle spasms (back pain).     DEXCOM G6  Misc  Generic drug: blood-glucose,,cont  1 each by Misc.(Non-Drug; Combo Route) route 2 (two) times daily.     DEXCOM G7 SENSOR Denisse  Generic drug: blood-glucose sensor  1 Device by Misc.(Non-Drug; Combo Route) route 2 (two) times daily.     empagliflozin 25 mg tablet  Commonly known as: JARDIANCE  Take 1 tablet (25 mg total) by mouth once daily.     furosemide 20 MG tablet  Commonly known as: LASIX  Take 1 tablet (20 mg total) by mouth 2 (two) times daily.     lancets [facility]  To check BG daily, to use with insurance preferred meter     LORazepam 1 MG tablet  Commonly known as: ATIVAN  Take 1 tablet (1 mg total) by mouth On call Procedure (for MRI).     meclizine 25 mg tablet  Commonly known as: ANTIVERT  Take 1 tablet (25 mg total) by mouth 2 (two) times daily as needed for Dizziness.     metoprolol succinate 25 MG 24 hr tablet  Commonly known as: TOPROL-XL  Take 1 tablet (25 mg total) by mouth once daily.     MOUNJARO 5 mg/0.5 mL Pnij  Generic drug: tirzepatide  Inject 5 mg into the skin every 7 days.     multivitamin per tablet  Commonly known as: THERAGRAN     REPATHA SURECLICK 140 mg/mL Pnij  Generic drug: evolocumab  Inject 1 mL (140 mg total) into the skin every 14 (fourteen) days.     sulfamethoxazole-trimethoprim 800-160mg 800-160 mg Tab  Commonly known as: BACTRIM DS  Take 1 tablet by mouth 2  (two) times daily. for 10 days     triamcinolone acetonide 0.1% 0.1 % cream  Commonly known as: KENALOG  Apply topically 2 (two) times daily. for 7 days     vitamin D 1000 units Tab  Commonly known as: VITAMIN D3           * This list has 2 medication(s) that are the same as other medications prescribed for you. Read the directions carefully, and ask your doctor or other care provider to review them with you.                  SOCIAL HISTORY:     Social History     Socioeconomic History    Marital status:    Tobacco Use    Smoking status: Former     Current packs/day: 0.00     Types: Cigarettes     Quit date: 2/6/2022     Years since quitting: 3.4     Passive exposure: Past    Smokeless tobacco: Never    Tobacco comments:     Patient Quit Smoking on 02/06/2022.   Substance and Sexual Activity    Alcohol use: Not Currently    Drug use: No    Sexual activity: Not Currently     Partners: Male     Social Drivers of Health     Financial Resource Strain: Low Risk  (4/11/2025)    Overall Financial Resource Strain (CARDIA)     Difficulty of Paying Living Expenses: Not very hard   Recent Concern: Financial Resource Strain - High Risk (1/18/2025)    Overall Financial Resource Strain (CARDIA)     Difficulty of Paying Living Expenses: Hard   Food Insecurity: Food Insecurity Present (4/11/2025)    Hunger Vital Sign     Worried About Running Out of Food in the Last Year: Sometimes true     Ran Out of Food in the Last Year: Sometimes true   Transportation Needs: No Transportation Needs (4/11/2025)    PRAPARE - Transportation     Lack of Transportation (Medical): No     Lack of Transportation (Non-Medical): No   Physical Activity: Inactive (3/31/2025)    Exercise Vital Sign     Days of Exercise per Week: 0 days     Minutes of Exercise per Session: 0 min   Stress: No Stress Concern Present (4/11/2025)    [employer] - Occupational Stress Questionnaire     Feeling of Stress : Not at all   Housing  Stability: Low Risk  (4/11/2025)    Housing Stability Vital Sign     Unable to Pay for Housing in the Last Year: No     Homeless in the Last Year: No   Recent Concern: Housing Stability - High Risk (1/18/2025)    Housing Stability Vital Sign     Unable to Pay for Housing in the Last Year: Yes     Homeless in the Last Year: No       FAMILY HISTORY:     Family History   Problem Relation Name Age of Onset    Kidney disease Mother      Heart disease Mother      Cancer Father      Hypertension Brother      Hypertension Daughter      Cancer Maternal Aunt         REVIEW OF SYSTEMS:   ROS    Constitution: Negative for chills, fever, weight gain and weight loss.   Eyes: Negative for blurry vision, visual changes    Cardiovascular: Negative for chest pain. Negative for claudication, dyspnea on exertion, leg swelling, orthopnea, palpitations, paroxysmal nocturnal dyspnea.   Respiratory:  Has some exertional shortness of breath, Negative for cough.    Endocrine: Negative for heat or cold intolerance    Hematologic/Lymphatic: Negative for easy bruising or bleeding    Skin: Negative for color change and rash.   Musculoskeletal: Negative for neck pain, arthralgias, myalgias    Gastrointestinal: Negative for abdominal pain, nausea, vomiting, diarrhea  Neurological: Negative for dizziness, light-headedness and loss of balance.   Psychiatric/Behavioral: Negative for altered mental status.    PHYSICAL EXAM:     There were no vitals filed for this visit.     There is no height or weight on file to calculate BMI.            Gen: NAD  Head/Eyes/Ears/Nose: MMM, good dentition   Neck: No carotid bruits, no JVD  Lung: Clear to auscultation bilaterally, no wheezes/rales/ronchi, symmetrical lung expansion with inspiration  Heart: Normal S1/S2, regular rate and rhythm, no murmurs/rubs/gallops  Abdomen: Soft, NT/ND, no masses  Extremities: No lower extremity edema.  No wounds or other skin lesions  Skin: Normal color and turgor. No wounds  rashes, no petechia, no ecchymoses.   Neuro: AAOx3    DATA:     Laboratory:  CBC:  Recent Labs   Lab 04/13/25  0220 04/17/25  1145 05/05/25  0915   WBC 5.54 5.16 5.49   HGB 9.7 L 10.7 L 11.4 L   HCT 32.3 L 37.0 37.0   Platelet Count 268 296 299       CHEMISTRIES:  Recent Labs   Lab 02/22/25  0858 04/10/25  1209 04/10/25  1938 04/11/25  0539 04/12/25  0521 04/13/25  0219 04/17/25  1145 04/30/25  1024 05/05/25  0915 05/15/25  0947   Glucose 137 H  137 H   < > 110 131 H  135 H 128 H 115 H 131 H 120 H 167 H 145 H   Sodium 142  142   < > 137 137  136 139 139 143 142 141 140   Potassium 4.3  4.3   < > 4.9 4.4  4.3 4.3 5.0 5.0 5.2 H 5.0 5.4 H   BUN 47 H  47 H   < > 73 H 73 H  71 H 67 H 59 H 43 H 39 H 56 H 57 H   Creatinine 1.9 H  1.9 H   < > 4.1 H 3.7 H  3.7 H 3.0 H 2.6 H 1.8 H 2.1 H 2.1 H 2.1 H   eGFR 28 A  28 A   < > 11 L 12 L  12 L 16 L 19 L 30 L 25 L 25 L 25 L   Calcium 9.0  9.0   < > 9.4 8.8  8.7 8.3 L 8.5 L 8.6 L 9.2 9.0 9.5   Magnesium   --   --  2.4 2.1 2.0 1.8  --   --   --   --    Magnesium 2.0  --   --   --   --   --   --   --   --   --     < > = values in this interval not displayed.       CARDIAC BIOMARKERS:  Recent Labs   Lab 01/03/25 2042 05/05/25  0915   Troponin I 0.031 H  --    Troponin-I  --  0.022       HBA1C:  Hemoglobin A1C   Date Value Ref Range Status   02/13/2025 8.5 (H) 4.0 - 5.6 % Final     Comment:     ADA Screening Guidelines:  5.7-6.4%  Consistent with prediabetes  >or=6.5%  Consistent with diabetes    High levels of fetal hemoglobin interfere with the HbA1C  assay. Heterozygous hemoglobin variants (HbS, HgC, etc)do  not significantly interfere with this assay.   However, presence of multiple variants may affect accuracy.     11/14/2024 9.8 (H) 4.0 - 5.6 % Final     Comment:     ADA Screening Guidelines:  5.7-6.4%  Consistent with prediabetes  >or=6.5%  Consistent with diabetes    High levels of fetal hemoglobin interfere with the HbA1C  assay. Heterozygous hemoglobin variants  (HbS, HgC, etc)do  not significantly interfere with this assay.   However, presence of multiple variants may affect accuracy.     2024 6.9 (H) 4.0 - 5.6 % Final     Comment:     ADA Screening Guidelines:  5.7-6.4%  Consistent with prediabetes  >or=6.5%  Consistent with diabetes    High levels of fetal hemoglobin interfere with the HbA1C  assay. Heterozygous hemoglobin variants (HbS, HgC, etc)do  not significantly interfere with this assay.   However, presence of multiple variants may affect accuracy.       Hemoglobin A1c   Date Value Ref Range Status   05/15/2025 6.7 (H) 4.0 - 5.6 % Final     Comment:     ADA Screening Guidelines:  5.7-6.4%  Consistent with prediabetes  >=6.5%  Consistent with diabetes    High levels of fetal hemoglobin interfere with the HbA1C  assay. Heterozygous hemoglobin variants (HbS, HgC, etc)do  not significantly interfere with this assay.   However, presence of multiple variants may affect accuracy.        COAGS:  Recent Labs   Lab 24  0931   INR 0.9       LIPIDS/LFTS:  Recent Labs   Lab 24  0910 25  2042 25  1017 25  0858 25  1145 25  0915 05/15/25  0947   Cholesterol Total  --   --   --   --   --   --  135   Cholesterol 241 H  --  205 H  --   --   --   --    Triglycerides 219 H  --  145  --   --   --   --    Triglyceride  --   --   --   --   --   --  143   HDL 43  --  43  --   --   --   --    HDL Cholesterol  --   --   --   --   --   --  45   LDL Cholesterol 154.2  --  133.0  --   --   --  61.4 L   Non-HDL Cholesterol 198  --  162  --   --   --   --    Non HDL Cholesterol  --   --   --   --   --   --  90   AST  --    < > 25   < > 14 11 13   ALT  --    < > 9 L   < > 28 9 L 11    < > = values in this interval not displayed.         CARDIAC DIAGNOSTICS:  :     EK/10/2025  Sinus rhythm, left atrial enlargement and left bundle-branch block    2025  Sinus rhythm, nonspecific IVCD    EKG done on 2024 showed sinus rhythm and  left bundle-branch block    ECHO  1/2025    Left Ventricle: Septal motion is consistent with bundle branch block. There is moderately reduced systolic function with a visually estimated ejection fraction of 30 - 35%. Grade II diastolic dysfunction. Elevated left ventricular filling pressure.    Right Ventricle: Systolic function is normal.    Left Atrium: Left atrium is severely dilated.    Right Atrium: Right atrium is mildly dilated.    Aortic Valve: The aortic valve is a trileaflet valve. There is mild aortic valve sclerosis.    Mitral Valve: Mildly calcified leaflets. There is moderate regurgitation with an eccentric jet.    Tricuspid Valve: There is mild regurgitation.    Pulmonary Artery: The estimated pulmonary artery systolic pressure is 25 mmHg.    IVC/SVC: Normal venous pressure at 3 mmHg.    (4/2024)    Left Ventricle: The left ventricle is normal in size. Moderately increased wall thickness. There is moderate concentric hypertrophy. Regional wall motion abnormalities present (basal-mid anteroseptal and inferoseptal HK). There is mildly reduced systolic function with a visually estimated ejection fraction of 40 - 45%. There is diastolic dysfunction but grade cannot be determined.    Right Ventricle: Normal right ventricular cavity size. Systolic function is normal.    3.  STRESS TEST (PET scan 6/2024)    There are no clinically significant perfusion abnormalities. There is a small (<10%) amount of mild resting heterogeneity in the anteroseptal wall(s) that improves with stress, indicative of non-obstructive disease.    The whole heart absolute myocardial perfusion values averaged 0.90 cc/min/g at rest, which is normal; 1.52 cc/min/g at stress, which is mildly reduced; and CFR is 1.72, which is mildly reduced.    CT attenuation images demonstrate mild diffuse coronary calcifications in the LAD territory and no aortic calcifications.    The gated perfusion images showed an ejection fraction of 32% at rest  and 36% during stress. A normal ejection fraction is greater than 47%.    There is moderate global hypokinesis at rest and during stress.    The LV cavity size is normal at rest and stress.    The ECG portion of the study is negative for ischemia.    There were no arrhythmias during stress.    The patient reported no chest pain during the stress test.    There are no prior studies for comparison.    4.  CARDIAC CATHETERIZATION  NA    5. OTHERS  JOSE EDUARDO 2/2025  Right leg:   Mildly decreased JOSE EDUARDO, 0.82.    Mild to moderately dampened PVR waveforms.  Decreased TBI.       Left leg:   Moderately decreased JOSE EDUARDO, 0.61.    Mild to moderately dampened PVR waveforms.    Decreased TBI.       (2/2025)  A1C8.5 (2/2025)    ASSESSMENT:   Nonischemic cardiomyopathy with EF around 30-35%  Hypertension  Hyperlipidemia - LDL 61.4 in May, 2025  Chronic Left bundle-branch block  Cardioembolic stroke (no documented Afib - his ILR in place)  Peripheral arterial disease (mildly decreased ABIs 2/2025 -> F/U with vascular surgery)  Type 2 diabetes mellitus - A1c 6.7 in May, 2025  Chronic kidney disease with baseline creatinine around 1.8-2.2    Appears to be euvolemic and in well perfused state. NYHA class 2 dyspnea.  Has left bundle-branch block.  Most recent EF is 30-35% (echo January, 2025).  Not on ACE inhibitor/ARBs due to chronic kidney disease.    PLAN:   Continue metoprolol succinate to 25 mg daily  Continue with Jardiance 25 mg daily  Asked her to keep a log of blood pressure.  If blood pressure allows, we will re-initiate low-dose hydralazine  We will avoid ACE I/ARB/ARNI and spironolactone due to chronic kidney disease  Monitor BMP.  Follows up with Nephrology  Continue with baby aspirin/atorvastatin  Blood pressure stable today  Daily weight  Continue Lasix 20 mg b.i.d.  Repeat echocardiogram  If her EF remains less than 35% despite maximally tolerated guideline directed medical therapy and she remains in NYHA class II dyspnea,  would refer to EP for cardiac resynchronization therapy  No documented AFib on recent ILR interrogation.  Follows up with EP at [facility]  Management of diabetes per PCP  Mediterranean diet and daily exercise    Follow up in 6 months    Álvaro Rudd MD  [facility]    This note was created by combination of typed  and M-Modal dictation.   Transcription errors may be present.

## 2025-08-06 ENCOUNTER — CLINICAL SUPPORT (OUTPATIENT)
Dept: CARDIOLOGY | Facility: HOSPITAL | Age: 75
End: 2025-08-06
Payer: MEDICARE

## 2025-08-06 ENCOUNTER — CLINICAL SUPPORT (OUTPATIENT)
Dept: CARDIOLOGY | Facility: HOSPITAL | Age: 75
End: 2025-08-06
Attending: INTERNAL MEDICINE
Payer: MEDICARE

## 2025-08-06 DIAGNOSIS — I49.8 OTHER SPECIFIED CARDIAC ARRHYTHMIAS: ICD-10-CM

## 2025-08-06 PROCEDURE — 93298 REM INTERROG DEV EVAL SCRMS: CPT | Mod: 26,HCNC,, | Performed by: INTERNAL MEDICINE

## 2025-08-06 PROCEDURE — 93298 REM INTERROG DEV EVAL SCRMS: CPT | Mod: HCNC | Performed by: INTERNAL MEDICINE

## 2025-08-14 LAB
OHS CV AF BURDEN PERCENT: < 1
OHS CV DC REMOTE DEVICE TYPE: NORMAL

## 2025-08-18 DIAGNOSIS — N18.30 STAGE 3 CHRONIC KIDNEY DISEASE, UNSPECIFIED WHETHER STAGE 3A OR 3B CKD: Primary | ICD-10-CM

## 2025-08-20 ENCOUNTER — OFFICE VISIT (OUTPATIENT)
Dept: VASCULAR SURGERY | Facility: CLINIC | Age: 75
End: 2025-08-20
Payer: MEDICARE

## 2025-08-20 VITALS
BODY MASS INDEX: 26.14 KG/M2 | WEIGHT: 166.56 LBS | HEIGHT: 67 IN | HEART RATE: 88 BPM | SYSTOLIC BLOOD PRESSURE: 126 MMHG | DIASTOLIC BLOOD PRESSURE: 84 MMHG

## 2025-08-20 DIAGNOSIS — I73.9 PERIPHERAL ARTERIAL DISEASE: Primary | ICD-10-CM

## 2025-08-20 PROCEDURE — 3061F NEG MICROALBUMINURIA REV: CPT | Mod: CPTII,HCNC,S$GLB, | Performed by: SURGERY

## 2025-08-20 PROCEDURE — 3008F BODY MASS INDEX DOCD: CPT | Mod: CPTII,HCNC,S$GLB, | Performed by: SURGERY

## 2025-08-20 PROCEDURE — 4010F ACE/ARB THERAPY RXD/TAKEN: CPT | Mod: CPTII,HCNC,S$GLB, | Performed by: SURGERY

## 2025-08-20 PROCEDURE — 3079F DIAST BP 80-89 MM HG: CPT | Mod: CPTII,HCNC,S$GLB, | Performed by: SURGERY

## 2025-08-20 PROCEDURE — 99215 OFFICE O/P EST HI 40 MIN: CPT | Mod: HCNC,S$GLB,, | Performed by: SURGERY

## 2025-08-20 PROCEDURE — 3066F NEPHROPATHY DOC TX: CPT | Mod: CPTII,HCNC,S$GLB, | Performed by: SURGERY

## 2025-08-20 PROCEDURE — 1126F AMNT PAIN NOTED NONE PRSNT: CPT | Mod: CPTII,HCNC,S$GLB, | Performed by: SURGERY

## 2025-08-20 PROCEDURE — 1101F PT FALLS ASSESS-DOCD LE1/YR: CPT | Mod: CPTII,HCNC,S$GLB, | Performed by: SURGERY

## 2025-08-20 PROCEDURE — 1159F MED LIST DOCD IN RCRD: CPT | Mod: CPTII,HCNC,S$GLB, | Performed by: SURGERY

## 2025-08-20 PROCEDURE — 99999 PR PBB SHADOW E&M-EST. PATIENT-LVL III: CPT | Mod: PBBFAC,HCNC,, | Performed by: SURGERY

## 2025-08-20 PROCEDURE — 3288F FALL RISK ASSESSMENT DOCD: CPT | Mod: CPTII,HCNC,S$GLB, | Performed by: SURGERY

## 2025-08-20 PROCEDURE — 3074F SYST BP LT 130 MM HG: CPT | Mod: CPTII,HCNC,S$GLB, | Performed by: SURGERY

## 2025-08-20 PROCEDURE — 3044F HG A1C LEVEL LT 7.0%: CPT | Mod: CPTII,HCNC,S$GLB, | Performed by: SURGERY

## 2025-08-23 ENCOUNTER — OFFICE VISIT (OUTPATIENT)
Dept: URGENT CARE | Facility: CLINIC | Age: 75
End: 2025-08-23
Payer: MEDICARE

## 2025-08-23 VITALS
BODY MASS INDEX: 25.85 KG/M2 | TEMPERATURE: 98 F | OXYGEN SATURATION: 96 % | RESPIRATION RATE: 18 BRPM | SYSTOLIC BLOOD PRESSURE: 110 MMHG | HEART RATE: 87 BPM | DIASTOLIC BLOOD PRESSURE: 74 MMHG | HEIGHT: 67 IN | WEIGHT: 164.69 LBS

## 2025-08-23 DIAGNOSIS — S99.921A INJURY OF RIGHT FOOT, INITIAL ENCOUNTER: Primary | ICD-10-CM

## 2025-08-23 DIAGNOSIS — M79.671 PAIN OF RIGHT FOOT: ICD-10-CM

## 2025-08-23 PROCEDURE — 73630 X-RAY EXAM OF FOOT: CPT | Mod: RT,S$GLB,, | Performed by: RADIOLOGY

## 2025-08-23 PROCEDURE — 99214 OFFICE O/P EST MOD 30 MIN: CPT | Mod: S$GLB,,,

## 2025-08-25 ENCOUNTER — HOSPITAL ENCOUNTER (OUTPATIENT)
Dept: CARDIOLOGY | Facility: HOSPITAL | Age: 75
Discharge: HOME | End: 2025-08-25
Attending: INTERNAL MEDICINE
Payer: MEDICARE

## 2025-08-25 DIAGNOSIS — E78.2 MIXED HYPERLIPIDEMIA: ICD-10-CM

## 2025-08-25 DIAGNOSIS — I50.22 CHRONIC SYSTOLIC (CONGESTIVE) HEART FAILURE: ICD-10-CM

## 2025-08-25 DIAGNOSIS — I44.7 LBBB (LEFT BUNDLE BRANCH BLOCK): ICD-10-CM

## 2025-08-25 DIAGNOSIS — I10 PRIMARY HYPERTENSION: ICD-10-CM

## 2025-08-25 LAB
ASCENDING AORTA: 2.7 CM
AV INDEX (PROSTH): 0.53
AV MEAN GRADIENT: 7 MMHG
AV PEAK GRADIENT: 12 MMHG
AV VALVE AREA BY VELOCITY RATIO: 1.7 CM²
AV VALVE AREA: 1.7 CM²
AV VELOCITY RATIO: 0.53
CV ECHO LV RWT: 0.4 CM
DOP CALC AO PEAK VEL: 1.7 M/S
DOP CALC AO VTI: 26.6 CM
DOP CALC LVOT AREA: 3.1 CM2
DOP CALC LVOT DIAMETER: 2 CM
DOP CALC LVOT PEAK VEL: 0.9 M/S
DOP CALC MV VTI: 28.9 CM
DOP CALCLVOT PEAK VEL VTI: 14 CM
E WAVE DECELERATION TIME: 189 MSEC
E/A RATIO: 0.99
E/E' RATIO: 23 M/S
ECHO LV POSTERIOR WALL: 1.1 CM (ref 0.6–1.1)
FRACTIONAL SHORTENING: 9.1 % (ref 28–44)
INTERVENTRICULAR SEPTUM: 1.3 CM (ref 0.6–1.1)
IVC DIAMETER: 1.82 CM
IVRT: 106 MSEC
LA MAJOR: 6.4 CM
LA MINOR: 6.5 CM
LA WIDTH: 4.9 CM
LEFT ATRIUM SIZE: 4.5 CM
LEFT ATRIUM VOLUME: 121 CM3
LEFT INTERNAL DIMENSION IN SYSTOLE: 5 CM (ref 2.1–4)
LEFT VENTRICLE DIASTOLIC VOLUME: 149 ML
LEFT VENTRICLE SYSTOLIC VOLUME: 118 ML
LEFT VENTRICULAR INTERNAL DIMENSION IN DIASTOLE: 5.5 CM (ref 3.5–6)
LEFT VENTRICULAR MASS: 272.4 G
LV LATERAL E/E' RATIO: 20.4 M/S
LV SEPTAL E/E' RATIO: 25.5 M/S
LVED V (TEICH): 148.62 ML
LVES V (TEICH): 118.48 ML
LVOT MG: 1.4 MMHG
LVOT MV: 0.54 CM/S
MV MEAN GRADIENT: 4 MMHG
MV PEAK A VEL: 1.03 M/S
MV PEAK E VEL: 1.02 M/S
MV PEAK GRADIENT: 6 MMHG
MV STENOSIS PRESSURE HALF TIME: 54.78 MS
MV VALVE AREA BY CONTINUITY EQUATION: 1.52 CM2
MV VALVE AREA P 1/2 METHOD: 4.02 CM2
OHS CV RV/LV RATIO: 0.6 CM
PISA TR MAX VEL: 2 M/S
PV PEAK GRADIENT: 2 MMHG
PV PEAK VELOCITY: 0.65 M/S
RA MAJOR: 5.93 CM
RA PRESSURE ESTIMATED: 8 MMHG
RA WIDTH: 3.5 CM
RIGHT VENTRICLE DIASTOLIC BASEL DIMENSION: 3.3 CM
RIGHT VENTRICULAR END-DIASTOLIC DIMENSION: 3.32 CM
RV TB RVSP: 10 MMHG
RV TISSUE DOPPLER FREE WALL SYSTOLIC VELOCITY 1 (APICAL 4 CHAMBER VIEW): 9.39 CM/S
SINUS: 3.6 CM
STJ: 2.1 CM
TDI LATERAL: 0.05 M/S
TDI SEPTAL: 0.04 M/S
TDI: 0.05 M/S
TR MAX PG: 17 MMHG
TRICUSPID ANNULAR PLANE SYSTOLIC EXCURSION: 2.2 CM
TV REST PULMONARY ARTERY PRESSURE: 24 MMHG

## 2025-08-25 PROCEDURE — 93306 TTE W/DOPPLER COMPLETE: CPT | Mod: 26,HCNC,, | Performed by: INTERNAL MEDICINE

## 2025-08-25 PROCEDURE — 93306 TTE W/DOPPLER COMPLETE: CPT | Mod: HCNC

## 2025-08-26 ENCOUNTER — TELEPHONE (OUTPATIENT)
Dept: NEPHROLOGY | Facility: CLINIC | Age: 75
End: 2025-08-26
Payer: MEDICARE

## 2025-08-27 ENCOUNTER — LAB VISIT (OUTPATIENT)
Dept: LAB | Facility: HOSPITAL | Age: 75
End: 2025-08-27
Payer: MEDICARE

## 2025-08-27 ENCOUNTER — RESULTS FOLLOW-UP (OUTPATIENT)
Dept: FAMILY MEDICINE | Facility: CLINIC | Age: 75
End: 2025-08-27
Payer: MEDICARE

## 2025-08-27 DIAGNOSIS — N18.4 TYPE 2 DIABETES MELLITUS WITH STAGE 4 CHRONIC KIDNEY DISEASE, WITH LONG-TERM CURRENT USE OF INSULIN: ICD-10-CM

## 2025-08-27 DIAGNOSIS — N18.30 STAGE 3 CHRONIC KIDNEY DISEASE, UNSPECIFIED WHETHER STAGE 3A OR 3B CKD: ICD-10-CM

## 2025-08-27 DIAGNOSIS — E11.22 TYPE 2 DIABETES MELLITUS WITH STAGE 4 CHRONIC KIDNEY DISEASE, WITH LONG-TERM CURRENT USE OF INSULIN: ICD-10-CM

## 2025-08-27 DIAGNOSIS — Z79.4 TYPE 2 DIABETES MELLITUS WITH STAGE 4 CHRONIC KIDNEY DISEASE, WITH LONG-TERM CURRENT USE OF INSULIN: ICD-10-CM

## 2025-08-27 DIAGNOSIS — I50.22 CHRONIC SYSTOLIC (CONGESTIVE) HEART FAILURE: ICD-10-CM

## 2025-08-27 LAB
ABSOLUTE EOSINOPHIL (OHS): 0.14 K/UL
ABSOLUTE MONOCYTE (OHS): 0.46 K/UL (ref 0.3–1)
ABSOLUTE NEUTROPHIL COUNT (OHS): 2.46 K/UL (ref 1.8–7.7)
ALBUMIN SERPL BCP-MCNC: 3.9 G/DL (ref 3.5–5.2)
ALP SERPL-CCNC: 63 UNIT/L (ref 40–150)
ALT SERPL W/O P-5'-P-CCNC: 11 UNIT/L (ref 0–55)
ANION GAP (OHS): 11 MMOL/L (ref 8–16)
AST SERPL-CCNC: 14 UNIT/L (ref 0–50)
BACTERIA #/AREA URNS AUTO: NORMAL /HPF
BASOPHILS # BLD AUTO: 0.02 K/UL
BASOPHILS NFR BLD AUTO: 0.5 %
BILIRUB SERPL-MCNC: 0.4 MG/DL (ref 0.1–1)
BILIRUB UR QL STRIP.AUTO: NEGATIVE
BUN SERPL-MCNC: 51 MG/DL (ref 8–23)
CALCIUM SERPL-MCNC: 9.5 MG/DL (ref 8.7–10.5)
CHLORIDE SERPL-SCNC: 107 MMOL/L (ref 95–110)
CLARITY UR: CLEAR
CO2 SERPL-SCNC: 23 MMOL/L (ref 23–29)
COLOR UR AUTO: YELLOW
CREAT SERPL-MCNC: 2.2 MG/DL (ref 0.5–1.4)
CREAT UR-MCNC: 61 MG/DL (ref 15–325)
EAG (OHS): 131 MG/DL (ref 68–131)
ERYTHROCYTE [DISTWIDTH] IN BLOOD BY AUTOMATED COUNT: 18.2 % (ref 11.5–14.5)
GFR SERPLBLD CREATININE-BSD FMLA CKD-EPI: 23 ML/MIN/1.73/M2
GLUCOSE SERPL-MCNC: 101 MG/DL (ref 70–110)
GLUCOSE UR QL STRIP: ABNORMAL
HBA1C MFR BLD: 6.2 % (ref 4–5.6)
HCT VFR BLD AUTO: 42.8 % (ref 37–48.5)
HGB BLD-MCNC: 13 GM/DL (ref 12–16)
HGB UR QL STRIP: NEGATIVE
IMM GRANULOCYTES # BLD AUTO: 0.01 K/UL (ref 0–0.04)
IMM GRANULOCYTES NFR BLD AUTO: 0.2 % (ref 0–0.5)
KETONES UR QL STRIP: NEGATIVE
LEUKOCYTE ESTERASE UR QL STRIP: NEGATIVE
LYMPHOCYTES # BLD AUTO: 1.25 K/UL (ref 1–4.8)
MCH RBC QN AUTO: 26.2 PG (ref 27–31)
MCHC RBC AUTO-ENTMCNC: 30.4 G/DL (ref 32–36)
MCV RBC AUTO: 86 FL (ref 82–98)
MICROSCOPIC COMMENT: NORMAL
NITRITE UR QL STRIP: NEGATIVE
NT-PROBNP SERPL-MCNC: ABNORMAL PG/ML
NUCLEATED RBC (/100WBC) (OHS): 0 /100 WBC
PH UR STRIP: 5 [PH]
PHOSPHATE SERPL-MCNC: 3.7 MG/DL (ref 2.7–4.5)
PLATELET # BLD AUTO: 274 K/UL (ref 150–450)
PMV BLD AUTO: 11.7 FL (ref 9.2–12.9)
POTASSIUM SERPL-SCNC: 4.9 MMOL/L (ref 3.5–5.1)
PROT SERPL-MCNC: 7.6 GM/DL (ref 6–8.4)
PROT UR QL STRIP: NEGATIVE
PROT UR-MCNC: <7 MG/DL
PROT/CREAT UR: NORMAL MG/G{CREAT}
PTH-INTACT SERPL-MCNC: 427.1 PG/ML (ref 9–77)
RBC # BLD AUTO: 4.96 M/UL (ref 4–5.4)
RBC #/AREA URNS AUTO: <1 /HPF (ref 0–4)
RELATIVE EOSINOPHIL (OHS): 3.2 %
RELATIVE LYMPHOCYTE (OHS): 28.8 % (ref 18–48)
RELATIVE MONOCYTE (OHS): 10.6 % (ref 4–15)
RELATIVE NEUTROPHIL (OHS): 56.7 % (ref 38–73)
SODIUM SERPL-SCNC: 141 MMOL/L (ref 136–145)
SP GR UR STRIP: 1.01
SQUAMOUS #/AREA URNS AUTO: <1 /HPF
URATE SERPL-MCNC: 10.3 MG/DL (ref 2.4–5.7)
UROBILINOGEN UR STRIP-ACNC: NEGATIVE EU/DL
WBC # BLD AUTO: 4.34 K/UL (ref 3.9–12.7)
WBC #/AREA URNS AUTO: 1 /HPF (ref 0–5)
YEAST UR QL AUTO: NORMAL /HPF

## 2025-08-27 PROCEDURE — 83970 ASSAY OF PARATHORMONE: CPT | Mod: HCNC

## 2025-08-27 PROCEDURE — 36415 COLL VENOUS BLD VENIPUNCTURE: CPT | Mod: HCNC,PO

## 2025-08-27 PROCEDURE — 84100 ASSAY OF PHOSPHORUS: CPT | Mod: HCNC

## 2025-08-27 PROCEDURE — 80053 COMPREHEN METABOLIC PANEL: CPT | Mod: HCNC

## 2025-08-27 PROCEDURE — 85025 COMPLETE CBC W/AUTO DIFF WBC: CPT | Mod: HCNC

## 2025-08-27 PROCEDURE — 82570 ASSAY OF URINE CREATININE: CPT | Mod: HCNC

## 2025-08-27 PROCEDURE — 81003 URINALYSIS AUTO W/O SCOPE: CPT | Mod: HCNC

## 2025-08-27 PROCEDURE — 84550 ASSAY OF BLOOD/URIC ACID: CPT | Mod: HCNC

## 2025-08-27 PROCEDURE — 83880 ASSAY OF NATRIURETIC PEPTIDE: CPT | Mod: HCNC

## 2025-08-27 PROCEDURE — 83036 HEMOGLOBIN GLYCOSYLATED A1C: CPT | Mod: HCNC

## 2025-08-28 ENCOUNTER — E-CONSULT (OUTPATIENT)
Facility: CLINIC | Age: 75
End: 2025-08-28
Payer: MEDICARE

## 2025-08-28 ENCOUNTER — TELEPHONE (OUTPATIENT)
Dept: FAMILY MEDICINE | Facility: CLINIC | Age: 75
End: 2025-08-28
Payer: MEDICARE

## 2025-08-28 DIAGNOSIS — R79.89 ELEVATED BRAIN NATRIURETIC PEPTIDE (BNP) LEVEL: Primary | ICD-10-CM

## 2025-08-28 LAB — HOLD SPECIMEN: NORMAL

## 2025-08-29 ENCOUNTER — PATIENT MESSAGE (OUTPATIENT)
Dept: CARDIOLOGY | Facility: CLINIC | Age: 75
End: 2025-08-29
Payer: MEDICARE

## 2025-08-29 ENCOUNTER — OFFICE VISIT (OUTPATIENT)
Dept: NEPHROLOGY | Facility: CLINIC | Age: 75
End: 2025-08-29
Payer: MEDICARE

## 2025-08-29 VITALS
BODY MASS INDEX: 25.78 KG/M2 | OXYGEN SATURATION: 97 % | WEIGHT: 164.25 LBS | SYSTOLIC BLOOD PRESSURE: 110 MMHG | HEART RATE: 83 BPM | HEIGHT: 67 IN | RESPIRATION RATE: 18 BRPM | DIASTOLIC BLOOD PRESSURE: 78 MMHG

## 2025-08-29 DIAGNOSIS — D63.1 ANEMIA IN STAGE 4 CHRONIC KIDNEY DISEASE: ICD-10-CM

## 2025-08-29 DIAGNOSIS — N18.4 CHRONIC KIDNEY DISEASE-MINERAL BONE DISORDER (CKD-MBD) WITH STAGE 4 CHRONIC KIDNEY DISEASE: Chronic | ICD-10-CM

## 2025-08-29 DIAGNOSIS — E83.9 CHRONIC KIDNEY DISEASE-MINERAL BONE DISORDER (CKD-MBD) WITH STAGE 4 CHRONIC KIDNEY DISEASE: Chronic | ICD-10-CM

## 2025-08-29 DIAGNOSIS — I12.9 BENIGN HYPERTENSION WITH CKD (CHRONIC KIDNEY DISEASE) STAGE IV: Chronic | ICD-10-CM

## 2025-08-29 DIAGNOSIS — M89.9 CHRONIC KIDNEY DISEASE-MINERAL BONE DISORDER (CKD-MBD) WITH STAGE 4 CHRONIC KIDNEY DISEASE: Chronic | ICD-10-CM

## 2025-08-29 DIAGNOSIS — N18.4 ANEMIA IN STAGE 4 CHRONIC KIDNEY DISEASE: ICD-10-CM

## 2025-08-29 DIAGNOSIS — N18.4 TYPE 2 DIABETES MELLITUS WITH STAGE 4 CHRONIC KIDNEY DISEASE, UNSPECIFIED WHETHER LONG TERM INSULIN USE: Chronic | ICD-10-CM

## 2025-08-29 DIAGNOSIS — N18.4 BENIGN HYPERTENSION WITH CKD (CHRONIC KIDNEY DISEASE) STAGE IV: Chronic | ICD-10-CM

## 2025-08-29 DIAGNOSIS — N18.4 CKD (CHRONIC KIDNEY DISEASE) STAGE 4, GFR 15-29 ML/MIN: Primary | Chronic | ICD-10-CM

## 2025-08-29 DIAGNOSIS — E78.2 MIXED HYPERLIPIDEMIA: Chronic | ICD-10-CM

## 2025-08-29 DIAGNOSIS — E11.22 TYPE 2 DIABETES MELLITUS WITH STAGE 4 CHRONIC KIDNEY DISEASE, UNSPECIFIED WHETHER LONG TERM INSULIN USE: Chronic | ICD-10-CM

## 2025-08-29 PROCEDURE — 99999 PR PBB SHADOW E&M-EST. PATIENT-LVL V: CPT | Mod: PBBFAC,HCNC,, | Performed by: STUDENT IN AN ORGANIZED HEALTH CARE EDUCATION/TRAINING PROGRAM

## 2025-08-29 RX ORDER — LOSARTAN POTASSIUM 50 MG/1
25 TABLET ORAL DAILY
Qty: 45 TABLET | Refills: 3 | Status: SHIPPED | OUTPATIENT
Start: 2025-08-29 | End: 2026-08-29

## (undated) DEVICE — ADHESIVE DERMABOND ADVANCED

## (undated) DEVICE — CUP MEDICINE GRAD STRL 2OZ

## (undated) DEVICE — KIT NERVE BLOCK PREP BAPTIST

## (undated) DEVICE — DRAPE PED LAP SURG 108X77IN

## (undated) DEVICE — MARKER SKIN REG TIP RULER LAB

## (undated) DEVICE — ELECTRODE REM PLYHSV RETURN 9

## (undated) DEVICE — NDL ECLIPSE SAFETY 18GX1-1/2IN

## (undated) DEVICE — COVER PROXIMA MAYO STAND

## (undated) DEVICE — GLOVE BIOGEL SKINSENSE PI 7.0

## (undated) DEVICE — CHLORAPREP 10.5 ML APPLICATOR

## (undated) DEVICE — MARKER SKIN STND TIP BLUE BARR

## (undated) DEVICE — GLOVE BIOGEL SKINSENSE PI 7.5

## (undated) DEVICE — COUNT NDL FOAM MAGNET 40COUNT

## (undated) DEVICE — SPONGE COTTON TRAY 4X4IN

## (undated) DEVICE — NDL QUINCKE SPINAL 25G 3.5IN

## (undated) DEVICE — PACK PACER PERMANENT OMC

## (undated) DEVICE — DEVICE PLASMABLADE X 3.0S LT

## (undated) DEVICE — UNDERGLOVES BIOGEL PI SZ 7 LF

## (undated) DEVICE — PACK EP DRAPE OMC

## (undated) DEVICE — SYR DISP LL 5CC

## (undated) DEVICE — SYR 10CC LUER LOCK

## (undated) DEVICE — PENCIL ROCKER SWITCH 10FT CORD

## (undated) DEVICE — TOWEL OR DISP STRL BLUE 4/PK

## (undated) DEVICE — DRESSING MEPILEX FLEX 3X3IN

## (undated) DEVICE — BANDAGE COVERLET ADH1X3IN

## (undated) DEVICE — SYR 3CC LUER LOC

## (undated) DEVICE — INTRODUCER HEMOSTASIS 6.5FR